# Patient Record
Sex: FEMALE | Race: WHITE | NOT HISPANIC OR LATINO | Employment: OTHER | URBAN - METROPOLITAN AREA
[De-identification: names, ages, dates, MRNs, and addresses within clinical notes are randomized per-mention and may not be internally consistent; named-entity substitution may affect disease eponyms.]

---

## 2017-02-23 ENCOUNTER — OFFICE VISIT (OUTPATIENT)
Dept: MEDICAL GROUP | Age: 67
End: 2017-02-23
Payer: MEDICARE

## 2017-02-23 VITALS
DIASTOLIC BLOOD PRESSURE: 78 MMHG | HEART RATE: 75 BPM | OXYGEN SATURATION: 96 % | HEIGHT: 66 IN | WEIGHT: 180 LBS | SYSTOLIC BLOOD PRESSURE: 106 MMHG | TEMPERATURE: 97.7 F | BODY MASS INDEX: 28.93 KG/M2

## 2017-02-23 DIAGNOSIS — Z23 NEED FOR PNEUMOCOCCAL VACCINATION: ICD-10-CM

## 2017-02-23 DIAGNOSIS — G47.00 INSOMNIA, UNSPECIFIED TYPE: ICD-10-CM

## 2017-02-23 DIAGNOSIS — M15.9 PRIMARY OSTEOARTHRITIS INVOLVING MULTIPLE JOINTS: ICD-10-CM

## 2017-02-23 DIAGNOSIS — G89.29 LUMBOSACRAL PAIN, CHRONIC: ICD-10-CM

## 2017-02-23 DIAGNOSIS — E06.3 HYPOTHYROIDISM DUE TO HASHIMOTO'S THYROIDITIS: ICD-10-CM

## 2017-02-23 DIAGNOSIS — E55.9 VITAMIN D INSUFFICIENCY: ICD-10-CM

## 2017-02-23 DIAGNOSIS — E04.2 MULTINODULAR GOITER (NONTOXIC): ICD-10-CM

## 2017-02-23 DIAGNOSIS — E78.00 PURE HYPERCHOLESTEROLEMIA: ICD-10-CM

## 2017-02-23 DIAGNOSIS — Z15.89 HLA B27 (HLA B27 POSITIVE): ICD-10-CM

## 2017-02-23 DIAGNOSIS — E03.8 HYPOTHYROIDISM DUE TO HASHIMOTO'S THYROIDITIS: ICD-10-CM

## 2017-02-23 DIAGNOSIS — M54.50 LUMBOSACRAL PAIN, CHRONIC: ICD-10-CM

## 2017-02-23 DIAGNOSIS — Z91.81 RISK FOR FALLS: ICD-10-CM

## 2017-02-23 DIAGNOSIS — Z80.0 FHX: COLON CANCER: ICD-10-CM

## 2017-02-23 DIAGNOSIS — K22.2 SCHATZKI'S RING: ICD-10-CM

## 2017-02-23 DIAGNOSIS — F41.1 GAD (GENERALIZED ANXIETY DISORDER): ICD-10-CM

## 2017-02-23 DIAGNOSIS — I10 BENIGN ESSENTIAL HTN: ICD-10-CM

## 2017-02-23 DIAGNOSIS — G43.009 MIGRAINE WITHOUT AURA AND WITHOUT STATUS MIGRAINOSUS, NOT INTRACTABLE: ICD-10-CM

## 2017-02-23 PROCEDURE — 99205 OFFICE O/P NEW HI 60 MIN: CPT | Performed by: FAMILY MEDICINE

## 2017-02-23 RX ORDER — BIOTIN 1 MG
5000 TABLET ORAL
COMMUNITY
End: 2018-07-05

## 2017-02-23 RX ORDER — ZOLPIDEM TARTRATE 5 MG/1
5 TABLET ORAL NIGHTLY PRN
COMMUNITY
End: 2017-02-23 | Stop reason: SDUPTHER

## 2017-02-23 RX ORDER — ZOLPIDEM TARTRATE 5 MG/1
5 TABLET ORAL NIGHTLY PRN
Qty: 30 TAB | Refills: 0 | Status: SHIPPED | OUTPATIENT
Start: 2017-02-23 | End: 2017-05-30 | Stop reason: SDUPTHER

## 2017-02-23 RX ORDER — PAROXETINE HYDROCHLORIDE 40 MG/1
40 TABLET, FILM COATED ORAL DAILY
COMMUNITY
End: 2017-04-07 | Stop reason: SDUPTHER

## 2017-02-23 RX ORDER — METOPROLOL SUCCINATE 50 MG/1
50 TABLET, EXTENDED RELEASE ORAL DAILY
COMMUNITY
End: 2017-03-23 | Stop reason: SDUPTHER

## 2017-02-23 RX ORDER — VALSARTAN AND HYDROCHLOROTHIAZIDE 160; 12.5 MG/1; MG/1
1 TABLET, FILM COATED ORAL DAILY
COMMUNITY
End: 2017-02-23

## 2017-02-23 RX ORDER — CHLORTHALIDONE 25 MG/1
12.5 TABLET ORAL DAILY
Qty: 90 TAB | Refills: 1 | Status: SHIPPED | OUTPATIENT
Start: 2017-02-23 | End: 2017-05-30 | Stop reason: SDUPTHER

## 2017-02-23 RX ORDER — HYDROCODONE BITARTRATE AND ACETAMINOPHEN 5; 325 MG/1; MG/1
0.5 TABLET ORAL
COMMUNITY
End: 2017-05-30 | Stop reason: SDUPTHER

## 2017-02-23 RX ORDER — LEVOTHYROXINE SODIUM 0.05 MG/1
50 TABLET ORAL
COMMUNITY
End: 2017-05-30 | Stop reason: SDUPTHER

## 2017-02-23 RX ORDER — ROSUVASTATIN CALCIUM 20 MG/1
20 TABLET, COATED ORAL EVERY EVENING
COMMUNITY
End: 2017-04-07 | Stop reason: SDUPTHER

## 2017-02-23 ASSESSMENT — PATIENT HEALTH QUESTIONNAIRE - PHQ9: CLINICAL INTERPRETATION OF PHQ2 SCORE: 0

## 2017-02-23 ASSESSMENT — PAIN SCALES - GENERAL: PAINLEVEL: 7=MODERATE-SEVERE PAIN

## 2017-02-23 NOTE — MR AVS SNAPSHOT
"        Margoth Hopkins   2017 1:40 PM   Office Visit   MRN: [id number]    Department:  [address] [place] [address]   Dept Phone:  [phone]    Description:  Female : 1950   Provider:  Charlee Walker M.D.           Reason for Visit     Establish Care           Allergies as of 2017     Allergen Noted Reactions    Pcn [Penicillins] 2017   Hives      You were diagnosed with     FHx: colon cancer   [622828]       Schatzki's ring   [044015]       Benign essential HTN   [983802]       Pure hypercholesterolemia   [272.0.ICD-9-CM]       Migraine without aura and without status migrainosus, not intractable   [934320]       MOHAMUD (generalized anxiety disorder)   [115210]       Hypothyroidism due to Hashimoto's thyroiditis   [0797866]       Multinodular goiter (nontoxic)   [719394]       HLA B27 (HLA B27 positive)   [875075]       Primary osteoarthritis involving multiple joints   [9219235]       Lumbosacral pain, chronic   [094749]       Insomnia, unspecified type   [6531165]       Risk for falls   [492083]       Need for pneumococcal vaccination   [745206]       Vitamin D insufficiency   [703374]         Vital Signs     Blood Pressure Pulse Temperature Height Weight Body Mass Index    106/78 mmHg 75 36.5 °C (97.7 °F) 1.676 m (5' 6\") 81.647 kg (180 lb) 29.07 kg/m2    Oxygen Saturation Smoking Status                96% Never Smoker           Basic Information     Date Of Birth Sex Race Ethnicity Preferred Language    1950 Female White Non- English      Your appointments     Mar 17, 2017  9:00 AM   DX INJECT/ASPIRATE-SMALL JOINT with [facility] DX 1, RADIOLOGIST, [facility] IMAGING - DIAGNOSTIC - [facility] ([facility]  [address]  [place] [address] [phone]           Need an H&P faxed (good for only 30 days).            Mar 21, 2017  2:20 PM   Established Patient with Charlee Walker M.D.   [facility] [address] [place] [address]    25 " [facility] [id number] [phone]           You will be receiving a confirmation call a few days before your appointment from our automated call confirmation system.              Problem List              ICD-10-CM Priority Class Noted - Resolved    FHx: colon cancer Z80.0   2/23/2017 - Present    Schatzki's ring K22.2   2/23/2017 - Present    Benign essential HTN I10   2/23/2017 - Present    Pure hypercholesterolemia E78.00   2/23/2017 - Present    Migraine without aura, not intractable G43.009   2/23/2017 - Present    MOHAMUD (generalized anxiety disorder) F41.1   2/23/2017 - Present    Hypothyroidism due to Hashimoto's thyroiditis E03.8, E06.3   2/23/2017 - Present    Multinodular goiter (nontoxic) E04.2   2/23/2017 - Present    HLA B27 (HLA B27 positive) Z15.89   2/23/2017 - Present    Osteoarthritis of multiple joints M15.9   2/23/2017 - Present    Lumbosacral pain, chronic M54.5, G89.29   2/23/2017 - Present    Insomnia G47.00   2/23/2017 - Present    Risk for falls Z91.81   2/23/2017 - Present    Vitamin D insufficiency E55.9   2/23/2017 - Present      Health Maintenance        Date Due Completion Dates    IMM DTaP/Tdap/Td Vaccine (1 - Tdap) 12/21/1967 ---    MAMMOGRAM 12/21/1988 ---    COLONOSCOPY 12/21/1998 ---    BONE DENSITY 12/21/2013 ---    IMM PNEUMOCOCCAL 65+ (ADULT) LOW/MEDIUM RISK SERIES (1 of 2 - PCV13) 12/21/2013 ---    IMM INFLUENZA (1) 9/1/2016 ---            Current Immunizations     SHINGLES VACCINE 2/23/2011, 2/15/2011    TD Vaccine 2/23/2011    Tetanus Vaccine 2/15/2011      Below and/or attached are the medications your provider expects you to take. Review all of your home medications and newly ordered medications with your provider and/or pharmacist. Follow medication instructions as directed by your provider and/or pharmacist. Please keep your medication list with you and share with your provider. Update the information when medications are discontinued, doses are changed, or  new medications (including over-the-counter products) are added; and carry medication information at all times in the event of emergency situations     Allergies:  PCN - Hives               Medications  Valid as of: February 23, 2017 -  2:50 PM    Generic Name Brand Name Tablet Size Instructions for use    Biotin (Tab) Biotin 1000 MCG Take 5,000 mcg by mouth.        Chlorthalidone (Tab) HYGROTON 25 MG Take 0.5 Tabs by mouth every day.        Cholecalciferol (Tab) cholecalciferol 1000 UNIT Take 2,000 Units by mouth every day.        Hydrocodone-Acetaminophen (Tab) NORCO 5-325 MG Take 0.5 Tabs by mouth every bedtime.        Levothyroxine Sodium (Tab) SYNTHROID 50 MCG Take 50 mcg by mouth Every morning on an empty stomach.        Metoprolol Succinate (TABLET SR 24 HR) TOPROL XL 50 MG Take 50 mg by mouth every day.        PARoxetine HCl (Tab) PAXIL 40 MG Take 40 mg by mouth every day.        Probiotic Product   Take  by mouth.        Rosuvastatin Calcium (Tab) CRESTOR 20 MG Take 20 mg by mouth every evening.        Turmeric   Take  by mouth.        Zolpidem Tartrate (Tab) AMBIEN 5 MG Take 1 Tab by mouth at bedtime as needed for Sleep.        .                 Medicines prescribed today were sent to:     [facility] - [address] [facility] [address]    Phone: [phone] Fax: [phone]    Open 24 Hours?: No    [facility] #[id number] - [place] - [address] [facility]    [address] [facility] [place] [address]    Phone: [phone] Fax: [phone]    Open 24 Hours?: No      Medication refill instructions:       If your prescription bottle indicates you have medication refills left, it is not necessary to call your provider’s office. Please contact your pharmacy and they will refill your medication.    If your prescription bottle indicates you do not have any refills left, you may request refills at any time through one of the following ways: The online  [employer] system (except Urgent Care), by calling your provider’s office, or by asking your pharmacy to contact your provider’s office with a refill request. Medication refills are processed only during regular business hours and may not be available until the next business day. Your provider may request additional information or to have a follow-up visit with you prior to refilling your medication.   *Please Note: Medication refills are assigned a new Rx number when refilled electronically. Your pharmacy may indicate that no refills were authorized even though a new prescription for the same medication is available at the pharmacy. Please request the medicine by name with the pharmacy before contacting your provider for a refill.        Your To Do List     Future Labs/Procedures Complete By Expires    COMP METABOLIC PANEL  As directed 2/24/2018    HEMOGLOBIN A1C  As directed 2/24/2018    LIPID PROFILE  As directed 2/24/2018    MICROALBUMIN CREAT RATIO URINE  As directed 2/24/2018    TSH  As directed 2/24/2018    VITAMIN D,25 HYDROXY  As directed 2/24/2018         [employer] Access Code: [email or web address]  Expires: 3/15/2017  2:14 PM    [employer]  A secure, online tool to manage your health information     [employer]’s [employer]® is a secure, online tool that connects you to your personalized health information from the privacy of your home -- day or night - making it very easy for you to manage your healthcare. Once the activation process is completed, you can even access your medical information using the Ubi Video roberto, which is available for free in the Apple Roberto store or Google Play store.     [employer] provides the following levels of access (as shown below):   My Chart Features   [facility] Doctor [facility]  Urgent  Care Non-[facility]  Primary Care  Doctor   Email your healthcare team securely and privately 24/7 X X X    Manage appointments: schedule your next appointment; view details of  past/upcoming appointments X      Request prescription refills. X      View recent personal medical records, including lab and immunizations X X X X   View health record, including health history, allergies, medications X X X X   Read reports about your outpatient visits, procedures, consult and ER notes X X X X   See your discharge summary, which is a recap of your hospital and/or ER visit that includes your diagnosis, lab results, and care plan. X X       How to register for [employer]:  1. Go to  [email or web address].  2. Click on the Sign Up Now box, which takes you to the New Member Sign Up page. You will need to provide the following information:  a. Enter your [employer] Access Code exactly as it appears at the top of this page. (You will not need to use this code after you’ve completed the sign-up process. If you do not sign up before the expiration date, you must request a new code.)   b. Enter your date of birth.   c. Enter your home email address.   d. Click Submit, and follow the next screen’s instructions.  3. Create a [employer] ID. This will be your [employer] login ID and cannot be changed, so think of one that is secure and easy to remember.  4. Create a [employer] password. You can change your password at any time.  5. Enter your Password Reset Question and Answer. This can be used at a later time if you forget your password.   6. Enter your e-mail address. This allows you to receive e-mail notifications when new information is available in [employer].  7. Click Sign Up. You can now view your health information.    For assistance activating your [employer] account, call [phone]

## 2017-02-24 NOTE — PROGRESS NOTES
CC: establish care, HTN, HLD    HPI:     Margoth Hopkins is a 66 y.o. female, new patient to the clinic, presents to [facility]. Pt has the following concerns:     1. FHx: colon cancer  Pt's mom has hx of colon cancer diagnosed in her 50s.   Pt has had multiple colonoscopy done starting at the age of 48. All has been normal so far.   Last colonoscopy done was 2-3 years ago in [place].     2. Schatzki's ring  Hx of Schatzki's ring at distal esophagus with intermittent symptoms of esophageal spasm, pain, and dysphagia.   Pt had EGD with esophageal dilation x4. She is currently asymptomatic and doing well.     3. Benign essential HTN  Chronic, well controlled with Diovan, denies medication side effects.   Denies chest pain, SOB, IVAN, unusual edema, dizziness with standing.     4. Pure hypercholesterolemia  Chronic, well controlled with Crestor, denies side effects.   Unable to exercise regularly due to diffuse arthritis.   Pt is adhering to low-fat, low-cholesterol diet.     5. Migraine without aura and without status migrainosus, not intractable  Hx of migraine, has been on small dose of Metoprolol for couple decades for migraine prophylaxis.   Doing well, denies active symptoms.     6. MOHAMUD (generalized anxiety disorder)  Chronic, well controlled with Paroxetine for couple years.   Tried to wean off, but suffered recurrent symptoms.   Pt expresses interest to continue.     7. Hypothyroidism due to Hashimoto's thyroiditis  Chronic, stable on Levothyroxine for a number of years, denies side effects.   Denies chest palpitation, hair loss, weight gain, fatigue, heat/cold intolerance    8. Multinodular goiter (nontoxic)  Pt was found to have multinodular goiter in [place] s/p needle biopsy.   Pt was told that it was benign and no further workups indicated.   Denies dysphagia, dysphonia, cervical lymphadenopathy.     9. HLA B27 (HLA B27 positive)  Pt was found to have HLA-B27 positive.     10. Primary osteoarthritis  involving multiple joints  Chronic, diffuse osteoarthritis including back, neck, knees, ankles, and hips.   Pt is taking 0.5 tablet of Vicodin 5-325 mg prior to bed for pain.   She usually takes OTC Tylenol or NSAID during the day for intermittent pain.   Can't exercise frequently due to arthritis.   Endorses mild leg weakness, but vision is excellent.   Declines PT referral or surgical intervention at this point.     11. Lumbosacral pain, chronic  Chronic, secondary to lumbosacral disc disease and arthritis, s/p steroid injection 6 times in [place], but does not really work.   She continue to suffer daily pain, that affect daily function. She take 0.5 tablet of Vicodin before bed.   She has appointment with pain management in a few weeks.     12. Insomnia, unspecified type  Pt takes Ambien occasionally for insomnia.   She states that her insomnia is pain-related. She has good sleep hygiene: drinks 1 cup of coffee per day, no reflux symptoms that keep her up at night, denies excessive usage of electronic devices, no daytime naps    13. Risk for falls  Pt has risks for fall secondary to diffuse joint pain. She uses cane on days that she feels unstable.   She declines PT or home safety eval. Vision is good. Neg hx of osteopenia or osteoporosis. Neg hx of bone fracture.     14. Vitamin D insufficiency  Pt is on vitamin D supplement      Current medicines (including changes today)  Current Outpatient Prescriptions   Medication Sig Dispense Refill   • metoprolol SR (TOPROL XL) 50 MG TABLET SR 24 HR Take 50 mg by mouth every day.     • rosuvastatin (CRESTOR) 20 MG Tab Take 20 mg by mouth every evening.     • paroxetine (PAXIL) 40 MG tablet Take 40 mg by mouth every day.     • levothyroxine (SYNTHROID) 50 MCG Tab Take 50 mcg by mouth Every morning on an empty stomach.     • vitamin D (CHOLECALCIFEROL) 1000 UNIT Tab Take 2,000 Units by mouth every day.     • Biotin 1000 MCG Tab Take 5,000 mcg by mouth.     • Probiotic  Product (PROBIOTIC DAILY PO) Take  by mouth.     • TURMERIC PO Take  by mouth.     • hydrocodone-acetaminophen (NORCO) 5-325 MG Tab per tablet Take 0.5 Tabs by mouth every bedtime.     • zolpidem (AMBIEN) 5 MG Tab Take 1 Tab by mouth at bedtime as needed for Sleep. 30 Tab 0   • chlorthalidone (HYGROTON) 25 MG Tab Take 0.5 Tabs by mouth every day. 90 Tab 1     No current facility-administered medications for this visit.     She  has a past medical history of Thyroid disease and Goiter.  She  has past surgical history that includes knee replacement, total (Right); foot surgery (Left); fusion; cervical fusion posterior; bladder sling female; appendectomy; and cataract extraction with iol (Left).  Social History   Substance Use Topics   • Smoking status: Never Smoker    • Smokeless tobacco: Never Used   • Alcohol Use: 4.2 oz/week     7 Glasses of wine per week     Social History     Social History Narrative   • No narrative on file     Family History   Problem Relation Age of Onset   • Arthritis Mother    • Alzheimer's Disease Mother    • Cancer Mother      cervical cancer   • Heart Attack Father    • Other Brother      brain anurism   • Cancer Maternal Grandmother 50     colono cancer   • Heart Disease Brother      Family Status   Relation Status Death Age   • Mother  92   • Father  64   • Brother     • Maternal Grandmother  70   • Brother Alive    • Maternal Grandfather     • Paternal Grandmother     • Paternal Grandfather     • Brother     • Brother Alive        I personally reviewed patient's problem list, allergies, medications, family hx, social hx with patient and update EPIC.     REVIEW OF SYSTEMS:  CONSTITUTIONAL:  Denies night sweats, fatigue, malaise, lethargy, fever or chills.  RESPIRATORY:  Denies cough, wheeze, hemoptysis, or shortness of breath.  CARDIOVASCULAR:  Denies chest pains, palpitations, pedal edema  GASTROINTESTINAL:  Denies  "abdominal pain, nausea or vomiting, diarrhea, constipation, hematemesis, hematochezia, melena.  GENITOURINARY:  Denies urinary urgency, frequency, dysuria, or hematuria.  No obstructive symptoms.  Denies unusual discharge.      All other systems reviewed and are negative     Objective:     Blood pressure 106/78, pulse 75, temperature 36.5 °C (97.7 °F), height 1.676 m (5' 6\"), weight 81.647 kg (180 lb), SpO2 96 %. Body mass index is 29.07 kg/(m^2).  Physical Exam:    Constitutional: Awake, alert, in no apparent distress.  Skin: Warm, dry, good turgor, no rashes/jaundice in visible areas.  Eye: PERRL, intact EOM, conjunctiva clear, lids normal.  ENMT: TM and auditory canal wnl, nasal & oral mucosa wnl, lips without lesions, good dentition, oropharynx clear.  Neck: Trachea midline, no masses, no thyromegaly. No cervical or supraclavicular lymphadenopathy.  Respiratory: Unlabored respiratory effort, lungs clear to auscultation, no wheezes, no rhonchi.  Cardiovascular: Normal S1, S2, no murmur, no rubs, no gallops, no pedal edema.  Abdomen: Soft, active bowel sounds, non-tender to palpation, no masses, no hepatosplenomegaly.  Neuro: CN 2-12 grossly intact, no focal weakness/numbness.   Psych: Alert and oriented x3, affect and mood wnl, intact judgement and insight.       Assessment and Plan:   The following treatment plan was discussed    1. FHx: colon cancer  Pt's mom has hx of colon cancer diagnosed in her 50s.   Pt has had multiple colonoscopy done starting at the age of 48. All has been normal so far.   Last colonoscopy done was 2-3 years ago in [place]. Plan:   - will request records from previous PCP  - continue screening colonoscopy every 5 years per GI recommendation    2. Schatzki's ring  Hx of Schatzki's ring at distal esophagus with intermittent symptoms of esophageal spasm, pain, and dysphagia.   Pt had EGD with esophageal dilation x4. She is currently asymptomatic and doing well. Plan: will monitor    3. " Benign essential HTN  Chronic, well controlled with Diovan, /78. Pt c/o mild dizziness with standing. Given fall risk, will discontinue Diovan and switch to low dose Chlorthalidone (hopefully also helps improving bone mineral density in postmenopausal women). Plan:   - chlorthalidone (HYGROTON) 25 MG Tab; Take 0.5 Tabs by mouth every day.  Dispense: 90 Tab; Refill: 1  - COMP METABOLIC PANEL; Future  - HEMOGLOBIN A1C; Future  - MICROALBUMIN CREAT RATIO URINE; Future  - f/u in 4 weeks    4. Pure hypercholesterolemia  - LIPID PROFILE; Future  - continue Crestor    5. Migraine without aura and without status migrainosus, not intractable  Chronic, well controlled with metoprolol. Plan: will monitor    6. MOHAMUD (generalized anxiety disorder)  Chronic, well controlled with Paxil, denies medication side effects. Plan: will continue Paxil    7. Hypothyroidism due to Hashimoto's thyroiditis  - TSH; Future  - continue Levothyroxine 50 mcg for now, will as adjust dose as indicated    8. Multinodular goiter (nontoxic)  Chronic, s/p biopsy, benign per pt's report.  Denies dysphagia, dysphonia, head/neck lymphadenopathy. Plan:   - will request records from previous PCP    9. HLA B27 (HLA B27 positive)  - will request records from previous PCP    10. Primary osteoarthritis involving multiple joints  Chronic, diffuse osteoarthritis including back, neck, knees, ankles, and hips.   Pt is taking 0.5 tablet of Vicodin 5-325 mg prior to bed for pain. She also takes OTC Tylenol or NSAID during the day for intermittent pain.   Can't exercise frequently due to arthritis. Endorses mild leg weakness, but vision is excellent. Declines PT referral or surgical intervention at this point. Plan:   - encourage home low-impact exercises  - avoid excessive consumption of NSAID  - f/u with pain management, appointment in a few weeks.     11. Lumbosacral pain, chronic  Chronic, secondary to lumbosacral disc disease and arthritis, s/p steroid  injection 6 times in [place], but does not really work. Pain sometimes keeps pt up at night.   She continue to suffer daily pain, that affects her functions. She take 0.5 tablet of Vicodin before bed. She has appointment with pain management in a few weeks.   Pt is not interested in PT or complimentary medicine at this point.   Plan:   - f/u with pain management.     12. Insomnia, unspecified type  Pt uses Ambien occasionally for insomnia. Does not drink alcohol at night. She takes 0.5 tablet of Vicodin 5-325 mg before bed every night for pain. Plan:   - zolpidem (AMBIEN) 5 MG Tab; Take 1 Tab by mouth at bedtime as needed for Sleep.  Dispense: 30 Tab; Refill: 0  - risks and benefits of Ambien reviewed with patient.   - avoid alcohol, drugs, and narcotics while taking Ambien.     13. Risk for falls  Secondary to diffuse hips, knee, ankle pain. Weakness and decondition are contributing. Her vision is good.   Pt states that she had DEXA scan every 2 years in [place]. She states that her bone density was normal.   Plan:   - Patient identified as fall risk.  Appropriate orders and counseling given.  - discussed PT referral and home safety eval but pt declines. She will try to exercise more with home equipment.   - Will request records from previous PCP    14. Need for pneumococcal vaccination  - PCV 13    15. Vitamin D insufficiency  - VITAMIN D,25 HYDROXY; Future    Total 60 minutes face-to-face time spent with patient, with greater than 50% of the total time discussing patient's issues and symptoms as listed above in assessment and plan, as well as managing coordination of care for future evaluation and treatment.        Records requested.  Followup: Return in about 4 weeks (around 3/23/2017) for Multiple issues.

## 2017-03-03 ENCOUNTER — HOSPITAL ENCOUNTER (OUTPATIENT)
Dept: LAB | Facility: MEDICAL CENTER | Age: 67
End: 2017-03-03
Attending: FAMILY MEDICINE
Payer: MEDICARE

## 2017-03-03 DIAGNOSIS — I10 BENIGN ESSENTIAL HTN: ICD-10-CM

## 2017-03-03 DIAGNOSIS — E03.8 HYPOTHYROIDISM DUE TO HASHIMOTO'S THYROIDITIS: ICD-10-CM

## 2017-03-03 DIAGNOSIS — E06.3 HYPOTHYROIDISM DUE TO HASHIMOTO'S THYROIDITIS: ICD-10-CM

## 2017-03-03 DIAGNOSIS — E78.00 PURE HYPERCHOLESTEROLEMIA: ICD-10-CM

## 2017-03-03 DIAGNOSIS — E55.9 VITAMIN D INSUFFICIENCY: ICD-10-CM

## 2017-03-03 LAB
25(OH)D3 SERPL-MCNC: 25 NG/ML (ref 30–100)
ALBUMIN SERPL BCP-MCNC: 4.3 G/DL (ref 3.2–4.9)
ALBUMIN/GLOB SERPL: 1.4 G/DL
ALP SERPL-CCNC: 70 U/L (ref 30–99)
ALT SERPL-CCNC: 23 U/L (ref 2–50)
ANION GAP SERPL CALC-SCNC: 9 MMOL/L (ref 0–11.9)
AST SERPL-CCNC: 20 U/L (ref 12–45)
BILIRUB SERPL-MCNC: 0.7 MG/DL (ref 0.1–1.5)
BUN SERPL-MCNC: 20 MG/DL (ref 8–22)
CALCIUM SERPL-MCNC: 10 MG/DL (ref 8.5–10.5)
CHLORIDE SERPL-SCNC: 106 MMOL/L (ref 96–112)
CHOLEST SERPL-MCNC: 200 MG/DL (ref 100–199)
CO2 SERPL-SCNC: 24 MMOL/L (ref 20–33)
CREAT SERPL-MCNC: 0.56 MG/DL (ref 0.5–1.4)
CREAT UR-MCNC: 156.3 MG/DL
EST. AVERAGE GLUCOSE BLD GHB EST-MCNC: 105 MG/DL
GLOBULIN SER CALC-MCNC: 3.1 G/DL (ref 1.9–3.5)
GLUCOSE SERPL-MCNC: 106 MG/DL (ref 65–99)
HBA1C MFR BLD: 5.3 % (ref 0–5.6)
HDLC SERPL-MCNC: 85 MG/DL
LDLC SERPL CALC-MCNC: 99 MG/DL
MICROALBUMIN UR-MCNC: 1.2 MG/DL
MICROALBUMIN/CREAT UR: 8 MG/G (ref 0–30)
POTASSIUM SERPL-SCNC: 3.8 MMOL/L (ref 3.6–5.5)
PROT SERPL-MCNC: 7.4 G/DL (ref 6–8.2)
SODIUM SERPL-SCNC: 139 MMOL/L (ref 135–145)
TRIGL SERPL-MCNC: 80 MG/DL (ref 0–149)
TSH SERPL DL<=0.005 MIU/L-ACNC: 0.64 UIU/ML (ref 0.3–3.7)

## 2017-03-03 PROCEDURE — 80061 LIPID PANEL: CPT

## 2017-03-03 PROCEDURE — 82043 UR ALBUMIN QUANTITATIVE: CPT

## 2017-03-03 PROCEDURE — 83036 HEMOGLOBIN GLYCOSYLATED A1C: CPT | Mod: GA

## 2017-03-03 PROCEDURE — 36415 COLL VENOUS BLD VENIPUNCTURE: CPT

## 2017-03-03 PROCEDURE — 80053 COMPREHEN METABOLIC PANEL: CPT

## 2017-03-03 PROCEDURE — 82570 ASSAY OF URINE CREATININE: CPT

## 2017-03-03 PROCEDURE — 84443 ASSAY THYROID STIM HORMONE: CPT

## 2017-03-03 PROCEDURE — 82306 VITAMIN D 25 HYDROXY: CPT

## 2017-03-17 ENCOUNTER — HOSPITAL ENCOUNTER (OUTPATIENT)
Dept: RADIOLOGY | Facility: MEDICAL CENTER | Age: 67
End: 2017-03-17
Attending: NURSE PRACTITIONER
Payer: MEDICARE

## 2017-03-17 DIAGNOSIS — M19.072 OSTEOARTHRITIS OF LEFT ANKLE, UNSPECIFIED OSTEOARTHRITIS TYPE: ICD-10-CM

## 2017-03-17 DIAGNOSIS — M25.572 LEFT ANKLE PAIN, UNSPECIFIED CHRONICITY: ICD-10-CM

## 2017-03-17 PROCEDURE — 700117 HCHG RX CONTRAST REV CODE 255: Performed by: NURSE PRACTITIONER

## 2017-03-17 PROCEDURE — 700111 HCHG RX REV CODE 636 W/ 250 OVERRIDE (IP)

## 2017-03-17 PROCEDURE — 700101 HCHG RX REV CODE 250

## 2017-03-17 PROCEDURE — 20605 DRAIN/INJ JOINT/BURSA W/O US: CPT | Mod: LT

## 2017-03-17 RX ORDER — BUPIVACAINE HYDROCHLORIDE 5 MG/ML
INJECTION, SOLUTION EPIDURAL; INTRACAUDAL
Status: DISPENSED
Start: 2017-03-17 | End: 2017-03-17

## 2017-03-17 RX ORDER — TRIAMCINOLONE ACETONIDE 40 MG/ML
INJECTION, SUSPENSION INTRA-ARTICULAR; INTRAMUSCULAR
Status: DISPENSED
Start: 2017-03-17 | End: 2017-03-17

## 2017-03-17 RX ORDER — LIDOCAINE HYDROCHLORIDE 10 MG/ML
INJECTION, SOLUTION INFILTRATION; PERINEURAL
Status: DISPENSED
Start: 2017-03-17 | End: 2017-03-17

## 2017-03-17 RX ADMIN — IOHEXOL 50 ML: 300 INJECTION, SOLUTION INTRAVENOUS at 09:15

## 2017-03-17 NOTE — OR SURGEON
Immediate Post- Operative Note        PostOp Diagnosis: left foot pain      Procedure(s): left foot injection      Estimated Blood Loss: Less than 5 ml        Complications: None            3/17/2017     2:15 PM     Luke Blue

## 2017-03-23 RX ORDER — METOPROLOL SUCCINATE 50 MG/1
50 TABLET, EXTENDED RELEASE ORAL DAILY
Qty: 90 TAB | Refills: 0 | Status: SHIPPED | OUTPATIENT
Start: 2017-03-23 | End: 2017-05-30 | Stop reason: SDUPTHER

## 2017-03-23 NOTE — TELEPHONE ENCOUNTER
Was the patient seen in the last year in this department? YES     Does patient have an active prescription for medications requested? No     Received Request Via: Patient

## 2017-03-27 ENCOUNTER — TELEPHONE (OUTPATIENT)
Dept: MEDICAL GROUP | Age: 67
End: 2017-03-27

## 2017-03-27 NOTE — TELEPHONE ENCOUNTER
Phone Number Called: [phone] [facility]    Message: medication called in to correct pharmacy, called pt to notify of information below.     Left Message for patient to call back: no

## 2017-03-27 NOTE — TELEPHONE ENCOUNTER
----- Message from Kerry Stallings sent at 3/27/2017  1:35 PM PDT -----  Contact: [phone]  Patient called up front, very upset. Her Metoprolol was called to the wrong Pharmacy in [place]. She is all out of her medication and is now dizzy and not feeling well. She would like her medication called to the [facility] in [place], [place]. [phone] and then she would like a call back to let her know it has been done, because she says she has called 3 times about this.  Thank you

## 2017-04-07 RX ORDER — ROSUVASTATIN CALCIUM 20 MG/1
20 TABLET, COATED ORAL EVERY EVENING
Qty: 90 TAB | Refills: 1 | Status: SHIPPED | OUTPATIENT
Start: 2017-04-07 | End: 2017-05-30 | Stop reason: SDUPTHER

## 2017-04-07 RX ORDER — PAROXETINE HYDROCHLORIDE 40 MG/1
40 TABLET, FILM COATED ORAL DAILY
Qty: 90 TAB | Refills: 1 | Status: SHIPPED | OUTPATIENT
Start: 2017-04-07 | End: 2017-05-30 | Stop reason: SDUPTHER

## 2017-04-07 NOTE — TELEPHONE ENCOUNTER
Phone Number Called: [phone] (home)     Message: Called and informed Pt that medication has been sent to Pharmacy. She is calling scheduling to set up an appointment to be seen.    Left Message for patient to call back: N\A

## 2017-05-26 ENCOUNTER — TELEPHONE (OUTPATIENT)
Dept: MEDICAL GROUP | Age: 67
End: 2017-05-26

## 2017-05-26 NOTE — TELEPHONE ENCOUNTER
ESTABLISHED PATIENT PRE-VISIT PLANNING     Note: Patient will not be contacted if there is no indication to call.     1.  Reviewed note from last office visit with PCP and/or other med group provider: Yes    2.  If any orders were placed at last visit, do we have Results/Consult Notes?        •  Labs - Labs ordered, completed and results are in chart.       •  Imaging - Imaging was not ordered at last office visit.       •  Referrals - No referrals were ordered at last office visit.    3.  Immunizations were updated in [facility] using WebIZ?: Yes       •  Web Iz Recommendations: FLU HEPATITIS A  PREVNAR (PCV13)  TDAP    4.  Patient is due for the following Health Maintenance Topics:   Health Maintenance Due   Topic Date Due   • Annual Wellness Visit  1950   • MAMMOGRAM  12/21/1988   • COLONOSCOPY  12/21/1998   • IMM DTaP/Tdap/Td Vaccine (1 - Tdap) 02/24/2011   • BONE DENSITY  12/21/2013   • IMM PNEUMOCOCCAL 65+ (ADULT) LOW/MEDIUM RISK SERIES (1 of 2 - PCV13) 12/21/2013       - Patient has completed TD and ZOSTAVAX (Shingles) Immunization(s) per WebIZ. Chart has been updated.    5.  Patient was not informed to arrive 15 min prior to their scheduled appointment and bring in their medication bottles.

## 2017-05-30 ENCOUNTER — OFFICE VISIT (OUTPATIENT)
Dept: MEDICAL GROUP | Age: 67
End: 2017-05-30
Payer: MEDICARE

## 2017-05-30 VITALS
HEIGHT: 66 IN | OXYGEN SATURATION: 95 % | HEART RATE: 72 BPM | TEMPERATURE: 98.1 F | DIASTOLIC BLOOD PRESSURE: 60 MMHG | BODY MASS INDEX: 27.61 KG/M2 | WEIGHT: 171.8 LBS | SYSTOLIC BLOOD PRESSURE: 110 MMHG

## 2017-05-30 DIAGNOSIS — E03.8 HYPOTHYROIDISM DUE TO HASHIMOTO'S THYROIDITIS: ICD-10-CM

## 2017-05-30 DIAGNOSIS — G43.009 MIGRAINE WITHOUT AURA AND WITHOUT STATUS MIGRAINOSUS, NOT INTRACTABLE: ICD-10-CM

## 2017-05-30 DIAGNOSIS — E06.3 HYPOTHYROIDISM DUE TO HASHIMOTO'S THYROIDITIS: ICD-10-CM

## 2017-05-30 DIAGNOSIS — I10 BENIGN ESSENTIAL HTN: Primary | ICD-10-CM

## 2017-05-30 DIAGNOSIS — E78.00 PURE HYPERCHOLESTEROLEMIA: ICD-10-CM

## 2017-05-30 DIAGNOSIS — M15.9 PRIMARY OSTEOARTHRITIS INVOLVING MULTIPLE JOINTS: ICD-10-CM

## 2017-05-30 DIAGNOSIS — G47.00 INSOMNIA, UNSPECIFIED TYPE: ICD-10-CM

## 2017-05-30 DIAGNOSIS — F41.1 GAD (GENERALIZED ANXIETY DISORDER): ICD-10-CM

## 2017-05-30 PROCEDURE — 99214 OFFICE O/P EST MOD 30 MIN: CPT | Performed by: FAMILY MEDICINE

## 2017-05-30 PROCEDURE — 3017F COLORECTAL CA SCREEN DOC REV: CPT | Mod: 8P | Performed by: FAMILY MEDICINE

## 2017-05-30 PROCEDURE — G8432 DEP SCR NOT DOC, RNG: HCPCS | Performed by: FAMILY MEDICINE

## 2017-05-30 PROCEDURE — 1100F PTFALLS ASSESS-DOCD GE2>/YR: CPT | Performed by: FAMILY MEDICINE

## 2017-05-30 PROCEDURE — G8419 CALC BMI OUT NRM PARAM NOF/U: HCPCS | Performed by: FAMILY MEDICINE

## 2017-05-30 PROCEDURE — 4040F PNEUMOC VAC/ADMIN/RCVD: CPT | Mod: 8P | Performed by: FAMILY MEDICINE

## 2017-05-30 PROCEDURE — 0518F FALL PLAN OF CARE DOCD: CPT | Mod: 8P | Performed by: FAMILY MEDICINE

## 2017-05-30 PROCEDURE — 3288F FALL RISK ASSESSMENT DOCD: CPT | Mod: 8P | Performed by: FAMILY MEDICINE

## 2017-05-30 PROCEDURE — 1036F TOBACCO NON-USER: CPT | Performed by: FAMILY MEDICINE

## 2017-05-30 PROCEDURE — 3014F SCREEN MAMMO DOC REV: CPT | Mod: 8P | Performed by: FAMILY MEDICINE

## 2017-05-30 RX ORDER — HYDROCODONE BITARTRATE AND ACETAMINOPHEN 5; 325 MG/1; MG/1
0.5 TABLET ORAL
Qty: 30 TAB | Refills: 0 | Status: SHIPPED | OUTPATIENT
Start: 2017-05-30 | End: 2017-09-21 | Stop reason: SDUPTHER

## 2017-05-30 RX ORDER — METOPROLOL SUCCINATE 50 MG/1
50 TABLET, EXTENDED RELEASE ORAL DAILY
Qty: 90 TAB | Refills: 0 | Status: SHIPPED | OUTPATIENT
Start: 2017-05-30 | End: 2017-08-08 | Stop reason: SDUPTHER

## 2017-05-30 RX ORDER — PAROXETINE HYDROCHLORIDE 40 MG/1
40 TABLET, FILM COATED ORAL DAILY
Qty: 90 TAB | Refills: 1 | Status: SHIPPED | OUTPATIENT
Start: 2017-05-30 | End: 2017-10-15 | Stop reason: SDUPTHER

## 2017-05-30 RX ORDER — ROSUVASTATIN CALCIUM 20 MG/1
20 TABLET, COATED ORAL EVERY EVENING
Qty: 90 TAB | Refills: 1 | Status: SHIPPED | OUTPATIENT
Start: 2017-05-30 | End: 2017-10-15 | Stop reason: SDUPTHER

## 2017-05-30 RX ORDER — LEVOTHYROXINE SODIUM 0.05 MG/1
50 TABLET ORAL
Qty: 90 TAB | Refills: 1 | Status: SHIPPED | OUTPATIENT
Start: 2017-05-30 | End: 2017-06-26 | Stop reason: SDUPTHER

## 2017-05-30 RX ORDER — ZOLPIDEM TARTRATE 5 MG/1
5 TABLET ORAL NIGHTLY PRN
Qty: 30 TAB | Refills: 0 | Status: SHIPPED | OUTPATIENT
Start: 2017-05-30 | End: 2017-09-21

## 2017-05-30 RX ORDER — CHLORTHALIDONE 25 MG/1
12.5 TABLET ORAL DAILY
Qty: 90 TAB | Refills: 1 | Status: SHIPPED | OUTPATIENT
Start: 2017-05-30 | End: 2018-03-20

## 2017-05-30 NOTE — MR AVS SNAPSHOT
"        Margoth Hopkins   2017 2:40 PM   Office Visit   MRN: [id number]    Department:  [address] [place] [address]   Dept Phone:  [phone]    Description:  Female : 1950   Provider:  Charlee Walker M.D.           Reason for Visit     Hypothyroidism     Hyperlipidemia     Results Blood work done 2017    Hypertension           Allergies as of 2017     Allergen Noted Reactions    Pcn [Penicillins] 2017   Hives      You were diagnosed with     Benign essential HTN   [023732]       Insomnia, unspecified type   [7266927]       Hypothyroidism due to Hashimoto's thyroiditis   [5883336]       MOHAMUD (generalized anxiety disorder)   [815469]       Pure hypercholesterolemia   [272.0.ICD-9-CM]       Primary osteoarthritis involving multiple joints   [0863511]       Migraine without aura and without status migrainosus, not intractable   [805564]         Vital Signs     Blood Pressure Pulse Temperature Height Weight Body Mass Index    110/60 mmHg 72 36.7 °C (98.1 °F) 1.676 m (5' 6\") 77.928 kg (171 lb 12.8 oz) 27.74 kg/m2    Oxygen Saturation Smoking Status                95% Never Smoker           Basic Information     Date Of Birth Sex Race Ethnicity Preferred Language    1950 Female White Non- English      Your appointments     2017 11:00 AM   ANNUAL WELLNESS with Charlee Walker M.D., [facility]    [address] [id number] [phone]              Problem List              ICD-10-CM Priority Class Noted - Resolved    FHx: colon cancer Z80.0   2017 - Present    Schatzki's ring K22.2   2017 - Present    Benign essential HTN I10   2017 - Present    Pure hypercholesterolemia E78.00   2017 - Present    Migraine without aura, not intractable G43.009   2017 - Present    MOHAMUD (generalized anxiety disorder) F41.1   2017 - Present    Hypothyroidism due to Hashimoto's thyroiditis E03.8, E06.3   2017 - " Present    Multinodular goiter (nontoxic) E04.2   2/23/2017 - Present    HLA B27 (HLA B27 positive) Z15.89   2/23/2017 - Present    Osteoarthritis of multiple joints M15.9   2/23/2017 - Present    Lumbosacral pain, chronic M54.5, G89.29   2/23/2017 - Present    Insomnia G47.00   2/23/2017 - Present    Risk for falls Z91.81   2/23/2017 - Present    Vitamin D insufficiency E55.9   2/23/2017 - Present      Health Maintenance        Date Due Completion Dates    MAMMOGRAM 12/21/1988 ---    COLONOSCOPY 12/21/1998 ---    IMM DTaP/Tdap/Td Vaccine (1 - Tdap) 2/24/2011 2/23/2011    BONE DENSITY 12/21/2013 ---    IMM PNEUMOCOCCAL 65+ (ADULT) LOW/MEDIUM RISK SERIES (1 of 2 - PCV13) 12/21/2013 ---            Current Immunizations     SHINGLES VACCINE 2/23/2011, 2/15/2011    TD Vaccine 2/23/2011    Tetanus Vaccine 2/15/2011      Below and/or attached are the medications your provider expects you to take. Review all of your home medications and newly ordered medications with your provider and/or pharmacist. Follow medication instructions as directed by your provider and/or pharmacist. Please keep your medication list with you and share with your provider. Update the information when medications are discontinued, doses are changed, or new medications (including over-the-counter products) are added; and carry medication information at all times in the event of emergency situations     Allergies:  PCN - Hives               Medications  Valid as of: May 30, 2017 -  2:49 PM    Generic Name Brand Name Tablet Size Instructions for use    Biotin (Tab) Biotin 1000 MCG Take 5,000 mcg by mouth.        Chlorthalidone (Tab) HYGROTON 25 MG Take 0.5 Tabs by mouth every day.        Cholecalciferol (Tab) cholecalciferol 1000 UNIT Take 2,000 Units by mouth every day.        Hydrocodone-Acetaminophen (Tab) NORCO 5-325 MG Take 0.5 Tabs by mouth every bedtime.        Levothyroxine Sodium (Tab) SYNTHROID 50 MCG Take 1 Tab by mouth Every morning on an  empty stomach.        Metoprolol Succinate (TABLET SR 24 HR) TOPROL XL 50 MG Take 1 Tab by mouth every day.        Omega-3 Fatty Acids   Take  by mouth.        PARoxetine HCl (Tab) PAXIL 40 MG Take 1 Tab by mouth every day.        Probiotic Product   Take  by mouth.        Rosuvastatin Calcium (Tab) CRESTOR 20 MG Take 1 Tab by mouth every evening.        Turmeric   Take  by mouth.        Zolpidem Tartrate (Tab) AMBIEN 5 MG Take 1 Tab by mouth at bedtime as needed for Sleep.        .                 Medicines prescribed today were sent to:     [id number] - [place], [place] - [address]    [address] [place] [address]    Phone: [phone] Fax: [phone]    Open 24 Hours?: No    [facility] - [place] [address] [facility] [address] [facility] [address]    Phone: [phone] Fax: [phone]    Open 24 Hours?: No    [id number] [place], [place] - [address] [address]    Phone: [phone] Fax: [phone]    Open 24 Hours?: Yes      Medication refill instructions:       If your prescription bottle indicates you have medication refills left, it is not necessary to call your provider’s office. Please contact your pharmacy and they will refill your medication.    If your prescription bottle indicates you do not have any refills left, you may request refills at any time through one of the following ways: The online Skillset system (except Urgent Care), by calling your provider’s office, or by asking your pharmacy to contact your provider’s office with a refill request. Medication refills are processed only during regular business hours and may not be available until the next business day. Your provider may request additional information or to have a follow-up visit with you prior to refilling your medication.   *Please Note: Medication refills are assigned a new Rx number when refilled electronically. Your pharmacy  may indicate that no refills were authorized even though a new prescription for the same medication is available at the pharmacy. Please request the medicine by name with the pharmacy before contacting your provider for a refill.           EndPlayhart Access Code: Activation code not generated  Current Ripple TVt Status: Active

## 2017-05-30 NOTE — PROGRESS NOTES
"Subjective:   CC: HTN follow up    HPI:     Margoth Hopkins is a 66 y.o. female, established patient of the clinic, presents with the following concerns:     1. Benign essential HTN  Chronic, taking Chlorthalidone as directed.   Endorses mild chest pressure with activity sometimes.   Pt states that she had cardiology evaluation 7 years ago [place].   She was told that she has \"valvular problems\", but she is not clear what valve was affected.     2. Insomnia, unspecified type  Chronic, taking Ambien PRN for symptoms, but rarely needs it.   Denies medication side effects.     3. Hypothyroidism due to Hashimoto's thyroiditis  Chronic, taking Levothyroxine 50 mcg qd as directed, denies side effects.   Denies weight gain/loss, heat/cold intolerance, chest palpitation    4. MOHAMUD (generalized anxiety disorder)  Chronic, taking Prozac as directed, denies side effects.     5. Pure hypercholesterolemia  Chronic, taking Lovastatin as directed, denies side effects.   She is monitoring her diet and tries to exercise as much as she can.     6. Primary osteoarthritis involving multiple joints  Chronic, s/p right knee replacement, but continues to suffer right knee pain.   She take 0.5 table of Norco 5-325 mg PRN for worsening pain.   She denies hx of alcohol/illicit drug abuse, oversedation, nausea, constipation with Norco.     7. Migraine without aura and without status migrainosus, not intractable  Chronic, taking metoprolol daily for prophylaxis.   Symptoms are overall well controlled.     Current medicines (including changes today)  Current Outpatient Prescriptions   Medication Sig Dispense Refill   • Omega-3 Fatty Acids (OMEGA 3 PO) Take  by mouth.     • levothyroxine (SYNTHROID) 50 MCG Tab Take 1 Tab by mouth Every morning on an empty stomach. 90 Tab 1   • chlorthalidone (HYGROTON) 25 MG Tab Take 0.5 Tabs by mouth every day. 90 Tab 1   • metoprolol SR (TOPROL XL) 50 MG TABLET SR 24 HR Take 1 Tab by mouth every " "day. 90 Tab 0   • rosuvastatin (CRESTOR) 20 MG Tab Take 1 Tab by mouth every evening. 90 Tab 1   • paroxetine (PAXIL) 40 MG tablet Take 1 Tab by mouth every day. 90 Tab 1   • hydrocodone-acetaminophen (NORCO) 5-325 MG Tab per tablet Take 0.5 Tabs by mouth every bedtime. 30 Tab 0   • zolpidem (AMBIEN) 5 MG Tab Take 1 Tab by mouth at bedtime as needed for Sleep. 30 Tab 0   • vitamin D (CHOLECALCIFEROL) 1000 UNIT Tab Take 2,000 Units by mouth every day.     • Biotin 1000 MCG Tab Take 5,000 mcg by mouth.     • Probiotic Product (PROBIOTIC DAILY PO) Take  by mouth.     • TURMERIC PO Take  by mouth.       No current facility-administered medications for this visit.     She  has a past medical history of Thyroid disease and Goiter.    I personally reviewed patient's problem list, allergies, medications, family hx, social hx with patient and update EPIC.     REVIEW OF SYSTEMS:  CONSTITUTIONAL:  Denies night sweats, fatigue, malaise, lethargy, fever or chills.  RESPIRATORY:  Denies cough, wheeze, hemoptysis, or shortness of breath.  CARDIOVASCULAR:  Denies chest pains, palpitations, pedal edema  GASTROINTESTINAL:  Denies abdominal pain, nausea or vomiting, diarrhea, constipation, hematemesis, hematochezia, melena.  GENITOURINARY:  Denies urinary urgency, frequency, dysuria, or hematuria.         Objective:     Blood pressure 110/60, pulse 72, temperature 36.7 °C (98.1 °F), height 1.676 m (5' 6\"), weight 77.928 kg (171 lb 12.8 oz), SpO2 95 %. Body mass index is 27.74 kg/(m^2).    Physical Exam:  Constitutional: awake, alert, in no distress.  Skin: Warm, dry, good turgor, no rashes, bruises, ulcers in visible areas.  Eye: conjunctiva clear, lids neg for edema or lesions.  Neck: Trachea midline, no masses, no thyromegaly. No cervical or supraclavicular lymphadenopathy  Respiratory: Unlabored respiratory effort, lungs clear to auscultation, no wheezes, no rhonchi.  Cardiovascular: Normal S1, S2, no murmur, no pedal " edema.  Abdomen: Soft, non-tender to palpation, no hernia, no hepatosplenomegaly.  Neuro: CN2-12 grossly intact.  Psych: Oriented x3, affect and mood wnl, intact judgement and insight.       Assessment and Plan:   The following treatment plan was discussed    1. Benign essential HTN  Chronic, well controlled with Chlorthalidone, will continue.   - chlorthalidone (HYGROTON) 25 MG Tab; Take 0.5 Tabs by mouth every day.  Dispense: 90 Tab; Refill: 1  - discussed lifestyle modification.   - pt c/o intermittent chest pressure at last minutes. Due to time constraint, pt is rescheduled a separate visit to discuss this issue. Strict ED precaution discussed with patient.     2. Insomnia, unspecified type  Chronic, taking Ambien PRN for symptoms, but rarely uses it. Will continue. Plan:   - zolpidem (AMBIEN) 5 MG Tab; Take 1 Tab by mouth at bedtime as needed for Sleep.  Dispense: 30 Tab; Refill: 0  - side effects reviewed with pt. Discouraged concurrent consumption of Ambien with alcohol and/or narcotics.     3. Hypothyroidism due to Hashimoto's thyroiditis  Chronic, well controlled with Levothyroxine, last TSH in 3/2017 was normal. Plan:   - levothyroxine (SYNTHROID) 50 MCG Tab; Take 1 Tab by mouth Every morning on an empty stomach.  Dispense: 90 Tab; Refill: 1    4. MOHAMUD (generalized anxiety disorder)  Chronic, well controlled with Paxil, will continue.   - paroxetine (PAXIL) 40 MG tablet; Take 1 Tab by mouth every day.  Dispense: 90 Tab; Refill: 1    5. Pure hypercholesterolemia  Chronic, responding well to Crestor, will continue  - rosuvastatin (CRESTOR) 20 MG Tab; Take 1 Tab by mouth every evening.  Dispense: 90 Tab; Refill: 1    6. Primary osteoarthritis involving multiple joints  Chronic, well controlled with Norco PRN, 30 pills lasts her for 2-3 months.   NARxCHECK done today, no concerns. Plan:   - hydrocodone-acetaminophen (NORCO) 5-325 MG Tab per tablet; Take 0.5 Tabs by mouth every bedtime.  Dispense: 30 Tab;  Refill: 0  - discussed risks, benefits, and potential complications of Narcotics. Discouraged concurrent consumption of narcotics with sleeping aid and/or alcohol.     7. Migraine without aura and without status migrainosus, not intractable  Chronic, well controlled with Metoprolol prophylaxis, will continue. Plan:   - metoprolol SR (TOPROL XL) 50 MG TABLET SR 24 HR; Take 1 Tab by mouth every day.  Dispense: 90 Tab; Refill: 0    HM:   Pneumonia vaccines, mammogram and colonoscopy were done in [place], will request records.     Charlee Walker M.D.      Followup: Return in about 4 weeks (around 6/27/2017) for Annual wellness visit.    Please note that this dictation was created using voice recognition software. I have made every reasonable attempt to correct obvious errors, but I expect that there are errors of grammar and possibly content that I did not discover before finalizing the note.

## 2017-06-21 ENCOUNTER — TELEPHONE (OUTPATIENT)
Dept: MEDICAL GROUP | Age: 67
End: 2017-06-21

## 2017-06-21 NOTE — TELEPHONE ENCOUNTER
ANNUAL WELLNESS VISIT PRE-VISIT PLANNING     1.  Reviewed note from last office visit with PCP: YES    2.  If any orders were placed at last visit, do we have Results/Consult Notes?        •  Labs - Labs were not ordered at last office visit.       •  Imaging - Imaging was not ordered at last office visit.       •  Referrals - No referrals were ordered at last office visit.    3.  Immunizations were updated in Epic using WebIZ?: Epic matches WebIZ       •  WebIZ Recommendations:        •  Is patient due for Tdap? NO       •  Is patient due for Shingles? NO     4.  Patient is due for the following Health Maintenance Topics:   Health Maintenance Due   Topic Date Due   • Annual Wellness Visit  1950   • MAMMOGRAM  12/21/1988   • COLONOSCOPY  12/21/1998   • IMM DTaP/Tdap/Td Vaccine (1 - Tdap) 02/24/2011   • BONE DENSITY  12/21/2013   • IMM PNEUMOCOCCAL 65+ (ADULT) LOW/MEDIUM RISK SERIES (1 of 2 - PCV13) 12/21/2013           5.  Reviewed/Updated the following with patient:       •   Preferred Pharmacy? YES       •   Preferred Lab? YES       •   Medications? YES. Was Abstract Encounter opened and chart updated? YES       •   Social History? YES. Was Abstract Encounter opened and chart updated? YES       •   Family History? YES. Was Abstract Encounter opened and chart updated? YES    6.  Care Team Updated:       •   DME Company (gait device, O2, CPAP, etc.): NO       •   Other Specialists (eye doctor, derm, GYN, cardiology, endo, etc): NO    7.  Patient has the following Care Path diagnoses on Problem List:  NONE    8.  Specialty Comments was updated with diagnosis information provided by [facility]: YES    9.  Patient was advised: “This is a free wellness visit. The provider will screen for medical conditions to help you stay healthy. If you have other concerns to address you may be asked to discuss these at a separate visit or there may be an additional fee.”     6.  Patient was informed to arrive 15 min prior to their  scheduled appointment and bring in their medication bottles.

## 2017-06-26 ENCOUNTER — OFFICE VISIT (OUTPATIENT)
Dept: MEDICAL GROUP | Age: 67
End: 2017-06-26
Payer: MEDICARE

## 2017-06-26 VITALS
DIASTOLIC BLOOD PRESSURE: 78 MMHG | WEIGHT: 170 LBS | BODY MASS INDEX: 26.68 KG/M2 | HEART RATE: 61 BPM | HEIGHT: 67 IN | OXYGEN SATURATION: 95 % | SYSTOLIC BLOOD PRESSURE: 102 MMHG | TEMPERATURE: 97.3 F

## 2017-06-26 DIAGNOSIS — I10 BENIGN ESSENTIAL HTN: ICD-10-CM

## 2017-06-26 DIAGNOSIS — F41.1 GAD (GENERALIZED ANXIETY DISORDER): ICD-10-CM

## 2017-06-26 DIAGNOSIS — E04.2 MULTINODULAR GOITER (NONTOXIC): ICD-10-CM

## 2017-06-26 DIAGNOSIS — E06.3 HYPOTHYROIDISM DUE TO HASHIMOTO'S THYROIDITIS: ICD-10-CM

## 2017-06-26 DIAGNOSIS — E03.8 HYPOTHYROIDISM DUE TO HASHIMOTO'S THYROIDITIS: ICD-10-CM

## 2017-06-26 DIAGNOSIS — R07.9 CHEST PAIN, UNSPECIFIED TYPE: Primary | ICD-10-CM

## 2017-06-26 PROBLEM — Z91.81 RISK FOR FALLS: Status: RESOLVED | Noted: 2017-02-23 | Resolved: 2017-06-26

## 2017-06-26 PROBLEM — L80 VITILIGO: Status: ACTIVE | Noted: 2017-06-26

## 2017-06-26 PROCEDURE — 99215 OFFICE O/P EST HI 40 MIN: CPT | Performed by: FAMILY MEDICINE

## 2017-06-26 RX ORDER — NITROGLYCERIN 0.3 MG/1
0.3 TABLET SUBLINGUAL PRN
Qty: 30 TAB | Refills: 0 | Status: SHIPPED | OUTPATIENT
Start: 2017-06-26 | End: 2017-08-19 | Stop reason: SDUPTHER

## 2017-06-26 RX ORDER — LEVOTHYROXINE SODIUM 0.05 MG/1
50 TABLET ORAL
Qty: 90 TAB | Refills: 1 | Status: SHIPPED | OUTPATIENT
Start: 2017-06-26 | End: 2018-01-02 | Stop reason: SDUPTHER

## 2017-06-26 ASSESSMENT — PATIENT HEALTH QUESTIONNAIRE - PHQ9: CLINICAL INTERPRETATION OF PHQ2 SCORE: 0

## 2017-06-26 NOTE — PROGRESS NOTES
Subjective:   CC: chest pain    HPI:     Margoth Hopkins is a 66 y.o. female, established patient of the clinic, presents with the following concerns:     1. Benign essential HTN  Chronic, taking chlorthalidone and metoprolol as directed.   Endorses intermittent chest pain and IVAN, denies unusual swelling or orthostatic hypotension.     2. Hypothyroidism due to Hashimoto's thyroiditis  4. Multinodular goiter (nontoxic)  Chronic, taking Levothyroxine as directed.   Pt had hx of multinodular goiter s/p biopsy in 2015 by Endo in [place].   Pt was told to have repeat thyroid US in 2 years.   She requests referral to establish care with local Endo.     3. MOHAMUD (generalized anxiety disorder)  Chronic, taking Paxil as directed, denies side effects.     5. Chest pain, unspecified type  Pt c/o intermittent chest tightness with exertion. Associated symptoms include SOB and mild anxiety. Pain is non-radiating, better with rest, usually last 30-60s then self resolve. She has similar symptoms in [place]. She did went through Echo and stress test 8 years ago. She states that everything was normal then. However, she states that her symptoms are progressive worsened over the past couple years. She has hx of well-controlled HTN and HLD. Her father and brother both have CVD.     Current medicines (including changes today)  Current Outpatient Prescriptions   Medication Sig Dispense Refill   • levothyroxine (SYNTHROID) 50 MCG Tab Take 1 Tab by mouth Every morning on an empty stomach. 90 Tab 1   • Omega-3 Fatty Acids (OMEGA 3 PO) Take  by mouth.     • chlorthalidone (HYGROTON) 25 MG Tab Take 0.5 Tabs by mouth every day. 90 Tab 1   • metoprolol SR (TOPROL XL) 50 MG TABLET SR 24 HR Take 1 Tab by mouth every day. 90 Tab 0   • rosuvastatin (CRESTOR) 20 MG Tab Take 1 Tab by mouth every evening. 90 Tab 1   • paroxetine (PAXIL) 40 MG tablet Take 1 Tab by mouth every day. 90 Tab 1   • hydrocodone-acetaminophen (NORCO) 5-325 MG Tab per tablet Take 0.5 Tabs by  "mouth every bedtime. 30 Tab 0   • zolpidem (AMBIEN) 5 MG Tab Take 1 Tab by mouth at bedtime as needed for Sleep. 30 Tab 0   • vitamin D (CHOLECALCIFEROL) 1000 UNIT Tab Take 2,000 Units by mouth every day.     • Biotin 1000 MCG Tab Take 5,000 mcg by mouth.     • Probiotic Product (PROBIOTIC DAILY PO) Take  by mouth.     • TURMERIC PO Take  by mouth.       No current facility-administered medications for this visit.     She  has a past medical history of Thyroid disease and Goiter.    I personally reviewed patient's problem list, allergies, medications, family hx, social hx with patient and update EPIC.     REVIEW OF SYSTEMS:  CONSTITUTIONAL:  Denies night sweats, fatigue, malaise, lethargy, fever or chills.  RESPIRATORY:  Denies cough, wheeze, hemoptysis, or shortness of breath.  CARDIOVASCULAR:  Denies palpitations, pedal edema  GASTROINTESTINAL:  Denies abdominal pain, nausea or vomiting, diarrhea, constipation, hematemesis, hematochezia, melena.  GENITOURINARY:  Denies urinary urgency, frequency, dysuria, or hematuria.  No obstructive symptoms.  Denies unusual discharge.       Objective:     Blood pressure 102/78, pulse 61, temperature 36.3 °C (97.3 °F), height 1.702 m (5' 7.01\"), weight 77.111 kg (170 lb), SpO2 95 %. Body mass index is 26.62 kg/(m^2).    Physical Exam:  Constitutional: awake, alert, in no distress.  Skin: Warm, dry, good turgor, no rashes, bruises, ulcers in visible areas.  Eye: conjunctiva clear, lids neg for edema or lesions.  Neck: Trachea midline, no masses, no thyromegaly. No cervical or supraclavicular lymphadenopathy  Respiratory: Unlabored respiratory effort, lungs clear to auscultation, no wheezes, no rhonchi.  Cardiovascular: Normal S1, S2, no murmur, no pedal edema.  Abdomen: Soft, non-tender to palpation, no hernia, no hepatosplenomegaly.  Neuro: CN2-12 grossly intact.     Psych: Oriented x3, affect and mood wnl, intact judgement and insight.       Assessment and Plan:   The following " treatment plan was discussed    1. Benign essential HTN  Chronic, well controlled with Chlorthalidone and Metoprolol, will continue.     2. Hypothyroidism due to Hashimoto's thyroiditis  Chronic, last TSH was normal, pt is on Levothyroxine 50 mcg.   Pt request Endo referral for multinodular goiter.   - REFERRAL TO ENDOCRINOLOGY  - levothyroxine (SYNTHROID) 50 MCG Tab; Take 1 Tab by mouth Every morning on an empty stomach.  Dispense: 90 Tab; Refill: 1    3. MOHAMUD (generalized anxiety disorder)  Chronic, well controlled with Paxil, will continue.     4. Multinodular goiter (nontoxic)  - REFERRAL TO ENDOCRINOLOGY    5. Chest pain, unspecified type  Pt presents with intermittent chest tightness with exertion. EKG noted for NSR w/o acute changes. HTN and HLD are well controlled. Familial hx note for father and brother with cardiovascular diseases. Plan:   - NM-CARDIAC STRESS TEST; Future (unable to do treadmill due to knee pain)  - trial of Nitroglyerine  - strict ED precautions     Total 40 minutes face-to-face time spent with patient, with greater than 50% of the total time discussing patient's issues and symptoms as listed above in assessment and plan, as well as managing coordination of care for future evaluation and treatment.      Charlee Walker M.D.      Followup: Return in about 2 weeks (around 7/10/2017) for Annual wellness visit, Todd.    Please note that this dictation was created using voice recognition software. I have made every reasonable attempt to correct obvious errors, but I expect that there are errors of grammar and possibly content that I did not discover before finalizing the note.

## 2017-06-26 NOTE — MR AVS SNAPSHOT
"        Margoth Hopkins   2017 11:40 AM   Office Visit   MRN: [id number]    Department:  [address] [place] [address]   Dept Phone:  [phone]    Description:  Female : 1950   Provider:  Charlee Walker M.D.           Reason for Visit     Annual Wellness Visit           Allergies as of 2017     Allergen Noted Reactions    Morphine 2017   Vomiting    Pcn [Penicillins] 2017   Hives      You were diagnosed with     Chest pain, unspecified type   [6538305]  -  Primary     Benign essential HTN   [934216]       Hypothyroidism due to Hashimoto's thyroiditis   [7418507]       MOHAMUD (generalized anxiety disorder)   [357998]       Multinodular goiter (nontoxic)   [964825]         Vital Signs     Blood Pressure Pulse Temperature Height Weight Body Mass Index    102/78 mmHg 61 36.3 °C (97.3 °F) 1.702 m (5' 7.01\") 77.111 kg (170 lb) 26.62 kg/m2    Oxygen Saturation Smoking Status                95% Never Smoker           Basic Information     Date Of Birth Sex Race Ethnicity Preferred Language    1950 Female White Non- English      Your appointments     2017  9:40 AM   ANNUAL WELLNESS with Charlee Walker M.D., [facility]    [address] [address] [phone]              Problem List              ICD-10-CM Priority Class Noted - Resolved    FHx: colon cancer Z80.0   2017 - Present    Schatzki's ring K22.2   2017 - Present    Benign essential HTN I10   2017 - Present    Pure hypercholesterolemia E78.00   2017 - Present    Migraine without aura, not intractable G43.009   2017 - Present    MOHAMUD (generalized anxiety disorder) F41.1   2017 - Present    Hypothyroidism due to Hashimoto's thyroiditis E03.8, E06.3   2017 - Present    Multinodular goiter (nontoxic) E04.2   2017 - Present    HLA B27 (HLA B27 positive) Z15.89   2017 - Present    Osteoarthritis of multiple joints M15.9   2017 - " Present    Lumbosacral pain, chronic M54.5, G89.29   2/23/2017 - Present    Insomnia G47.00   2/23/2017 - Present    Vitamin D insufficiency E55.9   2/23/2017 - Present    Vitiligo L80   6/26/2017 - Present    Chest pain R07.9   6/26/2017 - Present      Health Maintenance        Date Due Completion Dates    MAMMOGRAM 12/21/1988 ---    IMM DTaP/Tdap/Td Vaccine (1 - Tdap) 2/24/2011 2/23/2011    BONE DENSITY 12/21/2013 ---    IMM PNEUMOCOCCAL 65+ (ADULT) LOW/MEDIUM RISK SERIES (1 of 2 - PCV13) 12/21/2013 ---    COLONOSCOPY 3/24/2021 3/24/2016            Current Immunizations     SHINGLES VACCINE 2/23/2011, 2/15/2011    TD Vaccine 2/23/2011    Tetanus Vaccine 2/15/2011      Below and/or attached are the medications your provider expects you to take. Review all of your home medications and newly ordered medications with your provider and/or pharmacist. Follow medication instructions as directed by your provider and/or pharmacist. Please keep your medication list with you and share with your provider. Update the information when medications are discontinued, doses are changed, or new medications (including over-the-counter products) are added; and carry medication information at all times in the event of emergency situations     Allergies:  MORPHINE - Vomiting     PCN - Hives               Medications  Valid as of: June 26, 2017 - 12:46 PM    Generic Name Brand Name Tablet Size Instructions for use    Biotin (Tab) Biotin 1000 MCG Take 5,000 mcg by mouth.        Chlorthalidone (Tab) HYGROTON 25 MG Take 0.5 Tabs by mouth every day.        Cholecalciferol (Tab) cholecalciferol 1000 UNIT Take 2,000 Units by mouth every day.        Hydrocodone-Acetaminophen (Tab) NORCO 5-325 MG Take 0.5 Tabs by mouth every bedtime.        Levothyroxine Sodium (Tab) SYNTHROID 50 MCG Take 1 Tab by mouth Every morning on an empty stomach.        Metoprolol Succinate (TABLET SR 24 HR) TOPROL XL 50 MG Take 1 Tab by mouth every day.         Nitroglycerin (SL Tab) NITROSTAT 0.3 MG Place 1 Tab under tongue as needed for Chest Pain.        Omega-3 Fatty Acids   Take  by mouth.        PARoxetine HCl (Tab) PAXIL 40 MG Take 1 Tab by mouth every day.        Probiotic Product   Take  by mouth.        Rosuvastatin Calcium (Tab) CRESTOR 20 MG Take 1 Tab by mouth every evening.        Turmeric   Take  by mouth.        Zolpidem Tartrate (Tab) AMBIEN 5 MG Take 1 Tab by mouth at bedtime as needed for Sleep.        .                 Medicines prescribed today were sent to:     [id number] - [place], [place] - [address]    [address] [place] [address]    Phone: [phone] Fax: [phone]    Open 24 Hours?: No    [facility] - [place], [place] - [address] [place] [address]    [address] [facility] [address] [facility] [address]    Phone: [phone] Fax: [phone]    Open 24 Hours?: No    [id number] [place], [place] - [address] [address]    Phone: [phone] Fax: [phone]    Open 24 Hours?: Yes      Medication refill instructions:       If your prescription bottle indicates you have medication refills left, it is not necessary to call your provider’s office. Please contact your pharmacy and they will refill your medication.    If your prescription bottle indicates you do not have any refills left, you may request refills at any time through one of the following ways: The online [employer] system (except Urgent Care), by calling your provider’s office, or by asking your pharmacy to contact your provider’s office with a refill request. Medication refills are processed only during regular business hours and may not be available until the next business day. Your provider may request additional information or to have a follow-up visit with you prior to refilling your medication.   *Please Note: Medication refills are assigned a new Rx number when refilled electronically. Your pharmacy may indicate that  no refills were authorized even though a new prescription for the same medication is available at the pharmacy. Please request the medicine by name with the pharmacy before contacting your provider for a refill.        Your To Do List     Future Labs/Procedures Complete By Expires    NM-CARDIAC STRESS TEST  As directed 12/27/2017      Referral     A referral request has been sent to our patient care coordination department. Please allow 3-5 business days for us to process this request and contact you either by phone or mail. If you do not hear from us by the 5th business day, please call us at [phone].        Instructions    Referral information sent to the following:  Endocrinology     [facility]OCRINOLOGY [facility]  [phone]          Priztaghart Access Code: Activation code not generated  Current [employer] L[employer] Status: Active

## 2017-06-26 NOTE — PATIENT INSTRUCTIONS
Referral information sent to the following:  Endocrinology     ENDOCRINOLOGY MED [facility]  [phone]

## 2017-06-29 ENCOUNTER — TELEPHONE (OUTPATIENT)
Dept: MEDICAL GROUP | Age: 67
End: 2017-06-29

## 2017-06-29 NOTE — TELEPHONE ENCOUNTER
Phone Number Called:   [facility] - [place] [address] [address]  [address]  [facility] [address]  Phone: [phone] Fax: [phone]      Message: [facility] notified that it is ok to dispense 100 pills.    Left Message for patient to call back: no

## 2017-06-30 ENCOUNTER — APPOINTMENT (OUTPATIENT)
Dept: RADIOLOGY | Facility: MEDICAL CENTER | Age: 67
End: 2017-06-30
Attending: EMERGENCY MEDICINE
Payer: MEDICARE

## 2017-06-30 ENCOUNTER — HOSPITAL ENCOUNTER (EMERGENCY)
Facility: MEDICAL CENTER | Age: 67
End: 2017-06-30
Attending: EMERGENCY MEDICINE
Payer: MEDICARE

## 2017-06-30 VITALS
TEMPERATURE: 98.3 F | BODY MASS INDEX: 26.82 KG/M2 | OXYGEN SATURATION: 93 % | SYSTOLIC BLOOD PRESSURE: 131 MMHG | DIASTOLIC BLOOD PRESSURE: 67 MMHG | HEART RATE: 59 BPM | WEIGHT: 171.3 LBS | RESPIRATION RATE: 16 BRPM

## 2017-06-30 DIAGNOSIS — S09.90XA CLOSED HEAD INJURY, INITIAL ENCOUNTER: ICD-10-CM

## 2017-06-30 DIAGNOSIS — S20.211A CHEST WALL CONTUSION, RIGHT, INITIAL ENCOUNTER: ICD-10-CM

## 2017-06-30 DIAGNOSIS — S80.01XA CONTUSION OF RIGHT KNEE, INITIAL ENCOUNTER: ICD-10-CM

## 2017-06-30 PROCEDURE — 99284 EMERGENCY DEPT VISIT MOD MDM: CPT

## 2017-06-30 PROCEDURE — A9270 NON-COVERED ITEM OR SERVICE: HCPCS | Performed by: EMERGENCY MEDICINE

## 2017-06-30 PROCEDURE — 96374 THER/PROPH/DIAG INJ IV PUSH: CPT

## 2017-06-30 PROCEDURE — 71010 DX-CHEST-PORTABLE (1 VIEW): CPT

## 2017-06-30 PROCEDURE — 700102 HCHG RX REV CODE 250 W/ 637 OVERRIDE(OP): Performed by: EMERGENCY MEDICINE

## 2017-06-30 PROCEDURE — 96375 TX/PRO/DX INJ NEW DRUG ADDON: CPT

## 2017-06-30 PROCEDURE — 72125 CT NECK SPINE W/O DYE: CPT

## 2017-06-30 PROCEDURE — 700111 HCHG RX REV CODE 636 W/ 250 OVERRIDE (IP): Performed by: EMERGENCY MEDICINE

## 2017-06-30 PROCEDURE — 70450 CT HEAD/BRAIN W/O DYE: CPT

## 2017-06-30 PROCEDURE — 73562 X-RAY EXAM OF KNEE 3: CPT | Mod: RT

## 2017-06-30 RX ORDER — ONDANSETRON 2 MG/ML
4 INJECTION INTRAMUSCULAR; INTRAVENOUS ONCE
Status: COMPLETED | OUTPATIENT
Start: 2017-06-30 | End: 2017-06-30

## 2017-06-30 RX ORDER — OXYCODONE HYDROCHLORIDE AND ACETAMINOPHEN 5; 325 MG/1; MG/1
1-21 TABLET ORAL EVERY 6 HOURS PRN
Qty: 15 TAB | Refills: 0 | Status: SHIPPED | OUTPATIENT
Start: 2017-06-30 | End: 2017-09-21

## 2017-06-30 RX ORDER — OXYCODONE HYDROCHLORIDE AND ACETAMINOPHEN 5; 325 MG/1; MG/1
1 TABLET ORAL ONCE
Status: COMPLETED | OUTPATIENT
Start: 2017-06-30 | End: 2017-06-30

## 2017-06-30 RX ADMIN — OXYCODONE HYDROCHLORIDE AND ACETAMINOPHEN 1 TABLET: 5; 325 TABLET ORAL at 14:13

## 2017-06-30 RX ADMIN — ONDANSETRON 4 MG: 2 INJECTION INTRAMUSCULAR; INTRAVENOUS at 13:24

## 2017-06-30 RX ADMIN — HYDROMORPHONE HYDROCHLORIDE 0.5 MG: 1 INJECTION, SOLUTION INTRAMUSCULAR; INTRAVENOUS; SUBCUTANEOUS at 13:24

## 2017-06-30 ASSESSMENT — PAIN SCALES - GENERAL: PAINLEVEL_OUTOF10: ASSUMED PAIN PRESENT

## 2017-06-30 NOTE — ED AVS SNAPSHOT
SharePlow Access Code: Activation code not generated  Current SharePlow Status: Active    [employer]  A secure, online tool to manage your health information     [employer]’s SharePlow® is a secure, online tool that connects you to your personalized health information from the privacy of your home -- day or night - making it very easy for you to manage your healthcare. Once the activation process is completed, you can even access your medical information using the SharePlow roberto, which is available for free in the Apple Roberto store or Google Play store.     SharePlow provides the following levels of access (as shown below):   My Chart Features   [facility] Primary Care Doctor [facility]  Specialists [facility]  Urgent  Care Non-[facility]  Primary Care  Doctor   Email your healthcare team securely and privately 24/7 X X X X   Manage appointments: schedule your next appointment; view details of past/upcoming appointments X      Request prescription refills. X      View recent personal medical records, including lab and immunizations X X X X   View health record, including health history, allergies, medications X X X X   Read reports about your outpatient visits, procedures, consult and ER notes X X X X   See your discharge summary, which is a recap of your hospital and/or ER visit that includes your diagnosis, lab results, and care plan. X X       How to register for SharePlow:  1. Go to  [email or web address].  2. Click on the Sign Up Now box, which takes you to the New Member Sign Up page. You will need to provide the following information:  a. Enter your SharePlow Access Code exactly as it appears at the top of this page. (You will not need to use this code after you’ve completed the sign-up process. If you do not sign up before the expiration date, you must request a new code.)   b. Enter your date of birth.   c. Enter your home email address.   d. Click Submit, and follow the next screen’s instructions.  3. Create a SharePlow ID. This will  be your Fetchnotes login ID and cannot be changed, so think of one that is secure and easy to remember.  4. Create a Fetchnotes password. You can change your password at any time.  5. Enter your Password Reset Question and Answer. This can be used at a later time if you forget your password.   6. Enter your e-mail address. This allows you to receive e-mail notifications when new information is available in Fetchnotes.  7. Click Sign Up. You can now view your health information.    For assistance activating your Fetchnotes account, call [phone]

## 2017-06-30 NOTE — ED NOTES
Chief Complaint   Patient presents with   • Head Injury     pt states she tripped over a bag of groceries. pt states she landed on her right chest and bumped the right side of her head. pt denies LOC. pt not taking blood thinners. pt with hematoma above right eye. pt's AAOx4.    • Chest Wall Pain     right chest pain

## 2017-06-30 NOTE — DISCHARGE INSTRUCTIONS
Take Percocet as needed for severe pain  Do not take Norco and Percocet at the same time  Do not drink alcohol or drive while taking narcotics such as Norco and Percocet  Return to the ER for difficulty breathing, fever, increased pain, or other concerns  Follow-up with your regular doctor for recheck on Monday    Head Injury, Adult  You have a head injury. Headaches and throwing up (vomiting) are common after a head injury. It should be easy to wake up from sleeping. Sometimes you must stay in the hospital. Most problems happen within the first 24 hours. Side effects may occur up to 7-10 days after the injury.   WHAT ARE THE TYPES OF HEAD INJURIES?  Head injuries can be as minor as a bump. Some head injuries can be more severe. More severe head injuries include:  · A jarring injury to the brain (concussion).  · A bruise of the brain (contusion). This mean there is bleeding in the brain that can cause swelling.  · A cracked skull (skull fracture).  · Bleeding in the brain that collects, clots, and forms a bump (hematoma).  WHEN SHOULD I GET HELP RIGHT AWAY?   · You are confused or sleepy.  · You cannot be woken up.  · You feel sick to your stomach (nauseous) or keep throwing up (vomiting).  · Your dizziness or unsteadiness is getting worse.  · You have very bad, lasting headaches that are not helped by medicine. Take medicines only as told by your doctor.  · You cannot use your arms or legs like normal.  · You cannot walk.  · You notice changes in the black spots in the center of the colored part of your eye (pupil).  · You have clear or bloody fluid coming from your nose or ears.  · You have trouble seeing.  During the next 24 hours after the injury, you must stay with someone who can watch you. This person should get help right away (call 911 in the [place] if you start to shake and are not able to control it (have seizures), you pass out, or you are unable to wake up.  HOW CAN I PREVENT A HEAD INJURY IN THE  FUTURE?  · Wear seat belts.  · Wear a helmet while bike riding and playing sports like football.  · Stay away from dangerous activities around the house.  WHEN CAN I RETURN TO NORMAL ACTIVITIES AND ATHLETICS?  See your doctor before doing these activities. You should not do normal activities or play contact sports until 1 week after the following symptoms have stopped:  · Headache that does not go away.  · Dizziness.  · Poor attention.  · Confusion.  · Memory problems.  · Sickness to your stomach or throwing up.  · Tiredness.  · Fussiness.  · Bothered by bright lights or loud noises.  · Anxiousness or depression.  · Restless sleep.  MAKE SURE YOU:   · Understand these instructions.  · Will watch your condition.  · Will get help right away if you are not doing well or get worse.     This information is not intended to replace advice given to you by your health care provider. Make sure you discuss any questions you have with your health care provider.     Document Released: 11/30/2009 Document Revised: 01/08/2016 Document Reviewed: 08/25/2014  [employer] Interactive Patient Education ©2016 [employer]      Chest Contusion  A chest contusion is a deep bruise on your chest area. Contusions are the result of an injury that caused bleeding under the skin. A chest contusion may involve bruising of the skin, muscles, or ribs. The contusion may turn blue, purple, or yellow. Minor injuries will give you a painless contusion, but more severe contusions may stay painful and swollen for a few weeks.  CAUSES   A contusion is usually caused by a blow, trauma, or direct force to an area of the body.  SYMPTOMS   · Swelling and redness of the injured area.  · Discoloration of the injured area.  · Tenderness and soreness of the injured area.  · Pain.  DIAGNOSIS   The diagnosis can be made by taking a history and performing a physical exam. An X-ray, CT scan, or MRI may be needed to determine if there were any associated injuries, such  as broken bones (fractures) or internal injuries.  TREATMENT   Often, the best treatment for a chest contusion is resting, icing, and applying cold compresses to the injured area. Deep breathing exercises may be recommended to reduce the risk of pneumonia. Over-the-counter medicines may also be recommended for pain control.  HOME CARE INSTRUCTIONS   · Put ice on the injured area.  ¨ Put ice in a plastic bag.  ¨ Place a towel between your skin and the bag.  ¨ Leave the ice on for 15-20 minutes, 03-04 times a day.  · Only take over-the-counter or prescription medicines as directed by your caregiver. Your caregiver may recommend avoiding anti-inflammatory medicines (aspirin, ibuprofen, and naproxen) for 48 hours because these medicines may increase bruising.  · Rest the injured area.  · Perform deep-breathing exercises as directed by your caregiver.  · Stop smoking if you smoke.  · Do not lift objects over 5 pounds (2.3 kg) for 3 days or longer if recommended by your caregiver.  SEEK IMMEDIATE MEDICAL CARE IF:   · You have increased bruising or swelling.  · You have pain that is getting worse.  · You have difficulty breathing.  · You have dizziness, weakness, or fainting.  · You have blood in your urine or stool.  · You cough up or vomit blood.  · Your swelling or pain is not relieved with medicines.  MAKE SURE YOU:   · Understand these instructions.  · Will watch your condition.  · Will get help right away if you are not doing well or get worse.     This information is not intended to replace advice given to you by your health care provider. Make sure you discuss any questions you have with your health care provider.     Document Released: 09/12/2002 Document Revised: 09/11/2013 Document Reviewed: 06/10/2013  [employer] Interactive Patient Education ©2016 [employer]

## 2017-06-30 NOTE — ED AVS SNAPSHOT
6/30/2017    Margoth Hopkins  [address] [place] [address]    Dear Marogth:    [facility] wants to ensure your discharge home is safe and you or your loved ones have had all of your questions answered regarding your care after you leave the hospital.    Below is a list of resources and contact information should you have any questions regarding your hospital stay, follow-up instructions, or active medical symptoms.    Questions or Concerns Regarding… Contact   Medical Questions Related to Your Discharge  (7 days a week, 8am-5pm) Contact a Nurse Care Coordinator   [phone]   Medical Questions Not Related to Your Discharge  (24 hours a day / 7 days a week)  Contact the Nurse Health Line   [phone]    Medications or Discharge Instructions Refer to your discharge packet   or contact your [facility] Primary Care Provider   [phone]   Follow-up Appointment(s) Schedule your appointment via Estadeboda   or contact Scheduling [phone]   Billing Review your statement via Est[employer]  or contact Billing [phone]   Medical Records Review your records via Estadeboda   or contact Medical Records [phone]     You may receive a telephone call within two days of discharge. This call is to make certain you understand your discharge instructions and have the opportunity to have any questions answered. You can also easily access your medical information, test results and upcoming appointments via the Estadeb[employer] free online health management tool. You can learn more and sign up at [employer]/Estadeboda. For assistance setting up your Estadeboda account, please call [phone].    Once again, we want to ensure your discharge home is safe and that you have a clear understanding of any next steps in your care. If you have any questions or concerns, please do not hesitate to contact us, we are here for you. Thank you for choosing [facility] for your healthcare needs.    Sincerely,    Your [facility] Healthcare Team

## 2017-06-30 NOTE — ED AVS SNAPSHOT
Home Care Instructions                                                                                                                Margoth Hopkins   MRN: [id number]    Department:  [facility], Emergency Dept   Date of Visit:  6/30/2017            [facility], Emergency Dept    [address]    [place] [address]    Phone:  [phone]      You were seen by     Kadi Gaston M.D.      Your Diagnosis Was     Closed head injury, initial encounter     S09.90XA       These are the medications you received during your hospitalization from 06/30/2017 1212 to 06/30/2017 1402     Date/Time Order Dose Route Action    06/30/2017 1324 HYDROmorphone (DILAUDID) injection 0.5 mg 0.5 mg Intravenous Given    06/30/2017 1324 ondansetron (ZOFRAN) syringe/vial injection 4 mg 4 mg Intravenous Given      Follow-up Information     1. Follow up with [facility], Emergency Dept.    Specialty:  Emergency Medicine    Why:  As needed, If symptoms worsen    Contact information    [address] [address] [phone]        2. Follow up with Charlee Walker M.D.. Schedule an appointment as soon as possible for a visit in 3 days.    Specialty:  Family Medicine    Why:  for recheck    Contact information    [address] [address] [phone]        Medication Information     Review all of your home medications and newly ordered medications with your primary doctor and/or pharmacist as soon as possible. Follow medication instructions as directed by your doctor and/or pharmacist.     Please keep your complete medication list with you and share with your physician. Update the information when medications are discontinued, doses are changed, or new medications (including over-the-counter products) are added; and carry medication information at all times in the event of emergency situations.               Medication List      ASK your doctor about  these medications        Instructions    Morning Afternoon Evening Bedtime    Biotin 1000 MCG Tabs        Take 5,000 mcg by mouth.   Dose:  5000 mcg                        chlorthalidone 25 MG Tabs   Commonly known as:  HYGROTON        Take 0.5 Tabs by mouth every day.   Dose:  12.5 mg                        hydrocodone-acetaminophen 5-325 MG Tabs per tablet   Commonly known as:  NORCO        Take 0.5 Tabs by mouth every bedtime.   Dose:  0.5 Tab                        levothyroxine 50 MCG Tabs   Commonly known as:  SYNTHROID        Doctor's comments:  Brand Synthroid only   Take 1 Tab by mouth Every morning on an empty stomach.   Dose:  50 mcg                        metoprolol SR 50 MG Tb24   Commonly known as:  TOPROL XL        Take 1 Tab by mouth every day.   Dose:  50 mg                        nitroGLYCERIN 0.3 MG SL tablet   Commonly known as:  NITROSTAT        Place 1 Tab under tongue as needed for Chest Pain.   Dose:  0.3 mg                        OMEGA 3 PO        Take  by mouth.                        paroxetine 40 MG tablet   Commonly known as:  PAXIL        Take 1 Tab by mouth every day.   Dose:  40 mg                        PROBIOTIC DAILY PO        Take  by mouth.                        rosuvastatin 20 MG Tabs   Commonly known as:  CRESTOR        Take 1 Tab by mouth every evening.   Dose:  20 mg                        TURMERIC PO        Take  by mouth.                        vitamin D 1000 UNIT Tabs   Commonly known as:  cholecalciferol        Take 2,000 Units by mouth every day.   Dose:  2000 Units                        zolpidem 5 MG Tabs   Commonly known as:  AMBIEN        Take 1 Tab by mouth at bedtime as needed for Sleep.   Dose:  5 mg                                Procedures and tests performed during your visit     CT-CSPINE WITHOUT PLUS RECONS    CT-HEAD W/O    DX-CHEST-PORTABLE (1 VIEW)    DX-KNEE 3 VIEWS RIGHT        Discharge Instructions       Head Injury, Adult  You have a head injury.  Headaches and throwing up (vomiting) are common after a head injury. It should be easy to wake up from sleeping. Sometimes you must stay in the hospital. Most problems happen within the first 24 hours. Side effects may occur up to 7-10 days after the injury.   WHAT ARE THE TYPES OF HEAD INJURIES?  Head injuries can be as minor as a bump. Some head injuries can be more severe. More severe head injuries include:  · A jarring injury to the brain (concussion).  · A bruise of the brain (contusion). This mean there is bleeding in the brain that can cause swelling.  · A cracked skull (skull fracture).  · Bleeding in the brain that collects, clots, and forms a bump (hematoma).  WHEN SHOULD I GET HELP RIGHT AWAY?   · You are confused or sleepy.  · You cannot be woken up.  · You feel sick to your stomach (nauseous) or keep throwing up (vomiting).  · Your dizziness or unsteadiness is getting worse.  · You have very bad, lasting headaches that are not helped by medicine. Take medicines only as told by your doctor.  · You cannot use your arms or legs like normal.  · You cannot walk.  · You notice changes in the black spots in the center of the colored part of your eye (pupil).  · You have clear or bloody fluid coming from your nose or ears.  · You have trouble seeing.  During the next 24 hours after the injury, you must stay with someone who can watch you. This person should get help right away (call 911 in the [place] if you start to shake and are not able to control it (have seizures), you pass out, or you are unable to wake up.  HOW CAN I PREVENT A HEAD INJURY IN THE FUTURE?  · Wear seat belts.  · Wear a helmet while bike riding and playing sports like football.  · Stay away from dangerous activities around the house.  WHEN CAN I RETURN TO NORMAL ACTIVITIES AND ATHLETICS?  See your doctor before doing these activities. You should not do normal activities or play contact sports until 1 week after the following symptoms have  stopped:  · Headache that does not go away.  · Dizziness.  · Poor attention.  · Confusion.  · Memory problems.  · Sickness to your stomach or throwing up.  · Tiredness.  · Fussiness.  · Bothered by bright lights or loud noises.  · Anxiousness or depression.  · Restless sleep.  MAKE SURE YOU:   · Understand these instructions.  · Will watch your condition.  · Will get help right away if you are not doing well or get worse.     This information is not intended to replace advice given to you by your health care provider. Make sure you discuss any questions you have with your health care provider.     Document Released: 11/30/2009 Document Revised: 01/08/2016 Document Reviewed: 08/25/2014  [employer] Interactive Patient Education ©2016 [employer]      Chest Contusion  A chest contusion is a deep bruise on your chest area. Contusions are the result of an injury that caused bleeding under the skin. A chest contusion may involve bruising of the skin, muscles, or ribs. The contusion may turn blue, purple, or yellow. Minor injuries will give you a painless contusion, but more severe contusions may stay painful and swollen for a few weeks.  CAUSES   A contusion is usually caused by a blow, trauma, or direct force to an area of the body.  SYMPTOMS   · Swelling and redness of the injured area.  · Discoloration of the injured area.  · Tenderness and soreness of the injured area.  · Pain.  DIAGNOSIS   The diagnosis can be made by taking a history and performing a physical exam. An X-ray, CT scan, or MRI may be needed to determine if there were any associated injuries, such as broken bones (fractures) or internal injuries.  TREATMENT   Often, the best treatment for a chest contusion is resting, icing, and applying cold compresses to the injured area. Deep breathing exercises may be recommended to reduce the risk of pneumonia. Over-the-counter medicines may also be recommended for pain control.  HOME CARE INSTRUCTIONS   · Put ice  on the injured area.  ¨ Put ice in a plastic bag.  ¨ Place a towel between your skin and the bag.  ¨ Leave the ice on for 15-20 minutes, 03-04 times a day.  · Only take over-the-counter or prescription medicines as directed by your caregiver. Your caregiver may recommend avoiding anti-inflammatory medicines (aspirin, ibuprofen, and naproxen) for 48 hours because these medicines may increase bruising.  · Rest the injured area.  · Perform deep-breathing exercises as directed by your caregiver.  · Stop smoking if you smoke.  · Do not lift objects over 5 pounds (2.3 kg) for 3 days or longer if recommended by your caregiver.  SEEK IMMEDIATE MEDICAL CARE IF:   · You have increased bruising or swelling.  · You have pain that is getting worse.  · You have difficulty breathing.  · You have dizziness, weakness, or fainting.  · You have blood in your urine or stool.  · You cough up or vomit blood.  · Your swelling or pain is not relieved with medicines.  MAKE SURE YOU:   · Understand these instructions.  · Will watch your condition.  · Will get help right away if you are not doing well or get worse.     This information is not intended to replace advice given to you by your health care provider. Make sure you discuss any questions you have with your health care provider.     Document Released: 09/12/2002 Document Revised: 09/11/2013 Document Reviewed: 06/10/2013  [employer] Interactive Patient Education ©2016 [employer]                Patient Information     Patient Information    Following emergency treatment: all patient requiring follow-up care must return either to a private physician or a clinic if your condition worsens before you are able to obtain further medical attention, please return to the emergency room.     Billing Information    At [facility], we work to make the billing process streamlined for our patients.  Our Representatives are here to answer any questions you may have regarding your hospital bill.  If  you have insurance coverage and have supplied your insurance information to us, we will submit a claim to your insurer on your behalf.  Should you have any questions regarding your bill, we can be reached online or by phone as follows:  Online: You are able pay your bills online or live chat with our representatives about any billing questions you may have. We are here to help Monday - Friday from 8:00am to 7:30pm and 9:00am - 12:00pm on Saturdays.  Please visit [email or web address]  for more information.   Phone:  [phone] or [phone]    Please note that your emergency physician, surgeon, pathologist, radiologist, anesthesiologist, and other specialists are not employed by [facility] and will therefore bill separately for their services.  Please contact them directly for any questions concerning their bills at the numbers below:     Emergency Physician Services:  [phone]  [facility]:  [phone]  [facility]:  [phone]  [facility]:  [phone]    1. Your final bill may vary from the amount quoted upon discharge if all procedures are not complete at that time, or if your doctor has additional procedures of which we are not aware. You will receive an additional bill if you return to the Emergency Department at [facility] for suture removal regardless of the facility of which the sutures were placed.     2. Please arrange for settlement of this account at the emergency registration.    3. All self-pay accounts are due in full at the time of treatment.  If you are unable to meet this obligation then payment is expected within 4-5 days.     4. If you have had radiology studies (CT, X-ray, Ultrasound, MRI), you have received a preliminary result during your emergency department visit. Please contact the radiology department [phone] to receive a copy of your final result. Please discuss the Final result with  your primary physician or with the follow up physician provided.     Crisis Hotline:  [place] Crisis Hotline:  7-540-HDEKQDP or 1-500.951.3567  [place] Crisis Hotline:    1-614.875.6693 or 405-632-0416         ED Discharge Follow Up Questions    1. In order to provide you with very good care, we would like to follow up with a phone call in the next few days.  May we have your permission to contact you?     YES /  NO    2. What is the best phone number to call you? (       )_____-__________    3. What is the best time to call you?      Morning  /  Afternoon  /  Evening                   Patient Signature:  ____________________________________________________________    Date:  ____________________________________________________________      Your appointments     Jul 06, 2017  9:30 AM   NM CARDIAC STRESS TEST (30) with [facility]    [address]  [place] [address] [phone]           Some exams require specific prep instructions that would have been given to you at time of scheduling. If you have any additional questions about the prep instructions, please call Imaging Scheduling at [phone] and press #2.            Jul 24, 2017  9:40 AM   ANNUAL WELLNESS with Charlee Walker M.D., [facility]    [address] ([facility])    [address]  [place] [address] [phone]

## 2017-06-30 NOTE — ED PROVIDER NOTES
ED Provider Note    CHIEF COMPLAINT  Chief Complaint   Patient presents with   • Head Injury     pt states she tripped over a bag of groceries. pt states she landed on her right chest and bumped the right side of her head. pt denies LOC. pt not taking blood thinners. pt with hematoma above right eye. pt's AAOx4.    • Chest Wall Pain     right chest pain       HPI  Margoth Hopkins is a 66 y.o. female who presents complaining of right-sided chest pain, right knee pain, and bruising over the right forehead area status post fall.      Patient states she fell in her driveway while stepping over bags of groceries. This occurred prior to arrival. The patient fell on the cement driveway and struck the right side of her forehead, her right chest wall, and her right knee. She denies neck pain but states she has a plate status post fusion. Patient denies loss of consciousness, syncope, chest pain, shortness of breath visual changes, nausea, and vomiting. She does admit to a moderate to severe headache. She denies anticoagulants or aspirin use. Her areas of pain are achy, increased with movement or palpation, and nonradiating.      ALLERGIES  Allergies   Allergen Reactions   • Morphine Vomiting   • Pcn [Penicillins] Hives       CURRENT MEDICATIONS  Synthroid, Nitrostat, omega threes, Toprol, chlorthalidone, Crestor, Paxil, Norco, Ambien, vitamin D, turmeric, probiotic, biotin    PAST MEDICAL HISTORY   has a past medical history of Thyroid disease and Goiter.    SURGICAL HISTORY   has past surgical history that includes knee replacement, total (Right); foot surgery (Left); fusion; cervical fusion posterior; bladder sling female; appendectomy; and cataract extraction with iol (Left).    SOCIAL HISTORY  Social History     Social History Main Topics   • Smoking status: Never Smoker    • Smokeless tobacco: Never Used   • Alcohol Use: 4.2 oz/week     7 Glasses of wine per week   • Drug Use: No   • Sexual Activity:     Partners: Male          REVIEW OF SYSTEMS  See HPI for further details.  All other systems are negative except as above in HPI.    PHYSICAL EXAM  VITAL SIGNS: /67 mmHg  Pulse 80  Temp(Src) 36.8 °C (98.3 °F)  Resp 16  Wt 77.7 kg (171 lb 4.8 oz)  SpO2 92%   General:  WDWN, nontoxic appearing in NAD; A+Ox3; V/S as above; patient lying on hallway stretcher  Skin: warm and dry; good color; no rash  HEENT: NC; 3 cm hematoma over the right supraorbital/frontal regions; no bony depression; no laceration; no bogginess; EOMs intact; PERRL; no scleral icterus   Neck: Soft, no vertebral point tenderness  Cardiovascular: Regular heart rate and rhythm.  No murmurs, rubs, or gallops; pulses 2+ bilaterally radially and DP areas  Chest wall:  Tenderness to palpation over the superior aspect of the right breast; faint ecchymosis over the right breast  Lungs: Clear to auscultation with good air movement bilaterally.  No wheezes, rhonchi, or rales.   Abdomen: BS present; soft; NTND; no rebound, guarding, or rigidity.  No organomegaly or pulsatile mass  Extremities: CLEMENTS x 4; no e/o trauma; no pedal edema  Neurologic: CNs III-XII grossly intact; speech clear; distal sensation intact; strength 5/5 UE/LEs  Psychiatric: Appropriate affect, normal mood      IMAGING  CT-CSPINE WITHOUT PLUS RECONS   Final Result      1.  Surgical change extending from C4 through C7.      2.  No evidence of fracture.      3.  Multilevel degenerative disc disease and facet degeneration.      4.  Well-corticated ossification inferior to the anterior arch and anterior the odontoid involving the C1 vertebrae consistent with old posttraumatic change.      CT-HEAD W/O   Final Result      No evidence of acute intracranial process.      DX-KNEE 3 VIEWS RIGHT   Final Result      Prior tricompartment arthroplasty. No evidence of fracture or displacement of the arthroplasty components.      DX-CHEST-PORTABLE (1 VIEW)   Final Result      1.  No evidence of acute  cardiopulmonary process.      2.  Surgical changes involving the cervical spine.          MEDICAL RECORD  I have reviewed patient's medical record and pertinent results are listed below.      COURSE & MEDICAL DECISION MAKING  I have reviewed any medical record information, laboratory studies and radiographic results as noted.    Margoth Hopkins is a 66 y.o. female who presents complaining of right sided head pain, right chest wall pain, and right knee pain status post ground-level fall. Patient was in a hallway bed.      Pt was re-evaluated at 13:45  Pain is slightly improved after Dilaudid.    1:54 PM  Imaging results are noted. No evidence of acute injury on chest x-ray, knee x-ray, CT head, and CT C-spine. Patient was moved to bed 8B for a more thorough exam as she was originally a hallway bed. No ecchymosis or other injuries are noted on secondary exam. We will discharge home. Patient and significant other are amenable to this plan.      Pt's blood pressure was noted to be above 120/80 here in the ER.  Pt was informed and advised to follow up as an outpatient for recheck for possible dx/management of hypertension.    I have reviewed the patient's narcotic Rx record. Patient receives monthly prescription for chronic pain. She has obvious injuries that are requiring pain medication here in the ER. We will give her a prescription for Percocet to be taken as needed for worsening pain.      FINAL IMPRESSION  1. Closed head injury, initial encounter    2. Chest wall contusion, right, initial encounter    3. Contusion of right knee, initial encounter        Electronically signed by: Kadi Gaston, 6/30/2017 12:32 PM

## 2017-07-06 ENCOUNTER — HOSPITAL ENCOUNTER (OUTPATIENT)
Dept: RADIOLOGY | Facility: MEDICAL CENTER | Age: 67
End: 2017-07-06
Attending: FAMILY MEDICINE
Payer: MEDICARE

## 2017-07-06 DIAGNOSIS — R07.9 CHEST PAIN, UNSPECIFIED TYPE: ICD-10-CM

## 2017-07-06 PROCEDURE — 700111 HCHG RX REV CODE 636 W/ 250 OVERRIDE (IP)

## 2017-07-06 PROCEDURE — A9502 TC99M TETROFOSMIN: HCPCS

## 2017-07-06 RX ORDER — REGADENOSON 0.08 MG/ML
INJECTION, SOLUTION INTRAVENOUS
Status: COMPLETED
Start: 2017-07-06 | End: 2017-07-06

## 2017-07-06 RX ADMIN — REGADENOSON 0.4 MG: 0.08 INJECTION, SOLUTION INTRAVENOUS at 10:09

## 2017-07-06 NOTE — PROGRESS NOTES
Nursing care plan includes knowledge deficit, potential for discomfort, potential for compromised cardiac output.  POC includes teaching, comfort measures and reassurance, and access to code cart, cardiology stand by and availability of rapid response team.  Pt verbalizes good understanding of benefits and risks of pharmacological cardiac stress test.  Informed consent obtained.  Lexiscan given, pt developed the following signs/symptoms:sob, dizzy, nausea.    VS stable, symptoms resolved.  To waiting room, fluids and/or snack given, awaiting second scan.  Nursing goals met.

## 2017-07-17 ENCOUNTER — TELEPHONE (OUTPATIENT)
Dept: MEDICAL GROUP | Age: 67
End: 2017-07-17

## 2017-07-17 NOTE — TELEPHONE ENCOUNTER
ANNUAL WELLNESS VISIT PRE-VISIT PLANNING     1.  Reviewed note from last office visit with PCP: YES    2.  If any orders were placed at last visit, do we have Results/Consult Notes?        •  Labs - Labs were not ordered at last office visit.       •  Imaging - Imaging was not ordered at last office visit.       •  Referrals - Referral ordered, patient has NOT been seen.    3.  Immunizations were updated in [facility] using WebIZ?: Yes       •  WebIZ Recommendations: FLU, HEPATITIS A , PREVNAR (PCV13)  and TDAP       •  Is patient due for Tdap? NO       •  Is patient due for Shingles? NO     4.  Patient is due for the following Health Maintenance Topics:   Health Maintenance Due   Topic Date Due   • Annual Wellness Visit  1950   • MAMMOGRAM  12/21/1988   • IMM DTaP/Tdap/Td Vaccine (1 - Tdap) 02/24/2011   • BONE DENSITY  12/21/2013   • IMM PNEUMOCOCCAL 65+ (ADULT) LOW/MEDIUM RISK SERIES (1 of 2 - PCV13) 12/21/2013       - Patient has completed TD, TDAP and ZOSTAVAX (Shingles) Immunization(s) per WebIZ. Chart has been updated.    5.  Reviewed/Updated the following with patient:       •   Preferred Pharmacy? YES       •   Preferred Lab? YES       •   Medications? YES. Was Abstract Encounter opened and chart updated? YES       •   Social History? YES. Was Abstract Encounter opened and chart updated? YES       •   Family History? YES. Was Abstract Encounter opened and chart updated? YES    6.  Care Team Updated:       •   DME Company (gait device, O2, CPAP, etc.): N\A       •   Other Specialists (eye doctor, derm, GYN, cardiology, endo, etc): YES    7.  Patient has the following Care Path diagnoses on Problem List:  NONE    8.  Specialty Comments was updated with diagnosis information provided by [facility]: NO    9.  Patient was advised: “This is a free wellness visit. The provider will screen for medical conditions to help you stay healthy. If you have other concerns to address you may be asked to discuss these at a separate  visit or there may be an additional fee.”     6.  Patient was informed to arrive 15 min prior to their scheduled appointment and bring in their medication bottles.

## 2017-07-19 ENCOUNTER — OFFICE VISIT (OUTPATIENT)
Dept: ENDOCRINOLOGY | Facility: MEDICAL CENTER | Age: 67
End: 2017-07-19
Payer: MEDICARE

## 2017-07-19 VITALS
DIASTOLIC BLOOD PRESSURE: 76 MMHG | HEIGHT: 67 IN | OXYGEN SATURATION: 98 % | SYSTOLIC BLOOD PRESSURE: 108 MMHG | WEIGHT: 170 LBS | HEART RATE: 66 BPM | BODY MASS INDEX: 26.68 KG/M2

## 2017-07-19 DIAGNOSIS — E53.8 VITAMIN B12 DEFICIENCY: ICD-10-CM

## 2017-07-19 DIAGNOSIS — E06.3 HYPOTHYROIDISM DUE TO HASHIMOTO'S THYROIDITIS: ICD-10-CM

## 2017-07-19 DIAGNOSIS — E55.9 VITAMIN D DEFICIENCY: ICD-10-CM

## 2017-07-19 DIAGNOSIS — E04.2 MULTINODULAR GOITER (NONTOXIC): ICD-10-CM

## 2017-07-19 DIAGNOSIS — E03.8 HYPOTHYROIDISM DUE TO HASHIMOTO'S THYROIDITIS: ICD-10-CM

## 2017-07-19 PROCEDURE — 99204 OFFICE O/P NEW MOD 45 MIN: CPT | Performed by: INTERNAL MEDICINE

## 2017-07-19 RX ORDER — ERGOCALCIFEROL 1.25 MG/1
50000 CAPSULE ORAL
Qty: 10 CAP | Refills: 0 | Status: SHIPPED | OUTPATIENT
Start: 2017-07-19 | End: 2017-07-24

## 2017-07-19 NOTE — MR AVS SNAPSHOT
"        Margoth Hopkins   2017 3:50 PM   Office Visit   MRN: [id number]    Department:  Endocrinology [facility]   Dept Phone:  [phone]    Description:  Female : 1950   Provider:  Tay Garcia M.D.           Allergies as of 2017     Allergen Noted Reactions    Morphine 2017   Vomiting    Pcn [Penicillins] 2017   Hives      You were diagnosed with     Vitamin D deficiency   [1112594]       Hypothyroidism due to Hashimoto's thyroiditis   [1878436]       Multinodular goiter (nontoxic)   [711739]       Vitamin B12 deficiency   [137567]         Vital Signs     Blood Pressure Pulse Height Weight Body Mass Index Oxygen Saturation    108/76 mmHg 66 1.702 m (5' 7\") 77.111 kg (170 lb) 26.62 kg/m2 98%    Smoking Status                   Never Smoker            Basic Information     Date Of Birth Sex Race Ethnicity Preferred Language    1950 Female White Non- English      Your appointments     2017  9:40 AM   ANNUAL WELLNESS with Charlee Walker M.D., [facility]    [address] [id number] [phone]            Oct 03, 2017 10:30 AM   Established Patient with Tay Garcia M.D.   [facility]    [address], [address]  [facility] [address] [phone]           You will be receiving a confirmation call a few days before your appointment from our automated call confirmation system.              Problem List              ICD-10-CM Priority Class Noted - Resolved    FHx: colon cancer Z80.0   2017 - Present    Schatzki's ring K22.2   2017 - Present    Benign essential HTN I10   2017 - Present    Pure hypercholesterolemia E78.00   2017 - Present    Migraine without aura, not intractable G43.009   2017 - Present    MOHAMUD (generalized anxiety disorder) F41.1   2017 - Present    Hypothyroidism due to Hashimoto's thyroiditis E03.8, " E06.3   2/23/2017 - Present    Multinodular goiter (nontoxic) E04.2   2/23/2017 - Present    HLA B27 (HLA B27 positive) Z15.89   2/23/2017 - Present    Osteoarthritis of multiple joints M15.9   2/23/2017 - Present    Lumbosacral pain, chronic M54.5, G89.29   2/23/2017 - Present    Insomnia G47.00   2/23/2017 - Present    Vitamin D insufficiency E55.9   2/23/2017 - Present    Vitiligo L80   6/26/2017 - Present    Chest pain R07.9   6/26/2017 - Present      Health Maintenance        Date Due Completion Dates    MAMMOGRAM 12/21/1988 ---    IMM DTaP/Tdap/Td Vaccine (1 - Tdap) 2/24/2011 2/23/2011    BONE DENSITY 12/21/2013 ---    IMM PNEUMOCOCCAL 65+ (ADULT) LOW/MEDIUM RISK SERIES (1 of 2 - PCV13) 12/21/2013 ---    IMM INFLUENZA (1) 9/1/2017 ---    COLONOSCOPY 3/24/2021 3/24/2016            Current Immunizations     SHINGLES VACCINE 2/23/2011, 2/15/2011    TD Vaccine 2/23/2011    Tetanus Vaccine 2/15/2011      Below and/or attached are the medications your provider expects you to take. Review all of your home medications and newly ordered medications with your provider and/or pharmacist. Follow medication instructions as directed by your provider and/or pharmacist. Please keep your medication list with you and share with your provider. Update the information when medications are discontinued, doses are changed, or new medications (including over-the-counter products) are added; and carry medication information at all times in the event of emergency situations     Allergies:  MORPHINE - Vomiting     PCN - Hives               Medications  Valid as of: July 19, 2017 -  4:10 PM    Generic Name Brand Name Tablet Size Instructions for use    Biotin (Tab) Biotin 1000 MCG Take 5,000 mcg by mouth.        Chlorthalidone (Tab) HYGROTON 25 MG Take 0.5 Tabs by mouth every day.        Cholecalciferol (Tab) cholecalciferol 1000 UNIT Take 2,000 Units by mouth every day.        Ergocalciferol (Cap) DRISDOL 64532 UNITS Take 1 Cap by mouth  every 7 days.        Hydrocodone-Acetaminophen (Tab) NORCO 5-325 MG Take 0.5 Tabs by mouth every bedtime.        Levothyroxine Sodium (Tab) SYNTHROID 50 MCG Take 1 Tab by mouth Every morning on an empty stomach.        Metoprolol Succinate (TABLET SR 24 HR) TOPROL XL 50 MG Take 1 Tab by mouth every day.        Nitroglycerin (SL Tab) NITROSTAT 0.3 MG Place 1 Tab under tongue as needed for Chest Pain.        Omega-3 Fatty Acids   Take  by mouth.        Oxycodone-Acetaminophen (Tab) PERCOCET 5-325 MG Take 1-21 Tabs by mouth every 6 hours as needed for Moderate Pain.        PARoxetine HCl (Tab) PAXIL 40 MG Take 1 Tab by mouth every day.        Probiotic Product   Take  by mouth.        Rosuvastatin Calcium (Tab) CRESTOR 20 MG Take 1 Tab by mouth every evening.        Turmeric   Take  by mouth.        Zolpidem Tartrate (Tab) AMBIEN 5 MG Take 1 Tab by mouth at bedtime as needed for Sleep.        .                 Medicines prescribed today were sent to:     [facility]Y #[id number] - [place] - [address]    [address] [place] [address]    Phone: [phone] Fax: [phone]    Open 24 Hours?: No    [facility] - [place] [address] [facility] [address]    Phone: [phone] Fax: [phone]    Open 24 Hours?: No    [facility]Y #[id number] - [place] - [address]    [address] [address]    Phone: [phone] Fax: [phone]    Open 24 Hours?: Yes      Medication refill instructions:       If your prescription bottle indicates you have medication refills left, it is not necessary to call your provider’s office. Please contact your pharmacy and they will refill your medication.    If your prescription bottle indicates you do not have any refills left, you may request refills at any time through one of the following ways: The online [employer]AndS[employer] system (except Urgent Care), by calling your provider’s office, or by asking your  pharmacy to contact your provider’s office with a refill request. Medication refills are processed only during regular business hours and may not be available until the next business day. Your provider may request additional information or to have a follow-up visit with you prior to refilling your medication.   *Please Note: Medication refills are assigned a new Rx number when refilled electronically. Your pharmacy may indicate that no refills were authorized even though a new prescription for the same medication is available at the pharmacy. Please request the medicine by name with the pharmacy before contacting your provider for a refill.        Your To Do List     Future Labs/Procedures Complete By Expires    FREE THYROXINE  As directed 7/19/2018    TRIIDOTHYRONINE  As directed 7/19/2018    Comments:    Free T3    TRIIDOTHYRONINE  As directed 7/19/2018    Comments:    Total T3    TSH  As directed 7/19/2018    US-SOFT TISSUES OF HEAD - NECK  As directed 7/19/2018    VITAMIN B12  As directed 7/19/2018         Dine perfect Access Code: Activation code not generated  Current Dine perfect Status: Active

## 2017-07-19 NOTE — PROGRESS NOTES
New Patient Consult Note  Primary care physician: Charlee Walker M.D.    Reason for consult: Multiple thyroid nodules/hypothyroidism    HPI:  Margoth Hopkins is a 66 y.o. old patient who comes in today for evaluation of multiple thyroid nodules. Her most recent ultrasound was 2 years back.    For hypothyroidism and she is currently on 50 µg of levothyroxine. Denies any complaints and she feels fine.    ROS:  Constitutional: No weight loss  Cardiac: No palpitations or racing heart  Resp: No shortness of breath  Neuro: No numbness or tinging in feet  Endo: No heat or cold intolerance, no polyuria or polydipsia  All other systems were reviewed and were negative.    Past Medical History:  Patient Active Problem List    Diagnosis Date Noted   • Vitiligo 06/26/2017   • Chest pain 06/26/2017   • FHx: colon cancer 02/23/2017   • Schatzki's ring 02/23/2017   • Benign essential HTN 02/23/2017   • Pure hypercholesterolemia 02/23/2017   • Migraine without aura, not intractable 02/23/2017   • MOHAMUD (generalized anxiety disorder) 02/23/2017   • Hypothyroidism due to Hashimoto's thyroiditis 02/23/2017   • Multinodular goiter (nontoxic) 02/23/2017   • HLA B27 (HLA B27 positive) 02/23/2017   • Osteoarthritis of multiple joints 02/23/2017   • Lumbosacral pain, chronic 02/23/2017   • Insomnia 02/23/2017   • Vitamin D insufficiency 02/23/2017       Past Surgical History:  Past Surgical History   Procedure Laterality Date   • Knee replacement, total Right    • Foot surgery Left      metatarsal fusion, hammer toes correction   • Fusion       disc fusion   • Cervical fusion posterior     • Bladder sling female     • Appendectomy     • Cataract extraction with iol Left        Allergies:  Morphine and Pcn    Social History:  Social History     Social History   • Marital Status:      Spouse Name: N/A   • Number of Children: N/A   • Years of Education: N/A     Occupational History   • Not on file.     Social History Main Topics   • Smoking  status: Never Smoker    • Smokeless tobacco: Never Used   • Alcohol Use: 4.2 oz/week     7 Glasses of wine per week   • Drug Use: No   • Sexual Activity:     Partners: Male     Other Topics Concern   • Not on file     Social History Narrative       Family History:  Family History   Problem Relation Age of Onset   • Arthritis Mother    • Alzheimer's Disease Mother    • Cancer Mother      cervical cancer   • Heart Attack Father    • Other Brother      brain anurism   • Cancer Maternal Grandmother 50     colono cancer   • Heart Disease Brother        Medications:    Current outpatient prescriptions:   •  vitamin D, Ergocalciferol, (DRISDOL) 00936 UNITS Cap capsule, Take 1 Cap by mouth every 7 days., Disp: 10 Cap, Rfl: 0  •  oxycodone-acetaminophen (PERCOCET) 5-325 MG Tab, Take 1-21 Tabs by mouth every 6 hours as needed for Moderate Pain., Disp: 15 Tab, Rfl: 0  •  levothyroxine (SYNTHROID) 50 MCG Tab, Take 1 Tab by mouth Every morning on an empty stomach., Disp: 90 Tab, Rfl: 1  •  nitroGLYCERIN (NITROSTAT) 0.3 MG SL tablet, Place 1 Tab under tongue as needed for Chest Pain., Disp: 30 Tab, Rfl: 0  •  Omega-3 Fatty Acids (OMEGA 3 PO), Take  by mouth., Disp: , Rfl:   •  chlorthalidone (HYGROTON) 25 MG Tab, Take 0.5 Tabs by mouth every day., Disp: 90 Tab, Rfl: 1  •  metoprolol SR (TOPROL XL) 50 MG TABLET SR 24 HR, Take 1 Tab by mouth every day., Disp: 90 Tab, Rfl: 0  •  rosuvastatin (CRESTOR) 20 MG Tab, Take 1 Tab by mouth every evening., Disp: 90 Tab, Rfl: 1  •  paroxetine (PAXIL) 40 MG tablet, Take 1 Tab by mouth every day., Disp: 90 Tab, Rfl: 1  •  hydrocodone-acetaminophen (NORCO) 5-325 MG Tab per tablet, Take 0.5 Tabs by mouth every bedtime., Disp: 30 Tab, Rfl: 0  •  zolpidem (AMBIEN) 5 MG Tab, Take 1 Tab by mouth at bedtime as needed for Sleep., Disp: 30 Tab, Rfl: 0  •  vitamin D (CHOLECALCIFEROL) 1000 UNIT Tab, Take 2,000 Units by mouth every day., Disp: , Rfl:   •  Biotin 1000 MCG Tab, Take 5,000 mcg by mouth.,  "Disp: , Rfl:   •  Probiotic Product (PROBIOTIC DAILY PO), Take  by mouth., Disp: , Rfl:   •  TURMERIC PO, Take  by mouth., Disp: , Rfl:     Labs:    Physical Examination:  Vital signs: /76 mmHg  Pulse 66  Ht 1.702 m (5' 7\")  Wt 77.111 kg (170 lb)  BMI 26.62 kg/m2  SpO2 98% Body mass index is 26.62 kg/(m^2).  General: No apparent distress, cooperative  Eyes: No scleral icterus or discharge  ENMT: Normal on external inspection of nose, lips, normal thyroid exam  Neck: No abnormal masses on inspection  Resp: Normal effort, clear to auscultation bilaterally   CVS: Regular rate and rhythm, S1 S2 normal, no murmur   Extremities: No edema  Abdomen: abdominal obesity present  Neuro: Alert and oriented  Skin: No rash, left forearm region vitiligo present  Psych: Normal mood and affect, intact memory and able to make informed decisions    Assessment and Plan:    1. Vitamin D deficiency  Start   - vitamin D, Ergocalciferol, (DRISDOL) 59539 UNITS Cap capsule; Take 1 Cap every 7 days    2. Hypothyroidism due to Hashimoto's thyroiditis  Continue LT4 - 50 mcg daily.   - TRIIDOTHYRONINE; Future  - FREE THYROXINE; Future  - TRIIDOTHYRONINE; Future  - TSH; Future    3. Multinodular goiter (nontoxic)  Repeat   - US- of thyroid to monitor size of these nodules.  4. Vitamin B12 deficiency  Vitamin B12 deficiency considering the fact that she has hypothyroidism and vitiligo both of which are autoimmune problems.     - VITAMIN B12; Future      Return in about 2 months (around 9/19/2017).     Thank you for allowing me to participate in the care of this patient.    Tay Garcia M.D.  07/19/2017    CC:   Charlee Walker M.D.    This note was created using voice recognition software (Dragon). The accuracy of the dictation is limited by the abilities of the software. I have reviewed the note prior to signing, however some errors in grammar and context are still possible. If you have any questions related to this note please do not " hesitate to contact our office.

## 2017-07-24 ENCOUNTER — HOSPITAL ENCOUNTER (OUTPATIENT)
Dept: LAB | Facility: MEDICAL CENTER | Age: 67
End: 2017-07-24
Attending: ORTHOPAEDIC SURGERY
Payer: MEDICARE

## 2017-07-24 ENCOUNTER — OFFICE VISIT (OUTPATIENT)
Dept: MEDICAL GROUP | Age: 67
End: 2017-07-24
Payer: MEDICARE

## 2017-07-24 ENCOUNTER — HOSPITAL ENCOUNTER (OUTPATIENT)
Dept: LAB | Facility: MEDICAL CENTER | Age: 67
End: 2017-07-24
Attending: INTERNAL MEDICINE
Payer: MEDICARE

## 2017-07-24 VITALS
OXYGEN SATURATION: 96 % | SYSTOLIC BLOOD PRESSURE: 108 MMHG | HEART RATE: 74 BPM | TEMPERATURE: 98.4 F | BODY MASS INDEX: 26.53 KG/M2 | WEIGHT: 169 LBS | DIASTOLIC BLOOD PRESSURE: 76 MMHG | HEIGHT: 67 IN

## 2017-07-24 DIAGNOSIS — E04.2 MULTINODULAR GOITER (NONTOXIC): ICD-10-CM

## 2017-07-24 DIAGNOSIS — F41.1 GAD (GENERALIZED ANXIETY DISORDER): ICD-10-CM

## 2017-07-24 DIAGNOSIS — E03.8 HYPOTHYROIDISM DUE TO HASHIMOTO'S THYROIDITIS: ICD-10-CM

## 2017-07-24 DIAGNOSIS — L80 VITILIGO: ICD-10-CM

## 2017-07-24 DIAGNOSIS — G89.29 LUMBOSACRAL PAIN, CHRONIC: ICD-10-CM

## 2017-07-24 DIAGNOSIS — M17.11 PRIMARY OSTEOARTHRITIS OF RIGHT KNEE: ICD-10-CM

## 2017-07-24 DIAGNOSIS — E53.8 VITAMIN B12 DEFICIENCY: ICD-10-CM

## 2017-07-24 DIAGNOSIS — Z23 NEED FOR PNEUMOCOCCAL VACCINATION: ICD-10-CM

## 2017-07-24 DIAGNOSIS — E78.00 PURE HYPERCHOLESTEROLEMIA: ICD-10-CM

## 2017-07-24 DIAGNOSIS — Z91.81 RISK FOR FALLS: ICD-10-CM

## 2017-07-24 DIAGNOSIS — G47.00 INSOMNIA, UNSPECIFIED TYPE: ICD-10-CM

## 2017-07-24 DIAGNOSIS — Z12.31 ENCOUNTER FOR SCREENING MAMMOGRAM FOR BREAST CANCER: ICD-10-CM

## 2017-07-24 DIAGNOSIS — M54.50 LUMBOSACRAL PAIN, CHRONIC: ICD-10-CM

## 2017-07-24 DIAGNOSIS — E06.3 HYPOTHYROIDISM DUE TO HASHIMOTO'S THYROIDITIS: ICD-10-CM

## 2017-07-24 DIAGNOSIS — I10 BENIGN ESSENTIAL HTN: ICD-10-CM

## 2017-07-24 DIAGNOSIS — K22.2 SCHATZKI'S RING: ICD-10-CM

## 2017-07-24 DIAGNOSIS — Z00.00 MEDICARE ANNUAL WELLNESS VISIT, SUBSEQUENT: Primary | ICD-10-CM

## 2017-07-24 DIAGNOSIS — E55.9 VITAMIN D INSUFFICIENCY: ICD-10-CM

## 2017-07-24 PROBLEM — G43.009 MIGRAINE WITHOUT AURA, NOT INTRACTABLE: Status: RESOLVED | Noted: 2017-02-23 | Resolved: 2017-07-24

## 2017-07-24 PROBLEM — R07.9 CHEST PAIN: Status: RESOLVED | Noted: 2017-06-26 | Resolved: 2017-07-24

## 2017-07-24 LAB
CRP SERPL HS-MCNC: 0.11 MG/DL (ref 0–0.75)
ERYTHROCYTE [SEDIMENTATION RATE] IN BLOOD BY WESTERGREN METHOD: 10 MM/HOUR (ref 0–30)
T3 SERPL-MCNC: 132.5 NG/DL (ref 60–181)
T4 FREE SERPL-MCNC: 0.76 NG/DL (ref 0.53–1.43)
TSH SERPL DL<=0.005 MIU/L-ACNC: 1.1 UIU/ML (ref 0.3–3.7)
VIT B12 SERPL-MCNC: 578 PG/ML (ref 211–911)

## 2017-07-24 PROCEDURE — 36415 COLL VENOUS BLD VENIPUNCTURE: CPT

## 2017-07-24 PROCEDURE — 90670 PCV13 VACCINE IM: CPT | Performed by: FAMILY MEDICINE

## 2017-07-24 PROCEDURE — 85652 RBC SED RATE AUTOMATED: CPT

## 2017-07-24 PROCEDURE — 86140 C-REACTIVE PROTEIN: CPT

## 2017-07-24 PROCEDURE — G0439 PPPS, SUBSEQ VISIT: HCPCS | Mod: 25 | Performed by: FAMILY MEDICINE

## 2017-07-24 PROCEDURE — G0009 ADMIN PNEUMOCOCCAL VACCINE: HCPCS | Performed by: FAMILY MEDICINE

## 2017-07-24 ASSESSMENT — PATIENT HEALTH QUESTIONNAIRE - PHQ9: CLINICAL INTERPRETATION OF PHQ2 SCORE: 0

## 2017-07-24 NOTE — PROGRESS NOTES
Chief Complaint   Patient presents with   • Annual Wellness Visit       HPI:  Margoth is a 66 y.o. here for Medicare Annual Wellness Visit    Pt is doing well, no specific concerns.     Patient Active Problem List    Diagnosis Date Noted   • Risk for falls 07/24/2017   • Vitiligo 06/26/2017   • FHx: colon cancer 02/23/2017   • Schatzki's ring 02/23/2017   • Benign essential HTN 02/23/2017   • Pure hypercholesterolemia 02/23/2017   • MOHAMUD (generalized anxiety disorder) 02/23/2017   • Hypothyroidism due to Hashimoto's thyroiditis 02/23/2017   • Multinodular goiter (nontoxic) 02/23/2017   • HLA B27 (HLA B27 positive) 02/23/2017   • Primary osteoarthritis of right knee 02/23/2017   • Lumbosacral pain, chronic 02/23/2017   • Insomnia 02/23/2017   • Vitamin D insufficiency 02/23/2017       Current Outpatient Prescriptions   Medication Sig Dispense Refill   • Cholecalciferol (HM VITAMIN D3) 4000 UNITS Cap Take 1 Cap by mouth every day. 90 Cap 0   • oxycodone-acetaminophen (PERCOCET) 5-325 MG Tab Take 1-21 Tabs by mouth every 6 hours as needed for Moderate Pain. 15 Tab 0   • levothyroxine (SYNTHROID) 50 MCG Tab Take 1 Tab by mouth Every morning on an empty stomach. 90 Tab 1   • nitroGLYCERIN (NITROSTAT) 0.3 MG SL tablet Place 1 Tab under tongue as needed for Chest Pain. 30 Tab 0   • Omega-3 Fatty Acids (OMEGA 3 PO) Take  by mouth.     • chlorthalidone (HYGROTON) 25 MG Tab Take 0.5 Tabs by mouth every day. 90 Tab 1   • metoprolol SR (TOPROL XL) 50 MG TABLET SR 24 HR Take 1 Tab by mouth every day. 90 Tab 0   • rosuvastatin (CRESTOR) 20 MG Tab Take 1 Tab by mouth every evening. 90 Tab 1   • paroxetine (PAXIL) 40 MG tablet Take 1 Tab by mouth every day. 90 Tab 1   • hydrocodone-acetaminophen (NORCO) 5-325 MG Tab per tablet Take 0.5 Tabs by mouth every bedtime. 30 Tab 0   • zolpidem (AMBIEN) 5 MG Tab Take 1 Tab by mouth at bedtime as needed for Sleep. 30 Tab 0   • Probiotic Product (PROBIOTIC DAILY PO) Take  by mouth.     •  TURMERIC PO Take  by mouth.     • Biotin 1000 MCG Tab Take 5,000 mcg by mouth.       No current facility-administered medications for this visit.        Patient is taking medications as noted in medication list.  Current supplements as per medication list.     Allergies: Morphine and Pcn    Current social contact/activities: lives with , going to [employer], doing activities at club house    Is patient current with immunizations? No, due for PREVNAR (PCV13) , TDAP and ZOSTAVAX (Shingles). Patient is interested in receiving NONE today.    She  reports that she has never smoked. She has never used smokeless tobacco. She reports that she drinks about 4.2 oz of alcohol per week. She reports that she does not use illicit drugs.  Counseling given: Yes        DPA/Advanced directive: Patient does not have an Advanced Directive.  A packet and workshop information was given on Advanced Directives.    ROS:    Gait: Uses a cane as needed   Ostomy: no   Other tubes: no   Amputations: no   Chronic oxygen use no   Last eye exam 7/2016   Wears hearing aids: no   : Denies incontinence.       Depression Screening    Little interest or pleasure in doing things?  0 - not at all  Feeling down, depressed, or hopeless? 0 - not at all  Patient Health Questionnaire Score:  0  No depressive symptoms identified     Screening for Cognitive Impairment    Three Minute Recall (apple, watch, zeb)  3/3    Draw clock face with all 12 numbers set to the hand to show 10 minutes past 11 o'clock   5/5  No cognitive concerns identified     Fall Risk Assessment    Has the patient had two or more falls in the last year or any fall with injury in the last year?  Yes  No fall risk identified       Safety Assessment    Throw rugs on floor.  Yes  Handrails on all stairs.  Yes  Good lighting in all hallways.  Yes  Difficulty hearing.  No  Patient counseled about all safety risks that were identified.    Functional Assessment ADLs    Are there any  barriers preventing you from cooking for yourself or meeting nutritional needs?  No.    Are there any barriers preventing you from driving safely or obtaining transportation?  No.    Are there any barriers preventing you from using a telephone or calling for help?  No.    Are there any barriers preventing you from shopping?  No.    Are there any barriers preventing you from taking care of your own finances?  No.    Are there any barriers preventing you from managing your medications?  No.    Are you currently engaging any exercise or physical activity?  Yes.  Physical therapy, walking 3 times.    Health Maintenance Summary                Annual Wellness Visit Overdue 1950     MAMMOGRAM Overdue 12/21/1988     IMM DTaP/Tdap/Td Vaccine Overdue 2/24/2011      Done 2/23/2011 Imm Admin: TD Vaccine    BONE DENSITY Overdue 12/21/2013     IMM PNEUMOCOCCAL 65+ (ADULT) LOW/MEDIUM RISK SERIES Overdue 12/21/2013     IMM INFLUENZA Next Due 9/1/2017     COLONOSCOPY Next Due 3/24/2021      Done 3/24/2016 REFERRAL TO GI FOR COLONOSCOPY          Patient Care Team:  Charlee Walker M.D. as PCP - General (Family Medicine)  Alonzo Montes De Oca M.D. as Consulting Physician (Orthopaedics)  Slick Foley M.D. as Consulting Physician (Orthopaedics)  Tay Garcia M.D. as Consulting Physician (Endocrinology)      Social History   Substance Use Topics   • Smoking status: Never Smoker    • Smokeless tobacco: Never Used   • Alcohol Use: 4.2 oz/week     7 Glasses of wine per week     Family History   Problem Relation Age of Onset   • Arthritis Mother    • Alzheimer's Disease Mother    • Cancer Mother      cervical cancer   • Heart Attack Father    • Other Brother      brain anurism   • Cancer Maternal Grandmother 50     colono cancer   • Heart Disease Brother      She  has a past medical history of Thyroid disease and Goiter.   Past Surgical History   Procedure Laterality Date   • Knee replacement, total Right    • Foot surgery Left       "metatarsal fusion, hammer toes correction   • Fusion       disc fusion   • Cervical fusion posterior     • Bladder sling female     • Appendectomy     • Cataract extraction with iol Left          Exam:     Blood pressure 108/76, pulse 74, temperature 36.9 °C (98.4 °F), height 1.702 m (5' 7\"), weight 76.658 kg (169 lb), SpO2 96 %. Body mass index is 26.46 kg/(m^2).    Hearing excellent.    Dentition good  Alert, oriented in no acute distress.  Eye contact is good, speech goal directed, affect calm    Assessment and Plan.   1. Benign essential HTN  Chronic, well controlled with Chlorthalidone and metoprolol, denies side effect, will continue  - Initial Wellness Visit - Includes PPPS ()    2. Pure hypercholesterolemia  Chronic, well controlled with Crestor, will continue.   - Initial Wellness Visit - Includes PPPS ()    3. MOHAMUD (generalized anxiety disorder)  Chronic, well controlled with Paxil, will continue.   - Initial Wellness Visit - Includes PPPS ()    4. Hypothyroidism due to Hashimoto's thyroiditis  Chronic, well controlled with Levothyroxine 50 mcg qd, will continue.   - Initial Wellness Visit - Includes PPPS ()    5. Primary osteoarthritis of right knee  Chronic, working with PT, uses Norco PRN for pain.   Pt recently started THC cream with good symptoms improvement.   She is planning to f/u with [facility]  - Initial Wellness Visit - Includes PPPS ()    6. Insomnia, unspecified type  Chronic, taking Ambien PRN for symptoms, but rarely needs it.   Discussed sleep hygiene, will continue.   - Initial Wellness Visit - Includes PPPS ()    7. Vitiligo  Chronic, mostly on pt's hands bilaterally, does not appear to spread. Will monitor.   - Initial Wellness Visit - Includes PPPS ()    8. Schatzki's ring  Chronic, s/p dilation 4 times, doing well except for rare episodes of dysphagia. Plan:   - REFERRAL TO GASTROENTEROLOGY  - Initial Wellness Visit - Includes PPPS " ()    9. Multinodular goiter (nontoxic)  Chronic, working with Endo, she'll have neck US next weeks.   - Initial Wellness Visit - Includes PPPS ()    10. Medicare annual wellness visit, subsequent  - Initial Wellness Visit - Includes PPPS ()    11. Encounter for screening mammogram for breast cancer  - Initial Wellness Visit - Includes PPPS ()  - MA-SCREEN MAMMO W/CAD-BILAT    12. Need for pneumococcal vaccination  - PNEUMOCOCCAL CONJUGATE VACCINE 13-VALENT    13. Risk for falls  - Patient identified as fall risk.  Appropriate orders and counseling given.  - continue working with PT  - assisted device for ambulation.     14. Vitamin D insufficiency  - 4000 units of Vitamin D daily as directed by endocrinologist.     15. Lumbosacral pain, chronic  Chronic, currently asymptomatic, will monitor.       Services suggested: No services needed at this time  Health Care Screening recommendations as per orders if indicated.  Referrals offered: PT/OT/Nutrition counseling/Behavioral Health/Smoking cessation as per orders if indicated.    Discussion today about general wellness and lifestyle habits:    · Prevent falls and reduce trip hazards; Cautioned about securing or removing rugs.  · Have a working fire alarm and carbon monoxide detector;   · Engage in regular physical activity and social activities       Follow-up: Return in about 6 months (around 1/24/2018) for Multiple issues.

## 2017-07-24 NOTE — MR AVS SNAPSHOT
"        Margoth Hopkins   2017 9:40 AM   Office Visit   MRN: [id number]    Department:  [facility]   Dept Phone:  [phone]    Description:  Female : 1950   Provider:  Charlee Walker M.D.; [facility]            Reason for Visit     Annual Wellness Visit           Allergies as of 2017     Allergen Noted Reactions    Morphine 2017   Vomiting    Pcn [Penicillins] 2017   Hives      You were diagnosed with     Benign essential HTN   [996012]       Pure hypercholesterolemia   [272.0.ICD-9-CM]       MOHAMUD (generalized anxiety disorder)   [917368]       Hypothyroidism due to Hashimoto's thyroiditis   [8990318]       Primary osteoarthritis of right knee   [330445]       Insomnia, unspecified type   [0201881]       Vitiligo   [709.01.ICD-9-CM]       Schatzki's ring   [738331]       Multinodular goiter (nontoxic)   [346687]       Medicare annual wellness visit, subsequent   [365617]       Encounter for screening mammogram for breast cancer   [3090330]       Need for pneumococcal vaccination   [525257]       Risk for falls   [376887]       Vitamin D insufficiency   [630759]       Lumbosacral pain, chronic   [609252]         Vital Signs     Blood Pressure Pulse Temperature Height Weight Body Mass Index    108/76 mmHg 74 36.9 °C (98.4 °F) 1.702 m (5' 7\") 76.658 kg (169 lb) 26.46 kg/m2    Oxygen Saturation Smoking Status                96% Never Smoker           Basic Information     Date Of Birth Sex Race Ethnicity Preferred Language    1950 Female White Non- English      Your appointments     2017  1:00 PM   US [id number] with [facility] US 1   [facility] IMAGING - ULTRASOUND - [facility] ([facility])    [facility]  [address]  [place] [id number] [phone]            Oct 03, 2017 10:30 AM   Established Patient with Tay Garcia M.D.   [facility] ([facility])    [address], [address]  [place] " [place] [address]   [phone]           You will be receiving a confirmation call a few days before your appointment from our automated call confirmation system.            Dec 26, 2017 10:00 AM   Established Patient with Charlee Walker M.D.   [address] ([facility])    [address] [place] [id number] [phone]           You will be receiving a confirmation call a few days before your appointment from our automated call confirmation system.              Problem List              ICD-10-CM Priority Class Noted - Resolved    FHx: colon cancer Z80.0   2/23/2017 - Present    Schatzki's ring K22.2   2/23/2017 - Present    Benign essential HTN I10   2/23/2017 - Present    Pure hypercholesterolemia E78.00   2/23/2017 - Present    MOHAMUD (generalized anxiety disorder) F41.1   2/23/2017 - Present    Hypothyroidism due to Hashimoto's thyroiditis E03.8, E06.3   2/23/2017 - Present    Multinodular goiter (nontoxic) E04.2   2/23/2017 - Present    HLA B27 (HLA B27 positive) Z15.89   2/23/2017 - Present    Primary osteoarthritis of right knee M17.11   2/23/2017 - Present    Lumbosacral pain, chronic M54.5, G89.29   2/23/2017 - Present    Insomnia G47.00   2/23/2017 - Present    Vitamin D insufficiency E55.9   2/23/2017 - Present    Vitiligo L80   6/26/2017 - Present    Risk for falls Z91.81   7/24/2017 - Present      Health Maintenance        Date Due Completion Dates    MAMMOGRAM 12/21/1988 ---    IMM DTaP/Tdap/Td Vaccine (1 - Tdap) 2/24/2011 2/23/2011    BONE DENSITY 12/21/2013 ---    IMM INFLUENZA (1) 9/1/2017 ---    IMM PNEUMOCOCCAL 65+ (ADULT) LOW/MEDIUM RISK SERIES (2 of 2 - PPSV23) 7/24/2018 7/24/2017    COLONOSCOPY 3/24/2021 3/24/2016            Current Immunizations     13-VALENT PCV PREVNAR 7/24/2017    SHINGLES VACCINE 2/23/2011, 2/15/2011    TD Vaccine 2/23/2011    Tetanus Vaccine 2/15/2011      Below and/or attached are the medications your provider expects you to take. Review all of your home  medications and newly ordered medications with your provider and/or pharmacist. Follow medication instructions as directed by your provider and/or pharmacist. Please keep your medication list with you and share with your provider. Update the information when medications are discontinued, doses are changed, or new medications (including over-the-counter products) are added; and carry medication information at all times in the event of emergency situations     Allergies:  MORPHINE - Vomiting     PCN - Hives               Medications  Valid as of: July 24, 2017 - 10:57 AM    Generic Name Brand Name Tablet Size Instructions for use    Biotin (Tab) Biotin 1000 MCG Take 5,000 mcg by mouth.        Chlorthalidone (Tab) HYGROTON 25 MG Take 0.5 Tabs by mouth every day.        Cholecalciferol (Cap) Cholecalciferol 4000 UNITS Take 1 Cap by mouth every day.        Hydrocodone-Acetaminophen (Tab) NORCO 5-325 MG Take 0.5 Tabs by mouth every bedtime.        Levothyroxine Sodium (Tab) SYNTHROID 50 MCG Take 1 Tab by mouth Every morning on an empty stomach.        Metoprolol Succinate (TABLET SR 24 HR) TOPROL XL 50 MG Take 1 Tab by mouth every day.        Nitroglycerin (SL Tab) NITROSTAT 0.3 MG Place 1 Tab under tongue as needed for Chest Pain.        Omega-3 Fatty Acids   Take  by mouth.        Oxycodone-Acetaminophen (Tab) PERCOCET 5-325 MG Take 1-21 Tabs by mouth every 6 hours as needed for Moderate Pain.        PARoxetine HCl (Tab) PAXIL 40 MG Take 1 Tab by mouth every day.        Probiotic Product   Take  by mouth.        Rosuvastatin Calcium (Tab) CRESTOR 20 MG Take 1 Tab by mouth every evening.        Turmeric   Take  by mouth.        Zolpidem Tartrate (Tab) AMBIEN 5 MG Take 1 Tab by mouth at bedtime as needed for Sleep.        .                 Medicines prescribed today were sent to:     [facility]/PHARMACY #[id number] - [place] - [address]    [address] [address]    Phone: [phone] Fax: [phone]    [facility]  Hours?: No    [facility] - [place], [place] - [address]    [address] [facility] [address]    Phone: [phone] Fax: [phone]    Open 24 Hours?: No    [facility]/PHARMACY #[id number] - [place], [place] - [address]    [address] [place] [address]    Phone: [phone] Fax: [phone]    Open 24 Hours?: Yes      Medication refill instructions:       If your prescription bottle indicates you have medication refills left, it is not necessary to call your provider’s office. Please contact your pharmacy and they will refill your medication.    If your prescription bottle indicates you do not have any refills left, you may request refills at any time through one of the following ways: The online [employer] system (except Urgent Care), by calling your provider’s office, or by asking your pharmacy to contact your provider’s office with a refill request. Medication refills are processed only during regular business hours and may not be available until the next business day. Your provider may request additional information or to have a follow-up visit with you prior to refilling your medication.   *Please Note: Medication refills are assigned a new Rx number when refilled electronically. Your pharmacy may indicate that no refills were authorized even though a new prescription for the same medication is available at the pharmacy. Please request the medicine by name with the pharmacy before contacting your provider for a refill.        Referral     A referral request has been sent to our patient care coordination department. Please allow 3-5 business days for us to process this request and contact you either by phone or mail. If you do not hear from us by the 5th business day, please call us at [phone].           [employer] Access Code: Activation code not generated  Current [employer] Status: Active

## 2017-07-30 ENCOUNTER — HOSPITAL ENCOUNTER (OUTPATIENT)
Dept: RADIOLOGY | Facility: MEDICAL CENTER | Age: 67
End: 2017-07-30
Attending: INTERNAL MEDICINE
Payer: MEDICARE

## 2017-07-30 DIAGNOSIS — E04.2 MULTINODULAR GOITER (NONTOXIC): ICD-10-CM

## 2017-07-30 PROCEDURE — 76536 US EXAM OF HEAD AND NECK: CPT

## 2017-08-09 ENCOUNTER — APPOINTMENT (OUTPATIENT)
Dept: OTHER | Facility: IMAGING CENTER | Age: 67
End: 2017-08-09

## 2017-08-09 RX ORDER — METOPROLOL SUCCINATE 50 MG/1
TABLET, EXTENDED RELEASE ORAL
Qty: 90 TAB | Refills: 1 | Status: SHIPPED | OUTPATIENT
Start: 2017-08-09 | End: 2018-01-02 | Stop reason: SDUPTHER

## 2017-08-22 RX ORDER — NITROGLYCERIN 0.3 MG/1
TABLET SUBLINGUAL
Qty: 30 TAB | Refills: 0 | Status: SHIPPED | OUTPATIENT
Start: 2017-08-22 | End: 2017-09-21

## 2017-09-10 DIAGNOSIS — E55.9 VITAMIN D DEFICIENCY: ICD-10-CM

## 2017-09-10 NOTE — LETTER
September 14, 2017        Margoth Hopkins  [address]  [place] [address]        Dear Margoth:    Dr. Walker's  office has been unable to reach you. Please call our office at [phone]. Thank you.      If you have any questions or concerns, please don't hesitate to call.        Sincerely,        Donna Perez, Med Ass't    Electronically Signed

## 2017-09-11 RX ORDER — ERGOCALCIFEROL 1.25 MG/1
50000 CAPSULE ORAL
Qty: 10 CAP | Refills: 0 | OUTPATIENT
Start: 2017-09-11

## 2017-09-11 NOTE — TELEPHONE ENCOUNTER
Phone Number Called: [phone] (home)     Message: Left message for patient to call back.       Left Message for patient to call back: no and N\A

## 2017-09-13 NOTE — TELEPHONE ENCOUNTER
Phone Number Called: [phone] (home)     Message: left message for patient to call back.    Left Message for patient to call back: yes

## 2017-09-14 NOTE — TELEPHONE ENCOUNTER
Phone Number Called: [phone] (home)     Message: called pt left message for pt to call back. Letter mailed to pt.     Left Message for patient to call back: yes

## 2017-09-21 ENCOUNTER — OFFICE VISIT (OUTPATIENT)
Dept: MEDICAL GROUP | Age: 67
End: 2017-09-21
Payer: MEDICARE

## 2017-09-21 ENCOUNTER — HOSPITAL ENCOUNTER (OUTPATIENT)
Facility: MEDICAL CENTER | Age: 67
End: 2017-09-21
Attending: FAMILY MEDICINE
Payer: MEDICARE

## 2017-09-21 VITALS
TEMPERATURE: 96.7 F | WEIGHT: 172.6 LBS | HEART RATE: 69 BPM | DIASTOLIC BLOOD PRESSURE: 78 MMHG | OXYGEN SATURATION: 95 % | BODY MASS INDEX: 27.09 KG/M2 | SYSTOLIC BLOOD PRESSURE: 106 MMHG | HEIGHT: 67 IN

## 2017-09-21 DIAGNOSIS — Z23 NEED FOR VACCINATION: ICD-10-CM

## 2017-09-21 DIAGNOSIS — N30.01 ACUTE CYSTITIS WITH HEMATURIA: ICD-10-CM

## 2017-09-21 DIAGNOSIS — N30.01 ACUTE CYSTITIS WITH HEMATURIA: Primary | ICD-10-CM

## 2017-09-21 DIAGNOSIS — Z78.0 MENOPAUSE: ICD-10-CM

## 2017-09-21 DIAGNOSIS — M17.11 PRIMARY OSTEOARTHRITIS OF RIGHT KNEE: ICD-10-CM

## 2017-09-21 DIAGNOSIS — Z12.31 ENCOUNTER FOR SCREENING MAMMOGRAM FOR BREAST CANCER: ICD-10-CM

## 2017-09-21 LAB
APPEARANCE UR: CLEAR
APPEARANCE UR: CLEAR
BACTERIA #/AREA URNS HPF: NEGATIVE /HPF
BILIRUB UR QL STRIP.AUTO: NEGATIVE
BILIRUB UR STRIP-MCNC: NORMAL MG/DL
COLOR UR AUTO: NORMAL
COLOR UR: YELLOW
EPI CELLS #/AREA URNS HPF: NEGATIVE /HPF
GLUCOSE UR STRIP.AUTO-MCNC: NEGATIVE MG/DL
GLUCOSE UR STRIP.AUTO-MCNC: NORMAL MG/DL
HYALINE CASTS #/AREA URNS LPF: ABNORMAL /LPF
KETONES UR STRIP.AUTO-MCNC: NEGATIVE MG/DL
KETONES UR STRIP.AUTO-MCNC: NORMAL MG/DL
LEUKOCYTE ESTERASE UR QL STRIP.AUTO: ABNORMAL
LEUKOCYTE ESTERASE UR QL STRIP.AUTO: NORMAL
MICRO URNS: ABNORMAL
NITRITE UR QL STRIP.AUTO: NEGATIVE
NITRITE UR QL STRIP.AUTO: NORMAL
PH UR STRIP.AUTO: 6 [PH]
PH UR STRIP.AUTO: 6 [PH] (ref 5–8)
PROT UR QL STRIP: NEGATIVE MG/DL
PROT UR QL STRIP: NORMAL MG/DL
RBC # URNS HPF: ABNORMAL /HPF
RBC UR QL AUTO: NEGATIVE
RBC UR QL AUTO: NORMAL
SP GR UR STRIP.AUTO: 1.01
SP GR UR STRIP.AUTO: 1.02
UROBILINOGEN UR STRIP-MCNC: NORMAL MG/DL
UROBILINOGEN UR STRIP.AUTO-MCNC: 1 MG/DL
WBC #/AREA URNS HPF: ABNORMAL /HPF

## 2017-09-21 PROCEDURE — 81002 URINALYSIS NONAUTO W/O SCOPE: CPT | Performed by: FAMILY MEDICINE

## 2017-09-21 PROCEDURE — 99214 OFFICE O/P EST MOD 30 MIN: CPT | Mod: 25 | Performed by: FAMILY MEDICINE

## 2017-09-21 PROCEDURE — G0008 ADMIN INFLUENZA VIRUS VAC: HCPCS | Performed by: FAMILY MEDICINE

## 2017-09-21 PROCEDURE — 81001 URINALYSIS AUTO W/SCOPE: CPT

## 2017-09-21 PROCEDURE — 90662 IIV NO PRSV INCREASED AG IM: CPT | Performed by: FAMILY MEDICINE

## 2017-09-21 PROCEDURE — 87086 URINE CULTURE/COLONY COUNT: CPT

## 2017-09-21 RX ORDER — NITROFURANTOIN 25; 75 MG/1; MG/1
100 CAPSULE ORAL EVERY 12 HOURS
Qty: 10 CAP | Refills: 0 | Status: SHIPPED | OUTPATIENT
Start: 2017-09-21 | End: 2017-09-26

## 2017-09-21 RX ORDER — HYDROCODONE BITARTRATE AND ACETAMINOPHEN 5; 325 MG/1; MG/1
0.5 TABLET ORAL
Qty: 30 TAB | Refills: 0 | Status: SHIPPED | OUTPATIENT
Start: 2017-09-21 | End: 2017-12-29 | Stop reason: SDUPTHER

## 2017-09-21 NOTE — PROGRESS NOTES
Subjective:   CC:Dysuria    HPI:     Margoth Hopkins is a 66 y.o. female, established patient of the clinic, presents with the following concerns:     1. Need for vaccination  Need flu vaccine    2. Acute cystitis with hematuria  Patient was in her normal state of health until 7 days ago when she develops acute onset dysuria, urinary frequency, urinary urgency.  Patient denies fever, nausea, vomiting, flank pain, hematuria, abnormal vaginal bleeding or discharge.    3. Menopause  Patient requests DEXA scan. She denies personal or familial history of osteopenia or osteoporosis. She denies any history of fall or fracture.  She taking vitamin D and Calcium daily.     4. Primary osteoarthritis of right knee  Chronic, status post total knee replacement twice, however, she continues to experience persistent right knee pain.  She takes Norco PRN for symptoms. She has plan to have repeat knee surgery in 2/2018.   She tries to exercise as tolerated.       Current medicines (including changes today)  Current Outpatient Prescriptions   Medication Sig Dispense Refill   • Cholecalciferol (HM VITAMIN D3) 4000 units Cap Take 1 Cap by mouth every day. 90 Cap 0   • nitrofurantoin monohydr macro (MACROBID) 100 MG Cap Take 1 Cap by mouth every 12 hours for 5 days. 10 Cap 0   • hydrocodone-acetaminophen (NORCO) 5-325 MG Tab per tablet Take 0.5 Tabs by mouth 1 time daily as needed. 30 Tab 0   • metoprolol SR (TOPROL XL) 50 MG TABLET SR 24 HR Take 1 tablet by mouth  every day 90 Tab 1   • levothyroxine (SYNTHROID) 50 MCG Tab Take 1 Tab by mouth Every morning on an empty stomach. 90 Tab 1   • Omega-3 Fatty Acids (OMEGA 3 PO) Take  by mouth.     • chlorthalidone (HYGROTON) 25 MG Tab Take 0.5 Tabs by mouth every day. 90 Tab 1   • rosuvastatin (CRESTOR) 20 MG Tab Take 1 Tab by mouth every evening. 90 Tab 1   • paroxetine (PAXIL) 40 MG tablet Take 1 Tab by mouth every day. 90 Tab 1   • Biotin 1000 MCG Tab Take 5,000 mcg by mouth.     •  "Probiotic Product (PROBIOTIC DAILY PO) Take  by mouth.       No current facility-administered medications for this visit.      She  has a past medical history of Goiter and Thyroid disease.    I personally reviewed patient's problem list, allergies, medications, family hx, social hx with patient and update EPIC.     REVIEW OF SYSTEMS:  CONSTITUTIONAL:  Denies night sweats, fatigue, malaise, lethargy, fever or chills.  RESPIRATORY:  Denies cough, wheeze, hemoptysis, or shortness of breath.  CARDIOVASCULAR:  Denies chest pains, palpitations, pedal edema     Objective:     Blood pressure 106/78, pulse 69, temperature 35.9 °C (96.7 °F), height 1.708 m (5' 7.25\"), weight 78.3 kg (172 lb 9.6 oz), SpO2 95 %. Body mass index is 26.83 kg/m².    Physical Exam:  Constitutional: awake, alert, in no distress.  Skin: Warm, dry, good turgor, no rashes, bruises, ulcers in visible areas.  Eye: conjunctiva clear, lids neg for edema or lesions.  Respiratory: Unlabored respiratory effort, lungs clear to auscultation, no wheezes, no rhonchi.  Cardiovascular: Normal S1, S2, no murmur, no pedal edema.  Abdomen: Soft, non-tender to palpation, no hernia, no hepatosplenomegaly.  Neuro: CN2-12 grossly intact.   Psych: Oriented x3, affect and mood wnl, intact judgement and insight.       Assessment and Plan:   The following treatment plan was discussed    1. Need for vaccination  - INFLUENZA VACCINE, HIGH DOSE (65+ ONLY)    2. Acute cystitis with hematuria  Hx and urine dip concerning for acute cystitis. Plan:   - Urinalysis, Culture if Indicated; Future  - Urine Culture; Future  - nitrofurantoin monohydr macro (MACROBID) 100 MG Cap; Take 1 Cap by mouth every 12 hours for 5 days.  Dispense: 10 Cap; Refill: 0  - f/u PRN    3. Menopause  - DS-BONE DENSITY STUDY (DEXA); Future    4. Encounter for screening mammogram for breast cancer  - MA-SCREEN MAMMO W/CAD-BILAT    5. Primary osteoarthritis of right knee  Chronic, s/p knee replacement twice, " continues to have persistent pain, plans to have another knee surgery next year. PT does not seem to work. Plan:   - hydrocodone-acetaminophen (NORCO) 5-325 MG Tab per tablet; Take 0.5 Tabs by mouth 1 time daily as needed.  Dispense: 30 Tab; Refill: 0  - continue knee rehab exercise  - f/u with ortho.     Charlee Walker M.D.      Followup: Return in about 4 weeks (around 10/19/2017).    Please note that this dictation was created using voice recognition software. I have made every reasonable attempt to correct obvious errors, but I expect that there are errors of grammar and possibly content that I did not discover before finalizing the note.

## 2017-09-21 NOTE — PATIENT INSTRUCTIONS
Urinary Tract Infection  A urinary tract infection (UTI) can occur any place along the urinary tract. The tract includes the kidneys, ureters, bladder, and urethra. A type of germ called bacteria often causes a UTI. UTIs are often helped with antibiotic medicine.   HOME CARE   · If given, take antibiotics as told by your doctor. Finish them even if you start to feel better.  · Drink enough fluids to keep your pee (urine) clear or pale yellow.  · Avoid tea, drinks with caffeine, and bubbly (carbonated) drinks.  · Pee often. Avoid holding your pee in for a long time.  · Pee before and after having sex (intercourse).  · Wipe from front to back after you poop (bowel movement) if you are a woman. Use each tissue only once.  GET HELP RIGHT AWAY IF:   · You have back pain.  · You have lower belly (abdominal) pain.  · You have chills.  · You feel sick to your stomach (nauseous).  · You throw up (vomit).  · Your burning or discomfort with peeing does not go away.  · You have a fever.  · Your symptoms are not better in 3 days.  MAKE SURE YOU:   · Understand these instructions.  · Will watch your condition.  · Will get help right away if you are not doing well or get worse.     This information is not intended to replace advice given to you by your health care provider. Make sure you discuss any questions you have with your health care provider.     Document Released: 06/05/2009 Document Revised: 01/08/2016 Document Reviewed: 07/18/2013  [employer] Interactive Patient Education ©2016 [employer]

## 2017-09-24 LAB
BACTERIA UR CULT: NORMAL
SIGNIFICANT IND 70042: NORMAL
SOURCE SOURCE: NORMAL

## 2017-10-15 DIAGNOSIS — F41.1 GAD (GENERALIZED ANXIETY DISORDER): ICD-10-CM

## 2017-10-15 DIAGNOSIS — E78.00 PURE HYPERCHOLESTEROLEMIA: ICD-10-CM

## 2017-10-16 RX ORDER — ROSUVASTATIN CALCIUM 20 MG/1
TABLET, COATED ORAL
Qty: 90 TAB | Refills: 1 | Status: SHIPPED | OUTPATIENT
Start: 2017-10-16 | End: 2018-03-29 | Stop reason: SDUPTHER

## 2017-10-16 RX ORDER — PAROXETINE HYDROCHLORIDE 40 MG/1
TABLET, FILM COATED ORAL
Qty: 90 TAB | Refills: 1 | Status: SHIPPED | OUTPATIENT
Start: 2017-10-16 | End: 2018-03-29 | Stop reason: SDUPTHER

## 2017-10-20 ENCOUNTER — TELEPHONE (OUTPATIENT)
Dept: MEDICAL GROUP | Age: 67
End: 2017-10-20

## 2017-10-20 ENCOUNTER — NON-PROVIDER VISIT (OUTPATIENT)
Dept: MEDICAL GROUP | Age: 67
End: 2017-10-20
Payer: MEDICARE

## 2017-10-20 DIAGNOSIS — R39.9 UTI SYMPTOMS: ICD-10-CM

## 2017-10-20 LAB
APPEARANCE UR: CLEAR
BILIRUB UR STRIP-MCNC: NORMAL MG/DL
COLOR UR AUTO: YELLOW
GLUCOSE UR STRIP.AUTO-MCNC: NORMAL MG/DL
KETONES UR STRIP.AUTO-MCNC: NORMAL MG/DL
LEUKOCYTE ESTERASE UR QL STRIP.AUTO: NORMAL
NITRITE UR QL STRIP.AUTO: NORMAL
PH UR STRIP.AUTO: 6 [PH] (ref 5–8)
PROT UR QL STRIP: NORMAL MG/DL
RBC UR QL AUTO: NORMAL
SP GR UR STRIP.AUTO: 1.02
UROBILINOGEN UR STRIP-MCNC: NORMAL MG/DL

## 2017-10-20 PROCEDURE — 81002 URINALYSIS NONAUTO W/O SCOPE: CPT | Performed by: FAMILY MEDICINE

## 2017-10-20 NOTE — TELEPHONE ENCOUNTER
Please call patient and inform her that her follow urinalysis is clear.   If she continues to experience symptoms, please schedule follow up appointment.   Charlee Walker M.D.

## 2017-10-20 NOTE — PROGRESS NOTES
Margoth Hopkins is a 66 y.o. female here for a non-provider visit for Urinalysis    If abnormal was an in office provider notified today (if so, indicate provider)? No  Routed to PCP? Yes

## 2017-11-09 ENCOUNTER — OFFICE VISIT (OUTPATIENT)
Dept: MEDICAL GROUP | Age: 67
End: 2017-11-09
Payer: MEDICARE

## 2017-11-09 VITALS
HEART RATE: 80 BPM | TEMPERATURE: 97.2 F | BODY MASS INDEX: 27.31 KG/M2 | DIASTOLIC BLOOD PRESSURE: 64 MMHG | HEIGHT: 67 IN | SYSTOLIC BLOOD PRESSURE: 124 MMHG | OXYGEN SATURATION: 94 % | WEIGHT: 174 LBS

## 2017-11-09 DIAGNOSIS — J06.9 VIRAL URI WITH COUGH: Primary | ICD-10-CM

## 2017-11-09 DIAGNOSIS — H61.21 IMPACTED CERUMEN OF RIGHT EAR: ICD-10-CM

## 2017-11-09 PROCEDURE — 99214 OFFICE O/P EST MOD 30 MIN: CPT | Performed by: FAMILY MEDICINE

## 2017-11-09 RX ORDER — PROMETHAZINE HYDROCHLORIDE AND CODEINE PHOSPHATE 6.25; 1 MG/5ML; MG/5ML
5 SYRUP ORAL 4 TIMES DAILY PRN
Qty: 100 ML | Refills: 0 | Status: SHIPPED | OUTPATIENT
Start: 2017-11-09 | End: 2017-11-14

## 2017-11-09 NOTE — PROGRESS NOTES
Subjective:   CC:Cough    HPI:     Margoth Hopkins is a 66 y.o. female, established patient of the clinic, presents with the following concerns:     Patient was in her normal state of health until 6 days ago when she develops acute cough, sore throat, nasal congestion, nasal discharge, headaches, and generalized weakness. She denies fever, chills, shortness of breath, dyspnea on exertion. She had tried a DayQuil and NyQuil without relief. Her symptoms are worse at night and interfering with sleep. She also complains of right ear fullness and reduced hearing. She denies history of trauma to the right ear, tinnitus, focal weakness/numbness.      Current medicines (including changes today)  Current Outpatient Prescriptions   Medication Sig Dispense Refill   • promethazine-codeine (PHENERGAN-CODEINE) 6.25-10 MG/5ML Syrup Take 5 mL by mouth 4 times a day as needed for Cough for up to 5 days. 100 mL 0   • paroxetine (PAXIL) 40 MG tablet TAKE 1 TABLET BY MOUTH  EVERY DAY 90 Tab 1   • rosuvastatin (CRESTOR) 20 MG Tab TAKE 1 TABLET BY MOUTH  EVERY EVENING 90 Tab 1   • Cholecalciferol (HM VITAMIN D3) 4000 units Cap Take 1 Cap by mouth every day. 90 Cap 0   • hydrocodone-acetaminophen (NORCO) 5-325 MG Tab per tablet Take 0.5 Tabs by mouth 1 time daily as needed. 30 Tab 0   • metoprolol SR (TOPROL XL) 50 MG TABLET SR 24 HR Take 1 tablet by mouth  every day 90 Tab 1   • levothyroxine (SYNTHROID) 50 MCG Tab Take 1 Tab by mouth Every morning on an empty stomach. 90 Tab 1   • Omega-3 Fatty Acids (OMEGA 3 PO) Take  by mouth.     • chlorthalidone (HYGROTON) 25 MG Tab Take 0.5 Tabs by mouth every day. 90 Tab 1   • Biotin 1000 MCG Tab Take 5,000 mcg by mouth.     • Probiotic Product (PROBIOTIC DAILY PO) Take  by mouth.       No current facility-administered medications for this visit.      She  has a past medical history of Goiter and Thyroid disease.    I personally reviewed patient's problem list, allergies, medications, family hx,  "social hx with patient and update EPIC.     REVIEW OF SYSTEMS:  CONSTITUTIONAL:  Denies night sweats, fatigue, malaise, lethargy, fever or chills.  RESPIRATORY:  Denies cough, wheeze, hemoptysis, or shortness of breath.  CARDIOVASCULAR:  Denies chest pains, palpitations, pedal edema     Objective:     Blood pressure 124/64, pulse 80, temperature 36.2 °C (97.2 °F), height 1.702 m (5' 7\"), weight 78.9 kg (174 lb), SpO2 94 %. Body mass index is 27.25 kg/m².    Physical Exam:  Constitutional: awake, alert, in no distress.  Skin: Warm, dry, good turgor, no rashes, bruises, ulcers in visible areas.  Eye: conjunctiva clear, lids neg for edema or lesions.  ENMT: Cerumen impaction on the right ear canal. Left ear exam within normal limits. Inflamed nasal mucosa. Lips without lesions, good dentition, hyperemic oropharynx without tonsillar hypertrophy.  Neck: Trachea midline, no masses, no thyromegaly. Positive cervical lymphadenopathy  Respiratory: Unlabored respiratory effort, lungs clear to auscultation, no wheezes, no rhonchi, no rales.  Cardiovascular: Normal S1, S2, no murmur, no pedal edema.  Neuro: CN2-12 grossly intact. Strength 5/5, reflexes 2/4 in all extremities, no sensory deficits.   Psych: Oriented x3, affect and mood wnl, intact judgement and insight.       Assessment and Plan:   The following treatment plan was discussed    1. Viral URI with cough  History and exam consistent with viral URI with cough. Plan:   - promethazine-codeine (PHENERGAN-CODEINE) 6.25-10 MG/5ML Syrup; Take 5 mL by mouth 4 times a day as needed for Cough for up to 5 days.  Dispense: 100 mL; Refill: 0  Chloraseptic spray and/or Benzocaine-menthol lozenges for throat discomfort. Can also try home remedy with honey-lemon-ginger.  Nasal saline irrigation 2-3 times per day.  Tylenol and/or Ibuprofen for fever and pain.  Hydration, rest  Cautionary measures to prevent transmission.   Follow up PRN    2. Impacted cerumen of right ear  Exam noted " for cerumen impaction on the right ear, likely responsible for her symptoms. Ear wax was successfully irrigated by MA. Patient tolerated the procedure well. No immediate complication noted.    Total 25 minutes face-to-face time spent with patient, with greater than 50% of the total time discussing patient's issues and symptoms as listed above in assessment and plan, as well as managing coordination of care for future evaluation and treatment.    Charlee Walker M.D.      Followup: Return for As needed.    Please note that this dictation was created using voice recognition software. I have made every reasonable attempt to correct obvious errors, but I expect that there are errors of grammar and possibly content that I did not discover before finalizing the note.

## 2017-11-15 ENCOUNTER — OFFICE VISIT (OUTPATIENT)
Dept: ENDOCRINOLOGY | Facility: MEDICAL CENTER | Age: 67
End: 2017-11-15
Payer: MEDICARE

## 2017-11-15 VITALS
BODY MASS INDEX: 27.34 KG/M2 | OXYGEN SATURATION: 93 % | WEIGHT: 174.2 LBS | HEART RATE: 72 BPM | DIASTOLIC BLOOD PRESSURE: 78 MMHG | HEIGHT: 67 IN | SYSTOLIC BLOOD PRESSURE: 124 MMHG

## 2017-11-15 DIAGNOSIS — E04.2 MULTINODULAR GOITER (NONTOXIC): ICD-10-CM

## 2017-11-15 DIAGNOSIS — E55.9 VITAMIN D DEFICIENCY: ICD-10-CM

## 2017-11-15 DIAGNOSIS — E06.3 HYPOTHYROIDISM DUE TO HASHIMOTO'S THYROIDITIS: ICD-10-CM

## 2017-11-15 DIAGNOSIS — E03.8 HYPOTHYROIDISM DUE TO HASHIMOTO'S THYROIDITIS: ICD-10-CM

## 2017-11-15 DIAGNOSIS — E53.8 VITAMIN B12 DEFICIENCY: ICD-10-CM

## 2017-11-15 PROCEDURE — 99214 OFFICE O/P EST MOD 30 MIN: CPT | Performed by: INTERNAL MEDICINE

## 2017-11-15 NOTE — PROGRESS NOTES
Endocrinology Clinic Progress Note  PCP: Charlee Walker M.D.    HPI:  Margoth Hopkins is a 66 y.o. old patient who comes in today for routine follow up.   For  hypothyroidism she is currently on 50 µg of levothyroxine daily.  For  multinodular goiter she recently had an ultrasound of the thyroid done. She feels some obstructive symptoms including feeling of neck enlargement on and off.       ROS:  Constitutional: Neck enlargement No unintentional weight loss  Endo: Denies excessive thirst or frequent urination  All other systems were reviewed and were negative.    Past Medical History:  Patient Active Problem List    Diagnosis Date Noted   • Risk for falls 07/24/2017   • Vitiligo 06/26/2017   • FHx: colon cancer 02/23/2017   • Schatzki's ring 02/23/2017   • Benign essential HTN 02/23/2017   • Pure hypercholesterolemia 02/23/2017   • MOHAMUD (generalized anxiety disorder) 02/23/2017   • Hypothyroidism due to Hashimoto's thyroiditis 02/23/2017   • Multinodular goiter (nontoxic) 02/23/2017   • HLA B27 (HLA B27 positive) 02/23/2017   • Primary osteoarthritis of right knee 02/23/2017   • Lumbosacral pain, chronic 02/23/2017   • Insomnia 02/23/2017   • Vitamin D insufficiency 02/23/2017       Medications:    Current Outpatient Prescriptions:   •  paroxetine (PAXIL) 40 MG tablet, TAKE 1 TABLET BY MOUTH  EVERY DAY, Disp: 90 Tab, Rfl: 1  •  rosuvastatin (CRESTOR) 20 MG Tab, TAKE 1 TABLET BY MOUTH  EVERY EVENING, Disp: 90 Tab, Rfl: 1  •  Cholecalciferol (HM VITAMIN D3) 4000 units Cap, Take 1 Cap by mouth every day., Disp: 90 Cap, Rfl: 0  •  hydrocodone-acetaminophen (NORCO) 5-325 MG Tab per tablet, Take 0.5 Tabs by mouth 1 time daily as needed., Disp: 30 Tab, Rfl: 0  •  metoprolol SR (TOPROL XL) 50 MG TABLET SR 24 HR, Take 1 tablet by mouth  every day, Disp: 90 Tab, Rfl: 1  •  levothyroxine (SYNTHROID) 50 MCG Tab, Take 1 Tab by mouth Every morning on an empty stomach., Disp: 90 Tab, Rfl: 1  •  Omega-3 Fatty Acids (OMEGA 3 PO), Take  " by mouth., Disp: , Rfl:   •  chlorthalidone (HYGROTON) 25 MG Tab, Take 0.5 Tabs by mouth every day., Disp: 90 Tab, Rfl: 1  •  Biotin 1000 MCG Tab, Take 5,000 mcg by mouth., Disp: , Rfl:   •  Probiotic Product (PROBIOTIC DAILY PO), Take  by mouth., Disp: , Rfl:     Labs: Reviewed    Physical Examination:  Vital signs: /78   Pulse 72   Ht 1.702 m (5' 7\")   Wt 79 kg (174 lb 3.2 oz)   SpO2 93%   BMI 27.28 kg/m²  Body mass index is 27.28 kg/m².  General: No apparent distress, cooperative  Eyes: No scleral icterus, no discharge, normal eyelids  Neck: No abnormal masses on inspection, normal thyroid exam  Resp: Normal effort, clear to auscultation bilaterally  CVS: Regular rate and rhythm, S1 S2 normal, no murmur  Extremities: No lower extremity edema  Abdomen: abdominal obesity present  Musculoskeletal: Normal digits and nails  Skin: No rash on visible skin  Psych: Alert and oriented, normal mood and affect, intact memory and able to make informed decisions.    Assessment and Plan:    1. Multinodular goiter (nontoxic)  The nodules on the left and right when compared with the ultrasound done in 2015 obviously appears to have grown significantly in size and there also appears to be one nodule  Each on left and right side of the thyroid which are more than a centimeter in size and will need FNAC tor rule out thyroid cancer.     2. Hypothyroidism due to Hashimoto's thyroiditis  We'll repeat thyroid panel related to the dose accordingly.    3. Vitamin D deficiency  Vitamin D levels in March 2017 were around 25. Following that she was given a loading dose. I've advised her to take about 5-10,000 units daily as the maintenance dose. I will repeat vitamin D levels before next visit    4. Vitamin B12 deficiency  Her vitamin B12 levels are normal.    Return in about 2 months (around 1/15/2018).    Thank you for allowing me to participate in the care of this patient.    Tay Garcia M.D.    CC:   Charlee Walker, " M.D.    This note was created using voice recognition software (Dragon). The accuracy of the dictation is limited by the abilities of the software. I have reviewed the note prior to signing, however some errors in grammar and context are still possible. If you have any questions related to this note please do not hesitate to contact our office.

## 2017-12-12 ENCOUNTER — HOSPITAL ENCOUNTER (OUTPATIENT)
Dept: RADIOLOGY | Facility: MEDICAL CENTER | Age: 67
End: 2017-12-12
Attending: INTERNAL MEDICINE
Payer: MEDICARE

## 2017-12-12 DIAGNOSIS — E04.2 MULTINODULAR GOITER (NONTOXIC): ICD-10-CM

## 2017-12-12 PROCEDURE — 88112 CYTOPATH CELL ENHANCE TECH: CPT | Mod: 91

## 2017-12-12 PROCEDURE — 10022 IR-FINE NEEDLE ASPIR W/GUIDE(FL): CPT

## 2017-12-12 NOTE — PROGRESS NOTES
"Patient given [facility] \"Preventing the Spread of Infection\" Brochure upon being checked in.     US guided bilateral thyroid nodule fine needle aspiration done by Dr. Rose; bilateral anterior aspect of neck access site; procedural RN: Claudio; 2 jars of cytolyt obtained and sent to pathology lab; pt tolerated the procedure well; pt remained stable pre/intra/post procedure; all questions and concerns answered prior to being d/c; patient provided with appropriate education for procedure; pt d/c home.     "

## 2017-12-26 ENCOUNTER — APPOINTMENT (OUTPATIENT)
Dept: MEDICAL GROUP | Age: 67
End: 2017-12-26
Payer: COMMERCIAL

## 2017-12-28 ENCOUNTER — TELEPHONE (OUTPATIENT)
Dept: MEDICAL GROUP | Age: 67
End: 2017-12-28

## 2017-12-28 NOTE — TELEPHONE ENCOUNTER
ESTABLISHED PATIENT PRE-VISIT PLANNING     Note: Patient will not be contacted if there is no indication to call.     1.  Reviewed notes from the last few office visits within the medical group: Yes    2.  If any orders were placed at last visit or intended to be done for this visit (i.e. 6 mos follow-up), do we have Results/Consult Notes?        •  Labs - Labs ordered, completed on 10/21/17 and results are in chart.   Note: If patient appointment is for lab review and patient did not complete labs, check with provider if OK to reschedule patient until labs completed.       •  Imaging - Imaging ordered, NOT completed. Patient advised to complete prior to next appointment.       •  Referrals - Referral ordered, patient has NOT been seen.    3. Is this appointment scheduled as a Hospital Follow-Up? No    4.  Immunizations were updated in Epic using WebIZ?: Epic matches WebIZ       •  Web Iz Recommendations: TDAP    5.  Patient is due for the following Health Maintenance Topics:   Health Maintenance Due   Topic Date Due   • MAMMOGRAM  12/21/1988   • IMM DTaP/Tdap/Td Vaccine (1 - Tdap) 02/24/2011   • BONE DENSITY  12/21/2013           6.  Patient was NOT informed to arrive 15 min prior to their scheduled appointment and bring in their medication bottles.

## 2017-12-29 ENCOUNTER — OFFICE VISIT (OUTPATIENT)
Dept: MEDICAL GROUP | Age: 67
End: 2017-12-29
Payer: MEDICARE

## 2017-12-29 VITALS
OXYGEN SATURATION: 95 % | HEART RATE: 70 BPM | BODY MASS INDEX: 27.88 KG/M2 | TEMPERATURE: 95.1 F | DIASTOLIC BLOOD PRESSURE: 68 MMHG | HEIGHT: 67 IN | WEIGHT: 177.6 LBS | SYSTOLIC BLOOD PRESSURE: 128 MMHG

## 2017-12-29 DIAGNOSIS — E78.00 PURE HYPERCHOLESTEROLEMIA: ICD-10-CM

## 2017-12-29 DIAGNOSIS — E03.8 HYPOTHYROIDISM DUE TO HASHIMOTO'S THYROIDITIS: ICD-10-CM

## 2017-12-29 DIAGNOSIS — E55.9 VITAMIN D INSUFFICIENCY: ICD-10-CM

## 2017-12-29 DIAGNOSIS — E06.3 HYPOTHYROIDISM DUE TO HASHIMOTO'S THYROIDITIS: ICD-10-CM

## 2017-12-29 DIAGNOSIS — I10 BENIGN ESSENTIAL HTN: ICD-10-CM

## 2017-12-29 DIAGNOSIS — M17.11 PRIMARY OSTEOARTHRITIS OF RIGHT KNEE: ICD-10-CM

## 2017-12-29 DIAGNOSIS — G89.29 LUMBOSACRAL PAIN, CHRONIC: Primary | ICD-10-CM

## 2017-12-29 DIAGNOSIS — M54.50 LUMBOSACRAL PAIN, CHRONIC: Primary | ICD-10-CM

## 2017-12-29 PROCEDURE — 99214 OFFICE O/P EST MOD 30 MIN: CPT | Performed by: FAMILY MEDICINE

## 2017-12-29 RX ORDER — LORAZEPAM 0.5 MG/1
0.5 TABLET ORAL ONCE
Qty: 1 TAB | Refills: 0 | Status: SHIPPED | OUTPATIENT
Start: 2017-12-29 | End: 2017-12-29

## 2017-12-29 RX ORDER — HYDROCODONE BITARTRATE AND ACETAMINOPHEN 5; 325 MG/1; MG/1
1 TABLET ORAL 2 TIMES DAILY PRN
Qty: 60 TAB | Refills: 0 | Status: SHIPPED | OUTPATIENT
Start: 2017-12-29 | End: 2018-01-28

## 2017-12-29 NOTE — PROGRESS NOTES
Subjective:   CC:Chronic knee pain    HPI:     Margoth Hopkins is a 67 y.o. female, established patient of the clinic, presents with the following concerns:     Patient had history of primary osteoarthritis of the right knee status post total knee replacement twice. However, she continues to suffer persistent right knee pain and stiffness. Her orthopedic surgeon recommended another surgery as the hardwares appear to slip. Patient states that she is going to [place] as her daughter-in-law is having another baby in a few weeks. She states that she will schedule appointment for surgical evaluation. However, persistent right knee pain is affecting her mood, sleep, and daily activity. Patient tried Norco 5-325 mg 0.5 tablet daily in the past without relief.    She also complains of chronic lumbosacral back pain. She received multiple epidural injection in the past when she was living in [place]. However, she continues to have persistent worsening lumbosacral back pain radiating to the right leg. As with above, her symptoms are interfering with sleep and daily activity. Denies history of osteoporosis, history of malignancy, bowel/bladder symptoms, saddle anesthesia, LEs weakness, pain improves with lumbosacral leaning forward, history of IV drug use, history of systemic symptoms such as fever, chills, and malaise, dysuria, hematuria, pain onset comes with tearing/ripping sensation in the back.    Current medicines (including changes today)  Current Outpatient Prescriptions   Medication Sig Dispense Refill   • hydrocodone-acetaminophen (NORCO) 5-325 MG Tab per tablet Take 1 Tab by mouth 2 times a day as needed for up to 30 days. 60 Tab 0   • lorazepam (ATIVAN) 0.5 MG Tab Take 1 Tab by mouth Once for 1 dose. 1 Tab 0   • paroxetine (PAXIL) 40 MG tablet TAKE 1 TABLET BY MOUTH  EVERY DAY 90 Tab 1   • rosuvastatin (CRESTOR) 20 MG Tab TAKE 1 TABLET BY MOUTH  EVERY EVENING 90 Tab 1   • Cholecalciferol (HM VITAMIN D3) 4000  "units Cap Take 1 Cap by mouth every day. 90 Cap 0   • metoprolol SR (TOPROL XL) 50 MG TABLET SR 24 HR Take 1 tablet by mouth  every day 90 Tab 1   • levothyroxine (SYNTHROID) 50 MCG Tab Take 1 Tab by mouth Every morning on an empty stomach. 90 Tab 1   • Omega-3 Fatty Acids (OMEGA 3 PO) Take  by mouth.     • chlorthalidone (HYGROTON) 25 MG Tab Take 0.5 Tabs by mouth every day. 90 Tab 1   • Biotin 1000 MCG Tab Take 5,000 mcg by mouth.     • Probiotic Product (PROBIOTIC DAILY PO) Take  by mouth.       No current facility-administered medications for this visit.      She  has a past medical history of Goiter and Thyroid disease.    I personally reviewed patient's problem list, allergies, medications, family hx, social hx with patient and update EPIC.     REVIEW OF SYSTEMS:  CONSTITUTIONAL:  Denies night sweats, fatigue, malaise, lethargy, fever or chills.  RESPIRATORY:  Denies cough, wheeze, hemoptysis, or shortness of breath.  CARDIOVASCULAR:  Denies chest pains, palpitations, pedal edema     Objective:     Blood pressure 128/68, pulse 70, temperature (!) 35.1 °C (95.1 °F), height 1.702 m (5' 7\"), weight 80.6 kg (177 lb 9.6 oz), SpO2 95 %. Body mass index is 27.82 kg/m².    Physical Exam:  Constitutional: awake, alert, in no distress.  Skin: Warm, dry, good turgor, no rashes, bruises, ulcers in visible areas.  Eye: conjunctiva clear, lids neg for edema or lesions.  Neck: Trachea midline, no masses, no thyromegaly. No cervical or supraclavicular lymphadenopathy  Respiratory: Unlabored respiratory effort, lungs clear to auscultation, no wheezes, no rhonchi, no rales.  Cardiovascular: Normal S1, S2, no murmur, no pedal edema.  Psych: Oriented x3, affect and mood wnl, intact judgement and insight.       Assessment and Plan:   The following treatment plan was discussed    1. Primary osteoarthritis of right knee  Patient had history of primary osteoarthritis of the right knee status post total knee replacement twice. " However, she continues to suffer persistent right knee pain and stiffness. Her orthopedic surgeon recommended another surgery as the hardwares appear to slip. Patient is planning to schedule appointment for surgical evaluation next year. She is traveling to [place] to visit her family and interested in temporary pain control. Plans:  - hydrocodone-acetaminophen (NORCO) 5-325 MG Tab per tablet; Take 1 Tab by mouth 2 times a day as needed for up to 30 days.  Dispense: 60 Tab; Refill: 0  - Risks, benefits, side effects of medication discussed with patient.    2. Lumbosacral pain, chronic  Chronic, worsening lumbosacral pain with right-sided sciatica. Patient is status post multiple epidural injection in the past without success. Her previous neurosurgeon recommended spinal nerve stimulator. Patient is interested in consultation for this device. Plans:  - MR-LUMBAR SPINE-W/O; Future  - lorazepam (ATIVAN) 0.5 once for claustrophobia with MRI.  - REFERRAL TO NEUROSURGERY  - hydrocodone-acetaminophen (NORCO) 5-325 MG Tab per tablet; Take 1 Tab by mouth 2 times a day as needed for up to 30 days.  Dispense: 60 Tab; Refill: 0    Ly SPENCER Walker M.D.      Followup: Return in about 7 years (around 12/29/2024) for Annual wellness visit.    Please note that this dictation was created using voice recognition software. I have made every reasonable attempt to correct obvious errors, but I expect that there are errors of grammar and possibly content that I did not discover before finalizing the note.

## 2018-01-02 DIAGNOSIS — E03.8 HYPOTHYROIDISM DUE TO HASHIMOTO'S THYROIDITIS: ICD-10-CM

## 2018-01-02 DIAGNOSIS — E06.3 HYPOTHYROIDISM DUE TO HASHIMOTO'S THYROIDITIS: ICD-10-CM

## 2018-01-04 RX ORDER — LEVOTHYROXINE SODIUM 50 MCG
TABLET ORAL
Qty: 90 TAB | Refills: 1 | OUTPATIENT
Start: 2018-01-04 | End: 2018-03-19

## 2018-01-04 RX ORDER — METOPROLOL SUCCINATE 50 MG/1
TABLET, EXTENDED RELEASE ORAL
Qty: 90 TAB | Refills: 1 | OUTPATIENT
Start: 2018-01-04 | End: 2018-03-25 | Stop reason: SDUPTHER

## 2018-01-09 ENCOUNTER — TELEPHONE (OUTPATIENT)
Dept: MEDICAL GROUP | Age: 68
End: 2018-01-09

## 2018-01-09 RX ORDER — OSELTAMIVIR PHOSPHATE 75 MG/1
75 CAPSULE ORAL 2 TIMES DAILY
Qty: 10 CAP | Refills: 0 | Status: SHIPPED | OUTPATIENT
Start: 2018-01-09 | End: 2018-01-09

## 2018-01-09 RX ORDER — OSELTAMIVIR PHOSPHATE 75 MG/1
75 CAPSULE ORAL 2 TIMES DAILY
Qty: 10 CAP | Refills: 0 | Status: SHIPPED | OUTPATIENT
Start: 2018-01-09 | End: 2018-02-13

## 2018-02-08 ENCOUNTER — HOSPITAL ENCOUNTER (OUTPATIENT)
Dept: RADIOLOGY | Facility: MEDICAL CENTER | Age: 68
End: 2018-02-08
Attending: FAMILY MEDICINE
Payer: MEDICARE

## 2018-02-08 DIAGNOSIS — G89.29 LUMBOSACRAL PAIN, CHRONIC: ICD-10-CM

## 2018-02-08 DIAGNOSIS — M54.50 LUMBOSACRAL PAIN, CHRONIC: ICD-10-CM

## 2018-02-08 PROCEDURE — 72148 MRI LUMBAR SPINE W/O DYE: CPT

## 2018-02-13 ENCOUNTER — OFFICE VISIT (OUTPATIENT)
Dept: ENDOCRINOLOGY | Facility: MEDICAL CENTER | Age: 68
End: 2018-02-13
Payer: MEDICARE

## 2018-02-13 VITALS
BODY MASS INDEX: 27.94 KG/M2 | WEIGHT: 178 LBS | OXYGEN SATURATION: 95 % | HEIGHT: 67 IN | HEART RATE: 83 BPM | SYSTOLIC BLOOD PRESSURE: 108 MMHG | DIASTOLIC BLOOD PRESSURE: 72 MMHG

## 2018-02-13 DIAGNOSIS — E06.3 HYPOTHYROIDISM DUE TO HASHIMOTO'S THYROIDITIS: ICD-10-CM

## 2018-02-13 DIAGNOSIS — E16.2 HYPOGLYCEMIA: ICD-10-CM

## 2018-02-13 DIAGNOSIS — E03.8 HYPOTHYROIDISM DUE TO HASHIMOTO'S THYROIDITIS: ICD-10-CM

## 2018-02-13 DIAGNOSIS — E04.2 MULTINODULAR GOITER (NONTOXIC): ICD-10-CM

## 2018-02-13 LAB
HBA1C MFR BLD: 5.2 % (ref ?–5.8)
INT CON NEG: NORMAL
INT CON POS: NORMAL

## 2018-02-13 PROCEDURE — 83036 HEMOGLOBIN GLYCOSYLATED A1C: CPT | Performed by: INTERNAL MEDICINE

## 2018-02-13 PROCEDURE — 99214 OFFICE O/P EST MOD 30 MIN: CPT | Performed by: INTERNAL MEDICINE

## 2018-02-13 RX ORDER — HYDROCODONE BITARTRATE AND ACETAMINOPHEN 5; 325 MG/1; MG/1
1 TABLET ORAL EVERY 8 HOURS PRN
COMMUNITY
End: 2018-03-20

## 2018-02-14 NOTE — PROGRESS NOTES
Endocrinology Clinic Progress Note  PCP: Charlee Walker M.D.    HPI:  Margoth Hopkins is a 67 y.o. old patient who comes in today for review of endocrine problems.    She underwent FNAC of left and right dominant thyroid nodules recently and is negative for cancer. She forgot to get her labs are done as she was with her family in [facility] helping out with the birth of her grandchild.    She reports one episode wherein she felt increasing shakiness, heart palpitations and the blood pressure at the time was over 180 systolic. She drank some juice and milk and she felt better. It appears that she may have had an episode of hypoglycemia.    ROS:  Constitutional: No unintentional weight loss  Endo: Denies excessive thirst or frequent urination  All other systems were reviewed and were negative.    Past Medical History:  Patient Active Problem List    Diagnosis Date Noted   • Risk for falls 07/24/2017   • Vitiligo 06/26/2017   • FHx: colon cancer 02/23/2017   • Schatzki's ring 02/23/2017   • Benign essential HTN 02/23/2017   • Pure hypercholesterolemia 02/23/2017   • MOHAMUD (generalized anxiety disorder) 02/23/2017   • Hypothyroidism due to Hashimoto's thyroiditis 02/23/2017   • Multinodular goiter (nontoxic) 02/23/2017   • HLA B27 (HLA B27 positive) 02/23/2017   • Primary osteoarthritis of right knee 02/23/2017   • Lumbosacral pain, chronic, with right sciatica 02/23/2017   • Insomnia 02/23/2017   • Vitamin D insufficiency 02/23/2017       Medications:    Current Outpatient Prescriptions:   •  HYDROcodone-acetaminophen (NORCO) 5-325 MG Tab per tablet, Take 1 Tab by mouth every 8 hours as needed., Disp: , Rfl:   •  SYNTHROID 50 MCG Tab, TAKE 1 TABLET BY MOUTH  EVERY MORNING ON AN EMPTY  STOMACH, Disp: 90 Tab, Rfl: 1  •  metoprolol SR (TOPROL XL) 50 MG TABLET SR 24 HR, TAKE 1 TABLET BY MOUTH  EVERY DAY, Disp: 90 Tab, Rfl: 1  •  paroxetine (PAXIL) 40 MG tablet, TAKE 1 TABLET BY MOUTH  EVERY DAY, Disp: 90 Tab, Rfl: 1  •   "rosuvastatin (CRESTOR) 20 MG Tab, TAKE 1 TABLET BY MOUTH  EVERY EVENING, Disp: 90 Tab, Rfl: 1  •  Cholecalciferol (HM VITAMIN D3) 4000 units Cap, Take 1 Cap by mouth every day., Disp: 90 Cap, Rfl: 0  •  Omega-3 Fatty Acids (OMEGA 3 PO), Take  by mouth., Disp: , Rfl:   •  chlorthalidone (HYGROTON) 25 MG Tab, Take 0.5 Tabs by mouth every day., Disp: 90 Tab, Rfl: 1  •  Biotin 1000 MCG Tab, Take 5,000 mcg by mouth., Disp: , Rfl:     Labs: Reviewed    Physical Examination:  Vital signs: /72   Pulse 83   Ht 1.702 m (5' 7\")   Wt 80.7 kg (178 lb)   SpO2 95%   BMI 27.88 kg/m²  Body mass index is 27.88 kg/m².  General: No apparent distress, cooperative  Eyes: No scleral icterus, no discharge, normal eyelids  Neck: No abnormal masses on inspection, normal thyroid exam  Resp: Normal effort, clear to auscultation bilaterally  CVS: Regular rate and rhythm, S1 S2 normal, no murmur  Extremities: No lower extremity edema  Abdomen: abdominal obesity present  Musculoskeletal: Normal digits and nails  Skin: No rash on visible skin  Psych: Alert and oriented, normal mood and affect, intact memory and able to make informed decisions.    Assessment and Plan:    1. Hypoglycemia  Her A1c is slightly lower suggesting that she could be having frequent hypoglycemia. Advised to check more often.    2. Hypothyroidism due to Hashimoto's thyroiditis  She'll get the labs done shortly.    3. Multinodular goiter (nontoxic)  FNAC of the left and right thyroid dominant nodules are negative for cancer.    Return in about 1 month (around 3/13/2018).    Thank you for allowing me to participate in the care of this patient.    Tay Garcia M.D.    CC:   Charlee Walker M.D.    This note was created using voice recognition software (Dragon). The accuracy of the dictation is limited by the abilities of the software. I have reviewed the note prior to signing, however some errors in grammar and context are still possible. If you have any questions " related to this note please do not hesitate to contact our office.

## 2018-03-06 ENCOUNTER — HOSPITAL ENCOUNTER (OUTPATIENT)
Dept: LAB | Facility: MEDICAL CENTER | Age: 68
End: 2018-03-06
Attending: INTERNAL MEDICINE
Payer: MEDICARE

## 2018-03-06 DIAGNOSIS — E03.8 HYPOTHYROIDISM DUE TO HASHIMOTO'S THYROIDITIS: ICD-10-CM

## 2018-03-06 DIAGNOSIS — E55.9 VITAMIN D DEFICIENCY: ICD-10-CM

## 2018-03-06 DIAGNOSIS — E06.3 HYPOTHYROIDISM DUE TO HASHIMOTO'S THYROIDITIS: ICD-10-CM

## 2018-03-06 LAB
25(OH)D3 SERPL-MCNC: 21 NG/ML (ref 30–100)
T3 SERPL-MCNC: 81.1 NG/DL (ref 60–181)
T4 FREE SERPL-MCNC: 0.73 NG/DL (ref 0.53–1.43)
TSH SERPL DL<=0.005 MIU/L-ACNC: 1.49 UIU/ML (ref 0.38–5.33)

## 2018-03-06 PROCEDURE — 84443 ASSAY THYROID STIM HORMONE: CPT

## 2018-03-06 PROCEDURE — 82306 VITAMIN D 25 HYDROXY: CPT

## 2018-03-06 PROCEDURE — 36415 COLL VENOUS BLD VENIPUNCTURE: CPT

## 2018-03-06 PROCEDURE — 84439 ASSAY OF FREE THYROXINE: CPT

## 2018-03-06 PROCEDURE — 84480 ASSAY TRIIODOTHYRONINE (T3): CPT

## 2018-03-15 ENCOUNTER — HOSPITAL ENCOUNTER (OUTPATIENT)
Dept: LAB | Facility: MEDICAL CENTER | Age: 68
End: 2018-03-15
Attending: FAMILY MEDICINE
Payer: MEDICARE

## 2018-03-15 DIAGNOSIS — I10 BENIGN ESSENTIAL HTN: ICD-10-CM

## 2018-03-15 DIAGNOSIS — E78.00 PURE HYPERCHOLESTEROLEMIA: ICD-10-CM

## 2018-03-15 LAB
ALBUMIN SERPL BCP-MCNC: 3.9 G/DL (ref 3.2–4.9)
ALBUMIN/GLOB SERPL: 1.3 G/DL
ALP SERPL-CCNC: 58 U/L (ref 30–99)
ALT SERPL-CCNC: 36 U/L (ref 2–50)
ANION GAP SERPL CALC-SCNC: 8 MMOL/L (ref 0–11.9)
AST SERPL-CCNC: 27 U/L (ref 12–45)
BILIRUB SERPL-MCNC: 1 MG/DL (ref 0.1–1.5)
BUN SERPL-MCNC: 20 MG/DL (ref 8–22)
CALCIUM SERPL-MCNC: 9.6 MG/DL (ref 8.5–10.5)
CHLORIDE SERPL-SCNC: 106 MMOL/L (ref 96–112)
CHOLEST SERPL-MCNC: 212 MG/DL (ref 100–199)
CO2 SERPL-SCNC: 26 MMOL/L (ref 20–33)
CREAT SERPL-MCNC: 0.65 MG/DL (ref 0.5–1.4)
CREAT UR-MCNC: 98 MG/DL
GLOBULIN SER CALC-MCNC: 2.9 G/DL (ref 1.9–3.5)
GLUCOSE SERPL-MCNC: 94 MG/DL (ref 65–99)
HDLC SERPL-MCNC: 76 MG/DL
LDLC SERPL CALC-MCNC: 112 MG/DL
MICROALBUMIN UR-MCNC: <0.7 MG/DL
MICROALBUMIN/CREAT UR: NORMAL MG/G (ref 0–30)
POTASSIUM SERPL-SCNC: 3.9 MMOL/L (ref 3.6–5.5)
PROT SERPL-MCNC: 6.8 G/DL (ref 6–8.2)
SODIUM SERPL-SCNC: 140 MMOL/L (ref 135–145)
TRIGL SERPL-MCNC: 118 MG/DL (ref 0–149)

## 2018-03-15 PROCEDURE — 36415 COLL VENOUS BLD VENIPUNCTURE: CPT

## 2018-03-15 PROCEDURE — 82043 UR ALBUMIN QUANTITATIVE: CPT

## 2018-03-15 PROCEDURE — 80061 LIPID PANEL: CPT

## 2018-03-15 PROCEDURE — 82570 ASSAY OF URINE CREATININE: CPT

## 2018-03-15 PROCEDURE — 80053 COMPREHEN METABOLIC PANEL: CPT

## 2018-03-19 ENCOUNTER — OFFICE VISIT (OUTPATIENT)
Dept: ENDOCRINOLOGY | Facility: MEDICAL CENTER | Age: 68
End: 2018-03-19
Payer: MEDICARE

## 2018-03-19 VITALS
HEIGHT: 67 IN | BODY MASS INDEX: 28.09 KG/M2 | DIASTOLIC BLOOD PRESSURE: 80 MMHG | WEIGHT: 179 LBS | SYSTOLIC BLOOD PRESSURE: 126 MMHG | HEART RATE: 68 BPM | OXYGEN SATURATION: 95 %

## 2018-03-19 DIAGNOSIS — E04.2 MULTINODULAR GOITER (NONTOXIC): ICD-10-CM

## 2018-03-19 DIAGNOSIS — E06.3 HYPOTHYROIDISM DUE TO HASHIMOTO'S THYROIDITIS: ICD-10-CM

## 2018-03-19 DIAGNOSIS — E55.9 VITAMIN D DEFICIENCY: ICD-10-CM

## 2018-03-19 DIAGNOSIS — E03.8 HYPOTHYROIDISM DUE TO HASHIMOTO'S THYROIDITIS: ICD-10-CM

## 2018-03-19 PROCEDURE — 99214 OFFICE O/P EST MOD 30 MIN: CPT | Performed by: INTERNAL MEDICINE

## 2018-03-19 RX ORDER — LEVOTHYROXINE SODIUM 0.07 MG/1
75 TABLET ORAL
Qty: 90 TAB | Refills: 3 | Status: SHIPPED | OUTPATIENT
Start: 2018-03-19 | End: 2019-05-20 | Stop reason: SDUPTHER

## 2018-03-19 RX ORDER — MAGNESIUM 200 MG
TABLET ORAL
COMMUNITY
End: 2018-07-05

## 2018-03-19 RX ORDER — ERGOCALCIFEROL 1.25 MG/1
50000 CAPSULE ORAL
Qty: 12 CAP | Refills: 3 | Status: SHIPPED | OUTPATIENT
Start: 2018-03-19 | End: 2018-11-13

## 2018-03-19 RX ORDER — LEVOTHYROXINE SODIUM 0.07 MG/1
75 TABLET ORAL
Qty: 90 TAB | Refills: 3 | Status: SHIPPED | OUTPATIENT
Start: 2018-03-19 | End: 2018-03-19 | Stop reason: SDUPTHER

## 2018-03-20 ENCOUNTER — OFFICE VISIT (OUTPATIENT)
Dept: MEDICAL GROUP | Age: 68
End: 2018-03-20
Payer: MEDICARE

## 2018-03-20 VITALS
BODY MASS INDEX: 28.09 KG/M2 | HEIGHT: 67 IN | WEIGHT: 179 LBS | DIASTOLIC BLOOD PRESSURE: 66 MMHG | TEMPERATURE: 97.2 F | HEART RATE: 62 BPM | OXYGEN SATURATION: 97 % | SYSTOLIC BLOOD PRESSURE: 120 MMHG

## 2018-03-20 DIAGNOSIS — Z12.31 ENCOUNTER FOR SCREENING MAMMOGRAM FOR BREAST CANCER: ICD-10-CM

## 2018-03-20 DIAGNOSIS — Z78.0 MENOPAUSE: ICD-10-CM

## 2018-03-20 DIAGNOSIS — E03.8 HYPOTHYROIDISM DUE TO HASHIMOTO'S THYROIDITIS: Primary | ICD-10-CM

## 2018-03-20 DIAGNOSIS — G89.29 CHRONIC NONINTRACTABLE HEADACHE, UNSPECIFIED HEADACHE TYPE: ICD-10-CM

## 2018-03-20 DIAGNOSIS — R51.9 CHRONIC NONINTRACTABLE HEADACHE, UNSPECIFIED HEADACHE TYPE: ICD-10-CM

## 2018-03-20 DIAGNOSIS — E06.3 HYPOTHYROIDISM DUE TO HASHIMOTO'S THYROIDITIS: Primary | ICD-10-CM

## 2018-03-20 DIAGNOSIS — I10 BENIGN ESSENTIAL HTN: ICD-10-CM

## 2018-03-20 DIAGNOSIS — G89.29 LUMBOSACRAL PAIN, CHRONIC: ICD-10-CM

## 2018-03-20 DIAGNOSIS — M54.50 LUMBOSACRAL PAIN, CHRONIC: ICD-10-CM

## 2018-03-20 PROCEDURE — 99214 OFFICE O/P EST MOD 30 MIN: CPT | Performed by: FAMILY MEDICINE

## 2018-03-20 RX ORDER — OXYCODONE HYDROCHLORIDE AND ACETAMINOPHEN 5; 325 MG/1; MG/1
TABLET ORAL
COMMUNITY
Start: 2018-03-09 | End: 2018-07-05

## 2018-03-20 RX ORDER — METHYLPREDNISOLONE 4 MG/1
TABLET ORAL
COMMUNITY
Start: 2018-02-21 | End: 2018-03-20

## 2018-03-20 RX ORDER — HYDROCODONE BITARTRATE AND ACETAMINOPHEN 5; 325 MG/1; MG/1
TABLET ORAL
Refills: 0 | COMMUNITY
Start: 2017-12-29 | End: 2018-03-20

## 2018-03-20 RX ORDER — HYDROCHLOROTHIAZIDE 12.5 MG/1
12.5 TABLET ORAL DAILY
Qty: 90 TAB | Refills: 1 | Status: SHIPPED | OUTPATIENT
Start: 2018-03-20 | End: 2018-06-15

## 2018-03-20 NOTE — PROGRESS NOTES
Subjective:   CC: chronic back pain    HPI:     Margoth Hopkins is a 67 y.o. female, established patient of the clinic, presents with the following concerns:     1. Hypothyroidism due to Hashimoto's thyroiditis  Chronic, working with endocrinology. Her recent thyroid function test was normal. However endocrinology increases her dose from 50-75 µg daily. Patient will repeat her blood tests in 6 weeks. She reports doing well, denies any side effects with recent change in levothyroxine dose.    2. Benign essential HTN  Chronic, taking Chlorthalidone 12.5 Mg daily and metoprolol for 50 mg daily blood pressure and maintenance therapy for chronic headaches. Patient complains that it is very hard for her to break chlorthalidone 25 mg in half. She requests to change medication.     3. Lumbosacral pain, chronic, with right sciatica  Chronic, working with [facility], she is taking Oxycodone for chronic pain and was referred to physiatry. She is in the process to look for new orthopedic surgeon for evaluation of hip and right knee pain. She will be traveling to [place] for 3 weeks in 4/2018. She states that she might needs more pain medication for the trip as she anticipates to be more active during the trip. She denies any side effects with opiates. She does not drink or abuse illicit drugs/BDZ.     4. Chronic nonintractable headache, unspecified headache type  Chronic, taking propranolol for chronic suppression, which appears to work well. Denies any side effects.     Current medicines (including changes today)  Current Outpatient Prescriptions   Medication Sig Dispense Refill   • oxyCODONE-acetaminophen (PERCOCET) 5-325 MG Tab      • hydroCHLOROthiazide (HYDRODIURIL) 12.5 MG tablet Take 1 Tab by mouth every day. 90 Tab 1   • Magnesium 200 MG Tab Take  by mouth.     • vitamin D, Ergocalciferol, (DRISDOL) 30878 units Cap capsule Take 1 Cap by mouth every 7 days. 12 Cap 3   • levothyroxine (SYNTHROID) 75 MCG Tab Take 1 Tab  "by mouth Every morning on an empty stomach. Synthroid brand medically necessary. 90 Tab 3   • Cholecalciferol (HM VITAMIN D3) 4000 units Cap Take 1 Cap by mouth every day. 90 Cap 3   • metoprolol SR (TOPROL XL) 50 MG TABLET SR 24 HR TAKE 1 TABLET BY MOUTH  EVERY DAY 90 Tab 1   • paroxetine (PAXIL) 40 MG tablet TAKE 1 TABLET BY MOUTH  EVERY DAY 90 Tab 1   • rosuvastatin (CRESTOR) 20 MG Tab TAKE 1 TABLET BY MOUTH  EVERY EVENING 90 Tab 1   • Omega-3 Fatty Acids (OMEGA 3 PO) Take  by mouth.     • Biotin 1000 MCG Tab Take 5,000 mcg by mouth.       No current facility-administered medications for this visit.      She  has a past medical history of Goiter and Thyroid disease.    I personally reviewed patient's problem list, allergies, medications, family hx, social hx with patient and update EPIC.     REVIEW OF SYSTEMS:  CONSTITUTIONAL:  Denies night sweats, fatigue, malaise, lethargy, fever or chills.  RESPIRATORY:  Denies cough, wheeze, hemoptysis, or shortness of breath.  CARDIOVASCULAR:  Denies chest pains, palpitations, pedal edema     Objective:     Blood pressure 120/66, pulse 62, temperature 36.2 °C (97.2 °F), height 1.702 m (5' 7\"), weight 81.2 kg (179 lb), SpO2 97 %. Body mass index is 28.04 kg/m².    Physical Exam:  Constitutional: awake, alert, in no distress.  Skin: Warm, dry, good turgor, no rashes, bruises, ulcers in visible areas.  Neck: Trachea midline, no masses, no thyromegaly. No cervical or supraclavicular lymphadenopathy  Respiratory: Unlabored respiratory effort, lungs clear to auscultation, no wheezes, no rhonchi, no rales.  Cardiovascular: Normal S1, S2, no murmur, no pedal edema.  Psych: Oriented x3, affect and mood wnl, intact judgement and insight.       Assessment and Plan:   The following treatment plan was discussed    1. Hypothyroidism due to Hashimoto's thyroiditis  - f/u with Endo    2. Benign essential HTN  Chronic, stable, well controlled with Chlorthalidone 12.5 mg and Metoprolol XR 50 " mg daily. However, it is not easy for her to half Chlorthalidone. Plans:   - discontinue Chlorthalidone 12.5 mg.   - start hydroCHLOROthiazide (HYDRODIURIL) 12.5 MG tablet; Take 1 Tab by mouth every day.  Dispense: 90 Tab; Refill: 1  - continue Metoprolol for both HTN and chronic headache.      3. Lumbosacral pain, chronic, with right sciatica  - continue Oxycodone as directed by [facility].   - pt is traveling to [place] for 3 weeks in April and might suffer more musculoskeletal pain due to increased activity. Pt is advised to contact me prior to the trip to evaluate the need for pain med refill.     4. Chronic nonintractable headache, unspecified headache type  Chronic, well controlled with Propranolol, will continue.     5. Encounter for screening mammogram for breast cancer  - MA-SCREEN MAMMO W/CAD-BILAT; Future    6. Menopause  - DS-BONE DENSITY STUDY (DEXA); Future      Ly SPENCER Walker M.D.      Followup: Return for As needed.    Please note that this dictation was created using voice recognition software. I have made every reasonable attempt to correct obvious errors, but I expect that there are errors of grammar and possibly content that I did not discover before finalizing the note.

## 2018-03-20 NOTE — PROGRESS NOTES
Endocrinology Clinic Progress Note  PCP: Charlee Walker M.D.    HPI:  Margoth Hopkins is a 67 y.o. old patient who comes in today for review of endocrine problems.  Hypothyroidism: She is currently in 50 µg of levothyroxine daily. She does feel fatigued and tired at all times.    He also has vitamin D deficiency.      ROS:  Constitutional: Fatigue, No unintentional weight loss  Endo: Denies excessive thirst or frequent urination  All other systems were reviewed and were negative.    Past Medical History:  Patient Active Problem List    Diagnosis Date Noted   • Chronic headache 03/20/2018   • Risk for falls 07/24/2017   • Vitiligo 06/26/2017   • FHx: colon cancer 02/23/2017   • Schatzki's ring 02/23/2017   • Benign essential HTN 02/23/2017   • Pure hypercholesterolemia 02/23/2017   • MOHAMUD (generalized anxiety disorder) 02/23/2017   • Hypothyroidism due to Hashimoto's thyroiditis 02/23/2017   • Multinodular goiter (nontoxic) 02/23/2017   • HLA B27 (HLA B27 positive) 02/23/2017   • Primary osteoarthritis of right knee 02/23/2017   • Lumbosacral pain, chronic, with right sciatica 02/23/2017   • Insomnia 02/23/2017   • Vitamin D insufficiency 02/23/2017       Medications:    Current Outpatient Prescriptions:   •  Magnesium 200 MG Tab, Take  by mouth., Disp: , Rfl:   •  vitamin D, Ergocalciferol, (DRISDOL) 56502 units Cap capsule, Take 1 Cap by mouth every 7 days., Disp: 12 Cap, Rfl: 3  •  levothyroxine (SYNTHROID) 75 MCG Tab, Take 1 Tab by mouth Every morning on an empty stomach. Synthroid brand medically necessary., Disp: 90 Tab, Rfl: 3  •  Cholecalciferol (HM VITAMIN D3) 4000 units Cap, Take 1 Cap by mouth every day., Disp: 90 Cap, Rfl: 3  •  metoprolol SR (TOPROL XL) 50 MG TABLET SR 24 HR, TAKE 1 TABLET BY MOUTH  EVERY DAY, Disp: 90 Tab, Rfl: 1  •  paroxetine (PAXIL) 40 MG tablet, TAKE 1 TABLET BY MOUTH  EVERY DAY, Disp: 90 Tab, Rfl: 1  •  rosuvastatin (CRESTOR) 20 MG Tab, TAKE 1 TABLET BY MOUTH  EVERY EVENING, Disp: 90  "Tab, Rfl: 1  •  Omega-3 Fatty Acids (OMEGA 3 PO), Take  by mouth., Disp: , Rfl:   •  Biotin 1000 MCG Tab, Take 5,000 mcg by mouth., Disp: , Rfl:   •  oxyCODONE-acetaminophen (PERCOCET) 5-325 MG Tab, , Disp: , Rfl:   •  hydroCHLOROthiazide (HYDRODIURIL) 12.5 MG tablet, Take 1 Tab by mouth every day., Disp: 90 Tab, Rfl: 1    Labs: Reviewed    Physical Examination:  Vital signs: /80   Pulse 68   Ht 1.702 m (5' 7\")   Wt 81.2 kg (179 lb)   SpO2 95%   BMI 28.04 kg/m²  Body mass index is 28.04 kg/m².  General: No apparent distress, cooperative  Eyes: No scleral icterus, no discharge, normal eyelids  Neck: No abnormal masses on inspection, normal thyroid exam  Resp: Normal effort, clear to auscultation bilaterally  CVS: Regular rate and rhythm, S1 S2 normal, no murmur  Extremities: No lower extremity edema  Abdomen: abdominal obesity present  Musculoskeletal: Normal digits and nails  Skin: No rash on visible skin  Psych: Alert and oriented, normal mood and affect, intact memory and able to make informed decisions.    Assessment and Plan:    1. Vitamin D deficiency  Her low vitamin D levels could be contributing to her fatigue. She'll benefit from the loading dose as per below.    - vitamin D, Ergocalciferol, (DRISDOL) 90220 units Cap capsule; Take 1 Cap by mouth every 7 days.  Dispense: 12 Cap; Refill: 3    Continue  - Cholecalciferol (HM VITAMIN D3) 4000 units Cap; Take 1 Cap by mouth every day.  Dispense: 90 Cap; Refill: 3    2. Hypothyroidism due to Hashimoto's thyroiditis  Clinically and biochemically she is hypothyroid. Increase  levothyroxine (SYNTHROID) to 75 MCG Tab; Take 1 Tab by mouth Every morning on an empty stomach.     - FREE THYROXINE; Future  - TSH; Future  - TRIIDOTHYRONINE; Future  - TRIIDOTHYRONINE; Future  -    3. Multinodular goiter (nontoxic)  Stable.     Return in about 2 months (around 5/19/2018).    Thank you for allowing me to participate in the care of this patient.    Tay RAMOS" REENA Garcia.    CC:   Charlee Walker M.D.    This note was created using voice recognition software (Dragon). The accuracy of the dictation is limited by the abilities of the software. I have reviewed the note prior to signing, however some errors in grammar and context are still possible. If you have any questions related to this note please do not hesitate to contact our office.

## 2018-03-26 RX ORDER — METOPROLOL SUCCINATE 50 MG/1
TABLET, EXTENDED RELEASE ORAL
Qty: 90 TAB | Refills: 1 | Status: SHIPPED | OUTPATIENT
Start: 2018-03-26 | End: 2018-06-19

## 2018-03-26 NOTE — TELEPHONE ENCOUNTER
Was the patient seen in the last year in this department? Yes   Does patient have an active prescription for medications requested? No   Received Request Via: Pharmacy

## 2018-03-29 DIAGNOSIS — E78.00 PURE HYPERCHOLESTEROLEMIA: ICD-10-CM

## 2018-03-29 DIAGNOSIS — F41.1 GAD (GENERALIZED ANXIETY DISORDER): ICD-10-CM

## 2018-03-30 ENCOUNTER — HOSPITAL ENCOUNTER (OUTPATIENT)
Dept: RADIOLOGY | Facility: MEDICAL CENTER | Age: 68
End: 2018-03-30
Attending: FAMILY MEDICINE
Payer: MEDICARE

## 2018-03-30 DIAGNOSIS — Z12.31 ENCOUNTER FOR SCREENING MAMMOGRAM FOR BREAST CANCER: ICD-10-CM

## 2018-03-30 DIAGNOSIS — Z78.0 MENOPAUSE: ICD-10-CM

## 2018-03-30 PROCEDURE — 77080 DXA BONE DENSITY AXIAL: CPT

## 2018-03-30 PROCEDURE — 77067 SCR MAMMO BI INCL CAD: CPT

## 2018-03-30 RX ORDER — PAROXETINE HYDROCHLORIDE 40 MG/1
TABLET, FILM COATED ORAL
Qty: 90 TAB | Refills: 1 | Status: SHIPPED | OUTPATIENT
Start: 2018-03-30 | End: 2018-10-15 | Stop reason: SDUPTHER

## 2018-03-30 RX ORDER — ROSUVASTATIN CALCIUM 20 MG/1
TABLET, COATED ORAL
Qty: 90 TAB | Refills: 1 | Status: SHIPPED | OUTPATIENT
Start: 2018-03-30 | End: 2018-12-11 | Stop reason: SDUPTHER

## 2018-06-15 RX ORDER — CHLORTHALIDONE 25 MG/1
TABLET ORAL
Qty: 90 TAB | Refills: 1 | Status: SHIPPED | OUTPATIENT
Start: 2018-06-15 | End: 2018-07-05

## 2018-06-19 ENCOUNTER — OFFICE VISIT (OUTPATIENT)
Dept: MEDICAL GROUP | Age: 68
End: 2018-06-19
Payer: MEDICARE

## 2018-06-19 VITALS
WEIGHT: 178 LBS | HEART RATE: 62 BPM | OXYGEN SATURATION: 96 % | TEMPERATURE: 97.1 F | BODY MASS INDEX: 27.94 KG/M2 | HEIGHT: 67 IN | DIASTOLIC BLOOD PRESSURE: 74 MMHG | SYSTOLIC BLOOD PRESSURE: 126 MMHG

## 2018-06-19 DIAGNOSIS — M17.11 PRIMARY OSTEOARTHRITIS OF RIGHT KNEE: ICD-10-CM

## 2018-06-19 DIAGNOSIS — G25.0 ESSENTIAL TREMOR: ICD-10-CM

## 2018-06-19 DIAGNOSIS — Z01.818 PREOPERATIVE CLEARANCE: ICD-10-CM

## 2018-06-19 PROCEDURE — 99214 OFFICE O/P EST MOD 30 MIN: CPT | Performed by: FAMILY MEDICINE

## 2018-06-19 RX ORDER — OXYCODONE AND ACETAMINOPHEN 10; 325 MG/1; MG/1
TABLET ORAL
Refills: 0 | COMMUNITY
Start: 2018-04-06 | End: 2018-07-05

## 2018-06-19 RX ORDER — LEVOTHYROXINE SODIUM 50 MCG
TABLET ORAL
COMMUNITY
Start: 2018-05-26 | End: 2018-06-19

## 2018-06-19 RX ORDER — PROPRANOLOL HYDROCHLORIDE 40 MG/1
40 TABLET ORAL 2 TIMES DAILY
Qty: 180 TAB | Refills: 1 | Status: SHIPPED | OUTPATIENT
Start: 2018-06-19 | End: 2018-07-05

## 2018-06-19 RX ORDER — TRAMADOL HYDROCHLORIDE 50 MG/1
TABLET ORAL
Refills: 1 | COMMUNITY
Start: 2018-04-06 | End: 2018-11-13

## 2018-06-19 NOTE — LETTER
PROCEDURE/SURGERY CLEARANCE FORM      Encounter Date: 6/19/2018    Patient: Margoth Hopkins  YOB: 1950    To whom it may concern,    My name is Charlee Walker MD. I am taking care of Margoth Hopkins, who has elective orthopedic surgery of her right knee on 6/26/2018. I have interviewed and reviewed Margoth's relevant medical history. She does not have any contraindications for elective surgery at this time. She is therefore cleared for above-mentioned surgery.     If you have any questions, please do not hesitate to call my office.     Best regards,                  MD Signature   Charlee Walker M.D.       I cannot release the above patient for the following procedure/surgery without a cardiology evaluation:                                MD Signature   Charlee Walker M.D.

## 2018-06-19 NOTE — PROGRESS NOTES
Subjective:   CC: preop clearance    HPI:     1. Preoperative clearance  2. Primary osteoarthritis of right knee  Margoth Hopkins 67 y.o. female with history of HTN, HLD, hypothyroidism, osteoarthritis, HLD, who has planned elective right knee surgery on 6/26/2018. Pt presents for pre-op evaluation.     Tobacco consumption within one year: no  Alcohol consumption: rarely  Hx of chronic lung disease (COPD, asthma, etc): no  Hx of cardiovascular disease: no  Hx of stroke: no  Hx of steroid use for chronic conditions: no  Hx of ascites 30 days prior to surgery: no  Hx of heart failure 30 days prior to surgery: no  Hx of renal failure: no  Hx of dialysis: no  Hx of disseminated cancer: no  HTN: yes, under good control  DM: no  Independent status: no  Is the patient ventilator dependent?: no  Pt denies chest pain, SOB, IVAN, hemoptysis, hx of allergic reaction to anesthesia.     3. Essential tremor  Patient complains of fine, worsening tremor affecting her hands, neck, handwriting, and head. Symptoms are worse with stress, anxiety, nervousness. Symptoms are usually exacerbated with purposeful movement of the extremity. Denies resting tremor, gait problem, personal or familial history of Parkinson disease. However, both her mother and brothers were diagnosed with essential tremor. Symptoms were initially tolerable, however, they have gotten worse over the past few months. Patient is interested in pharmacotherapy for her symptoms.    4. Benign essential HTN  Chronic, currently taking chlorthalidone and metoprolol XL 50 mg daily as directed. Denies chest pain, shortness of breath, dyspnea on exertion, unusual edema.      Current medicines (including changes today)  Current Outpatient Prescriptions   Medication Sig Dispense Refill   • oxyCODONE-acetaminophen (PERCOCET-10)  MG Tab TAKE 1 TABLET BY MOUTH EVERY 12 HOURS AS NEEDED FOR PAIN FOR 30 DAYS  0   • tramadol (ULTRAM) 50 MG Tab TAKE 1 TABLET BY MOUTH EVERY 4 TO 6  "HOURS AS NEEDED FOR PAIN FOR 30 DAYS  1   • propranolol (INDERAL) 40 MG Tab Take 1 Tab by mouth 2 times a day. 180 Tab 1   • chlorthalidone (HYGROTON) 25 MG Tab TAKE ONE-HALF TABLET BY  MOUTH EVERY DAY 90 Tab 1   • paroxetine (PAXIL) 40 MG tablet TAKE 1 TABLET BY MOUTH  EVERY DAY 90 Tab 1   • rosuvastatin (CRESTOR) 20 MG Tab TAKE 1 TABLET BY MOUTH  EVERY EVENING 90 Tab 1   • oxyCODONE-acetaminophen (PERCOCET) 5-325 MG Tab      • Magnesium 200 MG Tab Take  by mouth.     • vitamin D, Ergocalciferol, (DRISDOL) 26926 units Cap capsule Take 1 Cap by mouth every 7 days. 12 Cap 3   • levothyroxine (SYNTHROID) 75 MCG Tab Take 1 Tab by mouth Every morning on an empty stomach. Synthroid brand medically necessary. 90 Tab 3   • Cholecalciferol (HM VITAMIN D3) 4000 units Cap Take 1 Cap by mouth every day. 90 Cap 3   • Omega-3 Fatty Acids (OMEGA 3 PO) Take  by mouth.     • Biotin 1000 MCG Tab Take 5,000 mcg by mouth.       No current facility-administered medications for this visit.      She  has a past medical history of Goiter and Thyroid disease.    I personally reviewed patient's problem list, allergies, medications, family hx, social hx with patient and update EPIC.     REVIEW OF SYSTEMS:  CONSTITUTIONAL:  Denies night sweats, fatigue, malaise, lethargy, fever or chills.  RESPIRATORY:  Denies cough, wheeze, hemoptysis, or shortness of breath.  CARDIOVASCULAR:  Denies chest pains, palpitations, pedal edema     Objective:     Blood pressure 126/74, pulse 62, temperature 36.2 °C (97.1 °F), height 1.702 m (5' 7\"), weight 80.7 kg (178 lb), SpO2 96 %. Body mass index is 27.88 kg/m².    Physical Exam:  Constitutional: awake, alert, in no distress, fine head tremor while resting.   Skin: Warm, dry, good turgor, no rashes, bruises, ulcers in visible areas.  Eye: conjunctiva clear, lids neg for edema or lesions.  Neck: Trachea midline, no masses, no thyromegaly. No cervical or supraclavicular lymphadenopathy  Respiratory: Unlabored " respiratory effort, lungs clear to auscultation, no wheezes, no rales.  Cardiovascular: Normal S1, S2, no murmur, no pedal edema.  Abdomen: Soft, non-tender to palpation, active BS, no hernia, no hepatosplenomegaly, negative rebound or guarding.   Psych: Oriented x3, affect and mood wnl, intact judgement and insight.   Neuro: fine head tremor at rest, negative resting tremor of the hands, positive action tremor bilaterally. The rest of neurological exam were unremarkable.     Assessment and Plan:   The following treatment plan was discussed    1. Preoperative clearance  2. Primary osteoarthritis of right knee  68 yo with hx of chronic right knee OA, s/p surgical intervention x2 but continues to have persistent knee pain. Pt has elective surgery of the right knee for revision of hardware in a few weeks. I reviewed pt's medical history, allergy, medications. Pt has no contraindication for elective surgery. She is therefore cleared for surgery from medical standpoint.     3. Essential tremor  Chronic, progressing to bothersome level, interested in treatment at this time. Plans:   - propranolol (INDERAL) 40 MG Tab; Take 1 Tab by mouth 2 times a day.  Dispense: 180 Tab; Refill: 1    4. Benign essential HTN  Chronic, well controlled with chlorthalidone and metoprolol.  Plans:   - discontinued Metoprolol  - start Propranolol for both hypertension and essential tremor  -Continue chlorthalidone as directed  - Discussed dietary modification: DASH diet   - Recommended moderate intensity exercise at least 30 minutes per day x 5 days per week.  - Encouraged weight loss, Smoking cessation, Avoid excessive alcohol consumption.  -Follow-up in 3 months.      Charlee Walker M.D.      Followup: Return in about 3 months (around 9/19/2018).    Please note that this dictation was created using voice recognition software. I have made every reasonable attempt to correct obvious errors, but I expect that there are errors of grammar and  possibly content that I did not discover before finalizing the note.

## 2018-06-19 NOTE — LETTER
PROCEDURE/SURGERY CLEARANCE FORM      Encounter Date: 6/19/2018    Patient: Margoth Hopkins  YOB: 1950    PCP:  Charlee Walker M.D.      To whom it may concern,    My name is Charlee Walker MD. I am taking care of Margoth Hopkins, who has elective orthopedic surgery of her right knee on 6/26/2018. I have interviewed and reviewed patient's relevant medical history. She does not have any contraindications for elective surgery at this time. She is therefore cleared for above-mentioned surgery.     If you have any questions, please do not hesitate to call my office.                  MD Signature   Charlee Walker M.D.

## 2018-07-05 ENCOUNTER — APPOINTMENT (OUTPATIENT)
Dept: RADIOLOGY | Facility: MEDICAL CENTER | Age: 68
DRG: 176 | End: 2018-07-05
Attending: EMERGENCY MEDICINE
Payer: MEDICARE

## 2018-07-05 ENCOUNTER — HOSPITAL ENCOUNTER (INPATIENT)
Facility: MEDICAL CENTER | Age: 68
LOS: 1 days | DRG: 176 | End: 2018-07-07
Attending: EMERGENCY MEDICINE | Admitting: HOSPITALIST
Payer: MEDICARE

## 2018-07-05 DIAGNOSIS — I26.99 BILATERAL PULMONARY EMBOLISM (HCC): ICD-10-CM

## 2018-07-05 DIAGNOSIS — I26.99 OTHER ACUTE PULMONARY EMBOLISM WITHOUT ACUTE COR PULMONALE (HCC): ICD-10-CM

## 2018-07-05 LAB
ALBUMIN SERPL BCP-MCNC: 3.3 G/DL (ref 3.2–4.9)
ALBUMIN/GLOB SERPL: 0.9 G/DL
ALP SERPL-CCNC: 122 U/L (ref 30–99)
ALT SERPL-CCNC: 39 U/L (ref 2–50)
ANION GAP SERPL CALC-SCNC: 7 MMOL/L (ref 0–11.9)
APPEARANCE UR: CLEAR
AST SERPL-CCNC: 39 U/L (ref 12–45)
BASOPHILS # BLD AUTO: 0.5 % (ref 0–1.8)
BASOPHILS # BLD: 0.05 K/UL (ref 0–0.12)
BILIRUB SERPL-MCNC: 0.9 MG/DL (ref 0.1–1.5)
BILIRUB UR QL STRIP.AUTO: NEGATIVE
BNP SERPL-MCNC: 44 PG/ML (ref 0–100)
BUN SERPL-MCNC: 15 MG/DL (ref 8–22)
CALCIUM SERPL-MCNC: 8.9 MG/DL (ref 8.4–10.2)
CHLORIDE SERPL-SCNC: 105 MMOL/L (ref 96–112)
CO2 SERPL-SCNC: 24 MMOL/L (ref 20–33)
COLOR UR: YELLOW
CREAT SERPL-MCNC: 0.68 MG/DL (ref 0.5–1.4)
DEPRECATED D DIMER PPP IA-ACNC: 2101 NG/ML(D-DU)
EKG IMPRESSION: NORMAL
EOSINOPHIL # BLD AUTO: 0.18 K/UL (ref 0–0.51)
EOSINOPHIL NFR BLD: 1.9 % (ref 0–6.9)
ERYTHROCYTE [DISTWIDTH] IN BLOOD BY AUTOMATED COUNT: 42.5 FL (ref 35.9–50)
GLOBULIN SER CALC-MCNC: 3.5 G/DL (ref 1.9–3.5)
GLUCOSE SERPL-MCNC: 112 MG/DL (ref 65–99)
GLUCOSE UR STRIP.AUTO-MCNC: NEGATIVE MG/DL
HCT VFR BLD AUTO: 35.9 % (ref 37–47)
HGB BLD-MCNC: 11.6 G/DL (ref 12–16)
IMM GRANULOCYTES # BLD AUTO: 0.08 K/UL (ref 0–0.11)
IMM GRANULOCYTES NFR BLD AUTO: 0.9 % (ref 0–0.9)
INR PPP: 0.95 (ref 0.87–1.13)
KETONES UR STRIP.AUTO-MCNC: NEGATIVE MG/DL
LEUKOCYTE ESTERASE UR QL STRIP.AUTO: NEGATIVE
LV EJECT FRACT  99904: 65
LV EJECT FRACT MOD 2C 99903: 65.56
LV EJECT FRACT MOD 4C 99902: 63.69
LV EJECT FRACT MOD BP 99901: 64.83
LYMPHOCYTES # BLD AUTO: 1.43 K/UL (ref 1–4.8)
LYMPHOCYTES NFR BLD: 15.5 % (ref 22–41)
MCH RBC QN AUTO: 27.4 PG (ref 27–33)
MCHC RBC AUTO-ENTMCNC: 32.3 G/DL (ref 33.6–35)
MCV RBC AUTO: 84.7 FL (ref 81.4–97.8)
MICRO URNS: NORMAL
MONOCYTES # BLD AUTO: 0.95 K/UL (ref 0–0.85)
MONOCYTES NFR BLD AUTO: 10.3 % (ref 0–13.4)
NEUTROPHILS # BLD AUTO: 6.55 K/UL (ref 2–7.15)
NEUTROPHILS NFR BLD: 70.9 % (ref 44–72)
NITRITE UR QL STRIP.AUTO: NEGATIVE
NRBC # BLD AUTO: 0 K/UL
NRBC BLD-RTO: 0 /100 WBC
PH UR STRIP.AUTO: 6.5 [PH]
PLATELET # BLD AUTO: 338 K/UL (ref 164–446)
PMV BLD AUTO: 9.5 FL (ref 9–12.9)
POTASSIUM SERPL-SCNC: 3.6 MMOL/L (ref 3.6–5.5)
PROT SERPL-MCNC: 6.8 G/DL (ref 6–8.2)
PROT UR QL STRIP: NEGATIVE MG/DL
PROTHROMBIN TIME: 12.6 SEC (ref 12–14.6)
RBC # BLD AUTO: 4.24 M/UL (ref 4.2–5.4)
RBC UR QL AUTO: NEGATIVE
SODIUM SERPL-SCNC: 136 MMOL/L (ref 135–145)
SP GR UR STRIP.AUTO: <=1.005
TROPONIN I SERPL-MCNC: <0.02 NG/ML (ref 0–0.04)
WBC # BLD AUTO: 9.2 K/UL (ref 4.8–10.8)

## 2018-07-05 PROCEDURE — 99220 PR INITIAL OBSERVATION CARE,LEVL III: CPT | Performed by: INTERNAL MEDICINE

## 2018-07-05 PROCEDURE — 85610 PROTHROMBIN TIME: CPT

## 2018-07-05 PROCEDURE — 96374 THER/PROPH/DIAG INJ IV PUSH: CPT

## 2018-07-05 PROCEDURE — 93005 ELECTROCARDIOGRAM TRACING: CPT

## 2018-07-05 PROCEDURE — 96372 THER/PROPH/DIAG INJ SC/IM: CPT

## 2018-07-05 PROCEDURE — 85025 COMPLETE CBC W/AUTO DIFF WBC: CPT

## 2018-07-05 PROCEDURE — A9270 NON-COVERED ITEM OR SERVICE: HCPCS

## 2018-07-05 PROCEDURE — 700111 HCHG RX REV CODE 636 W/ 250 OVERRIDE (IP): Performed by: EMERGENCY MEDICINE

## 2018-07-05 PROCEDURE — G0378 HOSPITAL OBSERVATION PER HR: HCPCS

## 2018-07-05 PROCEDURE — 83880 ASSAY OF NATRIURETIC PEPTIDE: CPT

## 2018-07-05 PROCEDURE — 84484 ASSAY OF TROPONIN QUANT: CPT

## 2018-07-05 PROCEDURE — 36415 COLL VENOUS BLD VENIPUNCTURE: CPT

## 2018-07-05 PROCEDURE — 93005 ELECTROCARDIOGRAM TRACING: CPT | Performed by: EMERGENCY MEDICINE

## 2018-07-05 PROCEDURE — 71275 CT ANGIOGRAPHY CHEST: CPT

## 2018-07-05 PROCEDURE — 304561 HCHG STAT O2

## 2018-07-05 PROCEDURE — 96375 TX/PRO/DX INJ NEW DRUG ADDON: CPT

## 2018-07-05 PROCEDURE — 85379 FIBRIN DEGRADATION QUANT: CPT

## 2018-07-05 PROCEDURE — 80053 COMPREHEN METABOLIC PANEL: CPT

## 2018-07-05 PROCEDURE — A9270 NON-COVERED ITEM OR SERVICE: HCPCS | Performed by: INTERNAL MEDICINE

## 2018-07-05 PROCEDURE — 700102 HCHG RX REV CODE 250 W/ 637 OVERRIDE(OP)

## 2018-07-05 PROCEDURE — 93308 TTE F-UP OR LMTD: CPT | Mod: 26 | Performed by: INTERNAL MEDICINE

## 2018-07-05 PROCEDURE — 94760 N-INVAS EAR/PLS OXIMETRY 1: CPT

## 2018-07-05 PROCEDURE — 71045 X-RAY EXAM CHEST 1 VIEW: CPT

## 2018-07-05 PROCEDURE — 700117 HCHG RX CONTRAST REV CODE 255: Performed by: EMERGENCY MEDICINE

## 2018-07-05 PROCEDURE — 81003 URINALYSIS AUTO W/O SCOPE: CPT

## 2018-07-05 PROCEDURE — 700102 HCHG RX REV CODE 250 W/ 637 OVERRIDE(OP): Performed by: INTERNAL MEDICINE

## 2018-07-05 PROCEDURE — 99285 EMERGENCY DEPT VISIT HI MDM: CPT

## 2018-07-05 PROCEDURE — 700111 HCHG RX REV CODE 636 W/ 250 OVERRIDE (IP): Performed by: INTERNAL MEDICINE

## 2018-07-05 PROCEDURE — 93306 TTE W/DOPPLER COMPLETE: CPT

## 2018-07-05 RX ORDER — ROSUVASTATIN CALCIUM 10 MG/1
20 TABLET, COATED ORAL EVERY EVENING
Status: DISCONTINUED | OUTPATIENT
Start: 2018-07-05 | End: 2018-07-07 | Stop reason: HOSPADM

## 2018-07-05 RX ORDER — LEVOTHYROXINE SODIUM 0.05 MG/1
75 TABLET ORAL
Status: DISCONTINUED | OUTPATIENT
Start: 2018-07-05 | End: 2018-07-07 | Stop reason: HOSPADM

## 2018-07-05 RX ORDER — AMOXICILLIN AND CLAVULANATE POTASSIUM 875; 125 MG/1; MG/1
1 TABLET, FILM COATED ORAL 2 TIMES DAILY
Status: ON HOLD | COMMUNITY
Start: 2018-06-21 | End: 2018-07-06

## 2018-07-05 RX ORDER — ONDANSETRON 2 MG/ML
4 INJECTION INTRAMUSCULAR; INTRAVENOUS EVERY 4 HOURS PRN
Status: DISCONTINUED | OUTPATIENT
Start: 2018-07-05 | End: 2018-07-07 | Stop reason: HOSPADM

## 2018-07-05 RX ORDER — POLYETHYLENE GLYCOL 3350 17 G/17G
1 POWDER, FOR SOLUTION ORAL DAILY
Status: DISCONTINUED | OUTPATIENT
Start: 2018-07-05 | End: 2018-07-07 | Stop reason: HOSPADM

## 2018-07-05 RX ORDER — TRAMADOL HYDROCHLORIDE 50 MG/1
50 TABLET ORAL EVERY 6 HOURS PRN
Status: DISCONTINUED | OUTPATIENT
Start: 2018-07-05 | End: 2018-07-07 | Stop reason: HOSPADM

## 2018-07-05 RX ORDER — ONDANSETRON 2 MG/ML
4 INJECTION INTRAMUSCULAR; INTRAVENOUS ONCE
Status: COMPLETED | OUTPATIENT
Start: 2018-07-05 | End: 2018-07-05

## 2018-07-05 RX ORDER — OXYCODONE AND ACETAMINOPHEN 10; 325 MG/1; MG/1
1 TABLET ORAL EVERY 4 HOURS PRN
Status: DISCONTINUED | OUTPATIENT
Start: 2018-07-05 | End: 2018-07-07 | Stop reason: HOSPADM

## 2018-07-05 RX ORDER — METOPROLOL SUCCINATE 25 MG/1
50 TABLET, EXTENDED RELEASE ORAL EVERY MORNING
Status: DISCONTINUED | OUTPATIENT
Start: 2018-07-05 | End: 2018-07-07 | Stop reason: HOSPADM

## 2018-07-05 RX ORDER — AMOXICILLIN 250 MG
2 CAPSULE ORAL 2 TIMES DAILY
Status: DISCONTINUED | OUTPATIENT
Start: 2018-07-05 | End: 2018-07-07 | Stop reason: HOSPADM

## 2018-07-05 RX ORDER — ONDANSETRON 4 MG/1
4 TABLET, ORALLY DISINTEGRATING ORAL EVERY 4 HOURS PRN
Status: DISCONTINUED | OUTPATIENT
Start: 2018-07-05 | End: 2018-07-07 | Stop reason: HOSPADM

## 2018-07-05 RX ORDER — OXYCODONE AND ACETAMINOPHEN 10; 325 MG/1; MG/1
1 TABLET ORAL EVERY 4 HOURS PRN
COMMUNITY
End: 2018-07-20

## 2018-07-05 RX ORDER — BISACODYL 10 MG
10 SUPPOSITORY, RECTAL RECTAL
Status: DISCONTINUED | OUTPATIENT
Start: 2018-07-05 | End: 2018-07-07 | Stop reason: HOSPADM

## 2018-07-05 RX ORDER — LORAZEPAM 2 MG/ML
0.5 INJECTION INTRAMUSCULAR EVERY 6 HOURS PRN
Status: DISCONTINUED | OUTPATIENT
Start: 2018-07-05 | End: 2018-07-07 | Stop reason: HOSPADM

## 2018-07-05 RX ORDER — LORAZEPAM 1 MG/1
0.5 TABLET ORAL EVERY 6 HOURS PRN
Status: DISCONTINUED | OUTPATIENT
Start: 2018-07-05 | End: 2018-07-07 | Stop reason: HOSPADM

## 2018-07-05 RX ORDER — METOPROLOL SUCCINATE 50 MG/1
50 TABLET, EXTENDED RELEASE ORAL EVERY MORNING
COMMUNITY
End: 2018-09-07 | Stop reason: SDUPTHER

## 2018-07-05 RX ORDER — PAROXETINE HYDROCHLORIDE 20 MG/1
40 TABLET, FILM COATED ORAL
Status: DISCONTINUED | OUTPATIENT
Start: 2018-07-05 | End: 2018-07-07 | Stop reason: HOSPADM

## 2018-07-05 RX ORDER — ACETAMINOPHEN 325 MG/1
650 TABLET ORAL EVERY 6 HOURS PRN
Status: DISCONTINUED | OUTPATIENT
Start: 2018-07-05 | End: 2018-07-07 | Stop reason: HOSPADM

## 2018-07-05 RX ORDER — OXYCODONE AND ACETAMINOPHEN 10; 325 MG/1; MG/1
TABLET ORAL
Status: COMPLETED
Start: 2018-07-05 | End: 2018-07-05

## 2018-07-05 RX ADMIN — ONDANSETRON 4 MG: 2 INJECTION INTRAMUSCULAR; INTRAVENOUS at 11:30

## 2018-07-05 RX ADMIN — OXYCODONE HYDROCHLORIDE AND ACETAMINOPHEN 1 TABLET: 10; 325 TABLET ORAL at 15:33

## 2018-07-05 RX ADMIN — TRAMADOL HYDROCHLORIDE 50 MG: 50 TABLET, COATED ORAL at 17:44

## 2018-07-05 RX ADMIN — FENTANYL CITRATE 100 MCG: 50 INJECTION INTRAMUSCULAR; INTRAVENOUS at 11:30

## 2018-07-05 RX ADMIN — SENNOSIDES AND DOCUSATE SODIUM 2 TABLET: 8.6; 5 TABLET ORAL at 20:14

## 2018-07-05 RX ADMIN — OXYCODONE HYDROCHLORIDE AND ACETAMINOPHEN 1 TABLET: 10; 325 TABLET ORAL at 20:16

## 2018-07-05 RX ADMIN — ENOXAPARIN SODIUM 100 MG: 100 INJECTION SUBCUTANEOUS at 14:42

## 2018-07-05 RX ADMIN — IOHEXOL 75 ML: 350 INJECTION, SOLUTION INTRAVENOUS at 13:28

## 2018-07-05 RX ADMIN — ROSUVASTATIN CALCIUM 20 MG: 10 TABLET, FILM COATED ORAL at 20:14

## 2018-07-05 ASSESSMENT — ENCOUNTER SYMPTOMS
COUGH: 1
HEMOPTYSIS: 0
DIARRHEA: 0
BLOOD IN STOOL: 1
DIZZINESS: 1
FEVER: 0
EYE DISCHARGE: 0
FOCAL WEAKNESS: 0
SHORTNESS OF BREATH: 1
CONSTIPATION: 1
WHEEZING: 0
CHILLS: 1
SPUTUM PRODUCTION: 0
SORE THROAT: 0
VOMITING: 0
HEADACHES: 1
DEPRESSION: 0
DIAPHORESIS: 1
NERVOUS/ANXIOUS: 0
ABDOMINAL PAIN: 0
NAUSEA: 1
EYE PAIN: 0

## 2018-07-05 ASSESSMENT — COGNITIVE AND FUNCTIONAL STATUS - GENERAL
PERSONAL GROOMING: A LITTLE
WALKING IN HOSPITAL ROOM: A LITTLE
STANDING UP FROM CHAIR USING ARMS: A LITTLE
TURNING FROM BACK TO SIDE WHILE IN FLAT BAD: A LITTLE
SUGGESTED CMS G CODE MODIFIER MOBILITY: CK
TOILETING: A LITTLE
DAILY ACTIVITIY SCORE: 19
MOBILITY SCORE: 19
DRESSING REGULAR UPPER BODY CLOTHING: A LITTLE
MOVING TO AND FROM BED TO CHAIR: A LITTLE
DRESSING REGULAR LOWER BODY CLOTHING: A LITTLE
SUGGESTED CMS G CODE MODIFIER DAILY ACTIVITY: CK
CLIMB 3 TO 5 STEPS WITH RAILING: A LITTLE
HELP NEEDED FOR BATHING: A LITTLE

## 2018-07-05 ASSESSMENT — LIFESTYLE VARIABLES
HAVE PEOPLE ANNOYED YOU BY CRITICIZING YOUR DRINKING: NO
TOTAL SCORE: 0
TOTAL SCORE: 0
HOW MANY TIMES IN THE PAST YEAR HAVE YOU HAD 5 OR MORE DRINKS IN A DAY: 0
ALCOHOL_USE: YES
TOTAL SCORE: 0
EVER FELT BAD OR GUILTY ABOUT YOUR DRINKING: NO
ON A TYPICAL DAY WHEN YOU DRINK ALCOHOL HOW MANY DRINKS DO YOU HAVE: 1
AVERAGE NUMBER OF DAYS PER WEEK YOU HAVE A DRINK CONTAINING ALCOHOL: 7
EVER_SMOKED: NEVER
CONSUMPTION TOTAL: NEGATIVE
HAVE YOU EVER FELT YOU SHOULD CUT DOWN ON YOUR DRINKING: NO
EVER HAD A DRINK FIRST THING IN THE MORNING TO STEADY YOUR NERVES TO GET RID OF A HANGOVER: NO

## 2018-07-05 ASSESSMENT — PAIN SCALES - GENERAL
PAINLEVEL_OUTOF10: 6
PAINLEVEL_OUTOF10: 7
PAINLEVEL_OUTOF10: 3
PAINLEVEL_OUTOF10: 6
PAINLEVEL_OUTOF10: ASSUMED PAIN PRESENT

## 2018-07-05 ASSESSMENT — PATIENT HEALTH QUESTIONNAIRE - PHQ9
1. LITTLE INTEREST OR PLEASURE IN DOING THINGS: NOT AT ALL
2. FEELING DOWN, DEPRESSED, IRRITABLE, OR HOPELESS: NOT AT ALL
SUM OF ALL RESPONSES TO PHQ9 QUESTIONS 1 AND 2: 0

## 2018-07-05 NOTE — ED NOTES
Med rec complete per pt at bedside  Allergies reviewed  Pt reports that she was prescribed a 5 day course of Augmentin but stopped taking on 6/24/18 after 2-3 days because it made her feel nauseated  Pt had surgery on 6/26/18, received IV vanco during that time, per pt

## 2018-07-05 NOTE — ED PROVIDER NOTES
ED Provider Note    CHIEF COMPLAINT  Chief Complaint   Patient presents with   • Shortness of Breath     pt s/p total right knee 6/26/18. pt denies hx DVT/PE.   • Chest Pain       HPI  Margoth Hopkins is a 67 y.o. female here for evaluation of chest pain and shortness of breath.  The patient states that she had knee revision surgery on the right knee on June 26, but approximately 3 days ago she was awoken in the middle of the night with some substernal chest pain and shortness of breath.  She has no fever, no vomiting, and no back pain.  The patient states that nothing alleviates or exacerbates her pain, and she did not take anything for the pain prior to coming in.  She denies any knee pain.  She has no other medical concerns at this time.    PAST MEDICAL HISTORY   has a past medical history of Goiter and Thyroid disease.    SOCIAL HISTORY  Social History     Social History Main Topics   • Smoking status: Never Smoker   • Smokeless tobacco: Never Used   • Alcohol use 4.2 oz/week     7 Glasses of wine per week   • Drug use: No   • Sexual activity: Yes     Partners: Male       SURGICAL HISTORY   has a past surgical history that includes knee replacement, total (Right); foot surgery (Left); fusion; cervical fusion posterior; bladder sling female; appendectomy; and cataract extraction with iol (Left).    CURRENT MEDICATIONS  Home Medications     Reviewed by Disha Funez (Pharmacy Tech) on 07/05/18 at 1102  Med List Status: Complete   Medication Last Dose Status   amoxicillin-clavulanate (AUGMENTIN) 875-125 MG Tab 6/24/2018 Active   Cholecalciferol (HM VITAMIN D3) 4000 units Cap 7/4/2018 Active   levothyroxine (SYNTHROID) 75 MCG Tab 7/5/2018 Active   metoprolol SR (TOPROL XL) 50 MG TABLET SR 24 HR 7/5/2018 Active   oxyCODONE-acetaminophen (PERCOCET-10)  MG Tab 7/4/2018 Active   paroxetine (PAXIL) 40 MG tablet 7/5/2018 Active   rosuvastatin (CRESTOR) 20 MG Tab 7/4/2018 Active   tramadol (ULTRAM) 50 MG  Tab >2 days Active   vitamin D, Ergocalciferol, (DRISDOL) 71291 units Cap capsule 7/3/2018 Active                ALLERGIES  Allergies   Allergen Reactions   • Morphine Vomiting   • Nsaids Unspecified     History of gastric ulcers - cannot take NSAIDS   • Pcn [Penicillins] Hives       REVIEW OF SYSTEMS  See HPI for further details. Review of systems as above, otherwise all other systems are negative.     PHYSICAL EXAM  Constitutional: Well developed, well nourished. No acute distress.  HEENT: Normocephalic, atraumatic. Posterior pharynx clear and moist.  Eyes:  EOMI. Normal sclera.  Neck: Supple, Full range of motion, nontender.  Chest/Pulmonary: clear to ausculation. Symmetrical expansion.   Cardio: Regular rate and rhythm with no murmur.   Abdomen: Soft, nontender. No peritoneal signs. No guarding. No palpable masses.  Back: No CVA tenderness, nontender midline, no step offs.  Musculoskeletal: No deformity, no edema, neurovascular intact.   Neuro: Clear speech, appropriate, cooperative, cranial nerves II-XII grossly intact.  Psych: Normal mood and affect      Results for orders placed or performed during the hospital encounter of 07/05/18   CBC w/ Differential   Result Value Ref Range    WBC 9.2 4.8 - 10.8 K/uL    RBC 4.24 4.20 - 5.40 M/uL    Hemoglobin 11.6 (L) 12.0 - 16.0 g/dL    Hematocrit 35.9 (L) 37.0 - 47.0 %    MCV 84.7 81.4 - 97.8 fL    MCH 27.4 27.0 - 33.0 pg    MCHC 32.3 (L) 33.6 - 35.0 g/dL    RDW 42.5 35.9 - 50.0 fL    Platelet Count 338 164 - 446 K/uL    MPV 9.5 9.0 - 12.9 fL    Neutrophils-Polys 70.90 44.00 - 72.00 %    Lymphocytes 15.50 (L) 22.00 - 41.00 %    Monocytes 10.30 0.00 - 13.40 %    Eosinophils 1.90 0.00 - 6.90 %    Basophils 0.50 0.00 - 1.80 %    Immature Granulocytes 0.90 0.00 - 0.90 %    Nucleated RBC 0.00 /100 WBC    Neutrophils (Absolute) 6.55 2.00 - 7.15 K/uL    Lymphs (Absolute) 1.43 1.00 - 4.80 K/uL    Monos (Absolute) 0.95 (H) 0.00 - 0.85 K/uL    Eos (Absolute) 0.18 0.00 - 0.51  K/uL    Baso (Absolute) 0.05 0.00 - 0.12 K/uL    Immature Granulocytes (abs) 0.08 0.00 - 0.11 K/uL    NRBC (Absolute) 0.00 K/uL   Complete Metabolic Panel (CMP)   Result Value Ref Range    Sodium 136 135 - 145 mmol/L    Potassium 3.6 3.6 - 5.5 mmol/L    Chloride 105 96 - 112 mmol/L    Co2 24 20 - 33 mmol/L    Anion Gap 7.0 0.0 - 11.9    Glucose 112 (H) 65 - 99 mg/dL    Bun 15 8 - 22 mg/dL    Creatinine 0.68 0.50 - 1.40 mg/dL    Calcium 8.9 8.4 - 10.2 mg/dL    AST(SGOT) 39 12 - 45 U/L    ALT(SGPT) 39 2 - 50 U/L    Alkaline Phosphatase 122 (H) 30 - 99 U/L    Total Bilirubin 0.9 0.1 - 1.5 mg/dL    Albumin 3.3 3.2 - 4.9 g/dL    Total Protein 6.8 6.0 - 8.2 g/dL    Globulin 3.5 1.9 - 3.5 g/dL    A-G Ratio 0.9 g/dL   Btype Natriuretic Peptide   Result Value Ref Range    B Natriuretic Peptide 44 0 - 100 pg/mL   Troponin STAT   Result Value Ref Range    Troponin I <0.02 0.00 - 0.04 ng/mL   PT/INR   Result Value Ref Range    PT 12.6 12.0 - 14.6 sec    INR 0.95 0.87 - 1.13   D-Dimer (only helpful in low pre-test probability wells critieria. Do not order if patient ruled out by PERC criteria. See Weblinks at top of Labs section)   Result Value Ref Range    D-Dimer Screen 2101 (H) <250 ng/mL(D-DU)   ESTIMATED GFR   Result Value Ref Range    GFR If African American >60 >60 mL/min/1.73 m 2    GFR If Non African American >60 >60 mL/min/1.73 m 2   EKG (ER)   Result Value Ref Range    Report       [facility]    Test Date:  2018  Pt Name:    IAN JOHNSON                   Department: EDS  MRN:        [id number]                      Room:       -ROOM 4  Gender:     Female                       Technician: [id number]  :        1950                   Requested By:ER TRIAGE PROTOCOL  Order #:    [id number]                    Reading MD:    Measurements  Intervals                                Axis  Rate:       91                           P:          46  IL:         185                           QRS:        -13  QRSD:       85                           T:          12  QT:         332  QTc:        409    Interpretive Statements  Sinus rhythm  Probable left atrial enlargement  Left ventricular hypertrophy  No previous ECG available for comparison       CT-CTA CHEST PULMONARY ARTERY W/ RECONS   Final Result      1.  Numerous pulmonary thromboemboli left greater than right with no evidence of right heart strain      2.  Right lower lobe pulmonary infarction favored over aspiration      3.  Small right effusion with multi segmental atelectasis      4.  Cholelithiasis      5.  5 mm noncalcified right lower lobe nodule      Low Risk: No routine follow-up      High Risk: Optional CT at 12 months      Comments: Nodules less than 6 mm do not require routine follow-up, but certain patients at high risk with suspicious nodule morphology, upper lobe location, or both may warrant 12-month follow-up.      Low Risk - Minimal or absent history of smoking and of other known risk factors.      High Risk - History of smoking or of other known risk factors.      Note: These recommendations do not apply to lung cancer screening, patients with immunosuppression, or patients with known primary cancer.      Fleischner Society 2017 Guidelines for Management of Incidentally Detected Pulmonary Nodules in Adults      Findings were discussed with ALVIN MANTILLA on 7/5/2018 1:35 PM.      DX-CHEST-PORTABLE (1 VIEW)   Final Result      New right midlung zone platelike atelectasis is favored over fluid in the fissure            PROCEDURES     MEDICAL RECORD  I have reviewed patient's medical record and pertinent results are listed above.    COURSE & MEDICAL DECISION MAKING  I have reviewed any medical record information, laboratory studies and radiographic results as noted above.    If you have had any blood pressure issues while here in the emergency department, please see your doctor for a further evaluation or work up.    1:42  PM  I spoke to Dr. Ortega, who states pt has multiple sites of PE, but  No heart strain    Dr. Owens will admit the pt.  The pt is currently comfortable, but did have some desaturation to a room air of 80 percent while walking to the bathroom.     Differential diagnoses include but not limited to: PE, mi, pneumonia     FINAL IMPRESSION  1. Other acute pulmonary embolism without acute cor pulmonale (HCC)        Electronically signed by: Demetrius Gan, 7/5/2018 12:26 PM

## 2018-07-05 NOTE — ED NOTES
[place] fall assessment completed. Pt is high risk for fall. Interventions complete. Pt placed in yellow non slip socks, fall wrist band placed, green sign on door. Call light and belongings within reach, bed locked and in lowest position. Explained importance of calling before getting gout of bed and pt verbalizes understanding. Hourly rounding in place.

## 2018-07-05 NOTE — ED NOTES
ERP at bedside. Pt agrees with plan of care discussed by ERP. AIDET acknowledged with patient. Veto in low position, side rail up for pt safety. Call light within reach. X-ray at bedside.  Will continue to monitor.

## 2018-07-05 NOTE — ED NOTES
Assisted pt back from BR to room w/ SBA.  Pt found to be 80% on RA, placed on 2L NC to maintain SPo2.  Pt c/o feeling short of breath while ambulating.

## 2018-07-05 NOTE — PROGRESS NOTES
Pt up to bed via gurney. Transferred with SBA on 4 L O2. 2 L O2 at rest. Report from Donna MARIE. Pt spouse at bedside.

## 2018-07-05 NOTE — ED NOTES
Chief Complaint   Patient presents with   • Shortness of Breath     pt s/p total right knee 6/26/18. pt denies hx DVT/PE.   • Chest Pain

## 2018-07-06 ENCOUNTER — PATIENT OUTREACH (OUTPATIENT)
Dept: HEALTH INFORMATION MANAGEMENT | Facility: OTHER | Age: 68
End: 2018-07-06

## 2018-07-06 PROCEDURE — G8980 MOBILITY D/C STATUS: HCPCS | Mod: CI

## 2018-07-06 PROCEDURE — 700102 HCHG RX REV CODE 250 W/ 637 OVERRIDE(OP): Performed by: HOSPITALIST

## 2018-07-06 PROCEDURE — 93970 EXTREMITY STUDY: CPT

## 2018-07-06 PROCEDURE — 700102 HCHG RX REV CODE 250 W/ 637 OVERRIDE(OP): Performed by: INTERNAL MEDICINE

## 2018-07-06 PROCEDURE — G8978 MOBILITY CURRENT STATUS: HCPCS | Mod: CI

## 2018-07-06 PROCEDURE — G8979 MOBILITY GOAL STATUS: HCPCS | Mod: CI

## 2018-07-06 PROCEDURE — A9270 NON-COVERED ITEM OR SERVICE: HCPCS | Performed by: INTERNAL MEDICINE

## 2018-07-06 PROCEDURE — 97161 PT EVAL LOW COMPLEX 20 MIN: CPT

## 2018-07-06 PROCEDURE — A9270 NON-COVERED ITEM OR SERVICE: HCPCS | Performed by: HOSPITALIST

## 2018-07-06 PROCEDURE — 700111 HCHG RX REV CODE 636 W/ 250 OVERRIDE (IP): Performed by: INTERNAL MEDICINE

## 2018-07-06 PROCEDURE — 99232 SBSQ HOSP IP/OBS MODERATE 35: CPT | Performed by: HOSPITALIST

## 2018-07-06 PROCEDURE — 97535 SELF CARE MNGMENT TRAINING: CPT

## 2018-07-06 PROCEDURE — 770020 HCHG ROOM/CARE - TELE (206)

## 2018-07-06 RX ADMIN — METOPROLOL SUCCINATE 50 MG: 25 TABLET, EXTENDED RELEASE ORAL at 09:41

## 2018-07-06 RX ADMIN — SENNOSIDES AND DOCUSATE SODIUM 2 TABLET: 8.6; 5 TABLET ORAL at 20:02

## 2018-07-06 RX ADMIN — ROSUVASTATIN CALCIUM 20 MG: 10 TABLET, FILM COATED ORAL at 20:02

## 2018-07-06 RX ADMIN — OXYCODONE HYDROCHLORIDE AND ACETAMINOPHEN 1 TABLET: 10; 325 TABLET ORAL at 11:08

## 2018-07-06 RX ADMIN — OXYCODONE HYDROCHLORIDE AND ACETAMINOPHEN 1 TABLET: 10; 325 TABLET ORAL at 00:47

## 2018-07-06 RX ADMIN — ENOXAPARIN SODIUM 100 MG: 100 INJECTION SUBCUTANEOUS at 09:42

## 2018-07-06 RX ADMIN — SENNOSIDES AND DOCUSATE SODIUM 2 TABLET: 8.6; 5 TABLET ORAL at 09:40

## 2018-07-06 RX ADMIN — PAROXETINE HYDROCHLORIDE 40 MG: 20 TABLET, FILM COATED ORAL at 15:19

## 2018-07-06 RX ADMIN — OXYCODONE HYDROCHLORIDE AND ACETAMINOPHEN 1 TABLET: 10; 325 TABLET ORAL at 15:19

## 2018-07-06 RX ADMIN — LEVOTHYROXINE SODIUM 75 MCG: 50 TABLET ORAL at 05:56

## 2018-07-06 RX ADMIN — ENOXAPARIN SODIUM 100 MG: 100 INJECTION SUBCUTANEOUS at 00:43

## 2018-07-06 RX ADMIN — APIXABAN 10 MG: 5 TABLET, FILM COATED ORAL at 20:02

## 2018-07-06 RX ADMIN — OXYCODONE HYDROCHLORIDE AND ACETAMINOPHEN 1 TABLET: 10; 325 TABLET ORAL at 20:02

## 2018-07-06 RX ADMIN — OXYCODONE HYDROCHLORIDE AND ACETAMINOPHEN 1 TABLET: 10; 325 TABLET ORAL at 05:56

## 2018-07-06 ASSESSMENT — ENCOUNTER SYMPTOMS
BLOOD IN STOOL: 0
EYE PAIN: 0
SORE THROAT: 0
MEMORY LOSS: 0
TINGLING: 0
HEADACHES: 0
WEAKNESS: 0
CHILLS: 0
NAUSEA: 0
CONSTIPATION: 0
PALPITATIONS: 0
TREMORS: 0
BACK PAIN: 0
DEPRESSION: 0
COUGH: 0
DOUBLE VISION: 0
MYALGIAS: 1
SPEECH CHANGE: 0
SENSORY CHANGE: 0
HEMOPTYSIS: 0
SPUTUM PRODUCTION: 0
PND: 0
NECK PAIN: 0
FEVER: 0
VOMITING: 0
BLURRED VISION: 0
HEARTBURN: 0
STRIDOR: 0
DIZZINESS: 0
PHOTOPHOBIA: 0
NERVOUS/ANXIOUS: 0
SHORTNESS OF BREATH: 0
CLAUDICATION: 0
ORTHOPNEA: 0

## 2018-07-06 ASSESSMENT — PAIN SCALES - GENERAL
PAINLEVEL_OUTOF10: 6
PAINLEVEL_OUTOF10: 7
PAINLEVEL_OUTOF10: 7
PAINLEVEL_OUTOF10: 3
PAINLEVEL_OUTOF10: 2
PAINLEVEL_OUTOF10: 7

## 2018-07-06 ASSESSMENT — GAIT ASSESSMENTS
GAIT LEVEL OF ASSIST: STAND BY ASSIST
DISTANCE (FEET): 225
ASSISTIVE DEVICE: FRONT WHEEL WALKER
DEVIATION: DECREASED TOE OFF

## 2018-07-06 ASSESSMENT — PATIENT HEALTH QUESTIONNAIRE - PHQ9
SUM OF ALL RESPONSES TO PHQ9 QUESTIONS 1 AND 2: 0
2. FEELING DOWN, DEPRESSED, IRRITABLE, OR HOPELESS: NOT AT ALL
1. LITTLE INTEREST OR PLEASURE IN DOING THINGS: NOT AT ALL

## 2018-07-06 NOTE — PROGRESS NOTES
2 RN skin assessment done with Alvin MARIE. Pt has surgical incision on right knee from recent surgery, dressing in place. No other skin issues noted.

## 2018-07-06 NOTE — DISCHARGE PLANNING
Received Choice form at 1355  Agency/Facility Name: [facility]  Referral sent per Choice form at 1430.  Choice obtained by AMANDO Damon

## 2018-07-06 NOTE — CARE PLAN
Problem: Communication  Goal: The ability to communicate needs accurately and effectively will improve  Outcome: PROGRESSING AS EXPECTED  POC discussed including echo, ultrasound, anticoagulation. Provided KRAMES education. Pt oriented to unit routine and environment, call light system, MD rounding, d/c planning. Pt verbalizes understanding.    Problem: Safety  Goal: Will remain free from injury  Outcome: PROGRESSING AS EXPECTED  Pt instructed to call for assistance. SBA with FWW. Non slip socks on pt. Call light and belongings in reach. Pt verbalizes understanding of safety, especially related to mobility and pain management.

## 2018-07-06 NOTE — ASSESSMENT & PLAN NOTE
No RV strain on CT  Discussed pros and cons of warfarin versus XA inhibitor  Will get echo to completely rule out right heart strain, ultrasound lower extremity to rule out unstable proximal DVT.  May need filter if positive  Possibly discharged tomorrow on XA inhibitor if these studies are okay.

## 2018-07-06 NOTE — CARE PLAN
Problem: Knowledge Deficit  Goal: Knowledge of disease process/condition, treatment plan, diagnostic tests, and medications will improve  Outcome: PROGRESSING AS EXPECTED  Pt provided with KRAMES handouts. One on one discussion had with pt with teachback. Pt verbalizes understanding of anticoagulation, mobility, and PE.    Problem: Pain Management  Goal: Pain level will decrease to patient's comfort goal  Outcome: PROGRESSING AS EXPECTED  Pt c/o pain in chest and knee. Medicated per MAR. Pt offered ice/heat. Verbalizes understanding of pain management.

## 2018-07-06 NOTE — CARE PLAN
Problem: Safety  Goal: Will remain free from falls    Intervention: Implement fall precautions  Room/floor clear. Non skid socks on. Proper signs up. Bed alarm on, bed low and locked. Call light and belonging within reach. Hourly rounding in place to make sure needs are met.        Problem: Infection  Goal: Will remain free from infection    Intervention: Implement standard precautions and perform hand washing before and after patient contact  Hand washing every encounter. IV ports scrubbed with alcohol when hanging medicine. Patient watch for s/s of infection. Patient taught to report s/s of infection, verbalized understanding.        Problem: Pain Management  Goal: Pain level will decrease to patient's comfort goal    Intervention: Follow pain managment plan developed in collaboration with patient and Interdisciplinary Team  Pain assessment Q4H or Q2H after medication intervention. Encouraged patient to report pain, verbalized understanding. Medicated PRN.        Problem: Respiratory:  Goal: Respiratory status will improve    Intervention: Educate and encourage coughing and deep breathing  Encouraged to perform coughing and deep breathing, verbalized understanding.

## 2018-07-06 NOTE — PROGRESS NOTES
Bedside report received from Ashleigh MARIE. Assumed care. POC discussed. Pt resting comfortably in bed. Safety precautions in place.

## 2018-07-06 NOTE — PROGRESS NOTES
Admission and assessment completed. Pt AAOx4, c/o pain 6/10, medicated per MAR. Provided with meal tray. Pt uses FWW with SBA to get up to bathroom. Pt aware of urine sample need. POC discussed including echo, ultrasound of leg, mobility/PT needs, anticoagulation (TALIA provided).

## 2018-07-06 NOTE — PROGRESS NOTES
Assessment completed. Due meds given by Marisela MARIE. Pt AAOx4, c/o pain in right knee and chest 7/10, chest pain worse with inspiration. Unchanged from admission. Medicated per MAR. RN had one on one discussion regarding PE's and anticoagulation. Provided [facility] handouts. Pt concerned about getting PT, PT already ordered. Will f/u with ultrasound for time. Will f/u with pt's surgeon regarding dressing care for surgical knee incision. No other needs, will monitor.

## 2018-07-06 NOTE — DISCHARGE INSTRUCTIONS
Discharge Instructions    Discharged to home by car with relative. Discharged via wheelchair, hospital escort: Yes.  Special equipment needed: Oxygen    Be sure to schedule a follow-up appointment with your primary care doctor or any specialists as instructed.     Discharge Plan:   Diet Plan: Discussed  Activity Level: Discussed  Confirmed Follow up Appointment: Appointment Scheduled  Confirmed Symptoms Management: Discussed  Medication Reconciliation Updated: Yes  Influenza Vaccine Indication: Not indicated: Previously immunized this influenza season and > 8 years of age    I understand that a diet low in cholesterol, fat, and sodium is recommended for good health. Unless I have been given specific instructions below for another diet, I accept this instruction as my diet prescription.   Other diet: Regular    Special Instructions: None    · Is patient discharged on Warfarin / Coumadin?   No       Pulmonary Embolism  A pulmonary embolism (PE) is a sudden blockage or decrease of blood flow in one lung or both lungs. Most blockages come from a blood clot that travels from the legs or the pelvis to the lungs. PE is a dangerous and potentially life-threatening condition if it is not treated right away.  What are the causes?  A pulmonary embolism occurs most commonly when a blood clot travels from one of your veins to your lungs. Rarely, PE is caused by air, fat, amniotic fluid, or part of a tumor traveling through your veins to your lungs.  What increases the risk?  A PE is more likely to develop in:  · People who smoke.  · People who are older, especially over 60 years of age.  · People who are overweight (obese).  · People who sit or lie still for a long time, such as during long-distance travel (over 4 hours), bed rest, hospitalization, or during recovery from certain medical conditions like a stroke.  · People who do not engage in much physical activity (sedentary lifestyle).  · People who have chronic breathing  disorders.  · People who have a personal or family history of blood clots or blood clotting disease.  · People who have peripheral vascular disease (PVD), diabetes, or some types of cancer.  · People who have heart disease, especially if the person had a recent heart attack or has congestive heart failure.  · People who have neurological diseases that affect the legs (leg paresis).  · People who have had a traumatic injury, such as breaking a hip or leg.  · People who have recently had major or lengthy surgery, especially on the hip, knee, or abdomen.  · People who have had a central line placed inside a large vein.  · People who take medicines that contain the hormone estrogen. These include birth control pills and hormone replacement therapy.  · Pregnancy or during childbirth or the postpartum period.  What are the signs or symptoms?  The symptoms of a PE usually start suddenly and include:  · Shortness of breath while active or at rest.  · Coughing or coughing up blood or blood-tinged mucus.  · Chest pain that is often worse with deep breaths.  · Rapid or irregular heartbeat.  · Feeling light-headed or dizzy.  · Fainting.  · Feeling anxious.  · Sweating.  There may also be pain and swelling in a leg if that is where the blood clot started.  These symptoms may represent a serious problem that is an emergency. Do not wait to see if the symptoms will go away. Get medical help right away. Call your local emergency services (911 in the [place] Do not drive yourself to the hospital.   How is this diagnosed?  Your health care provider will take a medical history and perform a physical exam. You may also have other tests, including:  · Blood tests to assess the clotting properties of your blood, assess oxygen levels in your blood, and find blood clots.  · Imaging tests, such as CT, ultrasound, MRI, X-ray, and other tests to see if you have clots anywhere in your body.  · An electrocardiogram (ECG) to look for heart strain  from blood clots in the lungs.  How is this treated?  The main goals of PE treatment are:  · To stop a blood clot from growing larger.  · To stop new blood clots from forming.  The type of treatment that you receive depends on many factors, such as the cause of your PE, your risk for bleeding or developing more clots, and other medical conditions that you have. Sometimes, a combination of treatments is necessary.  This condition may be treated with:  · Medicines, including newer oral blood thinners (anticoagulants), warfarin, low molecular weight heparins, thrombolytics, or heparins.  · Wearing compression stockings or using different types of devices.  · Surgery (rare) to remove the blood clot or to place a filter in your abdomen to stop the blood clot from traveling to your lungs.  Treatments for a PE are often divided into immediate treatment, long-term treatment (up to 3 months after PE), and extended treatment (more than 3 months after PE). Your treatment may continue for several months. This is called maintenance therapy, and it is used to prevent the forming of new blood clots. You can work with your health care provider to choose the treatment program that is best for you.  What are anticoagulants?   Anticoagulants are medicines that treat PEs. They can stop current blood clots from growing and stop new clots from forming. They cannot dissolve existing clots. Your body dissolves clots by itself over time. Anticoagulants are given by mouth, by injection, or through an IV tube.  What are thrombolytics?   Thrombolytics are clot-dissolving medicines that are used to dissolve a PE. They carry a high risk of bleeding, so they tend to be used only in severe cases or if you have very low blood pressure.  Follow these instructions at home:  If you are taking a newer oral anticoagulant:  · Take the medicine every single day at the same time each day.  · Understand what foods and drugs interact with this  medicine.  · Understand that there are no regular blood tests required when using this medicine.  · Understand the side effects of this medicine, including excessive bruising or bleeding. Ask your health care provider or pharmacist about other possible side effects.  If you are taking warfarin:  · Understand how to take warfarin and know which foods can affect how warfarin works in your body.  · Understand that it is dangerous to take too much or too little warfarin. Too much warfarin increases the risk of bleeding. Too little warfarin continues to allow the risk for blood clots.  · Follow your PT and INR blood testing schedule. The PT and INR results allow your health care provider to adjust your dose of warfarin. It is very important that you have your PT and INR tested as often as told by your health care provider.  · Avoid major changes in your diet, or tell your health care provider before you change your diet. Arrange a visit with a registered dietitian to answer your questions. Many foods, especially foods that are high in vitamin K, can interfere with warfarin and affect the PT and INR results. Eat a consistent amount of foods that are high in vitamin K, such as:  ¨ Spinach, kale, broccoli, cabbage, kathryn greens, turnip greens, Platter sprouts, peas, cauliflower, seaweed, and parsley.  ¨ Beef liver and pork liver.  ¨ Green tea.  ¨ Soybean oil.  · Tell your health care provider about any and all medicines, vitamins, and supplements that you take, including aspirin and other over-the-counter anti-inflammatory medicines. Be especially cautious with aspirin and anti-inflammatory medicines. Do not take those before you ask your health care provider if it is safe to do so. This is important because many medicines can interfere with warfarin and affect the PT and INR results.  · Do not start or stop taking any over-the-counter or prescription medicine unless your health care provider or pharmacist tells you to  do so.  If you take warfarin, you will also need to do these things:  · Hold pressure over cuts for longer than usual.  · Tell your dentist and other health care providers that you are taking warfarin before you have any procedures in which bleeding may occur.  · Avoid alcohol or drink very small amounts. Tell your health care provider if you change your alcohol intake.  · Do not use tobacco products, including cigarettes, chewing tobacco, and e-cigarettes. If you need help quitting, ask your health care provider.  · Avoid contact sports.  General instructions  · Take over-the-counter and prescription medicines only as told by your health care provider. Anticoagulant medicines can have side effects, including easy bruising and difficulty stopping bleeding. If you are prescribed an anticoagulant, you will also need to do these things:  ¨ Hold pressure over cuts for longer than usual.  ¨ Tell your dentist and other health care providers that you are taking anticoagulants before you have any procedures in which bleeding may occur.  ¨ Avoid contact sports.  · Wear a medical alert bracelet or carry a medical alert card that says you have had a PE.  · Ask your health care provider how soon you can go back to your normal activities. Stay active to prevent new blood clots from forming.  · Make sure to exercise while traveling or when you have been sitting or standing for a long period of time. It is very important to exercise. Exercise your legs by walking or by tightening and relaxing your leg muscles often. Take frequent walks.  · Wear compression stockings as told by your health care provider to help prevent more blood clots from forming.  · Do not use tobacco products, including cigarettes, chewing tobacco, and e-cigarettes. If you need help quitting, ask your health care provider.  · Keep all follow-up appointments with your health care provider. This is important.  How is this prevented?  Take these actions to  decrease your risk of developing another PE:  · Exercise regularly. For at least 30 minutes every day, engage in:  ¨ Activity that involves moving your arms and legs.  ¨ Activity that encourages good blood flow through your body by increasing your heart rate.  · Exercise your arms and legs every hour during long-distance travel (over 4 hours). Drink plenty of water and avoid drinking alcohol while traveling.  · Avoid sitting or lying in bed for long periods of time without moving your legs.  · Maintain a weight that is appropriate for your height. Ask your health care provider what weight is healthy for you.  · If you are a woman who is over 35 years of age, avoid unnecessary use of medicines that contain estrogen. These include birth control pills.  · Do not smoke, especially if you take estrogen medicines. If you need help quitting, ask your health care provider.  · If you are at very high risk for PE, wear compression stockings.  · If you recently had a PE, have regularly scheduled ultrasound testing on your legs to check for new blood clots.  If you are hospitalized, prevention measures may include:  · Early walking after surgery, as soon as your health care provider says that it is safe.  · Receiving anticoagulants to prevent blood clots. If you cannot take anticoagulants, other options may be available, such as wearing compression stockings or using different types of devices.  Get help right away if:  · You have new or increased pain, swelling, or redness in an arm or leg.  · You have numbness or tingling in an arm or leg.  · You have shortness of breath while active or at rest.  · You have chest pain.  · You have a rapid or irregular heartbeat.  · You feel light-headed or dizzy.  · You cough up blood.  · You notice blood in your vomit, bowel movement, or urine.  · You have a fever.  These symptoms may represent a serious problem that is an emergency. Do not wait to see if the symptoms will go away. Get  medical help right away. Call your local emergency services (911 in the [place] Do not drive yourself to the hospital.   This information is not intended to replace advice given to you by your health care provider. Make sure you discuss any questions you have with your health care provider.  Document Released: 12/15/2001 Document Revised: 05/25/2017 Document Reviewed: 04/13/2016  [employer] Interactive Patient Education © 2017 [employer]    Apixaban oral tablets  What is this medicine?  APIXABAN (a PIX a ban) is an anticoagulant (blood thinner). It is used to lower the chance of stroke in people with a medical condition called atrial fibrillation. It is also used to treat or prevent blood clots in the lungs or in the veins.  This medicine may be used for other purposes; ask your health care provider or pharmacist if you have questions.  COMMON BRAND NAME(S): Eliquis  What should I tell my health care provider before I take this medicine?  They need to know if you have any of these conditions:  -bleeding disorders  -bleeding in the brain  -blood in your stools (black or tarry stools) or if you have blood in your vomit  -history of stomach bleeding  -kidney disease  -liver disease  -mechanical heart valve  -an unusual or allergic reaction to apixaban, other medicines, foods, dyes, or preservatives  -pregnant or trying to get pregnant  -breast-feeding  How should I use this medicine?  Take this medicine by mouth with a glass of water. Follow the directions on the prescription label. You can take it with or without food. If it upsets your stomach, take it with food. Take your medicine at regular intervals. Do not take it more often than directed. Do not stop taking except on your doctor's advice. Stopping this medicine may increase your risk of a blot clot. Be sure to refill your prescription before you run out of medicine.  Talk to your pediatrician regarding the use of this medicine in children. Special care may be  needed.  Overdosage: If you think you have taken too much of this medicine contact a poison control center or emergency room at once.  NOTE: This medicine is only for you. Do not share this medicine with others.  What if I miss a dose?  If you miss a dose, take it as soon as you can. If it is almost time for your next dose, take only that dose. Do not take double or extra doses.  What may interact with this medicine?  This medicine may interact with the following:  -aspirin and aspirin-like medicines  -certain medicines for fungal infections like ketoconazole and itraconazole  -certain medicines for seizures like carbamazepine and phenytoin  -certain medicines that treat or prevent blood clots like warfarin, enoxaparin, and dalteparin  -clarithromycin  -NSAIDs, medicines for pain and inflammation, like ibuprofen or naproxen  -rifampin  -ritonavir  -Bonita's wort  This list may not describe all possible interactions. Give your health care provider a list of all the medicines, herbs, non-prescription drugs, or dietary supplements you use. Also tell them if you smoke, drink alcohol, or use illegal drugs. Some items may interact with your medicine.  What should I watch for while using this medicine?  Visit your doctor or health care professional for regular checks on your progress.  Notify your doctor or health care professional and seek emergency treatment if you develop breathing problems; changes in vision; chest pain; severe, sudden headache; pain, swelling, warmth in the leg; trouble speaking; sudden numbness or weakness of the face, arm or leg. These can be signs that your condition has gotten worse.  If you are going to have surgery or other procedure, tell your doctor that you are taking this medicine.  What side effects may I notice from receiving this medicine?  Side effects that you should report to your doctor or health care professional as soon as possible:  -allergic reactions like skin rash, itching or  hives, swelling of the face, lips, or tongue  -signs and symptoms of bleeding such as bloody or black, tarry stools; red or dark-brown urine; spitting up blood or brown material that looks like coffee grounds; red spots on the skin; unusual bruising or bleeding from the eye, gums, or nose  This list may not describe all possible side effects. Call your doctor for medical advice about side effects. You may report side effects to FDA at 9-285-QYH-7322.  Where should I keep my medicine?  Keep out of the reach of children.  Store at room temperature between 20 and 25 degrees C (68 and 77 degrees F). Throw away any unused medicine after the expiration date.  NOTE: This sheet is a summary. It may not cover all possible information. If you have questions about this medicine, talk to your doctor, pharmacist, or health care provider.  © 2018 [employer]vier/Gold Standard (2017-07-10 11:54:23)        Depression / Suicide Risk    As you are discharged from this [facility] facility, it is important to learn how to keep safe from harming yourself.    Recognize the warning signs:  · Abrupt changes in personality, positive or negative- including increase in energy   · Giving away possessions  · Change in eating patterns- significant weight changes-  positive or negative  · Change in sleeping patterns- unable to sleep or sleeping all the time   · Unwillingness or inability to communicate  · Depression  · Unusual sadness, discouragement and loneliness  · Talk of wanting to die  · Neglect of personal appearance   · Rebelliousness- reckless behavior  · Withdrawal from people/activities they love  · Confusion- inability to concentrate     If you or a loved one observes any of these behaviors or has concerns about self-harm, here's what you can do:  · Talk about it- your feelings and reasons for harming yourself  · Remove any means that you might use to hurt yourself (examples: pills, rope, extension cords, firearm)  · Get professional help  from the community (Mental Health, Substance Abuse, psychological counseling)  · Do not be alone:Call your Safe Contact- someone whom you trust who will be there for you.  · Call your local CRISIS HOTLINE 481-6792 or 605-889-1375  · Call your local Children's Mobile Crisis Response Team [place] (831) 424-4293 or www.[employer]  · Call the toll free National Suicide Prevention Hotlines   · National Suicide Prevention Lifeline 951-358-BNRP (9372)  · National Hope Line Network 800-SUICIDE (944-5865)

## 2018-07-06 NOTE — FACE TO FACE
Face to Face Note  -  Durable Medical Equipment    Sarah Eubanks M.D. - NPI: [id number]  I certify that this patient is under my care and that they had a durable medical equipment(DME)face to face encounter by myself that meets the physician DME face-to-face encounter requirements with this patient on:    Date of encounter:   Patient:                    MRN:                       YOB: 2020  Margoth Hopkins  [id number]  1950     The encounter with the patient was in whole, or in part, for the following medical condition, which is the primary reason for durable medical equipment:  Other - pulmonary embolism    I certify that, based on my findings, the following durable medical equipment is medically necessary:  Oxygen.    HOME O2 Saturation Measurements:(Values must be present for Home Oxygen orders)  Room air sat at rest: 94  Room air sat with amb: 87  With liters of O2: 2, O2 sat at rest with O2: 94  With Liters of O2: 2, O2 sat with amb with O2 : 91  Is the patient mobile?: Yes    My Clinical findings support the need for the above equipment due to:  Hypoxia    Supporting Symptoms: Pulmonary embolism

## 2018-07-06 NOTE — PROGRESS NOTES
US at bedside. Spoke with MD, pt needs to finish US, wait for one hour for results. If not posted in that time, pt can still d/c and follow up with PCP for results. Pt updated on POC. O2 has been delivered.

## 2018-07-06 NOTE — H&P
Hospital Medicine History & Physical Note    Date of Service  7/5/2018    Primary Care Physician  Charlee Walker M.D.    Consultants  none    Code Status  Full    Chief Complaint  Chest pain    History of Presenting Illness  67 y.o. female who presented 7/5/2018 with chest pain and shortness of breath.  Patient just had R knee revision surgery done on 6/26, prior to this she had 2 previous knee replacements on the same knee apparently the knee was weak and unstable after the previous two surgeries.  Around the 28th or 29th she began to notice some discomfort in her central chest and shortness of breath, she has swelling in the right leg but she says it is a normal amount and what she comes to expect after knee surgery.  She has chronic charley horses and has not noted any change in this either.  She has a slight cough which worsens the pain the cough is nonproductive and she has had no hemoptysis she denies any fever but has had episodes of diaphoresis particularly after pain medicine she has also had some chills, when she tries to take a deep breath it makes her chest hurt worse but she has no sharp or stabbing pain.  She has not found any exacerbating or relieving factors.  The chest pain has been fairly persistent.  She feels generally weak , she has had some lightheadedness but has not felt near syncopal .  She has had some headaches but is not having one currently .  She has had nausea but no vomiting.  She has been constipated. She has no history of previous thromboembolic disease.  She has no cardiac history.     Review of Systems  Review of Systems   Constitutional: Positive for chills, diaphoresis and malaise/fatigue. Negative for fever.   HENT: Positive for nosebleeds (Mild and self-limited due to dryness). Negative for congestion and sore throat.    Eyes: Negative for pain and discharge.   Respiratory: Positive for cough and shortness of breath. Negative for hemoptysis, sputum production and wheezing.     Cardiovascular: Positive for chest pain and leg swelling.   Gastrointestinal: Positive for blood in stool (The past none recently due to hemorrhoid), constipation and nausea. Negative for abdominal pain, diarrhea, melena and vomiting.        Remote history of bleeding ulcer requiring transfusion   Genitourinary: Negative for dysuria, frequency, hematuria and urgency.        Post voiding pressure sensation   Musculoskeletal: Positive for joint pain.        She has some discomfort along her medial right thigh   Skin: Negative for itching and rash.   Neurological: Positive for dizziness and headaches. Negative for focal weakness.   Psychiatric/Behavioral: Negative for depression. The patient is not nervous/anxious.        Past Medical History   has a past medical history of Goiter and Thyroid disease.  Hashimoto's    Surgical History   has a past surgical history that includes knee replacement, total (Right); foot surgery (Left); fusion; cervical fusion posterior; bladder sling female; appendectomy; and cataract extraction with iol (Left).  She has had 2 knee replacements on her right and the recent revision surgery in addition to this, right hand surgery, retinal detachment    Family History  family history includes Alzheimer's Disease in her mother; Arthritis in her mother; Cancer in her mother; Cancer (age of onset: 50) in her maternal grandmother; Heart Attack in her father; Heart Disease in her brother; Other in her brother.  Heart disease, cervical cancer, cerebral aneurysm, colon cancer, denies family history of thromboembolic disease    Social History   reports that she has never smoked. She has never used smokeless tobacco. She reports that she drinks about 4.2 oz of alcohol per week . She reports that she does not use drugs.  She has not drink any wine since her surgery, she [place] 1 biological child and 2 stepchildren [place]  He is  and lives with her spouse.    Allergies  Allergies    Allergen Reactions   • Morphine Vomiting   • Nsaids Unspecified     History of gastric ulcers - cannot take NSAIDS   • Pcn [Penicillins] Hives       Medications  Prior to Admission Medications   Prescriptions Last Dose Informant Patient Reported? Taking?   Cholecalciferol (HM VITAMIN D3) 4000 units Cap 7/4/2018 at am Patient No No   Sig: Take 1 Cap by mouth every day.   amoxicillin-clavulanate (AUGMENTIN) 875-125 MG Tab 6/24/2018 at FINISHED Patient Yes Yes   Sig: Take 1 Tab by mouth 2 times a day. 5 day course, stopped after 2-3 days (stop date 6/24/18)   levothyroxine (SYNTHROID) 75 MCG Tab 7/5/2018 at 0600 Patient No No   Sig: Take 1 Tab by mouth Every morning on an empty stomach. Synthroid brand medically necessary.   metoprolol SR (TOPROL XL) 50 MG TABLET SR 24 HR 7/5/2018 at 0900 Patient Yes Yes   Sig: Take 50 mg by mouth every morning.   oxyCODONE-acetaminophen (PERCOCET-10)  MG Tab 7/4/2018 at pm Patient Yes Yes   Sig: Take 1 Tab by mouth every four hours as needed for Severe Pain.   paroxetine (PAXIL) 40 MG tablet 7/5/2018 at 0900 Patient No No   Sig: TAKE 1 TABLET BY MOUTH  EVERY DAY   rosuvastatin (CRESTOR) 20 MG Tab 7/4/2018 at pm Patient No No   Sig: TAKE 1 TABLET BY MOUTH  EVERY EVENING   tramadol (ULTRAM) 50 MG Tab >2 days at unknown Patient Yes No   Sig: TAKE 1 TABLET BY MOUTH EVERY 4 TO 6 HOURS AS NEEDED FOR PAIN FOR 30 DAYS   vitamin D, Ergocalciferol, (DRISDOL) 60702 units Cap capsule 7/3/2018 at am Patient No No   Sig: Take 1 Cap by mouth every 7 days.      Facility-Administered Medications: None       Physical Exam  Blood Pressure : 114/57   Temperature: 36.7 °C (98.1 °F)   Pulse: 72   Respiration: 18   Pulse Oximetry: 97 %     Physical Exam   Constitutional: She is oriented to person, place, and time. She appears well-developed and well-nourished. No distress.   HENT:   Head: Normocephalic and atraumatic.   Eyes: Conjunctivae and EOM are normal. Pupils are equal, round, and reactive to  light. Right eye exhibits no discharge. Left eye exhibits no discharge.   Neck: Normal range of motion. Neck supple.   Cardiovascular: Normal rate and regular rhythm.    Pulmonary/Chest: Effort normal and breath sounds normal. No respiratory distress. She has no wheezes. She has no rales. She exhibits no tenderness.   Abdominal: Soft. Bowel sounds are normal. She exhibits distension (Mild bloatiness).   Musculoskeletal: She exhibits edema (8 lower extremity without pitting).   Tenderness around the right medial thigh without palpable cord   Neurological: She is alert and oriented to person, place, and time. No cranial nerve deficit.   Skin: Skin is warm and dry. She is not diaphoretic.   Vitiligo both hands   Psychiatric: She has a normal mood and affect.   Nursing note and vitals reviewed.      Laboratory:  Recent Labs      07/05/18   1125   WBC  9.2   RBC  4.24   HEMOGLOBIN  11.6*   HEMATOCRIT  35.9*   MCV  84.7   MCH  27.4   MCHC  32.3*   RDW  42.5   PLATELETCT  338   MPV  9.5     Recent Labs      07/05/18   1125   SODIUM  136   POTASSIUM  3.6   CHLORIDE  105   CO2  24   GLUCOSE  112*   BUN  15   CREATININE  0.68   CALCIUM  8.9     Recent Labs      07/05/18   1125   ALTSGPT  39   ASTSGOT  39   ALKPHOSPHAT  122*   TBILIRUBIN  0.9   GLUCOSE  112*     Recent Labs      07/05/18   1125   INR  0.95     Recent Labs      07/05/18   1125   BNPBTYPENAT  44         Lab Results   Component Value Date    TROPONINI <0.02 07/05/2018       Urinalysis:    Lab Results   Component Value Date    SPECGRAVITY 1.019 09/21/2017    GLUCOSEUR Negative 09/21/2017    KETONES Negative 09/21/2017    NITRITE Negative 09/21/2017    WBCURINE 20-50 (A) 09/21/2017    RBCURINE 2-5 (A) 09/21/2017    BACTERIA Negative 09/21/2017    EPITHELCELL Negative 09/21/2017        Imaging:  ECHOCARDIOGRAM COMP W/O CONT   Final Result      CT-CTA CHEST PULMONARY ARTERY W/ RECONS   Final Result      1.  Numerous pulmonary thromboemboli left greater than right  with no evidence of right heart strain      2.  Right lower lobe pulmonary infarction favored over aspiration      3.  Small right effusion with multi segmental atelectasis      4.  Cholelithiasis      5.  5 mm noncalcified right lower lobe nodule      Low Risk: No routine follow-up      High Risk: Optional CT at 12 months      Comments: Nodules less than 6 mm do not require routine follow-up, but certain patients at high risk with suspicious nodule morphology, upper lobe location, or both may warrant 12-month follow-up.      Low Risk - Minimal or absent history of smoking and of other known risk factors.      High Risk - History of smoking or of other known risk factors.      Note: These recommendations do not apply to lung cancer screening, patients with immunosuppression, or patients with known primary cancer.      Fleischner Society 2017 Guidelines for Management of Incidentally Detected Pulmonary Nodules in Adults      Findings were discussed with ALVIN MANTILLA on 7/5/2018 1:35 PM.      DX-CHEST-PORTABLE (1 VIEW)   Final Result      New right midlung zone platelike atelectasis is favored over fluid in the fissure      LE VENOUS DUPLEX    (Results Pending)         Assessment/Plan:  I anticipate this patient is appropriate for observation status at this time.    Pulmonary infarct (HCC)   Assessment & Plan    No hemoptysis at this time        Bilateral pulmonary embolism (HCC)   Assessment & Plan    No RV strain on CT  Discussed pros and cons of warfarin versus XA inhibitor  Will get echo to completely rule out right heart strain, ultrasound lower extremity to rule out unstable proximal DVT.  May need filter if positive  Possibly discharged tomorrow on XA inhibitor if these studies are okay.            VTE prophylaxis: Patient on full dose Lovenox

## 2018-07-06 NOTE — DISCHARGE PLANNING
Pt's eliquist copay is $0.     SW met with pt at bedside regarding O2 choice.   Pt chose [facility].      Per [facility], pt's O2 will be delivered within the hour.

## 2018-07-06 NOTE — THERAPY
"Physical Therapy Evaluation completed.   Bed Mobility:  Supine to Sit: Independent  Transfers: Sit to Stand: Supervised  Gait: Level Of Assist: Stand by Assist with Front-Wheel Walker       Plan of Care: Patient with no further skilled PT needs in the acute care setting at this time  Discharge Recommendations: Equipment: No Equipment Needed. Post-acute therapy Discharge to home with outpatient or home health for additional skilled therapy services.    See \"Rehab Therapy-Acute\" Patient Summary Report for complete documentation.   Pt is a 67 y.o.f. who presented to the hospital with c/o SOB and chest pain.  Pt dx with PE.  Pt had TKA revision 6/265/18  Pt is alert and able to follow all commands.  Pt was performing AP at home and using ice.  She was not able to recall any other exercises she had been given following her surgery.  Pt stated she had initially performed her exercises, but when the chest pain started she did not cont with  many exercises.  Pt's  was present for all training.  Pt educated in HEP and handout provided.  Pt completed 10 reps with exception prolonged heel prop stretch which pt performed x 3 minutes.  Pt educated in need to complete extension stretch exercises at home.  Pt educated in proper gait and demonstrated good understanding.   demonstrated good knowledge of HEP.  Pt provided with handout for HEP.  No further skilled PT needs in acute care setting.  Pt encouraged to complete HEP while in hospital and to cont with exercises at home,  Pt verbalized understanding.  "

## 2018-07-06 NOTE — PROGRESS NOTES
Bedside report given to Ashleigh MARIE. POC discussed. Pt resting comfortably in bed. Safety precautions in place.

## 2018-07-07 VITALS
HEIGHT: 67 IN | OXYGEN SATURATION: 97 % | RESPIRATION RATE: 18 BRPM | HEART RATE: 75 BPM | SYSTOLIC BLOOD PRESSURE: 140 MMHG | WEIGHT: 214.29 LBS | DIASTOLIC BLOOD PRESSURE: 54 MMHG | TEMPERATURE: 98 F | BODY MASS INDEX: 33.63 KG/M2

## 2018-07-07 PROCEDURE — 700102 HCHG RX REV CODE 250 W/ 637 OVERRIDE(OP): Performed by: INTERNAL MEDICINE

## 2018-07-07 PROCEDURE — 99239 HOSP IP/OBS DSCHRG MGMT >30: CPT | Performed by: HOSPITALIST

## 2018-07-07 PROCEDURE — A9270 NON-COVERED ITEM OR SERVICE: HCPCS | Performed by: INTERNAL MEDICINE

## 2018-07-07 PROCEDURE — A9270 NON-COVERED ITEM OR SERVICE: HCPCS | Performed by: HOSPITALIST

## 2018-07-07 PROCEDURE — 700102 HCHG RX REV CODE 250 W/ 637 OVERRIDE(OP): Performed by: HOSPITALIST

## 2018-07-07 RX ADMIN — APIXABAN 10 MG: 5 TABLET, FILM COATED ORAL at 08:49

## 2018-07-07 RX ADMIN — SENNOSIDES AND DOCUSATE SODIUM 2 TABLET: 8.6; 5 TABLET ORAL at 08:50

## 2018-07-07 RX ADMIN — OXYCODONE HYDROCHLORIDE AND ACETAMINOPHEN 1 TABLET: 10; 325 TABLET ORAL at 08:51

## 2018-07-07 RX ADMIN — METOPROLOL SUCCINATE 50 MG: 25 TABLET, EXTENDED RELEASE ORAL at 08:50

## 2018-07-07 RX ADMIN — LEVOTHYROXINE SODIUM 75 MCG: 50 TABLET ORAL at 06:03

## 2018-07-07 ASSESSMENT — PAIN SCALES - GENERAL
PAINLEVEL_OUTOF10: 7
PAINLEVEL_OUTOF10: 0

## 2018-07-07 NOTE — PROGRESS NOTES
Hospital Medicine Daily Progress Note    Date of Service  7/6/2018    Chief Complaint  Mrs. Hopkins is up for a pleasant 67-year-old female admitted on 7/5/2018 with chest discomfort and shortness of breath. She apparently had a right knee revision on 6/26 and then several days afterwards started experiencing exertional shortness of breath and chest discomfort on deep inspiration. In the ER patient underwent a CTA PE study which revealed a Numerous pulmonary thromboemboli left greater than right with no evidence of right heart strain, Right lower lobe pulmonary infarction favored over aspiration, prompting us to initiate anticoagulation weight-based.     Interval Problem Update  No acute events overnight patient clinically stable and patient says her energy level has improved.  We have confirmed evidence of pulmonary embolism, echocardiogram was performed which did show a preserved LVEF with mild right systolic ventricular heart pressures and 35 mmHg.  At this point she was on weight-based Lovenox will transition to [employer] quest for which she will continue as an outpatient  Lower extremity duplex was done and is currently pending for official read  Continue supportive management.      Consultants/Specialty  None    Disposition  TBD    Review of Systems  Review of Systems   Constitutional: Negative for chills, fever and malaise/fatigue.   HENT: Negative for congestion, hearing loss, sore throat and tinnitus.    Eyes: Negative for blurred vision, double vision, photophobia and pain.   Respiratory: Negative for cough, hemoptysis, sputum production, shortness of breath and stridor.    Cardiovascular: Negative for chest pain, palpitations, orthopnea, claudication and PND.   Gastrointestinal: Negative for blood in stool, constipation, heartburn, melena, nausea and vomiting.   Genitourinary: Negative for dysuria, frequency and urgency.   Musculoskeletal: Positive for joint pain and myalgias. Negative for back pain and neck  pain.   Neurological: Negative for dizziness, tingling, tremors, sensory change, speech change, weakness and headaches.   Psychiatric/Behavioral: Negative for depression, memory loss and suicidal ideas. The patient is not nervous/anxious.         Physical Exam  Blood Pressure : 127/60   Temperature: 36.6 °C (97.8 °F)   Pulse: 72   Respiration: 20   Pulse Oximetry: 90 %     Physical Exam   Constitutional: She is oriented to person, place, and time. She appears well-developed and well-nourished. No distress.   HENT:   Head: Normocephalic and atraumatic.   Mouth/Throat: No oropharyngeal exudate.   Eyes: Conjunctivae are normal. Pupils are equal, round, and reactive to light. Right eye exhibits no discharge. No scleral icterus.   Neck: Neck supple. No JVD present. No thyromegaly present.   Cardiovascular: Normal rate and intact distal pulses.    No murmur heard.  Pulmonary/Chest: Effort normal and breath sounds normal. No stridor. No respiratory distress. She has no wheezes. She has no rales.   Abdominal: Soft. Bowel sounds are normal. She exhibits no distension. There is no tenderness. There is no rebound.   Musculoskeletal: She exhibits tenderness. She exhibits no edema.   Right knee wrapped in Ace bandage dressing clean no discharge   Neurological: She is alert and oriented to person, place, and time. No cranial nerve deficit.   Skin: Skin is warm. She is not diaphoretic. No erythema.   Psychiatric: She has a normal mood and affect. Her behavior is normal. Thought content normal.       Fluids    Intake/Output Summary (Last 24 hours) at 07/06/18 1826  Last data filed at 07/06/18 1300   Gross per 24 hour   Intake              960 ml   Output                0 ml   Net              960 ml       Laboratory  Recent Labs      07/05/18   1125   WBC  9.2   RBC  4.24   HEMOGLOBIN  11.6*   HEMATOCRIT  35.9*   MCV  84.7   MCH  27.4   MCHC  32.3*   RDW  42.5   PLATELETCT  338   MPV  9.5     Recent Labs      07/05/18   1125    SODIUM  136   POTASSIUM  3.6   CHLORIDE  105   CO2  24   GLUCOSE  112*   BUN  15   CREATININE  0.68   CALCIUM  8.9     Recent Labs      07/05/18   1125   INR  0.95     Recent Labs      07/05/18   1125   BNPBTYPENAT  44           Imaging  ECHOCARDIOGRAM COMP W/O CONT   Final Result      CT-CTA CHEST PULMONARY ARTERY W/ RECONS   Final Result      1.  Numerous pulmonary thromboemboli left greater than right with no evidence of right heart strain      2.  Right lower lobe pulmonary infarction favored over aspiration      3.  Small right effusion with multi segmental atelectasis      4.  Cholelithiasis      5.  5 mm noncalcified right lower lobe nodule      Low Risk: No routine follow-up      High Risk: Optional CT at 12 months      Comments: Nodules less than 6 mm do not require routine follow-up, but certain patients at high risk with suspicious nodule morphology, upper lobe location, or both may warrant 12-month follow-up.      Low Risk - Minimal or absent history of smoking and of other known risk factors.      High Risk - History of smoking or of other known risk factors.      Note: These recommendations do not apply to lung cancer screening, patients with immunosuppression, or patients with known primary cancer.      Fleischner Society 2017 Guidelines for Management of Incidentally Detected Pulmonary Nodules in Adults      Findings were discussed with ALVIN MANTILLA on 7/5/2018 1:35 PM.      DX-CHEST-PORTABLE (1 VIEW)   Final Result      New right midlung zone platelike atelectasis is favored over fluid in the fissure      LE VENOUS DUPLEX    (Results Pending)        Assessment/Plan  Pulmonary infarct (HCC)   Assessment & Plan    No evidence of hemoptysis or decompensation.  A shoulder requiring 1-2 L of oxygen especially during ambulation  Home oxygen evaluation done patient to receive oxygen at home        Bilateral pulmonary embolism (HCC)   Assessment & Plan    Positive CTA PE  Echocardiogram done no  evidence of right heart strain  We will transition weight-based Lovenox to [employer] quest  Lower extremity duplex pending       Dyslipidemia (HCC)  Continue Crestor     Hypothyroidism  (HCC)  Continue Synthroid     Benign essential hypertension  (HCC)  Continue metoprolol      Patient plan of care discussed at multidisplinary team rounds and with patient and R.N at [facility].

## 2018-07-07 NOTE — PROGRESS NOTES
Received report from Elizabeth MARIE. Discussed plan of care and safety measures in place. No further needs at this time.

## 2018-07-07 NOTE — DISCHARGE SUMMARY
Discharge Summary    CHIEF COMPLAINT ON ADMISSION  Chief Complaint   Patient presents with   • Shortness of Breath     pt s/p total right knee 6/26/18. pt denies hx DVT/PE.   • Chest Pain       Reason for Admission  Chest Pains     Admission Date  7/5/2018    CODE STATUS  Full Code     HPI & HOSPITAL COURSE  Mrs. Hopkins is up for a pleasant 67-year-old female admitted on 7/5/2018 with chest discomfort and shortness of breath. She apparently had a right knee revision on 6/26 and then several days afterwards started experiencing exertional shortness of breath and chest discomfort on deep inspiration. In the ER patient underwent a CTA PE study which revealed a Numerous pulmonary thromboemboli left greater than right with no evidence of right heart strain, Right lower lobe pulmonary infarction favored over aspiration, prompting us to initiate anticoagulation weight-based. We also performed an echocardiogram which showed a preserved LV EF of 65% and Estimated right ventricular systolic pressure  is 35 mmHg. We also performed a LE duplex which showed a small distal popliteal DVT, final study not read yet. At this point patient has been clinically stable. She is requiring minimal amounts of oxygen nevertheless to maintain above 90% on ambulation hence oxygen was ordered. We have also prescribed the patient with Eliquis, which the decision was made together. I have recommended her to follow with her primary care physician within one week. Patient denies fevers/chills, chest pain, shortness of breath or nausea/vommiting.         Therefore, she is discharged in good and stable condition to home with close outpatient follow-up.    The patient recovered much more quickly than anticipated on admission.    Discharge Date  7/7/2018    FOLLOW UP ITEMS POST DISCHARGE  PCP in 1 week    DISCHARGE DIAGNOSES  Active Problems:    Bilateral pulmonary embolism (HCC) POA: Unknown    Pulmonary infarct (HCC) POA: Unknown  Resolved Problems:     * No resolved hospital problems. *      FOLLOW UP  Future Appointments  Date Time Provider Department Center   7/20/2018 3:00 PM Charlee Walker M.D. [facility]   7/27/2018 10:00 AM Charlee Walker M.D. [facility]     No follow-up provider specified.    MEDICATIONS ON DISCHARGE     Medication List      START taking these medications      Instructions   * apixaban 5mg Tabs  Commonly known as:  ELIQUIS   Take 1 Tab by mouth 2 Times a Day.  Dose:  5 mg     * apixaban 5mg Tabs  Commonly known as:  ELIQUIS   Take 2 Tabs by mouth 2 Times a Day for 13 doses.  Dose:  10 mg        * This list has 2 medication(s) that are the same as other medications prescribed for you. Read the directions carefully, and ask your doctor or other care provider to review them with you.            CONTINUE taking these medications      Instructions   Cholecalciferol 4000 units Caps  Commonly known as:  HM VITAMIN D3   Take 1 Cap by mouth every day.  Dose:  1 Cap     levothyroxine 75 MCG Tabs  Commonly known as:  SYNTHROID   Take 1 Tab by mouth Every morning on an empty stomach. Synthroid brand medically necessary.  Dose:  75 mcg     metoprolol SR 50 MG Tb24  Commonly known as:  TOPROL XL   Take 50 mg by mouth every morning.  Dose:  50 mg     oxyCODONE-acetaminophen  MG Tabs  Commonly known as:  PERCOCET-10   Take 1 Tab by mouth every four hours as needed for Severe Pain.  Dose:  1 Tab     paroxetine 40 MG tablet  Commonly known as:  PAXIL   TAKE 1 TABLET BY MOUTH  EVERY DAY     rosuvastatin 20 MG Tabs  Commonly known as:  CRESTOR   TAKE 1 TABLET BY MOUTH  EVERY EVENING     tramadol 50 MG Tabs  Commonly known as:  ULTRAM   TAKE 1 TABLET BY MOUTH EVERY 4 TO 6 HOURS AS NEEDED FOR PAIN FOR 30 DAYS     vitamin D (Ergocalciferol) 80064 units Caps capsule  Commonly known as:  DRISDOL   Take 1 Cap by mouth every 7 days.  Dose:  89602 Units        STOP taking these medications    amoxicillin-clavulanate 875-125 MG Tabs  Commonly known as:   AUGMENTIN            Allergies  Allergies   Allergen Reactions   • Morphine Vomiting   • Nsaids Unspecified     History of gastric ulcers - cannot take NSAIDS   • Pcn [Penicillins] Hives       DIET  Orders Placed This Encounter   Procedures   • Diet Order Regular     Standing Status:   Standing     Number of Occurrences:   1     Order Specific Question:   Diet:     Answer:   Regular [1]       ACTIVITY  As tolerated.  Weight bearing as tolerated    CONSULTATIONS  None    PROCEDURES  None    LABORATORY  Lab Results   Component Value Date    SODIUM 136 07/05/2018    POTASSIUM 3.6 07/05/2018    CHLORIDE 105 07/05/2018    CO2 24 07/05/2018    GLUCOSE 112 (H) 07/05/2018    BUN 15 07/05/2018    CREATININE 0.68 07/05/2018        Lab Results   Component Value Date    WBC 9.2 07/05/2018    HEMOGLOBIN 11.6 (L) 07/05/2018    HEMATOCRIT 35.9 (L) 07/05/2018    PLATELETCT 338 07/05/2018        Total time of the discharge process exceeds 38 minutes.

## 2018-07-07 NOTE — PROGRESS NOTES
Patient wants to stay the night. MD to bedside to speak with pt. Pt to d/c in the morning.  Bedside report given to Yessica MARIE. POC discussed. Pt resting comfortably in bed. Safety precautions in place.

## 2018-07-07 NOTE — PROGRESS NOTES
All charting, medication administration, notes, and assessments reviewed by JAIMEE Baca RN Preceptor

## 2018-07-07 NOTE — PROGRESS NOTES
Events noted.  I saw her in Dr. Lomeli's absence.  Her knee looks great, no hemarthrosis or drainage on the Xarelto.  Asked her to ice frequently and keep the compressive ACE wrap on it to help minimize bleeding.  She has an appointment with Dr. Lomeli on Tuesday and is expecting to discharge home today.  Otherwise, activity as tolerated.

## 2018-07-07 NOTE — PROGRESS NOTES
Pt is A&Ox4, resting comfortably in bed. Assessment completed and pain level 7/10. Due medication have been given. Bed is in lowest postion, and side rails up x2, pt calls when needs assistance and call light within reach.

## 2018-07-07 NOTE — CARE PLAN
Problem: Knowledge Deficit  Goal: Knowledge of disease process/condition, treatment plan, diagnostic tests, and medications will improve  Outcome: PROGRESSING AS EXPECTED  Educated pt on new medication and all questions answered.     Problem: Discharge Barriers/Planning  Goal: Patient's continuum of care needs will be met  Outcome: PROGRESSING AS EXPECTED  Discussed discharge plans with patient. All questions were answered.

## 2018-07-07 NOTE — PROGRESS NOTES
Received bedside report from Garrett MARIE. Assumed care of pt who is up in the bathroom, RR even/unlabored. Fall protocol in place. CLIP. Will continue to monitor.

## 2018-07-07 NOTE — PROGRESS NOTES
Pt discharge, discharge instructions provided, verbalized understanding. CNA escorted patient out to family car. All belongings in bag. PIV removed tip intact.

## 2018-07-09 ENCOUNTER — PATIENT OUTREACH (OUTPATIENT)
Dept: HEALTH INFORMATION MANAGEMENT | Facility: OTHER | Age: 68
End: 2018-07-09

## 2018-07-10 ENCOUNTER — PATIENT MESSAGE (OUTPATIENT)
Dept: MEDICAL GROUP | Age: 68
End: 2018-07-10

## 2018-07-10 DIAGNOSIS — J96.01 ACUTE RESPIRATORY FAILURE WITH HYPOXIA (HCC): ICD-10-CM

## 2018-07-10 DIAGNOSIS — I26.99 BILATERAL PULMONARY EMBOLISM (HCC): ICD-10-CM

## 2018-07-16 ENCOUNTER — TELEPHONE (OUTPATIENT)
Dept: MEDICAL GROUP | Age: 68
End: 2018-07-16

## 2018-07-16 NOTE — TELEPHONE ENCOUNTER
sent patient bublhart message to reschedule AWV on 7/27/18. Patient telephone is out of service.

## 2018-07-18 ENCOUNTER — TELEPHONE (OUTPATIENT)
Dept: MEDICAL GROUP | Age: 68
End: 2018-07-18

## 2018-07-18 NOTE — TELEPHONE ENCOUNTER
Future Appointments       Provider Department Center    7/20/2018 3:00 PM Charlee Walker M.D. [address]    7/27/2018 10:00 AM [facility] [address]        ESTABLISHED PATIENT PRE-VISIT PLANNING     Note: Patient will not be contacted if there is no indication to call.     1.  Reviewed notes from the last few office visits within the medical group: Yes    2.  If any orders were placed at last visit or intended to be done for this visit (i.e. 6 mos follow-up), do we have Results/Consult Notes?        •  Labs - Labs ordered, completed on 7/5/18 and results are in chart.   Note: If patient appointment is for lab review and patient did not complete labs, check with provider if OK to reschedule patient until labs completed.       •  Imaging - Imaging ordered, completed and results are in chart.       •  Referrals - Referral ordered, patient has NOT been seen.    3. Is this appointment scheduled as a Hospital Follow-Up? Yes, visit was at [facility].     4.  Immunizations were updated in Epic using WebIZ?: Epic matches WebIZ       •  Web Iz Recommendations: TDAP    5.  Patient is due for the following Health Maintenance Topics:   Health Maintenance Due   Topic Date Due   • IMM DTaP/Tdap/Td Vaccine (1 - Tdap) 02/24/2011       - Patient is up-to-date on all Health Maintenance topics. No records have been requested at this time.    6.  MDX printed for Provider? NO    7.  Patient was NOT informed to arrive 15 min prior to their scheduled appointment and bring in their medication bottles.

## 2018-07-20 ENCOUNTER — OFFICE VISIT (OUTPATIENT)
Dept: MEDICAL GROUP | Age: 68
End: 2018-07-20
Payer: MEDICARE

## 2018-07-20 VITALS
HEIGHT: 67 IN | OXYGEN SATURATION: 94 % | RESPIRATION RATE: 12 BRPM | BODY MASS INDEX: 27.69 KG/M2 | TEMPERATURE: 97.2 F | DIASTOLIC BLOOD PRESSURE: 76 MMHG | SYSTOLIC BLOOD PRESSURE: 122 MMHG | WEIGHT: 176.4 LBS | HEART RATE: 65 BPM

## 2018-07-20 DIAGNOSIS — Z09 HOSPITAL DISCHARGE FOLLOW-UP: Primary | ICD-10-CM

## 2018-07-20 DIAGNOSIS — I26.99 BILATERAL PULMONARY EMBOLISM (HCC): ICD-10-CM

## 2018-07-20 DIAGNOSIS — Z98.890 H/O RIGHT KNEE SURGERY: ICD-10-CM

## 2018-07-20 DIAGNOSIS — I26.99 PULMONARY INFARCT (HCC): ICD-10-CM

## 2018-07-20 DIAGNOSIS — J96.01 ACUTE RESPIRATORY FAILURE WITH HYPOXIA (HCC): ICD-10-CM

## 2018-07-20 PROCEDURE — 99214 OFFICE O/P EST MOD 30 MIN: CPT | Performed by: FAMILY MEDICINE

## 2018-07-20 RX ORDER — OXYCODONE HYDROCHLORIDE 5 MG/1
TABLET ORAL
Refills: 0 | COMMUNITY
Start: 2018-07-18 | End: 2018-11-13

## 2018-07-20 RX ORDER — AMOXICILLIN 875 MG/1
TABLET, COATED ORAL 2 TIMES DAILY
Refills: 0 | COMMUNITY
Start: 2018-06-20 | End: 2018-07-20

## 2018-07-23 NOTE — PROGRESS NOTES
Subjective:   CC: hospital discharge follow up    HPI:     Margoth Hopkins is a 67 y.o. female, established patient of the clinic, presents with the following concerns:     Pt has hx of right knee revision surgery on 6/26/2018. She developed acute SOB and chest discomfort approximately 7 days after the surgery and presented to ED on 7/5/2018. She was found to have numerous bilateral PE (left worse than right) w/o evidence of right heart strain. There was possible RLL pulmonary infarct (or aspiration). Echo showed EF of 65%, R ventricular pressure was estimated to be 35 mmHg. Venous doppler noted for right soleal thrombus in the distal right calf. Pt was treated with Eliquis and discharged home with oxygen supplement for acute respiratory failure. Today, pt reports doing well. The knee pain is improving. She has follow-up appointment with orthopedic surgeon in a few weeks. She anticipate to start PT soon. She states that SOB is much improved. She no longer need to use supplemental oxygen. She states that her oxygen saturation remains above 94% both at rest and exertion. She is planning to return oxygen tank to supplier.     She denies fever, chills, SoB, Johnson, CP, unusual edema, active bleeding, hematochezia, melena.   The wounds on her knee are healing well.     Current medicines (including changes today)  Current Outpatient Prescriptions   Medication Sig Dispense Refill   • oxyCODONE immediate-release (ROXICODONE) 5 MG Tab TAKE 1 TO 2 TABLETS BY MOUTH EVERY 4 TO 6 HOURS AS NEEDED FOR PAIN FOR 14 DAYS  0   • apixaban (ELIQUIS) 5mg Tab Take 1 Tab by mouth 2 Times a Day. 60 Tab 3   • metoprolol SR (TOPROL XL) 50 MG TABLET SR 24 HR Take 50 mg by mouth every morning.     • tramadol (ULTRAM) 50 MG Tab TAKE 1 TABLET BY MOUTH EVERY 4 TO 6 HOURS AS NEEDED FOR PAIN FOR 30 DAYS  1   • paroxetine (PAXIL) 40 MG tablet TAKE 1 TABLET BY MOUTH  EVERY DAY 90 Tab 1   • rosuvastatin (CRESTOR) 20 MG Tab TAKE 1 TABLET BY MOUTH  EVERY  "EVENING 90 Tab 1   • vitamin D, Ergocalciferol, (DRISDOL) 25359 units Cap capsule Take 1 Cap by mouth every 7 days. 12 Cap 3   • levothyroxine (SYNTHROID) 75 MCG Tab Take 1 Tab by mouth Every morning on an empty stomach. Synthroid brand medically necessary. 90 Tab 3   • Cholecalciferol (HM VITAMIN D3) 4000 units Cap Take 1 Cap by mouth every day. 90 Cap 3     No current facility-administered medications for this visit.      She  has a past medical history of Goiter and Thyroid disease.    I personally reviewed patient's problem list, allergies, medications, family hx, social hx with patient and update EPIC.     REVIEW OF SYSTEMS:  CONSTITUTIONAL:  Denies night sweats, fatigue, malaise, lethargy, fever or chills.  RESPIRATORY:  Denies cough, wheeze, hemoptysis, or shortness of breath.  CARDIOVASCULAR:  Denies chest pains, palpitations, pedal edema     Objective:     Blood pressure 122/76, pulse 65, temperature 36.2 °C (97.2 °F), resp. rate 12, height 1.702 m (5' 7\"), weight 80 kg (176 lb 6.4 oz), SpO2 94 %, not currently breastfeeding. Body mass index is 27.63 kg/m².    Physical Exam:  Constitutional: awake, alert, in no distress.  Skin: Warm, dry, good turgor, no rashes, bruises, ulcers in visible areas.  - surgical wounds on right knee is healing as expected. Trace edema and erythema noted around surgical wounds.   Neck: Trachea midline, no masses, no thyromegaly. No cervical or supraclavicular lymphadenopathy  Respiratory: Unlabored respiratory effort, lungs clear to auscultation, no wheezes, no rales.  Cardiovascular: Normal S1, S2, no murmur, no pedal edema.  Abdomen: Soft, non-tender to palpation, active BS, no hernia, no hepatosplenomegaly, negative rebound or guarding.   Psych: Oriented x3, affect and mood wnl, intact judgement and insight.     Assessment and Plan:   The following treatment plan was discussed    1. Hospital discharge follow-up  2. Pulmonary infarct (HCC)  3. Bilateral pulmonary embolism " (HCC)  - CT-CTA CHEST PULMONARY ARTERY W/ RECONS; in 3 months  - CBC WITH DIFFERENTIAL; Future  - COMP METABOLIC PANEL; Future  - continue Eliquis for 3 months    4. Acute respiratory failure with hypoxia (HCC)  Acute respiratory failure secondary to acute provoked VTE. Pt needs oxygen supplement for several days after discharge. However, oxygen problems have improved. She is able to maintain oxygen above 94% both at rest and exertion. Plans:   - discontinued oxygen supplement.     5. H/O right knee surgery  s/p right knee revision couple weeks ago, likely triggered acute VTE currently taking DOAC and tolerating it well, denies active bleeding, surgical wounds are healing well on exam. Pt is still taking opiates for pain but weaning down. Pt will have follow up with orthopedic surgeon in a few weeks. Plans:   - f/u with orthopedic surgeon as indicated.     Charlee Walker M.D.      Followup: Return in about 3 months (around 10/20/2018) for Multiple issues.    Please note that this dictation was created using voice recognition software. I have made every reasonable attempt to correct obvious errors, but I expect that there are errors of grammar and possibly content that I did not discover before finalizing the note.

## 2018-07-25 PROBLEM — Z79.891 CHRONIC USE OF OPIATE DRUGS THERAPEUTIC PURPOSES: Status: ACTIVE | Noted: 2018-07-25

## 2018-07-25 PROBLEM — J96.01 ACUTE RESPIRATORY FAILURE WITH HYPOXIA (HCC): Status: RESOLVED | Noted: 2018-07-20 | Resolved: 2018-07-25

## 2018-07-26 ENCOUNTER — PATIENT MESSAGE (OUTPATIENT)
Dept: MEDICAL GROUP | Age: 68
End: 2018-07-26

## 2018-07-26 NOTE — LETTER
July 27, 2018        RE: Margoth Hopkins    To whom it may concern,     Margoth was seen by me on 7/20/2018. It is safe for her to discontinue supplemental oxygen.     Let me know if you have any questions.     Regards,                   Charlee Walker

## 2018-07-27 ENCOUNTER — APPOINTMENT (OUTPATIENT)
Dept: MEDICAL GROUP | Age: 68
End: 2018-07-27
Payer: COMMERCIAL

## 2018-08-07 ENCOUNTER — PATIENT OUTREACH (OUTPATIENT)
Dept: HEALTH INFORMATION MANAGEMENT | Facility: OTHER | Age: 68
End: 2018-08-07

## 2018-08-13 NOTE — PROGRESS NOTES
Margoth Hopkins was admitted on 7/5/18 for pulmonary embolism, once treated she  was discharged home on 7/7/18. [facility] Patient Advocate confirmed discharge needs including 1 appointment with her Primary Care Physician. Of the 1 appointment the patient kept 1. Patient also has 1 scheduled appointment with, primary care physician on 11/2.

## 2018-09-11 RX ORDER — METOPROLOL SUCCINATE 50 MG/1
TABLET, EXTENDED RELEASE ORAL
Qty: 90 TAB | Refills: 1 | Status: SHIPPED | OUTPATIENT
Start: 2018-09-11 | End: 2018-12-11 | Stop reason: SDUPTHER

## 2018-09-11 RX ORDER — HYDROCHLOROTHIAZIDE 12.5 MG/1
12.5 TABLET ORAL DAILY
Qty: 90 TAB | Refills: 1 | Status: SHIPPED | OUTPATIENT
Start: 2018-09-11 | End: 2018-11-13

## 2018-09-11 NOTE — TELEPHONE ENCOUNTER
Phone Number Called: [phone] (home)       Message: pt state she's taking the medication not the propranolol pt state didn't make her feel good    Left Message for patient to call back: N\A

## 2018-09-17 ENCOUNTER — HOSPITAL ENCOUNTER (OUTPATIENT)
Dept: RADIOLOGY | Facility: MEDICAL CENTER | Age: 68
End: 2018-09-17

## 2018-10-10 ENCOUNTER — HOSPITAL ENCOUNTER (OUTPATIENT)
Dept: LAB | Facility: MEDICAL CENTER | Age: 68
End: 2018-10-10
Attending: INTERNAL MEDICINE
Payer: MEDICARE

## 2018-10-10 ENCOUNTER — HOSPITAL ENCOUNTER (OUTPATIENT)
Dept: LAB | Facility: MEDICAL CENTER | Age: 68
End: 2018-10-10
Attending: FAMILY MEDICINE
Payer: MEDICARE

## 2018-10-10 DIAGNOSIS — E06.3 HYPOTHYROIDISM DUE TO HASHIMOTO'S THYROIDITIS: ICD-10-CM

## 2018-10-10 DIAGNOSIS — E03.8 HYPOTHYROIDISM DUE TO HASHIMOTO'S THYROIDITIS: ICD-10-CM

## 2018-10-10 DIAGNOSIS — E55.9 VITAMIN D INSUFFICIENCY: ICD-10-CM

## 2018-10-10 DIAGNOSIS — I26.99 BILATERAL PULMONARY EMBOLISM (HCC): ICD-10-CM

## 2018-10-10 LAB
25(OH)D3 SERPL-MCNC: 59 NG/ML (ref 30–100)
ALBUMIN SERPL BCP-MCNC: 4.2 G/DL (ref 3.2–4.9)
ALBUMIN/GLOB SERPL: 1.6 G/DL
ALP SERPL-CCNC: 84 U/L (ref 30–99)
ALT SERPL-CCNC: 37 U/L (ref 2–50)
ANION GAP SERPL CALC-SCNC: 9 MMOL/L (ref 0–11.9)
AST SERPL-CCNC: 33 U/L (ref 12–45)
BASOPHILS # BLD AUTO: 0.8 % (ref 0–1.8)
BASOPHILS # BLD: 0.06 K/UL (ref 0–0.12)
BILIRUB SERPL-MCNC: 0.9 MG/DL (ref 0.1–1.5)
BUN SERPL-MCNC: 18 MG/DL (ref 8–22)
CALCIUM SERPL-MCNC: 10 MG/DL (ref 8.5–10.5)
CHLORIDE SERPL-SCNC: 103 MMOL/L (ref 96–112)
CO2 SERPL-SCNC: 26 MMOL/L (ref 20–33)
CREAT SERPL-MCNC: 0.63 MG/DL (ref 0.5–1.4)
EOSINOPHIL # BLD AUTO: 0.29 K/UL (ref 0–0.51)
EOSINOPHIL NFR BLD: 3.9 % (ref 0–6.9)
ERYTHROCYTE [DISTWIDTH] IN BLOOD BY AUTOMATED COUNT: 47.1 FL (ref 35.9–50)
GLOBULIN SER CALC-MCNC: 2.6 G/DL (ref 1.9–3.5)
GLUCOSE SERPL-MCNC: 89 MG/DL (ref 65–99)
HCT VFR BLD AUTO: 41.6 % (ref 37–47)
HGB BLD-MCNC: 13.7 G/DL (ref 12–16)
IMM GRANULOCYTES # BLD AUTO: 0.02 K/UL (ref 0–0.11)
IMM GRANULOCYTES NFR BLD AUTO: 0.3 % (ref 0–0.9)
LYMPHOCYTES # BLD AUTO: 2.64 K/UL (ref 1–4.8)
LYMPHOCYTES NFR BLD: 35.3 % (ref 22–41)
MCH RBC QN AUTO: 27.5 PG (ref 27–33)
MCHC RBC AUTO-ENTMCNC: 32.9 G/DL (ref 33.6–35)
MCV RBC AUTO: 83.4 FL (ref 81.4–97.8)
MONOCYTES # BLD AUTO: 0.65 K/UL (ref 0–0.85)
MONOCYTES NFR BLD AUTO: 8.7 % (ref 0–13.4)
NEUTROPHILS # BLD AUTO: 3.82 K/UL (ref 2–7.15)
NEUTROPHILS NFR BLD: 51 % (ref 44–72)
NRBC # BLD AUTO: 0 K/UL
NRBC BLD-RTO: 0 /100 WBC
PLATELET # BLD AUTO: 280 K/UL (ref 164–446)
PMV BLD AUTO: 10.9 FL (ref 9–12.9)
POTASSIUM SERPL-SCNC: 3.7 MMOL/L (ref 3.6–5.5)
PROT SERPL-MCNC: 6.8 G/DL (ref 6–8.2)
RBC # BLD AUTO: 4.99 M/UL (ref 4.2–5.4)
SODIUM SERPL-SCNC: 138 MMOL/L (ref 135–145)
T3 SERPL-MCNC: 107 NG/DL (ref 60–181)
T4 FREE SERPL-MCNC: 0.96 NG/DL (ref 0.53–1.43)
TSH SERPL DL<=0.005 MIU/L-ACNC: 0.67 UIU/ML (ref 0.38–5.33)
TSH SERPL DL<=0.005 MIU/L-ACNC: 0.69 UIU/ML (ref 0.38–5.33)
WBC # BLD AUTO: 7.5 K/UL (ref 4.8–10.8)

## 2018-10-10 PROCEDURE — 84443 ASSAY THYROID STIM HORMONE: CPT | Mod: 91

## 2018-10-10 PROCEDURE — 84439 ASSAY OF FREE THYROXINE: CPT

## 2018-10-10 PROCEDURE — 84480 ASSAY TRIIODOTHYRONINE (T3): CPT

## 2018-10-10 PROCEDURE — 82306 VITAMIN D 25 HYDROXY: CPT

## 2018-10-10 PROCEDURE — 85025 COMPLETE CBC W/AUTO DIFF WBC: CPT

## 2018-10-10 PROCEDURE — 80053 COMPREHEN METABOLIC PANEL: CPT

## 2018-10-10 PROCEDURE — 84443 ASSAY THYROID STIM HORMONE: CPT

## 2018-10-10 PROCEDURE — 36415 COLL VENOUS BLD VENIPUNCTURE: CPT

## 2018-10-12 ENCOUNTER — HOSPITAL ENCOUNTER (OUTPATIENT)
Dept: RADIOLOGY | Facility: MEDICAL CENTER | Age: 68
End: 2018-10-12
Attending: FAMILY MEDICINE
Payer: MEDICARE

## 2018-10-12 DIAGNOSIS — I26.99 PULMONARY INFARCT (HCC): ICD-10-CM

## 2018-10-12 DIAGNOSIS — I26.99 BILATERAL PULMONARY EMBOLISM (HCC): ICD-10-CM

## 2018-10-12 PROCEDURE — 71275 CT ANGIOGRAPHY CHEST: CPT

## 2018-10-12 PROCEDURE — 700117 HCHG RX CONTRAST REV CODE 255: Performed by: FAMILY MEDICINE

## 2018-10-12 RX ADMIN — IOHEXOL 100 ML: 350 INJECTION, SOLUTION INTRAVENOUS at 10:03

## 2018-10-14 PROBLEM — K80.21 CALCULUS OF GALLBLADDER WITH BILIARY OBSTRUCTION BUT WITHOUT CHOLECYSTITIS: Status: ACTIVE | Noted: 2018-10-14

## 2018-10-15 DIAGNOSIS — F41.1 GAD (GENERALIZED ANXIETY DISORDER): ICD-10-CM

## 2018-10-16 ENCOUNTER — APPOINTMENT (OUTPATIENT)
Dept: RADIOLOGY | Facility: MEDICAL CENTER | Age: 68
End: 2018-10-16
Attending: ORTHOPAEDIC SURGERY
Payer: MEDICARE

## 2018-10-16 DIAGNOSIS — M54.5 LOW BACK PAIN, UNSPECIFIED BACK PAIN LATERALITY, UNSPECIFIED CHRONICITY, WITH SCIATICA PRESENCE UNSPECIFIED: ICD-10-CM

## 2018-10-16 PROCEDURE — 72148 MRI LUMBAR SPINE W/O DYE: CPT

## 2018-10-17 RX ORDER — PAROXETINE HYDROCHLORIDE 40 MG/1
TABLET, FILM COATED ORAL
Qty: 90 TAB | Refills: 1 | Status: SHIPPED | OUTPATIENT
Start: 2018-10-17 | End: 2018-11-13

## 2018-11-09 ENCOUNTER — APPOINTMENT (OUTPATIENT)
Dept: MEDICAL GROUP | Age: 68
End: 2018-11-09
Payer: COMMERCIAL

## 2018-11-13 ENCOUNTER — OFFICE VISIT (OUTPATIENT)
Dept: MEDICAL GROUP | Age: 68
End: 2018-11-13
Payer: MEDICARE

## 2018-11-13 VITALS
OXYGEN SATURATION: 97 % | HEIGHT: 67 IN | WEIGHT: 173.8 LBS | SYSTOLIC BLOOD PRESSURE: 122 MMHG | BODY MASS INDEX: 27.28 KG/M2 | HEART RATE: 62 BPM | TEMPERATURE: 97.9 F | DIASTOLIC BLOOD PRESSURE: 70 MMHG

## 2018-11-13 DIAGNOSIS — E78.00 PURE HYPERCHOLESTEROLEMIA: ICD-10-CM

## 2018-11-13 DIAGNOSIS — K80.21 CALCULUS OF GALLBLADDER WITH BILIARY OBSTRUCTION BUT WITHOUT CHOLECYSTITIS: ICD-10-CM

## 2018-11-13 DIAGNOSIS — G47.01 INSOMNIA DUE TO MEDICAL CONDITION: ICD-10-CM

## 2018-11-13 DIAGNOSIS — Z79.891 CHRONIC USE OF OPIATE DRUGS THERAPEUTIC PURPOSES: ICD-10-CM

## 2018-11-13 DIAGNOSIS — I26.99 PULMONARY INFARCT (HCC): ICD-10-CM

## 2018-11-13 DIAGNOSIS — M54.50 LUMBOSACRAL PAIN, CHRONIC: ICD-10-CM

## 2018-11-13 DIAGNOSIS — Z91.81 RISK FOR FALLS: ICD-10-CM

## 2018-11-13 DIAGNOSIS — Z23 NEED FOR PNEUMOCOCCAL VACCINATION: ICD-10-CM

## 2018-11-13 DIAGNOSIS — M17.11 PRIMARY OSTEOARTHRITIS OF RIGHT KNEE: ICD-10-CM

## 2018-11-13 DIAGNOSIS — Z23 NEED FOR INFLUENZA VACCINATION: ICD-10-CM

## 2018-11-13 DIAGNOSIS — G89.29 LUMBOSACRAL PAIN, CHRONIC: ICD-10-CM

## 2018-11-13 DIAGNOSIS — E06.3 HYPOTHYROIDISM DUE TO HASHIMOTO'S THYROIDITIS: ICD-10-CM

## 2018-11-13 DIAGNOSIS — G89.29 CHRONIC NONINTRACTABLE HEADACHE, UNSPECIFIED HEADACHE TYPE: ICD-10-CM

## 2018-11-13 DIAGNOSIS — E03.8 HYPOTHYROIDISM DUE TO HASHIMOTO'S THYROIDITIS: ICD-10-CM

## 2018-11-13 DIAGNOSIS — Z00.00 MEDICARE ANNUAL WELLNESS VISIT, SUBSEQUENT: ICD-10-CM

## 2018-11-13 DIAGNOSIS — F41.1 GAD (GENERALIZED ANXIETY DISORDER): ICD-10-CM

## 2018-11-13 DIAGNOSIS — Z98.890 H/O RIGHT KNEE SURGERY: ICD-10-CM

## 2018-11-13 DIAGNOSIS — I10 BENIGN ESSENTIAL HTN: ICD-10-CM

## 2018-11-13 DIAGNOSIS — I26.99 BILATERAL PULMONARY EMBOLISM (HCC): ICD-10-CM

## 2018-11-13 DIAGNOSIS — L80 VITILIGO: ICD-10-CM

## 2018-11-13 DIAGNOSIS — R51.9 CHRONIC NONINTRACTABLE HEADACHE, UNSPECIFIED HEADACHE TYPE: ICD-10-CM

## 2018-11-13 DIAGNOSIS — E04.2 MULTINODULAR GOITER (NONTOXIC): ICD-10-CM

## 2018-11-13 DIAGNOSIS — K22.2 SCHATZKI'S RING: ICD-10-CM

## 2018-11-13 PROBLEM — E55.9 VITAMIN D INSUFFICIENCY: Status: RESOLVED | Noted: 2017-02-23 | Resolved: 2018-11-13

## 2018-11-13 PROCEDURE — G0008 ADMIN INFLUENZA VIRUS VAC: HCPCS | Performed by: FAMILY MEDICINE

## 2018-11-13 PROCEDURE — G0009 ADMIN PNEUMOCOCCAL VACCINE: HCPCS | Performed by: FAMILY MEDICINE

## 2018-11-13 PROCEDURE — G0439 PPPS, SUBSEQ VISIT: HCPCS | Mod: 25 | Performed by: FAMILY MEDICINE

## 2018-11-13 PROCEDURE — 99213 OFFICE O/P EST LOW 20 MIN: CPT | Mod: 25 | Performed by: FAMILY MEDICINE

## 2018-11-13 PROCEDURE — 90732 PPSV23 VACC 2 YRS+ SUBQ/IM: CPT | Performed by: FAMILY MEDICINE

## 2018-11-13 PROCEDURE — 90662 IIV NO PRSV INCREASED AG IM: CPT | Performed by: FAMILY MEDICINE

## 2018-11-13 RX ORDER — ZOLPIDEM TARTRATE 5 MG/1
5 TABLET ORAL NIGHTLY PRN
Qty: 30 TAB | Refills: 0 | Status: SHIPPED | OUTPATIENT
Start: 2018-11-13 | End: 2018-11-16 | Stop reason: SDUPTHER

## 2018-11-13 RX ORDER — DIAZEPAM 5 MG/1
TABLET ORAL
Refills: 0 | COMMUNITY
Start: 2018-09-25 | End: 2018-11-13

## 2018-11-13 RX ORDER — DULOXETIN HYDROCHLORIDE 20 MG/1
20 CAPSULE, DELAYED RELEASE ORAL DAILY
Qty: 90 CAP | Refills: 1 | Status: SHIPPED | OUTPATIENT
Start: 2018-11-13 | End: 2018-12-13 | Stop reason: SDUPTHER

## 2018-11-13 RX ORDER — HYDROCODONE BITARTRATE AND ACETAMINOPHEN 5; 325 MG/1; MG/1
1-2 TABLET ORAL
Qty: 30 TAB | Refills: 0 | Status: SHIPPED | OUTPATIENT
Start: 2018-11-13 | End: 2018-12-13

## 2018-11-13 RX ORDER — CLOPIDOGREL BISULFATE 75 MG/1
75 TABLET ORAL DAILY
Qty: 90 TAB | Refills: 1 | Status: SHIPPED | OUTPATIENT
Start: 2018-11-13 | End: 2019-05-14

## 2018-11-13 RX ORDER — CLINDAMYCIN HYDROCHLORIDE 150 MG/1
CAPSULE ORAL
Refills: 0 | COMMUNITY
Start: 2018-09-20 | End: 2018-11-13

## 2018-11-13 RX ORDER — CHLORTHALIDONE 25 MG/1
TABLET ORAL
COMMUNITY
Start: 2018-09-08 | End: 2019-02-21 | Stop reason: SDUPTHER

## 2018-11-13 ASSESSMENT — ENCOUNTER SYMPTOMS: GENERAL WELL-BEING: GOOD

## 2018-11-13 ASSESSMENT — ACTIVITIES OF DAILY LIVING (ADL): BATHING_REQUIRES_ASSISTANCE: 0

## 2018-11-13 ASSESSMENT — PATIENT HEALTH QUESTIONNAIRE - PHQ9: CLINICAL INTERPRETATION OF PHQ2 SCORE: 0

## 2018-11-14 NOTE — PROGRESS NOTES
Chief Complaint   Patient presents with   • Annual Exam   • Medication Management     refill: apixaban (ELIQUIS) 5mg Tab     HPI:  Margoth Hopkins is a 67 y.o. here for Medicare Annual Wellness Visit     The patient had history of right knee revision surgery on 6/26/2018 and was found to have bilateral PE. She was started on Eliquis in July 2018 and had a repeat CT-scan that showed improvement of PE. She states she continues to have right knee pain, but is not currently any OTC medications for pain relief. She cannot take Aspirin or NSAIDs due to stomach ulcers. She previously taking Percocet and Tramaold prescribed by [facility] for back pain, which is now reduced to Tramadol only. She rarely takes Tramadol as it is not effective.  She has tried using cannabis ointments on the right knee with some relief. She notices the pain can worsen when she is resting, so she would like some pain medications when the pain is at its worse. She has not had any falls related to the knee. She plans to travel to [place] in the next few months.     Due to the pain, her anxiety has also worsened. She is currently on Paxil, but has minimal relief from it. She has difficulty sleeping at night and has no relief with OTC medications. She has tried Ambien previously with success.     She is working with pain management at [facility] for chronic back pain and receives trigger point injections.     She has history of Schatzki's Ring and last had it dilated in December 2017. However, she continues to have dysphagia. She is planning to visit GI for consultation.     She takes Linzess as needed for constipation with inconsistent results. She is planning to follow up with GI.       Patient Active Problem List    Diagnosis Date Noted   • Calculus of gallbladder with biliary obstruction but without cholecystitis 10/14/2018   • H/O right knee surgery 07/20/2018   • Pulmonary infarct (HCC) 07/05/2018   • Chronic headache 03/20/2018   • Risk for  falls 07/24/2017   • Vitiligo 06/26/2017   • FHx: colon cancer 02/23/2017   • Schatzki's ring 02/23/2017   • Benign essential HTN 02/23/2017   • Pure hypercholesterolemia 02/23/2017   • MOHAMUD (generalized anxiety disorder) 02/23/2017   • Hypothyroidism due to Hashimoto's thyroiditis 02/23/2017   • Multinodular goiter (nontoxic), s/p FNA in 12/2018, benign folicullar nodules 02/23/2017   • HLA B27 (HLA B27 positive) 02/23/2017   • Primary osteoarthritis of right knee, S/P TKR + 2 revision surgeries 02/23/2017   • Lumbosacral pain, chronic, with right sciatica_[facility] 02/23/2017   • Insomnia 02/23/2017       Current Outpatient Prescriptions   Medication Sig Dispense Refill   • chlorthalidone (HYGROTON) 25 MG Tab      • HYDROcodone-acetaminophen (NORCO) 5-325 MG Tab per tablet Take 1-2 Tabs by mouth 1 time daily as needed for up to 30 days. 30 Tab 0   • DULoxetine (CYMBALTA) 20 MG Cap DR Particles Take 1 Cap by mouth every day. 90 Cap 1   • clopidogrel (PLAVIX) 75 MG Tab Take 1 Tab by mouth every day. 90 Tab 1   • zolpidem (AMBIEN) 5 MG Tab Take 1 Tab by mouth at bedtime as needed for Sleep for up to 30 days. 30 Tab 0   • metoprolol SR (TOPROL XL) 50 MG TABLET SR 24 HR TAKE 1 TABLET BY MOUTH  EVERY DAY 90 Tab 1   • rosuvastatin (CRESTOR) 20 MG Tab TAKE 1 TABLET BY MOUTH  EVERY EVENING 90 Tab 1   • levothyroxine (SYNTHROID) 75 MCG Tab Take 1 Tab by mouth Every morning on an empty stomach. Synthroid brand medically necessary. 90 Tab 3   • Cholecalciferol (HM VITAMIN D3) 4000 units Cap Take 1 Cap by mouth every day. 90 Cap 3     No current facility-administered medications for this visit.             Current supplements as per medication list.       Allergies: Morphine; Nsaids; and Pcn [penicillins]    Current social contact/activities: , lives with her , has 1 son and 2 children, travels frequently to [place].      She  reports that she has never smoked. She has never used smokeless tobacco. She reports  that she drinks about 4.2 oz of alcohol per week . She reports that she does not use drugs.  Counseling given: No      DPA/Advanced Directive:  Patient does not have an Advanced Directive.  A packet and workshop information was given on Advanced Directives.    ROS:    Gait: Uses no assistive device  Ostomy: No  Other tubes: No  Amputations: No  Chronic oxygen use: No  Last eye exam: 2018  Wears hearing aids: No   : Denies any urinary leakage during the last 6 months    Screening:    Depression Screening    Little interest or pleasure in doing things?  0 - not at all  Feeling down, depressed , or hopeless? 0 - not at all  Patient Health Questionnaire Score: 0   No depressive symptoms identified     Screening for Cognitive Impairment    Three Minute Recall (leader, season, table) 3/3    Shawn clock face with all 12 numbers and set the hands to show 10 past 11.  Yes    No cognitive concerns identified     Fall Risk Assessment    Has the patient had two or more falls in the last year or any fall with injury in the last year?  Yes    Safety Assessment    Throw rugs on floor.  Yes  Handrails on all stairs.  No  Good lighting in all hallways.  Yes  Difficulty hearing.  Yes  Patient counseled about all safety risks that were identified.    Functional Assessment ADLs    Are there any barriers preventing you from cooking for yourself or meeting nutritional needs?  No.    Are there any barriers preventing you from driving safely or obtaining transportation?  No.    Are there any barriers preventing you from using a telephone or calling for help?  No.    Are there any barriers preventing you from shopping?  No.    Are there any barriers preventing you from taking care of your own finances?  No.    Are there any barriers preventing you from managing your medications?  No.    Are there any barriers preventing you from showering, bathing or dressing yourself?  No.    Are you currently engaging in any exercise or physical  activity?  Yes.  Patient sometimes  Gardening  walk  What is your perception of your health?  Good.      Health Maintenance Summary                IMM DTaP/Tdap/Td Vaccine Overdue 2/24/2011      Done 2/23/2011 Imm Admin: TD Vaccine    IMM ZOSTER VACCINES Overdue 4/20/2011      Done 2/23/2011 Imm Admin: Zoster Vaccine Live (ZVL) (Zostavax)     Patient has more history with this topic...    MAMMOGRAM Next Due 3/30/2019      Done 3/30/2018 MA-MAMMO SCREENING BILAT W/TOMOSYNTHESIS W/CAD    Annual Wellness Visit Next Due 11/14/2019      Done 11/13/2018 Visit Dx: Medicare annual wellness visit, subsequent     Patient has more history with this topic...    COLONOSCOPY Next Due 3/24/2021      Done 3/24/2016 REFERRAL TO GI FOR COLONOSCOPY    BONE DENSITY Next Due 3/30/2023      Done 3/30/2018 DS-BONE DENSITY STUDY (DEXA)          Patient Care Team:  Charlee Walker M.D. as PCP - General (Family Medicine)  Alonzo Montes De Oca M.D. as Consulting Physician (Orthopaedics)  Slick Foley M.D. as Consulting Physician (Orthopaedics)  Tay Garcia M.D. as Consulting Physician (Endocrinology)        Social History   Substance Use Topics   • Smoking status: Never Smoker   • Smokeless tobacco: Never Used   • Alcohol use 4.2 oz/week     7 Glasses of wine per week     Family History   Problem Relation Age of Onset   • Arthritis Mother    • Alzheimer's Disease Mother    • Cancer Mother         cervical cancer   • Heart Attack Father    • Other Brother         brain anurism   • Cancer Maternal Grandmother 50        colono cancer   • Heart Disease Brother      She  has a past medical history of Goiter and Thyroid disease.   Past Surgical History:   Procedure Laterality Date   • APPENDECTOMY     • BLADDER SLING FEMALE     • CATARACT EXTRACTION WITH IOL Left    • CERVICAL FUSION POSTERIOR     • FOOT SURGERY Left     metatarsal fusion, hammer toes correction   • FUSION      disc fusion   • KNEE REPLACEMENT, TOTAL Right        Exam:   Blood  "pressure 122/70, pulse 62, temperature 36.6 °C (97.9 °F), temperature source Temporal, height 1.702 m (5' 7\"), weight 78.8 kg (173 lb 12.8 oz), SpO2 97 %, not currently breastfeeding. Body mass index is 27.22 kg/m².    Hearing excellent.    Dentition good  Alert, oriented in no acute distress.  Eye contact is good, speech goal directed, affect calm    Assessment and Plan. The following treatment and monitoring plan is recommended:      1. Need for influenza vaccination  - INFLUENZA VACCINE, HIGH DOSE (65+ ONLY)    2. Insomnia due to medical condition  Chronic, due to poorly controlled knee and back pain, tried a number of medications but not working. She tried Ambien in the past and appears to respond well to it. She is interested in Rx for Ambien to be taken as needed. She denies hx of drugs, alcohol, tobacco abuse. Plans:   - zolpidem (AMBIEN) 5 MG Tab; Take 1 Tab by mouth at bedtime as needed for Sleep for up to 30 days.  Dispense: 30 Tab; Refill: 0  - side effects of Ambien discussed with patient.      3. Schatzki's ring  Chronic, s/p multiple dilation by GI, last dilation was in 2017. Pt c/o early sign of symptoms relapse such as intermittent choking. She is planning to schedule appt with her GI doctor    4. Benign essential HTN  Chronic, well controlled with Chlorthalidone, will continue.     5. Pure hypercholesterolemia  Chronic, well controlled with Crestor 20 mg qd, will continue.     6. MOHAMUD (generalized anxiety disorder)  Chronic, currently taking Paxil 40 mg qd. Symptoms are partially controlled. Will discontinue Paxil and switch pt to Cymbalta, hopefull also help pt cope with chronic pain. Plans:   - DULoxetine (CYMBALTA) 20 MG Cap DR Particles; Take 1 Cap by mouth every day.  Dispense: 90 Cap; Refill: 1  - regular exercise, well-balanced diet, multi-vitamin daily, weight loss, adequate sleep (8 hours per day), meditation, stress management.   - Avoid excessive consumption of stimulants, alcohol, illicit " drugs, tobacco  - f/u in 6 months.       7. Hypothyroidism due to Hashimoto's thyroiditis  Chronic, well controlled with Levothyroxine 75 mcg qd, normal TSH in 10/2018, will continue the current dose of Levothyroxine.     8. Primary osteoarthritis of right knee, S/P TKR + 2 revision surgeries  10. H/O right knee surgery  Chronic, s/p TKR surgeries and revision x4. Last revision was earlier this year. Pt states that there is minimal changes in chronic knee pain. She is taking Tramadol for chronic back pain as well. However, she state that she rarely take Tramadol as it does not work. She is dealing with pain everyday. She states that it is hard to maintain daily function due to pain. She request Rx for Norco to be taken when needed. She is not planning to take them chronically. Plans:   - HYDROcodone-acetaminophen (NORCO) 5-325 MG Tab per tablet; Take 1-2 Tabs by mouth 1 time daily as needed for up to 30 days.  Dispense: 30 Tab; Refill: 0  - start Cymbalta for both mental health and chornic pain.     11. Bilateral pulmonary embolism, provoked  12. Pulmonary infarct (HCC)  Pt develops bilateral PE after last knee surgery 3+ months ago. She is now takin Eliquis. Recent CTA shows resolution of PE. She is not able to take low dose aspirin due to hx fo stomach ulcers. Plans:   - clopidogrel (PLAVIX) 75 MG Tab; Take 1 Tab by mouth every day.  Dispense: 90 Tab; Refill: 1  - discontinued Eliquis    12. Vitiligo  Chronic, mild stable, will monitor.     13. Lumbosacral pain, chronic, with right sciatica_[facility]  Chronic, s/p steroid injections, responding inconsistently to injections. Pt previously taking Percocet and Tramadol prescribed by [facility]. However, percocet was later discontinued. Tramadol is not helping with her symptoms. Plans:   - f/u with [facility]  - Norco prescribed for knee pain as above    14. Chronic use of opiate drugs therapeutic purposes  - f/u with [facility]    15. Calculus of gallbladder with biliary obstruction  but without cholecystitis  Incidental findings of cholelithiasis per imaging, pt is asymptomatic, will monitor.     16. Chronic nonintractable headache, unspecified headache type  Chronic, well controlled with with Metoprolol for HA prevention, will monitor.     17. Risk for falls  - Patient identified as fall risk.  Appropriate orders and counseling given.    18. Multinodular goiter (nontoxic), s/p FNA in 12/2018, benign folicullar nodules  S/p biopsy, benign, will monitor.     19. Medicare annual wellness visit, subsequent  - Annual Wellness Visit - Includes PPPS Subsequent ()    20. Need for pneumococcal vaccination  - Pneumococal Polysaccharide Vaccine 23-Valent =>3YO SQ/IM     Services suggested: No services needed at this time  Health Care Screening: Age-appropriate preventive services recommended by USPTF and ACIP covered by Medicare were discussed today. Services ordered if indicated and agreed upon by the patient.  Referrals offered: Community-based lifestyle interventions to reduce health risks and promote self-management and wellness, fall prevention, nutrition, physical activity, tobacco-use cessation, weight loss, and mental health services as per orders if indicated.    Discussion today about general wellness and lifestyle habits:    · Prevent falls and reduce trip hazards; Cautioned about securing or removing rugs.  · Have a working fire alarm and carbon monoxide detector;   · Engage in regular physical activity and social activities     Follow-up: Return in about 6 months (around 5/13/2019) for Multiple issues.

## 2018-11-15 ENCOUNTER — TELEPHONE (OUTPATIENT)
Dept: MEDICAL GROUP | Age: 68
End: 2018-11-15

## 2018-11-15 DIAGNOSIS — G47.01 INSOMNIA DUE TO MEDICAL CONDITION: ICD-10-CM

## 2018-11-15 NOTE — TELEPHONE ENCOUNTER
MEDICATION PRIOR AUTHORIZATION NEEDED:    1. Name of Medication: Zolpidem     2. Requested By (Name of Pharmacy):   [facility]/pharmacy #[id number] - [place] - [address]  [address] [address]  Phone: [phone] Fax: [phone]           3. Is insurance on file current? yes    4. What is the name & phone number of the 3rd party payor? Key code [id number]    Would you like PAR started?

## 2018-11-16 RX ORDER — ZOLPIDEM TARTRATE 5 MG/1
5 TABLET ORAL NIGHTLY PRN
Qty: 30 TAB | Refills: 0 | Status: SHIPPED | OUTPATIENT
Start: 2018-11-16 | End: 2021-03-09 | Stop reason: SDUPTHER

## 2018-11-16 NOTE — TELEPHONE ENCOUNTER
Please start PA.  In the meantime, patient can stop by the office to  the coupon and prescription that would allow her to pay cash for Ambien, approximately $8 for 30 pills at [facility] if she interested.  Thank you

## 2018-11-16 NOTE — TELEPHONE ENCOUNTER
DOCUMENTATION OF PAR STATUS:    1. Name of Medication & Dose: Zolpidem      2. Name of Prescription Coverage Company & phone #:   [facility]/pharmacy #[id number] - [place] - [address]  [address] [place] [address]  Phone: [phone] Fax: [phone]            3. Date Prior Auth Submitted: 11/16/18    4. What information was given to obtain insurance decision? Chart note     5. Prior Auth Status? Pending    6. Patient Notified: yes patient informed to stop in to clinic to  coupon in meantime.

## 2018-11-16 NOTE — TELEPHONE ENCOUNTER
Phone Number Called: [phone] (home)       Message: informed patient. Patient can stop by to  Rx with discount coupon.    Also patient want all her med [facility]    Left Message for patient to call back: no

## 2018-12-11 DIAGNOSIS — E78.00 PURE HYPERCHOLESTEROLEMIA: ICD-10-CM

## 2018-12-13 DIAGNOSIS — F41.1 GAD (GENERALIZED ANXIETY DISORDER): ICD-10-CM

## 2018-12-13 DIAGNOSIS — M17.11 PRIMARY OSTEOARTHRITIS OF RIGHT KNEE: ICD-10-CM

## 2018-12-13 RX ORDER — ROSUVASTATIN CALCIUM 20 MG/1
TABLET, COATED ORAL
Qty: 90 TAB | Refills: 3 | Status: SHIPPED | OUTPATIENT
Start: 2018-12-13 | End: 2019-11-21 | Stop reason: SDUPTHER

## 2018-12-13 RX ORDER — METOPROLOL SUCCINATE 50 MG/1
TABLET, EXTENDED RELEASE ORAL
Qty: 90 TAB | Refills: 3 | Status: SHIPPED | OUTPATIENT
Start: 2018-12-13 | End: 2019-09-16

## 2018-12-14 RX ORDER — DULOXETIN HYDROCHLORIDE 20 MG/1
20 CAPSULE, DELAYED RELEASE ORAL DAILY
Qty: 90 CAP | Refills: 1 | Status: SHIPPED | OUTPATIENT
Start: 2018-12-14 | End: 2019-05-03

## 2019-01-03 ENCOUNTER — TELEPHONE (OUTPATIENT)
Dept: MEDICAL GROUP | Age: 69
End: 2019-01-03

## 2019-01-03 NOTE — TELEPHONE ENCOUNTER
Fax refill request for Duloxetine received. Refill approved. Please call [facility] to correct my address. Per their fax, my practicing address is in [place], [place].   Thanks

## 2019-02-21 ENCOUNTER — PATIENT MESSAGE (OUTPATIENT)
Dept: MEDICAL GROUP | Age: 69
End: 2019-02-21

## 2019-02-22 RX ORDER — CHLORTHALIDONE 25 MG/1
25 TABLET ORAL DAILY
Qty: 90 TAB | Refills: 3 | Status: SHIPPED | OUTPATIENT
Start: 2019-02-22 | End: 2020-01-07

## 2019-04-24 ENCOUNTER — HOSPITAL ENCOUNTER (OUTPATIENT)
Dept: RADIOLOGY | Facility: MEDICAL CENTER | Age: 69
End: 2019-04-24
Attending: FAMILY MEDICINE
Payer: MEDICARE

## 2019-04-24 DIAGNOSIS — Z12.31 BREAST CANCER SCREENING BY MAMMOGRAM: ICD-10-CM

## 2019-04-24 PROCEDURE — 77063 BREAST TOMOSYNTHESIS BI: CPT

## 2019-04-30 DIAGNOSIS — F41.1 GAD (GENERALIZED ANXIETY DISORDER): ICD-10-CM

## 2019-04-30 DIAGNOSIS — M17.11 PRIMARY OSTEOARTHRITIS OF RIGHT KNEE: ICD-10-CM

## 2019-05-03 RX ORDER — DULOXETIN HYDROCHLORIDE 20 MG/1
CAPSULE, DELAYED RELEASE ORAL
Qty: 90 CAP | Refills: 3 | Status: SHIPPED | OUTPATIENT
Start: 2019-05-03 | End: 2019-12-02

## 2019-05-14 ENCOUNTER — OFFICE VISIT (OUTPATIENT)
Dept: MEDICAL GROUP | Age: 69
End: 2019-05-14
Payer: MEDICARE

## 2019-05-14 VITALS
BODY MASS INDEX: 27.65 KG/M2 | HEART RATE: 66 BPM | WEIGHT: 176.2 LBS | SYSTOLIC BLOOD PRESSURE: 130 MMHG | DIASTOLIC BLOOD PRESSURE: 66 MMHG | OXYGEN SATURATION: 94 % | HEIGHT: 67 IN | TEMPERATURE: 97.6 F

## 2019-05-14 DIAGNOSIS — G47.62 NOCTURNAL LEG CRAMPS: ICD-10-CM

## 2019-05-14 DIAGNOSIS — M17.11 PRIMARY OSTEOARTHRITIS OF RIGHT KNEE: ICD-10-CM

## 2019-05-14 DIAGNOSIS — E04.2 MULTINODULAR GOITER (NONTOXIC): ICD-10-CM

## 2019-05-14 DIAGNOSIS — M54.50 LUMBOSACRAL PAIN, CHRONIC: ICD-10-CM

## 2019-05-14 DIAGNOSIS — T14.8XXA SKIN ABRASION: ICD-10-CM

## 2019-05-14 DIAGNOSIS — G89.29 LUMBOSACRAL PAIN, CHRONIC: ICD-10-CM

## 2019-05-14 DIAGNOSIS — I10 BENIGN ESSENTIAL HTN: Primary | ICD-10-CM

## 2019-05-14 DIAGNOSIS — E78.00 PURE HYPERCHOLESTEROLEMIA: ICD-10-CM

## 2019-05-14 PROBLEM — I26.99 PULMONARY INFARCT (HCC): Status: RESOLVED | Noted: 2018-07-05 | Resolved: 2019-05-14

## 2019-05-14 PROCEDURE — 90471 IMMUNIZATION ADMIN: CPT | Performed by: FAMILY MEDICINE

## 2019-05-14 PROCEDURE — 90715 TDAP VACCINE 7 YRS/> IM: CPT | Performed by: FAMILY MEDICINE

## 2019-05-14 PROCEDURE — 99214 OFFICE O/P EST MOD 30 MIN: CPT | Mod: 25 | Performed by: FAMILY MEDICINE

## 2019-05-14 RX ORDER — HYDROCODONE BITARTRATE AND ACETAMINOPHEN 5; 325 MG/1; MG/1
TABLET ORAL
Refills: 0 | COMMUNITY
Start: 2019-04-18 | End: 2019-12-02

## 2019-05-14 RX ORDER — TIZANIDINE 4 MG/1
TABLET ORAL
Refills: 2 | COMMUNITY
Start: 2019-04-18 | End: 2021-11-17

## 2019-05-14 ASSESSMENT — PATIENT HEALTH QUESTIONNAIRE - PHQ9: CLINICAL INTERPRETATION OF PHQ2 SCORE: 0

## 2019-05-14 NOTE — PROGRESS NOTES
Subjective:   CC: Lab review    HPI:     Margoth Hopkins is a 68 y.o. female who is an established patient of the clinic, presents with the following concerns:     The patient is here for follow up visit for her chronic medical problems. She has been compliant with all her medications and overall feeling well.     She has history of osteoarthritis to the right knee, status post status post TKR with multiple revision surgeries.  Her pain, gait, stability are improving.  Patient is overall pleased with the outcome of the treatment.    She has been experiencing severe intermittent nocturnal bilateral leg cramps for the last few months.  She endorses adequate hydration.  She is active, but does not exercise regularly.  Patient has been taking over-the-counter leg cramp medication with some relief of symptoms.    She additionally has chronic history of lumbosacral pain with right sciatica and has received epidural and trigger injections multiple times with minimal improvement.  She is currently working with by Dr. Guerrero at [facility] and currently taking Norco and Zanaflex 4mg as needed with pain relief.  She received epidural injection couple weeks ago, but states that it did not work.    She has history of goiter and had a biopsy done in 2015 that she told was normal. She had a follow up ultrasound done in December 2017 that showed benign follicular nodules. Most recently, she states the goiter has enlarged and may be pushing on her esophagus as she is experiencing intermittent dysphagia for solids. She does have history of Schatzki's Ring and followed by GI.     In regards to her hypertension, she is taking Metoprolol 50mg and Chlorthalidone 25mg and has been tolerating them well without side effects.     For her cholesterol, she is taking Rosuvastatin 20mg and tolerating well witiout side effects.     She is requesting a tetanus shot as she works in the garden often and receives scratches to bilateral hands from her  "[employer].     Current medicines (including changes today)  Current Outpatient Prescriptions   Medication Sig Dispense Refill   • tizanidine (ZANAFLEX) 4 MG Tab TAKE 1/2 TO 2 TABS BY MOUTH 3 TIMES A DAY AS NEEDED FOR SPASMS  2   • HYDROcodone-acetaminophen (NORCO) 5-325 MG Tab per tablet TAKE 1 TABLET BY MOUTH TWICE A DAY AS NEEDED FOR 30 DAYS  0   • DILTIAZem (CARDIZEM) 30 MG Tab Take 1 tablet before bed nightly for leg cramp 90 Tab 0   • DULoxetine (CYMBALTA) 20 MG Cap DR Particles TAKE 1 CAPSULE BY MOUTH EVERY DAY 90 Cap 3   • chlorthalidone (HYGROTON) 25 MG Tab Take 1 Tab by mouth every day. 90 Tab 3   • rosuvastatin (CRESTOR) 20 MG Tab TAKE 1 TABLET BY MOUTH  EVERY EVENING 90 Tab 3   • metoprolol SR (TOPROL XL) 50 MG TABLET SR 24 HR TAKE 1 TABLET BY MOUTH  EVERY DAY 90 Tab 3   • levothyroxine (SYNTHROID) 75 MCG Tab Take 1 Tab by mouth Every morning on an empty stomach. Synthroid brand medically necessary. 90 Tab 3   • Cholecalciferol (HM VITAMIN D3) 4000 units Cap Take 1 Cap by mouth every day. 90 Cap 3     No current facility-administered medications for this visit.      She  has a past medical history of Goiter and Thyroid disease.    I personally reviewed patient's problem list, allergies, medications, family hx, social hx with patient and update EPIC.     REVIEW OF SYSTEMS:  CONSTITUTIONAL:  Denies night sweats, fatigue, malaise, lethargy, fever or chills.  RESPIRATORY:  Denies cough, wheeze, hemoptysis, or shortness of breath.  CARDIOVASCULAR:  Denies chest pains, palpitations, pedal edema     Objective:     /66 (BP Location: Left arm, Patient Position: Sitting, BP Cuff Size: Adult)   Pulse 66   Temp 36.4 °C (97.6 °F)   Ht 1.702 m (5' 7\")   Wt 79.9 kg (176 lb 3.2 oz)   SpO2 94%  Body mass index is 27.6 kg/m².    Physical Exam:  Constitutional: awake, alert, in no distress. Overweight.   Skin: Warm, dry, good turgor, no rashes, bruises, ulcers in visible areas.  -Healing superficial skin " scratches on bilateral forearms.  Eye: conjunctiva clear, lids neg for edema or lesions.  ENMT: TM and auditory canals wnl. Oral and nasal mucosa wnl. Lips without lesions, good dentition, oropharynx clear.  Neck: Trachea midline, no masses, no thyromegaly. No cervical or supraclavicular lymphadenopathy  Respiratory: Unlabored respiratory effort, lungs clear to auscultation, no wheezes, no rales.  Cardiovascular: Normal S1, S2, no murmur, no pedal edema.  Psych: Oriented x3, affect and mood wnl, intact judgement and insight.       Assessment and Plan:   The following treatment plan was discussed    1. Primary osteoarthritis of right knee, S/P TKR + 2 revision surgeries  Chronic, status post right total knee replacement with multiple revisions.  Her last revision surgery was in 2018 by Dr. Lomeli.  Patient is doing well currently.  Her right knee is not perfect, but does not give her symptoms as it used to be.  She overall is pleased with the outcome of the treatment.  We will continue routine monitoring.    2. Lumbosacral pain, chronic, with right sciatica_ROC_Dr. Amado   Chronic lumbosacral pain, status post multiple epidural and trigger injections in [place].  Patient is working with Dr. Mejia at [facility].  She is status post 2 epidural injection by Dr. Mejia.  Patient is now taking tizanidine and Norco PRN for pain.  She denies any lower extremity weakness or paresthesia.  -Follow-up with [facility] as directed    3. Nocturnal leg cramps  - DILTIAZem (CARDIZEM) 30 MG Tab; Take 1 tablet before bed nightly for leg cramp  Dispense: 90 Tab; Refill: 0   -Vitamin B complex with 30 mg of vitamin B6 3 times daily  -Hydration, stretching exercises  -Follow-up PRN    4. Benign essential HTN  Chronic, under good control with chlorthalidone 25 mg and metoprolol SR 50 mg daily.  Her blood pressure is 130/66 today.  Patient tolerates medications well, denies any side effects.  -Continue chlorthalidone and metoprolol at current  doses  - Pt was counseled on dietary modification, weight loss, smoking cessation, and avoidance of excessive alcohol consumption.    - Recommended moderate intensity exercise at least 30 minutes per day x 5 days per week.     5. Multinodular goiter (nontoxic), s/p FNA in 12/2017, benign folicullar nodules  - TSH; Future  - US-SOFT TISSUES OF HEAD - NECK; Future    6. Pure hypercholesterolemia  Chronic, controlled with Crestor 10 mg daily, denies any side effects, will monitor.  - Lipid Profile; Future    7. Skin abrasion  Patient suffers multiple healing skin abrasion from gardening.  Plans:  - Tdap Vaccine =>8YO IM       Charlee Walker M.D.    Followup: Return in about 6 months (around 11/14/2019) for Annual wellness visit.    Please note that this dictation was created using voice recognition software and/or scribes. I have made every reasonable attempt to correct obvious errors, but I expect that there are errors of grammar and possibly content that I did not discover before finalizing the note.     ILamonte (Binu), am scribing for, and in the presence of, Charlee Walker M.D.    Electronically signed by: Lamonte Jane (Binu), 5/14/2019    ICharlee M.D. personally performed the services described in this documentation, as scribed by Lamonte Jane in my presence, and it is both accurate and complete.

## 2019-05-16 ENCOUNTER — TELEPHONE (OUTPATIENT)
Dept: MEDICAL GROUP | Age: 69
End: 2019-05-16

## 2019-05-16 NOTE — TELEPHONE ENCOUNTER
1. Caller Name:   [facility] - [facility] [address]  Phone: [phone] Fax: [phone]          2. SPECIFIC Action To Be Taken: Medication clarification needed, please advise.     Medication : DilTIAZem 30MG   Typically dosed 3 times to 4 times daily, please advise.

## 2019-05-17 NOTE — TELEPHONE ENCOUNTER
This medication was prescribed for nocturnal leg cramp. The direction is correct. Please inform pharmacy.   Charele Walker M.D.

## 2019-05-20 ENCOUNTER — TELEPHONE (OUTPATIENT)
Dept: ENDOCRINOLOGY | Facility: MEDICAL CENTER | Age: 69
End: 2019-05-20

## 2019-05-20 DIAGNOSIS — E06.3 HYPOTHYROIDISM DUE TO HASHIMOTO'S THYROIDITIS: ICD-10-CM

## 2019-05-20 DIAGNOSIS — E03.8 HYPOTHYROIDISM DUE TO HASHIMOTO'S THYROIDITIS: ICD-10-CM

## 2019-05-20 RX ORDER — LEVOTHYROXINE SODIUM 0.07 MG/1
75 TABLET ORAL
Qty: 90 TAB | Refills: 0 | Status: SHIPPED | OUTPATIENT
Start: 2019-05-20 | End: 2019-05-23 | Stop reason: SDUPTHER

## 2019-05-20 NOTE — TELEPHONE ENCOUNTER
Patient called and notified that I did fill her prescription for Synthroid but she has not been seen in office in over a year.  She will need to make a follow up appointment with Dr. Garcia or have her PCP fill future refills of Synthroid.

## 2019-05-23 DIAGNOSIS — E06.3 HYPOTHYROIDISM DUE TO HASHIMOTO'S THYROIDITIS: ICD-10-CM

## 2019-05-23 DIAGNOSIS — E03.8 HYPOTHYROIDISM DUE TO HASHIMOTO'S THYROIDITIS: ICD-10-CM

## 2019-05-23 RX ORDER — LEVOTHYROXINE SODIUM 0.07 MG/1
75 TABLET ORAL
Qty: 90 TAB | Refills: 0 | Status: SHIPPED | OUTPATIENT
Start: 2019-05-23 | End: 2019-07-29

## 2019-06-15 ENCOUNTER — HOSPITAL ENCOUNTER (OUTPATIENT)
Dept: RADIOLOGY | Facility: MEDICAL CENTER | Age: 69
End: 2019-06-15
Attending: FAMILY MEDICINE
Payer: MEDICARE

## 2019-06-15 DIAGNOSIS — E04.2 MULTINODULAR GOITER (NONTOXIC): ICD-10-CM

## 2019-06-15 PROCEDURE — 76536 US EXAM OF HEAD AND NECK: CPT

## 2019-07-07 DIAGNOSIS — G47.62 NOCTURNAL LEG CRAMPS: ICD-10-CM

## 2019-07-29 ENCOUNTER — HOSPITAL ENCOUNTER (OUTPATIENT)
Facility: MEDICAL CENTER | Age: 69
End: 2019-07-29
Attending: PHYSICIAN ASSISTANT
Payer: MEDICARE

## 2019-07-29 ENCOUNTER — OFFICE VISIT (OUTPATIENT)
Dept: URGENT CARE | Facility: CLINIC | Age: 69
End: 2019-07-29
Payer: MEDICARE

## 2019-07-29 VITALS
WEIGHT: 168 LBS | HEIGHT: 67 IN | SYSTOLIC BLOOD PRESSURE: 140 MMHG | OXYGEN SATURATION: 95 % | BODY MASS INDEX: 26.37 KG/M2 | TEMPERATURE: 97.3 F | DIASTOLIC BLOOD PRESSURE: 68 MMHG | HEART RATE: 68 BPM

## 2019-07-29 DIAGNOSIS — R30.0 DYSURIA: Primary | ICD-10-CM

## 2019-07-29 DIAGNOSIS — R30.0 DYSURIA: ICD-10-CM

## 2019-07-29 LAB
APPEARANCE UR: NORMAL
BILIRUB UR STRIP-MCNC: NORMAL MG/DL
COLOR UR AUTO: YELLOW
GLUCOSE UR STRIP.AUTO-MCNC: NORMAL MG/DL
KETONES UR STRIP.AUTO-MCNC: NORMAL MG/DL
LEUKOCYTE ESTERASE UR QL STRIP.AUTO: NORMAL
NITRITE UR QL STRIP.AUTO: NORMAL
PH UR STRIP.AUTO: 7 [PH] (ref 5–8)
PROT UR QL STRIP: 100 MG/DL
RBC UR QL AUTO: NORMAL
SP GR UR STRIP.AUTO: 1.01
UROBILINOGEN UR STRIP-MCNC: 0.2 MG/DL

## 2019-07-29 PROCEDURE — 81002 URINALYSIS NONAUTO W/O SCOPE: CPT | Performed by: PHYSICIAN ASSISTANT

## 2019-07-29 PROCEDURE — 87186 SC STD MICRODIL/AGAR DIL: CPT

## 2019-07-29 PROCEDURE — 87077 CULTURE AEROBIC IDENTIFY: CPT

## 2019-07-29 PROCEDURE — 99214 OFFICE O/P EST MOD 30 MIN: CPT | Performed by: PHYSICIAN ASSISTANT

## 2019-07-29 PROCEDURE — 87086 URINE CULTURE/COLONY COUNT: CPT

## 2019-07-29 RX ORDER — PHENAZOPYRIDINE HYDROCHLORIDE 200 MG/1
200 TABLET, FILM COATED ORAL 3 TIMES DAILY
Qty: 6 TAB | Refills: 0 | Status: SHIPPED | OUTPATIENT
Start: 2019-07-29 | End: 2019-07-31

## 2019-07-29 RX ORDER — SULFAMETHOXAZOLE AND TRIMETHOPRIM 800; 160 MG/1; MG/1
1 TABLET ORAL EVERY 12 HOURS
Qty: 10 TAB | Refills: 0 | Status: SHIPPED | OUTPATIENT
Start: 2019-07-29 | End: 2019-08-03

## 2019-07-29 NOTE — PROGRESS NOTES
Subjective:      Pt is a 68 y.o. female who presents with Dysuria (x4 days. Dysuria, abdominal pain.)            HPI  This is a new problem. PT comes into the UC with a chief complaint of dysuria, burning on urination, urgency, frequency, and bladder pressure x 4 days. PT denies fevers or chills, CP, SOB, NVD, paresthesias, headaches, dizziness, change in vision, hives, or joint pain. PT states the pain is a 6/10 with burning upon urination, aching in nature and worse at night. Pt states they have not taken any RX meds for this issue. Pt denies flank or back pain as well. The pt's medication list, problem list, and allergies have been evaluated and reviewed during today's visit.      PMH:  Past Medical History:   Diagnosis Date   • Goiter    • Thyroid disease     hasimotos       PSH:  Past Surgical History:   Procedure Laterality Date   • APPENDECTOMY     • BLADDER SLING FEMALE     • CATARACT EXTRACTION WITH IOL Left    • CERVICAL FUSION POSTERIOR     • FOOT SURGERY Left     metatarsal fusion, hammer toes correction   • FUSION      disc fusion   • KNEE REPLACEMENT, TOTAL Right        Fam Hx:    family history includes Alzheimer's Disease in her mother; Arthritis in her mother; Cancer in her mother; Cancer (age of onset: 50) in her maternal grandmother; Heart Attack in her father; Heart Disease in her brother; Other in her brother.  Family Status   Relation Status   • Mo  at age 92   • Fa  at age 64   • Bro    • MGMo  at age 70   • Bro Alive   • MGFa    • PGMo    • PGFa    • Bro    • Bro Alive       Soc HX:  Social History     Social History   • Marital status:      Spouse name: N/A   • Number of children: N/A   • Years of education: N/A     Occupational History   • Not on file.     Social History Main Topics   • Smoking status: Never Smoker   • Smokeless tobacco: Never Used   • Alcohol use 4.2 oz/week     7 Glasses of wine per week   • Drug use:  No   • Sexual activity: Yes     Partners: Male     Other Topics Concern   • Not on file     Social History Narrative   • No narrative on file         Medications:    Current Outpatient Prescriptions:   •  sulfamethoxazole-trimethoprim (BACTRIM DS) 800-160 MG tablet, Take 1 Tab by mouth every 12 hours for 5 days., Disp: 10 Tab, Rfl: 0  •  phenazopyridine (PYRIDIUM) 200 MG Tab, Take 1 Tab by mouth 3 times a day for 2 days., Disp: 6 Tab, Rfl: 0  •  tizanidine (ZANAFLEX) 4 MG Tab, TAKE 1/2 TO 2 TABS BY MOUTH 3 TIMES A DAY AS NEEDED FOR SPASMS, Disp: , Rfl: 2  •  HYDROcodone-acetaminophen (NORCO) 5-325 MG Tab per tablet, TAKE 1 TABLET BY MOUTH TWICE A DAY AS NEEDED FOR 30 DAYS, Disp: , Rfl: 0  •  DULoxetine (CYMBALTA) 20 MG Cap DR Particles, TAKE 1 CAPSULE BY MOUTH EVERY DAY, Disp: 90 Cap, Rfl: 3  •  chlorthalidone (HYGROTON) 25 MG Tab, Take 1 Tab by mouth every day., Disp: 90 Tab, Rfl: 3  •  rosuvastatin (CRESTOR) 20 MG Tab, TAKE 1 TABLET BY MOUTH  EVERY EVENING, Disp: 90 Tab, Rfl: 3  •  metoprolol SR (TOPROL XL) 50 MG TABLET SR 24 HR, TAKE 1 TABLET BY MOUTH  EVERY DAY, Disp: 90 Tab, Rfl: 3  •  Cholecalciferol (HM VITAMIN D3) 4000 units Cap, Take 1 Cap by mouth every day., Disp: 90 Cap, Rfl: 3  •  PENNSAID 2 % Solution, , Disp: , Rfl:       Allergies:  Morphine; Nsaids; Pcn [penicillins]; and Gabapentin    ROS  Review of Systems   Constitutional: Negative for fever, chills and malaise/fatigue.   HENT: Negative for congestion and sore throat.    Eyes: Negative for blurred vision, double vision and photophobia.   Respiratory: Negative for cough and shortness of breath.  Cardiovascular: Negative for chest pain and palpitations.   Gastrointestinal: Negative for nausea, vomiting, abdominal pain, diarrhea and constipation.   Genitourinary: Positive for dysuria, urgency and frequency.   Musculoskeletal: Negative for joint pain and myalgias.   Skin: Negative for rash.   Neurological: Negative for dizziness, tingling and  "headaches.   Endo/Heme/Allergies: Does not bruise/bleed easily.   Psychiatric/Behavioral: Negative for depression. The patient is not nervous/anxious.           Objective:     /68   Pulse 68   Temp 36.3 °C (97.3 °F)   Ht 1.702 m (5' 7\")   Wt 76.2 kg (168 lb)   SpO2 95%   BMI 26.31 kg/m²      Physical Exam       Physical Exam   Constitutional: She is oriented to person, place, and time. She appears well-developed and well-nourished. No distress.   HENT:   Head: Normocephalic and atraumatic.   Right Ear: External ear normal.   Left Ear: External ear normal.   Nose: Nose normal.   Mouth/Throat: Oropharynx is clear and moist. No oropharyngeal exudate.   Eyes: Conjunctivae normal and EOM are normal. Pupils are equal, round, and reactive to light.   Neck: Normal range of motion. Neck supple. No thyromegaly present.   Cardiovascular: Normal rate, regular rhythm, normal heart sounds and intact distal pulses.  Exam reveals no gallop and no friction rub.    No murmur heard.  Pulmonary/Chest: Effort normal and breath sounds normal. No respiratory distress. She has no wheezes. She has no rales. She exhibits no tenderness.   Abdominal: Soft. Bowel sounds are normal. She exhibits no distension and no mass. There is no tenderness. There is no rebound and no guarding.   Genitourinary:        Pt deferred   Musculoskeletal: Normal range of motion. She exhibits no edema and no tenderness.   Lymphadenopathy:     She has no cervical adenopathy.   Neurological: She is alert and oriented to person, place, and time. She has normal reflexes. No cranial nerve deficit.   Skin: Skin is warm and dry. No rash noted. No erythema.   Psychiatric: She has a normal mood and affect. Her behavior is normal. Judgment and thought content normal.        Assessment/Plan:     1. Dysuria    - POCT Urinalysis-->LEUKS AND BLOOD  - Urine Culture; Future  - sulfamethoxazole-trimethoprim (BACTRIM DS) 800-160 MG tablet; Take 1 Tab by mouth every 12 " hours for 5 days.  Dispense: 10 Tab; Refill: 0  - phenazopyridine (PYRIDIUM) 200 MG Tab; Take 1 Tab by mouth 3 times a day for 2 days.  Dispense: 6 Tab; Refill: 0    Rest, fluids encouraged.  AVS with medical info given.  Pt was in full understanding and agreement with the plan.  Differential diagnosis, natural history, supportive care, and indications for immediate follow-up discussed. All questions answered. Patient agrees with the plan of care.  Follow-up as needed if symptoms worsen or fail to improve.

## 2019-07-31 ENCOUNTER — TELEPHONE (OUTPATIENT)
Dept: URGENT CARE | Facility: CLINIC | Age: 69
End: 2019-07-31

## 2019-07-31 LAB
BACTERIA UR CULT: ABNORMAL
BACTERIA UR CULT: ABNORMAL
SIGNIFICANT IND 70042: ABNORMAL
SITE SITE: ABNORMAL
SOURCE SOURCE: ABNORMAL

## 2019-07-31 NOTE — TELEPHONE ENCOUNTER
Called and left message with pt about urine culture results which came back positive for E.coli.   I told her she could continue the abx therapy with a positive urine culture which was sensitive to the abx she was placed on.  Encouraged Pt to call back with questions.  Mikael Anna PA-C

## 2019-08-06 ENCOUNTER — OFFICE VISIT (OUTPATIENT)
Dept: URGENT CARE | Facility: CLINIC | Age: 69
End: 2019-08-06
Payer: MEDICARE

## 2019-08-06 VITALS
OXYGEN SATURATION: 97 % | SYSTOLIC BLOOD PRESSURE: 152 MMHG | HEART RATE: 76 BPM | WEIGHT: 171.8 LBS | BODY MASS INDEX: 26.97 KG/M2 | HEIGHT: 67 IN | TEMPERATURE: 97.5 F | DIASTOLIC BLOOD PRESSURE: 90 MMHG

## 2019-08-06 DIAGNOSIS — R03.0 ELEVATED BLOOD PRESSURE READING: ICD-10-CM

## 2019-08-06 PROCEDURE — 99213 OFFICE O/P EST LOW 20 MIN: CPT | Performed by: PHYSICIAN ASSISTANT

## 2019-08-06 ASSESSMENT — ENCOUNTER SYMPTOMS
DIZZINESS: 1
SHORTNESS OF BREATH: 0
TREMORS: 1
BLURRED VISION: 0
HEADACHES: 1
HYPERTENSION: 1
PALPITATIONS: 0

## 2019-08-06 NOTE — PROGRESS NOTES
Subjective:   Margoth Hopkins is a 68 y.o. female who presents today with   Chief Complaint   Patient presents with   • Hypertension     Had lumbar epidermal yesterday, BP has been high all day, has headache x 2 days, pt fell this am       Hypertension   This is a new problem. The current episode started yesterday. The problem has been gradually improving since onset. The problem is controlled. Associated symptoms include anxiety and headaches. Pertinent negatives include no blurred vision, chest pain, palpitations, peripheral edema or shortness of breath.   Patient has tremor which has seemed to exacerbate recently. Her BP before lumbar epidural was 190 systolic. She has had BP readings in 150 to 160s systolic over 90 to 105 diastolic. She has had a headache before the injection and still does today. She did have a fall this morning when she felt dizzy.  Patient has had the lumbar epidural in the past with no issues.   PMH:  has a past medical history of Goiter and Thyroid disease.  MEDS:   Current Outpatient Medications:   •  PENNSAID 2 % Solution, , Disp: , Rfl:   •  tizanidine (ZANAFLEX) 4 MG Tab, TAKE 1/2 TO 2 TABS BY MOUTH 3 TIMES A DAY AS NEEDED FOR SPASMS, Disp: , Rfl: 2  •  HYDROcodone-acetaminophen (NORCO) 5-325 MG Tab per tablet, TAKE 1 TABLET BY MOUTH TWICE A DAY AS NEEDED FOR 30 DAYS, Disp: , Rfl: 0  •  DULoxetine (CYMBALTA) 20 MG Cap DR Particles, TAKE 1 CAPSULE BY MOUTH EVERY DAY, Disp: 90 Cap, Rfl: 3  •  chlorthalidone (HYGROTON) 25 MG Tab, Take 1 Tab by mouth every day., Disp: 90 Tab, Rfl: 3  •  rosuvastatin (CRESTOR) 20 MG Tab, TAKE 1 TABLET BY MOUTH  EVERY EVENING, Disp: 90 Tab, Rfl: 3  •  metoprolol SR (TOPROL XL) 50 MG TABLET SR 24 HR, TAKE 1 TABLET BY MOUTH  EVERY DAY, Disp: 90 Tab, Rfl: 3  •  Cholecalciferol (HM VITAMIN D3) 4000 units Cap, Take 1 Cap by mouth every day., Disp: 90 Cap, Rfl: 3  ALLERGIES:   Allergies   Allergen Reactions   • Morphine Vomiting   • Nsaids Unspecified      "History of gastric ulcers - cannot take NSAIDS   • Pcn [Penicillins] Hives   • Gabapentin      Cognitive changes      SURGHX:   Past Surgical History:   Procedure Laterality Date   • APPENDECTOMY     • BLADDER SLING FEMALE     • CATARACT EXTRACTION WITH IOL Left    • CERVICAL FUSION POSTERIOR     • FOOT SURGERY Left     metatarsal fusion, hammer toes correction   • FUSION      disc fusion   • KNEE REPLACEMENT, TOTAL Right      SOCHX:  reports that she has never smoked. She has never used smokeless tobacco. She reports that she drinks about 4.2 oz of alcohol per week. She reports that she does not use drugs.  FH: Reviewed with patient, not pertinent to this visit.       Review of Systems   Eyes: Negative for blurred vision.   Respiratory: Negative for shortness of breath.    Cardiovascular: Negative for chest pain and palpitations.   Neurological: Positive for dizziness, tremors and headaches.        Objective:   /90 (BP Location: Left arm, Patient Position: Sitting)   Pulse 76   Temp 36.4 °C (97.5 °F) (Temporal)   Ht 1.702 m (5' 7\")   Wt 77.9 kg (171 lb 12.8 oz)   SpO2 97%   BMI 26.91 kg/m²   Physical Exam   Constitutional: She appears well-developed and well-nourished. No distress.   HENT:   Head: Normocephalic and atraumatic.   Right Ear: Hearing, tympanic membrane and ear canal normal.   Left Ear: Hearing, tympanic membrane and ear canal normal.   Eyes: Pupils are equal, round, and reactive to light.   Cardiovascular: Normal rate, regular rhythm and normal heart sounds.   Pulmonary/Chest: Effort normal and breath sounds normal.   Musculoskeletal:   Normal movement in all 4 extremities   Neurological: She is alert. She displays tremor. No cranial nerve deficit or sensory deficit. She displays a negative Romberg sign. Coordination normal. GCS eye subscore is 4. GCS verbal subscore is 5. GCS motor subscore is 6.   Skin: Skin is warm and dry.   Psychiatric: She has a normal mood and affect.   Nursing note " and vitals reviewed.  Normal strength bilaterally. Non-pressured speech. Normal facial strength with no drooping.       Assessment/Plan:   Assessment    1. Elevated blood pressure reading  Patient plans on following up with PCP who is currently out of town.   Differential diagnosis, natural history, supportive care, and indications for immediate follow-up discussed.   Patient given instructions and understanding of medications and treatment.    Strict ER precautions given if BP gets elevated again or symptoms as discussed occur for acute hypertension management.   Unimpressive physical exam for any acute abnormalities.    Patient agreeable to plan.      Please note that this dictation was created using voice recognition software. I have made every reasonable attempt to correct obvious errors, but I expect that there are errors of grammar and possibly content that I did not discover before finalizing the note.    Tonio Butler PA-C

## 2019-08-12 ENCOUNTER — OFFICE VISIT (OUTPATIENT)
Dept: MEDICAL GROUP | Age: 69
End: 2019-08-12
Payer: MEDICARE

## 2019-08-12 VITALS
TEMPERATURE: 97.2 F | WEIGHT: 171 LBS | BODY MASS INDEX: 26.84 KG/M2 | HEART RATE: 70 BPM | SYSTOLIC BLOOD PRESSURE: 168 MMHG | OXYGEN SATURATION: 96 % | HEIGHT: 67 IN | DIASTOLIC BLOOD PRESSURE: 82 MMHG

## 2019-08-12 DIAGNOSIS — I10 BENIGN ESSENTIAL HTN: ICD-10-CM

## 2019-08-12 DIAGNOSIS — R53.81 MALAISE: ICD-10-CM

## 2019-08-12 DIAGNOSIS — E78.49 OTHER HYPERLIPIDEMIA: ICD-10-CM

## 2019-08-12 DIAGNOSIS — R00.2 PALPITATIONS: ICD-10-CM

## 2019-08-12 DIAGNOSIS — E78.00 PURE HYPERCHOLESTEROLEMIA: ICD-10-CM

## 2019-08-12 DIAGNOSIS — I35.8 AORTIC VALVE SCLEROSIS: ICD-10-CM

## 2019-08-12 DIAGNOSIS — E03.8 HYPOTHYROIDISM DUE TO HASHIMOTO'S THYROIDITIS: ICD-10-CM

## 2019-08-12 DIAGNOSIS — E06.3 HYPOTHYROIDISM DUE TO HASHIMOTO'S THYROIDITIS: ICD-10-CM

## 2019-08-12 DIAGNOSIS — I35.1 MILD AORTIC INSUFFICIENCY: ICD-10-CM

## 2019-08-12 PROCEDURE — 93000 ELECTROCARDIOGRAM COMPLETE: CPT | Performed by: INTERNAL MEDICINE

## 2019-08-12 PROCEDURE — 99204 OFFICE O/P NEW MOD 45 MIN: CPT | Performed by: INTERNAL MEDICINE

## 2019-08-12 RX ORDER — LEVOTHYROXINE SODIUM 0.07 MG/1
75 TABLET ORAL
Qty: 90 TAB | Refills: 0 | Status: SHIPPED | OUTPATIENT
Start: 2019-08-12 | End: 2019-09-16

## 2019-08-12 RX ORDER — LISINOPRIL 5 MG/1
5 TABLET ORAL DAILY
Qty: 90 TAB | Refills: 0 | Status: SHIPPED | OUTPATIENT
Start: 2019-08-12 | End: 2019-09-16 | Stop reason: SDUPTHER

## 2019-08-12 SDOH — HEALTH STABILITY: MENTAL HEALTH: HOW MANY STANDARD DRINKS CONTAINING ALCOHOL DO YOU HAVE ON A TYPICAL DAY?: 1 OR 2

## 2019-08-12 SDOH — HEALTH STABILITY: MENTAL HEALTH: HOW OFTEN DO YOU HAVE A DRINK CONTAINING ALCOHOL?: 4 OR MORE TIMES A WEEK

## 2019-08-12 ASSESSMENT — ENCOUNTER SYMPTOMS
GASTROINTESTINAL NEGATIVE: 1
EYES NEGATIVE: 1
CARDIOVASCULAR NEGATIVE: 1
PSYCHIATRIC NEGATIVE: 1
RESPIRATORY NEGATIVE: 1
MUSCULOSKELETAL NEGATIVE: 1
CONSTITUTIONAL NEGATIVE: 1
NEUROLOGICAL NEGATIVE: 1

## 2019-08-13 NOTE — PROGRESS NOTES
Subjective:      Margoth Hopkins is a 68 y.o. female who presents with Hypertension (has been high for more than one week)    This is a new patient to me unable see PCP today.  She comes in for BP control.  She has been on metoprolol 50 mg daily with heart rates usually less than 70 as well as chlorthalidone 25 mg daily.  He also complains of palpitations but no chest pain PND orthopnea.  Has occasional migraine headaches but no worsening of them recently.  Does have general fatigue and malaise however.  Loss of energy.  No syncope or presyncope.    Past medical history significant for aortic sclerosis without stenosis and mild aortic insufficiency on echocardiogram 2018 when she had pulmonary embolus.    Hypothyroidism is under control with present therapy, dyslipidemia controlled on statin therapy    Outpatient Medications Prior to Visit   Medication Sig Dispense Refill   • Probiotic Product (PROBIOTIC DAILY PO) Take  by mouth.     • MAGNESIUM PO Take  by mouth.     • PENNSAID 2 % Solution      • tizanidine (ZANAFLEX) 4 MG Tab TAKE 1/2 TO 2 TABS BY MOUTH 3 TIMES A DAY AS NEEDED FOR SPASMS  2   • HYDROcodone-acetaminophen (NORCO) 5-325 MG Tab per tablet TAKE 1 TABLET BY MOUTH TWICE A DAY AS NEEDED FOR 30 DAYS  0   • DULoxetine (CYMBALTA) 20 MG Cap DR Particles TAKE 1 CAPSULE BY MOUTH EVERY DAY 90 Cap 3   • chlorthalidone (HYGROTON) 25 MG Tab Take 1 Tab by mouth every day. 90 Tab 3   • rosuvastatin (CRESTOR) 20 MG Tab TAKE 1 TABLET BY MOUTH  EVERY EVENING 90 Tab 3   • metoprolol SR (TOPROL XL) 50 MG TABLET SR 24 HR TAKE 1 TABLET BY MOUTH  EVERY DAY 90 Tab 3   • Cholecalciferol (HM VITAMIN D3) 4000 units Cap Take 1 Cap by mouth every day. 90 Cap 3     No facility-administered medications prior to visit.      Allergies   Allergen Reactions   • Morphine Vomiting   • Nsaids Unspecified     History of gastric ulcers - cannot take NSAIDS   • Pcn [Penicillins] Hives   • Gabapentin      Cognitive changes   "                  '        HPI    Review of Systems   Constitutional: Negative.    HENT: Negative.    Eyes: Negative.    Respiratory: Negative.    Cardiovascular: Negative.    Gastrointestinal: Negative.    Genitourinary: Negative.    Musculoskeletal: Negative.    Skin: Negative.    Neurological: Negative.    Endo/Heme/Allergies: Negative.    Psychiatric/Behavioral: Negative.           Objective:     BP (!) 168/82 (BP Location: Right arm, Patient Position: Sitting, BP Cuff Size: Adult)   Pulse 70   Temp 36.2 °C (97.2 °F) (Temporal)   Ht 1.702 m (5' 7\")   Wt 77.6 kg (171 lb)   SpO2 96%   BMI 26.78 kg/m²    Repeat BP by me was confirmed at 160/94 in left arm sitting.  Unchanged after 5 minutes.  Physical Exam   Constitutional: She is oriented to person, place, and time. She appears well-developed and well-nourished.   HENT:   Head: Normocephalic and atraumatic.   Right Ear: External ear normal.   Left Ear: External ear normal.   Nose: Nose normal.   Mouth/Throat: Oropharynx is clear and moist.   Eyes: Pupils are equal, round, and reactive to light. Conjunctivae and EOM are normal. Right eye exhibits no discharge. Left eye exhibits no discharge. No scleral icterus.   Neck: Normal range of motion. Neck supple. No JVD present. No tracheal deviation present. No thyromegaly present.   Cardiovascular: Normal rate, regular rhythm, normal heart sounds and intact distal pulses. Exam reveals no gallop and no friction rub.   Pulmonary/Chest: Effort normal and breath sounds normal. No stridor. No respiratory distress. She has no wheezes. Rales:   She exhibits no tenderness.   Abdominal: Soft. Bowel sounds are normal. She exhibits no distension and no mass. There is no tenderness. There is no rebound and no guarding. No hernia.   Musculoskeletal: Normal range of motion. She exhibits no edema or tenderness.   Lymphadenopathy:     She has no cervical adenopathy.   Neurological: She is alert and oriented to person, place, and " time. She has normal reflexes. She displays normal reflexes. No cranial nerve deficit. Coordination normal.   Skin: Skin is warm and dry. No rash noted. No erythema. No pallor.   Psychiatric: She has a normal mood and affect. Her behavior is normal. Judgment and thought content normal.   Nursing note and vitals reviewed.    Hospital Outpatient Visit on 07/29/2019   Component Date Value   • Significant Indicator 07/29/2019 POS*   • Source 07/29/2019 UR    • Site 07/29/2019 -    • Culture Result 07/29/2019 Mixed skin lynn 10-50,000 cfu/mL*   • Culture Result 07/29/2019 *                    Value:Escherichia coli  10-50,000 cfu/mL     Office Visit on 07/29/2019   Component Date Value   • POC Color 07/29/2019 YELLOW    • POC Appearance 07/29/2019 CLOUDY    • POC Leukocyte Esterase 07/29/2019 LARGE    • POC Nitrites 07/29/2019 neg    • POC Urobiligen 07/29/2019 0.2    • POC Protein 07/29/2019 100    • POC Urine PH 07/29/2019 7.0    • POC Blood 07/29/2019 SMALL    • POC Specific Gravity 07/29/2019 1.015    • POC Ketones 07/29/2019 neg    • POC Bilirubin 07/29/2019 neg    • POC Glucose 07/29/2019 neg       Lab Results   Component Value Date/Time    HBA1C 5.2 02/13/2018 02:50 PM    HBA1C 5.3 03/03/2017 10:16 AM     Lab Results   Component Value Date/Time    SODIUM 138 10/10/2018 02:29 PM    POTASSIUM 3.7 10/10/2018 02:29 PM    CHLORIDE 103 10/10/2018 02:29 PM    CO2 26 10/10/2018 02:29 PM    GLUCOSE 89 10/10/2018 02:29 PM    BUN 18 10/10/2018 02:29 PM    CREATININE 0.63 10/10/2018 02:29 PM    ALKPHOSPHAT 84 10/10/2018 02:29 PM    ASTSGOT 33 10/10/2018 02:29 PM    ALTSGPT 37 10/10/2018 02:29 PM    TBILIRUBIN 0.9 10/10/2018 02:29 PM     Lab Results   Component Value Date/Time    INR 0.95 07/05/2018 11:25 AM     Lab Results   Component Value Date/Time    CHOLSTRLTOT 212 (H) 03/15/2018 09:58 AM     (H) 03/15/2018 09:58 AM    HDL 76 03/15/2018 09:58 AM    TRIGLYCERIDE 118 03/15/2018 09:58 AM       No results found for:  TESTOSTERONE  No results found for: TSH  Lab Results   Component Value Date/Time    FREET4 0.96 10/10/2018 02:28 PM    FREET4 0.73 03/06/2018 02:42 PM     No results found for: URICACID  No components found for: VITB12  Lab Results   Component Value Date/Time    25HYDROXY 59 10/10/2018 02:29 PM    25HYDROXY 21 (L) 03/06/2018 02:42 PM     EKG reviewed by me with unremarkable with sinus rhythm at 65 and no ST segment changes of ischemia or injury or LVH.                Assessment/Plan:     1. Palpitations-probably benign.  No premature beats heard on auscultation for 1 minute nor on EKG.  Probably secondary to emotional distress from hypertension.     - EKG    2. Benign essential HTN-uncontrolled.  Continue metoprolol 50 mg daily and chlorthalidone 25 mg daily, and start lisinopril 5 mg daily.      Since her heart rate is in the 60s, I am not inclined to push the beta-blocker therapy to 100 mg a day at this time unless she continues to have uncontrollable palpitations, and her heart rate remains above 60.  But she says she often has heart rates less than this at home..    She will follow-up with her PCP in 2 weeks to assess response to adding lisinopril.      - EKG  - lisinopril (PRINIVIL) 5 MG Tab; Take 1 Tab by mouth every day.  Dispense: 90 Tab; Refill: 0  - Comp Metabolic Panel; Future  - Lipid Profile; Future  - CBC WITH DIFFERENTIAL; Future    3. Hypothyroidism due to Hashimoto's thyroiditis-under good control.  Continue same regimen medication refill.       - TSH; Future  - levothyroxine (SYNTHROID) 75 MCG Tab; Take 1 Tab by mouth Every morning on an empty stomach. Synthroid brand medically necessary.  Dispense: 90 Tab; Refill: 0    4. Malaise-probably secondary to hypertension and stress from concerns about this.       5. Aortic valve sclerosis- without stenosis 2018 echo-stable.  No significant murmur of aortic stenosis.      6. Mild aortic insufficiency- 2018 echo     I cannot detect the AI murmur at this  time.       7. Other hyperlipidemia  Under good control.  Continue same regimen  - Comp Metabolic Panel; Future  - Lipid Profile; Future    8. Pure hypercholesterolemia     As above    45 minute face-to-face encounter took place today.  More than half of this time was spent in the coordination of care of the above problems, as well as counseling.

## 2019-09-03 ENCOUNTER — HOSPITAL ENCOUNTER (OUTPATIENT)
Dept: LAB | Facility: MEDICAL CENTER | Age: 69
End: 2019-09-03
Attending: INTERNAL MEDICINE
Payer: MEDICARE

## 2019-09-03 DIAGNOSIS — E78.49 OTHER HYPERLIPIDEMIA: ICD-10-CM

## 2019-09-03 DIAGNOSIS — E03.8 HYPOTHYROIDISM DUE TO HASHIMOTO'S THYROIDITIS: ICD-10-CM

## 2019-09-03 DIAGNOSIS — E06.3 HYPOTHYROIDISM DUE TO HASHIMOTO'S THYROIDITIS: ICD-10-CM

## 2019-09-03 DIAGNOSIS — I10 BENIGN ESSENTIAL HTN: ICD-10-CM

## 2019-09-03 LAB
ALBUMIN SERPL BCP-MCNC: 4 G/DL (ref 3.2–4.9)
ALBUMIN/GLOB SERPL: 1.6 G/DL
ALP SERPL-CCNC: 77 U/L (ref 30–99)
ALT SERPL-CCNC: 35 U/L (ref 2–50)
ANION GAP SERPL CALC-SCNC: 9 MMOL/L (ref 0–11.9)
AST SERPL-CCNC: 29 U/L (ref 12–45)
BASOPHILS # BLD AUTO: 1.1 % (ref 0–1.8)
BASOPHILS # BLD: 0.06 K/UL (ref 0–0.12)
BILIRUB SERPL-MCNC: 0.9 MG/DL (ref 0.1–1.5)
BUN SERPL-MCNC: 22 MG/DL (ref 8–22)
CALCIUM SERPL-MCNC: 9.6 MG/DL (ref 8.5–10.5)
CHLORIDE SERPL-SCNC: 105 MMOL/L (ref 96–112)
CHOLEST SERPL-MCNC: 211 MG/DL (ref 100–199)
CO2 SERPL-SCNC: 25 MMOL/L (ref 20–33)
CREAT SERPL-MCNC: 0.8 MG/DL (ref 0.5–1.4)
EOSINOPHIL # BLD AUTO: 0.49 K/UL (ref 0–0.51)
EOSINOPHIL NFR BLD: 8.9 % (ref 0–6.9)
ERYTHROCYTE [DISTWIDTH] IN BLOOD BY AUTOMATED COUNT: 44.7 FL (ref 35.9–50)
FASTING STATUS PATIENT QL REPORTED: NORMAL
GLOBULIN SER CALC-MCNC: 2.5 G/DL (ref 1.9–3.5)
GLUCOSE SERPL-MCNC: 89 MG/DL (ref 65–99)
HCT VFR BLD AUTO: 40.2 % (ref 37–47)
HDLC SERPL-MCNC: 61 MG/DL
HGB BLD-MCNC: 13.5 G/DL (ref 12–16)
IMM GRANULOCYTES # BLD AUTO: 0.04 K/UL (ref 0–0.11)
IMM GRANULOCYTES NFR BLD AUTO: 0.7 % (ref 0–0.9)
LDLC SERPL CALC-MCNC: 121 MG/DL
LYMPHOCYTES # BLD AUTO: 2.21 K/UL (ref 1–4.8)
LYMPHOCYTES NFR BLD: 40.3 % (ref 22–41)
MCH RBC QN AUTO: 31.4 PG (ref 27–33)
MCHC RBC AUTO-ENTMCNC: 33.6 G/DL (ref 33.6–35)
MCV RBC AUTO: 93.5 FL (ref 81.4–97.8)
MONOCYTES # BLD AUTO: 0.46 K/UL (ref 0–0.85)
MONOCYTES NFR BLD AUTO: 8.4 % (ref 0–13.4)
NEUTROPHILS # BLD AUTO: 2.23 K/UL (ref 2–7.15)
NEUTROPHILS NFR BLD: 40.6 % (ref 44–72)
NRBC # BLD AUTO: 0 K/UL
NRBC BLD-RTO: 0 /100 WBC
PLATELET # BLD AUTO: 290 K/UL (ref 164–446)
PMV BLD AUTO: 10.4 FL (ref 9–12.9)
POTASSIUM SERPL-SCNC: 3.7 MMOL/L (ref 3.6–5.5)
PROT SERPL-MCNC: 6.5 G/DL (ref 6–8.2)
RBC # BLD AUTO: 4.3 M/UL (ref 4.2–5.4)
SODIUM SERPL-SCNC: 139 MMOL/L (ref 135–145)
TRIGL SERPL-MCNC: 143 MG/DL (ref 0–149)
TSH SERPL DL<=0.005 MIU/L-ACNC: 1.08 UIU/ML (ref 0.38–5.33)
WBC # BLD AUTO: 5.5 K/UL (ref 4.8–10.8)

## 2019-09-03 PROCEDURE — 80053 COMPREHEN METABOLIC PANEL: CPT

## 2019-09-03 PROCEDURE — 84443 ASSAY THYROID STIM HORMONE: CPT

## 2019-09-03 PROCEDURE — 36415 COLL VENOUS BLD VENIPUNCTURE: CPT

## 2019-09-03 PROCEDURE — 85025 COMPLETE CBC W/AUTO DIFF WBC: CPT

## 2019-09-03 PROCEDURE — 80061 LIPID PANEL: CPT

## 2019-09-16 ENCOUNTER — OFFICE VISIT (OUTPATIENT)
Dept: MEDICAL GROUP | Age: 69
End: 2019-09-16
Payer: MEDICARE

## 2019-09-16 VITALS
HEIGHT: 67 IN | WEIGHT: 172 LBS | SYSTOLIC BLOOD PRESSURE: 138 MMHG | BODY MASS INDEX: 27 KG/M2 | TEMPERATURE: 97.1 F | DIASTOLIC BLOOD PRESSURE: 84 MMHG | HEART RATE: 65 BPM | OXYGEN SATURATION: 96 %

## 2019-09-16 DIAGNOSIS — G25.0 ESSENTIAL TREMOR: ICD-10-CM

## 2019-09-16 DIAGNOSIS — E03.8 HYPOTHYROIDISM DUE TO HASHIMOTO'S THYROIDITIS: ICD-10-CM

## 2019-09-16 DIAGNOSIS — E78.00 PURE HYPERCHOLESTEROLEMIA: ICD-10-CM

## 2019-09-16 DIAGNOSIS — J30.1 SEASONAL ALLERGIC RHINITIS DUE TO POLLEN: ICD-10-CM

## 2019-09-16 DIAGNOSIS — I10 BENIGN ESSENTIAL HTN: Primary | ICD-10-CM

## 2019-09-16 DIAGNOSIS — Z23 NEED FOR VACCINATION: ICD-10-CM

## 2019-09-16 DIAGNOSIS — E06.3 HYPOTHYROIDISM DUE TO HASHIMOTO'S THYROIDITIS: ICD-10-CM

## 2019-09-16 PROBLEM — R53.81 MALAISE: Status: RESOLVED | Noted: 2019-08-12 | Resolved: 2019-09-16

## 2019-09-16 PROCEDURE — 99214 OFFICE O/P EST MOD 30 MIN: CPT | Mod: 25 | Performed by: FAMILY MEDICINE

## 2019-09-16 PROCEDURE — G0008 ADMIN INFLUENZA VIRUS VAC: HCPCS | Performed by: FAMILY MEDICINE

## 2019-09-16 PROCEDURE — 90662 IIV NO PRSV INCREASED AG IM: CPT | Performed by: FAMILY MEDICINE

## 2019-09-16 RX ORDER — FLUTICASONE PROPIONATE 50 MCG
1 SPRAY, SUSPENSION (ML) NASAL DAILY
Qty: 16 G | Refills: 1 | Status: SHIPPED | OUTPATIENT
Start: 2019-09-16 | End: 2019-10-12 | Stop reason: SDUPTHER

## 2019-09-16 RX ORDER — LEVOTHYROXINE SODIUM 75 MCG
75 TABLET ORAL
Qty: 90 TAB | Refills: 3 | Status: SHIPPED | OUTPATIENT
Start: 2019-11-01 | End: 2019-09-16 | Stop reason: SDUPTHER

## 2019-09-16 RX ORDER — LISINOPRIL 10 MG/1
10 TABLET ORAL DAILY
Qty: 90 TAB | Refills: 0 | Status: SHIPPED | OUTPATIENT
Start: 2019-09-16 | End: 2019-10-01

## 2019-09-16 RX ORDER — FEXOFENADINE HCL 180 MG/1
180 TABLET ORAL DAILY
Qty: 90 TAB | Refills: 1 | Status: SHIPPED | OUTPATIENT
Start: 2019-09-16 | End: 2019-12-02

## 2019-09-16 RX ORDER — PROPRANOLOL HYDROCHLORIDE 40 MG/1
40 TABLET ORAL 2 TIMES DAILY
Qty: 180 TAB | Refills: 1 | Status: SHIPPED | OUTPATIENT
Start: 2019-09-16 | End: 2020-02-07

## 2019-09-16 RX ORDER — LEVOTHYROXINE SODIUM 75 MCG
75 TABLET ORAL
Qty: 90 TAB | Refills: 3 | Status: ON HOLD | OUTPATIENT
Start: 2019-11-01 | End: 2020-02-05

## 2019-09-16 RX ORDER — LISINOPRIL 10 MG/1
10 TABLET ORAL DAILY
Qty: 90 TAB | Refills: 0 | Status: SHIPPED | OUTPATIENT
Start: 2019-09-16 | End: 2019-09-16 | Stop reason: SDUPTHER

## 2019-09-16 NOTE — PROGRESS NOTES
Subjective:   CC: Hypertension follow-up    HPI:     Margoth Hopkins is a 68 y.o. female, established patient of the clinic, presents with the following concerns:     Patient has history of essential hypertension, hypothyroidism secondary to Hashimoto thyroiditis, hyperlipidemia, essential tremor.  Hypertension was previously under good control with chlorthalidone 25 mg daily and metoprolol XL 50 mg daily.  However, patient has been experiencing elevated blood pressure at home.  Patient was seen by Dr. Rod on August 12, 2019.  At that time, her blood pressure was 168/82.  Lisinopril 5 mg daily was added to control blood pressure.  Patient tolerated lisinopril well, no side effect reported.  Her blood pressure is improving with lisinopril.  Patient is taking Synthroid 75 mcg daily for hypothyroidism.  Her recent TSH was normal.  For hyperlipidemia, she is taking Crestor 20 mg daily.  She tolerates all medication well, no side effect reported.    Today, patient complains of worsening essential tremor.  She currently does not take any medication for this condition.  However, symptoms are becoming more noticeable and are bothersome to patient.  Patient wishes to start treatment for essential tremor.    Patient also complains of dry cough, sneezing, sore throat, nasal discharge, tearing, burning eyes over the past few weeks.  She denies fever, chills, shortness of breath, dyspnea on exertion, chest pain.      Current medicines (including changes today)  Current Outpatient Medications   Medication Sig Dispense Refill   • fluticasone (FLONASE) 50 MCG/ACT nasal spray Spray 1 Spray in nose every day. 16 g 1   • fexofenadine (ALLEGRA) 180 MG tablet Take 1 Tab by mouth every day. 90 Tab 1   • [START ON 11/1/2019] SYNTHROID 75 MCG Tab Take 1 Tab by mouth Every morning on an empty stomach. 90 Tab 3   • lisinopril (PRINIVIL) 10 MG Tab Take 1 Tab by mouth every day. 90 Tab 0   • propranolol (INDERAL) 40 MG Tab Take 1 Tab by  "mouth 2 times a day. 180 Tab 1   • Probiotic Product (PROBIOTIC DAILY PO) Take  by mouth.     • MAGNESIUM PO Take  by mouth.     • PENNSAID 2 % Solution      • tizanidine (ZANAFLEX) 4 MG Tab TAKE 1/2 TO 2 TABS BY MOUTH 3 TIMES A DAY AS NEEDED FOR SPASMS  2   • HYDROcodone-acetaminophen (NORCO) 5-325 MG Tab per tablet TAKE 1 TABLET BY MOUTH TWICE A DAY AS NEEDED FOR 30 DAYS  0   • DULoxetine (CYMBALTA) 20 MG Cap DR Particles TAKE 1 CAPSULE BY MOUTH EVERY DAY 90 Cap 3   • chlorthalidone (HYGROTON) 25 MG Tab Take 1 Tab by mouth every day. 90 Tab 3   • rosuvastatin (CRESTOR) 20 MG Tab TAKE 1 TABLET BY MOUTH  EVERY EVENING 90 Tab 3   • Cholecalciferol (HM VITAMIN D3) 4000 units Cap Take 1 Cap by mouth every day. 90 Cap 3     No current facility-administered medications for this visit.      She  has a past medical history of Goiter and Thyroid disease.    I personally reviewed patient's problem list, allergies, medications, family hx, social hx with patient and update [facility].     REVIEW OF SYSTEMS:  CONSTITUTIONAL:  Denies night sweats, fatigue, malaise, lethargy, fever or chills.  RESPIRATORY:  Denies cough, wheeze, hemoptysis, or shortness of breath.  CARDIOVASCULAR:  Denies chest pains, palpitations, pedal edema     Objective:     /84 (BP Location: Left arm, Patient Position: Sitting, BP Cuff Size: Adult)   Pulse 65   Temp 36.2 °C (97.1 °F) (Temporal)   Ht 1.702 m (5' 7\")   Wt 78 kg (172 lb)   SpO2 96%  Body mass index is 26.94 kg/m².    Physical Exam:  Constitutional: awake, alert, in no distress.  Skin: Warm, dry, good turgor, no rashes, bruises, ulcers in visible areas.  Eye: conjunctiva clear, lids neg for edema or lesions.  ENMT: TM and auditory canals wnl.  Inflamed nasal mucosa with inferior nasal turbinate hypertrophy bilaterally. Lips without lesions, good dentition, hyperemic oropharynx without tonsillar hypertrophy.  Neck: Trachea midline, no masses, no thyromegaly. No cervical or supraclavicular " lymphadenopathy  Respiratory: Unlabored respiratory effort, lungs clear to auscultation, no wheezes, no rales.  Cardiovascular: Normal S1, S2, no murmur, no pedal edema.  Psych: Oriented x3, affect and mood wnl, intact judgement and insight.       Assessment and Plan:   The following treatment plan was discussed    1. Benign essential HTN  Chronic, previously under good control with chlorthalidone 25 mg daily and metoprolol SR 50 mg daily.  Patient complains of worsening blood pressure at home despite compliance to medications.  Patient was seen by Dr. Rod 4 weeks ago.  Lisinopril 5 mg was added to her existing regimen.  Her blood pressure was 138/84 today.  -Continue chlorthalidone 25 mg daily  -Discontinue metoprolol SR 50 mg.  Patient was started on propranolol 40 mg twice daily for essential tremor.  - lisinopril (PRINIVIL) 10 MG Tab; Take 1 Tab by mouth every day.  Dispense: 90 Tab; Refill: 0  - Pt was counseled on dietary modification, weight loss, smoking cessation, and avoidance of excessive alcohol consumption.    - Recommended moderate intensity exercise at least 30 minutes per day x 5 days per week.   -Patient is a retired nurse.  She was advised to continue to monitor her home blood pressure daily and email me the readings weekly.  We will adjust pharmacotherapy as directed.    2. Hypothyroidism due to Hashimoto's thyroiditis  Chronic, controlled with Synthroid 75 mcg daily, no side effect reported, will continue.  - SYNTHROID 75 MCG Tab; Take 1 Tab by mouth Every morning on an empty stomach.  Dispense: 90 Tab; Refill: 3    3. Need for vaccination  - INFLUENZA VACCINE, HIGH DOSE (65+ ONLY)    4. Pure hypercholesterolemia  -Continue Crestor 20 mg daily  - recommended dietary modification, exercise, and weight loss.      5. Seasonal allergic rhinitis due to pollen  -Nasal saline irrigation  - fluticasone (FLONASE) 50 MCG/ACT nasal spray; Spray 1 Spray in nose every day.  Dispense: 16 g; Refill: 1  -  fexofenadine (ALLEGRA) 180 MG tablet; Take 1 Tab by mouth every day.  Dispense: 90 Tab; Refill: 1    6. Essential tremor  Chronic, worsening essential tremor.  Patient wishes to start treatment for essential tremor.  - propranolol (INDERAL) 40 MG Tab; Take 1 Tab by mouth 2 times a day.  Dispense: 180 Tab; Refill: 1      Ly SPENCER Walker M.D.      Followup: Return in about 3 months (around 12/16/2019) for Annual wellness visit.    Please note that this dictation was created using voice recognition software. I have made every reasonable attempt to correct obvious errors, but I expect that there are errors of grammar and possibly content that I did not discover before finalizing the note.

## 2019-09-16 NOTE — PATIENT INSTRUCTIONS
Irrigate your nose 2 times daily.       1 spray per nostril twice daily      Take Allegra 180 mg once daily

## 2019-09-19 ENCOUNTER — TELEPHONE (OUTPATIENT)
Dept: MEDICAL GROUP | Age: 69
End: 2019-09-19

## 2019-09-19 NOTE — TELEPHONE ENCOUNTER
Fax Received From:   [facility] - [address]  [address]  [facility] [address]  Phone: [phone] Fax: [phone]        Message: Patient has been prescribed both Propanolol 40mg tab and metoprolol Succ ER 50MG Tab. Should patient be taking both medications? Please advise.

## 2019-09-20 NOTE — TELEPHONE ENCOUNTER
Patient was advised to stop taking metoprolol during previous office visit.  Please advise pharmacy to discontinue prescription for metoprolol.  Thank you

## 2019-10-01 ENCOUNTER — PATIENT MESSAGE (OUTPATIENT)
Dept: MEDICAL GROUP | Age: 69
End: 2019-10-01

## 2019-10-01 DIAGNOSIS — G47.01 INSOMNIA DUE TO MEDICAL CONDITION: ICD-10-CM

## 2019-10-01 DIAGNOSIS — I10 BENIGN ESSENTIAL HTN: ICD-10-CM

## 2019-10-01 RX ORDER — LISINOPRIL 10 MG/1
20 TABLET ORAL DAILY
Qty: 90 TAB | Refills: 0
Start: 2019-10-01 | End: 2019-11-20

## 2019-10-12 DIAGNOSIS — J30.1 SEASONAL ALLERGIC RHINITIS DUE TO POLLEN: ICD-10-CM

## 2019-10-14 RX ORDER — FLUTICASONE PROPIONATE 50 MCG
2 SPRAY, SUSPENSION (ML) NASAL DAILY
Qty: 1 BOTTLE | Refills: 3 | Status: SHIPPED | OUTPATIENT
Start: 2019-10-14 | End: 2020-01-31

## 2019-10-28 DIAGNOSIS — I10 BENIGN ESSENTIAL HTN: ICD-10-CM

## 2019-10-28 RX ORDER — ZOLPIDEM TARTRATE 5 MG/1
5 TABLET ORAL NIGHTLY PRN
Qty: 30 TAB | Refills: 0 | Status: SHIPPED | OUTPATIENT
Start: 2019-10-28 | End: 2019-10-28 | Stop reason: SDUPTHER

## 2019-10-28 RX ORDER — ZOLPIDEM TARTRATE 5 MG/1
5 TABLET ORAL NIGHTLY PRN
Qty: 30 TAB | Refills: 0 | Status: SHIPPED
Start: 2019-10-28 | End: 2020-01-28 | Stop reason: SDUPTHER

## 2019-10-30 NOTE — PROGRESS NOTES
ANNUAL WELLNESS VISIT PRE-VISIT PLANNING WITHOUT OUTREACH    1.  Reviewed note from last office visit with PCP: YES    2.  If any orders were placed at last visit, do we have Results/Consult Notes?        •  Labs - Labs ordered, completed on 9/3/2019 and results are in chart.  Note: If patient appointment is for lab review and patient did not complete labs, check with provider if OK to reschedule patient until labs completed.       •  Imaging - Imaging was not ordered at last office visit.       •  Referrals - No referrals were ordered at last office visit.    3.  Immunizations were updated in Epic using WebIZ?: Epic matches WebIZ       •  WebIZ Recommendations: SHINGRIX (Shingles)        •  Is patient due for Tdap? NO       •  Is patient due for Shingrix? YES. Patient was not notified of copay/out of pocket cost.     4.  Patient is due for the following Health Maintenance Topics:   Health Maintenance Due   Topic Date Due   • HEPATITIS C SCREENING  1950   • IMM ZOSTER VACCINES (2 of 3) 04/20/2011           5.  Reviewed/Updated the following with patient:       •   Preferred Pharmacy? YES       •   Preferred Lab? YES       •   Preferred Communication? YES       •   Allergies? YES       •   Medications? YES. Was Abstract Encounter opened and chart updated? YES       •   Social History? YES. Was Abstract Encounter opened and chart updated? YES       •   Family History (document living status of immediate family members and if + hx of  cancer, diabetes, hypertension, hyperlipidemia, heart attack, stroke) YES. Was Abstract Encounter opened and chart updated? YES    6.  Care Team Updated:       •   DME Company (gait device, O2, CPAP, etc.): N\A       •   Other Specialists (eye doctor, derm, GYN, cardiology, endo, etc): YES    7. Orders for overdue Health Maintenance topics pended in Pre-Charting? N\A    8.  Patient has the following Care Path diagnoses on Problem List:  NONE    9.  Patient was advised: “This is a  free wellness visit. The provider will screen for medical conditions to help you stay healthy. If you have other concerns to address you may be asked to discuss these at a separate visit or there may be an additional fee.”     10.  Patient was informed to arrive 15 min prior to their scheduled appointment and bring in their medication bottles.

## 2019-10-31 ENCOUNTER — OFFICE VISIT (OUTPATIENT)
Dept: MEDICAL GROUP | Age: 69
End: 2019-10-31
Payer: MEDICARE

## 2019-10-31 VITALS
TEMPERATURE: 97.4 F | HEIGHT: 67 IN | DIASTOLIC BLOOD PRESSURE: 72 MMHG | BODY MASS INDEX: 27.34 KG/M2 | OXYGEN SATURATION: 97 % | WEIGHT: 174.2 LBS | SYSTOLIC BLOOD PRESSURE: 110 MMHG | HEART RATE: 65 BPM

## 2019-10-31 DIAGNOSIS — E03.8 HYPOTHYROIDISM DUE TO HASHIMOTO'S THYROIDITIS: ICD-10-CM

## 2019-10-31 DIAGNOSIS — M54.50 LUMBOSACRAL PAIN, CHRONIC: ICD-10-CM

## 2019-10-31 DIAGNOSIS — I10 BENIGN ESSENTIAL HTN: Primary | ICD-10-CM

## 2019-10-31 DIAGNOSIS — Z71.84 TRAVEL ADVICE ENCOUNTER: ICD-10-CM

## 2019-10-31 DIAGNOSIS — E06.3 HYPOTHYROIDISM DUE TO HASHIMOTO'S THYROIDITIS: ICD-10-CM

## 2019-10-31 DIAGNOSIS — M25.561 POSTERIOR KNEE PAIN, RIGHT: ICD-10-CM

## 2019-10-31 DIAGNOSIS — E78.00 PURE HYPERCHOLESTEROLEMIA: ICD-10-CM

## 2019-10-31 DIAGNOSIS — G89.29 LUMBOSACRAL PAIN, CHRONIC: ICD-10-CM

## 2019-10-31 DIAGNOSIS — M17.11 PRIMARY OSTEOARTHRITIS OF RIGHT KNEE: ICD-10-CM

## 2019-10-31 DIAGNOSIS — G25.0 ESSENTIAL TREMOR: ICD-10-CM

## 2019-10-31 PROBLEM — Z98.890 H/O RIGHT KNEE SURGERY: Status: RESOLVED | Noted: 2018-07-20 | Resolved: 2019-10-31

## 2019-10-31 PROBLEM — Z91.81 RISK FOR FALLS: Status: RESOLVED | Noted: 2017-07-24 | Resolved: 2019-10-31

## 2019-10-31 PROBLEM — G47.62 NOCTURNAL LEG CRAMPS: Status: RESOLVED | Noted: 2019-05-14 | Resolved: 2019-10-31

## 2019-10-31 PROCEDURE — 99215 OFFICE O/P EST HI 40 MIN: CPT | Mod: 25 | Performed by: FAMILY MEDICINE

## 2019-10-31 PROCEDURE — 90472 IMMUNIZATION ADMIN EACH ADD: CPT | Performed by: FAMILY MEDICINE

## 2019-10-31 PROCEDURE — 90707 MMR VACCINE SC: CPT | Performed by: FAMILY MEDICINE

## 2019-10-31 PROCEDURE — 90471 IMMUNIZATION ADMIN: CPT | Performed by: FAMILY MEDICINE

## 2019-10-31 PROCEDURE — 90632 HEPA VACCINE ADULT IM: CPT | Performed by: FAMILY MEDICINE

## 2019-10-31 RX ORDER — SULFAMETHOXAZOLE AND TRIMETHOPRIM 800; 160 MG/1; MG/1
1 TABLET ORAL 2 TIMES DAILY
Qty: 10 TAB | Refills: 0 | Status: SHIPPED | OUTPATIENT
Start: 2019-10-31 | End: 2019-11-05

## 2019-10-31 RX ORDER — PREGABALIN 50 MG/1
50 CAPSULE ORAL NIGHTLY PRN
Qty: 90 CAP | Refills: 0 | Status: SHIPPED | OUTPATIENT
Start: 2019-10-31 | End: 2021-12-30

## 2019-10-31 ASSESSMENT — ENCOUNTER SYMPTOMS: GENERAL WELL-BEING: FAIR

## 2019-10-31 ASSESSMENT — ACTIVITIES OF DAILY LIVING (ADL): BATHING_REQUIRES_ASSISTANCE: 0

## 2019-10-31 ASSESSMENT — PATIENT HEALTH QUESTIONNAIRE - PHQ9: CLINICAL INTERPRETATION OF PHQ2 SCORE: 0

## 2019-10-31 ASSESSMENT — PAIN SCALES - GENERAL: PAINLEVEL: NO PAIN

## 2019-10-31 NOTE — PROGRESS NOTES
Subjective:   CC: posterior right knee pain    HPI:     Margoth Hopkins is a 68 y.o. female, established patient of the clinic, presents with the following concerns:     She states her propranolol is helping with her tremors. Her blood pressure is 110/72 in the office today. The patient will be traveling to [place] and [facility] soon and is requesting that I review requested vaccines.    Pt has hx of chronic R knee pain s/p TKR with 2 revisions surgeries. She was doing well until recently when she develops acute posterior R knee pain. The pain is exacerbated by laying supine at night and is not improved with repositioning her knee with pillows. She denies leg swelling, although, she describes a sensation of fullness behind her R knee.     Pt also has hx of chronic lower back pain, currently working Dr. Amado, multiple steroid Injections have not improved the pain. Patient is no longer taking her Norco regularly and doesn't notice much pain control with the Norco anyway. She complains that gabapentin causes poor balance. She has never tried taking the gabapentin before bedtime.     Pt has hx of essential HTN. Her BP was elevated at previous OV. She was advised to take Chlorthalidone 25 mg, Lisinopril 20 mg, and Propranolol 40 mg BID for both essential tremors and HTN. Her BP is 110/72. She states that home BP still in the 140s for systemic and 90s for diastolic.     She tolerates all medications well, no s/e reported.     Current medicines (including changes today)  Current Outpatient Medications   Medication Sig Dispense Refill   • pregabalin (LYRICA) 50 MG capsule Take 1 Cap by mouth at bedtime as needed for up to 90 days. 90 Cap 0   • sulfamethoxazole-trimethoprim (BACTRIM DS) 800-160 MG tablet Take 1 Tab by mouth 2 times a day for 5 days. 10 Tab 0   • typhoid (VIVOTIF) SR capsule Take 1 capsul on day 1, 3, 5, and 7 one week prior to the trip. 4 Cap 0   • zolpidem (AMBIEN) 5 MG Tab Take 1 Tab by mouth at  "bedtime as needed for Sleep for up to 30 days. 30 Tab 0   • fluticasone (FLONASE) 50 MCG/ACT nasal spray Spray 2 Sprays in nose every day. 1 Bottle 3   • lisinopril (PRINIVIL) 10 MG Tab Take 2 Tabs by mouth every day. 90 Tab 0   • fexofenadine (ALLEGRA) 180 MG tablet Take 1 Tab by mouth every day. 90 Tab 1   • [START ON 11/1/2019] SYNTHROID 75 MCG Tab Take 1 Tab by mouth Every morning on an empty stomach. 90 Tab 3   • propranolol (INDERAL) 40 MG Tab Take 1 Tab by mouth 2 times a day. 180 Tab 1   • Probiotic Product (PROBIOTIC DAILY PO) Take  by mouth.     • MAGNESIUM PO Take  by mouth.     • PENNSAID 2 % Solution      • tizanidine (ZANAFLEX) 4 MG Tab TAKE 1/2 TO 2 TABS BY MOUTH 3 TIMES A DAY AS NEEDED FOR SPASMS  2   • HYDROcodone-acetaminophen (NORCO) 5-325 MG Tab per tablet TAKE 1 TABLET BY MOUTH TWICE A DAY AS NEEDED FOR 30 DAYS  0   • DULoxetine (CYMBALTA) 20 MG Cap DR Particles TAKE 1 CAPSULE BY MOUTH EVERY DAY 90 Cap 3   • chlorthalidone (HYGROTON) 25 MG Tab Take 1 Tab by mouth every day. 90 Tab 3   • rosuvastatin (CRESTOR) 20 MG Tab TAKE 1 TABLET BY MOUTH  EVERY EVENING 90 Tab 3   • Cholecalciferol (HM VITAMIN D3) 4000 units Cap Take 1 Cap by mouth every day. 90 Cap 3     No current facility-administered medications for this visit.      She  has a past medical history of Goiter and Thyroid disease.    I personally reviewed patient's problem list, allergies, medications, family hx, social hx with patient and update [facility].     REVIEW OF SYSTEMS:  CONSTITUTIONAL:  Denies night sweats, fatigue, malaise, lethargy, fever or chills.  RESPIRATORY:  Denies cough, wheeze, hemoptysis, or shortness of breath.  CARDIOVASCULAR:  Denies chest pains, palpitations, pedal edema     Objective:     /72 (BP Location: Left arm, Patient Position: Sitting, BP Cuff Size: Adult)   Pulse 65   Temp 36.3 °C (97.4 °F)   Ht 1.702 m (5' 7\")   Wt 79 kg (174 lb 3.2 oz)   SpO2 97%  Body mass index is 27.28 kg/m².    Physical " Exam:  Constitutional: awake, alert, in no distress.  Skin: Warm, dry, good turgor, no rashes, bruises, ulcers in visible areas.  Neck: Trachea midline, no masses, no thyromegaly. No cervical or supraclavicular lymphadenopathy  Respiratory: Unlabored respiratory effort, lungs clear to auscultation, no wheezes, no rales.  Cardiovascular: Normal S1, S2, no murmur, no pedal edema.  Psych: Oriented x3, affect and mood wnl, intact judgement and insight.   MSK: palpation right posterior knee fullness with pressure sensation to palpation but no pain. The rest of R knee exam was unremarkable.     Assessment and Plan:   The following treatment plan was discussed    1. Benign essential HTN  Chronic, currently taking Lisinopril 20 mg and Chlorthalidone 25 mg qd. Her BP was 110/72 today. However, she states that home blood pressure remains elevated. She states that she feels better with recent adjustment of anti-HTN meds. She would like to return to clinic in 4 weeks for BP recheck. Pt will bring home BP machine to  for callibration. Pt was advised to cont all meds.   - 4 wks f/u for BP.   - Pt was counseled on dietary modification, weight loss, smoking cessation, and avoidance of excessive alcohol consumption.    - Recommended moderate intensity exercise at least 30 minutes per day x 5 days per week.     2. Hypothyroidism due to Hashimoto's thyroiditis  Chronic, controlled with Levothyroxine 75 mcg daily, normal TSH with recent blood tests in 9/2019.   - cont Levothyroxine 75 mcg dialy    3. Lumbosacral pain, chronic, with right sciatica_ROC_Dr. Amado   Chronic, s/p several steroid injections w/o significant relief, takes Norco PRN for pain, tried Gabapentin but c/o sedation. Plans:   - pregabalin (LYRICA) 50 MG capsule; Take 1 Cap by mouth at bedtime as needed for up to 90 days.  Dispense: 90 Cap; Refill: 0  - weight loss, regular exercises    4. Pure hypercholesterolemia  Chronic, taking Crestor 20 mg daily, no s/e  reported, will cont  - recommended dietary modification, exercise, and weight loss.      5. Primary osteoarthritis of right knee, S/P TKR + 2 revision surgeries  6. Posterior knee pain, right  Chronic, s/p TRK replacement with 2 revisions by Dr. Lomeli. She is doing well but c/o posterior R knee discomfort and fullness sensation especially at night. Exam is concerning for posterior R knee fullness concerning for Baker's cyst.   - US-EXTREMITY NON VASCULAR UNILATERAL RIGHT; Future    7. Travel advice encounter  Pt is traveling to [facility], [place], [place], and [place].  Patient requests travel counseling and vaccination.  [facility] travel recommendations discussed with patient.  Appropriate counseling provided.  - sulfamethoxazole-trimethoprim (BACTRIM DS) 800-160 MG tablet; Take 1 Tab by mouth 2 times a day for 5 days.  Dispense: 10 Tab; Refill: 0  - Hepatitis A Vaccine Adult IM  - typhoid (VIVOTIF) SR capsule; Take 1 capsul on day 1, 3, 5, and 7 one week prior to the trip.  Dispense: 4 Cap; Refill: 0  - MMR Vaccine SQ    8. Essential tremor  Chronic, improved with propranolol 40 mg twice daily, no side effect reported, will continue.    Patient was seen for 40 minutes face to face of which greater than 50% of appointment time was spent on counseling and coordination of care regarding the above     Ly SPENCER Walker M.D.      Followup: Return in about 4 weeks (around 11/28/2019) for Hypertension.    Please note that this dictation was created using voice recognition software. I have made every reasonable attempt to correct obvious errors, but I expect that there are errors of grammar and possibly content that I did not discover before finalizing the note.

## 2019-11-18 ENCOUNTER — HOSPITAL ENCOUNTER (OUTPATIENT)
Dept: RADIOLOGY | Facility: MEDICAL CENTER | Age: 69
End: 2019-11-18
Attending: FAMILY MEDICINE
Payer: MEDICARE

## 2019-11-18 DIAGNOSIS — I10 BENIGN ESSENTIAL HTN: ICD-10-CM

## 2019-11-18 DIAGNOSIS — M25.561 POSTERIOR KNEE PAIN, RIGHT: ICD-10-CM

## 2019-11-18 PROCEDURE — 76882 US LMTD JT/FCL EVL NVASC XTR: CPT | Mod: RT

## 2019-11-19 NOTE — TELEPHONE ENCOUNTER
Was the patient seen in the last year in this department? Yes    Does patient have an active prescription for medications requested? No     Received Request Via: Pharmacy  
None

## 2019-11-20 RX ORDER — LISINOPRIL 20 MG/1
20 TABLET ORAL DAILY
Qty: 90 TAB | Refills: 3 | Status: SHIPPED | OUTPATIENT
Start: 2019-11-20 | End: 2020-02-07

## 2019-11-20 RX ORDER — LISINOPRIL 10 MG/1
TABLET ORAL
Qty: 90 TAB | Refills: 0 | OUTPATIENT
Start: 2019-11-20

## 2019-11-21 DIAGNOSIS — E78.00 PURE HYPERCHOLESTEROLEMIA: ICD-10-CM

## 2019-11-23 RX ORDER — ROSUVASTATIN CALCIUM 20 MG/1
TABLET, COATED ORAL
Qty: 90 TAB | Refills: 3 | Status: SHIPPED | OUTPATIENT
Start: 2019-11-23 | End: 2020-06-12

## 2019-11-29 ENCOUNTER — OFFICE VISIT (OUTPATIENT)
Dept: URGENT CARE | Facility: MEDICAL CENTER | Age: 69
End: 2019-11-29
Payer: MEDICARE

## 2019-11-29 ENCOUNTER — HOSPITAL ENCOUNTER (OUTPATIENT)
Dept: RADIOLOGY | Facility: MEDICAL CENTER | Age: 69
End: 2019-11-29
Attending: FAMILY MEDICINE
Payer: MEDICARE

## 2019-11-29 VITALS
HEART RATE: 57 BPM | SYSTOLIC BLOOD PRESSURE: 118 MMHG | HEIGHT: 67 IN | DIASTOLIC BLOOD PRESSURE: 68 MMHG | BODY MASS INDEX: 26.93 KG/M2 | TEMPERATURE: 97.5 F | RESPIRATION RATE: 12 BRPM | OXYGEN SATURATION: 97 % | WEIGHT: 171.6 LBS

## 2019-11-29 DIAGNOSIS — F41.1 GAD (GENERALIZED ANXIETY DISORDER): ICD-10-CM

## 2019-11-29 DIAGNOSIS — R05.9 COUGH: ICD-10-CM

## 2019-11-29 DIAGNOSIS — J01.90 ACUTE RHINOSINUSITIS: ICD-10-CM

## 2019-11-29 DIAGNOSIS — J20.9 ACUTE BRONCHITIS WITH BRONCHOSPASM: ICD-10-CM

## 2019-11-29 DIAGNOSIS — M17.11 PRIMARY OSTEOARTHRITIS OF RIGHT KNEE: ICD-10-CM

## 2019-11-29 PROCEDURE — 71046 X-RAY EXAM CHEST 2 VIEWS: CPT

## 2019-11-29 PROCEDURE — 99214 OFFICE O/P EST MOD 30 MIN: CPT | Performed by: EMERGENCY MEDICINE

## 2019-11-29 RX ORDER — BENZONATATE 100 MG/1
100 CAPSULE ORAL 3 TIMES DAILY PRN
Qty: 20 CAP | Refills: 0 | Status: SHIPPED | OUTPATIENT
Start: 2019-11-29 | End: 2020-01-08

## 2019-11-29 RX ORDER — DOXYCYCLINE HYCLATE 100 MG
100 TABLET ORAL 2 TIMES DAILY
Qty: 14 TAB | Refills: 0 | Status: SHIPPED | OUTPATIENT
Start: 2019-11-29 | End: 2020-01-08

## 2019-11-29 ASSESSMENT — ENCOUNTER SYMPTOMS
HEMOPTYSIS: 0
COUGH: 1
SENSORY CHANGE: 0
DIARRHEA: 0
SPUTUM PRODUCTION: 0
RHINORRHEA: 1
VOMITING: 0
SINUS PAIN: 0
SORE THROAT: 1
HEADACHES: 1
FEVER: 0
HEARTBURN: 0
SHORTNESS OF BREATH: 0
WHEEZING: 0
FOCAL WEAKNESS: 0

## 2019-11-29 NOTE — PROGRESS NOTES
Subjective:      Margoth Hopkins is a 68 y.o. female who presents with Cough (x2weeks, consistent cough, fatigue, loss of voice, chest congestion, )            Cough   This is a new problem. Episode onset: 2 weeks. The problem has been gradually worsening. The cough is non-productive. Associated symptoms include headaches, nasal congestion, rhinorrhea and a sore throat. Pertinent negatives include no chest pain, ear congestion, ear pain, fever, heartburn, hemoptysis, rash, shortness of breath or wheezing. The symptoms are aggravated by lying down. She has tried OTC cough suppressant for the symptoms. The treatment provided no relief. Her past medical history is significant for bronchitis. There is no history of pneumonia.       Review of Systems   Constitutional: Positive for malaise/fatigue. Negative for fever.   HENT: Positive for congestion, rhinorrhea and sore throat. Negative for ear pain, nosebleeds and sinus pain.    Respiratory: Positive for cough. Negative for hemoptysis, sputum production, shortness of breath and wheezing.    Cardiovascular: Negative for chest pain.   Gastrointestinal: Negative for diarrhea, heartburn and vomiting.   Skin: Negative for rash.   Neurological: Positive for headaches. Negative for sensory change and focal weakness.     PMH:  has a past medical history of Goiter and Thyroid disease.  MEDS:   Current Outpatient Medications:   •  rosuvastatin (CRESTOR) 20 MG Tab, TAKE 1 TABLET BY MOUTH  EVERY EVENING, Disp: 90 Tab, Rfl: 3  •  lisinopril (PRINIVIL) 20 MG Tab, Take 1 Tab by mouth every day., Disp: 90 Tab, Rfl: 3  •  pregabalin (LYRICA) 50 MG capsule, Take 1 Cap by mouth at bedtime as needed for up to 90 days., Disp: 90 Cap, Rfl: 0  •  typhoid (VIVOTIF) SR capsule, Take 1 capsul on day 1, 3, 5, and 7 one week prior to the trip., Disp: 4 Cap, Rfl: 0  •  fluticasone (FLONASE) 50 MCG/ACT nasal spray, Spray 2 Sprays in nose every day., Disp: 1 Bottle, Rfl: 3  •  SYNTHROID 75 MCG Tab,  Take 1 Tab by mouth Every morning on an empty stomach., Disp: 90 Tab, Rfl: 3  •  propranolol (INDERAL) 40 MG Tab, Take 1 Tab by mouth 2 times a day., Disp: 180 Tab, Rfl: 1  •  Probiotic Product (PROBIOTIC DAILY PO), Take  by mouth., Disp: , Rfl:   •  MAGNESIUM PO, Take  by mouth., Disp: , Rfl:   •  tizanidine (ZANAFLEX) 4 MG Tab, TAKE 1/2 TO 2 TABS BY MOUTH 3 TIMES A DAY AS NEEDED FOR SPASMS, Disp: , Rfl: 2  •  HYDROcodone-acetaminophen (NORCO) 5-325 MG Tab per tablet, TAKE 1 TABLET BY MOUTH TWICE A DAY AS NEEDED FOR 30 DAYS, Disp: , Rfl: 0  •  DULoxetine (CYMBALTA) 20 MG Cap DR Particles, TAKE 1 CAPSULE BY MOUTH EVERY DAY, Disp: 90 Cap, Rfl: 3  •  chlorthalidone (HYGROTON) 25 MG Tab, Take 1 Tab by mouth every day., Disp: 90 Tab, Rfl: 3  •  Cholecalciferol (HM VITAMIN D3) 4000 units Cap, Take 1 Cap by mouth every day., Disp: 90 Cap, Rfl: 3  •  fexofenadine (ALLEGRA) 180 MG tablet, Take 1 Tab by mouth every day. (Patient not taking: Reported on 11/29/2019), Disp: 90 Tab, Rfl: 1  •  PENNSAID 2 % Solution, , Disp: , Rfl:   ALLERGIES:   Allergies   Allergen Reactions   • Morphine Vomiting   • Nsaids Unspecified     History of gastric ulcers - cannot take NSAIDS   • Pcn [Penicillins] Hives   • Gabapentin      Cognitive changes      SURGHX:   Past Surgical History:   Procedure Laterality Date   • APPENDECTOMY     • BLADDER SLING FEMALE     • CATARACT EXTRACTION WITH IOL Left    • CERVICAL FUSION POSTERIOR     • FOOT SURGERY Left     metatarsal fusion, hammer toes correction   • FUSION      disc fusion   • KNEE REPLACEMENT, TOTAL Right      SOCHX:  reports that she has never smoked. She has never used smokeless tobacco. She reports current alcohol use of about 4.2 oz of alcohol per week. She reports that she does not use drugs.  FH: family history includes Alzheimer's Disease in her mother; Arthritis in her mother; Cancer in her mother; Cancer (age of onset: 50) in her maternal grandmother; Heart Attack in her father;  "Heart Disease in her brother; Other in her brother.     Objective:     /68 (BP Location: Left arm, Patient Position: Sitting, BP Cuff Size: Adult)   Pulse (!) 57   Temp 36.4 °C (97.5 °F) (Temporal)   Resp 12   Ht 1.702 m (5' 7\")   Wt 77.8 kg (171 lb 9.6 oz)   SpO2 97%   BMI 26.88 kg/m²      Physical Exam  Constitutional:       General: She is not in acute distress.     Appearance: She is well-developed. She is not toxic-appearing.   HENT:      Right Ear: Tympanic membrane and ear canal normal.      Left Ear: Tympanic membrane and ear canal normal.      Nose: Mucosal edema and rhinorrhea present.      Mouth/Throat:      Pharynx: Posterior oropharyngeal erythema present. No oropharyngeal exudate.   Eyes:      Conjunctiva/sclera: Conjunctivae normal.   Neck:      Musculoskeletal: Neck supple.      Trachea: Trachea normal.   Cardiovascular:      Rate and Rhythm: Normal rate and regular rhythm.      Heart sounds: Murmur present.   Pulmonary:      Effort: Pulmonary effort is normal.      Breath sounds: Decreased breath sounds present. No wheezing, rhonchi or rales.   Musculoskeletal:      Right lower leg: No edema.      Left lower leg: No edema.   Lymphadenopathy:      Cervical: No cervical adenopathy.   Skin:     General: Skin is warm and dry.   Neurological:      Mental Status: She is alert and oriented to person, place, and time.   Psychiatric:         Behavior: Behavior is cooperative.                 Assessment/Plan:       1. Acute bronchitis with bronchospasm  Rx ProAir, Tessalon  Recommended supportive care measures, including rest, increasing oral fluid intake and use of over-the-counter medications for relief of symptoms.  2. Acute rhinosinusitis  Due to duration:  Rx doxycycline  3. Cough  - DX-CHEST-2 VIEWS; per radiologist:  No radiographic evidence of acute cardiopulmonary process.    "

## 2019-12-02 ENCOUNTER — OFFICE VISIT (OUTPATIENT)
Dept: MEDICAL GROUP | Age: 69
End: 2019-12-02
Payer: MEDICARE

## 2019-12-02 ENCOUNTER — PATIENT MESSAGE (OUTPATIENT)
Dept: MEDICAL GROUP | Age: 69
End: 2019-12-02

## 2019-12-02 VITALS
OXYGEN SATURATION: 95 % | WEIGHT: 173 LBS | SYSTOLIC BLOOD PRESSURE: 118 MMHG | HEART RATE: 54 BPM | HEIGHT: 67 IN | TEMPERATURE: 97.6 F | BODY MASS INDEX: 27.15 KG/M2 | DIASTOLIC BLOOD PRESSURE: 70 MMHG

## 2019-12-02 DIAGNOSIS — E03.8 HYPOTHYROIDISM DUE TO HASHIMOTO'S THYROIDITIS: ICD-10-CM

## 2019-12-02 DIAGNOSIS — M17.11 PRIMARY OSTEOARTHRITIS OF RIGHT KNEE: ICD-10-CM

## 2019-12-02 DIAGNOSIS — I10 BENIGN ESSENTIAL HTN: ICD-10-CM

## 2019-12-02 DIAGNOSIS — J06.9 VIRAL URI WITH COUGH: Primary | ICD-10-CM

## 2019-12-02 DIAGNOSIS — E06.3 HYPOTHYROIDISM DUE TO HASHIMOTO'S THYROIDITIS: ICD-10-CM

## 2019-12-02 DIAGNOSIS — E78.00 PURE HYPERCHOLESTEROLEMIA: ICD-10-CM

## 2019-12-02 PROCEDURE — 99214 OFFICE O/P EST MOD 30 MIN: CPT | Performed by: FAMILY MEDICINE

## 2019-12-02 RX ORDER — ALBUTEROL SULFATE 90 UG/1
2 AEROSOL, METERED RESPIRATORY (INHALATION) EVERY 6 HOURS PRN
Qty: 8.5 G | Refills: 5 | Status: SHIPPED | OUTPATIENT
Start: 2019-12-02 | End: 2020-01-08

## 2019-12-02 RX ORDER — HYDROCODONE BITARTRATE AND ACETAMINOPHEN 5; 325 MG/1; MG/1
1 TABLET ORAL
Qty: 30 TAB | Refills: 0 | Status: SHIPPED | OUTPATIENT
Start: 2019-12-02 | End: 2020-01-01

## 2019-12-02 RX ORDER — DULOXETIN HYDROCHLORIDE 20 MG/1
CAPSULE, DELAYED RELEASE ORAL
Qty: 90 CAP | Refills: 1 | Status: SHIPPED | OUTPATIENT
Start: 2019-12-02 | End: 2020-05-18

## 2019-12-02 ASSESSMENT — PAIN SCALES - GENERAL: PAINLEVEL: 5=MODERATE PAIN

## 2019-12-02 NOTE — PROGRESS NOTES
Subjective:   CC: HTN follow up     HPI:     Margoth Hopkins is a 68 y.o. female who is an established patient of the clinic, presents with the following concerns:       Patient has a history of hypertension, currently taking Lisinopril 20 mg and Chorthalidone 25 mg QD. Patient's BP was 118/70 today. She denies any side effects from medications. Patient denies any vision changes, headache, dizziness, nausea, or vomiting.     Patient complains of an onset of cough and congestion 1 week ago. She was seen at Urgent Care on 11/29/19 for her cough and prescribed Tessalon, Proair inhaler, and course of Doxycyline. Patient reports minimal improvement with antibiotics but complains of a persistent cough not improving with Tessalon. She tried to take left-over codeine cough syrup w/o improvement as well. Patient denies any chest pain or shortness of breath.     Patient has a history of chronic right knee pain secondary to osteoarthritis, s/p 2 previous revision surgeries by Dr. Lomeli. Patient treats her chronic knee pain with Norco 5-325 mg as needed. Her last Rx of Norco was 6 months ago.     Patient has a history of hypothyroidism, currently taking Synthroid 75 mcg daily. She denies any side effects.     Patient has a history of pure hypercholesteremia, currently taking Rosuvastatin 20 mg daily. She denies any side effects.             Current medicines (including changes today)  Current Outpatient Medications   Medication Sig Dispense Refill   • Hydrocod Polst-CPM Polst ER (TUSSIONEX) 10-8 MG/5ML Suspension Extended Release Take 5 mL by mouth every 12 hours for 10 days. 100 mL 0   • albuterol 108 (90 Base) MCG/ACT Aero Soln inhalation aerosol Inhale 2 Puffs by mouth every 6 hours as needed for Shortness of Breath. 8.5 g 5   • HYDROcodone-acetaminophen (NORCO) 5-325 MG Tab per tablet Take 1 Tab by mouth 1 time daily as needed for up to 30 days. 30 Tab 0   • benzonatate (TESSALON) 100 MG Cap Take 1 Cap by mouth 3 times a  "day as needed for Cough. 20 Cap 0   • doxycycline (VIBRAMYCIN) 100 MG Tab Take 1 Tab by mouth 2 times a day. 14 Tab 0   • rosuvastatin (CRESTOR) 20 MG Tab TAKE 1 TABLET BY MOUTH  EVERY EVENING 90 Tab 3   • lisinopril (PRINIVIL) 20 MG Tab Take 1 Tab by mouth every day. 90 Tab 3   • pregabalin (LYRICA) 50 MG capsule Take 1 Cap by mouth at bedtime as needed for up to 90 days. 90 Cap 0   • typhoid (VIVOTIF) SR capsule Take 1 capsul on day 1, 3, 5, and 7 one week prior to the trip. 4 Cap 0   • fluticasone (FLONASE) 50 MCG/ACT nasal spray Spray 2 Sprays in nose every day. 1 Bottle 3   • SYNTHROID 75 MCG Tab Take 1 Tab by mouth Every morning on an empty stomach. 90 Tab 3   • propranolol (INDERAL) 40 MG Tab Take 1 Tab by mouth 2 times a day. 180 Tab 1   • Probiotic Product (PROBIOTIC DAILY PO) Take  by mouth.     • MAGNESIUM PO Take  by mouth.     • PENNSAID 2 % Solution      • tizanidine (ZANAFLEX) 4 MG Tab TAKE 1/2 TO 2 TABS BY MOUTH 3 TIMES A DAY AS NEEDED FOR SPASMS  2   • DULoxetine (CYMBALTA) 20 MG Cap DR Particles TAKE 1 CAPSULE BY MOUTH EVERY DAY 90 Cap 3   • chlorthalidone (HYGROTON) 25 MG Tab Take 1 Tab by mouth every day. 90 Tab 3   • Cholecalciferol (HM VITAMIN D3) 4000 units Cap Take 1 Cap by mouth every day. 90 Cap 3     No current facility-administered medications for this visit.      She  has a past medical history of Goiter and Thyroid disease.    I personally reviewed patient's problem list, allergies, medications, family hx, social hx with patient and update [facility].     REVIEW OF SYSTEMS:  CONSTITUTIONAL:  Denies night sweats, fatigue, malaise, lethargy, fever or chills.  RESPIRATORY:  Denies wheeze, hemoptysis, or shortness of breath.  CARDIOVASCULAR:  Denies chest pains, palpitations, pedal edema     Objective:     /70 (BP Location: Left arm, Patient Position: Sitting, BP Cuff Size: Adult)   Pulse (!) 54   Temp 36.4 °C (97.6 °F)   Ht 1.702 m (5' 7\")   Wt 78.5 kg (173 lb)   SpO2 95%  Body mass " index is 27.1 kg/m².    Physical Exam:  Constitutional: awake, alert, in no distress.  Skin: Warm, dry, good turgor, no rashes, bruises, ulcers in visible areas.  Neck: Trachea midline, no masses, no thyromegaly. No cervical or supraclavicular lymphadenopathy  Respiratory: Unlabored respiratory effort, lungs clear to auscultation, no wheezes, no rales.  Cardiovascular: Normal S1, S2, no murmur, no pedal edema.  Psych: Oriented x3, affect and mood wnl, intact judgement and insight.       Assessment and Plan:   The following treatment plan was discussed    1. Benign essential HTN  Chronic, controlled with Chlorthalidone 25 mg and Lisinopril 20 mg qd, no s/e reported, will continue.    - Pt was counseled on lifestyle modification     - CBC WITH DIFFERENTIAL; Future  - Comp Metabolic Panel; Future  - MICROALBUMIN CREAT RATIO URINE; Future    2. Viral URI with cough  Pt was recently seen by . She was diagnosed with Bronchitis. Proair, Doxycycline, and Tessalon were prescribed w/o significant improvement. She tried left-over cough syrup with codeine w/o improvement as well. She is traveling to [place] in a few days and wants to get better prior to the trip.   - Hydrocod Polst-CPM Polst ER (TUSSIONEX) 10-8 MG/5ML Suspension Extended Release; Take 5 mL by mouth every 12 hours for 10 days.  Dispense: 100 mL; Refill: 0  - albuterol 108 (90 Base) MCG/ACT Aero Soln inhalation aerosol; Inhale 2 Puffs by mouth every 6 hours as needed for Shortness of Breath.  Dispense: 8.5 g; Refill: 5  - Risks, benefits, side effects, as well as potential health complications associated with Tussionex discussed with patient. Appropriate counseling provided.      3. Primary osteoarthritis of right knee, S/P TKR + 2 revision surgeries_Dr. Lomeli  Chronic OA affecting the R knee, s/p TKR + 2 revisions by Dr. Lomeli but knee pain remains chronic. She manages her symptoms with exercises, weight loss, and behavioral modification. She intermittently  takes Norco 5-325 mg qd PRN for pain. Her last Rx for 30 tablets of Norco was 6 months ago. Pt is traveling to [place] and concerns of worsening knee pain from excessive ambulation and requests Rx for Norco.   - HYDROcodone-acetaminophen (NORCO) 5-325 MG Tab per tablet; Take 1 Tab by mouth 1 time daily as needed for up to 30 days.  Dispense: 30 Tab; Refill: 0  - Consent for Opiate Prescription  - Risks, benefits, side effects, as well as potential health complications associated with Norco discussed with patient. Appropriate counseling provided.      4. Hypothyroidism due to Hashimoto's thyroiditis  Chronic controlled with Levothyroxine 75 mcg daily, no s/e reported, due for repeat labs  - cont Levothyroxine 75 mcg daily  - TSH; Future    5. Pure hypercholesterolemia  Chronic, controlled with Crestor 20 mg qd, no s/e reported, will cont.   - Lipid Profile; Future       Charlee Walker M.D.    Followup: Return in about 6 months (around 6/2/2020) for Annual wellness visit.    Please note that this dictation was created using voice recognition software and/or scribes. I have made every reasonable attempt to correct obvious errors, but I expect that there are errors of grammar and possibly content that I did not discover before finalizing the note.     IMarc (Scribe), am scribing for, and in the presence of, Charlee Walker M.D.    Electronically signed by: Marc Patel ([email or web address]), 12/2/2019    Charlee ALANIS M.D. personally performed the services described in this documentation, as scribed by Marc Patel in my presence, and it is both accurate and complete.

## 2019-12-02 NOTE — TELEPHONE ENCOUNTER
Was the patient seen in the last year in this department? Yes    Does patient have an active prescription for medications requested? Yes     Medication needs to go to [facility]. Pharmacy has been updated.    Received Request Via: Pharmacy

## 2019-12-05 RX ORDER — GUAIFENESIN AND DEXTROMETHORPHAN HYDROBROMIDE 100; 10 MG/5ML; MG/5ML
10 SOLUTION ORAL EVERY 4 HOURS PRN
Qty: 600 ML | Refills: 0 | Status: SHIPPED | OUTPATIENT
Start: 2019-12-05 | End: 2020-01-08

## 2020-01-07 RX ORDER — CHLORTHALIDONE 25 MG/1
25 TABLET ORAL DAILY
Qty: 90 TAB | Refills: 3 | Status: SHIPPED | OUTPATIENT
Start: 2020-01-07 | End: 2020-12-07

## 2020-01-08 ENCOUNTER — OFFICE VISIT (OUTPATIENT)
Dept: ENDOCRINOLOGY | Facility: MEDICAL CENTER | Age: 70
End: 2020-01-08
Payer: MEDICARE

## 2020-01-08 VITALS
HEART RATE: 74 BPM | SYSTOLIC BLOOD PRESSURE: 122 MMHG | BODY MASS INDEX: 26.68 KG/M2 | OXYGEN SATURATION: 100 % | WEIGHT: 170 LBS | DIASTOLIC BLOOD PRESSURE: 64 MMHG | HEIGHT: 67 IN

## 2020-01-08 DIAGNOSIS — E55.9 VITAMIN D DEFICIENCY: ICD-10-CM

## 2020-01-08 DIAGNOSIS — E03.8 HYPOTHYROIDISM DUE TO HASHIMOTO'S THYROIDITIS: ICD-10-CM

## 2020-01-08 DIAGNOSIS — E04.2 MULTIPLE THYROID NODULES: ICD-10-CM

## 2020-01-08 DIAGNOSIS — E04.2 MULTINODULAR GOITER (NONTOXIC): ICD-10-CM

## 2020-01-08 DIAGNOSIS — E06.3 HYPOTHYROIDISM DUE TO HASHIMOTO'S THYROIDITIS: ICD-10-CM

## 2020-01-08 PROCEDURE — 99214 OFFICE O/P EST MOD 30 MIN: CPT | Performed by: INTERNAL MEDICINE

## 2020-01-08 RX ORDER — OMEPRAZOLE 20 MG/1
CAPSULE, DELAYED RELEASE ORAL DAILY
COMMUNITY
Start: 2020-01-02 | End: 2022-12-08

## 2020-01-08 NOTE — PROGRESS NOTES
Dictation #1  MRN:[id number]  [facility]:[id number]  Endocrinology Clinic Progress Note  PCP: Charlee Walker M.D.    HPI:  Margoth Hopkins is a 69 y.o. old patient who comes in today for review of her endocrine problems.   She has a multiglandular thyroid nodules.   She is complaining of neck and throat discomfort, states it is a choking sensation.    Last ultrasound was performed in June of 2019 which showed:    Nodule #1  Location:  Right  lower  Size:  3.2 x 1.8 x 1.8 cm  Composition:  Mixed-1  Echogenicity:  Isoechoic-1  Shape:  Wider than tall-0  Margins:  Smooth-0  Echogenic Foci:  None-0     ACR TIRADS points/category:  2 - TR2 - Not Suspicious     Nodule #2  Location:  Right  upper  Size:  0.9 x 0.7 x 1.2 cm  Composition:  Solid-2  Echogenicity:  Hyperechoic-1  Shape:  Taller than wide-3  Margins:  Smooth-0  Echogenic Foci:  None-0     ACR TIRADS points/category:  6 - TR4 - Moderately Suspicious     Nodule #3  Location:  Left  upper  Size:  2.2 x 2.0 x 1.6 cm  Composition:  Mixed-1  Echogenicity:  Isoechoic-1  Shape:  Wider than tall-0  Margins:  Smooth-0  Echogenic Foci:  None-0     ACR TIRADS points/category:  2 - TR2 - Not Suspicious     Nodule #4  Location:  Left  lower  Size:  1.7 x 1.8 x 1.5 cm  Composition:  Mixed-1  Echogenicity:  Isoechoic-1  Shape:  Wider than tall-0  Margins:  Smooth-0  Echogenic Foci:  None-0     ACR TIRADS points/category:  2 - TR2 - Not Suspicious     IMPRESSION:     Multinodular goiter with the most suspicious nodule in the right upper pole measuring 12 mm.     She has hypothyroidism due to Hashimoto's thyroiditis, she is currently on Synthroid 75 mcg per day.  Her last TSH value was on 9/13/19 at 1.08  Her Vitamin D deficiency has been well controlled with supplemental Vitamin D of 4000 iu per day       ROS:  Constitutional: No weight loss  Cardiac: No palpitations or racing heart  Resp: Choking sensation, no shortness of breath  Neuro: No numbness or tinging in feet  Endo: No heat or  cold intolerance, no polyuria or polydipsia  GI: On and off difficulty swallowing food  All other systems were reviewed and were negative.    Past Medical History:  Patient Active Problem List    Diagnosis Date Noted   • Essential tremor 10/31/2019   • Aortic valve sclerosis- without stenosis 2018 echo 08/12/2019   • Mild aortic insufficiency- 2018 echo 08/12/2019   • Calculus of gallbladder with biliary obstruction but without cholecystitis 10/14/2018   • Chronic headache 03/20/2018   • Vitiligo 06/26/2017   • FHx: colon cancer 02/23/2017   • Schatzki's ring_DHA 02/23/2017   • Benign essential HTN 02/23/2017   • Pure hypercholesterolemia 02/23/2017   • MOHAMUD (generalized anxiety disorder) 02/23/2017   • Hypothyroidism due to Hashimoto's thyroiditis 02/23/2017   • Multinodular goiter (nontoxic), s/p FNA in 12/2017, TR4 x1, repeat US in 6/2020 02/23/2017   • HLA B27 (HLA B27 positive) 02/23/2017   • Primary osteoarthritis of right knee, S/P TKR + 2 revision surgeries_Dr. Lomeli 02/23/2017   • Lumbosacral pain, chronic, with right sciatica_ROC_Dr. Amado  02/23/2017   • Insomnia 02/23/2017       Past Surgical History:  Past Surgical History:   Procedure Laterality Date   • APPENDECTOMY     • BLADDER SLING FEMALE     • CATARACT EXTRACTION WITH IOL Left    • CERVICAL FUSION POSTERIOR     • FOOT SURGERY Left     metatarsal fusion, hammer toes correction   • FUSION      disc fusion   • KNEE REPLACEMENT, TOTAL Right        Allergies:  Morphine; Nsaids; Pcn [penicillins]; and Gabapentin    Social History:  Social History     Socioeconomic History   • Marital status:      Spouse name: Not on file   • Number of children: Not on file   • Years of education: Not on file   • Highest education level: Not on file   Occupational History   • Not on file   Social Needs   • Financial resource strain: Not on file   • Food insecurity:     Worry: Not on file     Inability: Not on file   • Transportation needs:     Medical: Not on  file     Non-medical: Not on file   Tobacco Use   • Smoking status: Never Smoker   • Smokeless tobacco: Never Used   Substance and Sexual Activity   • Alcohol use: Yes     Alcohol/week: 4.2 oz     Types: 7 Glasses of wine per week     Frequency: 4 or more times a week     Drinks per session: 1 or 2   • Drug use: No   • Sexual activity: Yes     Partners: Male   Lifestyle   • Physical activity:     Days per week: Not on file     Minutes per session: Not on file   • Stress: Not on file   Relationships   • Social connections:     Talks on phone: Not on file     Gets together: Not on file     Attends Voodoo service: Not on file     Active member of club or organization: Not on file     Attends meetings of clubs or organizations: Not on file     Relationship status: Not on file   • Intimate partner violence:     Fear of current or ex partner: Not on file     Emotionally abused: Not on file     Physically abused: Not on file     Forced sexual activity: Not on file   Other Topics Concern   • Not on file   Social History Narrative   • Not on file       Family History:  Family History   Problem Relation Age of Onset   • Arthritis Mother    • Alzheimer's Disease Mother    • Cancer Mother         cervical cancer   • Heart Attack Father    • Other Brother         brain anurism   • Cancer Maternal Grandmother 50        colono cancer   • Heart Disease Brother        Medications:    Current Outpatient Medications:   •  omeprazole (PRILOSEC) 20 MG delayed-release capsule, , Disp: , Rfl:   •  chlorthalidone (HYGROTON) 25 MG Tab, Take 1 Tab by mouth every day., Disp: 90 Tab, Rfl: 3  •  DULoxetine (CYMBALTA) 20 MG Cap DR Particles, TAKE 1 CAPSULE BY MOUTH  EVERY DAY, Disp: 90 Cap, Rfl: 1  •  rosuvastatin (CRESTOR) 20 MG Tab, TAKE 1 TABLET BY MOUTH  EVERY EVENING, Disp: 90 Tab, Rfl: 3  •  lisinopril (PRINIVIL) 20 MG Tab, Take 1 Tab by mouth every day., Disp: 90 Tab, Rfl: 3  •  pregabalin (LYRICA) 50 MG capsule, Take 1 Cap by mouth at  "bedtime as needed for up to 90 days., Disp: 90 Cap, Rfl: 0  •  fluticasone (FLONASE) 50 MCG/ACT nasal spray, Spray 2 Sprays in nose every day., Disp: 1 Bottle, Rfl: 3  •  SYNTHROID 75 MCG Tab, Take 1 Tab by mouth Every morning on an empty stomach., Disp: 90 Tab, Rfl: 3  •  propranolol (INDERAL) 40 MG Tab, Take 1 Tab by mouth 2 times a day., Disp: 180 Tab, Rfl: 1  •  Probiotic Product (PROBIOTIC DAILY PO), Take  by mouth., Disp: , Rfl:   •  MAGNESIUM PO, Take  by mouth., Disp: , Rfl:   •  PENNSAID 2 % Solution, , Disp: , Rfl:   •  tizanidine (ZANAFLEX) 4 MG Tab, TAKE 1/2 TO 2 TABS BY MOUTH 3 TIMES A DAY AS NEEDED FOR SPASMS, Disp: , Rfl: 2  •  Cholecalciferol (HM VITAMIN D3) 4000 units Cap, Take 1 Cap by mouth every day., Disp: 90 Cap, Rfl: 3    Labs: Reviewed    Physical Examination:  Vital signs: /64 (BP Location: Left arm, Patient Position: Sitting, BP Cuff Size: Adult)   Pulse 74   Ht 1.702 m (5' 7\")   Wt 77.1 kg (170 lb)   SpO2 100%   BMI 26.63 kg/m²  Body mass index is 26.63 kg/m².  General: No apparent distress, cooperative  Eyes: No scleral icterus or discharge  ENMT: Normal on external inspection of nose, lips, lumpy bumpy thyroid exam  Neck: No abnormal masses on inspection, scar from previous neck surgery present  Resp: Normal effort, clear to auscultation bilaterally   CVS: Regular rate and rhythm, S1 S2 normal, no murmur   Extremities: No edema  Abdomen: abdominal obesity present  Neuro: Alert and oriented  Skin: No rash  Psych: Normal mood and affect, intact memory and able to make informed decisions    Assessment and Plan:  1. Multiple thyroid nodules  With obstructive symptoms leading to choking sensation and difficulty swallowing food.  Referral to endocrine surgeon for total thyroidectomy.  Discussed several different options with patient in detail and patient agreeable for total thyroidectomy considering the obstructive symptoms    2. Hypothyroidism due to Hashimoto's " thyroiditis  Continue current dose of Synthroid    3. Vitamin D deficiency  continue vitamin D supplementation    Return in about 3 months (around 4/8/2020).    Thank you for allowing me to participate in the care of this patient.    Tay Garcia M.D.  01/08/20    CC:   Charlee Walker M.D.    This note was created using voice recognition software (Dragon). The accuracy of the dictation is limited by the abilities of the software. I have reviewed the note prior to signing, however some errors in grammar and context are still possible. If you have any questions related to this note please do not hesitate to contact our office.   This note was scribed by Danita Pena RN, CDE

## 2020-01-28 DIAGNOSIS — M54.50 LUMBOSACRAL PAIN, CHRONIC: ICD-10-CM

## 2020-01-28 DIAGNOSIS — G89.29 LUMBOSACRAL PAIN, CHRONIC: ICD-10-CM

## 2020-01-28 RX ORDER — PREGABALIN 50 MG/1
CAPSULE ORAL
Qty: 90 CAP | Refills: 0 | OUTPATIENT
Start: 2020-01-28 | End: 2020-03-16

## 2020-01-31 DIAGNOSIS — Z01.812 PRE-PROCEDURAL LABORATORY EXAMINATION: ICD-10-CM

## 2020-01-31 LAB
ANION GAP SERPL CALC-SCNC: 11 MMOL/L (ref 0–11.9)
BUN SERPL-MCNC: 20 MG/DL (ref 8–22)
CALCIUM SERPL-MCNC: 10.2 MG/DL (ref 8.5–10.5)
CHLORIDE SERPL-SCNC: 103 MMOL/L (ref 96–112)
CO2 SERPL-SCNC: 26 MMOL/L (ref 20–33)
CREAT SERPL-MCNC: 0.73 MG/DL (ref 0.5–1.4)
ERYTHROCYTE [DISTWIDTH] IN BLOOD BY AUTOMATED COUNT: 44.2 FL (ref 35.9–50)
GLUCOSE SERPL-MCNC: 106 MG/DL (ref 65–99)
HCT VFR BLD AUTO: 43 % (ref 37–47)
HGB BLD-MCNC: 14.2 G/DL (ref 12–16)
MCH RBC QN AUTO: 30 PG (ref 27–33)
MCHC RBC AUTO-ENTMCNC: 33 G/DL (ref 33.6–35)
MCV RBC AUTO: 90.9 FL (ref 81.4–97.8)
PLATELET # BLD AUTO: 286 K/UL (ref 164–446)
PMV BLD AUTO: 10.6 FL (ref 9–12.9)
POTASSIUM SERPL-SCNC: 3.6 MMOL/L (ref 3.6–5.5)
RBC # BLD AUTO: 4.73 M/UL (ref 4.2–5.4)
SODIUM SERPL-SCNC: 140 MMOL/L (ref 135–145)
WBC # BLD AUTO: 7.2 K/UL (ref 4.8–10.8)

## 2020-01-31 PROCEDURE — 85027 COMPLETE CBC AUTOMATED: CPT

## 2020-01-31 PROCEDURE — 86800 THYROGLOBULIN ANTIBODY: CPT

## 2020-01-31 PROCEDURE — 36415 COLL VENOUS BLD VENIPUNCTURE: CPT

## 2020-01-31 PROCEDURE — 84432 ASSAY OF THYROGLOBULIN: CPT

## 2020-01-31 PROCEDURE — 80048 BASIC METABOLIC PNL TOTAL CA: CPT

## 2020-02-01 LAB
THYROGLOB AB SERPL-ACNC: 3.7 IU/ML (ref 0–4)
THYROGLOB SERPL-MCNC: 61.5 NG/ML (ref 1.3–31.8)
THYROGLOB SERPL-MCNC: ABNORMAL NG/ML (ref 1.3–31.8)

## 2020-02-05 ENCOUNTER — ANESTHESIA (OUTPATIENT)
Dept: SURGERY | Facility: MEDICAL CENTER | Age: 70
End: 2020-02-05
Payer: MEDICARE

## 2020-02-05 ENCOUNTER — HOSPITAL ENCOUNTER (OUTPATIENT)
Facility: MEDICAL CENTER | Age: 70
End: 2020-02-06
Attending: SURGERY | Admitting: SURGERY
Payer: MEDICARE

## 2020-02-05 ENCOUNTER — ANESTHESIA EVENT (OUTPATIENT)
Dept: SURGERY | Facility: MEDICAL CENTER | Age: 70
End: 2020-02-05
Payer: MEDICARE

## 2020-02-05 DIAGNOSIS — G89.18 POSTOPERATIVE PAIN: ICD-10-CM

## 2020-02-05 LAB
ALBUMIN SERPL BCP-MCNC: 4.1 G/DL (ref 3.2–4.9)
CALCIUM SERPL-MCNC: 9.3 MG/DL (ref 8.5–10.5)
CALCIUM SERPL-MCNC: 9.5 MG/DL (ref 8.5–10.5)
PATHOLOGY CONSULT NOTE: NORMAL

## 2020-02-05 PROCEDURE — 160002 HCHG RECOVERY MINUTES (STAT): Performed by: SURGERY

## 2020-02-05 PROCEDURE — 88307 TISSUE EXAM BY PATHOLOGIST: CPT

## 2020-02-05 PROCEDURE — 160009 HCHG ANES TIME/MIN: Performed by: SURGERY

## 2020-02-05 PROCEDURE — 700102 HCHG RX REV CODE 250 W/ 637 OVERRIDE(OP): Performed by: ANESTHESIOLOGY

## 2020-02-05 PROCEDURE — 700105 HCHG RX REV CODE 258: Performed by: SURGERY

## 2020-02-05 PROCEDURE — 36415 COLL VENOUS BLD VENIPUNCTURE: CPT

## 2020-02-05 PROCEDURE — 160031 HCHG SURGERY MINUTES - 1ST 30 MINS LEVEL 5: Performed by: SURGERY

## 2020-02-05 PROCEDURE — 160036 HCHG PACU - EA ADDL 30 MINS PHASE I: Performed by: SURGERY

## 2020-02-05 PROCEDURE — G0378 HOSPITAL OBSERVATION PER HR: HCPCS

## 2020-02-05 PROCEDURE — 82310 ASSAY OF CALCIUM: CPT

## 2020-02-05 PROCEDURE — A9270 NON-COVERED ITEM OR SERVICE: HCPCS | Performed by: ANESTHESIOLOGY

## 2020-02-05 PROCEDURE — 501838 HCHG SUTURE GENERAL: Performed by: SURGERY

## 2020-02-05 PROCEDURE — 82040 ASSAY OF SERUM ALBUMIN: CPT

## 2020-02-05 PROCEDURE — 700101 HCHG RX REV CODE 250: Performed by: ANESTHESIOLOGY

## 2020-02-05 PROCEDURE — 160042 HCHG SURGERY MINUTES - EA ADDL 1 MIN LEVEL 5: Performed by: SURGERY

## 2020-02-05 PROCEDURE — 160048 HCHG OR STATISTICAL LEVEL 1-5: Performed by: SURGERY

## 2020-02-05 PROCEDURE — 700111 HCHG RX REV CODE 636 W/ 250 OVERRIDE (IP): Performed by: ANESTHESIOLOGY

## 2020-02-05 PROCEDURE — 700102 HCHG RX REV CODE 250 W/ 637 OVERRIDE(OP): Performed by: SURGERY

## 2020-02-05 PROCEDURE — A9270 NON-COVERED ITEM OR SERVICE: HCPCS | Performed by: SURGERY

## 2020-02-05 PROCEDURE — 160035 HCHG PACU - 1ST 60 MINS PHASE I: Performed by: SURGERY

## 2020-02-05 PROCEDURE — 502594 HCHG SCISSOR HANDLE, HARMONIC ACE: Performed by: SURGERY

## 2020-02-05 RX ORDER — OXYCODONE HYDROCHLORIDE AND ACETAMINOPHEN 5; 325 MG/1; MG/1
1 TABLET ORAL
Status: COMPLETED | OUTPATIENT
Start: 2020-02-05 | End: 2020-02-05

## 2020-02-05 RX ORDER — HYDROCODONE BITARTRATE AND ACETAMINOPHEN 5; 325 MG/1; MG/1
1-2 TABLET ORAL EVERY 6 HOURS PRN
Status: DISCONTINUED | OUTPATIENT
Start: 2020-02-05 | End: 2020-02-06 | Stop reason: HOSPADM

## 2020-02-05 RX ORDER — VITAMIN B COMPLEX
1000 TABLET ORAL DAILY
Status: DISCONTINUED | OUTPATIENT
Start: 2020-02-06 | End: 2020-02-06 | Stop reason: HOSPADM

## 2020-02-05 RX ORDER — SODIUM CHLORIDE, SODIUM LACTATE, POTASSIUM CHLORIDE, CALCIUM CHLORIDE 600; 310; 30; 20 MG/100ML; MG/100ML; MG/100ML; MG/100ML
INJECTION, SOLUTION INTRAVENOUS CONTINUOUS
Status: DISCONTINUED | OUTPATIENT
Start: 2020-02-05 | End: 2020-02-05

## 2020-02-05 RX ORDER — SODIUM CHLORIDE, SODIUM LACTATE, POTASSIUM CHLORIDE, CALCIUM CHLORIDE 600; 310; 30; 20 MG/100ML; MG/100ML; MG/100ML; MG/100ML
INJECTION, SOLUTION INTRAVENOUS CONTINUOUS
Status: DISCONTINUED | OUTPATIENT
Start: 2020-02-05 | End: 2020-02-06 | Stop reason: HOSPADM

## 2020-02-05 RX ORDER — PROPRANOLOL HYDROCHLORIDE 40 MG/1
40 TABLET ORAL 2 TIMES DAILY
Status: DISCONTINUED | OUTPATIENT
Start: 2020-02-05 | End: 2020-02-06 | Stop reason: HOSPADM

## 2020-02-05 RX ORDER — SODIUM CHLORIDE, SODIUM LACTATE, POTASSIUM CHLORIDE, CALCIUM CHLORIDE 600; 310; 30; 20 MG/100ML; MG/100ML; MG/100ML; MG/100ML
INJECTION, SOLUTION INTRAVENOUS CONTINUOUS
Status: DISCONTINUED | OUTPATIENT
Start: 2020-02-05 | End: 2020-02-05 | Stop reason: HOSPADM

## 2020-02-05 RX ORDER — CEFAZOLIN SODIUM 1 G/3ML
INJECTION, POWDER, FOR SOLUTION INTRAMUSCULAR; INTRAVENOUS PRN
Status: DISCONTINUED | OUTPATIENT
Start: 2020-02-05 | End: 2020-02-05 | Stop reason: SURG

## 2020-02-05 RX ORDER — PHENYLEPHRINE HCL IN 0.9% NACL 0.5 MG/5ML
SYRINGE (ML) INTRAVENOUS PRN
Status: DISCONTINUED | OUTPATIENT
Start: 2020-02-05 | End: 2020-02-05 | Stop reason: SURG

## 2020-02-05 RX ORDER — SCOLOPAMINE TRANSDERMAL SYSTEM 1 MG/1
1 PATCH, EXTENDED RELEASE TRANSDERMAL
Status: DISCONTINUED | OUTPATIENT
Start: 2020-02-05 | End: 2020-02-06 | Stop reason: HOSPADM

## 2020-02-05 RX ORDER — DIPHENHYDRAMINE HYDROCHLORIDE 50 MG/ML
25 INJECTION INTRAMUSCULAR; INTRAVENOUS EVERY 6 HOURS PRN
Status: DISCONTINUED | OUTPATIENT
Start: 2020-02-05 | End: 2020-02-06 | Stop reason: HOSPADM

## 2020-02-05 RX ORDER — OXYCODONE HYDROCHLORIDE AND ACETAMINOPHEN 5; 325 MG/1; MG/1
2 TABLET ORAL
Status: COMPLETED | OUTPATIENT
Start: 2020-02-05 | End: 2020-02-05

## 2020-02-05 RX ORDER — OMEPRAZOLE 20 MG/1
20 CAPSULE, DELAYED RELEASE ORAL EVERY EVENING
Status: DISCONTINUED | OUTPATIENT
Start: 2020-02-05 | End: 2020-02-06 | Stop reason: HOSPADM

## 2020-02-05 RX ORDER — ONDANSETRON 2 MG/ML
INJECTION INTRAMUSCULAR; INTRAVENOUS PRN
Status: DISCONTINUED | OUTPATIENT
Start: 2020-02-05 | End: 2020-02-05 | Stop reason: SURG

## 2020-02-05 RX ORDER — ROSUVASTATIN CALCIUM 20 MG/1
20 TABLET, COATED ORAL EVERY EVENING
Status: DISCONTINUED | OUTPATIENT
Start: 2020-02-05 | End: 2020-02-06 | Stop reason: HOSPADM

## 2020-02-05 RX ORDER — HALOPERIDOL 5 MG/ML
1 INJECTION INTRAMUSCULAR
Status: DISCONTINUED | OUTPATIENT
Start: 2020-02-05 | End: 2020-02-05 | Stop reason: HOSPADM

## 2020-02-05 RX ORDER — ONDANSETRON 2 MG/ML
4 INJECTION INTRAMUSCULAR; INTRAVENOUS
Status: DISCONTINUED | OUTPATIENT
Start: 2020-02-05 | End: 2020-02-05 | Stop reason: HOSPADM

## 2020-02-05 RX ORDER — PREGABALIN 25 MG/1
50 CAPSULE ORAL EVERY EVENING
Status: DISCONTINUED | OUTPATIENT
Start: 2020-02-05 | End: 2020-02-06 | Stop reason: HOSPADM

## 2020-02-05 RX ORDER — DEXAMETHASONE SODIUM PHOSPHATE 4 MG/ML
INJECTION, SOLUTION INTRA-ARTICULAR; INTRALESIONAL; INTRAMUSCULAR; INTRAVENOUS; SOFT TISSUE PRN
Status: DISCONTINUED | OUTPATIENT
Start: 2020-02-05 | End: 2020-02-05 | Stop reason: SURG

## 2020-02-05 RX ORDER — LEVOTHYROXINE SODIUM 0.12 MG/1
125 TABLET ORAL
Status: DISCONTINUED | OUTPATIENT
Start: 2020-02-05 | End: 2020-02-06 | Stop reason: HOSPADM

## 2020-02-05 RX ORDER — ROCURONIUM BROMIDE 10 MG/ML
INJECTION, SOLUTION INTRAVENOUS PRN
Status: DISCONTINUED | OUTPATIENT
Start: 2020-02-05 | End: 2020-02-05 | Stop reason: SURG

## 2020-02-05 RX ORDER — LISINOPRIL 20 MG/1
20 TABLET ORAL DAILY
Status: DISCONTINUED | OUTPATIENT
Start: 2020-02-05 | End: 2020-02-06 | Stop reason: HOSPADM

## 2020-02-05 RX ORDER — ZOLPIDEM TARTRATE 5 MG/1
5 TABLET ORAL NIGHTLY PRN
Status: DISCONTINUED | OUTPATIENT
Start: 2020-02-05 | End: 2020-02-06 | Stop reason: HOSPADM

## 2020-02-05 RX ORDER — CHLORTHALIDONE 25 MG/1
25 TABLET ORAL DAILY
Status: DISCONTINUED | OUTPATIENT
Start: 2020-02-05 | End: 2020-02-06 | Stop reason: HOSPADM

## 2020-02-05 RX ORDER — DULOXETIN HYDROCHLORIDE 20 MG/1
20 CAPSULE, DELAYED RELEASE ORAL DAILY
Status: DISCONTINUED | OUTPATIENT
Start: 2020-02-06 | End: 2020-02-06 | Stop reason: HOSPADM

## 2020-02-05 RX ORDER — ONDANSETRON 2 MG/ML
4 INJECTION INTRAMUSCULAR; INTRAVENOUS EVERY 4 HOURS PRN
Status: DISCONTINUED | OUTPATIENT
Start: 2020-02-05 | End: 2020-02-06 | Stop reason: HOSPADM

## 2020-02-05 RX ORDER — DEXMEDETOMIDINE HYDROCHLORIDE 100 UG/ML
INJECTION, SOLUTION INTRAVENOUS PRN
Status: DISCONTINUED | OUTPATIENT
Start: 2020-02-05 | End: 2020-02-05 | Stop reason: SURG

## 2020-02-05 RX ADMIN — PREGABALIN 50 MG: 25 CAPSULE ORAL at 18:02

## 2020-02-05 RX ADMIN — DEXAMETHASONE SODIUM PHOSPHATE 4 MG: 4 INJECTION, SOLUTION INTRA-ARTICULAR; INTRALESIONAL; INTRAMUSCULAR; INTRAVENOUS; SOFT TISSUE at 07:40

## 2020-02-05 RX ADMIN — FENTANYL CITRATE 50 MCG: 0.05 INJECTION, SOLUTION INTRAMUSCULAR; INTRAVENOUS at 09:38

## 2020-02-05 RX ADMIN — OXYCODONE HYDROCHLORIDE AND ACETAMINOPHEN 2 TABLET: 5; 325 TABLET ORAL at 08:56

## 2020-02-05 RX ADMIN — PROPOFOL 120 MG: 10 INJECTION, EMULSION INTRAVENOUS at 07:32

## 2020-02-05 RX ADMIN — Medication 200 MCG: at 07:42

## 2020-02-05 RX ADMIN — HYDROCODONE BITARTRATE AND ACETAMINOPHEN 2 TABLET: 5; 325 TABLET ORAL at 18:01

## 2020-02-05 RX ADMIN — GLYCOPYRROLATE 0.2 MG: 0.2 INJECTION INTRAMUSCULAR; INTRAVENOUS at 08:10

## 2020-02-05 RX ADMIN — LISINOPRIL 20 MG: 20 TABLET ORAL at 20:56

## 2020-02-05 RX ADMIN — Medication 200 MCG: at 08:10

## 2020-02-05 RX ADMIN — EPHEDRINE SULFATE 10 MG: 50 INJECTION, SOLUTION INTRAVENOUS at 08:10

## 2020-02-05 RX ADMIN — OMEPRAZOLE 20 MG: 20 CAPSULE, DELAYED RELEASE ORAL at 18:03

## 2020-02-05 RX ADMIN — FENTANYL CITRATE 50 MCG: 0.05 INJECTION, SOLUTION INTRAMUSCULAR; INTRAVENOUS at 08:55

## 2020-02-05 RX ADMIN — DEXMEDETOMIDINE HYDROCHLORIDE 10 MCG: 100 INJECTION, SOLUTION INTRAVENOUS at 07:52

## 2020-02-05 RX ADMIN — ONDANSETRON 4 MG: 2 INJECTION INTRAMUSCULAR; INTRAVENOUS at 08:29

## 2020-02-05 RX ADMIN — HYDROCODONE BITARTRATE AND ACETAMINOPHEN 2 TABLET: 5; 325 TABLET ORAL at 11:33

## 2020-02-05 RX ADMIN — PROPRANOLOL HYDROCHLORIDE 40 MG: 40 TABLET ORAL at 18:03

## 2020-02-05 RX ADMIN — DEXMEDETOMIDINE HYDROCHLORIDE 10 MCG: 100 INJECTION, SOLUTION INTRAVENOUS at 07:40

## 2020-02-05 RX ADMIN — ROSUVASTATIN CALCIUM 20 MG: 20 TABLET, FILM COATED ORAL at 18:02

## 2020-02-05 RX ADMIN — CEFAZOLIN 2 G: 330 INJECTION, POWDER, FOR SOLUTION INTRAMUSCULAR; INTRAVENOUS at 07:30

## 2020-02-05 RX ADMIN — SODIUM CHLORIDE, POTASSIUM CHLORIDE, SODIUM LACTATE AND CALCIUM CHLORIDE: 600; 310; 30; 20 INJECTION, SOLUTION INTRAVENOUS at 11:34

## 2020-02-05 RX ADMIN — FENTANYL CITRATE 50 MCG: 50 INJECTION, SOLUTION INTRAMUSCULAR; INTRAVENOUS at 07:32

## 2020-02-05 RX ADMIN — MIDAZOLAM HYDROCHLORIDE 2 MG: 1 INJECTION, SOLUTION INTRAMUSCULAR; INTRAVENOUS at 07:32

## 2020-02-05 RX ADMIN — ZOLPIDEM TARTRATE 5 MG: 5 TABLET ORAL at 20:56

## 2020-02-05 RX ADMIN — SODIUM CHLORIDE, POTASSIUM CHLORIDE, SODIUM LACTATE AND CALCIUM CHLORIDE: 600; 310; 30; 20 INJECTION, SOLUTION INTRAVENOUS at 08:43

## 2020-02-05 RX ADMIN — ROCURONIUM BROMIDE 20 MG: 10 INJECTION, SOLUTION INTRAVENOUS at 07:32

## 2020-02-05 RX ADMIN — CHLORTHALIDONE 25 MG: 25 TABLET ORAL at 20:56

## 2020-02-05 RX ADMIN — SODIUM CHLORIDE, POTASSIUM CHLORIDE, SODIUM LACTATE AND CALCIUM CHLORIDE: 600; 310; 30; 20 INJECTION, SOLUTION INTRAVENOUS at 07:45

## 2020-02-05 SDOH — ECONOMIC STABILITY: TRANSPORTATION INSECURITY
IN THE PAST 12 MONTHS, HAS LACK OF TRANSPORTATION KEPT YOU FROM MEETINGS, WORK, OR FROM GETTING THINGS NEEDED FOR DAILY LIVING?: NO

## 2020-02-05 SDOH — ECONOMIC STABILITY: TRANSPORTATION INSECURITY
IN THE PAST 12 MONTHS, HAS THE LACK OF TRANSPORTATION KEPT YOU FROM MEDICAL APPOINTMENTS OR FROM GETTING MEDICATIONS?: NO

## 2020-02-05 SDOH — ECONOMIC STABILITY: FOOD INSECURITY: WITHIN THE PAST 12 MONTHS, YOU WORRIED THAT YOUR FOOD WOULD RUN OUT BEFORE YOU GOT MONEY TO BUY MORE.: NEVER TRUE

## 2020-02-05 SDOH — ECONOMIC STABILITY: FOOD INSECURITY: WITHIN THE PAST 12 MONTHS, THE FOOD YOU BOUGHT JUST DIDN'T LAST AND YOU DIDN'T HAVE MONEY TO GET MORE.: NEVER TRUE

## 2020-02-05 ASSESSMENT — COPD QUESTIONNAIRES
DURING THE PAST 4 WEEKS HOW MUCH DID YOU FEEL SHORT OF BREATH: NONE/LITTLE OF THE TIME
COPD SCREENING SCORE: 2
IN THE PAST 12 MONTHS DO YOU DO LESS THAN YOU USED TO BECAUSE OF YOUR BREATHING PROBLEMS: DISAGREE/UNSURE
HAVE YOU SMOKED AT LEAST 100 CIGARETTES IN YOUR ENTIRE LIFE: NO/DON'T KNOW
DO YOU EVER COUGH UP ANY MUCUS OR PHLEGM?: NO/ONLY WITH OCCASIONAL COLDS OR INFECTIONS

## 2020-02-05 ASSESSMENT — LIFESTYLE VARIABLES
CONSUMPTION TOTAL: NEGATIVE
ALCOHOL_USE: NO
TOTAL SCORE: 0
EVER HAD A DRINK FIRST THING IN THE MORNING TO STEADY YOUR NERVES TO GET RID OF A HANGOVER: NO
TOTAL SCORE: 0
HOW MANY TIMES IN THE PAST YEAR HAVE YOU HAD 5 OR MORE DRINKS IN A DAY: 0
AVERAGE NUMBER OF DAYS PER WEEK YOU HAVE A DRINK CONTAINING ALCOHOL: 0
EVER_SMOKED: NEVER
ON A TYPICAL DAY WHEN YOU DRINK ALCOHOL HOW MANY DRINKS DO YOU HAVE: 0
HAVE YOU EVER FELT YOU SHOULD CUT DOWN ON YOUR DRINKING: NO
DOES PATIENT WANT TO STOP DRINKING: NO
TOTAL SCORE: 0
HAVE PEOPLE ANNOYED YOU BY CRITICIZING YOUR DRINKING: NO
EVER FELT BAD OR GUILTY ABOUT YOUR DRINKING: NO

## 2020-02-05 ASSESSMENT — PATIENT HEALTH QUESTIONNAIRE - PHQ9
2. FEELING DOWN, DEPRESSED, IRRITABLE, OR HOPELESS: NOT AT ALL
SUM OF ALL RESPONSES TO PHQ9 QUESTIONS 1 AND 2: 0
1. LITTLE INTEREST OR PLEASURE IN DOING THINGS: NOT AT ALL

## 2020-02-05 ASSESSMENT — COGNITIVE AND FUNCTIONAL STATUS - GENERAL
SUGGESTED CMS G CODE MODIFIER MOBILITY: CJ
WALKING IN HOSPITAL ROOM: A LITTLE
DAILY ACTIVITIY SCORE: 23
MOBILITY SCORE: 21
STANDING UP FROM CHAIR USING ARMS: A LITTLE
CLIMB 3 TO 5 STEPS WITH RAILING: A LITTLE
SUGGESTED CMS G CODE MODIFIER DAILY ACTIVITY: CI
TOILETING: A LITTLE

## 2020-02-05 ASSESSMENT — PAIN SCALES - GENERAL: PAIN_LEVEL: 2

## 2020-02-05 NOTE — ANESTHESIA PREPROCEDURE EVALUATION
Relevant Problems   CARDIAC   (+) Benign essential HTN   (+) Mild aortic insufficiency- 2018 echo      ENDO   (+) Hypothyroidism due to Hashimoto's thyroiditis       Physical Exam    Airway   Mallampati: II  TM distance: >3 FB  Neck ROM: full       Cardiovascular - normal exam  Rhythm: regular  Rate: normal  (-) murmur     Dental - normal exam         Pulmonary - normal exam  Breath sounds clear to auscultation     Abdominal    Neurological - normal exam                 Anesthesia Plan    ASA 2       Plan - general       Airway plan will be ETT      Plan Factors:   Patient was previously instructed to abstain from smoking on day of procedure.  Patient did not smoke on day of procedure.      Induction: intravenous    Postoperative Plan: Postoperative administration of opioids is intended.    Pertinent diagnostic labs and testing reviewed    Informed Consent:    Anesthetic plan and risks discussed with patient.    Use of blood products discussed with: patient whom consented to blood products.

## 2020-02-05 NOTE — ANESTHESIA TIME REPORT
Anesthesia Start and Stop Event Times     Date Time Event    2/5/2020 0725 Ready for Procedure     0730 Anesthesia Start     0844 Anesthesia Stop        Responsible Staff  02/05/20    Name Role Begin End    Gianluca Klein M.D. Anesth 0730 0844        Preop Diagnosis (Free Text):  Pre-op Diagnosis     MULTINODULAR GOITER        Preop Diagnosis (Codes):    Post op Diagnosis  Goiter      Premium Reason  Non-Premium    Comments:

## 2020-02-05 NOTE — ANESTHESIA POSTPROCEDURE EVALUATION
Patient: Margoth Hopkins    Procedure Summary     Date:  02/05/20 Room / Location:  [facility] ROOM 23 / SURGERY SAME DAY [facility]    Anesthesia Start:  0730 Anesthesia Stop:  0844    Procedure:  THYROIDECTOMY, TOTAL- COMPLETE (Bilateral Neck) Diagnosis:  (MULTINODULAR GOITER)    Surgeon:  Cydney Franklin M.D. Responsible Provider:  Gianluca Klein M.D.    Anesthesia Type:  general ASA Status:  2          Final Anesthesia Type: general  Last vitals  BP   Blood Pressure : 129/59    Temp   36.2 °C (97.2 °F)    Pulse   Pulse: 85   Resp   18    SpO2   99 %      Anesthesia Post Evaluation    Patient location during evaluation: PACU  Patient participation: complete - patient participated  Level of consciousness: awake and alert  Pain score: 2    Airway patency: patent  Anesthetic complications: no  Cardiovascular status: hemodynamically stable  Respiratory status: acceptable  Hydration status: euvolemic    PONV: none           Nurse Pain Score: 5 (NPRS)

## 2020-02-05 NOTE — OP REPORT
Operative Report    Date: 2/5/2020    Surgeon: Cydney Franklin M.D.    Assistant: VENKATESH Jason    Anesthesiologist: Ernie AGUIRRE    Pre-operative Diagnosis: E04.2 Nontoxic multinodular goiter     Post-operative Diagnosis: same     Procedure: 66478 Thyroidectomy, total or complete , bilateral recurrent laryngeal nerve monitoring    Procedure in detail: The patient was identified in the pre-operative holding area and brought to the operating room. Correct side and site were identified.  GETA was smoothly induced. The patient was prepped and draped in the usual sterile fashion.    With the patient in the supine position, the head of the bed slightly elevated, the neck slightly extended, and the patient intubated with a NIM endotracheal tube, the precordial electrodes were placed by the surgical assistant, who then monitored the recurrent laryngeal nerves bilaterally throughout the procedure.     The neck was prepared with betadiene and draped in the usual fashion. The neck was entered through a lower transverse cervical incision and carried down through the platysma. Subplatysmal flaps were dissected superiorly and inferiorly down to the sternal notch. The midline cervical fascia was incised down to the capsule of the thyroid. The strap muscles were mobilized off the right thyroid lobe, and the superior pole vessels progressively divided with the Harmonic. The thyroid veins were divided with the Harmonic. The recurrent laryngeal nerve and both parathyroids were identified and protected. Branches of the inferior thyroid artery were divided on the capsule of the gland with the Harmonic. Smaller vessels were either divided with the Harmonic or, when near to the recurrent nerve, divided between clamps and suture ligated with 3-0 Vicryl suture as the gland was dissected free from the nerve and off the trachea. The gland was transected at the medial border of the left thyroid lobe and the right thyroid lobe and the  isthmus were sent for permanent pathologic exam.    The strap muscles were then mobilized off of the left thyroid lobe, and the superior pole vessels progressively divided with the Harmonic. The middle and inferior thyroid veins were divided with the Harmonic. The recurrent laryngeal nerve and both parathyroids were identified and protected. Branches of the inferior thyroid artery were divided on the capsule of the gland with the Harminic. Smaller vessels were either divided with the Harmonic or, when close to the nerve, divided between clamps and suture ligated with 3-0 Vicryl as the gland was dissected free of the nerve and off of the trachea. The left  thyroid lobe was sent for permanent pathology. Good hemostasis was assured.     The integrity of both recurrent laryngeal nerves was documented. Small pieces of Surgicel Fibrillar were placed in both sides of the neck. The midline cervical fascia was reapproximated with running 3-0 Vicryl. Platysma was reapproximated with interrupted 3-0 Vicryl. The skin was closed with interrupted 5-0 silk suture.     The patient was awakened from general anesthetic, and was taken to the recovery room in stable condition.    Sponge and needle counts were correct at the end of the case.     Specimen:   1. Right thyroid lobe and isthmus  2. Left thyroid lobe    EBL: minimal    Dispo: stable, extubated, to PACU    Cydney Franklin M.D.  [facility]  [phone]

## 2020-02-05 NOTE — OR NURSING
0843  RECEIVED PATIENT FROM OR.  REPORT FROM DR. HOOVER.  NO AIRWAY IN PLACE.  RESPIRATIONS ARE EVEN AND UNLABORED.  GAUZE, TAPE AND SUTURES TO ANTERIOR NECK ARE CDI.      0855  MEDICATED WITH IV FENTANYL FOR C/O ANTERIOR NECK PAIN 7.    0856  MEDICATED WITH PO PERCOCET.      0909   LEEANN UPDATED IN WAITING ROOM.    0938  MEDICATED WITH IV FENTANYL FOR C/O NECK PAIN 7.    1042   LEEANN TO BEDSIDE.    1050  TRANSFERRED TO [place] [id number] BED 1, VIA WHEELCHAIR, ON OXYGEN 10 LITERS PER OXYMASK, WITH TRANSPORT.  ALL BELONGINGS WITH PATIENT.   ALONG FOR TRANSFER.  PATIENT STATES SHE IS READY TO GO TO HER ROOM.

## 2020-02-05 NOTE — PROGRESS NOTES
Bedside report received.  Assessment complete.  A&O x 4. Patient calls appropriately.  Patient up with stand by assist.   Patient has 8/10 pain. PRN given, Norco works fine for pain, makes it tolerable.  Denies N&V. Tolerating regular diet.  Surgical transverse anterior necks site with sutures and gauze and tape, CDI.  + void, - flatus, - BM.  Patient denies SOB.  SCD's on.  Patient in pleasant mood, admit profile completed, no further concerns.  Review plan with of care with patient. Call light and personal belongings with in reach. Hourly rounding in place. All needs met at this time.

## 2020-02-06 VITALS
BODY MASS INDEX: 26.92 KG/M2 | SYSTOLIC BLOOD PRESSURE: 132 MMHG | HEART RATE: 62 BPM | OXYGEN SATURATION: 94 % | RESPIRATION RATE: 18 BRPM | DIASTOLIC BLOOD PRESSURE: 50 MMHG | TEMPERATURE: 97.1 F | HEIGHT: 67 IN | WEIGHT: 171.52 LBS

## 2020-02-06 PROBLEM — E06.3 HYPOTHYROIDISM DUE TO HASHIMOTO'S THYROIDITIS: Status: RESOLVED | Noted: 2017-02-23 | Resolved: 2020-02-06

## 2020-02-06 PROBLEM — E04.2 MULTINODULAR GOITER (NONTOXIC): Status: RESOLVED | Noted: 2017-02-23 | Resolved: 2020-02-06

## 2020-02-06 PROBLEM — E03.8 HYPOTHYROIDISM DUE TO HASHIMOTO'S THYROIDITIS: Status: RESOLVED | Noted: 2017-02-23 | Resolved: 2020-02-06

## 2020-02-06 LAB
ALBUMIN SERPL BCP-MCNC: 3.6 G/DL (ref 3.2–4.9)
ALBUMIN SERPL BCP-MCNC: 3.6 G/DL (ref 3.2–4.9)
CALCIUM SERPL-MCNC: 8.6 MG/DL (ref 8.5–10.5)
CALCIUM SERPL-MCNC: 8.6 MG/DL (ref 8.5–10.5)

## 2020-02-06 PROCEDURE — 82310 ASSAY OF CALCIUM: CPT | Mod: 91

## 2020-02-06 PROCEDURE — 82040 ASSAY OF SERUM ALBUMIN: CPT | Mod: 91

## 2020-02-06 PROCEDURE — 700102 HCHG RX REV CODE 250 W/ 637 OVERRIDE(OP): Performed by: SURGERY

## 2020-02-06 PROCEDURE — 36415 COLL VENOUS BLD VENIPUNCTURE: CPT

## 2020-02-06 PROCEDURE — G0378 HOSPITAL OBSERVATION PER HR: HCPCS

## 2020-02-06 PROCEDURE — A9270 NON-COVERED ITEM OR SERVICE: HCPCS | Performed by: SURGERY

## 2020-02-06 RX ORDER — HYDROCODONE BITARTRATE AND ACETAMINOPHEN 5; 325 MG/1; MG/1
1-2 TABLET ORAL EVERY 6 HOURS PRN
Qty: 20 TAB | Refills: 0 | Status: SHIPPED | OUTPATIENT
Start: 2020-02-06 | End: 2020-02-13

## 2020-02-06 RX ORDER — LEVOTHYROXINE SODIUM 0.12 MG/1
125 TABLET ORAL
Qty: 30 TAB | Refills: 3 | Status: SHIPPED | OUTPATIENT
Start: 2020-02-07 | End: 2020-04-21

## 2020-02-06 RX ADMIN — LISINOPRIL 20 MG: 20 TABLET ORAL at 06:16

## 2020-02-06 RX ADMIN — DULOXETINE HYDROCHLORIDE 20 MG: 20 CAPSULE, DELAYED RELEASE ORAL at 06:16

## 2020-02-06 RX ADMIN — MELATONIN 1000 UNITS: at 06:16

## 2020-02-06 RX ADMIN — HYDROCODONE BITARTRATE AND ACETAMINOPHEN 2 TABLET: 5; 325 TABLET ORAL at 00:15

## 2020-02-06 RX ADMIN — HYDROCODONE BITARTRATE AND ACETAMINOPHEN 2 TABLET: 5; 325 TABLET ORAL at 06:16

## 2020-02-06 RX ADMIN — PROPRANOLOL HYDROCHLORIDE 40 MG: 40 TABLET ORAL at 06:16

## 2020-02-06 RX ADMIN — LEVOTHYROXINE SODIUM 125 MCG: 125 TABLET ORAL at 06:16

## 2020-02-06 RX ADMIN — CHLORTHALIDONE 25 MG: 25 TABLET ORAL at 06:16

## 2020-02-06 NOTE — DISCHARGE PLANNING
This RN CM met with pt at bedside to assist pt with discharge. Per pt her  will provide transport for her to home and that she does not need help with prescriptions. No other identified need from pt.

## 2020-02-06 NOTE — RESPIRATORY CARE
COPD EDUCATION by COPD CLINICAL EDUCATOR  2/6/2020 at 7:52 AM by Ange De Leon, RRT     Patient reviewed by COPD education team. Patient does not have a history or diagnosis of COPD and is a non-smoker, therefore does not qualify for the COPD program.

## 2020-02-06 NOTE — PROGRESS NOTES
"Bedside report received.  Assessment complete.  A&O x 4. Patient calls appropriately.  Patient ambulates with no assist. Bed alarm off.   Patient has 4/10 pain. Pain managed with prescribed medications.  Denies N&V. Tolerating regular diet.  Surgical dressing JAIME, sutures removed and steri strips applied per order.  + void, + flatus, last BM PTA.  Patient denies SOB.  SCD's refused.  Patient is pleasant and cooperative, expressing positive feelings regarding DC home.  Review plan with of care with patient. Call light and personal belongings with in reach. Hourly rounding in place. All needs met at this time.  /50   Pulse 62   Temp 36.2 °C (97.1 °F) (Temporal)   Resp 18   Ht 1.702 m (5' 7\")   Wt 77.8 kg (171 lb 8.3 oz)   SpO2 94%   BMI 26.86 kg/m²     "

## 2020-02-06 NOTE — CARE PLAN
Problem: Communication  Goal: The ability to communicate needs accurately and effectively will improve  Outcome: PROGRESSING AS EXPECTED     Problem: Safety  Goal: Will remain free from injury  Outcome: PROGRESSING AS EXPECTED  Goal: Will remain free from falls  Outcome: PROGRESSING AS EXPECTED     Problem: Knowledge Deficit  Goal: Knowledge of disease process/condition, treatment plan, diagnostic tests, and medications will improve  Outcome: PROGRESSING AS EXPECTED  Goal: Knowledge of the prescribed therapeutic regimen will improve  Outcome: PROGRESSING AS EXPECTED     Problem: Discharge Barriers/Planning  Goal: Patient's continuum of care needs will be met  Outcome: PROGRESSING AS EXPECTED

## 2020-02-06 NOTE — PROGRESS NOTES
Assumed care of pt at 1900, report given.  A+O x 4, VSS, and RA.   Pt has lateral anterior neck incision with sutures; covered with dry gauze and tape. Dressing CDI with scant old dried serosanguinous drainage.  Pt complaining of mild to moderate pain; medicated per MAR and tolerating well. Ice applied.  Pt tolerating regular diet; denies N/V at this time.   +void  -BM (PTA)  +flatus  Pt ambulating self up to restroom and down halls; tolerating well.  Pt updated on POC and all questions answered at this time.  Bed in lowest position, call light within reach, and no current needs.

## 2020-02-06 NOTE — DISCHARGE SUMMARY
Discharge Summary      DATE OF ADMISSION: 2/5/2020    DATE OF DISCHARGE: 2/6/2020    DISCHARGE DIAGNOSIS (ES):  Nontoxic multinodular goiter    DISCHARGE CONDITION:  ? good    CONSULTATIONS:  ? none    PROCEDURES:  ? 2/5/20: total thyroidectomy    BRIEF HPI:  ? Admitted with above, see admission history and physical.     HOSPITAL COURSE:  ? Underwent procedure as above. On day of discharge, the patient has stable vital signs, tolerating a regular diet, and pain is well controlled with PO meds. The patient is stable for discharge to home.    MEDS:   Current Outpatient Medications   Medication Sig Dispense Refill   • HYDROcodone-acetaminophen (NORCO) 5-325 MG Tab per tablet Take 1-2 Tabs by mouth every 6 hours as needed for up to 7 days. 20 Tab 0   • [START ON 2/7/2020] levothyroxine (SYNTHROID) 125 MCG Tab Take 1 Tab by mouth Every morning on an empty stomach. 30 Tab 3       FOLLOW-UP:  ? Please call my office at [phone] to make an appointment in 1 week(s)    DISCHARGE INSTRUCTIONS:      Discharge instructions after thyroidectomy:    You had surgery called thyroidectomy. This means that part or all of your thyroid gland was removed. The main job of the thyroid gland is to make thyroid hormone. This hormone controls your body’s metabolism. This is the way your body creates and uses energy. Removing the thyroid gland removes your body’s source of thyroid hormone. So after the surgery, you will need to take thyroid hormone pills daily. This helps keep the level of thyroid hormone in your body steady. This sheet tells you more about how to care for yourself after surgery.    Recovering After Surgery:    Get plenty of rest.    Care for your incision as directed.    Avoid heavy lifting and strenuous activity for 3-5 weeks.    Walk a few times daily. But don’t push yourself too hard. Slowly increase your pace and distance, as you feel able.    Return to work when your doctor says it’s okay.    Taking Your Thyroid  Medication:    Take your medication as directed.    Use a pillbox labeled with the days of the week. This will help you remember whether you’ve taken your medication each day.    Take your medication with a liquid. But avoid taking it with soy milk. Soy milk can affect how well your body absorbs the medication. The pill needs to reach your stomach and not dissolve in your throat.    Try to take your medication with the same types and amounts of food and liquid each day. This helps control the amount of thyroid hormone in your system.    After taking your medication:    Wait 4 hours before eating or drinking anything that contains soy.    Wait 4 hours before taking certain medications. These include:     Iron supplements     Calcium supplements     Antacids that contain either calcium or aluminum hydroxide     Medications that lower your cholesterol    Do not stop taking your medication even if you become pregnant.    Never stop taking medications on your own.    Keeping Your Doctor’s Appointments:    See your doctor for regular visits. These are needed to monitor your health.    Have routine blood tests done. These check the level of thyroid hormone in your body. This helps your doctor know whether to adjust the dosage of your medication if needed.    Tell your doctor about any signs of further thyroid problems.    Signs that you may have too much thyroid hormone in your body include:     Restlessness     Rapid weight loss     Sweating     Signs that you may have too little thyroid hormone in your body include:     Fatigue or sluggishness     Puffy hands, face, or feet     Hoarseness     Muscle pain     Slow pulse (less than 60 beats per minute)    When to Call Your Doctor:  Call your doctor right away if you have any of the following:  Fever above 100.4°F  Swelling or bleeding at the incision site  Choking  Trouble breathing  A sore throat that lasts longer than 7 days  Tingling or cramps in your hands, feet, or  lips    FOLLOW-UP:  ? Please call my office at [phone] to make an appointment in 1 week    Office address:  [address]  [place] [id number]    Cydney Franklin M.D.  [facility]  [phone]

## 2020-02-06 NOTE — DISCHARGE INSTRUCTIONS
FOLLOW-UP:  · Please call my office at [phone] to make an appointment in 1 week(s)     DISCHARGE INSTRUCTIONS:        Discharge instructions after thyroidectomy:     You had surgery called thyroidectomy. This means that part or all of your thyroid gland was removed. The main job of the thyroid gland is to make thyroid hormone. This hormone controls your body’s metabolism. This is the way your body creates and uses energy. Removing the thyroid gland removes your body’s source of thyroid hormone. So after the surgery, you will need to take thyroid hormone pills daily. This helps keep the level of thyroid hormone in your body steady. This sheet tells you more about how to care for yourself after surgery.     Recovering After Surgery:     Get plenty of rest.     Care for your incision as directed.     Avoid heavy lifting and strenuous activity for 3-5 weeks.     Walk a few times daily. But don’t push yourself too hard. Slowly increase your pace and distance, as you feel able.     Return to work when your doctor says it’s okay.     Taking Your Thyroid Medication:     Take your medication as directed.     Use a pillbox labeled with the days of the week. This will help you remember whether you’ve taken your medication each day.     Take your medication with a liquid. But avoid taking it with soy milk. Soy milk can affect how well your body absorbs the medication. The pill needs to reach your stomach and not dissolve in your throat.     Try to take your medication with the same types and amounts of food and liquid each day. This helps control the amount of thyroid hormone in your system.     After taking your medication:     Wait 4 hours before eating or drinking anything that contains soy.     Wait 4 hours before taking certain medications. These include:                 Iron supplements                 Calcium supplements                 Antacids that contain either calcium or aluminum hydroxide                  Medications that lower your cholesterol     Do not stop taking your medication even if you become pregnant.     Never stop taking medications on your own.     Keeping Your Doctor’s Appointments:     See your doctor for regular visits. These are needed to monitor your health.     Have routine blood tests done. These check the level of thyroid hormone in your body. This helps your doctor know whether to adjust the dosage of your medication if needed.     Tell your doctor about any signs of further thyroid problems.     Signs that you may have too much thyroid hormone in your body include:                 Restlessness                 Rapid weight loss                 Sweating                 Signs that you may have too little thyroid hormone in your body include:                 Fatigue or sluggishness                 Puffy hands, face, or feet                 Hoarseness                 Muscle pain                 Slow pulse (less than 60 beats per minute)     When to Call Your Doctor:  Call your doctor right away if you have any of the following:  Fever above 100.4°F  Swelling or bleeding at the incision site  Choking  Trouble breathing  A sore throat that lasts longer than 7 days  Tingling or cramps in your hands, feet, or lips     FOLLOW-UP:  · Please call my office at [phone] to make an appointment in 1 week     Office address:  [address] [place] [address] [id number]  [place] [address]     Cydney Franklin M.D.  [facility]  [phone] Instructions    Discharged to home by car with relative. Discharged via wheelchair, hospital escort: Yes.  Special equipment needed: none    Be sure to schedule a follow-up appointment with your primary care doctor or any specialists as instructed.     Discharge Plan:   Influenza Vaccine Indication: Not indicated: Previously immunized this influenza season and > 8 years of age    I understand that a diet low in cholesterol, fat, and sodium is recommended for good health.  Unless I have been given specific instructions below for another diet, I accept this instruction as my diet prescription.   Other diet: regular    Special Instructions: None    · Is patient discharged on Warfarin / Coumadin?   No     Depression / Suicide Risk    As you are discharged from this [facility] facility, it is important to learn how to keep safe from harming yourself.    Recognize the warning signs:  · Abrupt changes in personality, positive or negative- including increase in energy   · Giving away possessions  · Change in eating patterns- significant weight changes-  positive or negative  · Change in sleeping patterns- unable to sleep or sleeping all the time   · Unwillingness or inability to communicate  · Depression  · Unusual sadness, discouragement and loneliness  · Talk of wanting to die  · Neglect of personal appearance   · Rebelliousness- reckless behavior  · Withdrawal from people/activities they love  · Confusion- inability to concentrate     If you or a loved one observes any of these behaviors or has concerns about self-harm, here's what you can do:  · Talk about it- your feelings and reasons for harming yourself  · Remove any means that you might use to hurt yourself (examples: pills, rope, extension cords, firearm)  · Get professional help from the community (Mental Health, Substance Abuse, psychological counseling)  · Do not be alone:Call your Safe Contact- someone whom you trust who will be there for you.  · Call your local CRISIS HOTLINE 868-6644 or 640-141-3178  · Call your local Children's Mobile Crisis Response Team [place] (784) 677-0868 or www.[employer]  · Call the toll free National Suicide Prevention Hotlines   · National Suicide Prevention Lifeline 524-100-SZUT (4758)  · National Hope Line Network 800-SUICIDE (477-5586)

## 2020-02-06 NOTE — PROGRESS NOTES
"Surgical Progress Note:    POD# 1  S/P TT     No acute events. Pain well controlled with PO meds.    PE:  /59   Pulse 80   Temp 36.3 °C (97.4 °F) (Temporal)   Resp 18   Ht 1.702 m (5' 7\")   Wt 77.8 kg (171 lb 8.3 oz)   SpO2 94%   BMI 26.86 kg/m²     I/O:     Intake/Output Summary (Last 24 hours) at 2/6/2020 0730  Last data filed at 2/6/2020 0400  Gross per 24 hour   Intake 3672.5 ml   Output --   Net 3672.5 ml       NAD  Breathing comfortably  Voice scratchy  Incision c/d/i with sutures, no hematoma    Labs:  Calcium:   6p: 9.5   12a: 8.6   6a:8.6      A/P: POD# 1  S/P TT   - doing well  - will remove silk sutures from neck incision and place steri strips.  - d/c home, f/u one week      Cydney Franklin M.D.  [facility]  [phone]          "

## 2020-02-06 NOTE — CARE PLAN
Problem: Communication  Goal: The ability to communicate needs accurately and effectively will improve  2/6/2020 0951 by Ameya Voss R.N.  Outcome: MET  2/6/2020 0936 by Ameya Voss R.N.  Outcome: PROGRESSING AS EXPECTED     Problem: Safety  Goal: Will remain free from injury  2/6/2020 0951 by Ameya Voss R.N.  Outcome: MET  2/6/2020 0936 by Ameya Voss R.N.  Outcome: PROGRESSING AS EXPECTED  Goal: Will remain free from falls  2/6/2020 0951 by Ameya Voss R.N.  Outcome: MET  2/6/2020 0936 by Ameya Voss R.N.  Outcome: PROGRESSING AS EXPECTED     Problem: Infection  Goal: Will remain free from infection  Outcome: MET     Problem: Venous Thromboembolism (VTW)/Deep Vein Thrombosis (DVT) Prevention:  Goal: Patient will participate in Venous Thrombosis (VTE)/Deep Vein Thrombosis (DVT)Prevention Measures  Outcome: MET     Problem: Bowel/Gastric:  Goal: Normal bowel function is maintained or improved  Outcome: MET  Goal: Will not experience complications related to bowel motility  Outcome: MET     Problem: Knowledge Deficit  Goal: Knowledge of disease process/condition, treatment plan, diagnostic tests, and medications will improve  2/6/2020 0951 by Ameya Voss R.N.  Outcome: MET  2/6/2020 0936 by Ameya Voss R.N.  Outcome: PROGRESSING AS EXPECTED  Goal: Knowledge of the prescribed therapeutic regimen will improve  2/6/2020 0951 by Ameya Voss R.N.  Outcome: MET  2/6/2020 0936 by Ameya Voss R.N.  Outcome: PROGRESSING AS EXPECTED     Problem: Discharge Barriers/Planning  Goal: Patient's continuum of care needs will be met  2/6/2020 0951 by Ameya Voss R.N.  Outcome: MET  2/6/2020 0936 by Ameya Voss R.N.  Outcome: PROGRESSING AS EXPECTED     Problem: Pain Management  Goal: Pain level will decrease to patient's comfort goal  Outcome: MET     Problem: Fluid Volume:  Goal: Will maintain balanced intake and output  Outcome: MET

## 2020-02-10 ENCOUNTER — HOSPITAL ENCOUNTER (OUTPATIENT)
Dept: LAB | Facility: MEDICAL CENTER | Age: 70
End: 2020-02-10
Attending: SURGERY
Payer: MEDICARE

## 2020-02-10 LAB — CALCIUM SERPL-MCNC: 9.2 MG/DL (ref 8.5–10.5)

## 2020-02-10 PROCEDURE — 36415 COLL VENOUS BLD VENIPUNCTURE: CPT

## 2020-02-10 PROCEDURE — 82310 ASSAY OF CALCIUM: CPT

## 2020-02-20 PROBLEM — E06.3 HASHIMOTO'S THYROIDITIS: Status: ACTIVE | Noted: 2020-02-20

## 2020-03-15 DIAGNOSIS — G89.29 LUMBOSACRAL PAIN, CHRONIC: ICD-10-CM

## 2020-03-15 DIAGNOSIS — M54.50 LUMBOSACRAL PAIN, CHRONIC: ICD-10-CM

## 2020-03-16 RX ORDER — PREGABALIN 50 MG/1
CAPSULE ORAL
Qty: 90 CAP | Refills: 1 | Status: SHIPPED
Start: 2020-03-16 | End: 2020-09-29 | Stop reason: SDUPTHER

## 2020-03-16 NOTE — TELEPHONE ENCOUNTER
Received request via: Pharmacy    Was the patient seen in the last year in this department? Yes    Does the patient have an active prescription (recently filled or refills available) for medication(s) requested? Yes

## 2020-03-17 ENCOUNTER — HOSPITAL ENCOUNTER (OUTPATIENT)
Dept: LAB | Facility: MEDICAL CENTER | Age: 70
End: 2020-03-17
Attending: SURGERY
Payer: MEDICARE

## 2020-03-17 LAB — TSH SERPL DL<=0.005 MIU/L-ACNC: 0.01 UIU/ML (ref 0.38–5.33)

## 2020-03-17 PROCEDURE — 84443 ASSAY THYROID STIM HORMONE: CPT

## 2020-03-17 PROCEDURE — 36415 COLL VENOUS BLD VENIPUNCTURE: CPT

## 2020-04-13 ENCOUNTER — HOSPITAL ENCOUNTER (OUTPATIENT)
Dept: LAB | Facility: MEDICAL CENTER | Age: 70
End: 2020-04-13
Attending: INTERNAL MEDICINE
Payer: MEDICARE

## 2020-04-13 DIAGNOSIS — E03.8 HYPOTHYROIDISM DUE TO HASHIMOTO'S THYROIDITIS: ICD-10-CM

## 2020-04-13 DIAGNOSIS — E06.3 HYPOTHYROIDISM DUE TO HASHIMOTO'S THYROIDITIS: ICD-10-CM

## 2020-04-13 DIAGNOSIS — E55.9 VITAMIN D DEFICIENCY: ICD-10-CM

## 2020-04-13 LAB
25(OH)D3 SERPL-MCNC: 41 NG/ML (ref 30–100)
T3 SERPL-MCNC: 109 NG/DL (ref 60–181)
T3FREE SERPL-MCNC: 3.44 PG/ML (ref 2.4–4.2)
T4 FREE SERPL-MCNC: 2.19 NG/DL (ref 0.53–1.43)
TSH SERPL DL<=0.005 MIU/L-ACNC: 0.01 UIU/ML (ref 0.38–5.33)

## 2020-04-13 PROCEDURE — 84443 ASSAY THYROID STIM HORMONE: CPT

## 2020-04-13 PROCEDURE — 84439 ASSAY OF FREE THYROXINE: CPT

## 2020-04-13 PROCEDURE — 82306 VITAMIN D 25 HYDROXY: CPT

## 2020-04-13 PROCEDURE — 84481 FREE ASSAY (FT-3): CPT

## 2020-04-13 PROCEDURE — 36415 COLL VENOUS BLD VENIPUNCTURE: CPT

## 2020-04-13 PROCEDURE — 84480 ASSAY TRIIODOTHYRONINE (T3): CPT

## 2020-04-19 NOTE — PROGRESS NOTES
Endocrinology Clinic Progress Note  PCP: Charlee Walker M.D.    HPI:  This encounter was conducted via Zoom.  Verbal consent was obtained. Patient's identity was verified.  Margoth Hopkins is a 69 y.o. old patient who comes in today for review of endocrine problems.    Postoperative hypothyroidism:  She had a total thyroidectomy by Dr. 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 Currently taking Levothyroxine 112 mcg daily. She feels shaky and losing hair. Results for MARGOTH HOPKINS (MRN [id number]) as of 4/18/2020 19:26   Ref. Range 4/13/2020 12:45   TSH Latest Ref Range: 0.380 - 5.330 uIU/mL 0.009 (L)   Free T-4 Latest Ref Range: 0.53 - 1.43 ng/dL 2.19 (H)   T3 Latest Ref Range: 60.0 - 181.0 ng/dL 109.0   T3,Free Latest Ref Range: 2.40 - 4.20 pg/mL 3.44      Ref. Range 2/10/2020 10:46   Calcium Latest Ref Range: 8.5 - 10.5 mg/dL 9.2       Vitamin D deficiency:  Currently taking Vitamin D 2000 units daily.  Results for MARGOTH HOPKINS (MRN [id number]) as of 4/18/2020 19:26   Ref. Range 4/13/2020 12:45   25-Hydroxy   Vitamin D 25 Latest Ref Range: 30 - 100 ng/mL 41       ROS:  Constitutional: slightly shaky, No unintentional weight loss  Endo: Denies excessive thirst or frequent urination  All other systems were reviewed and were negative.    Past Medical History:  Patient Active Problem List    Diagnosis Date Noted   • Hashimoto's thyroiditis s/p total thyroidectom Dr. Franklin in 2/2020.  02/20/2020   • Essential tremor 10/31/2019   • Aortic valve sclerosis- without stenosis 2018 echo 08/12/2019   • Mild aortic insufficiency- 2018 echo 08/12/2019   • Calculus of gallbladder with biliary obstruction but without cholecystitis 10/14/2018   • Chronic headache 03/20/2018   • Vitiligo 06/26/2017   • FHx: colon cancer 02/23/2017   • Schatzki's ring_DHA 02/23/2017   • Benign essential HTN 02/23/2017   • Pure hypercholesterolemia 02/23/2017   • MOHAMUD (generalized anxiety disorder)  02/23/2017   • HLA B27 (HLA B27 positive) 02/23/2017   • Primary osteoarthritis of right knee, S/P TKR + 2 revision surgeries_Dr. Lomeli 02/23/2017   • Lumbosacral pain, chronic, with right sciatica_ROC_Dr. Amado  02/23/2017   • Insomnia 02/23/2017       Medications:    Current Outpatient Medications:   •  VITAMIN D PO, Take 2,000 Units by mouth every day., Disp: , Rfl:   •  vitamin D, Ergocalciferol, (DRISDOL) 1.25 MG (10469 UT) Cap capsule, Take 1 Cap by mouth every 7 days. With food., Disp: 12 Cap, Rfl: 3  •  levothyroxine (SYNTHROID) 100 MCG Tab, Take 1 Tab by mouth Every morning on an empty stomach., Disp: 90 Tab, Rfl: 3  •  pregabalin (LYRICA) 50 MG capsule, TAKE 1 CAPSULE BY MOUTH AT BEDTIME AS NEEDED FOR 90 DAYS, Disp: 90 Cap, Rfl: 1  •  propranolol (INDERAL) 40 MG Tab, Take 1 Tab by mouth 2 times a day., Disp: 180 Tab, Rfl: 1  •  lisinopril (PRINIVIL) 20 MG Tab, Take 1 Tab by mouth every day., Disp: 90 Tab, Rfl: 1  •  omeprazole (PRILOSEC) 20 MG delayed-release capsule, every evening., Disp: , Rfl:   •  chlorthalidone (HYGROTON) 25 MG Tab, Take 1 Tab by mouth every day., Disp: 90 Tab, Rfl: 3  •  DULoxetine (CYMBALTA) 20 MG Cap DR Particles, TAKE 1 CAPSULE BY MOUTH  EVERY DAY, Disp: 90 Cap, Rfl: 1  •  rosuvastatin (CRESTOR) 20 MG Tab, TAKE 1 TABLET BY MOUTH  EVERY EVENING, Disp: 90 Tab, Rfl: 3  •  Probiotic Product (PROBIOTIC DAILY PO), Take  by mouth every day., Disp: , Rfl:   •  MAGNESIUM PO, Take  by mouth as needed., Disp: , Rfl:   •  PENNSAID 2 % Solution, as needed., Disp: , Rfl:   •  tizanidine (ZANAFLEX) 4 MG Tab, TAKE 1/2 TO 2 TABS BY MOUTH 3 TIMES A DAY AS NEEDED FOR SPASMS, Disp: , Rfl: 2    Labs: Reviewed    Physical Examination:  Vital signs: There were no vitals taken for this visit. There is no height or weight on file to calculate BMI. Patient's body mass index is unknown because there is no height or weight on file. Exercise and nutrition counseling were performed at this visit.  General:  No apparent distress, cooperative  Eyes: No scleral icterus or discharge  ENMT: Normal on external inspection of nose, lips, Scar from previous thyroidectomy present.   Neck: No abnormal masses on inspection  Resp: Normal effort  Extremities: No visible edema  Neuro: Alert and oriented  Skin: No visible rash  Psych: Normal mood and affect, intact memory and able to make informed decisions    Assessment and Plan:    1. Postoperative hypothyroidism  Reduce levothyroxine to 100 mcg daily; monitor closely.  Surgical thyroid specimen was not cancerous.     2. Vitamin D deficiency  Recommend vit D 50,000 units once a week with food. Script sent to pharmacy.   Side effects and benefits discussed with patient in detail.       Return in about 3 months (around 7/21/2020).    Thank you for allowing me to participate in the care of this patient.        CC:   Charlee Walker M.D.    This note was created using voice recognition software (Dragon). The accuracy of the dictation is limited by the abilities of the software. I have reviewed the note prior to signing, however some errors in grammar and context are still possible. If you have any questions related to this note please do not hesitate to contact our office.

## 2020-04-21 ENCOUNTER — TELEMEDICINE (OUTPATIENT)
Dept: ENDOCRINOLOGY | Facility: MEDICAL CENTER | Age: 70
End: 2020-04-21
Payer: MEDICARE

## 2020-04-21 DIAGNOSIS — L65.9 HAIR LOSS: ICD-10-CM

## 2020-04-21 DIAGNOSIS — E53.8 VITAMIN B 12 DEFICIENCY: ICD-10-CM

## 2020-04-21 DIAGNOSIS — E89.0 POSTOPERATIVE HYPOTHYROIDISM: ICD-10-CM

## 2020-04-21 DIAGNOSIS — E55.9 VITAMIN D DEFICIENCY: ICD-10-CM

## 2020-04-21 PROCEDURE — 99214 OFFICE O/P EST MOD 30 MIN: CPT | Mod: 95,CR | Performed by: INTERNAL MEDICINE

## 2020-04-21 RX ORDER — LEVOTHYROXINE SODIUM 112 UG/1
112 TABLET ORAL
COMMUNITY
End: 2020-04-21

## 2020-04-21 RX ORDER — LEVOTHYROXINE SODIUM 0.1 MG/1
100 TABLET ORAL
Qty: 90 TAB | Refills: 3 | Status: SHIPPED | OUTPATIENT
Start: 2020-04-21 | End: 2020-08-11 | Stop reason: SDUPTHER

## 2020-04-21 RX ORDER — ERGOCALCIFEROL 1.25 MG/1
50000 CAPSULE ORAL
Qty: 12 CAP | Refills: 3 | Status: SHIPPED | OUTPATIENT
Start: 2020-04-21 | End: 2020-08-11 | Stop reason: SDUPTHER

## 2020-04-27 DIAGNOSIS — G47.01 INSOMNIA DUE TO MEDICAL CONDITION: ICD-10-CM

## 2020-04-27 RX ORDER — ZOLPIDEM TARTRATE 5 MG/1
5 TABLET ORAL NIGHTLY PRN
Qty: 30 TAB | Refills: 0 | OUTPATIENT
Start: 2020-04-27 | End: 2020-05-27

## 2020-05-17 DIAGNOSIS — F41.1 GAD (GENERALIZED ANXIETY DISORDER): ICD-10-CM

## 2020-05-17 DIAGNOSIS — M17.11 PRIMARY OSTEOARTHRITIS OF RIGHT KNEE: ICD-10-CM

## 2020-05-18 RX ORDER — DULOXETIN HYDROCHLORIDE 20 MG/1
CAPSULE, DELAYED RELEASE ORAL
Qty: 90 CAP | Refills: 3 | Status: SHIPPED | OUTPATIENT
Start: 2020-05-18 | End: 2021-03-09

## 2020-06-03 ENCOUNTER — HOSPITAL ENCOUNTER (OUTPATIENT)
Dept: LAB | Facility: MEDICAL CENTER | Age: 70
End: 2020-06-03
Attending: FAMILY MEDICINE
Payer: MEDICARE

## 2020-06-03 DIAGNOSIS — E78.00 PURE HYPERCHOLESTEROLEMIA: ICD-10-CM

## 2020-06-03 DIAGNOSIS — I10 BENIGN ESSENTIAL HTN: ICD-10-CM

## 2020-06-03 LAB
BASOPHILS # BLD AUTO: 1.2 % (ref 0–1.8)
BASOPHILS # BLD: 0.08 K/UL (ref 0–0.12)
CREAT UR-MCNC: 106.24 MG/DL
EOSINOPHIL # BLD AUTO: 0.39 K/UL (ref 0–0.51)
EOSINOPHIL NFR BLD: 6 % (ref 0–6.9)
ERYTHROCYTE [DISTWIDTH] IN BLOOD BY AUTOMATED COUNT: 42.9 FL (ref 35.9–50)
HCT VFR BLD AUTO: 43.5 % (ref 37–47)
HGB BLD-MCNC: 14.5 G/DL (ref 12–16)
IMM GRANULOCYTES # BLD AUTO: 0.03 K/UL (ref 0–0.11)
IMM GRANULOCYTES NFR BLD AUTO: 0.5 % (ref 0–0.9)
LYMPHOCYTES # BLD AUTO: 2.15 K/UL (ref 1–4.8)
LYMPHOCYTES NFR BLD: 33.3 % (ref 22–41)
MCH RBC QN AUTO: 29.6 PG (ref 27–33)
MCHC RBC AUTO-ENTMCNC: 33.3 G/DL (ref 33.6–35)
MCV RBC AUTO: 88.8 FL (ref 81.4–97.8)
MICROALBUMIN UR-MCNC: <1.2 MG/DL
MICROALBUMIN/CREAT UR: NORMAL MG/G (ref 0–30)
MONOCYTES # BLD AUTO: 0.69 K/UL (ref 0–0.85)
MONOCYTES NFR BLD AUTO: 10.7 % (ref 0–13.4)
NEUTROPHILS # BLD AUTO: 3.11 K/UL (ref 2–7.15)
NEUTROPHILS NFR BLD: 48.3 % (ref 44–72)
NRBC # BLD AUTO: 0 K/UL
NRBC BLD-RTO: 0 /100 WBC
PLATELET # BLD AUTO: 294 K/UL (ref 164–446)
PMV BLD AUTO: 10.8 FL (ref 9–12.9)
RBC # BLD AUTO: 4.9 M/UL (ref 4.2–5.4)
WBC # BLD AUTO: 6.5 K/UL (ref 4.8–10.8)

## 2020-06-03 PROCEDURE — 80061 LIPID PANEL: CPT

## 2020-06-03 PROCEDURE — 82043 UR ALBUMIN QUANTITATIVE: CPT

## 2020-06-03 PROCEDURE — 80053 COMPREHEN METABOLIC PANEL: CPT

## 2020-06-03 PROCEDURE — 82570 ASSAY OF URINE CREATININE: CPT

## 2020-06-03 PROCEDURE — 85025 COMPLETE CBC W/AUTO DIFF WBC: CPT

## 2020-06-03 PROCEDURE — 36415 COLL VENOUS BLD VENIPUNCTURE: CPT

## 2020-06-04 LAB
ALBUMIN SERPL BCP-MCNC: 4.1 G/DL (ref 3.2–4.9)
ALBUMIN/GLOB SERPL: 1.6 G/DL
ALP SERPL-CCNC: 75 U/L (ref 30–99)
ALT SERPL-CCNC: 24 U/L (ref 2–50)
ANION GAP SERPL CALC-SCNC: 12 MMOL/L (ref 7–16)
AST SERPL-CCNC: 25 U/L (ref 12–45)
BILIRUB SERPL-MCNC: 0.8 MG/DL (ref 0.1–1.5)
BUN SERPL-MCNC: 19 MG/DL (ref 8–22)
CALCIUM SERPL-MCNC: 10 MG/DL (ref 8.5–10.5)
CHLORIDE SERPL-SCNC: 104 MMOL/L (ref 96–112)
CHOLEST SERPL-MCNC: 190 MG/DL (ref 100–199)
CO2 SERPL-SCNC: 23 MMOL/L (ref 20–33)
CREAT SERPL-MCNC: 0.62 MG/DL (ref 0.5–1.4)
FASTING STATUS PATIENT QL REPORTED: NORMAL
GLOBULIN SER CALC-MCNC: 2.6 G/DL (ref 1.9–3.5)
GLUCOSE SERPL-MCNC: 95 MG/DL (ref 65–99)
HDLC SERPL-MCNC: 75 MG/DL
LDLC SERPL CALC-MCNC: 95 MG/DL
POTASSIUM SERPL-SCNC: 3.9 MMOL/L (ref 3.6–5.5)
PROT SERPL-MCNC: 6.7 G/DL (ref 6–8.2)
SODIUM SERPL-SCNC: 139 MMOL/L (ref 135–145)
TRIGL SERPL-MCNC: 100 MG/DL (ref 0–149)

## 2020-06-11 RX ORDER — ZOLPIDEM TARTRATE 5 MG/1
5 TABLET ORAL NIGHTLY PRN
COMMUNITY
End: 2020-06-12 | Stop reason: SDUPTHER

## 2020-06-11 RX ORDER — ROSUVASTATIN CALCIUM 20 MG/1
20 TABLET, COATED ORAL DAILY
COMMUNITY
Start: 2020-03-15 | End: 2020-12-08 | Stop reason: SDUPTHER

## 2020-06-11 RX ORDER — HYDROCODONE BITARTRATE AND ACETAMINOPHEN 7.5; 325 MG/1; MG/1
1 TABLET ORAL EVERY 6 HOURS PRN
COMMUNITY
End: 2020-06-12 | Stop reason: SDUPTHER

## 2020-06-11 SDOH — HEALTH STABILITY: MENTAL HEALTH: HOW OFTEN DO YOU HAVE 6 OR MORE DRINKS ON ONE OCCASION?: NEVER

## 2020-06-11 NOTE — PROGRESS NOTES
ANNUAL WELLNESS VISIT PRE-VISIT PLANNING WITHOUT OUTREACH    1.  Reviewed note from last office visit with PCP: YES    2.  If any orders were placed at last visit, do we have Results/Consult Notes?        •  Labs - Labs ordered, completed on 06/04/2020 and results are in chart.  Note: If patient appointment is for lab review and patient did not complete labs, check with provider if OK to reschedule patient until labs completed.       •  Imaging - Imaging was not ordered at last office visit.       •  Referrals - No referrals were ordered at last office visit.    3.  Immunizations were updated in Epic using WebIZ?: Epic matches WebIZ       •  WebIZ Recommendations: SHINGRIX (Shingles)        •  Is patient due for Tdap? NO       •  Is patient due for Shingrix? YES. Patient was notified of copay/out of pocket cost.     4.  Patient is due for the following Health Maintenance Topics:   Health Maintenance Due   Topic Date Due   • IMM ZOSTER VACCINES (2 of 3) 04/20/2011   • Annual Wellness Visit  11/14/2019   • MAMMOGRAM  04/24/2020     5.  Reviewed/Updated the following with patient:       •   Preferred Pharmacy? YES       •   Preferred Lab? YES       •   Preferred Communication? YES       •   Allergies? YES       •   Medications? YES. Was Abstract Encounter opened and chart updated? YES       •   Social History? YES. Was Abstract Encounter opened and chart updated? YES       •   Family History (document living status of immediate family members and if + hx of  cancer, diabetes, hypertension, hyperlipidemia, heart attack, stroke) YES. Was Abstract Encounter opened and chart updated? YES    6.  Care Team Updated:       •   DME Company (gait device, O2, CPAP, etc.): NO       •   Other Specialists (eye doctor, derm, GYN, cardiology, endo, etc): N\A    7. Orders for overdue Health Maintenance topics pended in Pre-Charting? N\A    8.  Patient has the following Care Path diagnoses on Problem List:  NONE    9.  Patient was  advised: “This is a free wellness visit. The provider will screen for medical conditions to help you stay healthy. If you have other concerns to address you may be asked to discuss these at a separate visit or there may be an additional fee.”     10.  Patient was informed to arrive 15 min prior to their scheduled appointment and bring in their medication bottles.

## 2020-06-12 ENCOUNTER — TELEMEDICINE (OUTPATIENT)
Dept: MEDICAL GROUP | Age: 70
End: 2020-06-12
Payer: MEDICARE

## 2020-06-12 VITALS
DIASTOLIC BLOOD PRESSURE: 71 MMHG | HEART RATE: 72 BPM | HEIGHT: 67 IN | BODY MASS INDEX: 26.68 KG/M2 | WEIGHT: 170 LBS | SYSTOLIC BLOOD PRESSURE: 131 MMHG

## 2020-06-12 DIAGNOSIS — K59.03 DRUG INDUCED CONSTIPATION: ICD-10-CM

## 2020-06-12 DIAGNOSIS — E89.0 POSTOPERATIVE HYPOTHYROIDISM: ICD-10-CM

## 2020-06-12 DIAGNOSIS — R51.9 CHRONIC NONINTRACTABLE HEADACHE, UNSPECIFIED HEADACHE TYPE: ICD-10-CM

## 2020-06-12 DIAGNOSIS — K22.2 SCHATZKI'S RING: ICD-10-CM

## 2020-06-12 DIAGNOSIS — Z00.00 MEDICARE ANNUAL WELLNESS VISIT, SUBSEQUENT: Primary | ICD-10-CM

## 2020-06-12 DIAGNOSIS — F41.1 GENERALIZED ANXIETY DISORDER: ICD-10-CM

## 2020-06-12 DIAGNOSIS — I10 BENIGN ESSENTIAL HTN: ICD-10-CM

## 2020-06-12 DIAGNOSIS — E78.00 PURE HYPERCHOLESTEROLEMIA: ICD-10-CM

## 2020-06-12 DIAGNOSIS — E06.3 HASHIMOTO'S THYROIDITIS: ICD-10-CM

## 2020-06-12 DIAGNOSIS — G47.01 INSOMNIA DUE TO MEDICAL CONDITION: ICD-10-CM

## 2020-06-12 DIAGNOSIS — G25.0 ESSENTIAL TREMOR: ICD-10-CM

## 2020-06-12 DIAGNOSIS — M54.50 LUMBOSACRAL PAIN, CHRONIC: ICD-10-CM

## 2020-06-12 DIAGNOSIS — G89.29 LUMBOSACRAL PAIN, CHRONIC: ICD-10-CM

## 2020-06-12 DIAGNOSIS — M15.9 GENERALIZED OSTEOARTHRITIS: ICD-10-CM

## 2020-06-12 DIAGNOSIS — G89.29 CHRONIC NONINTRACTABLE HEADACHE, UNSPECIFIED HEADACHE TYPE: ICD-10-CM

## 2020-06-12 DIAGNOSIS — Z12.31 ENCOUNTER FOR SCREENING MAMMOGRAM FOR BREAST CANCER: ICD-10-CM

## 2020-06-12 DIAGNOSIS — M17.11 PRIMARY OSTEOARTHRITIS OF RIGHT KNEE: ICD-10-CM

## 2020-06-12 DIAGNOSIS — Z79.891 CHRONIC USE OF OPIATE DRUG FOR THERAPEUTIC PURPOSE: ICD-10-CM

## 2020-06-12 PROCEDURE — G0439 PPPS, SUBSEQ VISIT: HCPCS | Mod: 25,95,CR | Performed by: FAMILY MEDICINE

## 2020-06-12 PROCEDURE — 99213 OFFICE O/P EST LOW 20 MIN: CPT | Mod: CR | Performed by: FAMILY MEDICINE

## 2020-06-12 RX ORDER — HYDROCODONE BITARTRATE AND ACETAMINOPHEN 7.5; 325 MG/1; MG/1
1 TABLET ORAL EVERY 6 HOURS PRN
Qty: 30 TAB | Refills: 0 | Status: SHIPPED | OUTPATIENT
Start: 2020-08-11 | End: 2020-08-11

## 2020-06-12 RX ORDER — LINACLOTIDE 72 UG/1
1 CAPSULE, GELATIN COATED ORAL DAILY
Qty: 30 CAP | Refills: 5 | Status: SHIPPED | OUTPATIENT
Start: 2020-06-12 | End: 2020-12-08 | Stop reason: SDUPTHER

## 2020-06-12 RX ORDER — HYDROCODONE BITARTRATE AND ACETAMINOPHEN 7.5; 325 MG/1; MG/1
1 TABLET ORAL EVERY 6 HOURS PRN
Qty: 30 TAB | Refills: 0 | Status: SHIPPED | OUTPATIENT
Start: 2020-06-12 | End: 2020-07-12

## 2020-06-12 RX ORDER — HYDROCODONE BITARTRATE AND ACETAMINOPHEN 7.5; 325 MG/1; MG/1
1 TABLET ORAL EVERY 6 HOURS PRN
Qty: 30 TAB | Refills: 0 | Status: SHIPPED | OUTPATIENT
Start: 2020-07-12 | End: 2020-08-19 | Stop reason: SDUPTHER

## 2020-06-12 RX ORDER — ZOLPIDEM TARTRATE 5 MG/1
5 TABLET ORAL NIGHTLY PRN
Qty: 30 TAB | Refills: 0 | Status: SHIPPED | OUTPATIENT
Start: 2020-06-12 | End: 2020-08-10 | Stop reason: SDUPTHER

## 2020-06-12 ASSESSMENT — PAIN SCALES - GENERAL: PAINLEVEL: 7=MODERATE-SEVERE PAIN

## 2020-06-12 ASSESSMENT — FIBROSIS 4 INDEX: FIB4 SCORE: 1.23

## 2020-06-12 ASSESSMENT — PATIENT HEALTH QUESTIONNAIRE - PHQ9: CLINICAL INTERPRETATION OF PHQ2 SCORE: 0

## 2020-06-12 ASSESSMENT — ACTIVITIES OF DAILY LIVING (ADL): BATHING_REQUIRES_ASSISTANCE: 0

## 2020-06-12 ASSESSMENT — ENCOUNTER SYMPTOMS: GENERAL WELL-BEING: GOOD

## 2020-06-12 NOTE — LETTER
[facility]  Charlee Walker M.D.  [address] [place] [address]  Fax: [phone]   Authorization for Release/Disclosure of   Protected Health Information   Name: MARGOTH JOHNSON : 1950 SSN: [id number]   Address: [address] [facility] [place] [address] Phone:    [phone] (home)    I authorize the entity listed below to release/disclose the PHI below to:   [facility]/Charlee Walker M.D. and Charlee Walker M.D.   Provider or Entity Name:  [facility]   Address   [facility], [facility]   Phone:   [phone]    Fax:     Reason for request: continuity of care   Information to be released:    [  ] LAST COLONOSCOPY,  including any PATH REPORT and follow-up  [  ] LAST FIT/COLOGUARD RESULT [  ] LAST DEXA  [  ] LAST MAMMOGRAM  [  ] LAST PAP  [  ] LAST LABS [  ] RETINA EXAM REPORT  [  ] IMMUNIZATION RECORDS  [  ] Release all info      [  ] Check here and initial the line next to each item to release ALL health information INCLUDING  _____ Care and treatment for drug and / or alcohol abuse  _____ HIV testing, infection status, or AIDS  _____ Genetic Testing    DATES OF SERVICE OR TIME PERIOD TO BE DISCLOSED: _____________  I understand and acknowledge that:  * This Authorization may be revoked at any time by you in writing, except if your health information has already been used or disclosed.  * Your health information that will be used or disclosed as a result of you signing this authorization could be re-disclosed by the recipient. If this occurs, your re-disclosed health information may no longer be protected by State or Federal laws.  * You may refuse to sign this Authorization. Your refusal will not affect your ability to obtain treatment.  * This Authorization becomes effective upon signing and will  on (date) __________.      If no date is indicated, this Authorization will  one (1) year from the signature date.    Name: Margoth Johnson    Signature: Continuity of Care   Date:     2020          PLEASE FAX REQUESTED RECORDS BACK TO: [phone]

## 2020-06-12 NOTE — PROGRESS NOTES
Chief Complaint   Patient presents with   • Annual Wellness Visit     This encounter was conducted via [employer].   Verbal consent was obtained. Patient's identity was verified.      HPI:  Margoth is a 69 y.o. here for Medicare Annual Wellness Visit    Patient is doing well, she takes all medication as directed.  She denies any side effect with medication.  Patient recently underwent thyroidectomy by Dr. Franklin in February 2020.  She is now taking levothyroxine 100 mcg daily managed by endocrinology.  Patient also history of chronic low back pain and left knee pain.  She had multiple surgery on her knee including knee replacement and revision.  However, she continues to have chronic left knee pain.  She previously working with Dr. Lomeli.  However, she no longer follow-up with him due to failure of pain control despite multiple surgeries.  Patient was previously working with Dr. Mejia at [facility].  She received epidural and trigger point injections multiple times in the past without relief.  She currently taking Norco 7.5-325 mg daily as needed, tizanidine, and Lyrica 50 mg nightly as needed to manage her pain.  She states that she only take this medication as needed.  She does not want to continue to follow-up with Dr. Mejia.  She wants me to manage pain medication.  She denies history of drugs, alcohol, tobacco abuse.  She takes Ambien 5 mg 2-3 times per week for sleep.  She states that insomnia is a direct result of poor pain control at night.  She tried over-the-counter products without relief.  Trazodone did not work for her.      Patient Active Problem List    Diagnosis Date Noted   • Postoperative hypothyroidism_s/p thyroidectomy.  06/12/2020   • Generalized osteoarthritis 06/12/2020   • Drug induced constipation 06/12/2020   • Hashimoto's thyroiditis s/p total thyroidectom Dr. Franklin in 2/2020.  02/20/2020   • Essential tremor 10/31/2019   • Aortic valve sclerosis- without stenosis 2018 echo 08/12/2019   • Mild  aortic insufficiency- 2018 echo 08/12/2019   • Calculus of gallbladder with biliary obstruction but without cholecystitis 10/14/2018   • Chronic headache 03/20/2018   • Vitiligo 06/26/2017   • FHx: colon cancer 02/23/2017   • Schatzki's ring_DHA 02/23/2017   • Benign essential HTN 02/23/2017   • Pure hypercholesterolemia 02/23/2017   • MOHAMUD (generalized anxiety disorder) 02/23/2017   • HLA B27 (HLA B27 positive) 02/23/2017   • Primary osteoarthritis of right knee, S/P TKR + 2 revision surgeries_Dr. Lomeli 02/23/2017   • Lumbosacral pain, chronic, with right sciatica_ROC_Dr. Amado  02/23/2017   • Insomnia 02/23/2017       Current Outpatient Medications   Medication Sig Dispense Refill   • VITAMIN E PO Take  by mouth.     • HYDROcodone-acetaminophen (NORCO) 7.5-325 MG per tablet Take 1 Tab by mouth every 6 hours as needed for up to 30 days. 30 Tab 0   • [START ON 7/12/2020] HYDROcodone-acetaminophen (NORCO) 7.5-325 MG per tablet Take 1 Tab by mouth every 6 hours as needed for up to 30 days. 30 Tab 0   • [START ON 8/11/2020] HYDROcodone-acetaminophen (NORCO) 7.5-325 MG per tablet Take 1 Tab by mouth every 6 hours as needed for up to 30 days. 30 Tab 0   • linaCLOtide (LINZESS) 72 MCG Cap Take 1 Cap by mouth every day. 30 Cap 5   • zolpidem (AMBIEN) 5 MG Tab Take 1 Tab by mouth at bedtime as needed for Sleep for up to 30 days. 30 Tab 0   • rosuvastatin (CRESTOR) 20 MG Tab      • COLLAGEN PO Take  by mouth.     • DULoxetine (CYMBALTA) 20 MG Cap DR Particles TAKE 1 CAPSULE BY MOUTH  EVERY DAY 90 Cap 3   • VITAMIN D PO Take 2,000 Units by mouth every day.     • vitamin D, Ergocalciferol, (DRISDOL) 1.25 MG (74717 UT) Cap capsule Take 1 Cap by mouth every 7 days. With food. 12 Cap 3   • levothyroxine (SYNTHROID) 100 MCG Tab Take 1 Tab by mouth Every morning on an empty stomach. 90 Tab 3   • pregabalin (LYRICA) 50 MG capsule TAKE 1 CAPSULE BY MOUTH AT BEDTIME AS NEEDED FOR 90 DAYS 90 Cap 1   • propranolol (INDERAL) 40 MG Tab  Take 1 Tab by mouth 2 times a day. 180 Tab 1   • lisinopril (PRINIVIL) 20 MG Tab Take 1 Tab by mouth every day. 90 Tab 1   • omeprazole (PRILOSEC) 20 MG delayed-release capsule every evening.     • chlorthalidone (HYGROTON) 25 MG Tab Take 1 Tab by mouth every day. 90 Tab 3   • rosuvastatin (CRESTOR) 20 MG Tab TAKE 1 TABLET BY MOUTH  EVERY EVENING 90 Tab 3   • Probiotic Product (PROBIOTIC DAILY PO) Take  by mouth every day.     • MAGNESIUM PO Take  by mouth as needed.     • PENNSAID 2 % Solution as needed.     • tizanidine (ZANAFLEX) 4 MG Tab TAKE 1/2 TO 2 TABS BY MOUTH 3 TIMES A DAY AS NEEDED FOR SPASMS  2     No current facility-administered medications for this visit.         Patient is taking medications as noted in medication list.  Current supplements as per medication list.     Allergies: Morphine; Nsaids; Pcn [penicillins]; and Gabapentin    Current social contact/activities: Visit with friends/family by telephone, gardening, walking, beading/knitting, cooking, cleaning the house, laundry    Is patient current with immunizations? No, due for SHINGRIX (Shingles). Patient is interested in receiving NONE today.    She  reports that she has never smoked. She has never used smokeless tobacco. She reports current alcohol use of about 4.2 oz of alcohol per week. She reports that she does not use drugs.  Counseling given: Not Answered        DPA/Advanced directive: Patient has Advanced Directive on file.     ROS:    Gait: Uses a cane   Ostomy: No   Other tubes: No   Amputations: No   Chronic oxygen use No   Last eye exam: 2019  Wears hearing aids: No   : Denies any urinary leakage during the last 6 months      Screening:      Depression Screening    Little interest or pleasure in doing things?  0 - not at all  Feeling down, depressed, or hopeless? 0 - not at all  Patient Health Questionnaire Score: 0    Screening for Cognitive Impairment    Three Minute Recall (village, kitchen, baby)   /3    Shawn clock face with  all 12 numbers and set the hands to show 10 past 10.         Fall Risk Assessment    Has the patient had two or more falls in the last year or any fall with injury in the last year?  Yes  If fall risk identified, deferred for follow up unless specifically addressed in assessment and plan.    Safety Assessment    Throw rugs on floor.  Yes  Handrails on all stairs.  No  Good lighting in all hallways.  Yes  Difficulty hearing.  No  Patient counseled about all safety risks that were identified.    Functional Assessment ADLs    Are there any barriers preventing you from cooking for yourself or meeting nutritional needs?  No.    Are there any barriers preventing you from driving safely or obtaining transportation?  No.    Are there any barriers preventing you from using a telephone or calling for help?  No.    Are there any barriers preventing you from shopping?  No.    Are there any barriers preventing you from taking care of your own finances?  No.    Are there any barriers preventing you from managing your medications?  No.    Are there any barriers preventing you from showering, bathing or dressing yourself?  No.    Are you currently engaging in any exercise or physical activity?  Yes.     What is your perception of your health?  Good.    Health Maintenance Summary                IMM ZOSTER VACCINES Overdue 4/20/2011      Done 2/23/2011 Imm Admin: Zoster Vaccine Live (ZVL) (Zostavax)     Patient has more history with this topic...    Annual Wellness Visit Overdue 11/14/2019      Done 11/13/2018 Visit Dx: Medicare annual wellness visit, subsequent     Patient has more history with this topic...    MAMMOGRAM Overdue 4/24/2020      Done 4/24/2019 MA-SCREENING MAMMO BILAT W/TOMOSYNTHESIS W/CAD     Patient has more history with this topic...    COLONOSCOPY Next Due 3/24/2021      Done 3/24/2016 REFERRAL TO GI FOR COLONOSCOPY    BONE DENSITY Next Due 3/30/2023      Done 3/30/2018 DS-BONE DENSITY STUDY (DEXA)    IMM  DTaP/Tdap/Td Vaccine Next Due 5/14/2029      Done 5/14/2019 Imm Admin: Tdap Vaccine     Patient has more history with this topic...          Patient Care Team:  Charlee Walker M.D. as PCP - General (Family Medicine)  Alonzo Montes De Oca M.D. as Consulting Physician (Orthopaedics)  Tay Garcia M.D. as Consulting Physician (Endocrinology)  Ishmael Lomeli M.D. as Consulting Physician (Orthopaedics)  [facility] as Attending Team Physician (Optometry)    Social History     Tobacco Use   • Smoking status: Never Smoker   • Smokeless tobacco: Never Used   Substance Use Topics   • Alcohol use: Yes     Alcohol/week: 4.2 oz     Types: 7 Glasses of wine per week     Frequency: 4 or more times a week     Drinks per session: 1 or 2     Binge frequency: Never     Comment: 3-5/day   • Drug use: No     Comment: CBD ingested     Family History   Problem Relation Age of Onset   • Arthritis Mother    • Alzheimer's Disease Mother    • Cancer Mother         cervical cancer   • Heart Attack Father    • Other Brother         brain anurism   • Hypertension Brother    • Cancer Maternal Grandmother 50        colono cancer   • Heart Disease Brother    • Hypertension Brother    • Arthritis Brother    • Other Brother         Celiac Disease   • No Known Problems Maternal Grandfather    • No Known Problems Paternal Grandmother    • No Known Problems Paternal Grandfather    • Hypertension Brother    • Arthritis Brother    • Lung Disease Brother    • Asthma Brother      She  has a past medical history of Anemia, Aortic insufficiency (01/31/2020), Arthritis, Cataract, Gastric ulcer, Goiter, High cholesterol, Hypertension, Hypothyroidism, Pain (01/31/2020), Pulmonary embolism (HCC) (01/31/2020), Reactive arthritis (HCC), Thyroid disease, and Thyroid disease (01/31/2020).   Past Surgical History:   Procedure Laterality Date   • THYROIDECTOMY TOTAL Bilateral 2/5/2020    Procedure: THYROIDECTOMY, TOTAL- COMPLETE;  Surgeon: Cydney Franklin,  "M.D.;  Location: SURGERY SAME DAY [facility];  Service: General   • APPENDECTOMY     • BLADDER SLING FEMALE     • CATARACT EXTRACTION WITH IOL Left    • CATARACT EXTRACTION WITH IOL Bilateral    • CERVICAL FUSION POSTERIOR     • FOOT SURGERY Left     metatarsal fusion, hammer toes correction   • FUSION      disc fusion   • GYN SURGERY      Hysterectomy   • KNEE ARTHROPLASTY TOTAL Right     x3   • KNEE REPLACEMENT, TOTAL Right    • KNEE REVISION TOTAL Right            Exam:     /71 (BP Location: Left arm, Patient Position: Sitting, BP Cuff Size: Adult)   Pulse 72   Ht 1.702 m (5' 7\")   Wt 77.1 kg (170 lb)  Body mass index is 26.63 kg/m².    Hearing excellent.    Dentition good  Alert, oriented in no acute distress.  Eye contact is good, speech goal directed, affect calm      Assessment and Plan. The following treatment and monitoring plan is recommended:      1. Benign essential HTN  Chronic, controlled with chlorthalidone 25 mg daily and lisinopril 20 mg daily, no s/e reported, will continue.      2. Pure hypercholesterolemia  Chronic, controlled with Crestor 20 mg daily, no s/e reported, will continue.    - recommended dietary modification, exercise, and weight loss.   - avoid alcohol, drugs, tobacco products     3. MOHAMUD (generalized anxiety disorder)  Chronic, controlled with Cymbalta 20 mg daily, no side effect reported, will continue.  Patient does not work with behavioral health.  - Appropriate counseling provided. Topics might include: regular exercise, well-balanced diet, multi-vitamin daily, weight loss, adequate sleep, meditation, stress management, avoidance of excessive consumption of caffeine, alcohol, illicit drugs, tobacco.      4. Postoperative hypothyroidism_s/p thyroidectomy.   5. Hashimoto's thyroiditis s/p total thyroidectom Dr. Franklin in 2/2020.   Patient is status post thyroidectomy in February 2020.  She is taking levothyroxine 100 mcg daily.  She reports feeling much better after " surgery.  This condition is currently being managed by endocrinology.  -Continue levothyroxine 100 mcg daily and follow-up with endocrinology as directed    6. Insomnia due to medical condition  Chronic insomnia secondary to chronic, poorly controlled generalized pain from osteoarthritis.  Patient states that she takes Ambien 5 mg couple times per week for sleep.  She tried over-the-counter and prescribed medication without relief.  She denies history of drugs and alcohol abuse.  - zolpidem (AMBIEN) 5 MG Tab; Take 1 Tab by mouth at bedtime as needed for Sleep for up to 30 days.  Dispense: 30 Tab; Refill: 0  - Risks, benefits, side effects, as well as potential health complications associated with Ambien discussed with patient. Appropriate counseling provided.    - regular exercise, well-balanced diet, multi-vitamin daily, meditation, stress reduction.   - Avoid excessive consumption of stimulants, alcohol, illicit drugs, tobacco  - Avoid using computer or electronic devices at night  - Avoid watching TV on bed  - Avoid napping during the day  - pain control as below   - f/u PRN.      7. Schatzki's ring_DHA  Chronic, status post dilation x6, most recent EGD was in early 2020, currently taking omeprazole 20 mg daily.  Denies any swallowing problems at this time.  -Continue omeprazole 20 mg daily and follow-up with GI as directed.    8. Essential tremor  Chronic, controlled with propanolol 40 mg twice daily, no side effect reported, will continue.    9. Encounter for screening mammogram for breast cancer  - MA-SCREENING MAMMO BILAT W/CAD; Future    10. Chronic nonintractable headache, unspecified headache type  Chronic, controlled with propanolol 40 mg twice daily, rarely has exacerbation.  -Continue propranolol 40 mg twice daily    11. Medicare annual wellness visit, subsequent  General counseling provided, topics might include: diet, exercise, vitamin supplement, mental health, sleep, stress management, pap,  mammogram, colonoscopy and vaccine recommendations.        12. Chronic use of opiate drug for therapeutic purpose  13. Lumbosacral pain, chronic, with right sciatica_ROC_Dr. Amado   14. Primary osteoarthritis of right knee, S/P TKR + 2 revision surgeries_Dr. Lomeli  15. Generalized osteoarthritis  -Chronic pain source: Generalized osteoarthritis  -Functional goals: able to perform ADLs, sleep   -Opioid Medications (med, does, /mo):  Norco 7.5-325 mg qd    -Non opiates: Tizanidine and Lyrica   -Other modalities: regular rehab exercises   -Drug or alcohol abuse history: neg   -Opiate Risk Score:0   -Date of Last therapy agreement/ update: 6/12/2020 (will send to patient to sign)  -Date of last UDS: UDS ordered, pt will have it done prior to next OV.     -Discrepancies: No  -date of last NARxCHECK: 6/12/2020   Plan:   - pt was counseled on risks, benefits, and potential complications of chronic opiate use including but not limited to the followings: tolerance, dependence, addiction, diversion, side effects of constipation, bloating, abdominal distention, nausea, vomiting, drowsiness, dizziness loss of balance or coordination, cognitive slowing, itching, potential allergic reaction, sedation, and respiratory depression which are enhanced with concomitant use of other classes of medications including antidepression, anxiolytic, sleep medications,  benzodiazepines, and others. Pt was advised to avoid alcohol, illicit drugs, BDZ, and OTC sleeping aids while taking opiates  - recommended counseling, but pt declines at this time.  - regular exercise, well-balanced diet, meditation, stress management, 8 hours sleep nightly  - Controlled Substance Treatment Agreement, will send to pt for signature.   - Pain Management Screen; Future (pt will complete prior to next OV)  - HYDROcodone-acetaminophen (NORCO) 7.5-325 MG per tablet; Take 1 Tab by mouth every 6 hours as needed for up to 30 days.  Dispense: 30 Tab; Refill: 0  -  HYDROcodone-acetaminophen (NORCO) 7.5-325 MG per tablet; Take 1 Tab by mouth every 6 hours as needed for up to 30 days.  Dispense: 30 Tab; Refill: 0  - HYDROcodone-acetaminophen (NORCO) 7.5-325 MG per tablet; Take 1 Tab by mouth every 6 hours as needed for up to 30 days.  Dispense: 30 Tab; Refill: 0    16. Drug induced constipation  Hx of drug-induced constipation in the past. Historically, she did well with Linzess. Plans:   - linaCLOtide (LINZESS) 72 MCG Cap; Take 1 Cap by mouth every day.  Dispense: 30 Cap; Refill: 5     Services suggested: No services needed at this time  Health Care Screening recommendations as per orders if indicated.  Referrals offered: PT/OT/Nutrition counseling/Behavioral Health/Smoking cessation as per orders if indicated.    Discussion today about general wellness and lifestyle habits:    · Prevent falls and reduce trip hazards; Cautioned about securing or removing rugs.  · Have a working fire alarm and carbon monoxide detector;   · Engage in regular physical activity and social activities       Follow-up: Return in about 3 months (around 9/12/2020) for pain meds refill.

## 2020-07-25 ENCOUNTER — TELEPHONE ENCOUNTER (OUTPATIENT)
Dept: URBAN - METROPOLITAN AREA CLINIC 13 | Facility: CLINIC | Age: 70
End: 2020-07-25

## 2020-07-25 ENCOUNTER — HOSPITAL ENCOUNTER (OUTPATIENT)
Dept: RADIOLOGY | Facility: MEDICAL CENTER | Age: 70
End: 2020-07-25
Attending: FAMILY MEDICINE
Payer: MEDICARE

## 2020-07-25 PROCEDURE — 77067 SCR MAMMO BI INCL CAD: CPT

## 2020-07-26 ENCOUNTER — TELEPHONE ENCOUNTER (OUTPATIENT)
Dept: URBAN - METROPOLITAN AREA CLINIC 13 | Facility: CLINIC | Age: 70
End: 2020-07-26

## 2020-08-06 ENCOUNTER — HOSPITAL ENCOUNTER (OUTPATIENT)
Dept: LAB | Facility: MEDICAL CENTER | Age: 70
End: 2020-08-06
Attending: INTERNAL MEDICINE
Payer: MEDICARE

## 2020-08-06 DIAGNOSIS — E89.0 POSTOPERATIVE HYPOTHYROIDISM: ICD-10-CM

## 2020-08-06 DIAGNOSIS — E55.9 VITAMIN D DEFICIENCY: ICD-10-CM

## 2020-08-06 DIAGNOSIS — E53.8 VITAMIN B 12 DEFICIENCY: ICD-10-CM

## 2020-08-06 LAB
25(OH)D3 SERPL-MCNC: 52 NG/ML (ref 30–100)
T3 SERPL-MCNC: 95 NG/DL (ref 60–181)
T3FREE SERPL-MCNC: 2.59 PG/ML (ref 2–4.4)
T4 FREE SERPL-MCNC: 1.99 NG/DL (ref 0.93–1.7)
TSH SERPL DL<=0.005 MIU/L-ACNC: 0.06 UIU/ML (ref 0.38–5.33)
VIT B12 SERPL-MCNC: 609 PG/ML (ref 211–911)

## 2020-08-06 PROCEDURE — 84443 ASSAY THYROID STIM HORMONE: CPT

## 2020-08-06 PROCEDURE — 84439 ASSAY OF FREE THYROXINE: CPT

## 2020-08-06 PROCEDURE — 84481 FREE ASSAY (FT-3): CPT

## 2020-08-06 PROCEDURE — 82607 VITAMIN B-12: CPT

## 2020-08-06 PROCEDURE — 82306 VITAMIN D 25 HYDROXY: CPT

## 2020-08-06 PROCEDURE — 36415 COLL VENOUS BLD VENIPUNCTURE: CPT

## 2020-08-06 PROCEDURE — 84480 ASSAY TRIIODOTHYRONINE (T3): CPT

## 2020-08-09 DIAGNOSIS — G47.01 INSOMNIA DUE TO MEDICAL CONDITION: ICD-10-CM

## 2020-08-09 NOTE — PROGRESS NOTES
Endocrinology Clinic Progress Note  PCP: Charlee Walker M.D.    HPI:  Margoth Hopkins is a 69 y.o. old patient who comes in today for review of endocrine problems.    Postoperative hypothyroidism:  She had a total thyroidectomy by Dr. 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 Currently taking Name brand Synthroid 100 mcg daily. Results for MARGOTH HOPKINS (MRN [id number]) as of 8/9/2020 13:00   Ref. Range 8/6/2020 15:57   TSH Latest Ref Range: 0.380 - 5.330 uIU/mL 0.063 (L)   Free T-4 Latest Ref Range: 0.93 - 1.70 ng/dL 1.99 (H)   T3 Latest Ref Range: 60.0 - 181.0 ng/dL 95.0   T3,Free Latest Ref Range: 2.00 - 4.40 pg/mL 2.59      Ref. Range 6/3/2020 11:17   Calcium Latest Ref Range: 8.5 - 10.5 mg/dL 10.0     Vitamin D deficiency:  Currently taking Vitamin D 50,000 units weekly.  Results for MARGOTH HOPKINS (MRN [id number]) as of 8/9/2020 13:00   Ref. Range 8/6/2020 15:57   25-Hydroxy   Vitamin D 25 Latest Ref Range: 30 - 100 ng/mL 52       ROS:  Constitutional: No unintentional weight loss  Endo: Denies excessive thirst or frequent urination  All other systems were reviewed and were negative.    Past Medical History:  Patient Active Problem List    Diagnosis Date Noted   • Postoperative hypothyroidism_s/p thyroidectomy.  06/12/2020   • Generalized osteoarthritis 06/12/2020   • Drug induced constipation 06/12/2020   • Hashimoto's thyroiditis s/p total thyroidectom Dr. Franklin in 2/2020.  02/20/2020   • Essential tremor 10/31/2019   • Aortic valve sclerosis- without stenosis 2018 echo 08/12/2019   • Mild aortic insufficiency- 2018 echo 08/12/2019   • Calculus of gallbladder with biliary obstruction but without cholecystitis 10/14/2018   • Chronic use of opiate drug for therapeutic purpose_contract signed 6/2020, UDS pending 07/25/2018   • Chronic headache 03/20/2018   • Vitiligo 06/26/2017   • FHx: colon cancer 02/23/2017   • Jody's ring_DHA 02/23/2017   • Benign  essential HTN 02/23/2017   • Pure hypercholesterolemia 02/23/2017   • MOHAMUD (generalized anxiety disorder) 02/23/2017   • HLA B27 (HLA B27 positive) 02/23/2017   • Primary osteoarthritis of right knee, S/P TKR + 2 revision surgeries_Dr. Lomeli 02/23/2017   • Lumbosacral pain, chronic, with right sciatica_ROC_Dr. Amado  02/23/2017   • Insomnia 02/23/2017       Medications:    Current Outpatient Medications:   •  vitamin D, Ergocalciferol, (DRISDOL) 1.25 MG (85405 UT) Cap capsule, Take 1 Cap by mouth every 7 days. With food., Disp: 12 Cap, Rfl: 3  •  levothyroxine (SYNTHROID) 100 MCG Tab, Take 1 Tab by mouth Every morning on an empty stomach. Synthroid brand medically necessary., Disp: 12 Tab, Rfl: 3  •  zolpidem (AMBIEN) 5 MG Tab, Take 1 Tab by mouth at bedtime as needed for Sleep for up to 30 days., Disp: 30 Tab, Rfl: 0  •  VITAMIN E PO, Take  by mouth., Disp: , Rfl:   •  HYDROcodone-acetaminophen (NORCO) 7.5-325 MG per tablet, Take 1 Tab by mouth every 6 hours as needed for up to 30 days., Disp: 30 Tab, Rfl: 0  •  linaCLOtide (LINZESS) 72 MCG Cap, Take 1 Cap by mouth every day., Disp: 30 Cap, Rfl: 5  •  rosuvastatin (CRESTOR) 20 MG Tab, , Disp: , Rfl:   •  COLLAGEN PO, Take  by mouth., Disp: , Rfl:   •  DULoxetine (CYMBALTA) 20 MG Cap DR Particles, TAKE 1 CAPSULE BY MOUTH  EVERY DAY, Disp: 90 Cap, Rfl: 3  •  pregabalin (LYRICA) 50 MG capsule, TAKE 1 CAPSULE BY MOUTH AT BEDTIME AS NEEDED FOR 90 DAYS, Disp: 90 Cap, Rfl: 1  •  propranolol (INDERAL) 40 MG Tab, Take 1 Tab by mouth 2 times a day., Disp: 180 Tab, Rfl: 1  •  lisinopril (PRINIVIL) 20 MG Tab, Take 1 Tab by mouth every day., Disp: 90 Tab, Rfl: 1  •  omeprazole (PRILOSEC) 20 MG delayed-release capsule, every evening., Disp: , Rfl:   •  chlorthalidone (HYGROTON) 25 MG Tab, Take 1 Tab by mouth every day., Disp: 90 Tab, Rfl: 3  •  Probiotic Product (PROBIOTIC DAILY PO), Take  by mouth every day., Disp: , Rfl:   •  MAGNESIUM PO, Take  by mouth as needed., Disp: ,  "Rfl:   •  tizanidine (ZANAFLEX) 4 MG Tab, TAKE 1/2 TO 2 TABS BY MOUTH 3 TIMES A DAY AS NEEDED FOR SPASMS, Disp: , Rfl: 2  •  VITAMIN D PO, Take 2,000 Units by mouth every day., Disp: , Rfl:     Labs: Reviewed    Physical Examination:  Vital signs: /70   Pulse 82   Ht 1.702 m (5' 7\")   Wt 79.4 kg (175 lb)   SpO2 96%   BMI 27.41 kg/m²  Body mass index is 27.41 kg/m². Patient's body mass index is 27.41 kg/m².   General: No apparent distress, cooperative  Eyes: No scleral icterus or discharge  ENMT: Normal on external inspection of nose, lips, Scar from previous thyroidectomy present.   Neck: No abnormal masses on inspection  Resp: Normal effort, clear to auscultation bilaterally   CVS: Regular rate and rhythm, S1 S2 normal, no murmur   Extremities: No edema  Abdomen: abdominal obesity present  Neuro: Alert and oriented  Skin: No rash  Psych: Normal mood and affect, intact memory and able to make informed decisions    Assessment and Plan:    1. Postoperative hypothyroidism  Continue current dose; no signs and or symptoms of hyperthyroidism.    2. Vitamin D deficiency  Cont vit D with food.     Return in about 3 months (around 11/11/2020).    Thank you for allowing me to participate in the care of this patient.        CC:   Charlee Walker M.D.    This note was created using voice recognition software (Dragon). The accuracy of the dictation is limited by the abilities of the software. I have reviewed the note prior to signing, however some errors in grammar and context are still possible. If you have any questions related to this note please do not hesitate to contact our office.   "

## 2020-08-10 RX ORDER — ZOLPIDEM TARTRATE 5 MG/1
5 TABLET ORAL NIGHTLY PRN
Qty: 30 TAB | Refills: 0 | Status: SHIPPED | OUTPATIENT
Start: 2020-08-10 | End: 2020-09-08 | Stop reason: SDUPTHER

## 2020-08-10 NOTE — TELEPHONE ENCOUNTER
From: Margoth Hopkins  To: Office of Charele Walker M.D.  Sent: 8/9/2020 10:26 PM PDT  Subject: Medication Renewal Request    Refills have been requested for the following medications:   Other - Zolpidem Tartrate 5mg. po hs prn for sleep.    Preferred pharmacy: [id number] - [place], [place] - [address]  Delivery method: Pickup

## 2020-08-11 ENCOUNTER — OFFICE VISIT (OUTPATIENT)
Dept: ENDOCRINOLOGY | Facility: MEDICAL CENTER | Age: 70
End: 2020-08-11
Attending: INTERNAL MEDICINE
Payer: MEDICARE

## 2020-08-11 VITALS
DIASTOLIC BLOOD PRESSURE: 70 MMHG | SYSTOLIC BLOOD PRESSURE: 108 MMHG | OXYGEN SATURATION: 96 % | HEART RATE: 82 BPM | BODY MASS INDEX: 27.47 KG/M2 | HEIGHT: 67 IN | WEIGHT: 175 LBS

## 2020-08-11 DIAGNOSIS — E55.9 VITAMIN D DEFICIENCY: ICD-10-CM

## 2020-08-11 DIAGNOSIS — I35.1 AORTIC VALVE INSUFFICIENCY, ETIOLOGY OF CARDIAC VALVE DISEASE UNSPECIFIED: ICD-10-CM

## 2020-08-11 DIAGNOSIS — E89.0 POSTOPERATIVE HYPOTHYROIDISM: ICD-10-CM

## 2020-08-11 PROCEDURE — 99214 OFFICE O/P EST MOD 30 MIN: CPT | Performed by: INTERNAL MEDICINE

## 2020-08-11 PROCEDURE — 99212 OFFICE O/P EST SF 10 MIN: CPT | Performed by: INTERNAL MEDICINE

## 2020-08-11 RX ORDER — ERGOCALCIFEROL 1.25 MG/1
50000 CAPSULE ORAL
Qty: 12 CAP | Refills: 3 | Status: SHIPPED | OUTPATIENT
Start: 2020-08-11 | End: 2020-12-08

## 2020-08-11 RX ORDER — LEVOTHYROXINE SODIUM 0.1 MG/1
100 TABLET ORAL
Qty: 12 TAB | Refills: 3 | Status: SHIPPED | OUTPATIENT
Start: 2020-08-11 | End: 2021-01-12

## 2020-08-11 ASSESSMENT — FIBROSIS 4 INDEX: FIB4 SCORE: 1.23

## 2020-08-19 DIAGNOSIS — M15.9 GENERALIZED OSTEOARTHRITIS: ICD-10-CM

## 2020-08-19 DIAGNOSIS — Z79.891 CHRONIC USE OF OPIATE DRUG FOR THERAPEUTIC PURPOSE: ICD-10-CM

## 2020-08-19 RX ORDER — HYDROCODONE BITARTRATE AND ACETAMINOPHEN 7.5; 325 MG/1; MG/1
1 TABLET ORAL EVERY 6 HOURS PRN
Qty: 30 TAB | Refills: 0 | Status: SHIPPED | OUTPATIENT
Start: 2020-08-19 | End: 2020-09-08

## 2020-09-03 ENCOUNTER — HOSPITAL ENCOUNTER (OUTPATIENT)
Dept: RADIOLOGY | Facility: MEDICAL CENTER | Age: 70
End: 2020-09-03
Attending: NEUROLOGICAL SURGERY
Payer: MEDICARE

## 2020-09-03 ENCOUNTER — HOSPITAL ENCOUNTER (OUTPATIENT)
Facility: MEDICAL CENTER | Age: 70
End: 2020-09-03
Attending: FAMILY MEDICINE
Payer: MEDICARE

## 2020-09-03 DIAGNOSIS — M54.50 LOW BACK PAIN, UNSPECIFIED BACK PAIN LATERALITY, UNSPECIFIED CHRONICITY, UNSPECIFIED WHETHER SCIATICA PRESENT: ICD-10-CM

## 2020-09-03 DIAGNOSIS — M15.9 GENERALIZED OSTEOARTHRITIS: ICD-10-CM

## 2020-09-03 DIAGNOSIS — Z79.891 CHRONIC USE OF OPIATE DRUG FOR THERAPEUTIC PURPOSE: ICD-10-CM

## 2020-09-03 PROCEDURE — 72110 X-RAY EXAM L-2 SPINE 4/>VWS: CPT

## 2020-09-03 PROCEDURE — 80307 DRUG TEST PRSMV CHEM ANLYZR: CPT

## 2020-09-07 LAB
6MAM UR QL: NOT DETECTED
7AMINOCLONAZEPAM UR QL: NOT DETECTED
A-OH ALPRAZ UR QL: NOT DETECTED
ALPRAZ UR QL: NOT DETECTED
AMPHET UR QL SCN: NOT DETECTED
ANNOTATION COMMENT IMP: NORMAL
ANNOTATION COMMENT IMP: NORMAL
BARBITURATES UR QL: NOT DETECTED
BUPRENORPHINE UR QL: NOT DETECTED
BZE UR QL: NOT DETECTED
CARBOXYTHC UR QL: NOT DETECTED
CARISOPRODOL UR QL: NOT DETECTED
CLONAZEPAM UR QL: NOT DETECTED
CODEINE UR QL: NOT DETECTED
DIAZEPAM UR QL: NOT DETECTED
ETHYL GLUCURONIDE UR QL: NOT DETECTED
FENTANYL UR QL: NOT DETECTED
HYDROCODONE UR QL: NOT DETECTED
HYDROMORPHONE UR QL: NOT DETECTED
LORAZEPAM UR QL: NOT DETECTED
MDA UR QL: NOT DETECTED
MDEA UR QL: NOT DETECTED
MDMA UR QL: NOT DETECTED
MEPERIDINE UR QL: NOT DETECTED
METHADONE UR QL: NOT DETECTED
METHAMPHET UR QL: NOT DETECTED
MIDAZOLAM UR QL SCN: NOT DETECTED
MORPHINE UR QL: NOT DETECTED
NORBUPRENORPHINE UR QL CFM: NOT DETECTED
NORDIAZEPAM UR QL: NOT DETECTED
NORFENTANYL UR QL: NOT DETECTED
NORHYDROCODONE UR QL CFM: NOT DETECTED
NOROXYCODONE UR QL CFM: NOT DETECTED
NOROXYMORPH CO100 Q0458: NOT DETECTED
OXAZEPAM UR QL: NOT DETECTED
OXYCODONE UR QL: NOT DETECTED
OXYMORPHONE UR QL: NOT DETECTED
PATHOLOGY STUDY: NORMAL
PCP UR QL: NOT DETECTED
PHENTERMINE UR QL: NOT DETECTED
PPAA UR QL: NOT DETECTED
PROPOXYPH UR QL: NOT DETECTED
SERVICE CMNT-IMP: NORMAL
TAPENADOL OSULF CO200 Q0473: NOT DETECTED
TAPENTADOL UR QL SCN: NOT DETECTED
TEMAZEPAM UR QL: NOT DETECTED
TRAMADOL UR QL: NOT DETECTED
ZOLPIDEM UR QL: NOT DETECTED

## 2020-09-08 ENCOUNTER — TELEMEDICINE (OUTPATIENT)
Dept: MEDICAL GROUP | Age: 70
End: 2020-09-08
Payer: MEDICARE

## 2020-09-08 VITALS — HEIGHT: 67 IN | BODY MASS INDEX: 26.68 KG/M2 | WEIGHT: 170 LBS

## 2020-09-08 DIAGNOSIS — E89.0 POSTOPERATIVE HYPOTHYROIDISM: ICD-10-CM

## 2020-09-08 DIAGNOSIS — G89.29 LUMBOSACRAL PAIN, CHRONIC: ICD-10-CM

## 2020-09-08 DIAGNOSIS — M54.50 LUMBOSACRAL PAIN, CHRONIC: ICD-10-CM

## 2020-09-08 DIAGNOSIS — G47.01 INSOMNIA DUE TO MEDICAL CONDITION: Primary | ICD-10-CM

## 2020-09-08 PROCEDURE — 99214 OFFICE O/P EST MOD 30 MIN: CPT | Mod: 95,CR | Performed by: FAMILY MEDICINE

## 2020-09-08 RX ORDER — ZOLPIDEM TARTRATE 5 MG/1
5 TABLET ORAL NIGHTLY PRN
Qty: 30 TAB | Refills: 0 | Status: SHIPPED | OUTPATIENT
Start: 2020-11-09 | End: 2020-09-08 | Stop reason: SDUPTHER

## 2020-09-08 RX ORDER — ZOLPIDEM TARTRATE 5 MG/1
5 TABLET ORAL NIGHTLY PRN
Qty: 30 TAB | Refills: 0 | Status: SHIPPED | OUTPATIENT
Start: 2020-09-10 | End: 2020-12-08 | Stop reason: SDUPTHER

## 2020-09-08 RX ORDER — ZOLPIDEM TARTRATE 5 MG/1
5 TABLET ORAL NIGHTLY PRN
Qty: 30 TAB | Refills: 0 | Status: SHIPPED | OUTPATIENT
Start: 2020-10-10 | End: 2020-11-09

## 2020-09-08 RX ORDER — ZOLPIDEM TARTRATE 5 MG/1
5 TABLET ORAL NIGHTLY PRN
Qty: 30 TAB | Refills: 0 | Status: SHIPPED | OUTPATIENT
Start: 2020-11-09 | End: 2020-11-05

## 2020-09-08 ASSESSMENT — FIBROSIS 4 INDEX: FIB4 SCORE: 1.23

## 2020-09-09 NOTE — PROGRESS NOTES
Virtual Visit: Established Patient   This visit was conducted via Zoom using secure and encrypted videoconferencing technology. The patient was in a private location in the state of [place].    The patient's identity was confirmed and verbal consent was obtained for this virtual visit.    Subjective:   CC: insomnia f/u     Margoth Hopkins is a 69 y.o. female with hx of chronic insomnia. She is taking Ambien 5 mg qhs for sleep. She tried and failed multiple OTC and prescribed sleeping aids. She tolerates Ambien well, no s/e reported. She has been following up with me every 3 months for medication refill.     She also has hx of chronic lumbosacral pain, s/p epidural and trigger injections x6 in [place], but did not work. She was previously working with with pain management at [facility], got 2 epidural injections by Dr. Amado. However, her symptoms are persistent. She is on Tizanidine, Lyrica, and Norco PRN. She also takes Cymbalta 20 mg qd for chronic pain. She will establish care with Dr. Alvarez in 9/2020 for 2nd opinion.     She has hypothyroidism, s/p thyroidectomy in 2/2020, currently working with Dr. Garcia. She is taking Levothyroxine 100 mcg qd. She denies any s/e.       ROS   Denies any recent fevers or chills. No nausea or vomiting. No chest pains or shortness of breath.     Allergies   Allergen Reactions   • Morphine Vomiting   • Nsaids Unspecified     History of gastric ulcers - cannot take NSAIDS   • Pcn [Penicillins] Hives   • Gabapentin      Cognitive changes        Current medicines (including changes today)  Current Outpatient Medications   Medication Sig Dispense Refill   • [START ON 9/10/2020] zolpidem (AMBIEN) 5 MG Tab Take 1 Tab by mouth at bedtime as needed for Sleep for up to 30 days. 30 Tab 0   • [START ON 10/10/2020] zolpidem (AMBIEN) 5 MG Tab Take 1 Tab by mouth at bedtime as needed for Sleep for up to 30 days. 30 Tab 0   • [START ON 11/9/2020] zolpidem (AMBIEN) 5 MG Tab Take 1 Tab by mouth at  bedtime as needed for Sleep for up to 30 days. 30 Tab 0   • vitamin D, Ergocalciferol, (DRISDOL) 1.25 MG (53628 UT) Cap capsule Take 1 Cap by mouth every 7 days. With food. 12 Cap 3   • levothyroxine (SYNTHROID) 100 MCG Tab Take 1 Tab by mouth Every morning on an empty stomach. Synthroid brand medically necessary. 12 Tab 3   • VITAMIN E PO Take  by mouth.     • linaCLOtide (LINZESS) 72 MCG Cap Take 1 Cap by mouth every day. 30 Cap 5   • rosuvastatin (CRESTOR) 20 MG Tab      • COLLAGEN PO Take  by mouth.     • DULoxetine (CYMBALTA) 20 MG Cap DR Particles TAKE 1 CAPSULE BY MOUTH  EVERY DAY 90 Cap 3   • pregabalin (LYRICA) 50 MG capsule TAKE 1 CAPSULE BY MOUTH AT BEDTIME AS NEEDED FOR 90 DAYS 90 Cap 1   • propranolol (INDERAL) 40 MG Tab Take 1 Tab by mouth 2 times a day. 180 Tab 1   • lisinopril (PRINIVIL) 20 MG Tab Take 1 Tab by mouth every day. 90 Tab 1   • omeprazole (PRILOSEC) 20 MG delayed-release capsule every evening.     • chlorthalidone (HYGROTON) 25 MG Tab Take 1 Tab by mouth every day. 90 Tab 3   • Probiotic Product (PROBIOTIC DAILY PO) Take  by mouth every day.     • MAGNESIUM PO Take  by mouth as needed.     • tizanidine (ZANAFLEX) 4 MG Tab TAKE 1/2 TO 2 TABS BY MOUTH 3 TIMES A DAY AS NEEDED FOR SPASMS  2     No current facility-administered medications for this visit.        Patient Active Problem List    Diagnosis Date Noted   • Postoperative hypothyroidism_s/p thyroidectomy_Dr. Garcia  06/12/2020   • Generalized osteoarthritis 06/12/2020   • Drug induced constipation 06/12/2020   • Hashimoto's thyroiditis s/p total thyroidectom Dr. Franklin in 2/2020.  02/20/2020   • Essential tremor 10/31/2019   • Aortic valve sclerosis- without stenosis 2018 echo 08/12/2019   • Mild aortic insufficiency- 2018 echo 08/12/2019   • Calculus of gallbladder with biliary obstruction but without cholecystitis 10/14/2018   • Chronic use of opiate drug for therapeutic purpose_contract signed 6/2020, UDS consistent in 9/2020  "07/25/2018   • Chronic headache 03/20/2018   • Vitiligo 06/26/2017   • FHx: colon cancer 02/23/2017   • Schatzki's ring_DHA 02/23/2017   • Benign essential HTN 02/23/2017   • Pure hypercholesterolemia 02/23/2017   • MOHAMUD (generalized anxiety disorder) 02/23/2017   • HLA B27 (HLA B27 positive) 02/23/2017   • Primary osteoarthritis of right knee, S/P TKR + 2 revision surgeries_Dr. Lomeli 02/23/2017   • Lumbosacral pain, chronic, with right sciatica_ROC_Dr. Alvarez 02/23/2017   • Insomnia 02/23/2017       Family History   Problem Relation Age of Onset   • Arthritis Mother    • Alzheimer's Disease Mother    • Cancer Mother         cervical cancer   • Heart Attack Father    • Other Brother         brain anurism   • Hypertension Brother    • Cancer Maternal Grandmother 50        colono cancer   • Heart Disease Brother    • Hypertension Brother    • Arthritis Brother    • Other Brother         Celiac Disease   • No Known Problems Maternal Grandfather    • No Known Problems Paternal Grandmother    • No Known Problems Paternal Grandfather    • Hypertension Brother    • Arthritis Brother    • Lung Disease Brother    • Asthma Brother        She  has a past medical history of Anemia, Aortic insufficiency (01/31/2020), Arthritis, Cataract, Gastric ulcer, Goiter, High cholesterol, Hypertension, Hypothyroidism, Pain (01/31/2020), Pulmonary embolism (HCC) (01/31/2020), Reactive arthritis (HCC), Thyroid disease, and Thyroid disease (01/31/2020).  She  has a past surgical history that includes knee replacement, total (Right); fusion; cervical fusion posterior; bladder sling female; cataract extraction with iol (Left); appendectomy; cataract extraction with iol (Bilateral); gyn surgery; foot surgery (Left); knee arthroplasty total (Right); knee revision total (Right); and thyroidectomy total (Bilateral, 2/5/2020).       Objective:   Ht 1.702 m (5' 7\")   Wt 77.1 kg (170 lb) Comment: pt stated  BMI 26.63 kg/m²     Physical " Exam:  Constitutional: Alert, no distress, well-groomed.  Skin: No rashes in visible areas.  Eye: Round. Conjunctiva clear, lids normal. No icterus.   ENMT: Lips pink without lesions, good dentition, moist mucous membranes. Phonation normal.  Neck: No masses, no thyromegaly. Moves freely without pain.  Respiratory: Unlabored respiratory effort, no cough or audible wheeze  Psych: Alert and oriented x3, normal affect and mood.       Assessment and Plan:   The following treatment plan was discussed:     1. Lumbosacral pain, chronic, with right sciatica_Dr. Alvarez   She also has hx of chronic lumbosacral pain, s/p epidural and trigger injections x6 in [place], but did not work. She was previously working with with pain management at [facility], got 2 epidural injections by Dr. Amado. However, her symptoms are persistent. She is on Tizanidine, Lyrica, and Norco PRN. She also takes Cymbalta 20 mg qd for chronic pain. She will establish care with Dr. Alvarez in 9/2020 for 2nd opinion.   - cont Tizanidine, Lyrica, Norco PRN, and Cymbalta  - f/u with Dr. Alvarez     2. Insomnia due to medical condition  - zolpidem (AMBIEN) 5 MG Tab; Take 1 Tab by mouth at bedtime as needed for Sleep for up to 30 days.  Dispense: 30 Tab; Refill: 0  - zolpidem (AMBIEN) 5 MG Tab; Take 1 Tab by mouth at bedtime as needed for Sleep for up to 30 days.  Dispense: 30 Tab; Refill: 0  - zolpidem (AMBIEN) 5 MG Tab; Take 1 Tab by mouth at bedtime as needed for Sleep for up to 30 days.  Dispense: 30 Tab; Refill: 0  - Risks, benefits, side effects, as well as potential health complications associated with Ambien discussed with patient. Appropriate counseling provided.      3. Postoperative hypothyroidism_s/p thyroidectomy.   S/p thyroidectomy in 2/2020, currently takes Levothyroxine 100 mcg qd managed by endo.   - f/u with endo as directed.      Follow-up: Return in about 3 months (around 12/8/2020) for Ambien refill .

## 2020-09-28 DIAGNOSIS — G25.0 ESSENTIAL TREMOR: ICD-10-CM

## 2020-09-28 DIAGNOSIS — G89.29 LUMBOSACRAL PAIN, CHRONIC: ICD-10-CM

## 2020-09-28 DIAGNOSIS — M54.50 LUMBOSACRAL PAIN, CHRONIC: ICD-10-CM

## 2020-09-29 RX ORDER — PROPRANOLOL HYDROCHLORIDE 40 MG/1
40 TABLET ORAL 2 TIMES DAILY
Qty: 180 TAB | Refills: 1 | Status: SHIPPED | OUTPATIENT
Start: 2020-09-29 | End: 2021-01-05 | Stop reason: SDUPTHER

## 2020-09-29 RX ORDER — PREGABALIN 50 MG/1
CAPSULE ORAL
Qty: 90 CAP | Refills: 0 | Status: SHIPPED | OUTPATIENT
Start: 2020-09-29 | End: 2020-12-08

## 2020-10-07 ENCOUNTER — APPOINTMENT (OUTPATIENT)
Dept: RADIOLOGY | Facility: MEDICAL CENTER | Age: 70
End: 2020-10-07
Attending: PHYSICIAN ASSISTANT
Payer: MEDICARE

## 2020-10-07 DIAGNOSIS — M54.16 LUMBAR RADICULOPATHY: ICD-10-CM

## 2020-10-07 PROCEDURE — 72148 MRI LUMBAR SPINE W/O DYE: CPT

## 2020-10-20 ENCOUNTER — TELEPHONE (OUTPATIENT)
Dept: CARDIOLOGY | Facility: MEDICAL CENTER | Age: 70
End: 2020-10-20

## 2020-10-20 NOTE — TELEPHONE ENCOUNTER
Contacted patient on 10/20/2020 @ 2:02pm regarding her upcoming appointment with Dr. Bermudez.     She reports that there is no recent EKG's or other testing done from outside sources and anything cardiac related is in her chart.

## 2020-11-05 ENCOUNTER — OFFICE VISIT (OUTPATIENT)
Dept: CARDIOLOGY | Facility: MEDICAL CENTER | Age: 70
End: 2020-11-05
Payer: MEDICARE

## 2020-11-05 VITALS
DIASTOLIC BLOOD PRESSURE: 76 MMHG | OXYGEN SATURATION: 97 % | BODY MASS INDEX: 27.47 KG/M2 | WEIGHT: 175 LBS | HEIGHT: 67 IN | SYSTOLIC BLOOD PRESSURE: 140 MMHG | HEART RATE: 70 BPM

## 2020-11-05 DIAGNOSIS — R00.2 PALPITATIONS: ICD-10-CM

## 2020-11-05 DIAGNOSIS — R07.9 CHEST PAIN, UNSPECIFIED TYPE: ICD-10-CM

## 2020-11-05 DIAGNOSIS — I35.1 MILD AORTIC INSUFFICIENCY: ICD-10-CM

## 2020-11-05 DIAGNOSIS — R06.02 SHORTNESS OF BREATH: ICD-10-CM

## 2020-11-05 LAB — EKG IMPRESSION: NORMAL

## 2020-11-05 PROCEDURE — 99205 OFFICE O/P NEW HI 60 MIN: CPT | Performed by: INTERNAL MEDICINE

## 2020-11-05 PROCEDURE — 93000 ELECTROCARDIOGRAM COMPLETE: CPT | Performed by: INTERNAL MEDICINE

## 2020-11-05 RX ORDER — TIZANIDINE 2 MG/1
2 TABLET ORAL
COMMUNITY
Start: 2020-10-22 | End: 2020-11-05

## 2020-11-05 ASSESSMENT — ENCOUNTER SYMPTOMS
LOSS OF CONSCIOUSNESS: 0
PND: 0
DIZZINESS: 0
DEPRESSION: 0
FALLS: 0
ORTHOPNEA: 0
SHORTNESS OF BREATH: 1
ABDOMINAL PAIN: 0
PALPITATIONS: 1

## 2020-11-05 ASSESSMENT — FIBROSIS 4 INDEX: FIB4 SCORE: 1.23

## 2020-11-05 NOTE — PROGRESS NOTES
"Chief Complaint   Patient presents with   • Aortic Stenosis   • Palpitations   • Chest Pain       Subjective:   Margoth Hopkins is a 69 y.o. female who presents today as a referral for management of possible aortic stenosis.    Pertinent history:  Hypertension  Hyperlipidemia  History of goiter status post total thyroidectomy  History of GI bleed secondary to gastric ulcers, around 2016 or 2017  Patient has a Schatzki's ring and gets regular dilatations, most recent in January 2020      Patient reports being told that she had mitral valve prolapse when she used to live in the [place] area.  But later on she was told that she has aortic valve prolapse.  She was referred to us for further management of this.    Patient reports being in her usual state of health about a year ago when she started noticing substernal nonradiating chest heaviness that occurs with lying down associated with \"gasping\" and symptoms usually resolve within a couple of minutes after she sits up.  Symptoms have been occurring sometimes every day and have been relatively stable over the past year.  She also reports having similar symptoms with walking which again resolved after a few minutes with resting.  She reports having associated palpitations with the aforementioned symptoms.    Patient has a history of GI bleed secondary to gastric ulcers about 3 to 4 years ago.  She was told to stop taking aspirin after that.  She has not had any recurrent GI bleeding issues since.    She is on propranolol for her history of essential tremors.    She worries about her family history.  Her father and brother have both had MIs, at the age of 64 and 57 respectively.    Referring physician: Dr. Garcia    Past Medical History:   Diagnosis Date   • Anemia     H/O   • Aortic insufficiency 01/31/2020    Pt. states this is mild and does not have any signs or symptoms.   • Arthritis     Osteo-Right Knee   • Cataract     IOL OU   • Gastric ulcer    • Goiter    • " High cholesterol    • Hypertension    • Hypothyroidism    • Pain 01/31/2020    Right Knee & Lower Back   • Pulmonary embolism (HCC) 01/31/2020    Pt. states after joint replacement in 7-2018.   • Reactive arthritis (HCC)    • Thyroid disease     hasimotos   • Thyroid disease 01/31/2020    Thyroid Nodules     Past Surgical History:   Procedure Laterality Date   • THYROIDECTOMY TOTAL Bilateral 2/5/2020    Procedure: THYROIDECTOMY, TOTAL- COMPLETE;  Surgeon: Cydney Franklin M.D.;  Location: SURGERY SAME DAY [facility];  Service: General   • APPENDECTOMY     • BLADDER SLING FEMALE     • CATARACT EXTRACTION WITH IOL Left    • CATARACT EXTRACTION WITH IOL Bilateral    • CERVICAL FUSION POSTERIOR     • FOOT SURGERY Left     metatarsal fusion, hammer toes correction   • FUSION      disc fusion   • GYN SURGERY      Hysterectomy   • KNEE ARTHROPLASTY TOTAL Right     x3   • KNEE REPLACEMENT, TOTAL Right    • KNEE REVISION TOTAL Right      Family History   Problem Relation Age of Onset   • Arthritis Mother    • Alzheimer's Disease Mother    • Cancer Mother         cervical cancer   • Heart Attack Father    • Other Brother         brain anurism   • Hypertension Brother    • Cancer Maternal Grandmother 50        colono cancer   • Heart Disease Brother    • Hypertension Brother    • Arthritis Brother    • Other Brother         Celiac Disease   • No Known Problems Maternal Grandfather    • No Known Problems Paternal Grandmother    • No Known Problems Paternal Grandfather    • Hypertension Brother    • Arthritis Brother    • Lung Disease Brother    • Asthma Brother      Social History     Socioeconomic History   • Marital status:      Spouse name: Not on file   • Number of children: Not on file   • Years of education: Not on file   • Highest education level: Not on file   Occupational History   • Not on file   Social Needs   • Financial resource strain: Not on file   • Food insecurity     Worry: Never true     Inability:  Never true   • Transportation needs     Medical: No     Non-medical: No   Tobacco Use   • Smoking status: Never Smoker   • Smokeless tobacco: Never Used   Substance and Sexual Activity   • Alcohol use: Yes     Alcohol/week: 4.2 oz     Types: 7 Glasses of wine per week     Frequency: 4 or more times a week     Drinks per session: 1 or 2     Binge frequency: Never     Comment: 3-5/day   • Drug use: No     Comment: CBD ingested   • Sexual activity: Yes     Partners: Male   Lifestyle   • Physical activity     Days per week: Not on file     Minutes per session: Not on file   • Stress: Not on file   Relationships   • Social connections     Talks on phone: Not on file     Gets together: Not on file     Attends Spiritism service: Not on file     Active member of club or organization: Not on file     Attends meetings of clubs or organizations: Not on file     Relationship status: Not on file   • Intimate partner violence     Fear of current or ex partner: Not on file     Emotionally abused: Not on file     Physically abused: Not on file     Forced sexual activity: Not on file   Other Topics Concern   • Not on file   Social History Narrative   • Not on file     Allergies   Allergen Reactions   • Morphine Vomiting   • Nsaids Unspecified     History of gastric ulcers - cannot take NSAIDS   • Pcn [Penicillins] Hives   • Gabapentin      Cognitive changes      Outpatient Encounter Medications as of 11/5/2020   Medication Sig Dispense Refill   • propranolol (INDERAL) 40 MG Tab Take 1 Tab by mouth 2 times a day. 180 Tab 1   • pregabalin (LYRICA) 50 MG capsule TAKE 1 CAPSULE BY MOUTH AT BEDTIME AS NEEDED FOR 90 DAYS (Patient taking differently: every bedtime. TAKE 1 CAPSULE BY MOUTH AT BEDTIME AS NEEDED FOR 90 DAYS) 90 Cap 0   • zolpidem (AMBIEN) 5 MG Tab Take 1 Tab by mouth at bedtime as needed for Sleep for up to 30 days. 30 Tab 0   • vitamin D, Ergocalciferol, (DRISDOL) 1.25 MG (39437 UT) Cap capsule Take 1 Cap by mouth every 7  "days. With food. 12 Cap 3   • levothyroxine (SYNTHROID) 100 MCG Tab Take 1 Tab by mouth Every morning on an empty stomach. Synthroid brand medically necessary. 12 Tab 3   • VITAMIN E PO Take  by mouth.     • linaCLOtide (LINZESS) 72 MCG Cap Take 1 Cap by mouth every day. 30 Cap 5   • rosuvastatin (CRESTOR) 20 MG Tab Take 20 mg by mouth every day.     • COLLAGEN PO Take  by mouth.     • DULoxetine (CYMBALTA) 20 MG Cap DR Particles TAKE 1 CAPSULE BY MOUTH  EVERY DAY 90 Cap 3   • lisinopril (PRINIVIL) 20 MG Tab Take 1 Tab by mouth every day. 90 Tab 1   • omeprazole (PRILOSEC) 20 MG delayed-release capsule every evening.     • chlorthalidone (HYGROTON) 25 MG Tab Take 1 Tab by mouth every day. 90 Tab 3   • Probiotic Product (PROBIOTIC DAILY PO) Take  by mouth every day.     • MAGNESIUM PO Take  by mouth as needed.     • tizanidine (ZANAFLEX) 4 MG Tab TAKE 1/2 TO 2 TABS BY MOUTH 3 TIMES A DAY AS NEEDED FOR SPASMS  2   • [DISCONTINUED] tizanidine (ZANAFLEX) 2 MG tablet Take 2 mg by mouth. AS NEEDED FOR SPASM     • [DISCONTINUED] zolpidem (AMBIEN) 5 MG Tab Take 1 Tab by mouth at bedtime as needed for Sleep for up to 30 days. 30 Tab 0     No facility-administered encounter medications on file as of 11/5/2020.      Review of Systems   Constitutional: Negative for malaise/fatigue.   Respiratory: Positive for shortness of breath.    Cardiovascular: Positive for chest pain and palpitations. Negative for orthopnea, leg swelling and PND.   Gastrointestinal: Negative for abdominal pain.   Musculoskeletal: Negative for falls.   Neurological: Negative for dizziness and loss of consciousness.   Psychiatric/Behavioral: Negative for depression.   All other systems reviewed and are negative.       Objective:   /76 (BP Location: Left arm, Patient Position: Sitting, BP Cuff Size: Adult)   Pulse 70   Ht 1.702 m (5' 7\")   Wt 79.4 kg (175 lb)   SpO2 97%   BMI 27.41 kg/m²     Physical Exam   Constitutional: She is oriented to " person, place, and time. She appears well-developed and well-nourished. No distress.   HENT:   Head: Normocephalic and atraumatic.   Eyes: Conjunctivae are normal. No scleral icterus.   Neck: Normal range of motion. Neck supple.   Cardiovascular: Normal rate and regular rhythm. Exam reveals no gallop and no friction rub.   Murmur heard.   Crescendo decrescendo systolic murmur is present with a grade of 1/6.  Pulmonary/Chest: Effort normal and breath sounds normal. No respiratory distress. She has no wheezes. She has no rales.   Abdominal: She exhibits no distension. There is no abdominal tenderness.   Musculoskeletal:         General: No edema.   Neurological: She is alert and oriented to person, place, and time.   Skin: Skin is warm and dry. She is not diaphoretic.   Psychiatric: She has a normal mood and affect. Her behavior is normal. Judgment and thought content normal.   Nursing note and vitals reviewed.    Labs performed June 2020 were reviewed and showed normal creatinine.  Normal hemoglobin.  LDL 95  In August 2020, TSH low, T4 elevated    EKG performed today was personally reviewed and per my interpretation shows sinus rhythm in the 60s.  First-degree AV block.  Poor R wave progression.    Assessment:     1. Mild aortic insufficiency- 2018 echo     2. Chest pain, unspecified type  EKG    NM-CARDIAC STRESS TEST   3. Shortness of breath  EC-ECHOCARDIOGRAM COMPLETE W/O CONT   4. Palpitations  TSH    FREE THYROXINE     Medical Decision Making:  Today's Assessment / Status / Plan:     Patient has possible mitral valve prolapse, not appreciated on exam.  Should be referred for repeat echocardiogram to reevaluate her mitral and aortic valves.    Patient reports having chest discomfort which is concerning for angina.  She will be referred for a pharmacologic myocardial perfusion study for ischemic evaluation.  If her myocardial perfusion study is low risk, would recommend a coronary calcium scoring CT for  restratification given her family history.  Patient is agreeable with this plan.    She also reports having frequent palpitations.  Her recent T4 levels were elevated.  We will repeat thyroid labs.  If she continues to be hyperthyroid, she may need lowering of her Synthroid dose which may help with her palpitations.  We will consider referring her for an ambulatory EKG monitor depending on her results.    Her dyspnea could be secondary to PND.  Echocardiogram as above.    Return to clinic in 2 months or earlier if needed.    Thank you for allowing me to participate in the care of this patient. Please do not hesitate to contact me with any questions.    Bianca Bermudez MD, [facility]  Cardiologist  [facility]    PLEASE NOTE: This dictation was created using voice recognition software.

## 2020-11-05 NOTE — LETTER
"     [facility]   [address], [facility] [address]  [place] [address]  Phone: [phone]  Fax: [phone]              Margoth Hopkins  1950    Encounter Date: 11/5/2020    Bianca Bermudez M.D.          PROGRESS NOTE:  Chief Complaint   Patient presents with   • Aortic Stenosis   • Palpitations   • Chest Pain       Subjective:   Margoth Hopkins is a 69 y.o. female who presents today as a referral for management of possible aortic stenosis.    Pertinent history:  Hypertension  Hyperlipidemia  History of goiter status post total thyroidectomy  History of GI bleed secondary to gastric ulcers, around 2016 or 2017  Patient has a Schatzki's ring and gets regular dilatations, most recent in January 2020      Patient reports being told that she had mitral valve prolapse when she used to live in the [place] area.  But later on she was told that she has aortic valve prolapse.  She was referred to us for further management of this.    Patient reports being in her usual state of health about a year ago when she started noticing substernal nonradiating chest heaviness that occurs with lying down associated with \"gasping\" and symptoms usually resolve within a couple of minutes after she sits up.  Symptoms have been occurring sometimes every day and have been relatively stable over the past year.  She also reports having similar symptoms with walking which again resolved after a few minutes with resting.  She reports having associated palpitations with the aforementioned symptoms.    Patient has a history of GI bleed secondary to gastric ulcers about 3 to 4 years ago.  She was told to stop taking aspirin after that.  She has not had any recurrent GI bleeding issues since.    She is on propranolol for her history of essential tremors.    She worries about her family history.  Her father and brother have both had MIs, at the age of 64 and 57 respectively.    Referring physician: Dr." Jamiechema    Past Medical History:   Diagnosis Date   • Anemia     H/O   • Aortic insufficiency 01/31/2020    Pt. states this is mild and does not have any signs or symptoms.   • Arthritis     Osteo-Right Knee   • Cataract     IOL OU   • Gastric ulcer    • Goiter    • High cholesterol    • Hypertension    • Hypothyroidism    • Pain 01/31/2020    Right Knee & Lower Back   • Pulmonary embolism (HCC) 01/31/2020    Pt. states after joint replacement in 7-2018.   • Reactive arthritis (HCC)    • Thyroid disease     hasimotos   • Thyroid disease 01/31/2020    Thyroid Nodules     Past Surgical History:   Procedure Laterality Date   • THYROIDECTOMY TOTAL Bilateral 2/5/2020    Procedure: THYROIDECTOMY, TOTAL- COMPLETE;  Surgeon: Cydney Franklin M.D.;  Location: SURGERY SAME DAY [facility];  Service: General   • APPENDECTOMY     • BLADDER SLING FEMALE     • CATARACT EXTRACTION WITH IOL Left    • CATARACT EXTRACTION WITH IOL Bilateral    • CERVICAL FUSION POSTERIOR     • FOOT SURGERY Left     metatarsal fusion, hammer toes correction   • FUSION      disc fusion   • GYN SURGERY      Hysterectomy   • KNEE ARTHROPLASTY TOTAL Right     x3   • KNEE REPLACEMENT, TOTAL Right    • KNEE REVISION TOTAL Right      Family History   Problem Relation Age of Onset   • Arthritis Mother    • Alzheimer's Disease Mother    • Cancer Mother         cervical cancer   • Heart Attack Father    • Other Brother         brain anurism   • Hypertension Brother    • Cancer Maternal Grandmother 50        colono cancer   • Heart Disease Brother    • Hypertension Brother    • Arthritis Brother    • Other Brother         Celiac Disease   • No Known Problems Maternal Grandfather    • No Known Problems Paternal Grandmother    • No Known Problems Paternal Grandfather    • Hypertension Brother    • Arthritis Brother    • Lung Disease Brother    • Asthma Brother      Social History     Socioeconomic History   • Marital status:      Spouse name: Not on file     • Number of children: Not on file   • Years of education: Not on file   • Highest education level: Not on file   Occupational History   • Not on file   Social Needs   • Financial resource strain: Not on file   • Food insecurity     Worry: Never true     Inability: Never true   • Transportation needs     Medical: No     Non-medical: No   Tobacco Use   • Smoking status: Never Smoker   • Smokeless tobacco: Never Used   Substance and Sexual Activity   • Alcohol use: Yes     Alcohol/week: 4.2 oz     Types: 7 Glasses of wine per week     Frequency: 4 or more times a week     Drinks per session: 1 or 2     Binge frequency: Never     Comment: 3-5/day   • Drug use: No     Comment: CBD ingested   • Sexual activity: Yes     Partners: Male   Lifestyle   • Physical activity     Days per week: Not on file     Minutes per session: Not on file   • Stress: Not on file   Relationships   • Social connections     Talks on phone: Not on file     Gets together: Not on file     Attends [employer] service: Not on file     Active member of club or organization: Not on file     Attends meetings of clubs or organizations: Not on file     Relationship status: Not on file   • Intimate partner violence     Fear of current or ex partner: Not on file     Emotionally abused: Not on file     Physically abused: Not on file     Forced sexual activity: Not on file   Other Topics Concern   • Not on file   Social History Narrative   • Not on file     Allergies   Allergen Reactions   • Morphine Vomiting   • Nsaids Unspecified     History of gastric ulcers - cannot take NSAIDS   • Pcn [Penicillins] Hives   • Gabapentin      Cognitive changes      Outpatient Encounter Medications as of 11/5/2020   Medication Sig Dispense Refill   • propranolol (INDERAL) 40 MG Tab Take 1 Tab by mouth 2 times a day. 180 Tab 1   • pregabalin (LYRICA) 50 MG capsule TAKE 1 CAPSULE BY MOUTH AT BEDTIME AS NEEDED FOR 90 DAYS (Patient taking differently: every bedtime. TAKE 1  CAPSULE BY MOUTH AT BEDTIME AS NEEDED FOR 90 DAYS) 90 Cap 0   • zolpidem (AMBIEN) 5 MG Tab Take 1 Tab by mouth at bedtime as needed for Sleep for up to 30 days. 30 Tab 0   • vitamin D, Ergocalciferol, (DRISDOL) 1.25 MG (56824 UT) Cap capsule Take 1 Cap by mouth every 7 days. With food. 12 Cap 3   • levothyroxine (SYNTHROID) 100 MCG Tab Take 1 Tab by mouth Every morning on an empty stomach. Synthroid brand medically necessary. 12 Tab 3   • VITAMIN E PO Take  by mouth.     • linaCLOtide (LINZESS) 72 MCG Cap Take 1 Cap by mouth every day. 30 Cap 5   • rosuvastatin (CRESTOR) 20 MG Tab Take 20 mg by mouth every day.     • COLLAGEN PO Take  by mouth.     • DULoxetine (CYMBALTA) 20 MG Cap DR Particles TAKE 1 CAPSULE BY MOUTH  EVERY DAY 90 Cap 3   • lisinopril (PRINIVIL) 20 MG Tab Take 1 Tab by mouth every day. 90 Tab 1   • omeprazole (PRILOSEC) 20 MG delayed-release capsule every evening.     • chlorthalidone (HYGROTON) 25 MG Tab Take 1 Tab by mouth every day. 90 Tab 3   • Probiotic Product (PROBIOTIC DAILY PO) Take  by mouth every day.     • MAGNESIUM PO Take  by mouth as needed.     • tizanidine (ZANAFLEX) 4 MG Tab TAKE 1/2 TO 2 TABS BY MOUTH 3 TIMES A DAY AS NEEDED FOR SPASMS  2   • [DISCONTINUED] tizanidine (ZANAFLEX) 2 MG tablet Take 2 mg by mouth. AS NEEDED FOR SPASM     • [DISCONTINUED] zolpidem (AMBIEN) 5 MG Tab Take 1 Tab by mouth at bedtime as needed for Sleep for up to 30 days. 30 Tab 0     No facility-administered encounter medications on file as of 11/5/2020.      Review of Systems   Constitutional: Negative for malaise/fatigue.   Respiratory: Positive for shortness of breath.    Cardiovascular: Positive for chest pain and palpitations. Negative for orthopnea, leg swelling and PND.   Gastrointestinal: Negative for abdominal pain.   Musculoskeletal: Negative for falls.   Neurological: Negative for dizziness and loss of consciousness.   Psychiatric/Behavioral: Negative for depression.   All other systems  "reviewed and are negative.       Objective:   /76 (BP Location: Left arm, Patient Position: Sitting, BP Cuff Size: Adult)   Pulse 70   Ht 1.702 m (5' 7\")   Wt 79.4 kg (175 lb)   SpO2 97%   BMI 27.41 kg/m²     Physical Exam   Constitutional: She is oriented to person, place, and time. She appears well-developed and well-nourished. No distress.   HENT:   Head: Normocephalic and atraumatic.   Eyes: Conjunctivae are normal. No scleral icterus.   Neck: Normal range of motion. Neck supple.   Cardiovascular: Normal rate and regular rhythm. Exam reveals no gallop and no friction rub.   Murmur heard.   Crescendo decrescendo systolic murmur is present with a grade of 1/6.  Pulmonary/Chest: Effort normal and breath sounds normal. No respiratory distress. She has no wheezes. She has no rales.   Abdominal: She exhibits no distension. There is no abdominal tenderness.   Musculoskeletal:         General: No edema.   Neurological: She is alert and oriented to person, place, and time.   Skin: Skin is warm and dry. She is not diaphoretic.   Psychiatric: She has a normal mood and affect. Her behavior is normal. Judgment and thought content normal.   Nursing note and vitals reviewed.    Labs performed June 2020 were reviewed and showed normal creatinine.  Normal hemoglobin.  LDL 95  In August 2020, TSH low, T4 elevated    EKG performed today was personally reviewed and per my interpretation shows sinus rhythm in the 60s.  First-degree AV block.  Poor R wave progression.    Assessment:     1. Mild aortic insufficiency- 2018 echo     2. Chest pain, unspecified type  EKG    NM-CARDIAC STRESS TEST   3. Shortness of breath  EC-ECHOCARDIOGRAM COMPLETE W/O CONT   4. Palpitations  TSH    FREE THYROXINE     Medical Decision Making:  Today's Assessment / Status / Plan:     Patient has possible mitral valve prolapse, not appreciated on exam.  Should be referred for repeat echocardiogram to reevaluate her mitral and aortic " valves.    Patient reports having chest discomfort which is concerning for angina.  She will be referred for a pharmacologic myocardial perfusion study for ischemic evaluation.  If her myocardial perfusion study is low risk, would recommend a coronary calcium scoring CT for restratification given her family history.  Patient is agreeable with this plan.    She also reports having frequent palpitations.  Her recent T4 levels were elevated.  We will repeat thyroid labs.  If she continues to be hyperthyroid, she may need lowering of her Synthroid dose which may help with her palpitations.  We will consider referring her for an ambulatory EKG monitor depending on her results.    Her dyspnea could be secondary to PND.  Echocardiogram as above.    Return to clinic in 2 months or earlier if needed.    Thank you for allowing me to participate in the care of this patient. Please do not hesitate to contact me with any questions.    Bianca Bermudez MD, [facility]  Cardiologist  [facility]    PLEASE NOTE: This dictation was created using voice recognition software.             Charlee Walker M.D.  [address] [place] [address] [place] [address]  [employer]

## 2020-12-04 DIAGNOSIS — I10 BENIGN ESSENTIAL HTN: ICD-10-CM

## 2020-12-07 RX ORDER — CHLORTHALIDONE 25 MG/1
TABLET ORAL
Qty: 90 TAB | Refills: 3 | Status: SHIPPED | OUTPATIENT
Start: 2020-12-07 | End: 2021-06-10

## 2020-12-08 ENCOUNTER — TELEMEDICINE (OUTPATIENT)
Dept: MEDICAL GROUP | Age: 70
End: 2020-12-08
Payer: MEDICARE

## 2020-12-08 VITALS — WEIGHT: 172 LBS | BODY MASS INDEX: 27 KG/M2 | HEIGHT: 67 IN

## 2020-12-08 DIAGNOSIS — G89.29 LUMBOSACRAL PAIN, CHRONIC: ICD-10-CM

## 2020-12-08 DIAGNOSIS — E06.3 HASHIMOTO'S THYROIDITIS: ICD-10-CM

## 2020-12-08 DIAGNOSIS — E78.00 PURE HYPERCHOLESTEROLEMIA: ICD-10-CM

## 2020-12-08 DIAGNOSIS — K59.03 DRUG INDUCED CONSTIPATION: ICD-10-CM

## 2020-12-08 DIAGNOSIS — G47.01 INSOMNIA DUE TO MEDICAL CONDITION: Primary | ICD-10-CM

## 2020-12-08 DIAGNOSIS — E89.0 POSTOPERATIVE HYPOTHYROIDISM: ICD-10-CM

## 2020-12-08 DIAGNOSIS — M54.50 LUMBOSACRAL PAIN, CHRONIC: ICD-10-CM

## 2020-12-08 DIAGNOSIS — I10 BENIGN ESSENTIAL HTN: ICD-10-CM

## 2020-12-08 PROBLEM — Z79.891 CHRONIC USE OF OPIATE DRUG FOR THERAPEUTIC PURPOSE: Status: RESOLVED | Noted: 2018-07-25 | Resolved: 2020-12-08

## 2020-12-08 PROCEDURE — 99214 OFFICE O/P EST MOD 30 MIN: CPT | Mod: 95,CR | Performed by: FAMILY MEDICINE

## 2020-12-08 RX ORDER — ZOLPIDEM TARTRATE 5 MG/1
5 TABLET ORAL NIGHTLY PRN
Qty: 30 TAB | Refills: 0 | Status: SHIPPED | OUTPATIENT
Start: 2020-12-08 | End: 2021-01-26 | Stop reason: SDUPTHER

## 2020-12-08 RX ORDER — ROSUVASTATIN CALCIUM 20 MG/1
20 TABLET, COATED ORAL DAILY
Qty: 90 TAB | Refills: 3 | Status: SHIPPED | OUTPATIENT
Start: 2020-12-08 | End: 2021-11-17

## 2020-12-08 RX ORDER — LINACLOTIDE 72 UG/1
1 CAPSULE, GELATIN COATED ORAL DAILY
Qty: 90 CAP | Refills: 3 | Status: SHIPPED | OUTPATIENT
Start: 2020-12-08 | End: 2021-12-30

## 2020-12-08 RX ORDER — LISINOPRIL 20 MG/1
20 TABLET ORAL DAILY
Qty: 90 TAB | Refills: 1 | Status: SHIPPED | OUTPATIENT
Start: 2020-12-08 | End: 2021-04-16

## 2020-12-08 RX ORDER — PREGABALIN 75 MG/1
75 CAPSULE ORAL
COMMUNITY
End: 2021-03-09

## 2020-12-08 ASSESSMENT — FIBROSIS 4 INDEX: FIB4 SCORE: 1.23

## 2020-12-08 NOTE — PROGRESS NOTES
Virtual Visit: Established Patient   This visit was conducted via Zoom using secure and encrypted videoconferencing technology. The patient was in a private location in the state of [place].    The patient's identity was confirmed and verbal consent was obtained for this virtual visit.    Subjective:   CC: Insomnia    Margoth Hopkins is a 69 y.o. female patient has history of chronic, intermittent insomnia.  Patient is taking Ambien 5 mg nightly as needed for sleep.  Over the course of the past 90 days, patient uses approximately 30 tablets of Ambien.  Patient also has history of chronic low back pain with right-sided sciatica.  Patient has had multiple steroid injection and back surgery in the past.  She recently received injection with XIOMARA from Dr. Mejia.  She is taking tizanidine and Lyrica for pain.  She was previously taking Norco 7.5-325 mg daily as needed for pain.  However, she stopped taking this medication due to ineffectiveness.  She is now working with [facility].  Patient received nerve block recently with significant improvement of her symptoms.  She is scheduled for ablation with them in near future.  She continue to take tizanidine and Lyrica 75 mg daily.    Patient has been suffering drug-induced constipation.  Her symptoms respond well to Linzess 72 mg daily.    Hyperlipidemia is controlled with Crestor 20 mg daily.    Patient has history of postop hypothyroidism secondary to Hashimoto thyroiditis.  Patient was previously working with Dr. Garcia.  However, he left the practice.  Patient is looking forward to establishing care with new endocrinologist.    ROS   Denies any recent fevers or chills. No nausea or vomiting. No chest pains or shortness of breath.     Allergies   Allergen Reactions   • Morphine Vomiting   • Nsaids Unspecified     History of gastric ulcers - cannot take NSAIDS   • Pcn [Penicillins] Hives   • Gabapentin      Cognitive changes        Current medicines  (including changes today)  Current Outpatient Medications   Medication Sig Dispense Refill   • pregabalin (LYRICA) 75 MG Cap Take 75 mg by mouth every bedtime.     • zolpidem (AMBIEN) 5 MG Tab Take 1 Tab by mouth at bedtime as needed for Sleep for up to 30 days. 30 Tab 0   • linaCLOtide (LINZESS) 72 MCG Cap Take 1 Cap by mouth every day. 90 Cap 3   • lisinopril (PRINIVIL) 20 MG Tab Take 1 Tab by mouth every day. 90 Tab 1   • rosuvastatin (CRESTOR) 20 MG Tab Take 1 Tab by mouth every day. 90 Tab 3   • chlorthalidone (HYGROTON) 25 MG Tab TAKE 1 TABLET BY MOUTH  EVERY DAY 90 Tab 3   • propranolol (INDERAL) 40 MG Tab Take 1 Tab by mouth 2 times a day. 180 Tab 1   • levothyroxine (SYNTHROID) 100 MCG Tab Take 1 Tab by mouth Every morning on an empty stomach. Synthroid brand medically necessary. 12 Tab 3   • VITAMIN E PO Take  by mouth.     • COLLAGEN PO Take  by mouth.     • DULoxetine (CYMBALTA) 20 MG Cap DR Particles TAKE 1 CAPSULE BY MOUTH  EVERY DAY 90 Cap 3   • omeprazole (PRILOSEC) 20 MG delayed-release capsule every evening.     • Probiotic Product (PROBIOTIC DAILY PO) Take  by mouth every day.     • MAGNESIUM PO Take  by mouth as needed.     • tizanidine (ZANAFLEX) 4 MG Tab TAKE 1/2 TO 2 TABS BY MOUTH 3 TIMES A DAY AS NEEDED FOR SPASMS  2     No current facility-administered medications for this visit.        Patient Active Problem List    Diagnosis Date Noted   • Postoperative hypothyroidism_s/p thyroidectomy_Dr. Garcia  06/12/2020   • Generalized osteoarthritis 06/12/2020   • Drug induced constipation 06/12/2020   • Hashimoto's thyroiditis s/p total thyroidectom Dr. Franklin in 2/2020.  02/20/2020   • Essential tremor 10/31/2019   • Aortic valve sclerosis- without stenosis 2018 echo 08/12/2019   • Mild aortic insufficiency- 2018 echo 08/12/2019   • Calculus of gallbladder with biliary obstruction but without cholecystitis 10/14/2018   • Chronic headache 03/20/2018   • Vitiligo 06/26/2017   • FHx: colon cancer  "02/23/2017   • Schatzki's ring_DHA 02/23/2017   • Benign essential HTN 02/23/2017   • Pure hypercholesterolemia 02/23/2017   • MOHAMUD (generalized anxiety disorder) 02/23/2017   • HLA B27 (HLA B27 positive) 02/23/2017   • Primary osteoarthritis of right knee, S/P TKR + 2 revision surgeries_Dr. Lomeli 02/23/2017   • Lumbosacral pain, chronic, with right sciatica_ [facility] 02/23/2017   • Insomnia 02/23/2017       Family History   Problem Relation Age of Onset   • Arthritis Mother    • Alzheimer's Disease Mother    • Cancer Mother         cervical cancer   • Heart Attack Father    • Other Brother         brain anurism   • Hypertension Brother    • Cancer Maternal Grandmother 50        colono cancer   • Heart Disease Brother    • Hypertension Brother    • Arthritis Brother    • Other Brother         Celiac Disease   • No Known Problems Maternal Grandfather    • No Known Problems Paternal Grandmother    • No Known Problems Paternal Grandfather    • Hypertension Brother    • Arthritis Brother    • Lung Disease Brother    • Asthma Brother        She  has a past medical history of Anemia, Aortic insufficiency (01/31/2020), Arthritis, Cataract, Gastric ulcer, Goiter, High cholesterol, Hypertension, Hypothyroidism, Pain (01/31/2020), Pulmonary embolism (HCC) (01/31/2020), Reactive arthritis (HCC), Thyroid disease, and Thyroid disease (01/31/2020).  She  has a past surgical history that includes knee replacement, total (Right); fusion; cervical fusion posterior; bladder sling female; cataract extraction with iol (Left); appendectomy; cataract extraction with iol (Bilateral); gyn surgery; foot surgery (Left); knee arthroplasty total (Right); knee revision total (Right); and thyroidectomy total (Bilateral, 2/5/2020).       Objective:   Ht 1.702 m (5' 7\")   Wt 78 kg (172 lb) Comment: pt stated  BMI 26.94 kg/m²     Physical Exam:  Constitutional: Alert, no distress, well-groomed.  Skin: No rashes in visible areas.  Eye: Round. " Conjunctiva clear, lids normal. No icterus.   ENMT: Lips pink without lesions, good dentition, moist mucous membranes. Phonation normal.  Neck: No masses, no thyromegaly. Moves freely without pain.  Respiratory: Unlabored respiratory effort, no cough or audible wheeze  Psych: Alert and oriented x3, normal affect and mood.       Assessment and Plan:   The following treatment plan was discussed:     1. Insomnia due to medical condition  - zolpidem (AMBIEN) 5 MG Tab; Take 1 Tab by mouth at bedtime as needed for Sleep for up to 30 days.  Dispense: 30 Tab; Refill: 0  - Risks, benefits, side effects, as well as potential health complications associated with Ambien was previously discussed with patient. Appropriate counseling provided.      2. Lumbosacral pain, chronic, with right sciatica   - f/u with [facility]  - cont Lyrica 75 mg qd  - con Tizanidine     3. Drug induced constipation  - linaCLOtide (LINZESS) 72 MCG Cap; Take 1 Cap by mouth every day.  Dispense: 90 Cap; Refill: 3  - hydration, increased fibers  - dietary modification.     4. Benign essential HTN  - lisinopril (PRINIVIL) 20 MG Tab; Take 1 Tab by mouth every day.  Dispense: 90 Tab; Refill: 1    5. Pure hypercholesterolemia  - rosuvastatin (CRESTOR) 20 MG Tab; Take 1 Tab by mouth every day.  Dispense: 90 Tab; Refill: 3  - dietary modification, exercise, and weight loss.   - avoid alcohol, drugs, tobacco products     6. Hashimoto's thyroiditis s/p total thyroidectom Dr. Franklin in 2/2020.   7. Postoperative hypothyroidism_s/p thyroidectomy   - TSH; Future  - FREE THYROXINE; Future  - cont Levothyroxine 100 mcg qd  - f/u with new endocrinologist.         Follow-up: Return in about 3 months (around 3/8/2021).

## 2020-12-10 ENCOUNTER — HOSPITAL ENCOUNTER (OUTPATIENT)
Dept: CARDIOLOGY | Facility: MEDICAL CENTER | Age: 70
End: 2020-12-10
Attending: INTERNAL MEDICINE
Payer: MEDICARE

## 2020-12-10 ENCOUNTER — HOSPITAL ENCOUNTER (OUTPATIENT)
Dept: RADIOLOGY | Facility: MEDICAL CENTER | Age: 70
End: 2020-12-10
Attending: INTERNAL MEDICINE
Payer: MEDICARE

## 2020-12-10 DIAGNOSIS — R07.9 CHEST PAIN, UNSPECIFIED TYPE: ICD-10-CM

## 2020-12-10 DIAGNOSIS — R06.02 SHORTNESS OF BREATH: ICD-10-CM

## 2020-12-10 LAB
LV EJECT FRACT  99904: 65
LV EJECT FRACT MOD 2C 99903: 62.43
LV EJECT FRACT MOD 4C 99902: 61.83
LV EJECT FRACT MOD BP 99901: 62.19

## 2020-12-10 PROCEDURE — 93306 TTE W/DOPPLER COMPLETE: CPT | Mod: 26 | Performed by: INTERNAL MEDICINE

## 2020-12-10 PROCEDURE — A9502 TC99M TETROFOSMIN: HCPCS

## 2020-12-10 PROCEDURE — 78452 HT MUSCLE IMAGE SPECT MULT: CPT | Mod: 26 | Performed by: INTERNAL MEDICINE

## 2020-12-10 PROCEDURE — 93306 TTE W/DOPPLER COMPLETE: CPT

## 2020-12-10 PROCEDURE — 93018 CV STRESS TEST I&R ONLY: CPT | Performed by: INTERNAL MEDICINE

## 2020-12-10 PROCEDURE — 700111 HCHG RX REV CODE 636 W/ 250 OVERRIDE (IP)

## 2020-12-10 RX ORDER — REGADENOSON 0.08 MG/ML
INJECTION, SOLUTION INTRAVENOUS
Status: COMPLETED
Start: 2020-12-10 | End: 2020-12-10

## 2020-12-10 RX ADMIN — REGADENOSON 0.4 MG: 0.08 INJECTION, SOLUTION INTRAVENOUS at 09:08

## 2020-12-15 ENCOUNTER — TELEPHONE (OUTPATIENT)
Dept: ENDOCRINOLOGY | Facility: MEDICAL CENTER | Age: 70
End: 2020-12-15

## 2020-12-15 NOTE — TELEPHONE ENCOUNTER
Patient called she used to see Dr. Garcia told her she is still scheduled for  5/11/2020 with Lalitha/Resource provider.  She stated her PCP and Cardiologist think her labs are not in range.  She will redo her labs next week would like someone to call her with results.  #[phone]      Thanks

## 2020-12-21 ENCOUNTER — TELEPHONE (OUTPATIENT)
Dept: CARDIOLOGY | Facility: MEDICAL CENTER | Age: 70
End: 2020-12-21

## 2020-12-21 DIAGNOSIS — R07.9 CHEST PAIN, UNSPECIFIED TYPE: ICD-10-CM

## 2020-12-21 NOTE — TELEPHONE ENCOUNTER
----- Message from Bianca Bermudez M.D. sent at 12/18/2020  9:38 AM PST -----  Reviewed nuclear study. No reversible defects noted.   Can you please refer the patient for coronary calcium scoring CT?  Thank you  AA

## 2021-01-05 ENCOUNTER — NURSE TRIAGE (OUTPATIENT)
Dept: HEALTH INFORMATION MANAGEMENT | Facility: OTHER | Age: 71
End: 2021-01-05

## 2021-01-05 ENCOUNTER — PATIENT MESSAGE (OUTPATIENT)
Dept: MEDICAL GROUP | Age: 71
End: 2021-01-05

## 2021-01-05 DIAGNOSIS — G25.0 ESSENTIAL TREMOR: ICD-10-CM

## 2021-01-05 NOTE — TELEPHONE ENCOUNTER
Take propanolol 40 mg BID, did not receive full dose.  [facility].   and palpations.     Sent message to Dr. Walker through Epic also left message for Dr. Jose Antonio NAJERA.  Will call pts back at 1400.  Pt aware if symptoms worsen that she will need to go to the ED, rather on waiting for medication refill.     Returned phone call at 1345    Current /98 HR 86  No returned call from Dr. Jose Antonio NAJERA as of yet.  Will continue to [facility] w/pt.    Returned phone call at 1545.  She stated Dr. Jose Antonio NAJERA sent a message to the MD to refill medication.  Told to call back as needed.     Reason for Disposition  • Request for URGENT new prescription or refill of 'essential' medication (i.e., likelihood of harm to patient if not taken) and triager unable to fill per department policy    Additional Information  • Negative: Drug overdose and triager unable to answer question  • Negative: Caller requesting information unrelated to medicine  • Negative: Caller requesting a prescription for Strep throat and has a positive culture result  • Negative: Rash while taking a medication or within 3 days of stopping it  • Negative: Immunization reaction suspected  • Negative: Asthma and having symptoms of asthma (cough, wheezing, etc.)  • Negative: Breastfeeding questions about mother's medicines and diet  • Negative: MORE THAN A DOUBLE DOSE of a prescription or over-the-counter (OTC) drug  • Negative: DOUBLE DOSE (an extra dose or lesser amount) of over-the-counter (OTC) drug and any symptoms (e.g., dizziness, nausea, pain, sleepiness)  • Negative: DOUBLE DOSE (an extra dose or lesser amount) of prescription drug and any symptoms (e.g., dizziness, nausea, pain, sleepiness)  • Negative: Took another person's prescription drug  • Negative: DOUBLE DOSE (an extra dose or lesser amount) of prescription drug and NO symptoms (Exception: a double dose of antibiotics)  • Negative: Diabetes drug error or overdose (e.g., took wrong type of insulin or took extra  dose)  • Negative: Caller has medication question about med not prescribed by PCP and triager unable to answer question (e.g., compatibility with other med, storage)    Protocols used: MEDICATION QUESTION CALL-A-[place]

## 2021-01-05 NOTE — TELEPHONE ENCOUNTER
From: Margoth Hopkins  To: Charlee Walker M.D.  Sent: 1/5/2021 1:22 PM PST  Subject: Prescription Question    Urgent   Dr Walker  I ran out of Propranolol 40mg bid. I receive my med. from [facility]. It was mailed yesterday. Please call  In a week of Propranolol at [facility] [place]. I have missed 2 doses, having palpitations, increased HR headache and increased tremors.   Thanks

## 2021-01-06 ENCOUNTER — PATIENT MESSAGE (OUTPATIENT)
Dept: MEDICAL GROUP | Age: 71
End: 2021-01-06

## 2021-01-06 RX ORDER — PROPRANOLOL HYDROCHLORIDE 40 MG/1
40 TABLET ORAL 2 TIMES DAILY
Qty: 180 TAB | Refills: 0 | Status: SHIPPED | OUTPATIENT
Start: 2021-01-06 | End: 2021-02-24 | Stop reason: SDUPTHER

## 2021-01-07 ENCOUNTER — HOSPITAL ENCOUNTER (OUTPATIENT)
Dept: LAB | Facility: MEDICAL CENTER | Age: 71
End: 2021-01-07
Attending: FAMILY MEDICINE
Payer: MEDICARE

## 2021-01-07 DIAGNOSIS — E06.3 HASHIMOTO'S THYROIDITIS: ICD-10-CM

## 2021-01-07 LAB
T4 FREE SERPL-MCNC: 2.1 NG/DL (ref 0.93–1.7)
TSH SERPL DL<=0.005 MIU/L-ACNC: 0.01 UIU/ML (ref 0.38–5.33)

## 2021-01-07 PROCEDURE — 84443 ASSAY THYROID STIM HORMONE: CPT

## 2021-01-07 PROCEDURE — 36415 COLL VENOUS BLD VENIPUNCTURE: CPT

## 2021-01-07 PROCEDURE — 84439 ASSAY OF FREE THYROXINE: CPT

## 2021-01-07 NOTE — TELEPHONE ENCOUNTER
Called pt to follow up. Discussed Echo and stress test results. Advised CT cardiac test recommended, pt agreeable. She had previously discussed test with AA at last OV. Order placed. Confirmed pt lab and follow up visit on 01/19/21. She stated she will get her lab work completed today. No questions or concerns. Verbalized understanding and appreciative of call.

## 2021-01-12 ENCOUNTER — TELEPHONE (OUTPATIENT)
Dept: ENDOCRINOLOGY | Facility: MEDICAL CENTER | Age: 71
End: 2021-01-12

## 2021-01-12 ENCOUNTER — TELEMEDICINE (OUTPATIENT)
Dept: ENDOCRINOLOGY | Facility: MEDICAL CENTER | Age: 71
End: 2021-01-12
Attending: INTERNAL MEDICINE
Payer: MEDICARE

## 2021-01-12 DIAGNOSIS — E53.8 VITAMIN B 12 DEFICIENCY: ICD-10-CM

## 2021-01-12 DIAGNOSIS — E55.9 VITAMIN D DEFICIENCY: ICD-10-CM

## 2021-01-12 DIAGNOSIS — E89.0 POSTOPERATIVE HYPOTHYROIDISM: ICD-10-CM

## 2021-01-12 PROCEDURE — 99214 OFFICE O/P EST MOD 30 MIN: CPT | Mod: 95,CR | Performed by: INTERNAL MEDICINE

## 2021-01-12 RX ORDER — LEVOTHYROXINE SODIUM 88 MCG
88 TABLET ORAL
Qty: 90 TAB | Refills: 3 | Status: SHIPPED | OUTPATIENT
Start: 2021-01-12 | End: 2021-04-21

## 2021-01-12 NOTE — PROGRESS NOTES
Chief Complaint: Follow up for  Postsurgical hypothyroidism.  Patient was presented for a telehealth consultation via secure and encrypted videoconferencing technology. This encounter was conducted via Zoom . Verbal consent was obtained. Patient's identity was verified.      HPI:     Margoth Hopkins is a 70 y.o. female here for follow up of postsurgical hypothyroidism, she had a total thyroidectomy on February 5, 2020 with benign findings    Since last visit patient reports feeling poor.  She remains on Synthroid 100 MCG daily which has been her dose for the past 6 months.   She reports excellent compliance and denies missing any daily doses.   She takes thyroid hormone prior to breakfast.   She  denies taking any iron, calcium supplements or antacids.      Weight has been stable    She currently reports feeling excessive energy, tremulousness, palpitations which has been present for months.     She currently denies weight loss, diarrhea, change in skin,  nails, or hair, heat intolerance.         Her TSH is markedly suppressed at 0.012 with an elevated free T4 2.10 on January 7, 2021  Previously her TSH was low at 0.063 with an elevated free T4 of 1.99 back in August 6, 2020  She reports that she alerted her previous endocrinologist about her abnormal labs but she was told by her previous endocrinologist that her labs were fine      Her last 25-hydroxy vitamin D was 52   B12 levels were 609  on August 6, 2020       Patient's medications, allergies, and social histories were reviewed and updated as appropriate.      ROS:     CONS:     No fever, no chills   EYES:     No diplopia, no blurry vision   CV:           No chest pain, no palpitations   PULM:     No SOB, no cough, no hemoptysis.   GI:            No nausea, no vomiting, no diarrhea, no constipation   ENDO:     No polyuria, no polydipsia, no heat intolerance, no cold intolerance       Past Medical History:  Problem List:  2020-06: Postoperative  hypothyroidism_s/p thyroidectomy_Dr. Garcia   2020-06: Generalized osteoarthritis  2020-06: Drug induced constipation  2020-02: Hashimoto's thyroiditis s/p total thyroidectom Dr. Franklin in   2/2020.   2019-10: Essential tremor  2019-08: Malaise  2019-08: Aortic valve sclerosis- without stenosis 2018 echo  2019-08: Mild aortic insufficiency- 2018 echo  2019-05: Nocturnal leg cramps  2018-10: Calculus of gallbladder with biliary obstruction but without   cholecystitis  2018-07: Chronic use of opiate drug for therapeutic purpose_contract   signed 6/2020, UDS consistent in 9/2020 2018-07: H/O right knee surgery  2018-07: Acute respiratory failure with hypoxia (HCC)  2018-07: Bilateral pulmonary embolism, provoked  2018-07: Pulmonary infarct (HCC)  2018-03: Chronic headache  2017-07: Risk for falls  2017-06: Vitiligo  2017-06: Chest pain  2017-02: FHx: colon cancer  2017-02: Schatzki's ring_DHA  2017-02: Benign essential HTN  2017-02: Pure hypercholesterolemia  2017-02: Migraine without aura, not intractable  2017-02: MOHAMUD (generalized anxiety disorder)  2017-02: Hypothyroidism due to Hashimoto's thyroiditis  2017-02: Multinodular goiter (nontoxic), s/p FNA in 12/2017, TR4 x1,   repeat US in 6/2020 2017-02: HLA B27 (HLA B27 positive)  2017-02: Primary osteoarthritis of right knee, S/P TKR + 2 revision   surgeries_Dr. Lomeli  2017-02: Lumbosacral pain, chronic, with right sciatica_ [place]  2017-02: Insomnia  2017-02: Risk for falls  2017-02: Vitamin D insufficiency      Past Surgical History:  Past Surgical History:   Procedure Laterality Date   • THYROIDECTOMY TOTAL Bilateral 2/5/2020    Procedure: THYROIDECTOMY, TOTAL- COMPLETE;  Surgeon: Cydney Franklin M.D.;  Location: SURGERY SAME DAY [facility];  Service: General   • APPENDECTOMY     • BLADDER SLING FEMALE     • CATARACT EXTRACTION WITH IOL Left    • CATARACT EXTRACTION WITH IOL Bilateral    • CERVICAL FUSION POSTERIOR     • FOOT SURGERY Left      metatarsal fusion, hammer toes correction   • FUSION      disc fusion   • GYN SURGERY      Hysterectomy   • KNEE ARTHROPLASTY TOTAL Right     x3   • KNEE REPLACEMENT, TOTAL Right    • KNEE REVISION TOTAL Right         Allergies:  Morphine, Nsaids, Pcn [penicillins], and Gabapentin     Social History:  Social History     Tobacco Use   • Smoking status: Never Smoker   • Smokeless tobacco: Never Used   Substance Use Topics   • Alcohol use: Yes     Alcohol/week: 4.2 oz     Types: 7 Glasses of wine per week     Frequency: 4 or more times a week     Drinks per session: 1 or 2     Binge frequency: Never     Comment: 3-5/day   • Drug use: No     Comment: CBD ingested        Family History:   family history includes Alzheimer's Disease in her mother; Arthritis in her brother, brother, and mother; Asthma in her brother; Cancer in her mother; Cancer (age of onset: 50) in her maternal grandmother; Heart Attack in her father; Heart Disease in her brother; Hypertension in her brother, brother, and brother; Lung Disease in her brother; No Known Problems in her maternal grandfather, paternal grandfather, and paternal grandmother; Other in her brother and brother.      PHYSICAL EXAM:   Vital signs: There were no vitals taken for this visit.  GENERAL: Well-developed, well-nourished in no apparent distress.   EYE:  No ocular asymmetry, PERRLA  HENT: Pink, moist mucous membranes.    NECK: No thyromegaly.   CARDIOVASCULAR:  No murmurs  LUNGS: Clear breath sounds  ABDOMEN: Soft, nontender   EXTREMITIES: No clubbing, cyanosis, or edema.   NEUROLOGICAL: No gross focal motor abnormalities   LYMPH: No cervical adenopathy seen.   SKIN: No rashes, lesions.       Labs:  Lab Results   Component Value Date/Time    SODIUM 139 06/03/2020 11:17 AM    POTASSIUM 3.9 06/03/2020 11:17 AM    CHLORIDE 104 06/03/2020 11:17 AM    CO2 23 06/03/2020 11:17 AM    ANION 12.0 06/03/2020 11:17 AM    GLUCOSE 95 06/03/2020 11:17 AM    BUN 19 06/03/2020 11:17 AM     CREATININE 0.62 06/03/2020 11:17 AM    CALCIUM 10.0 06/03/2020 11:17 AM    ASTSGOT 25 06/03/2020 11:17 AM    ALTSGPT 24 06/03/2020 11:17 AM    TBILIRUBIN 0.8 06/03/2020 11:17 AM    ALBUMIN 4.1 06/03/2020 11:17 AM    TOTPROTEIN 6.7 06/03/2020 11:17 AM    GLOBULIN 2.6 06/03/2020 11:17 AM    AGRATIO 1.6 06/03/2020 11:17 AM       Lab Results   Component Value Date/Time    SODIUM 139 06/03/2020 1117    POTASSIUM 3.9 06/03/2020 1117    CHLORIDE 104 06/03/2020 1117    CO2 23 06/03/2020 1117    GLUCOSE 95 06/03/2020 1117    BUN 19 06/03/2020 1117    CREATININE 0.62 06/03/2020 1117    CALCIUM 10.0 06/03/2020 1117    ANION 12.0 06/03/2020 1117       Lab Results   Component Value Date/Time    CHOLSTRLTOT 190 06/03/2020 1117    TRIGLYCERIDE 100 06/03/2020 1117    HDL 75 06/03/2020 1117    LDL 95 06/03/2020 1117       Lab Results   Component Value Date/Time    TSHULTRASEN 0.012 (L) 01/07/2021 1535     Lab Results   Component Value Date/Time    FREET4 2.10 (H) 01/07/2021 1535     Lab Results   Component Value Date/Time    FREET3 2.59 08/06/2020 1557     No results found for: THYSTIMIG    No results found for: MICROSOMALA      Imaging:      ASSESSMENT/PLAN:     1. Postoperative hypothyroidism  Unstable she has iatrogenic hyperthyroidism both clinically and biochemically  Adjust Synthroid to 88 mcg daily  Discussed with patient that we would like to avoid iatrogenic hyperthyroidism  I will see her again in 3 months with repeat of her TSH and free T4 levels    2. Vitamin D deficiency  Controlled continue current supplements repeat calcium and 25-hydroxy levels in 3 months    3. Vitamin B 12 deficiency  Controlled continue current supplements repeat B12 levels with next labs      Return in about 3 months (around 4/12/2021).      This patient during there office visit today was started on a new medication.  Side effects of the new medication were discussed with the patient today in the office.     Thank you kindly for allowing me to  participate in the thyroid care plan for this patient.    Asad Mullen MD, [facility], [facility]  01/12/21    CC:   Charlee Walker M.D.

## 2021-01-12 NOTE — TELEPHONE ENCOUNTER
Patient called stating she is worried about her lab results and she would like to speak with Lalitha or Dr. Mullen about her medication dosage.   Please advise.

## 2021-01-15 DIAGNOSIS — Z23 NEED FOR VACCINATION: ICD-10-CM

## 2021-01-18 ENCOUNTER — HOSPITAL ENCOUNTER (OUTPATIENT)
Dept: RADIOLOGY | Facility: MEDICAL CENTER | Age: 71
End: 2021-01-18
Attending: INTERNAL MEDICINE
Payer: COMMERCIAL

## 2021-01-18 DIAGNOSIS — R07.9 CHEST PAIN, UNSPECIFIED TYPE: ICD-10-CM

## 2021-01-18 PROCEDURE — 4410556 CT-CARDIAC SCORING (SELF PAY ONLY)

## 2021-01-19 ENCOUNTER — OFFICE VISIT (OUTPATIENT)
Dept: CARDIOLOGY | Facility: MEDICAL CENTER | Age: 71
End: 2021-01-19
Payer: MEDICARE

## 2021-01-19 VITALS
HEIGHT: 67 IN | HEART RATE: 60 BPM | WEIGHT: 175.8 LBS | OXYGEN SATURATION: 96 % | BODY MASS INDEX: 27.59 KG/M2 | RESPIRATION RATE: 16 BRPM | DIASTOLIC BLOOD PRESSURE: 62 MMHG | SYSTOLIC BLOOD PRESSURE: 120 MMHG

## 2021-01-19 DIAGNOSIS — I25.10 CORONARY ARTERY CALCIFICATION: ICD-10-CM

## 2021-01-19 DIAGNOSIS — Z79.899 ENCOUNTER FOR LONG-TERM (CURRENT) USE OF HIGH-RISK MEDICATION: ICD-10-CM

## 2021-01-19 DIAGNOSIS — K92.2 GASTROINTESTINAL HEMORRHAGE, UNSPECIFIED GASTROINTESTINAL HEMORRHAGE TYPE: ICD-10-CM

## 2021-01-19 DIAGNOSIS — R01.1 SYSTOLIC MURMUR: ICD-10-CM

## 2021-01-19 DIAGNOSIS — I35.9 AORTIC VALVE DISORDER: ICD-10-CM

## 2021-01-19 DIAGNOSIS — I25.84 CORONARY ARTERY CALCIFICATION: ICD-10-CM

## 2021-01-19 DIAGNOSIS — I10 BENIGN ESSENTIAL HTN: ICD-10-CM

## 2021-01-19 DIAGNOSIS — E78.00 PURE HYPERCHOLESTEROLEMIA: ICD-10-CM

## 2021-01-19 PROCEDURE — 99214 OFFICE O/P EST MOD 30 MIN: CPT | Performed by: INTERNAL MEDICINE

## 2021-01-19 RX ORDER — ERGOCALCIFEROL 1.25 MG/1
CAPSULE ORAL
COMMUNITY
Start: 2020-12-31 | End: 2021-08-24 | Stop reason: SDUPTHER

## 2021-01-19 RX ORDER — DIAZEPAM 5 MG/1
TABLET ORAL
COMMUNITY
Start: 2020-12-28 | End: 2021-04-21

## 2021-01-19 ASSESSMENT — ENCOUNTER SYMPTOMS
ABDOMINAL PAIN: 0
LOSS OF CONSCIOUSNESS: 0
PALPITATIONS: 0
SHORTNESS OF BREATH: 0
PND: 0
ORTHOPNEA: 0
DEPRESSION: 0
FALLS: 0
DIZZINESS: 0

## 2021-01-19 ASSESSMENT — FIBROSIS 4 INDEX: FIB4 SCORE: 1.25

## 2021-01-19 NOTE — Clinical Note
Joey Joe,   Can you contact this patient's GI physician at [facility]?  I would like to know if the patient can safely be on baby aspirin.  She has a history of gastric ulcers about 4 years ago. Thank you  AA

## 2021-01-19 NOTE — PROGRESS NOTES
Chief Complaint   Patient presents with   • Aortic Stenosis     F/V Dx: Aortic valve sclerosis- without stenosis 2018 echo       Subjective:   Margoth Hopkins is a 70-year-old female presenting to clinic for follow-up on possible aortic stenosis.    Pertinent history:  Hypertension  Hyperlipidemia  History of goiter status post total thyroidectomy  History of GI bleed secondary to gastric ulcers, around 2016 or 2017  Patient has a Schatzki's ring and gets regular dilatations, most recent in January 2020        Patient has previously been told that she has mitral valve prolapse and aortic stenosis.    Since her last appointment, she has been doing much better.    She was having significant palpitations which have improved as her Synthroid dose has been reduced slowly.  She has not had any recurrent chest discomfort since her last appointment.  She has not had any recurrent GI bleeding issues.  She follows with a GI physician in [facility]      She is a retired pediatric nurse.    Past Medical History:   Diagnosis Date   • Anemia     H/O   • Aortic insufficiency 01/31/2020    Pt. states this is mild and does not have any signs or symptoms.   • Arthritis     Osteo-Right Knee   • Cataract     IOL OU   • Gastric ulcer    • Goiter    • High cholesterol    • Hypertension    • Hypothyroidism    • Pain 01/31/2020    Right Knee & Lower Back   • Pulmonary embolism (HCC) 01/31/2020    Pt. states after joint replacement in 7-2018.   • Reactive arthritis (HCC)    • Thyroid disease     hasimotos   • Thyroid disease 01/31/2020    Thyroid Nodules     Past Surgical History:   Procedure Laterality Date   • THYROIDECTOMY TOTAL Bilateral 2/5/2020    Procedure: THYROIDECTOMY, TOTAL- COMPLETE;  Surgeon: Cydney Franklin M.D.;  Location: SURGERY SAME DAY [facility];  Service: General   • APPENDECTOMY     • BLADDER SLING FEMALE     • CATARACT EXTRACTION WITH IOL Left    • CATARACT EXTRACTION WITH IOL Bilateral    • CERVICAL  FUSION POSTERIOR     • FOOT SURGERY Left     metatarsal fusion, hammer toes correction   • FUSION      disc fusion   • GYN SURGERY      Hysterectomy   • KNEE ARTHROPLASTY TOTAL Right     x3   • KNEE REPLACEMENT, TOTAL Right    • KNEE REVISION TOTAL Right      Family History   Problem Relation Age of Onset   • Arthritis Mother    • Alzheimer's Disease Mother    • Cancer Mother         cervical cancer   • Heart Attack Father    • Other Brother         brain anurism   • Hypertension Brother    • Cancer Maternal Grandmother 50        colono cancer   • Heart Disease Brother    • Hypertension Brother    • Arthritis Brother    • Other Brother         Celiac Disease   • No Known Problems Maternal Grandfather    • No Known Problems Paternal Grandmother    • No Known Problems Paternal Grandfather    • Hypertension Brother    • Arthritis Brother    • Lung Disease Brother    • Asthma Brother      Social History     Socioeconomic History   • Marital status:      Spouse name: Not on file   • Number of children: Not on file   • Years of education: Not on file   • Highest education level: Not on file   Occupational History   • Not on file   Social Needs   • Financial resource strain: Not on file   • Food insecurity     Worry: Never true     Inability: Never true   • Transportation needs     Medical: No     Non-medical: No   Tobacco Use   • Smoking status: Never Smoker   • Smokeless tobacco: Never Used   Substance and Sexual Activity   • Alcohol use: Yes     Alcohol/week: 4.2 oz     Types: 7 Glasses of wine per week     Frequency: 4 or more times a week     Drinks per session: 1 or 2     Binge frequency: Never     Comment: 3-5/day   • Drug use: No     Comment: CBD ingested   • Sexual activity: Yes     Partners: Male   Lifestyle   • Physical activity     Days per week: Not on file     Minutes per session: Not on file   • Stress: Not on file   Relationships   • Social connections     Talks on phone: Not on file     Gets  together: Not on file     Attends Caodaism service: Not on file     Active member of club or organization: Not on file     Attends meetings of clubs or organizations: Not on file     Relationship status: Not on file   • Intimate partner violence     Fear of current or ex partner: Not on file     Emotionally abused: Not on file     Physically abused: Not on file     Forced sexual activity: Not on file   Other Topics Concern   • Not on file   Social History Narrative   • Not on file     Allergies   Allergen Reactions   • Morphine Vomiting   • Nsaids Unspecified     History of gastric ulcers - cannot take NSAIDS   • Pcn [Penicillins] Hives   • Gabapentin      Cognitive changes      Outpatient Encounter Medications as of 1/19/2021   Medication Sig Dispense Refill   • diazePAM (VALIUM) 5 MG Tab 2 TABLETS BY MOUTH AS DIRECTED TAKE ONE 30 MINS PRIOR TO APPT AND ONE AT THE TIME OF CHECK IN     • vitamin D, Ergocalciferol, (DRISDOL) 1.25 MG (66037 UT) Cap capsule TAKE 1 CAP BY MOUTH EVERY 7 DAYS. WITH FOOD.     • SYNTHROID 88 MCG Tab Take 1 Tab by mouth Every morning on an empty stomach. 90 Tab 3   • propranolol (INDERAL) 40 MG Tab Take 1 Tab by mouth 2 times a day. 180 Tab 0   • pregabalin (LYRICA) 75 MG Cap Take 75 mg by mouth every bedtime.     • linaCLOtide (LINZESS) 72 MCG Cap Take 1 Cap by mouth every day. 90 Cap 3   • lisinopril (PRINIVIL) 20 MG Tab Take 1 Tab by mouth every day. 90 Tab 1   • rosuvastatin (CRESTOR) 20 MG Tab Take 1 Tab by mouth every day. 90 Tab 3   • chlorthalidone (HYGROTON) 25 MG Tab TAKE 1 TABLET BY MOUTH  EVERY DAY 90 Tab 3   • VITAMIN E PO Take  by mouth.     • COLLAGEN PO Take  by mouth.     • DULoxetine (CYMBALTA) 20 MG Cap DR Particles TAKE 1 CAPSULE BY MOUTH  EVERY DAY 90 Cap 3   • omeprazole (PRILOSEC) 20 MG delayed-release capsule every evening.     • Probiotic Product (PROBIOTIC DAILY PO) Take  by mouth every day.     • MAGNESIUM PO Take  by mouth as needed.     • tizanidine (ZANAFLEX) 4  "MG Tab TAKE 1/2 TO 2 TABS BY MOUTH 3 TIMES A DAY AS NEEDED FOR SPASMS  2     No facility-administered encounter medications on file as of 1/19/2021.      Review of Systems   Constitutional: Negative for malaise/fatigue.   Respiratory: Negative for shortness of breath.    Cardiovascular: Negative for chest pain, palpitations, orthopnea, leg swelling and PND.   Gastrointestinal: Negative for abdominal pain.   Musculoskeletal: Negative for falls.   Neurological: Negative for dizziness and loss of consciousness.   Psychiatric/Behavioral: Negative for depression.   All other systems reviewed and are negative.       Objective:   /62 (BP Location: Left arm, Patient Position: Sitting, BP Cuff Size: Adult)   Pulse 60   Resp 16   Ht 1.702 m (5' 7\")   Wt 79.7 kg (175 lb 12.8 oz)   SpO2 96%   BMI 27.53 kg/m²     Physical Exam   Constitutional: She is oriented to person, place, and time. She appears well-developed and well-nourished. No distress.   HENT:   Head: Normocephalic and atraumatic.   Eyes: Conjunctivae are normal. No scleral icterus.   Neck: Normal range of motion. Neck supple.   Cardiovascular: Normal rate and regular rhythm. Exam reveals no gallop and no friction rub.   Murmur heard.   Crescendo decrescendo systolic murmur is present with a grade of 1/6.  Pulmonary/Chest: Effort normal and breath sounds normal. No respiratory distress. She has no wheezes. She has no rales.   Abdominal: She exhibits no distension. There is no abdominal tenderness.   Musculoskeletal:         General: No edema.   Neurological: She is alert and oriented to person, place, and time.   Skin: Skin is warm and dry. She is not diaphoretic.   Psychiatric: She has a normal mood and affect. Her behavior is normal. Judgment and thought content normal.   Nursing note and vitals reviewed.    Echocardiogram performed December 2020 was personally reviewed and per my interpretation showed normal LV function.  RVSP 37 mmHg.    Myocardial " perfusion study performed December 2020 was personally reviewed and per my interpretation showed no reversible defects.  Normal LV function.    Coronary calcium scoring CT performed in January 2021.  Report was reviewed and showed a total score of 264.    Assessment:     1. Aortic valve disorder     2. Systolic murmur     3. Benign essential HTN  Basic Metabolic Panel   4. Pure hypercholesterolemia  Lipid Profile   5. Coronary artery calcification     6. Gastrointestinal hemorrhage, unspecified gastrointestinal hemorrhage type     7. Encounter for long-term (current) use of high-risk medication       Medical Decision Making:  Today's Assessment / Status / Plan:     Patient has previously been told that she has mitral valve prolapse and aortic stenosis.  I discussed her echocardiogram findings with her.  She does not have any major valvular pathology.  Her murmur is secondary to the aortic valve sclerosis.  No further work-up for now.    Hypertension is well controlled.  Continue lisinopril and chlorthalidone at current dose.  Both of these are high risk medications requiring regular monitoring of renal function and potassium.  Labs ordered today.    For hyperlipidemia, continue Crestor at current dose.  Lipid panel ordered today.    She was found to have coronary artery calcification recently.  We discussed using a baby aspirin however patient is concerned given her history of GI bleed in the past.  We will contact her GI physician at [facility].  Okay to hold off on aspirin for now pending GI recommendations.    Return to clinic in 6 months to 1 year or earlier if needed.    Thank you for allowing me to participate in the care of this patient. Please do not hesitate to contact me with any questions.    Bianca Bermudez MD, [facility]  Cardiologist  [facility]    PLEASE NOTE: This dictation was created using voice recognition software.

## 2021-01-19 NOTE — LETTER
[facility]   [address]   [address]  [place] [address]  Phone: [phone]  Fax: [phone]              Margoth Hopkisn  1950    Encounter Date: 1/19/2021    Bianca Bermudez M.D.          PROGRESS NOTE:  Chief Complaint   Patient presents with   • Aortic Stenosis     F/V Dx: Aortic valve sclerosis- without stenosis 2018 echo       Subjective:   Margoth Hopkins is a 70-year-old female presenting to clinic for follow-up on possible aortic stenosis.    Pertinent history:  Hypertension  Hyperlipidemia  History of goiter status post total thyroidectomy  History of GI bleed secondary to gastric ulcers, around 2016 or 2017  Patient has a Schatzki's ring and gets regular dilatations, most recent in January 2020        Patient has previously been told that she has mitral valve prolapse and aortic stenosis.    Since her last appointment, she has been doing much better.    She was having significant palpitations which have improved as her Synthroid dose has been reduced slowly.  She has not had any recurrent chest discomfort since her last appointment.  She has not had any recurrent GI bleeding issues.  She follows with a GI physician in [facility]      She is a retired pediatric nurse.    Past Medical History:   Diagnosis Date   • Anemia     H/O   • Aortic insufficiency 01/31/2020    Pt. states this is mild and does not have any signs or symptoms.   • Arthritis     Osteo-Right Knee   • Cataract     IOL OU   • Gastric ulcer    • Goiter    • High cholesterol    • Hypertension    • Hypothyroidism    • Pain 01/31/2020    Right Knee & Lower Back   • Pulmonary embolism (HCC) 01/31/2020    Pt. states after joint replacement in 7-2018.   • Reactive arthritis (HCC)    • Thyroid disease     hasimotos   • Thyroid disease 01/31/2020    Thyroid Nodules     Past Surgical History:   Procedure Laterality Date   • THYROIDECTOMY TOTAL Bilateral 2/5/2020    Procedure: THYROIDECTOMY, TOTAL- COMPLETE;  Surgeon: Cydney Franklin M.D.;  Location: SURGERY SAME DAY [facility];  Service: General   • APPENDECTOMY     • BLADDER SLING FEMALE     • CATARACT EXTRACTION WITH IOL Left    • CATARACT EXTRACTION WITH IOL Bilateral    • CERVICAL FUSION POSTERIOR     • FOOT SURGERY Left     metatarsal fusion, hammer toes correction   • FUSION      disc fusion   • GYN SURGERY      Hysterectomy   • KNEE ARTHROPLASTY TOTAL Right     x3   • KNEE REPLACEMENT, TOTAL Right    • KNEE REVISION TOTAL Right      Family History   Problem Relation Age of Onset   • Arthritis Mother    • Alzheimer's Disease Mother    • Cancer Mother         cervical cancer   • Heart Attack Father    • Other Brother         brain anurism   • Hypertension Brother    • Cancer Maternal Grandmother 50        colono cancer   • Heart Disease Brother    • Hypertension Brother    • Arthritis Brother    • Other Brother         Celiac Disease   • No Known Problems Maternal Grandfather    • No Known Problems Paternal Grandmother    • No Known Problems Paternal Grandfather    • Hypertension Brother    • Arthritis Brother    • Lung Disease Brother    • Asthma Brother      Social History     Socioeconomic History   • Marital status:      Spouse name: Not on file   • Number of children: Not on file   • Years of education: Not on file   • Highest education level: Not on file   Occupational History   • Not on file   Social Needs   • Financial resource strain: Not on file   • Food insecurity     Worry: Never true     Inability: Never true   • Transportation needs     Medical: No     Non-medical: No   Tobacco Use   • Smoking status: Never Smoker   • Smokeless tobacco: Never Used   Substance and Sexual Activity   • Alcohol use: Yes     Alcohol/week: 4.2 oz     Types: 7 Glasses of wine per week     Frequency: 4 or more times a week     Drinks per session: 1 or 2     Binge frequency: Never     Comment: 3-5/day   • Drug use: No      Comment: CBD ingested   • Sexual activity: Yes     Partners: Male   Lifestyle   • Physical activity     Days per week: Not on file     Minutes per session: Not on file   • Stress: Not on file   Relationships   • Social connections     Talks on phone: Not on file     Gets together: Not on file     Attends Jehovah's witness service: Not on file     Active member of club or organization: Not on file     Attends meetings of clubs or organizations: Not on file     Relationship status: Not on file   • Intimate partner violence     Fear of current or ex partner: Not on file     Emotionally abused: Not on file     Physically abused: Not on file     Forced sexual activity: Not on file   Other Topics Concern   • Not on file   Social History Narrative   • Not on file     Allergies   Allergen Reactions   • Morphine Vomiting   • Nsaids Unspecified     History of gastric ulcers - cannot take NSAIDS   • Pcn [Penicillins] Hives   • Gabapentin      Cognitive changes      Outpatient Encounter Medications as of 1/19/2021   Medication Sig Dispense Refill   • diazePAM (VALIUM) 5 MG Tab 2 TABLETS BY MOUTH AS DIRECTED TAKE ONE 30 MINS PRIOR TO APPT AND ONE AT THE TIME OF CHECK IN     • vitamin D, Ergocalciferol, (DRISDOL) 1.25 MG (27522 UT) Cap capsule TAKE 1 CAP BY MOUTH EVERY 7 DAYS. WITH FOOD.     • SYNTHROID 88 MCG Tab Take 1 Tab by mouth Every morning on an empty stomach. 90 Tab 3   • propranolol (INDERAL) 40 MG Tab Take 1 Tab by mouth 2 times a day. 180 Tab 0   • pregabalin (LYRICA) 75 MG Cap Take 75 mg by mouth every bedtime.     • linaCLOtide (LINZESS) 72 MCG Cap Take 1 Cap by mouth every day. 90 Cap 3   • lisinopril (PRINIVIL) 20 MG Tab Take 1 Tab by mouth every day. 90 Tab 1   • rosuvastatin (CRESTOR) 20 MG Tab Take 1 Tab by mouth every day. 90 Tab 3   • chlorthalidone (HYGROTON) 25 MG Tab TAKE 1 TABLET BY MOUTH  EVERY DAY 90 Tab 3   • VITAMIN E PO Take  by mouth.     • COLLAGEN PO Take  by mouth.     • DULoxetine (CYMBALTA) 20 MG Cap  "DR Particles TAKE 1 CAPSULE BY MOUTH  EVERY DAY 90 Cap 3   • omeprazole (PRILOSEC) 20 MG delayed-release capsule every evening.     • Probiotic Product (PROBIOTIC DAILY PO) Take  by mouth every day.     • MAGNESIUM PO Take  by mouth as needed.     • tizanidine (ZANAFLEX) 4 MG Tab TAKE 1/2 TO 2 TABS BY MOUTH 3 TIMES A DAY AS NEEDED FOR SPASMS  2     No facility-administered encounter medications on file as of 1/19/2021.      Review of Systems   Constitutional: Negative for malaise/fatigue.   Respiratory: Negative for shortness of breath.    Cardiovascular: Negative for chest pain, palpitations, orthopnea, leg swelling and PND.   Gastrointestinal: Negative for abdominal pain.   Musculoskeletal: Negative for falls.   Neurological: Negative for dizziness and loss of consciousness.   Psychiatric/Behavioral: Negative for depression.   All other systems reviewed and are negative.       Objective:   /62 (BP Location: Left arm, Patient Position: Sitting, BP Cuff Size: Adult)   Pulse 60   Resp 16   Ht 1.702 m (5' 7\")   Wt 79.7 kg (175 lb 12.8 oz)   SpO2 96%   BMI 27.53 kg/m²     Physical Exam   Constitutional: She is oriented to person, place, and time. She appears well-developed and well-nourished. No distress.   HENT:   Head: Normocephalic and atraumatic.   Eyes: Conjunctivae are normal. No scleral icterus.   Neck: Normal range of motion. Neck supple.   Cardiovascular: Normal rate and regular rhythm. Exam reveals no gallop and no friction rub.   Murmur heard.   Crescendo decrescendo systolic murmur is present with a grade of 1/6.  Pulmonary/Chest: Effort normal and breath sounds normal. No respiratory distress. She has no wheezes. She has no rales.   Abdominal: She exhibits no distension. There is no abdominal tenderness.   Musculoskeletal:         General: No edema.   Neurological: She is alert and oriented to person, place, and time.   Skin: Skin is warm and dry. She is not diaphoretic.   Psychiatric: She has " a normal mood and affect. Her behavior is normal. Judgment and thought content normal.   Nursing note and vitals reviewed.    Echocardiogram performed December 2020 was personally reviewed and per my interpretation showed normal LV function.  RVSP 37 mmHg.    Myocardial perfusion study performed December 2020 was personally reviewed and per my interpretation showed no reversible defects.  Normal LV function.    Coronary calcium scoring CT performed in January 2021.  Report was reviewed and showed a total score of 264.    Assessment:     1. Aortic valve disorder     2. Systolic murmur     3. Benign essential HTN  Basic Metabolic Panel   4. Pure hypercholesterolemia  Lipid Profile   5. Coronary artery calcification     6. Gastrointestinal hemorrhage, unspecified gastrointestinal hemorrhage type     7. Encounter for long-term (current) use of high-risk medication       Medical Decision Making:  Today's Assessment / Status / Plan:     Patient has previously been told that she has mitral valve prolapse and aortic stenosis.  I discussed her echocardiogram findings with her.  She does not have any major valvular pathology.  Her murmur is secondary to the aortic valve sclerosis.  No further work-up for now.    Hypertension is well controlled.  Continue lisinopril and chlorthalidone at current dose.  Both of these are high risk medications requiring regular monitoring of renal function and potassium.  Labs ordered today.    For hyperlipidemia, continue Crestor at current dose.  Lipid panel ordered today.    She was found to have coronary artery calcification recently.  We discussed using a baby aspirin however patient is concerned given her history of GI bleed in the past.  We will contact her GI physician at [facility].  Okay to hold off on aspirin for now pending GI recommendations.    Return to clinic in 6 months to 1 year or earlier if needed.    Thank you for allowing me to participate in the care of this patient.  Please do not hesitate to contact me with any questions.    Bianca Bermudez MD, [facility]  Cardiologist  [facility]    PLEASE NOTE: This dictation was created using voice recognition software.             Charlee Walker M.D.  [address] [place] [address] [place] [address]  Via In Basket

## 2021-01-20 ENCOUNTER — TELEPHONE (OUTPATIENT)
Dept: CARDIOLOGY | Facility: MEDICAL CENTER | Age: 71
End: 2021-01-20

## 2021-01-20 DIAGNOSIS — I25.10 CORONARY ARTERY CALCIFICATION: ICD-10-CM

## 2021-01-20 DIAGNOSIS — R07.9 CHEST PAIN, UNSPECIFIED TYPE: ICD-10-CM

## 2021-01-20 DIAGNOSIS — I25.84 CORONARY ARTERY CALCIFICATION: ICD-10-CM

## 2021-01-20 PROBLEM — I35.1 MILD AORTIC INSUFFICIENCY: Status: RESOLVED | Noted: 2019-08-12 | Resolved: 2021-01-20

## 2021-01-20 PROBLEM — I35.8 AORTIC VALVE SCLEROSIS: Status: RESOLVED | Noted: 2019-08-12 | Resolved: 2021-01-20

## 2021-01-25 ENCOUNTER — PATIENT MESSAGE (OUTPATIENT)
Dept: MEDICAL GROUP | Age: 71
End: 2021-01-25

## 2021-01-25 DIAGNOSIS — G47.01 INSOMNIA DUE TO MEDICAL CONDITION: ICD-10-CM

## 2021-01-26 RX ORDER — ZOLPIDEM TARTRATE 5 MG/1
5 TABLET ORAL NIGHTLY PRN
Qty: 30 TAB | Refills: 0 | Status: SHIPPED | OUTPATIENT
Start: 2021-01-26 | End: 2021-02-25

## 2021-01-26 RX ORDER — ZOLPIDEM TARTRATE 5 MG/1
5 TABLET ORAL NIGHTLY PRN
Qty: 30 TAB | Refills: 0 | Status: SHIPPED | OUTPATIENT
Start: 2021-01-26 | End: 2021-01-26

## 2021-01-27 ENCOUNTER — IMMUNIZATION (OUTPATIENT)
Dept: FAMILY PLANNING/WOMEN'S HEALTH CLINIC | Facility: IMMUNIZATION CENTER | Age: 71
End: 2021-01-27
Attending: INTERNAL MEDICINE
Payer: MEDICARE

## 2021-01-27 DIAGNOSIS — Z23 NEED FOR VACCINATION: ICD-10-CM

## 2021-01-27 DIAGNOSIS — Z23 ENCOUNTER FOR VACCINATION: Primary | ICD-10-CM

## 2021-01-27 PROCEDURE — 0001A PFIZER SARS-COV-2 VACCINE: CPT

## 2021-01-27 PROCEDURE — 91300 PFIZER SARS-COV-2 VACCINE: CPT

## 2021-02-03 RX ORDER — ASPIRIN 81 MG/1
81 TABLET, CHEWABLE ORAL DAILY
Qty: 100 TAB | Refills: 0 | COMMUNITY
Start: 2021-02-03 | End: 2022-06-10

## 2021-02-03 NOTE — TELEPHONE ENCOUNTER
Attempted to call Sandhya back at provided number and ext. No answer, LM again for her to call back.  
Called back LASHELL, spoke with Chyna . She states pt has a FV with a nurse practitioner on 02/02/21. Chyna took down message for Sandhya MARIE regarding ASA safety. Left phone and fax numbers to office. She will send message to Sandhya. Will await response.  
Message  Received: Yesterday  Message Contents   TIMOTHY Zambrano R.N. Hi Liz,   Can you contact this patient's GI physician at [facility]?  I would like to know if the patient can safely be on baby aspirin.  She has a history of gastric ulcers about 4 years ago.  Thank you   AA        Called [facility] and spoke with . Pt last seen by Dr. Coronel. Called and LM for RN for Dr. Coronel regarding above. Will await call back.      [facility]  [phone]  
Received OV notes from [facility]. Pt seen and evaluated by VENKATESH Munoz. Per evaluation, pt is ok to start ASA 81mg daily. Pt was advised to take medication with plenty of water to prevent ulceration. Pt was also advised not to take ASA on an empty stomach or at bedside. MAR updated.    Copy of OV notes sent to scanning.  
Received call back from Sandhya MARIE at [facility]. She states pt has not been seen in their office since December 2019 however has a FV on 02/02/21. Will discuss ASA safety at this FV and return our call. Will await call next week.  
TO: 2:30p/Fri/Ofc  NM: Sandhya   HOSP: [facility]   PH: [phone]  EXT: 112   PT NM: Margoth Hopkins   : 46   REG DR: does not know   RE: Returning Tatyana's call.    DISP HIST: 2021 02:12P AF p/disp  2021 02:16P RANGEL rosario msg    
Airway patent, Nasal mucosa clear. Mouth with normal mucosa. Throat has no vesicles, no oropharyngeal exudates and uvula is midline.

## 2021-02-19 ENCOUNTER — IMMUNIZATION (OUTPATIENT)
Dept: FAMILY PLANNING/WOMEN'S HEALTH CLINIC | Facility: IMMUNIZATION CENTER | Age: 71
End: 2021-02-19
Attending: INTERNAL MEDICINE
Payer: MEDICARE

## 2021-02-19 DIAGNOSIS — Z23 ENCOUNTER FOR VACCINATION: Primary | ICD-10-CM

## 2021-02-19 PROCEDURE — 0002A PFIZER SARS-COV-2 VACCINE: CPT

## 2021-02-19 PROCEDURE — 91300 PFIZER SARS-COV-2 VACCINE: CPT

## 2021-02-24 ENCOUNTER — PATIENT MESSAGE (OUTPATIENT)
Dept: MEDICAL GROUP | Age: 71
End: 2021-02-24

## 2021-02-24 DIAGNOSIS — G25.0 ESSENTIAL TREMOR: ICD-10-CM

## 2021-02-24 NOTE — PATIENT COMMUNICATION
Received request via: Patient    Was the patient seen in the last year in this department? Yes    Does the patient have an active prescription (recently filled or refills available) for medication(s) requested? Yes, Patient states that [employer] has canceled the script sent on 01/2021 and a new script needs to be sent.

## 2021-02-25 RX ORDER — PROPRANOLOL HYDROCHLORIDE 40 MG/1
40 TABLET ORAL 2 TIMES DAILY
Qty: 180 TABLET | Refills: 3 | Status: SHIPPED | OUTPATIENT
Start: 2021-02-25 | End: 2021-12-30 | Stop reason: SDUPTHER

## 2021-03-08 ENCOUNTER — TELEPHONE (OUTPATIENT)
Dept: MEDICAL GROUP | Age: 71
End: 2021-03-08

## 2021-03-08 NOTE — TELEPHONE ENCOUNTER
ESTABLISHED PATIENT PRE-VISIT PLANNING     03/08/21@10:31AM Called & spoke to pt. Advised virtual visit scheduled Tuesday, 03/09/21@1:00PM Dr. Walker. Per pt zoom rosita downloaded. [employer] active.    Patient was contacted to complete PVP.     Note: Patient will not be contacted if there is no indication to call.     1.  Reviewed notes from the last few office visits within the medical group: Yes    2.  If any orders were placed at last visit or intended to be done for this visit (i.e. 6 mos follow-up), do we have Results/Consult Notes?         •  Labs - Labs ordered, completed on 01/07/21 and results are in chart.  Note: If patient appointment is for lab review and patient did not complete labs, check with provider if OK to reschedule patient until labs completed.       •  Imaging - Imaging ordered, completed and results are in chart.       •  Referrals - No referrals were ordered at last office visit.    3. Is this appointment scheduled as a Hospital Follow-Up? No    4.  Immunizations were updated in Epic using [employer] Outside Information activity? Yes    5.  Patient is due for the following Health Maintenance Topics:   Health Maintenance Due   Topic Date Due   • IMM ZOSTER VACCINES (2 of 3) 04/20/2011   • COLONOSCOPY  03/24/2021     6.  AHA (Pulse8) form printed for Provider? N/A

## 2021-03-09 ENCOUNTER — TELEMEDICINE (OUTPATIENT)
Dept: MEDICAL GROUP | Age: 71
End: 2021-03-09
Payer: MEDICARE

## 2021-03-09 VITALS
HEART RATE: 64 BPM | DIASTOLIC BLOOD PRESSURE: 56 MMHG | SYSTOLIC BLOOD PRESSURE: 120 MMHG | WEIGHT: 178 LBS | BODY MASS INDEX: 27.88 KG/M2

## 2021-03-09 DIAGNOSIS — E78.00 PURE HYPERCHOLESTEROLEMIA: ICD-10-CM

## 2021-03-09 DIAGNOSIS — G47.01 INSOMNIA DUE TO MEDICAL CONDITION: Primary | ICD-10-CM

## 2021-03-09 DIAGNOSIS — Z78.0 MENOPAUSE: ICD-10-CM

## 2021-03-09 DIAGNOSIS — I10 BENIGN ESSENTIAL HTN: ICD-10-CM

## 2021-03-09 DIAGNOSIS — Z23 NEED FOR VACCINATION: ICD-10-CM

## 2021-03-09 PROCEDURE — 99214 OFFICE O/P EST MOD 30 MIN: CPT | Mod: 95,CR | Performed by: FAMILY MEDICINE

## 2021-03-09 RX ORDER — ZOLPIDEM TARTRATE 5 MG/1
5 TABLET ORAL NIGHTLY PRN
Qty: 30 TABLET | Refills: 2 | Status: SHIPPED | OUTPATIENT
Start: 2021-03-09 | End: 2021-06-07

## 2021-03-09 RX ORDER — PREGABALIN 50 MG/1
50 CAPSULE ORAL
COMMUNITY
Start: 2021-03-08 | End: 2021-06-10

## 2021-03-09 RX ORDER — DULOXETIN HYDROCHLORIDE 30 MG/1
30 CAPSULE, DELAYED RELEASE ORAL 2 TIMES DAILY
COMMUNITY
Start: 2021-03-08 | End: 2023-01-05 | Stop reason: SDUPTHER

## 2021-03-09 ASSESSMENT — PATIENT HEALTH QUESTIONNAIRE - PHQ9: CLINICAL INTERPRETATION OF PHQ2 SCORE: 0

## 2021-03-09 ASSESSMENT — FIBROSIS 4 INDEX: FIB4 SCORE: 1.25

## 2021-03-09 NOTE — PROGRESS NOTES
Virtual Visit: Established Patient   This visit was conducted via Zoom using secure and encrypted videoconferencing technology. The patient was in a private location in the state of [place].    The patient's identity was confirmed and verbal consent was obtained for this virtual visit.    Subjective:   CC: Insomnia follow up       Margoth Hopkins is a 70 y.o. female with chronic insomnia secondary to chronic pain from generalized osteoarthritis and chronic back pain.  Patient has been taking Ambien 5 mg nightly as needed for sleep.  Patient tolerated medication well, no side effect reported.  Negative history of drugs, alcohol, tobacco abuse.  Patient has been following up with me every 3 months for medication refill.    Patient also has chronic hypertension and hyperlipidemia.  She is compliant with medications.  She tolerates them well, no side effect reported.  Patient is unable to exercise regularly due to chronic pain.    ROS   Denies any recent fevers or chills. No nausea or vomiting. No chest pains or shortness of breath.     Allergies   Allergen Reactions   • Morphine Vomiting   • Nsaids Unspecified     History of gastric ulcers - cannot take NSAIDS   • Pcn [Penicillins] Hives   • Gabapentin      Cognitive changes        Current medicines (including changes today)  Current Outpatient Medications   Medication Sig Dispense Refill   • zolpidem (AMBIEN) 5 MG Tab Take 1 tablet by mouth at bedtime as needed for Sleep for up to 90 days. 30 tablet 2   • propranolol (INDERAL) 40 MG Tab Take 1 tablet by mouth 2 times a day. 180 tablet 3   • aspirin (ASA) 81 MG Chew Tab chewable tablet Chew 1 Tab every day. 100 Tab 0   • vitamin D, Ergocalciferol, (DRISDOL) 1.25 MG (08524 UT) Cap capsule TAKE 1 CAP BY MOUTH EVERY 7 DAYS. WITH FOOD.     • SYNTHROID 88 MCG Tab Take 1 Tab by mouth Every morning on an empty stomach. 90 Tab 3   • linaCLOtide (LINZESS) 72 MCG Cap Take 1 Cap by mouth every day. 90 Cap 3   • lisinopril  (PRINIVIL) 20 MG Tab Take 1 Tab by mouth every day. 90 Tab 1   • rosuvastatin (CRESTOR) 20 MG Tab Take 1 Tab by mouth every day. 90 Tab 3   • chlorthalidone (HYGROTON) 25 MG Tab TAKE 1 TABLET BY MOUTH  EVERY DAY 90 Tab 3   • VITAMIN E PO Take  by mouth.     • COLLAGEN PO Take  by mouth.     • omeprazole (PRILOSEC) 20 MG delayed-release capsule every evening.     • Probiotic Product (PROBIOTIC DAILY PO) Take  by mouth every day.     • tizanidine (ZANAFLEX) 4 MG Tab TAKE 1/2 TO 2 TABS BY MOUTH 3 TIMES A DAY AS NEEDED FOR SPASMS  2   • DULoxetine (CYMBALTA) 30 MG Cap DR Particles Take 30 mg by mouth every day.     • pregabalin (LYRICA) 50 MG capsule Take 50 mg by mouth 2 Times a Day.     • diazePAM (VALIUM) 5 MG Tab 2 TABLETS BY MOUTH AS DIRECTED TAKE ONE 30 MINS PRIOR TO APPT AND ONE AT THE TIME OF CHECK IN     • MAGNESIUM PO Take  by mouth as needed.       No current facility-administered medications for this visit.       Patient Active Problem List    Diagnosis Date Noted   • Postoperative hypothyroidism_s/p thyroidectomy_Dr. Mullen 06/12/2020   • Generalized osteoarthritis 06/12/2020   • Drug induced constipation 06/12/2020   • Hashimoto's thyroiditis s/p total thyroidectom Dr. Franklin in 2/2020.  02/20/2020   • Essential tremor 10/31/2019   • Calculus of gallbladder with biliary obstruction but without cholecystitis 10/14/2018   • Chronic headache 03/20/2018   • Vitiligo 06/26/2017   • FHx: colon cancer 02/23/2017   • Schatzki's ring_DHA 02/23/2017   • Benign essential HTN 02/23/2017   • Pure hypercholesterolemia 02/23/2017   • MOHAMUD (generalized anxiety disorder) 02/23/2017   • HLA B27 (HLA B27 positive) 02/23/2017   • Primary osteoarthritis of right knee, S/P TKR + 2 revision surgeries_Dr. Lomeli 02/23/2017   • Lumbosacral pain, chronic, with right sciatica_ [place] 02/23/2017   • Insomnia 02/23/2017       Family History   Problem Relation Age of Onset   • Arthritis Mother    • Alzheimer's Disease Mother    •  Cancer Mother         cervical cancer   • Heart Attack Father    • Other Brother         brain anurism   • Hypertension Brother    • Cancer Maternal Grandmother 50        colono cancer   • Heart Disease Brother    • Hypertension Brother    • Arthritis Brother    • Other Brother         Celiac Disease   • No Known Problems Maternal Grandfather    • No Known Problems Paternal Grandmother    • No Known Problems Paternal Grandfather    • Hypertension Brother    • Arthritis Brother    • Lung Disease Brother    • Asthma Brother        She  has a past medical history of Anemia, Aortic insufficiency (01/31/2020), Arthritis, Cataract, Gastric ulcer, Goiter, High cholesterol, Hypertension, Hypothyroidism, Pain (01/31/2020), Pulmonary embolism (HCC) (01/31/2020), Reactive arthritis (HCC), Thyroid disease, and Thyroid disease (01/31/2020).  She  has a past surgical history that includes knee replacement, total (Right); fusion; cervical fusion posterior; bladder sling female; cataract extraction with iol (Left); appendectomy; cataract extraction with iol (Bilateral); gyn surgery; foot surgery (Left); knee arthroplasty total (Right); knee revision total (Right); and thyroidectomy total (Bilateral, 2/5/2020).       Objective:   /56 (BP Location: Right arm, Patient Position: Sitting, BP Cuff Size: Adult) Comment: pt stated  Pulse 64 Comment: pt stated  Wt 80.7 kg (178 lb) Comment: pt stated  BMI 27.88 kg/m²     Physical Exam:  Constitutional: Alert, no distress, well-groomed.  Skin: No rashes in visible areas.  Eye: Round. Conjunctiva clear, lids normal. No icterus.   ENMT: Lips pink without lesions, good dentition, moist mucous membranes. Phonation normal.  Neck: No masses, no thyromegaly. Moves freely without pain.  Respiratory: Unlabored respiratory effort, no cough or audible wheeze  Psych: Alert and oriented x3, normal affect and mood.       Assessment and Plan:   The following treatment plan was discussed:     1.  Insomnia due to medical condition  - zolpidem (AMBIEN) 5 MG Tab; Take 1 tablet by mouth at bedtime as needed for Sleep for up to 90 days.  Dispense: 30 tablet; Refill: 2  - Risks, benefits, side effects, as well as potential health complications associated with Ambien was previously discussed with patient. Appropriate counseling provided.      2. Menopause  - VITAMIN D,25 HYDROXY; Future  - DS-BONE DENSITY STUDY (DEXA); Future    3. Benign essential HTN  Chronic, controlled with Lisinopril 20  Mg qd, no s/e reported, will continue.    - CBC WITH DIFFERENTIAL; Future  - Comp Metabolic Panel; Future  - MICROALBUMIN CREAT RATIO URINE; Future    4. Pure hypercholesterolemia  Chronic, controlled with Crestor 20 mg qd, no s/e reported, will continue.    - CBC WITH DIFFERENTIAL; Future  - Comp Metabolic Panel; Future  - Lipid Profile; Future    5. Need for vaccination  - Shingles Vaccine (SHINGRIX); Future       Follow-up: Return in about 3 months (around 6/9/2021) for Annual wellness visit.

## 2021-03-15 ENCOUNTER — APPOINTMENT (OUTPATIENT)
Dept: MEDICAL GROUP | Age: 71
End: 2021-03-15
Payer: COMMERCIAL

## 2021-03-29 ENCOUNTER — HOSPITAL ENCOUNTER (OUTPATIENT)
Dept: RADIOLOGY | Facility: MEDICAL CENTER | Age: 71
End: 2021-03-29
Attending: FAMILY MEDICINE
Payer: MEDICARE

## 2021-03-29 DIAGNOSIS — Z78.0 MENOPAUSE: ICD-10-CM

## 2021-03-29 PROCEDURE — 77080 DXA BONE DENSITY AXIAL: CPT

## 2021-03-31 PROBLEM — M85.89 OSTEOPENIA OF MULTIPLE SITES: Status: ACTIVE | Noted: 2021-03-31

## 2021-04-08 ENCOUNTER — HOSPITAL ENCOUNTER (OUTPATIENT)
Dept: LAB | Facility: MEDICAL CENTER | Age: 71
End: 2021-04-08
Attending: INTERNAL MEDICINE
Payer: MEDICARE

## 2021-04-08 DIAGNOSIS — E89.0 POSTOPERATIVE HYPOTHYROIDISM: ICD-10-CM

## 2021-04-08 DIAGNOSIS — E53.8 VITAMIN B 12 DEFICIENCY: ICD-10-CM

## 2021-04-08 DIAGNOSIS — E55.9 VITAMIN D DEFICIENCY: ICD-10-CM

## 2021-04-08 LAB
ALBUMIN SERPL BCP-MCNC: 4.2 G/DL (ref 3.2–4.9)
ALBUMIN/GLOB SERPL: 1.5 G/DL
ALP SERPL-CCNC: 80 U/L (ref 30–99)
ALT SERPL-CCNC: 27 U/L (ref 2–50)
ANION GAP SERPL CALC-SCNC: 10 MMOL/L (ref 7–16)
AST SERPL-CCNC: 21 U/L (ref 12–45)
BILIRUB SERPL-MCNC: 0.7 MG/DL (ref 0.1–1.5)
BUN SERPL-MCNC: 18 MG/DL (ref 8–22)
CALCIUM SERPL-MCNC: 9.6 MG/DL (ref 8.5–10.5)
CHLORIDE SERPL-SCNC: 101 MMOL/L (ref 96–112)
CO2 SERPL-SCNC: 27 MMOL/L (ref 20–33)
CREAT SERPL-MCNC: 0.6 MG/DL (ref 0.5–1.4)
GLOBULIN SER CALC-MCNC: 2.8 G/DL (ref 1.9–3.5)
GLUCOSE SERPL-MCNC: 99 MG/DL (ref 65–99)
POTASSIUM SERPL-SCNC: 3.7 MMOL/L (ref 3.6–5.5)
PROT SERPL-MCNC: 7 G/DL (ref 6–8.2)
SODIUM SERPL-SCNC: 138 MMOL/L (ref 135–145)
T3FREE SERPL-MCNC: 2.72 PG/ML (ref 2–4.4)
T4 FREE SERPL-MCNC: 1.9 NG/DL (ref 0.93–1.7)
TSH SERPL DL<=0.005 MIU/L-ACNC: 0.07 UIU/ML (ref 0.38–5.33)
VIT B12 SERPL-MCNC: 605 PG/ML (ref 211–911)

## 2021-04-08 PROCEDURE — 82306 VITAMIN D 25 HYDROXY: CPT

## 2021-04-08 PROCEDURE — 80053 COMPREHEN METABOLIC PANEL: CPT

## 2021-04-08 PROCEDURE — 36415 COLL VENOUS BLD VENIPUNCTURE: CPT

## 2021-04-08 PROCEDURE — 84439 ASSAY OF FREE THYROXINE: CPT

## 2021-04-08 PROCEDURE — 84443 ASSAY THYROID STIM HORMONE: CPT

## 2021-04-08 PROCEDURE — 84481 FREE ASSAY (FT-3): CPT

## 2021-04-08 PROCEDURE — 82607 VITAMIN B-12: CPT

## 2021-04-10 LAB — 25(OH)D3 SERPL-MCNC: 72 NG/ML (ref 30–80)

## 2021-04-13 ENCOUNTER — PATIENT MESSAGE (OUTPATIENT)
Dept: MEDICAL GROUP | Age: 71
End: 2021-04-13

## 2021-04-13 DIAGNOSIS — G47.01 INSOMNIA DUE TO MEDICAL CONDITION: ICD-10-CM

## 2021-04-15 DIAGNOSIS — I10 BENIGN ESSENTIAL HTN: ICD-10-CM

## 2021-04-15 RX ORDER — ZOLPIDEM TARTRATE 5 MG/1
5 TABLET ORAL NIGHTLY PRN
Qty: 30 TABLET | Refills: 2 | OUTPATIENT
Start: 2021-04-15 | End: 2021-07-14

## 2021-04-16 RX ORDER — LISINOPRIL 20 MG/1
TABLET ORAL
Qty: 90 TABLET | Refills: 3 | Status: SHIPPED | OUTPATIENT
Start: 2021-04-16 | End: 2022-03-17

## 2021-04-21 ENCOUNTER — OFFICE VISIT (OUTPATIENT)
Dept: ENDOCRINOLOGY | Facility: MEDICAL CENTER | Age: 71
End: 2021-04-21
Attending: INTERNAL MEDICINE
Payer: MEDICARE

## 2021-04-21 ENCOUNTER — HOSPITAL ENCOUNTER (OUTPATIENT)
Dept: LAB | Facility: MEDICAL CENTER | Age: 71
End: 2021-04-21
Attending: ORTHOPAEDIC SURGERY
Payer: MEDICARE

## 2021-04-21 VITALS
OXYGEN SATURATION: 97 % | DIASTOLIC BLOOD PRESSURE: 70 MMHG | SYSTOLIC BLOOD PRESSURE: 118 MMHG | HEIGHT: 67 IN | WEIGHT: 181 LBS | BODY MASS INDEX: 28.41 KG/M2 | HEART RATE: 74 BPM

## 2021-04-21 DIAGNOSIS — E89.0 POSTOPERATIVE HYPOTHYROIDISM: ICD-10-CM

## 2021-04-21 DIAGNOSIS — E55.9 VITAMIN D DEFICIENCY: ICD-10-CM

## 2021-04-21 DIAGNOSIS — E53.8 VITAMIN B 12 DEFICIENCY: ICD-10-CM

## 2021-04-21 LAB
CRP SERPL HS-MCNC: <0.3 MG/DL (ref 0–0.75)
ERYTHROCYTE [SEDIMENTATION RATE] IN BLOOD BY WESTERGREN METHOD: 7 MM/HOUR (ref 0–30)

## 2021-04-21 PROCEDURE — 99214 OFFICE O/P EST MOD 30 MIN: CPT | Performed by: INTERNAL MEDICINE

## 2021-04-21 PROCEDURE — 86140 C-REACTIVE PROTEIN: CPT

## 2021-04-21 PROCEDURE — 36415 COLL VENOUS BLD VENIPUNCTURE: CPT

## 2021-04-21 PROCEDURE — 99211 OFF/OP EST MAY X REQ PHY/QHP: CPT | Performed by: INTERNAL MEDICINE

## 2021-04-21 PROCEDURE — 85652 RBC SED RATE AUTOMATED: CPT

## 2021-04-21 RX ORDER — LEVOTHYROXINE SODIUM 75 MCG
75 TABLET ORAL
Qty: 90 TABLET | Refills: 1 | Status: SHIPPED | OUTPATIENT
Start: 2021-04-21 | End: 2021-08-24 | Stop reason: SDUPTHER

## 2021-04-21 RX ORDER — LEVOTHYROXINE SODIUM 75 MCG
75 TABLET ORAL
Qty: 90 TABLET | Refills: 1 | Status: SHIPPED | OUTPATIENT
Start: 2021-04-21 | End: 2021-04-21 | Stop reason: SDUPTHER

## 2021-04-21 ASSESSMENT — FIBROSIS 4 INDEX: FIB4 SCORE: .989743318610787025

## 2021-04-21 NOTE — PROGRESS NOTES
Chief Complaint: Follow up for  Postsurgical hypothyroidism.      HPI:     Margoth Hopkins is a 70 y.o. female here for follow up of postsurgical hypothyroidism, she had a total thyroidectomy on February 5, 2020 with benign findings    Since last visit patient reports feeling poor.  She remains on Synthroid 88 MCG daily which has been her dose for the past 3 months.   She reports excellent compliance and denies missing any daily doses.   She takes thyroid hormone prior to breakfast.   She  denies taking any iron, calcium supplements or antacids.      Weight has been stable    She currently reports palpitations  She currently denies weight loss, diarrhea, change in skin,  nails, or hair, heat intolerance.         Her TSH is markedly suppressed at 0.012 with an elevated free T4 2.10 on January 7, 2021 -while on Synthroid 100  Previously her TSH was low at 0.063 with an elevated free T4 of 1.99 back in August 6, 2020 -while on Synthroid 100    Her TSH is still low at 0.070 with an elevated free T4 of 1.9 on Synthroid 88 mcg daily      Her last 25-hydroxy vitamin D was 72  B12 levels were 609  on April 8, 2021    She was recently diagnosed with osteopenia by her primary care physician  Bone density showed the lowest T score of -1.2 for the lumbar spine on March 29, 2021      Patient's medications, allergies, and social histories were reviewed and updated as appropriate.      ROS:     CONS:     No fever, no chills   EYES:     No diplopia, no blurry vision   CV:           No chest pain, no palpitations   PULM:     No SOB, no cough, no hemoptysis.   GI:            No nausea, no vomiting, no diarrhea, no constipation   ENDO:     No polyuria, no polydipsia, no heat intolerance, no cold intolerance       Past Medical History:  Problem List:  2021-03: Osteopenia of multiple sites  2020-06: Postoperative hypothyroidism_s/p thyroidectomy_Dr. Mullen  2020-06: Generalized osteoarthritis  2020-06: Drug induced  constipation  2020-02: Hashimoto's thyroiditis s/p total thyroidectom Dr. Franklin in   2/2020.   2019-10: Essential tremor  2019-08: Malaise  2019-08: Aortic valve sclerosis- without stenosis 2018 echo  2019-08: Mild aortic insufficiency- 2018 echo  2019-05: Nocturnal leg cramps  2018-10: Calculus of gallbladder with biliary obstruction but without   cholecystitis  2018-07: Chronic use of opiate drug for therapeutic purpose_contract   signed 6/2020, UDS consistent in 9/2020 2018-07: H/O right knee surgery  2018-07: Acute respiratory failure with hypoxia (HCC)  2018-07: Bilateral pulmonary embolism, provoked  2018-07: Pulmonary infarct (HCC)  2018-03: Chronic headache  2017-07: Risk for falls  2017-06: Vitiligo  2017-06: Chest pain  2017-02: FHx: colon cancer  2017-02: Schatzki's ring_DHA  2017-02: Benign essential HTN  2017-02: Pure hypercholesterolemia  2017-02: Migraine without aura, not intractable  2017-02: MOHAMUD (generalized anxiety disorder)  2017-02: Hypothyroidism due to Hashimoto's thyroiditis  2017-02: Multinodular goiter (nontoxic), s/p FNA in 12/2017, TR4 x1,   repeat US in 6/2020 2017-02: HLA B27 (HLA B27 positive)  2017-02: Primary osteoarthritis of right knee, S/P TKR + 2 revision   surgeries_Dr. Lomeli  2017-02: Lumbosacral pain, chronic, with right sciatica_ sweet water  2017-02: Insomnia  2017-02: Risk for falls  2017-02: Vitamin D insufficiency      Past Surgical History:  Past Surgical History:   Procedure Laterality Date   • THYROIDECTOMY TOTAL Bilateral 2/5/2020    Procedure: THYROIDECTOMY, TOTAL- COMPLETE;  Surgeon: Cydney Franklin M.D.;  Location: SURGERY SAME DAY [facility];  Service: General   • APPENDECTOMY     • BLADDER SLING FEMALE     • CATARACT EXTRACTION WITH IOL Left    • CATARACT EXTRACTION WITH IOL Bilateral    • CERVICAL FUSION POSTERIOR     • FOOT SURGERY Left     metatarsal fusion, hammer toes correction   • FUSION      disc fusion   • GYN SURGERY      Hysterectomy   • KNEE  "ARTHROPLASTY TOTAL Right     x3   • KNEE REPLACEMENT, TOTAL Right    • KNEE REVISION TOTAL Right         Allergies:  Morphine, Nsaids, Pcn [penicillins], and Gabapentin     Social History:  Social History     Tobacco Use   • Smoking status: Never Smoker   • Smokeless tobacco: Never Used   Substance Use Topics   • Alcohol use: Yes     Alcohol/week: 4.2 oz     Types: 7 Glasses of wine per week     Comment: 3-5/day   • Drug use: Yes     Types: Oral     Comment: CBD ingested        Family History:   family history includes Alzheimer's Disease in her mother; Arthritis in her brother, brother, and mother; Asthma in her brother; Cancer in her mother; Cancer (age of onset: 50) in her maternal grandmother; Heart Attack in her father; Heart Disease in her brother; Hypertension in her brother, brother, and brother; Lung Disease in her brother; No Known Problems in her maternal grandfather, paternal grandfather, and paternal grandmother; Other in her brother and brother.      PHYSICAL EXAM:   Vital signs: /70 (BP Location: Left arm, Patient Position: Sitting, BP Cuff Size: Adult)   Pulse 74   Ht 1.702 m (5' 7\")   Wt 82.1 kg (181 lb)   SpO2 97%   BMI 28.35 kg/m²   GENERAL: Well-developed, well-nourished in no apparent distress.   EYE:  No ocular asymmetry, PERRLA  HENT: Pink, moist mucous membranes.    NECK: No thyromegaly.   CARDIOVASCULAR:  No murmurs  LUNGS: Clear breath sounds  ABDOMEN: Soft, nontender   EXTREMITIES: No clubbing, cyanosis, or edema.   NEUROLOGICAL: No gross focal motor abnormalities   LYMPH: No cervical adenopathy seen.   SKIN: No rashes, lesions.       Labs:  Lab Results   Component Value Date/Time    SODIUM 138 04/08/2021 02:15 PM    POTASSIUM 3.7 04/08/2021 02:15 PM    CHLORIDE 101 04/08/2021 02:15 PM    CO2 27 04/08/2021 02:15 PM    ANION 10.0 04/08/2021 02:15 PM    GLUCOSE 99 04/08/2021 02:15 PM    BUN 18 04/08/2021 02:15 PM    CREATININE 0.60 04/08/2021 02:15 PM    CALCIUM 9.6 04/08/2021 " 02:15 PM    ASTSGOT 21 04/08/2021 02:15 PM    ALTSGPT 27 04/08/2021 02:15 PM    TBILIRUBIN 0.7 04/08/2021 02:15 PM    ALBUMIN 4.2 04/08/2021 02:15 PM    TOTPROTEIN 7.0 04/08/2021 02:15 PM    GLOBULIN 2.8 04/08/2021 02:15 PM    AGRATIO 1.5 04/08/2021 02:15 PM       Lab Results   Component Value Date/Time    SODIUM 139 06/03/2020 1117    POTASSIUM 3.9 06/03/2020 1117    CHLORIDE 104 06/03/2020 1117    CO2 23 06/03/2020 1117    GLUCOSE 95 06/03/2020 1117    BUN 19 06/03/2020 1117    CREATININE 0.62 06/03/2020 1117    CALCIUM 10.0 06/03/2020 1117    ANION 12.0 06/03/2020 1117       Lab Results   Component Value Date/Time    CHOLSTRLTOT 190 06/03/2020 1117    TRIGLYCERIDE 100 06/03/2020 1117    HDL 75 06/03/2020 1117    LDL 95 06/03/2020 1117       Lab Results   Component Value Date/Time    TSHULTRASEN 0.012 (L) 01/07/2021 1535     Lab Results   Component Value Date/Time    FREET4 2.10 (H) 01/07/2021 1535     Lab Results   Component Value Date/Time    FREET3 2.59 08/06/2020 1557     No results found for: THYSTIMIG    No results found for: MICROSOMALA      Imaging:      ASSESSMENT/PLAN:     1. Postoperative hypothyroidism  Unstable she has still  iatrogenic hyperthyroidism both clinically and biochemically  Adjust Synthroid to 75 mcg daily  Discussed with patient that we would like to avoid iatrogenic hyperthyroidism  Discussed the risk of iatrogenic hyperthyroidism such as bone loss and arrhythmias  I will see her again in 3 months with repeat of her TSH and free T4 levels    2. Vitamin D deficiency  Controlled continue current supplements repeat calcium and 25-hydroxy levels in 6 months    3. Vitamin B 12 deficiency  Controlled continue current supplements repeat B12 levels after 6 months      Return in about 4 months (around 8/21/2021).      Thank you kindly for allowing me to participate in the thyroid care plan for this patient.    Asad Mullen MD, [facility], [facility]  01/12/21    CC:   Charlee Walker M.D.

## 2021-06-04 ENCOUNTER — HOSPITAL ENCOUNTER (OUTPATIENT)
Dept: LAB | Facility: MEDICAL CENTER | Age: 71
End: 2021-06-04
Attending: FAMILY MEDICINE
Payer: MEDICARE

## 2021-06-04 DIAGNOSIS — I10 BENIGN ESSENTIAL HTN: ICD-10-CM

## 2021-06-04 DIAGNOSIS — Z78.0 MENOPAUSE: ICD-10-CM

## 2021-06-04 DIAGNOSIS — E78.00 PURE HYPERCHOLESTEROLEMIA: ICD-10-CM

## 2021-06-04 LAB
ALBUMIN SERPL BCP-MCNC: 4.1 G/DL (ref 3.2–4.9)
ALBUMIN/GLOB SERPL: 1.5 G/DL
ALP SERPL-CCNC: 72 U/L (ref 30–99)
ALT SERPL-CCNC: 28 U/L (ref 2–50)
ANION GAP SERPL CALC-SCNC: 11 MMOL/L (ref 7–16)
AST SERPL-CCNC: 29 U/L (ref 12–45)
BASOPHILS # BLD AUTO: 1.5 % (ref 0–1.8)
BASOPHILS # BLD: 0.11 K/UL (ref 0–0.12)
BILIRUB SERPL-MCNC: 0.6 MG/DL (ref 0.1–1.5)
BUN SERPL-MCNC: 20 MG/DL (ref 8–22)
CALCIUM SERPL-MCNC: 9.2 MG/DL (ref 8.5–10.5)
CHLORIDE SERPL-SCNC: 104 MMOL/L (ref 96–112)
CHOLEST SERPL-MCNC: 200 MG/DL (ref 100–199)
CO2 SERPL-SCNC: 25 MMOL/L (ref 20–33)
CREAT SERPL-MCNC: 0.63 MG/DL (ref 0.5–1.4)
CREAT UR-MCNC: 126.87 MG/DL
EOSINOPHIL # BLD AUTO: 1.23 K/UL (ref 0–0.51)
EOSINOPHIL NFR BLD: 16.4 % (ref 0–6.9)
ERYTHROCYTE [DISTWIDTH] IN BLOOD BY AUTOMATED COUNT: 45.2 FL (ref 35.9–50)
FASTING STATUS PATIENT QL REPORTED: NORMAL
GLOBULIN SER CALC-MCNC: 2.7 G/DL (ref 1.9–3.5)
GLUCOSE SERPL-MCNC: 117 MG/DL (ref 65–99)
HCT VFR BLD AUTO: 42.7 % (ref 37–47)
HDLC SERPL-MCNC: 79 MG/DL
HGB BLD-MCNC: 14.1 G/DL (ref 12–16)
IMM GRANULOCYTES # BLD AUTO: 0.03 K/UL (ref 0–0.11)
IMM GRANULOCYTES NFR BLD AUTO: 0.4 % (ref 0–0.9)
LDLC SERPL CALC-MCNC: 104 MG/DL
LYMPHOCYTES # BLD AUTO: 2 K/UL (ref 1–4.8)
LYMPHOCYTES NFR BLD: 26.7 % (ref 22–41)
MCH RBC QN AUTO: 30.3 PG (ref 27–33)
MCHC RBC AUTO-ENTMCNC: 33 G/DL (ref 33.6–35)
MCV RBC AUTO: 91.8 FL (ref 81.4–97.8)
MICROALBUMIN UR-MCNC: <1.2 MG/DL
MICROALBUMIN/CREAT UR: NORMAL MG/G (ref 0–30)
MONOCYTES # BLD AUTO: 0.68 K/UL (ref 0–0.85)
MONOCYTES NFR BLD AUTO: 9.1 % (ref 0–13.4)
NEUTROPHILS # BLD AUTO: 3.45 K/UL (ref 2–7.15)
NEUTROPHILS NFR BLD: 45.9 % (ref 44–72)
NRBC # BLD AUTO: 0 K/UL
NRBC BLD-RTO: 0 /100 WBC
PLATELET # BLD AUTO: 262 K/UL (ref 164–446)
PMV BLD AUTO: 11.1 FL (ref 9–12.9)
POTASSIUM SERPL-SCNC: 3.8 MMOL/L (ref 3.6–5.5)
PROT SERPL-MCNC: 6.8 G/DL (ref 6–8.2)
RBC # BLD AUTO: 4.65 M/UL (ref 4.2–5.4)
SODIUM SERPL-SCNC: 140 MMOL/L (ref 135–145)
TRIGL SERPL-MCNC: 85 MG/DL (ref 0–149)
WBC # BLD AUTO: 7.5 K/UL (ref 4.8–10.8)

## 2021-06-04 PROCEDURE — 82570 ASSAY OF URINE CREATININE: CPT

## 2021-06-04 PROCEDURE — 85025 COMPLETE CBC W/AUTO DIFF WBC: CPT

## 2021-06-04 PROCEDURE — 80053 COMPREHEN METABOLIC PANEL: CPT

## 2021-06-04 PROCEDURE — 36415 COLL VENOUS BLD VENIPUNCTURE: CPT | Mod: GA

## 2021-06-04 PROCEDURE — 82306 VITAMIN D 25 HYDROXY: CPT | Mod: GA

## 2021-06-04 PROCEDURE — 82043 UR ALBUMIN QUANTITATIVE: CPT

## 2021-06-04 PROCEDURE — 80061 LIPID PANEL: CPT

## 2021-06-07 ENCOUNTER — APPOINTMENT (OUTPATIENT)
Dept: RADIOLOGY | Facility: MEDICAL CENTER | Age: 71
End: 2021-06-07
Attending: NURSE PRACTITIONER
Payer: MEDICARE

## 2021-06-07 DIAGNOSIS — M54.6 THORACIC SPINE PAIN: ICD-10-CM

## 2021-06-07 LAB — 25(OH)D3 SERPL-MCNC: 64 NG/ML (ref 30–80)

## 2021-06-07 PROCEDURE — 72146 MRI CHEST SPINE W/O DYE: CPT | Mod: MH

## 2021-06-09 RX ORDER — COVID-19 MOLECULAR TEST ASSAY
KIT MISCELLANEOUS
COMMUNITY
Start: 2021-04-22 | End: 2021-06-10

## 2021-06-09 RX ORDER — HYDROCODONE BITARTRATE AND ACETAMINOPHEN 7.5; 325 MG/1; MG/1
TABLET ORAL PRN
COMMUNITY
Start: 2021-05-13 | End: 2022-09-16

## 2021-06-09 NOTE — PROGRESS NOTES
ESTABLISHED PATIENT PRE-VISIT PLANNING   Wednesday, 06/09/2021@2:17PM:    Phone Number Called: [phone] (home) [phone] (work)  Call outcome: Spoke to patient regarding message below.  Message: Advised office visit scheduled with , Thursday, 06/10/2021@ 11:00AM, [address]. Patient wants office visit.    Patient was contacted to complete PVP.     Note: Patient will not be contacted if there is no indication to call.     1.  Reviewed notes from the last few office visits within the medical group: Yes    2.  If any orders were placed at last visit or intended to be done for this visit (i.e. 6 mos follow-up), do we have Results/Consult Notes?         •  Labs - Labs ordered, completed on 06/04/2021 and results are in chart.  Note: If patient appointment is for lab review and patient did not complete labs, check with provider if OK to reschedule patient until labs completed.       •  Imaging - Imaging ordered, completed and results are in chart.       •  Referrals - No referrals were ordered at last office visit.    3. Is this appointment scheduled as a Hospital Follow-Up? No    4.  Immunizations were updated in Epic using Reconcile Outside Information activity? No. Initiated request for immunization update with [employer] registry. At time of this pre-visit planning note, wide spread system issues are occurring and I.T. is working to resolve. No response to request.    5.  Patient is due for the following Health Maintenance Topics:   Health Maintenance Due   Topic Date Due   • IMM ZOSTER VACCINES (2 of 3) 04/20/2011   • COLONOSCOPY  03/24/2021     6.  AHA (Pulse8) form printed for Provider? N/A

## 2021-06-10 ENCOUNTER — OFFICE VISIT (OUTPATIENT)
Dept: MEDICAL GROUP | Age: 71
End: 2021-06-10
Payer: MEDICARE

## 2021-06-10 VITALS
TEMPERATURE: 96.9 F | SYSTOLIC BLOOD PRESSURE: 110 MMHG | BODY MASS INDEX: 28.56 KG/M2 | OXYGEN SATURATION: 96 % | HEIGHT: 67 IN | HEART RATE: 60 BPM | WEIGHT: 182 LBS | DIASTOLIC BLOOD PRESSURE: 62 MMHG

## 2021-06-10 DIAGNOSIS — G89.29 LUMBOSACRAL PAIN, CHRONIC: ICD-10-CM

## 2021-06-10 DIAGNOSIS — E78.00 PURE HYPERCHOLESTEROLEMIA: Primary | ICD-10-CM

## 2021-06-10 DIAGNOSIS — R73.01 IMPAIRED FASTING BLOOD SUGAR: ICD-10-CM

## 2021-06-10 DIAGNOSIS — Z23 NEED FOR VACCINATION: ICD-10-CM

## 2021-06-10 DIAGNOSIS — I10 BENIGN ESSENTIAL HTN: ICD-10-CM

## 2021-06-10 DIAGNOSIS — G47.01 INSOMNIA DUE TO MEDICAL CONDITION: ICD-10-CM

## 2021-06-10 DIAGNOSIS — M54.50 LUMBOSACRAL PAIN, CHRONIC: ICD-10-CM

## 2021-06-10 PROCEDURE — 99214 OFFICE O/P EST MOD 30 MIN: CPT | Mod: 25 | Performed by: FAMILY MEDICINE

## 2021-06-10 PROCEDURE — 90471 IMMUNIZATION ADMIN: CPT | Performed by: FAMILY MEDICINE

## 2021-06-10 PROCEDURE — 90750 HZV VACC RECOMBINANT IM: CPT | Performed by: FAMILY MEDICINE

## 2021-06-10 RX ORDER — PREGABALIN 75 MG/1
75 CAPSULE ORAL 2 TIMES DAILY
Qty: 180 CAPSULE | Refills: 0 | Status: SHIPPED | OUTPATIENT
Start: 2021-06-10 | End: 2021-09-08

## 2021-06-10 RX ORDER — HYDROCHLOROTHIAZIDE 12.5 MG/1
12.5 TABLET ORAL DAILY
Qty: 90 TABLET | Refills: 3 | Status: SHIPPED | OUTPATIENT
Start: 2021-06-10 | End: 2021-11-17

## 2021-06-10 RX ORDER — ZOLPIDEM TARTRATE 5 MG/1
5 TABLET ORAL NIGHTLY PRN
Qty: 30 TABLET | Refills: 2 | Status: SHIPPED | OUTPATIENT
Start: 2021-06-13 | End: 2021-09-11

## 2021-06-10 ASSESSMENT — FIBROSIS 4 INDEX: FIB4 SCORE: 1.51

## 2021-06-10 NOTE — PROGRESS NOTES
Subjective:   CC: 6 months follow up     HPI:     Margoth Hopkins is a 70 y.o. female, established patient of the clinic, presents with the following concerns:     Pt has chronic hypertension, hyperlipidemia, insomnia, IFG, and chronic lower back pain. She tried multiple epidural injections, nerve blocks, ablations, but none appears to work. Nerve stimulator is being considered. She is taking Lyrica 50 mg qhs for pain. Her pain doctor recommended 100 mg of Lyrica, but she complained of oversedation with higher dose of Lyrica. She wants to try 75 mg qhs instead. She also takes Ambien 5 mg qhs for sleep. She tries to wean off without success. Chronic lower back pain causes insomnia.     Current medicines (including changes today)  Current Outpatient Medications   Medication Sig Dispense Refill   • pregabalin (LYRICA) 75 MG Cap Take 1 capsule by mouth 2 times a day for 90 days. 180 capsule 0   • hydroCHLOROthiazide (HYDRODIURIL) 12.5 MG tablet Take 1 tablet by mouth every day. 90 tablet 3   • [START ON 6/13/2021] zolpidem (AMBIEN) 5 MG Tab Take 1 tablet by mouth at bedtime as needed for Sleep for up to 90 days. 30 tablet 2   • HYDROcodone-acetaminophen (NORCO) 7.5-325 MG per tablet      • SYNTHROID 75 MCG Tab Take 1 tablet by mouth every morning on an empty stomach. 90 tablet 1   • lisinopril (PRINIVIL) 20 MG Tab TAKE 1 TABLET BY MOUTH  DAILY 90 tablet 3   • DULoxetine (CYMBALTA) 30 MG Cap DR Particles Take 30 mg by mouth 2 times a day.     • propranolol (INDERAL) 40 MG Tab Take 1 tablet by mouth 2 times a day. 180 tablet 3   • aspirin (ASA) 81 MG Chew Tab chewable tablet Chew 1 Tab every day. 100 Tab 0   • vitamin D, Ergocalciferol, (DRISDOL) 1.25 MG (64542 UT) Cap capsule TAKE 1 CAP BY MOUTH EVERY 7 DAYS. WITH FOOD.     • linaCLOtide (LINZESS) 72 MCG Cap Take 1 Cap by mouth every day. 90 Cap 3   • rosuvastatin (CRESTOR) 20 MG Tab Take 1 Tab by mouth every day. 90 Tab 3   • VITAMIN E PO Take  by mouth.     • COLLAGEN  "PO Take  by mouth.     • omeprazole (PRILOSEC) 20 MG delayed-release capsule every evening.     • Probiotic Product (PROBIOTIC DAILY PO) Take  by mouth every day.     • tizanidine (ZANAFLEX) 4 MG Tab TAKE 1/2 TO 2 TABS BY MOUTH 3 TIMES A DAY AS NEEDED FOR SPASMS  2     No current facility-administered medications for this visit.     She  has a past medical history of Anemia, Aortic insufficiency (01/31/2020), Arthritis, Cataract, Gastric ulcer, Goiter, High cholesterol, Hypertension, Hypothyroidism, Pain (01/31/2020), Pulmonary embolism (HCC) (01/31/2020), Reactive arthritis (HCC), Thyroid disease, and Thyroid disease (01/31/2020).    I personally reviewed patient's problem list, allergies, medications, family hx, social hx with patient and update EPIC.     REVIEW OF SYSTEMS:  CONSTITUTIONAL:  Denies night sweats, fatigue, malaise, lethargy, fever or chills.  RESPIRATORY:  Denies cough, wheeze, hemoptysis, or shortness of breath.  CARDIOVASCULAR:  Denies chest pains, palpitations, pedal edema     Objective:     /62 (BP Location: Left arm, Patient Position: Sitting, BP Cuff Size: Adult)   Pulse 60   Temp 36.1 °C (96.9 °F) (Temporal)   Ht 1.702 m (5' 7\")   Wt 82.6 kg (182 lb)   SpO2 96%  Body mass index is 28.51 kg/m².    Physical Exam:  Constitutional: awake, alert, in no distress.  Skin: Warm, dry, good turgor, no rashes, bruises, ulcers in visible areas.  Eye: conjunctiva clear, lids neg for edema or lesions.  ENMT: TM and auditory canals wnl.    Neck: Trachea midline, no masses, no thyromegaly. No cervical or supraclavicular lymphadenopathy  Respiratory: Unlabored respiratory effort, lungs clear to auscultation, no wheezes, no rales.  Cardiovascular: Normal S1, S2, no murmur, no pedal edema.  Psych: Oriented x3, affect and mood wnl, intact judgement and insight.       Assessment and Plan:   The following treatment plan was discussed    1. Lumbosacral pain, chronic, with right sciatica_ sweet " water  Chronic, working with pain management, taking Lyrica 50 mg qhs for pain currently, but still has pain. Her pain doctor recommended Lyrica 100 mg, which caused over sedation in the morning. Will start a trial of 75 mg qhs.   - pregabalin (LYRICA) 75 MG Cap; Take 1 capsule by mouth 2 times a day for 90 days.  Dispense: 180 capsule; Refill: 0  - Risks, benefits, side effects, as well as potential health complications associated with Lyrica was previously discussed with patient. Appropriate counseling provided.    - cont to follow up with pain management, nerve stimulator is being planned.     2. Benign essential HTN  Chronic, currently taking Chlorthalidone 25 mg and Lisinopril 20 mg qd. Both home and in-clinic BP is low. She does have intermittent dizziness with standing.   - discontinued Chlorthalidone.   - Start hydroCHLOROthiazide (HYDRODIURIL) 12.5 MG tablet; Take 1 tablet by mouth every day.  Dispense: 90 tablet; Refill: 3  - cont Lisinopril 20 mg qd.   - Comp Metabolic Panel; Future    3. Pure hypercholesterolemia  Chronic, taking Crestor 20 mg qd. Most recently labs showed mild worsening lipid profile, but not severe.   - cont Crestor 20 mg qhs  - dietary modification, exercise, and weight loss.   - avoid alcohol, drugs, tobacco products   - Lipid Profile; Future    4. Postoperative hypothyroidism_s/p thyroidectomy_Dr. Mullen  F/u with Dr. Mullen.     5. Insomnia due to medical condition  - zolpidem (AMBIEN) 5 MG Tab; Take 1 tablet by mouth at bedtime as needed for Sleep for up to 90 days.  Dispense: 30 tablet; Refill: 2  - Risks, benefits, side effects, as well as potential health complications associated with Ambien was previously discussed with patient. Appropriate counseling provided.      6. Impaired fasting blood sugar  - HEMOGLOBIN A1C; Future    7. Need for vaccination  - Shingles Vaccine (SHINGRIX); Future      Ly SPENCER Walker M.D.      Followup: Return in about 6 months (around 12/10/2021) for  Annual wellness visit.    Please note that this dictation was created using voice recognition software. I have made every reasonable attempt to correct obvious errors, but I expect that there are errors of grammar and possibly content that I did not discover before finalizing the note.

## 2021-07-30 ENCOUNTER — OFFICE VISIT (OUTPATIENT)
Dept: URGENT CARE | Facility: CLINIC | Age: 71
End: 2021-07-30
Payer: MEDICARE

## 2021-07-30 ENCOUNTER — HOSPITAL ENCOUNTER (OUTPATIENT)
Facility: MEDICAL CENTER | Age: 71
End: 2021-07-30
Attending: PHYSICIAN ASSISTANT
Payer: MEDICARE

## 2021-07-30 VITALS
HEART RATE: 58 BPM | HEIGHT: 67 IN | TEMPERATURE: 97 F | OXYGEN SATURATION: 99 % | RESPIRATION RATE: 16 BRPM | WEIGHT: 182 LBS | DIASTOLIC BLOOD PRESSURE: 72 MMHG | BODY MASS INDEX: 28.56 KG/M2 | SYSTOLIC BLOOD PRESSURE: 154 MMHG

## 2021-07-30 DIAGNOSIS — N30.01 ACUTE CYSTITIS WITH HEMATURIA: ICD-10-CM

## 2021-07-30 LAB
APPEARANCE UR: CLEAR
BILIRUB UR STRIP-MCNC: NEGATIVE MG/DL
COLOR UR AUTO: YELLOW
GLUCOSE UR STRIP.AUTO-MCNC: NEGATIVE MG/DL
KETONES UR STRIP.AUTO-MCNC: NEGATIVE MG/DL
LEUKOCYTE ESTERASE UR QL STRIP.AUTO: NORMAL
NITRITE UR QL STRIP.AUTO: NEGATIVE
PH UR STRIP.AUTO: 6.5 [PH] (ref 5–8)
PROT UR QL STRIP: NEGATIVE MG/DL
RBC UR QL AUTO: NORMAL
SP GR UR STRIP.AUTO: 1.01
UROBILINOGEN UR STRIP-MCNC: 0.2 MG/DL

## 2021-07-30 PROCEDURE — 81002 URINALYSIS NONAUTO W/O SCOPE: CPT | Performed by: PHYSICIAN ASSISTANT

## 2021-07-30 PROCEDURE — 87086 URINE CULTURE/COLONY COUNT: CPT

## 2021-07-30 PROCEDURE — 99214 OFFICE O/P EST MOD 30 MIN: CPT | Performed by: PHYSICIAN ASSISTANT

## 2021-07-30 RX ORDER — SULFAMETHOXAZOLE AND TRIMETHOPRIM 800; 160 MG/1; MG/1
1 TABLET ORAL EVERY 12 HOURS
Qty: 10 TABLET | Refills: 0 | Status: SHIPPED | OUTPATIENT
Start: 2021-07-30 | End: 2021-08-04

## 2021-07-30 RX ORDER — PHENAZOPYRIDINE HYDROCHLORIDE 200 MG/1
200 TABLET, FILM COATED ORAL 3 TIMES DAILY
Qty: 6 TABLET | Refills: 0 | Status: SHIPPED | OUTPATIENT
Start: 2021-07-30 | End: 2021-08-01

## 2021-07-30 ASSESSMENT — ENCOUNTER SYMPTOMS
BACK PAIN: 1
CHILLS: 0
FLANK PAIN: 0
FEVER: 0

## 2021-07-30 ASSESSMENT — FIBROSIS 4 INDEX: FIB4 SCORE: 1.51

## 2021-07-30 NOTE — PROGRESS NOTES
Subjective:   Margoth Hopkins is a 70 y.o. female who presents today with   Chief Complaint   Patient presents with   • Dysuria     x 4 days       Dysuria   This is a new problem. Episode onset: 4 days. The problem occurs every urination. The problem has been unchanged. The quality of the pain is described as burning. The pain is mild. There is no history of pyelonephritis. Associated symptoms include frequency and urgency. Pertinent negatives include no chills, flank pain or hematuria. She has tried nothing for the symptoms. The treatment provided no relief.     History of UTIs but no history of kidney stones or kidney infections.  PMH:  has a past medical history of Anemia, Aortic insufficiency (01/31/2020), Arthritis, Cataract, Gastric ulcer, Goiter, High cholesterol, Hypertension, Hypothyroidism, Pain (01/31/2020), Pulmonary embolism (HCC) (01/31/2020), Reactive arthritis (HCC), Thyroid disease, and Thyroid disease (01/31/2020).  MEDS:   Current Outpatient Medications:   •  sulfamethoxazole-trimethoprim (BACTRIM DS) 800-160 MG tablet, Take 1 tablet by mouth every 12 hours for 5 days., Disp: 10 tablet, Rfl: 0  •  phenazopyridine (PYRIDIUM) 200 MG Tab, Take 1 tablet by mouth 3 times a day for 2 days., Disp: 6 tablet, Rfl: 0  •  pregabalin (LYRICA) 75 MG Cap, Take 1 capsule by mouth 2 times a day for 90 days., Disp: 180 capsule, Rfl: 0  •  hydroCHLOROthiazide (HYDRODIURIL) 12.5 MG tablet, Take 1 tablet by mouth every day., Disp: 90 tablet, Rfl: 3  •  zolpidem (AMBIEN) 5 MG Tab, Take 1 tablet by mouth at bedtime as needed for Sleep for up to 90 days., Disp: 30 tablet, Rfl: 2  •  HYDROcodone-acetaminophen (NORCO) 7.5-325 MG per tablet, , Disp: , Rfl:   •  SYNTHROID 75 MCG Tab, Take 1 tablet by mouth every morning on an empty stomach., Disp: 90 tablet, Rfl: 1  •  lisinopril (PRINIVIL) 20 MG Tab, TAKE 1 TABLET BY MOUTH  DAILY, Disp: 90 tablet, Rfl: 3  •  DULoxetine (CYMBALTA) 30 MG Cap DR Particles, Take 30 mg by  mouth 2 times a day., Disp: , Rfl:   •  propranolol (INDERAL) 40 MG Tab, Take 1 tablet by mouth 2 times a day., Disp: 180 tablet, Rfl: 3  •  aspirin (ASA) 81 MG Chew Tab chewable tablet, Chew 1 Tab every day., Disp: 100 Tab, Rfl: 0  •  vitamin D, Ergocalciferol, (DRISDOL) 1.25 MG (61268 UT) Cap capsule, TAKE 1 CAP BY MOUTH EVERY 7 DAYS. WITH FOOD., Disp: , Rfl:   •  linaCLOtide (LINZESS) 72 MCG Cap, Take 1 Cap by mouth every day., Disp: 90 Cap, Rfl: 3  •  rosuvastatin (CRESTOR) 20 MG Tab, Take 1 Tab by mouth every day., Disp: 90 Tab, Rfl: 3  •  VITAMIN E PO, Take  by mouth., Disp: , Rfl:   •  COLLAGEN PO, Take  by mouth., Disp: , Rfl:   •  omeprazole (PRILOSEC) 20 MG delayed-release capsule, every evening., Disp: , Rfl:   •  Probiotic Product (PROBIOTIC DAILY PO), Take  by mouth every day., Disp: , Rfl:   •  tizanidine (ZANAFLEX) 4 MG Tab, TAKE 1/2 TO 2 TABS BY MOUTH 3 TIMES A DAY AS NEEDED FOR SPASMS, Disp: , Rfl: 2  ALLERGIES:   Allergies   Allergen Reactions   • Morphine Vomiting   • Nsaids Unspecified     History of gastric ulcers - cannot take NSAIDS   • Pcn [Penicillins] Hives   • Gabapentin      Cognitive changes    • Seasonal      SURGHX:   Past Surgical History:   Procedure Laterality Date   • THYROIDECTOMY TOTAL Bilateral 2/5/2020    Procedure: THYROIDECTOMY, TOTAL- COMPLETE;  Surgeon: Cydney Franklin M.D.;  Location: SURGERY SAME DAY [facility];  Service: General   • APPENDECTOMY     • BLADDER SLING FEMALE     • CATARACT EXTRACTION WITH IOL Left    • CATARACT EXTRACTION WITH IOL Bilateral    • CERVICAL FUSION POSTERIOR     • FOOT SURGERY Left     metatarsal fusion, hammer toes correction   • FUSION      disc fusion   • GYN SURGERY      Hysterectomy   • KNEE ARTHROPLASTY TOTAL Right     x3   • KNEE REPLACEMENT, TOTAL Right    • KNEE REVISION TOTAL Right      SOCHX:  reports that she has never smoked. She has never used smokeless tobacco. She reports current alcohol use of about 4.2 oz of alcohol per  "week. She reports previous drug use. Drug: Oral.  FH: Reviewed with patient, not pertinent to this visit.       Review of Systems   Constitutional: Negative for chills and fever.   Genitourinary: Positive for dysuria, frequency and urgency. Negative for flank pain and hematuria.   Musculoskeletal: Positive for back pain.        Objective:   /72 (BP Location: Right arm, Patient Position: Sitting, BP Cuff Size: Adult)   Pulse (!) 58   Temp 36.1 °C (97 °F) (Temporal)   Resp 16   Ht 1.702 m (5' 7\")   Wt 82.6 kg (182 lb)   SpO2 99%   BMI 28.51 kg/m²   Physical Exam  Vitals and nursing note reviewed.   Constitutional:       Appearance: Normal appearance. She is well-developed. She is not ill-appearing or toxic-appearing.   HENT:      Head: Normocephalic and atraumatic.      Right Ear: Hearing normal.      Left Ear: Hearing normal.   Eyes:      Conjunctiva/sclera: Conjunctivae normal.   Cardiovascular:      Rate and Rhythm: Normal rate.   Pulmonary:      Effort: Pulmonary effort is normal.   Abdominal:      Tenderness: There is abdominal tenderness in the suprapubic area. There is right CVA tenderness (Minimal). There is no left CVA tenderness.   Genitourinary:     Comments: Patient deferred  exam  Musculoskeletal:      Comments: Normal movement in all 4 extremities   Skin:     General: Skin is warm and dry.   Neurological:      Mental Status: She is alert.      Coordination: Coordination normal.   Psychiatric:         Mood and Affect: Mood normal.         UA consistent with UTI  Assessment/Plan:   Assessment    1. Acute cystitis with hematuria  - Urine Culture; Future  - sulfamethoxazole-trimethoprim (BACTRIM DS) 800-160 MG tablet; Take 1 tablet by mouth every 12 hours for 5 days.  Dispense: 10 tablet; Refill: 0  - phenazopyridine (PYRIDIUM) 200 MG Tab; Take 1 tablet by mouth 3 times a day for 2 days.  Dispense: 6 tablet; Refill: 0  - POCT Urinalysis  Symptoms and presentation are most consistent with " urinary tract infection at this time and will treat accordingly with antibiotics.  Discussed red flag signs and if any worsening symptoms she should return or go to the ER.  Continue monitoring blood pressure and follow-up with primary care as needed.  Differential diagnosis, natural history, supportive care, and indications for immediate follow-up discussed.   Patient given instructions and understanding of medications and treatment.    If not improving in 3-5 days, F/U with PCP or return to  if symptoms worsen.    Patient agreeable to plan.  Greater than 30 minutes were spent reviewing patient's chart, examining and obtaining history from patient, and discussing plan of care.     Please note that this dictation was created using voice recognition software. I have made every reasonable attempt to correct obvious errors, but I expect that there are errors of grammar and possibly content that I did not discover before finalizing the note.    Tonio Butler PA-C

## 2021-08-02 ENCOUNTER — OFFICE VISIT (OUTPATIENT)
Dept: CARDIOLOGY | Facility: MEDICAL CENTER | Age: 71
End: 2021-08-02
Payer: MEDICARE

## 2021-08-02 VITALS
DIASTOLIC BLOOD PRESSURE: 70 MMHG | WEIGHT: 180 LBS | OXYGEN SATURATION: 93 % | RESPIRATION RATE: 16 BRPM | SYSTOLIC BLOOD PRESSURE: 118 MMHG | HEIGHT: 67 IN | BODY MASS INDEX: 28.25 KG/M2 | HEART RATE: 66 BPM

## 2021-08-02 DIAGNOSIS — R00.2 PALPITATIONS: ICD-10-CM

## 2021-08-02 DIAGNOSIS — R06.02 SHORTNESS OF BREATH: ICD-10-CM

## 2021-08-02 LAB
BACTERIA UR CULT: NORMAL
SIGNIFICANT IND 70042: NORMAL
SITE SITE: NORMAL
SOURCE SOURCE: NORMAL

## 2021-08-02 PROCEDURE — 99214 OFFICE O/P EST MOD 30 MIN: CPT | Performed by: INTERNAL MEDICINE

## 2021-08-02 RX ORDER — DIAZEPAM 10 MG/1
TABLET ORAL
COMMUNITY
Start: 2021-07-01 | End: 2021-10-18

## 2021-08-02 ASSESSMENT — ENCOUNTER SYMPTOMS
SHORTNESS OF BREATH: 1
DIZZINESS: 0
LOSS OF CONSCIOUSNESS: 0
ABDOMINAL PAIN: 0
DEPRESSION: 0
PND: 0
PALPITATIONS: 1
FALLS: 0
ORTHOPNEA: 0

## 2021-08-02 ASSESSMENT — FIBROSIS 4 INDEX: FIB4 SCORE: 1.51

## 2021-08-02 NOTE — PROGRESS NOTES
Chief Complaint   Patient presents with   • HTN (Controlled)       Subjective:   Margoth Hopkins is a 70-year-old female presenting to clinic for evaluation of dyspnea.    Pertinent history:  Hypertension  Hyperlipidemia  History of goiter status post total thyroidectomy  History of GI bleed secondary to gastric ulcers, around 2016 or 2017  Patient has a Schatzki's ring and gets regular dilatations, most recent in January 2020      Patient reports occasionally when she is exerting herself that she has to gasp for air.  No associated chest discomfort or palpitations.    About 2-3 times a week she reports having palpitations that occurs with any clear triggers and usually spontaneously resolves within 2 to 3 minutes.  It is not related to her dyspnea.    Past Medical History:   Diagnosis Date   • Anemia     H/O   • Aortic insufficiency 01/31/2020    Pt. states this is mild and does not have any signs or symptoms.   • Arthritis     Osteo-Right Knee   • Cataract     IOL OU   • Gastric ulcer    • Goiter    • High cholesterol    • Hypertension    • Hypothyroidism    • Pain 01/31/2020    Right Knee & Lower Back   • Pulmonary embolism (HCC) 01/31/2020    Pt. states after joint replacement in 7-2018.   • Reactive arthritis (HCC)    • Thyroid disease     hasimotos   • Thyroid disease 01/31/2020    Thyroid Nodules     Past Surgical History:   Procedure Laterality Date   • THYROIDECTOMY TOTAL Bilateral 2/5/2020    Procedure: THYROIDECTOMY, TOTAL- COMPLETE;  Surgeon: Cydney Franklin M.D.;  Location: SURGERY SAME DAY [facility];  Service: General   • APPENDECTOMY     • BLADDER SLING FEMALE     • CATARACT EXTRACTION WITH IOL Left    • CATARACT EXTRACTION WITH IOL Bilateral    • CERVICAL FUSION POSTERIOR     • FOOT SURGERY Left     metatarsal fusion, hammer toes correction   • FUSION      disc fusion   • GYN SURGERY      Hysterectomy   • KNEE ARTHROPLASTY TOTAL Right     x3   • KNEE REPLACEMENT, TOTAL Right    • KNEE  REVISION TOTAL Right      Family History   Problem Relation Age of Onset   • Arthritis Mother    • Alzheimer's Disease Mother    • Cancer Mother         cervical cancer   • Heart Attack Father    • Other Brother         brain anurism   • Hypertension Brother    • Cancer Maternal Grandmother 66        colono cancer   • Heart Disease Brother    • Hypertension Brother    • Arthritis Brother    • Other Brother         Celiac Disease   • No Known Problems Maternal Grandfather    • No Known Problems Paternal Grandmother    • No Known Problems Paternal Grandfather    • Hypertension Brother    • Arthritis Brother    • Lung Disease Brother    • Asthma Brother      Social History     Socioeconomic History   • Marital status:      Spouse name: Not on file   • Number of children: Not on file   • Years of education: Not on file   • Highest education level: Not on file   Occupational History   • Not on file   Tobacco Use   • Smoking status: Never Smoker   • Smokeless tobacco: Never Used   Vaping Use   • Vaping Use: Never used   Substance and Sexual Activity   • Alcohol use: Yes     Alcohol/week: 4.2 oz     Types: 7 Standard drinks or equivalent per week     Comment: 3-5/day   • Drug use: Not Currently     Types: Oral     Comment: CBD ingested   • Sexual activity: Yes     Partners: Male   Other Topics Concern   • Not on file   Social History Narrative   • Not on file     Social Determinants of Health     Financial Resource Strain:    • Difficulty of Paying Living Expenses:    Food Insecurity:    • Worried About Running Out of Food in the Last Year:    • Ran Out of Food in the Last Year:    Transportation Needs:    • Lack of Transportation (Medical):    • Lack of Transportation (Non-Medical):    Physical Activity:    • Days of Exercise per Week:    • Minutes of Exercise per Session:    Stress:    • Feeling of Stress :    Social Connections:    • Frequency of Communication with Friends and Family:    • Frequency of Social  Gatherings with Friends and Family:    • Attends [employer] Services:    • Active Member of Clubs or Organizations:    • Attends Club or Organization Meetings:    • Marital Status:    Intimate Partner Violence:    • Fear of Current or Ex-Partner:    • Emotionally Abused:    • Physically Abused:    • Sexually Abused:      Allergies   Allergen Reactions   • Morphine Vomiting   • Nsaids Unspecified     History of gastric ulcers - cannot take NSAIDS   • Pcn [Penicillins] Hives   • Gabapentin      Cognitive changes    • Seasonal      Outpatient Encounter Medications as of 8/2/2021   Medication Sig Dispense Refill   • diazepam (VALIUM) 10 MG tablet TAKE 1 TABLET BY MOUTH THE NIGHT BEFORE DENTAL APPOINTMENT, THEN 1 TABLET BY MOUTH 1 HOUR BEFORE PROCEDURE.     • sulfamethoxazole-trimethoprim (BACTRIM DS) 800-160 MG tablet Take 1 tablet by mouth every 12 hours for 5 days. 10 tablet 0   • pregabalin (LYRICA) 75 MG Cap Take 1 capsule by mouth 2 times a day for 90 days. (Patient taking differently: Take 75 mg by mouth every day.) 180 capsule 0   • hydroCHLOROthiazide (HYDRODIURIL) 12.5 MG tablet Take 1 tablet by mouth every day. 90 tablet 3   • zolpidem (AMBIEN) 5 MG Tab Take 1 tablet by mouth at bedtime as needed for Sleep for up to 90 days. 30 tablet 2   • HYDROcodone-acetaminophen (NORCO) 7.5-325 MG per tablet      • SYNTHROID 75 MCG Tab Take 1 tablet by mouth every morning on an empty stomach. 90 tablet 1   • lisinopril (PRINIVIL) 20 MG Tab TAKE 1 TABLET BY MOUTH  DAILY 90 tablet 3   • DULoxetine (CYMBALTA) 30 MG Cap DR Particles Take 30 mg by mouth 2 times a day.     • propranolol (INDERAL) 40 MG Tab Take 1 tablet by mouth 2 times a day. 180 tablet 3   • aspirin (ASA) 81 MG Chew Tab chewable tablet Chew 1 Tab every day. 100 Tab 0   • vitamin D, Ergocalciferol, (DRISDOL) 1.25 MG (59173 UT) Cap capsule TAKE 1 CAP BY MOUTH EVERY 7 DAYS. WITH FOOD.     • linaCLOtide (LINZESS) 72 MCG Cap Take 1 Cap by mouth every day. 90 Cap 3  "  • rosuvastatin (CRESTOR) 20 MG Tab Take 1 Tab by mouth every day. 90 Tab 3   • VITAMIN E PO Take  by mouth.     • COLLAGEN PO Take  by mouth.     • omeprazole (PRILOSEC) 20 MG delayed-release capsule every day.     • tizanidine (ZANAFLEX) 4 MG Tab TAKE 1/2 TO 2 TABS BY MOUTH 3 TIMES A DAY AS NEEDED FOR SPASMS  2   • Probiotic Product (PROBIOTIC DAILY PO) Take  by mouth every day. (Patient not taking: Reported on 8/2/2021)       No facility-administered encounter medications on file as of 8/2/2021.     Review of Systems   Constitutional: Negative for malaise/fatigue.   Respiratory: Positive for shortness of breath.    Cardiovascular: Positive for palpitations. Negative for chest pain, orthopnea, leg swelling and PND.   Gastrointestinal: Negative for abdominal pain.   Musculoskeletal: Negative for falls.   Neurological: Negative for dizziness and loss of consciousness.   Psychiatric/Behavioral: Negative for depression.   All other systems reviewed and are negative.       Objective:   /70 (BP Location: Left arm, Patient Position: Sitting, BP Cuff Size: Adult)   Pulse 66   Resp 16   Ht 1.702 m (5' 7\")   Wt 81.6 kg (180 lb)   SpO2 93%   BMI 28.19 kg/m²     Physical Exam   Constitutional: She is oriented to person, place, and time. She appears well-developed. No distress.   HENT:   Head: Normocephalic and atraumatic.   Eyes: Conjunctivae are normal.   Musculoskeletal:      Cervical back: Neck supple.   Neurological: She is alert and oriented to person, place, and time.   Skin: No rash noted. She is not diaphoretic.   Psychiatric: Her behavior is normal. Judgment and thought content normal.   Nursing note and vitals reviewed.    Echocardiogram performed December 2020 showed normal LV function.  RVSP 37 mmHg.    Myocardial perfusion study performed December 2020 showed no reversible defects.  Normal LV function.    Coronary calcium scoring CT performed in January 2021.  Report was reviewed and showed a total " score of 264.    Assessment:     1. Shortness of breath  REFERRAL TO PULMONARY AND SLEEP MEDICINE   2. Palpitations       Medical Decision Making:  Today's Assessment / Status / Plan:     Patient reports having palpitations.  We discussed an ambulatory EKG monitor which the patient would like to defer for now.  We have also discussed lifestyle modification like drinking a lot of water which the patient is interested in.  If she has persistent symptoms, she will let us know.    For her dyspnea, her cardiac work-up has been unremarkable recently.  We discussed a pulmonary referral which the patient is interested in.  Referral placed today.    Hypertension is well controlled.  Continue lisinopril hydrochlorothiazide at current dose.    For hyperlipidemia continue Crestor.    Return to clinic in 6 months or earlier if needed.    Thank you for allowing me to participate in the care of this patient. Please do not hesitate to contact me with any questions.    Bianca Bermudez MD, [facility]  Cardiologist  [facility]    PLEASE NOTE: This dictation was created using voice recognition software.

## 2021-08-17 ENCOUNTER — HOSPITAL ENCOUNTER (OUTPATIENT)
Dept: LAB | Facility: MEDICAL CENTER | Age: 71
End: 2021-08-17
Attending: INTERNAL MEDICINE
Payer: MEDICARE

## 2021-08-17 DIAGNOSIS — E53.8 VITAMIN B 12 DEFICIENCY: ICD-10-CM

## 2021-08-17 DIAGNOSIS — E55.9 VITAMIN D DEFICIENCY: ICD-10-CM

## 2021-08-17 DIAGNOSIS — E89.0 POSTOPERATIVE HYPOTHYROIDISM: ICD-10-CM

## 2021-08-17 LAB
25(OH)D3 SERPL-MCNC: 43 NG/ML (ref 30–100)
ALBUMIN SERPL BCP-MCNC: 4.2 G/DL (ref 3.2–4.9)
ALBUMIN/GLOB SERPL: 1.6 G/DL
ALP SERPL-CCNC: 74 U/L (ref 30–99)
ALT SERPL-CCNC: 21 U/L (ref 2–50)
ANION GAP SERPL CALC-SCNC: 14 MMOL/L (ref 7–16)
AST SERPL-CCNC: 23 U/L (ref 12–45)
BILIRUB SERPL-MCNC: 0.6 MG/DL (ref 0.1–1.5)
BUN SERPL-MCNC: 19 MG/DL (ref 8–22)
CALCIUM SERPL-MCNC: 9.5 MG/DL (ref 8.5–10.5)
CHLORIDE SERPL-SCNC: 104 MMOL/L (ref 96–112)
CO2 SERPL-SCNC: 23 MMOL/L (ref 20–33)
CREAT SERPL-MCNC: 0.66 MG/DL (ref 0.5–1.4)
FASTING STATUS PATIENT QL REPORTED: NORMAL
GLOBULIN SER CALC-MCNC: 2.6 G/DL (ref 1.9–3.5)
GLUCOSE SERPL-MCNC: 85 MG/DL (ref 65–99)
POTASSIUM SERPL-SCNC: 4.1 MMOL/L (ref 3.6–5.5)
PROT SERPL-MCNC: 6.8 G/DL (ref 6–8.2)
SODIUM SERPL-SCNC: 141 MMOL/L (ref 135–145)
T4 FREE SERPL-MCNC: 1.32 NG/DL (ref 0.93–1.7)
TSH SERPL DL<=0.005 MIU/L-ACNC: 0.37 UIU/ML (ref 0.38–5.33)
VIT B12 SERPL-MCNC: 500 PG/ML (ref 211–911)

## 2021-08-17 PROCEDURE — 82306 VITAMIN D 25 HYDROXY: CPT

## 2021-08-17 PROCEDURE — 36415 COLL VENOUS BLD VENIPUNCTURE: CPT

## 2021-08-17 PROCEDURE — 84443 ASSAY THYROID STIM HORMONE: CPT

## 2021-08-17 PROCEDURE — 82607 VITAMIN B-12: CPT

## 2021-08-17 PROCEDURE — 80053 COMPREHEN METABOLIC PANEL: CPT

## 2021-08-17 PROCEDURE — 84439 ASSAY OF FREE THYROXINE: CPT

## 2021-08-24 ENCOUNTER — OFFICE VISIT (OUTPATIENT)
Dept: ENDOCRINOLOGY | Facility: MEDICAL CENTER | Age: 71
End: 2021-08-24
Attending: INTERNAL MEDICINE
Payer: MEDICARE

## 2021-08-24 VITALS
WEIGHT: 182 LBS | BODY MASS INDEX: 28.56 KG/M2 | OXYGEN SATURATION: 97 % | HEIGHT: 67 IN | HEART RATE: 65 BPM | RESPIRATION RATE: 15 BRPM | DIASTOLIC BLOOD PRESSURE: 76 MMHG | SYSTOLIC BLOOD PRESSURE: 116 MMHG

## 2021-08-24 DIAGNOSIS — E53.8 VITAMIN B 12 DEFICIENCY: ICD-10-CM

## 2021-08-24 DIAGNOSIS — E55.9 VITAMIN D DEFICIENCY: ICD-10-CM

## 2021-08-24 DIAGNOSIS — E89.0 POSTOPERATIVE HYPOTHYROIDISM: ICD-10-CM

## 2021-08-24 PROCEDURE — 99214 OFFICE O/P EST MOD 30 MIN: CPT | Performed by: INTERNAL MEDICINE

## 2021-08-24 PROCEDURE — 99211 OFF/OP EST MAY X REQ PHY/QHP: CPT | Performed by: INTERNAL MEDICINE

## 2021-08-24 RX ORDER — LEVOTHYROXINE SODIUM 75 MCG
75 TABLET ORAL
Qty: 90 TABLET | Refills: 3 | Status: SHIPPED | OUTPATIENT
Start: 2021-08-24 | End: 2021-12-12 | Stop reason: SDUPTHER

## 2021-08-24 RX ORDER — ERGOCALCIFEROL 1.25 MG/1
50000 CAPSULE ORAL
Qty: 12 CAPSULE | Refills: 3 | Status: SHIPPED | OUTPATIENT
Start: 2021-08-24 | End: 2021-12-12 | Stop reason: SDUPTHER

## 2021-08-24 RX ORDER — LEVOTHYROXINE SODIUM 75 MCG
75 TABLET ORAL
Qty: 90 TABLET | Refills: 2 | Status: SHIPPED | OUTPATIENT
Start: 2021-08-24 | End: 2021-08-24 | Stop reason: SDUPTHER

## 2021-08-24 ASSESSMENT — FIBROSIS 4 INDEX: FIB4 SCORE: 1.38

## 2021-08-24 NOTE — PROGRESS NOTES
Chief Complaint: Follow up for  Postsurgical hypothyroidism.      HPI:     Margoth Hopkins is a 70 y.o. female here for follow up of postsurgical hypothyroidism, she had a total thyroidectomy on February 5, 2020 with benign findings    Comorbid issues include osteopenia managed by primary care  For unclear reasons her previous endocrinologist was keeping her TSH suppressed despite her history of osteopenia  She was previously on 100 mcg of Synthroid and TSH was undetectable and free T4 levels were elevated      Since last visit patient reports feeling poor.  She remains on Synthroid 75 MCG daily which has been her dose for the past 3 months.   She reports excellent compliance and denies missing any daily doses.   She takes thyroid hormone prior to breakfast.   She  denies taking any iron, calcium supplements or antacids.      Weight has been stable    She no longer has palpitations  Her energy levels are good    Her TSH is 0.370 with a free T4 of 1.32 on August 2021    She takes ergocalciferol 50,000 units weekly  Vitamin D was 43 on August 2021 with a calcium of 9.5 and serum creatinine of 0.66    B12 is 500      She has osteopenia based upon her bone density showed the lowest T score of -1.2 for the lumbar spine on March 29, 2021  She is currently not on antiresorptive therapy  She exercises regularly and denies interval falls and fractures      Patient's medications, allergies, and social histories were reviewed and updated as appropriate.      ROS:     CONS:     No fever, no chills   EYES:     No diplopia, no blurry vision   CV:           No chest pain, no palpitations   PULM:     No SOB, no cough, no hemoptysis.   GI:            No nausea, no vomiting, no diarrhea, no constipation   ENDO:     No polyuria, no polydipsia, no heat intolerance, no cold intolerance       Past Medical History:  Problem List:  2021-03: Osteopenia of multiple sites  2020-06: Postoperative hypothyroidism_s/p thyroidectomy_  Mullen  2020-06: Generalized osteoarthritis  2020-06: Drug induced constipation  2020-02: Hashimoto's thyroiditis s/p total thyroidectom Dr. Franklin in   2/2020.   2019-10: Essential tremor  2019-08: Malaise  2019-08: Aortic valve sclerosis- without stenosis 2018 echo  2019-08: Mild aortic insufficiency- 2018 echo  2019-05: Nocturnal leg cramps  2018-10: Calculus of gallbladder with biliary obstruction but without   cholecystitis  2018-07: Chronic use of opiate drug for therapeutic purpose_contract   signed 6/2020, UDS consistent in 9/2020  2018-07: H/O right knee surgery  2018-07: Acute respiratory failure with hypoxia (HCC)  2018-07: Bilateral pulmonary embolism, provoked  2018-07: Pulmonary infarct (HCC)  2018-03: Chronic headache  2017-07: Risk for falls  2017-06: Vitiligo  2017-06: Chest pain  2017-02: FHx: colon cancer  2017-02: Schatzki's ring_DHA  2017-02: Benign essential HTN  2017-02: Pure hypercholesterolemia  2017-02: Migraine without aura, not intractable  2017-02: MOHAMUD (generalized anxiety disorder)  2017-02: Hypothyroidism due to Hashimoto's thyroiditis  2017-02: Multinodular goiter (nontoxic), s/p FNA in 12/2017, TR4 x1,   repeat US in 6/2020 2017-02: HLA B27 (HLA B27 positive)  2017-02: Primary osteoarthritis of right knee, S/P TKR + 2 revision   surgeries_Dr. Lomeli  2017-02: Lumbosacral pain, chronic, with right sciatica_ [place]  2017-02: Insomnia  2017-02: Risk for falls  2017-02: Vitamin D insufficiency      Past Surgical History:  Past Surgical History:   Procedure Laterality Date   • THYROIDECTOMY TOTAL Bilateral 2/5/2020    Procedure: THYROIDECTOMY, TOTAL- COMPLETE;  Surgeon: Cydney Franklin M.D.;  Location: SURGERY SAME DAY [facility];  Service: General   • APPENDECTOMY     • BLADDER SLING FEMALE     • CATARACT EXTRACTION WITH IOL Left    • CATARACT EXTRACTION WITH IOL Bilateral    • CERVICAL FUSION POSTERIOR     • FOOT SURGERY Left     metatarsal fusion, hammer toes correction   •  "FUSION      disc fusion   • GYN SURGERY      Hysterectomy   • KNEE ARTHROPLASTY TOTAL Right     x3   • KNEE REPLACEMENT, TOTAL Right    • KNEE REVISION TOTAL Right         Allergies:  Morphine, Nsaids, Pcn [penicillins], and Gabapentin     Social History:  Social History     Tobacco Use   • Smoking status: Never Smoker   • Smokeless tobacco: Never Used   Vaping Use   • Vaping Use: Never used   Substance Use Topics   • Alcohol use: Yes     Alcohol/week: 4.2 oz     Types: 7 Standard drinks or equivalent per week     Comment: 3-5/day   • Drug use: Not Currently     Types: Oral     Comment: CBD ingested        Family History:   family history includes Alzheimer's Disease in her mother; Arthritis in her brother, brother, and mother; Asthma in her brother; Cancer in her mother; Cancer (age of onset: 66) in her maternal grandmother; Heart Attack in her father; Heart Disease in her brother; Hypertension in her brother, brother, and brother; Lung Disease in her brother; No Known Problems in her maternal grandfather, paternal grandfather, and paternal grandmother; Other in her brother and brother.      PHYSICAL EXAM:   Vital signs: /76 (BP Location: Left arm, Patient Position: Sitting, BP Cuff Size: Adult)   Pulse 65   Resp 15   Ht 1.702 m (5' 7\")   Wt 82.6 kg (182 lb)   SpO2 97%   BMI 28.51 kg/m²   GENERAL: Well-developed, well-nourished in no apparent distress.   EYE:  No ocular asymmetry, PERRLA  HENT: Pink, moist mucous membranes.    NECK: No thyromegaly.   CARDIOVASCULAR:  No murmurs  LUNGS: Clear breath sounds  ABDOMEN: Soft, nontender   EXTREMITIES: No clubbing, cyanosis, or edema.   NEUROLOGICAL: No gross focal motor abnormalities   LYMPH: No cervical adenopathy seen.   SKIN: No rashes, lesions.       Labs:  Lab Results   Component Value Date/Time    SODIUM 141 08/17/2021 02:17 PM    POTASSIUM 4.1 08/17/2021 02:17 PM    CHLORIDE 104 08/17/2021 02:17 PM    CO2 23 08/17/2021 02:17 PM    ANION 14.0 08/17/2021 " 02:17 PM    GLUCOSE 85 08/17/2021 02:17 PM    BUN 19 08/17/2021 02:17 PM    CREATININE 0.66 08/17/2021 02:17 PM    CALCIUM 9.5 08/17/2021 02:17 PM    ASTSGOT 23 08/17/2021 02:17 PM    ALTSGPT 21 08/17/2021 02:17 PM    TBILIRUBIN 0.6 08/17/2021 02:17 PM    ALBUMIN 4.2 08/17/2021 02:17 PM    TOTPROTEIN 6.8 08/17/2021 02:17 PM    GLOBULIN 2.6 08/17/2021 02:17 PM    AGRATIO 1.6 08/17/2021 02:17 PM       Lab Results   Component Value Date/Time    SODIUM 139 06/03/2020 1117    POTASSIUM 3.9 06/03/2020 1117    CHLORIDE 104 06/03/2020 1117    CO2 23 06/03/2020 1117    GLUCOSE 95 06/03/2020 1117    BUN 19 06/03/2020 1117    CREATININE 0.62 06/03/2020 1117    CALCIUM 10.0 06/03/2020 1117    ANION 12.0 06/03/2020 1117       Lab Results   Component Value Date/Time    CHOLSTRLTOT 190 06/03/2020 1117    TRIGLYCERIDE 100 06/03/2020 1117    HDL 75 06/03/2020 1117    LDL 95 06/03/2020 1117       Lab Results   Component Value Date/Time    TSHULTRASEN 0.012 (L) 01/07/2021 1535     Lab Results   Component Value Date/Time    FREET4 2.10 (H) 01/07/2021 1535     Lab Results   Component Value Date/Time    FREET3 2.59 08/06/2020 1557     No results found for: THYSTIMIG    No results found for: MICROSOMALA      Imaging:      ASSESSMENT/PLAN:     1. Postoperative hypothyroidism  Improved    Continue Synthroid  75 mcg daily  I will see her again in 12 months with repeat of her TSH and free T4 levels    2. Vitamin D deficiency  Controlled   Can thank you she takes ergocalciferol continue current supplements repeat calcium and 25-hydroxy levels in 6-12 months    3. Vitamin B 12 deficiency  Controlled continue current supplements repeat B12 levels after 6 months      Return in about 1 year (around 8/24/2022).      Thank you kindly for allowing me to participate in the thyroid care plan for this patient.    Asad Mullen MD, FACE, ECNU  01/12/21    CC:   Charlee Walker M.D.

## 2021-08-27 ENCOUNTER — HOSPITAL ENCOUNTER (OUTPATIENT)
Dept: RADIOLOGY | Facility: MEDICAL CENTER | Age: 71
End: 2021-08-27
Attending: NURSE PRACTITIONER
Payer: MEDICARE

## 2021-08-27 DIAGNOSIS — L98.9 BENIGN SKIN LESION OF THIGH: ICD-10-CM

## 2021-08-27 PROCEDURE — 76882 US LMTD JT/FCL EVL NVASC XTR: CPT | Mod: RT

## 2021-09-14 ENCOUNTER — HOSPITAL ENCOUNTER (OUTPATIENT)
Dept: RADIOLOGY | Facility: MEDICAL CENTER | Age: 71
End: 2021-09-14
Attending: FAMILY MEDICINE
Payer: MEDICARE

## 2021-09-14 DIAGNOSIS — Z12.31 VISIT FOR SCREENING MAMMOGRAM: ICD-10-CM

## 2021-09-14 PROCEDURE — 77063 BREAST TOMOSYNTHESIS BI: CPT

## 2021-09-16 ENCOUNTER — PATIENT MESSAGE (OUTPATIENT)
Dept: MEDICAL GROUP | Age: 71
End: 2021-09-16

## 2021-09-16 DIAGNOSIS — R22.41 MASS OF RIGHT THIGH: ICD-10-CM

## 2021-09-16 NOTE — TELEPHONE ENCOUNTER
From: Margoth Hopkins  To: Physician Charlee Walker  Sent: 9/16/2021 3:55 PM PDT  Subject: Referral for Biopsy     Dr Walker,  I am being referred by Kyaw Lou at [facility] for a biopsy on my  Right leg. I had an ultrasound, which revealed a mass. You should have the report, I would like to see Dr. Franklin. Thanks Margoth Hopkins

## 2021-09-20 PROBLEM — R22.41 MASS OF RIGHT THIGH: Status: ACTIVE | Noted: 2021-09-20

## 2021-11-02 ENCOUNTER — PRE-ADMISSION TESTING (OUTPATIENT)
Dept: ADMISSIONS | Facility: MEDICAL CENTER | Age: 71
End: 2021-11-02
Attending: SURGERY
Payer: MEDICARE

## 2021-11-02 DIAGNOSIS — Z01.812 PRE-OPERATIVE LABORATORY EXAMINATION: ICD-10-CM

## 2021-11-02 DIAGNOSIS — Z01.810 PRE-OPERATIVE CARDIOVASCULAR EXAMINATION: ICD-10-CM

## 2021-11-02 LAB
ANION GAP SERPL CALC-SCNC: 12 MMOL/L (ref 7–16)
BUN SERPL-MCNC: 15 MG/DL (ref 8–22)
CALCIUM SERPL-MCNC: 9.5 MG/DL (ref 8.5–10.5)
CHLORIDE SERPL-SCNC: 106 MMOL/L (ref 96–112)
CO2 SERPL-SCNC: 25 MMOL/L (ref 20–33)
CREAT SERPL-MCNC: 0.68 MG/DL (ref 0.5–1.4)
EKG IMPRESSION: NORMAL
GLUCOSE SERPL-MCNC: 84 MG/DL (ref 65–99)
POTASSIUM SERPL-SCNC: 4.2 MMOL/L (ref 3.6–5.5)
SARS-COV-2 RNA RESP QL NAA+PROBE: NOTDETECTED
SODIUM SERPL-SCNC: 143 MMOL/L (ref 135–145)
SPECIMEN SOURCE: NORMAL

## 2021-11-02 PROCEDURE — C9803 HOPD COVID-19 SPEC COLLECT: HCPCS

## 2021-11-02 PROCEDURE — 80048 BASIC METABOLIC PNL TOTAL CA: CPT

## 2021-11-02 PROCEDURE — 93010 ELECTROCARDIOGRAM REPORT: CPT | Performed by: INTERNAL MEDICINE

## 2021-11-02 PROCEDURE — 36415 COLL VENOUS BLD VENIPUNCTURE: CPT

## 2021-11-02 PROCEDURE — U0005 INFEC AGEN DETEC AMPLI PROBE: HCPCS

## 2021-11-02 PROCEDURE — U0003 INFECTIOUS AGENT DETECTION BY NUCLEIC ACID (DNA OR RNA); SEVERE ACUTE RESPIRATORY SYNDROME CORONAVIRUS 2 (SARS-COV-2) (CORONAVIRUS DISEASE [COVID-19]), AMPLIFIED PROBE TECHNIQUE, MAKING USE OF HIGH THROUGHPUT TECHNOLOGIES AS DESCRIBED BY CMS-2020-01-R: HCPCS

## 2021-11-02 PROCEDURE — 93005 ELECTROCARDIOGRAM TRACING: CPT

## 2021-11-02 ASSESSMENT — FIBROSIS 4 INDEX: FIB4 SCORE: 1.38

## 2021-11-05 ENCOUNTER — ANESTHESIA EVENT (OUTPATIENT)
Dept: SURGERY | Facility: MEDICAL CENTER | Age: 71
End: 2021-11-05
Payer: MEDICARE

## 2021-11-05 ENCOUNTER — ANESTHESIA (OUTPATIENT)
Dept: SURGERY | Facility: MEDICAL CENTER | Age: 71
End: 2021-11-05
Payer: MEDICARE

## 2021-11-05 ENCOUNTER — HOSPITAL ENCOUNTER (OUTPATIENT)
Facility: MEDICAL CENTER | Age: 71
End: 2021-11-05
Attending: SURGERY | Admitting: SURGERY
Payer: MEDICARE

## 2021-11-05 VITALS
RESPIRATION RATE: 18 BRPM | DIASTOLIC BLOOD PRESSURE: 79 MMHG | BODY MASS INDEX: 28.75 KG/M2 | TEMPERATURE: 97.9 F | WEIGHT: 183.2 LBS | HEIGHT: 67 IN | SYSTOLIC BLOOD PRESSURE: 159 MMHG | HEART RATE: 63 BPM | OXYGEN SATURATION: 93 %

## 2021-11-05 DIAGNOSIS — G89.18 POSTOPERATIVE PAIN: ICD-10-CM

## 2021-11-05 LAB — PATHOLOGY CONSULT NOTE: NORMAL

## 2021-11-05 PROCEDURE — 500002 HCHG ADHESIVE, DERMABOND: Performed by: SURGERY

## 2021-11-05 PROCEDURE — 700105 HCHG RX REV CODE 258: Performed by: SURGERY

## 2021-11-05 PROCEDURE — 700101 HCHG RX REV CODE 250: Performed by: ANESTHESIOLOGY

## 2021-11-05 PROCEDURE — 501838 HCHG SUTURE GENERAL: Performed by: SURGERY

## 2021-11-05 PROCEDURE — 700101 HCHG RX REV CODE 250: Performed by: SURGERY

## 2021-11-05 PROCEDURE — 160002 HCHG RECOVERY MINUTES (STAT): Performed by: SURGERY

## 2021-11-05 PROCEDURE — A9270 NON-COVERED ITEM OR SERVICE: HCPCS | Performed by: ANESTHESIOLOGY

## 2021-11-05 PROCEDURE — 160028 HCHG SURGERY MINUTES - 1ST 30 MINS LEVEL 3: Performed by: SURGERY

## 2021-11-05 PROCEDURE — 160046 HCHG PACU - 1ST 60 MINS PHASE II: Performed by: SURGERY

## 2021-11-05 PROCEDURE — 700111 HCHG RX REV CODE 636 W/ 250 OVERRIDE (IP): Performed by: ANESTHESIOLOGY

## 2021-11-05 PROCEDURE — 700102 HCHG RX REV CODE 250 W/ 637 OVERRIDE(OP): Performed by: ANESTHESIOLOGY

## 2021-11-05 PROCEDURE — RXMED WILLOW AMBULATORY MEDICATION CHARGE: Performed by: SURGERY

## 2021-11-05 PROCEDURE — 160048 HCHG OR STATISTICAL LEVEL 1-5: Performed by: SURGERY

## 2021-11-05 PROCEDURE — 160009 HCHG ANES TIME/MIN: Performed by: SURGERY

## 2021-11-05 PROCEDURE — 88304 TISSUE EXAM BY PATHOLOGIST: CPT

## 2021-11-05 PROCEDURE — 160035 HCHG PACU - 1ST 60 MINS PHASE I: Performed by: SURGERY

## 2021-11-05 PROCEDURE — 160025 RECOVERY II MINUTES (STATS): Performed by: SURGERY

## 2021-11-05 RX ORDER — SODIUM CHLORIDE, SODIUM LACTATE, POTASSIUM CHLORIDE, CALCIUM CHLORIDE 600; 310; 30; 20 MG/100ML; MG/100ML; MG/100ML; MG/100ML
INJECTION, SOLUTION INTRAVENOUS CONTINUOUS
Status: DISCONTINUED | OUTPATIENT
Start: 2021-11-05 | End: 2021-11-05 | Stop reason: HOSPADM

## 2021-11-05 RX ORDER — MIDAZOLAM HYDROCHLORIDE 1 MG/ML
1 INJECTION INTRAMUSCULAR; INTRAVENOUS
Status: DISCONTINUED | OUTPATIENT
Start: 2021-11-05 | End: 2021-11-05 | Stop reason: HOSPADM

## 2021-11-05 RX ORDER — OXYCODONE HCL 5 MG/5 ML
5 SOLUTION, ORAL ORAL
Status: COMPLETED | OUTPATIENT
Start: 2021-11-05 | End: 2021-11-05

## 2021-11-05 RX ORDER — ONDANSETRON 2 MG/ML
INJECTION INTRAMUSCULAR; INTRAVENOUS PRN
Status: DISCONTINUED | OUTPATIENT
Start: 2021-11-05 | End: 2021-11-05 | Stop reason: SURG

## 2021-11-05 RX ORDER — LABETALOL HYDROCHLORIDE 5 MG/ML
5 INJECTION, SOLUTION INTRAVENOUS
Status: DISCONTINUED | OUTPATIENT
Start: 2021-11-05 | End: 2021-11-05 | Stop reason: HOSPADM

## 2021-11-05 RX ORDER — MIDAZOLAM HYDROCHLORIDE 1 MG/ML
INJECTION INTRAMUSCULAR; INTRAVENOUS PRN
Status: DISCONTINUED | OUTPATIENT
Start: 2021-11-05 | End: 2021-11-05 | Stop reason: SURG

## 2021-11-05 RX ORDER — LIDOCAINE HYDROCHLORIDE 20 MG/ML
INJECTION, SOLUTION EPIDURAL; INFILTRATION; INTRACAUDAL; PERINEURAL PRN
Status: DISCONTINUED | OUTPATIENT
Start: 2021-11-05 | End: 2021-11-05 | Stop reason: SURG

## 2021-11-05 RX ORDER — DIPHENHYDRAMINE HYDROCHLORIDE 50 MG/ML
12.5 INJECTION INTRAMUSCULAR; INTRAVENOUS
Status: DISCONTINUED | OUTPATIENT
Start: 2021-11-05 | End: 2021-11-05 | Stop reason: HOSPADM

## 2021-11-05 RX ORDER — OXYCODONE HCL 5 MG/5 ML
10 SOLUTION, ORAL ORAL
Status: COMPLETED | OUTPATIENT
Start: 2021-11-05 | End: 2021-11-05

## 2021-11-05 RX ORDER — HALOPERIDOL 5 MG/ML
1 INJECTION INTRAMUSCULAR
Status: DISCONTINUED | OUTPATIENT
Start: 2021-11-05 | End: 2021-11-05 | Stop reason: HOSPADM

## 2021-11-05 RX ORDER — HYDROMORPHONE HYDROCHLORIDE 1 MG/ML
0.4 INJECTION, SOLUTION INTRAMUSCULAR; INTRAVENOUS; SUBCUTANEOUS
Status: DISCONTINUED | OUTPATIENT
Start: 2021-11-05 | End: 2021-11-05 | Stop reason: HOSPADM

## 2021-11-05 RX ORDER — HYDROMORPHONE HYDROCHLORIDE 1 MG/ML
0.2 INJECTION, SOLUTION INTRAMUSCULAR; INTRAVENOUS; SUBCUTANEOUS
Status: DISCONTINUED | OUTPATIENT
Start: 2021-11-05 | End: 2021-11-05 | Stop reason: HOSPADM

## 2021-11-05 RX ORDER — DEXAMETHASONE SODIUM PHOSPHATE 4 MG/ML
INJECTION, SOLUTION INTRA-ARTICULAR; INTRALESIONAL; INTRAMUSCULAR; INTRAVENOUS; SOFT TISSUE PRN
Status: DISCONTINUED | OUTPATIENT
Start: 2021-11-05 | End: 2021-11-05 | Stop reason: SURG

## 2021-11-05 RX ORDER — CEFAZOLIN SODIUM 1 G/3ML
INJECTION, POWDER, FOR SOLUTION INTRAMUSCULAR; INTRAVENOUS PRN
Status: DISCONTINUED | OUTPATIENT
Start: 2021-11-05 | End: 2021-11-05 | Stop reason: SURG

## 2021-11-05 RX ORDER — ONDANSETRON 2 MG/ML
4 INJECTION INTRAMUSCULAR; INTRAVENOUS
Status: DISCONTINUED | OUTPATIENT
Start: 2021-11-05 | End: 2021-11-05 | Stop reason: HOSPADM

## 2021-11-05 RX ORDER — BUPIVACAINE HYDROCHLORIDE AND EPINEPHRINE 5; 5 MG/ML; UG/ML
INJECTION, SOLUTION EPIDURAL; INTRACAUDAL; PERINEURAL
Status: DISCONTINUED | OUTPATIENT
Start: 2021-11-05 | End: 2021-11-05 | Stop reason: HOSPADM

## 2021-11-05 RX ORDER — MEPERIDINE HYDROCHLORIDE 25 MG/ML
12.5 INJECTION INTRAMUSCULAR; INTRAVENOUS; SUBCUTANEOUS
Status: DISCONTINUED | OUTPATIENT
Start: 2021-11-05 | End: 2021-11-05 | Stop reason: HOSPADM

## 2021-11-05 RX ORDER — HYDROCODONE BITARTRATE AND ACETAMINOPHEN 5; 325 MG/1; MG/1
1 TABLET ORAL EVERY 4 HOURS PRN
Qty: 10 TABLET | Refills: 0 | Status: SHIPPED | OUTPATIENT
Start: 2021-11-05 | End: 2021-12-03

## 2021-11-05 RX ORDER — HYDROMORPHONE HYDROCHLORIDE 1 MG/ML
0.1 INJECTION, SOLUTION INTRAMUSCULAR; INTRAVENOUS; SUBCUTANEOUS
Status: DISCONTINUED | OUTPATIENT
Start: 2021-11-05 | End: 2021-11-05 | Stop reason: HOSPADM

## 2021-11-05 RX ADMIN — LIDOCAINE HYDROCHLORIDE 100 MG: 20 INJECTION, SOLUTION EPIDURAL; INFILTRATION; INTRACAUDAL at 10:33

## 2021-11-05 RX ADMIN — OXYCODONE HYDROCHLORIDE 10 MG: 5 SOLUTION ORAL at 11:47

## 2021-11-05 RX ADMIN — MIDAZOLAM HYDROCHLORIDE 2 MG: 1 INJECTION, SOLUTION INTRAMUSCULAR; INTRAVENOUS at 10:33

## 2021-11-05 RX ADMIN — ONDANSETRON 4 MG: 2 INJECTION INTRAMUSCULAR; INTRAVENOUS at 10:33

## 2021-11-05 RX ADMIN — FENTANYL CITRATE 100 MCG: 50 INJECTION, SOLUTION INTRAMUSCULAR; INTRAVENOUS at 10:33

## 2021-11-05 RX ADMIN — DEXAMETHASONE SODIUM PHOSPHATE 8 MG: 4 INJECTION, SOLUTION INTRA-ARTICULAR; INTRALESIONAL; INTRAMUSCULAR; INTRAVENOUS; SOFT TISSUE at 10:33

## 2021-11-05 RX ADMIN — SODIUM CHLORIDE, POTASSIUM CHLORIDE, SODIUM LACTATE AND CALCIUM CHLORIDE: 600; 310; 30; 20 INJECTION, SOLUTION INTRAVENOUS at 10:30

## 2021-11-05 RX ADMIN — CEFAZOLIN 2 G: 330 INJECTION, POWDER, FOR SOLUTION INTRAMUSCULAR; INTRAVENOUS at 10:30

## 2021-11-05 RX ADMIN — FENTANYL CITRATE 50 MCG: 50 INJECTION, SOLUTION INTRAMUSCULAR; INTRAVENOUS at 11:47

## 2021-11-05 RX ADMIN — PROPOFOL 100 MG: 10 INJECTION, EMULSION INTRAVENOUS at 10:33

## 2021-11-05 ASSESSMENT — FIBROSIS 4 INDEX: FIB4 SCORE: 1.38

## 2021-11-05 ASSESSMENT — PAIN SCALES - GENERAL: PAIN_LEVEL: 0

## 2021-11-05 NOTE — OR NURSING
1217 pt stating she is feeling better and her pain level is at a tolerable level to go home with    1230 Pt and  have been educated on D/C instructions, IV removed and pt escorted out with RN in wheelchair to car.

## 2021-11-05 NOTE — ANESTHESIA PREPROCEDURE EVALUATION
Relevant Problems   NEURO   (positive) Chronic headache      CARDIAC   (positive) Benign essential HTN      ENDO   (positive) Postoperative hypothyroidism_s/p thyroidectomy_Dr. Mullen      Other   (positive) MOHAMUD (generalized anxiety disorder)   (positive) Hashimoto's thyroiditis s/p total thyroidectom Dr. Franklin in 2/2020.    (positive) Multinodular goiter (nontoxic), s/p FNA in 12/2017, TR4 x1, repeat US in 6/2020 (Resolved)       Physical Exam    Airway   Mallampati: II  TM distance: >3 FB  Neck ROM: full       Cardiovascular - normal exam  Rhythm: regular  Rate: normal  (-) murmur     Dental - normal exam           Pulmonary - normal exam  Breath sounds clear to auscultation     Abdominal    Neurological - normal exam                 Anesthesia Plan    ASA 2       Plan - general       Airway plan will be LMA          Induction: intravenous    Postoperative Plan: Postoperative administration of opioids is intended.    Pertinent diagnostic labs and testing reviewed    Informed Consent:    Anesthetic plan and risks discussed with patient.    Use of blood products discussed with: patient whom consented to blood products.

## 2021-11-05 NOTE — PROGRESS NOTES
Discharge Instructions:    Care of Your Incisions:  • Leave the bandages on for 48 hours. You can shower with the dressing on as it is waterproof.  Avoid getting lotions, powders or deodorant on the incision while it is healing.  • You may have thin paper strips across the incision. These are called Steri-Strips. When they start to curl at the edges, you can peel them off. It is okay to shower with these on.  • Don’t worry if the area under either incision feels firm or hard. This is normal and usually softens within a few months.    How to Manage Discomfort After Surgery:  • It is normal to have tenderness, discomfort or mild swelling at the site of the incisions.     You may also feel:  - Numbness at the incisions.    • You will be given a prescription for pain medication prior to being discharged from the hospital. If you are having pain, take the medication as directed by your physician and by the label directions. You should be comfortable and moving around. Most people use some prescription pain medication, though some need only over the counter pain medication, such as ibuprofen (Motrin) or acetaminophen (Tylenol). Some women have little pain and take nothing at all. Whatever amount of pain you have, it is important to listen to your body and use the medication if needed during your recovery.    • Prescription pain medication may cause constipation. If you are having problems, use what you normally would or call your nurse for suggestions. It also helps to stay regular by including fiber in your diet (for example: bran or fruits and vegetables) and drink plenty of liquids (water, juice, etc.).    Activity:  • You may resume your normal routine, but avoid heavy lifting (over 10 pounds), pushing or pulling until your first post-operative visit, unless otherwise specified by your surgeon.  • Do not drive for at least 24-48 hours after surgery (unless otherwise directed by your surgeon), or while you are taking  prescription pain medicine. Prescription pain medicine causes drowsiness (makes you sleepy) so you should avoid doing any tasks where you should be awake and alert (driving, operating machinery, etc).    When to Call Your Doctor/Nurse:  • If you have a fever greater than 100.5, increased pain, redness or warmth at the area around the incision, drainage from the incision or around the drain. Call Dr. Franklin's office at [phone] with any other questions or concerns.    You should have an appointment to see Dr. Franklin about a week after surgery, call [phone] if you do not already have an appointment.    Office address:  [address] [place] [address] [address]  [place] [id number]      Cydney Franklin M.D.  [facility]  [phone]

## 2021-11-05 NOTE — DISCHARGE INSTRUCTIONS
Discharge Instructions:     Care of Your Incisions:  • Leave the bandages on for 48 hours. You can shower with the dressing on as it is waterproof.  Avoid getting lotions, powders or deodorant on the incision while it is healing.  • You may have thin paper strips across the incision. These are called Steri-Strips. When they start to curl at the edges, you can peel them off. It is okay to shower with these on.  • Don’t worry if the area under either incision feels firm or hard. This is normal and usually softens within a few months.     How to Manage Discomfort After Surgery:  • It is normal to have tenderness, discomfort or mild swelling at the site of the incisions.      You may also feel:  - Numbness at the incisions.     • You will be given a prescription for pain medication prior to being discharged from the hospital. If you are having pain, take the medication as directed by your physician and by the label directions. You should be comfortable and moving around. Most people use some prescription pain medication, though some need only over the counter pain medication, such as ibuprofen (Motrin) or acetaminophen (Tylenol). Some women have little pain and take nothing at all. Whatever amount of pain you have, it is important to listen to your body and use the medication if needed during your recovery.     • Prescription pain medication may cause constipation. If you are having problems, use what you normally would or call your nurse for suggestions. It also helps to stay regular by including fiber in your diet (for example: bran or fruits and vegetables) and drink plenty of liquids (water, juice, etc.).     Activity:  • You may resume your normal routine, but avoid heavy lifting (over 10 pounds), pushing or pulling until your first post-operative visit, unless otherwise specified by your surgeon.  • Do not drive for at least 24-48 hours after surgery (unless otherwise directed by your surgeon), or while you are  taking prescription pain medicine. Prescription pain medicine causes drowsiness (makes you sleepy) so you should avoid doing any tasks where you should be awake and alert (driving, operating machinery, etc).     When to Call Your Doctor/Nurse:  • If you have a fever greater than 100.5, increased pain, redness or warmth at the area around the incision, drainage from the incision or around the drain. Call Dr. Franklin's office at [phone] with any other questions or concerns.     You should have an appointment to see Dr. Franklin about a week after surgery, call [phone] if you do not already have an appointment.     Office address:  [address] [place] [address] [address]  [place] [address]        Cydney Franklin M.D.  [facility]  [phone]      ACTIVITY: Rest and take it easy for the first 24 hours.  A responsible adult is recommended to remain with you during that time.  It is normal to feel sleepy.  We encourage you to not do anything that requires balance, judgment or coordination.    MILD FLU-LIKE SYMPTOMS ARE NORMAL. YOU MAY EXPERIENCE GENERALIZED MUSCLE ACHES, THROAT IRRITATION, HEADACHE AND/OR SOME NAUSEA.    FOR 24 HOURS DO NOT:  Drive, operate machinery or run household appliances.  Drink beer or alcoholic beverages.   Make important decisions or sign legal documents.        DIET: To avoid nausea, slowly advance diet as tolerated, avoiding spicy or greasy foods for the first day.  Add more substantial food to your diet according to your physician's instructions.  INCREASE FLUIDS AND FIBER TO AVOID CONSTIPATION.        You should call [phone] if you develop problems with breathing or chest pain.    If you are unable to contact your doctor or surgical center, you should go to the nearest emergency room or urgent care center.  Physician's telephone #: Dr. Franklin [phone]    If any questions arise, call your doctor.  If your doctor is not available, please feel free to call the Surgical Center at  [phone]. The Contact Center is open Monday through Friday 7AM to 5PM and may speak to a nurse at [phone], or toll free at [phone].     A registered nurse may call you a few days after your surgery to see how you are doing after your procedure.    MEDICATIONS: Resume taking daily medication.  Take prescribed pain medication with food.  If no medication is prescribed, you may take non-aspirin pain medication if needed.  PAIN MEDICATION CAN BE VERY CONSTIPATING.  Take a stool softener or laxative such as senokot, pericolace, or milk of magnesia if needed.    Prescription given for NORCO.  Last pain medication given at ____________________.    If your physician has prescribed pain medication that includes Acetaminophen (Tylenol), do not take additional Acetaminophen (Tylenol) while taking the prescribed medication.    Depression / Suicide Risk    As you are discharged from this [facility] facility, it is important to learn how to keep safe from harming yourself.    Recognize the warning signs:  · Abrupt changes in personality, positive or negative- including increase in energy   · Giving away possessions  · Change in eating patterns- significant weight changes-  positive or negative  · Change in sleeping patterns- unable to sleep or sleeping all the time   · Unwillingness or inability to communicate  · Depression  · Unusual sadness, discouragement and loneliness  · Talk of wanting to die  · Neglect of personal appearance   · Rebelliousness- reckless behavior  · Withdrawal from people/activities they love  · Confusion- inability to concentrate     If you or a loved one observes any of these behaviors or has concerns about self-harm, here's what you can do:  · Talk about it- your feelings and reasons for harming yourself  · Remove any means that you might use to hurt yourself (examples: pills, rope, extension cords, firearm)  · Get professional help from the community (Mental Health, Substance Abuse,  psychological counseling)  · Do not be alone:Call your Safe Contact- someone whom you trust who will be there for you.  · Call your local CRISIS HOTLINE 439-7567 or 274-175-3570  · Call your local Children's Mobile Crisis Response Team [place] (646) 152-9077 or www.[employer]  · Call the toll free National Suicide Prevention Hotlines   · National Suicide Prevention Lifeline 612-095-COHZ (7854)  · National Hope Line Network 800-SUICIDE (594-3070)

## 2021-11-05 NOTE — ANESTHESIA POSTPROCEDURE EVALUATION
Patient: Margoth Hopkins    Procedure Summary     Date: 11/05/21 Room / Location: [facility] ROOM 23 / SURGERY SAME DAY [facility]    Anesthesia Start: 1030 Anesthesia Stop: 1055    Procedure: EXCISION, MASS - LATERAL THIGH (Right Leg Upper) Diagnosis: (MASS OF SUBCUTANEOUS TISSUE RIGHT THIGH)    Surgeons: Cydney Franklin M.D. Responsible Provider: Tavares Dan M.D.    Anesthesia Type: general ASA Status: 2          Final Anesthesia Type: general  Last vitals  BP   Blood Pressure : (!) 173/80    Temp   36.3 °C (97.3 °F)    Pulse   63   Resp   18    SpO2   94 %      Anesthesia Post Evaluation    Patient location during evaluation: PACU  Patient participation: complete - patient participated  Level of consciousness: awake and alert  Pain score: 0    Airway patency: patent  Anesthetic complications: no  Cardiovascular status: hemodynamically stable  Respiratory status: acceptable  Hydration status: euvolemic    PONV: none          No complications documented.     Nurse Pain Score: 7 (NPRS)

## 2021-11-05 NOTE — ANESTHESIA PROCEDURE NOTES
Airway    Date/Time: 11/5/2021 10:34 AM  Performed by: Tavares Dan M.D.  Authorized by: Tavares Dan M.D.     Location:  OR  Urgency:  Elective  Indications for Airway Management:  Anesthesia      Spontaneous Ventilation: absent    Sedation Level:  Deep  Preoxygenated: Yes    Final Airway Type:  Supraglottic airway  Final Supraglottic Airway:  Standard LMA    SGA Size:  3  Number of Attempts at Approach:  1

## 2021-11-05 NOTE — OP REPORT
Operative Report    Date: 11/5/2021    Surgeon: Cydney Franklin M.D.    Assistant: VENKATESH Jason    Anesthesiologist: Ni AGUIRRE    Pre-operative Diagnosis:  Right thigh subcutaneous mass     Post-operative Diagnosis: done     Procedure: excision subcutaneous mass, 1.5 cm x 1.5 cm    Procedure in detail: The patient was identified in the pre-operative holding area and brought to the operating room. Correct side and site were identified. Pre-operative antibiotics of ancef were administered prior to the procedure. Anesthesia was smoothly induced.The patient was then prepped and draped in the usual sterile fashion.    The skin was infiltrated with local anesthetic and a curvilinear incision was made over the mass.  The subcutaneous tissue was grasped and the mass excised using cautery. The specimen was then completely removed and was sent for permanent pathology. Meticulous hemostasis was achieved with electrocautery. The area was irrigated and suctioned. The skin was closed in two layers with vicryl and monocryl. Sterile dressings were applied.     The patient was awakened from anesthetic, and was taken to the recovery room in stable condition.    Sponge and needle counts were correct at the end of the case.     Specimen: right thigh mass    EBL: minimal    Dispo: stable, extubated, to PACU    Cydney Franklin M.D.  [facility]  [phone]

## 2021-11-05 NOTE — OR NURSING
"1053: patient arrived from OR, report received, attached to monitoring. VSS, patient oxygenating well on 4 L oxymask, incision to right thigh, dermabond in place: clean/dry/intact    1111: oral airway d/c, patient denies pain and nausea at this time, states feels \"groggy\", water given, tolerated well    1130: patient denies pain and nausea, tolerating PO, phase I recovery complete     1134: discharge instructions given to , Zak, questions answered    1147: patient up to change into clothes, c/o 7/10 pain, IV Fentanyl 50 mcg given and PO Oxycodone 10 mg given       "

## 2021-11-05 NOTE — ANESTHESIA TIME REPORT
Anesthesia Start and Stop Event Times     Date Time Event    11/5/2021 1027 Ready for Procedure     1030 Anesthesia Start     1055 Anesthesia Stop        Responsible Staff  11/05/21    Name Role Begin End    Tavares Dan M.D. Anesth 1030 1055        Preop Diagnosis (Free Text):  Pre-op Diagnosis     MASS OF SUBCUTANEOUS TISSUE RIGHT THIGH        Preop Diagnosis (Codes):    Premium Reason  Non-Premium    Comments:

## 2021-11-16 NOTE — PROGRESS NOTES
Cardiology Follow-up Consultation Note    Date of note:    11/17/2021    Primary Care Provider: Charlee Walker M.D.    Patient Name: Margoth Hopkins   YOB: 1950  MRN:              [id number]    Chief Complaint: Follow-up hypertension and dyslipidemia    History of Present Illness: Ms. Margoth Hopkins is a 70 y.o. female whose current medical problems include hypertension, dyslipidemia, goiter status post total thyroidectomy, GI bleed secondary to gastric ulcers, Schatzki's ring (gets regular dilatations) who is here for follow-up hypertension and dyslipidemia.    The patient was a patient of Dr. Bermudez, last seen 8/2/2021.  During the visit, she reported having palpitations.  They discussed getting event monitor, which the patient would like to defer at the time.  The patient was also referred to pulmonary clinic due to shortness of breath, as her cardiac work-up had been unremarkable.  No other changes were made to her medications.    The patient returns today for follow-up.  She reports feeling well today.  She continues to feel short of breath, and has not made an appointment with pulmonology yet.  She denies any chest pain.  Denies any palpitations.  No orthopnea, PND, or leg swelling.  No syncope or presyncopal episodes.    Cardiovascular Risk Factors:  1. Smoking status: Never smoker  2. Type II Diabetes Mellitus: None  Lab Results   Component Value Date/Time    HBA1C 5.2 02/13/2018 02:50 PM    HBA1C 5.3 03/03/2017 10:16 AM     3. Hypertension: On medication  4. Dyslipidemia: On statin  Cholesterol,Tot   Date Value Ref Range Status   06/04/2021 200 (H) 100 - 199 mg/dL Final     LDL   Date Value Ref Range Status   06/04/2021 104 (H) <100 mg/dL Final     HDL   Date Value Ref Range Status   06/04/2021 79 >=40 mg/dL Final     Triglycerides   Date Value Ref Range Status   06/04/2021 85 0 - 149 mg/dL Final     5. Family history of early Coronary Artery Disease in a first degree relative (Male less  than 55 years of age; Female less than 65 years of age): Brother with MI in 50s  6.  Obesity and/or Metabolic Syndrome: BMI 29.29  7. Sedentary lifestyle: Not sedentary, but not active    Review of Systems   Constitutional: Negative for fever, malaise/fatigue and night sweats.   Cardiovascular: Negative for chest pain, dyspnea on exertion, irregular heartbeat, leg swelling, near-syncope, orthopnea, palpitations, paroxysmal nocturnal dyspnea and syncope.   Respiratory: Negative for cough, shortness of breath and wheezing.    Gastrointestinal: Negative for abdominal pain, diarrhea, nausea and vomiting.   Neurological: Negative for dizziness, focal weakness and weakness.         All other systems reviewed and are negative.       Current Outpatient Medications   Medication Sig Dispense Refill   • tizanidine (ZANAFLEX) 2 MG tablet      • zolpidem (AMBIEN) 5 MG Tab zolpidem 5 mg tablet     • pregabalin (LYRICA) 75 MG Cap Take 75 mg by mouth every day.     • rosuvastatin (CRESTOR) 40 MG tablet Take 1 Tablet by mouth every day. 30 Tablet 11   • SYNTHROID 75 MCG Tab Take 1 Tablet by mouth every morning on an empty stomach. 90 Tablet 3   • vitamin D, Ergocalciferol, (DRISDOL) 1.25 MG (33250 UT) Cap capsule Take 1 Capsule by mouth every 7 days. 12 Capsule 3   • HYDROcodone-acetaminophen (NORCO) 7.5-325 MG per tablet as needed.     • lisinopril (PRINIVIL) 20 MG Tab TAKE 1 TABLET BY MOUTH  DAILY 90 tablet 3   • DULoxetine (CYMBALTA) 30 MG Cap DR Particles Take 30 mg by mouth 2 times a day.     • propranolol (INDERAL) 40 MG Tab Take 1 tablet by mouth 2 times a day. 180 tablet 3   • aspirin (ASA) 81 MG Chew Tab chewable tablet Chew 1 Tab every day. 100 Tab 0   • linaCLOtide (LINZESS) 72 MCG Cap Take 1 Cap by mouth every day. 90 Cap 3   • VITAMIN E PO Take  by mouth.     • COLLAGEN PO Take  by mouth.     • omeprazole (PRILOSEC) 20 MG delayed-release capsule every day.     • Probiotic Product (PROBIOTIC DAILY PO) Take  by mouth  "every day.       No current facility-administered medications for this visit.         Allergies   Allergen Reactions   • Morphine Vomiting   • Nsaids Unspecified     History of gastric ulcers - cannot take NSAIDS   • Pcn [Penicillins] Hives   • Gabapentin      Cognitive changes    • Seasonal        Physical Exam:  Ambulatory Vitals  /70 (BP Location: Left arm, Patient Position: Sitting, BP Cuff Size: Adult)   Pulse 71   Resp 14   Ht 1.702 m (5' 7\")   Wt 84.8 kg (187 lb)   SpO2 94%    Oxygen Therapy:  Pulse Oximetry: 94 %  BP Readings from Last 4 Encounters:   11/17/21 132/70   11/05/21 159/79   08/24/21 116/76   08/02/21 118/70       Weight/BMI: Body mass index is 29.29 kg/m².  Wt Readings from Last 4 Encounters:   11/17/21 84.8 kg (187 lb)   11/05/21 83.1 kg (183 lb 3.2 oz)   08/24/21 82.6 kg (182 lb)   08/02/21 81.6 kg (180 lb)         General: Well appearing and in no apparent distress  Eyes: nl conjunctiva, no icteric sclera  ENT: wearing a mask, normal external appearance of ears  Neck: no visible JVP,  no carotid bruits  Lungs: normal respiratory effort, CTAB  Heart: RRR, no murmurs, no rubs or gallops,  no edema bilateral lower extremities. No LV/RV heave on cardiac palpatation. + bilateral radial pulses.  + bilateral dp pulses.   Abdomen: soft, non tender, non distended, no masses, normal bowel sounds.  No HSM.  Extremities/MSK: no clubbing, no cyanosis  Neurological: No focal sensory deficits  Psychiatric: Appropriate affect, A/O x 3, intact judgement and insight  Skin: Warm extremities      Lab Data Review:  Lab Results   Component Value Date/Time    CHOLSTRLTOT 200 (H) 06/04/2021 09:38 AM     (H) 06/04/2021 09:38 AM    HDL 79 06/04/2021 09:38 AM    TRIGLYCERIDE 85 06/04/2021 09:38 AM       Lab Results   Component Value Date/Time    SODIUM 143 11/02/2021 01:53 PM    POTASSIUM 4.2 11/02/2021 01:53 PM    CHLORIDE 106 11/02/2021 01:53 PM    CO2 25 11/02/2021 01:53 PM    GLUCOSE 84 11/02/2021 " 01:53 PM    BUN 15 11/02/2021 01:53 PM    CREATININE 0.68 11/02/2021 01:53 PM     Lab Results   Component Value Date/Time    ALKPHOSPHAT 74 08/17/2021 02:17 PM    ASTSGOT 23 08/17/2021 02:17 PM    ALTSGPT 21 08/17/2021 02:17 PM    TBILIRUBIN 0.6 08/17/2021 02:17 PM      Lab Results   Component Value Date/Time    WBC 7.5 06/04/2021 09:38 AM     Lab Results   Component Value Date/Time    HBA1C 5.2 02/13/2018 02:50 PM    HBA1C 5.3 03/03/2017 10:16 AM         Cardiac Imaging and Procedures Review:    EKG dated 11/2/2021: My personal interpretation is sinus bradycardia, first-degree AV block    Echo dated 12/10/2020:   CONCLUSIONS  Normal left ventricular systolic function. Left ventricular ejection   fraction is visually estimated to be 65%.   Normal diastolic function.  Normal inferior vena cava size and inspiratory collapse.  Estimated right ventricular systolic pressure is 37 mmHg.    CTA  1/18/2021  FINDINGS:     Coronary calcification:  LMA - 0.0  LCX - 0.0  LAD - 264.1  RCA - 0.0  PDA - 0.0     Total Calcium Score: 264.1     Percentile: Calcium score is above the 75th percentile for the patient's age and sex.    Nuclear Perfusion Imaging (12/10/2020):   NUCLEAR IMAGING INTERPRETATION   No reversible defects that would indicate ischemia.    Small defect noted in the apical septal segment both at rest and stress.    Could represent apical thinning vs. possibly infarct.    Normal left ventricular systolic function.    The estimated ejection fraction is 65%.       ECG INTERPRETATION   Negative stress ECG for ischemia.      Assessment & Plan     1. Coronary artery calcification  rosuvastatin (CRESTOR) 40 MG tablet   2. Essential hypertension     3. Dyslipidemia  rosuvastatin (CRESTOR) 40 MG tablet   4. Shortness of breath           Shared Medical Decision Making:    Hypertension  BP controlled this visit  -Continue lisinopril 20mg daily and propranolol 40mg twice daily (pt takes propranolol for essential  tremor)    Dyslipidemia  Elevated calcium score  -Continue aspirin 81mg daily   -Increase rosuvastatin to 40 mg daily due to LDL still being elevated on previous dose    Shortness of breath  Previously evaluated by Dr. Bermudez.  Normal echo and nuclear stress test without reversible ischemia.  The patient had been referred to pulmonary clinic.    All of the patient's excellent questions were answered to the best of my knowledge and to her satisfaction.  It was a pleasure seeing Ms. Margoth Hopkins in my clinic today. Return in about 6 months (around 5/17/2022). Patient is aware to call the cardiology clinic with any questions or concerns.      Fiorella Worrell MD  [facility]  [facility], [address]  [address] [address]  Phone: [phone]  Fax: [phone]

## 2021-11-17 ENCOUNTER — OFFICE VISIT (OUTPATIENT)
Dept: CARDIOLOGY | Facility: MEDICAL CENTER | Age: 71
End: 2021-11-17
Payer: MEDICARE

## 2021-11-17 VITALS
WEIGHT: 187 LBS | HEART RATE: 71 BPM | HEIGHT: 67 IN | DIASTOLIC BLOOD PRESSURE: 70 MMHG | SYSTOLIC BLOOD PRESSURE: 132 MMHG | OXYGEN SATURATION: 94 % | RESPIRATION RATE: 14 BRPM | BODY MASS INDEX: 29.35 KG/M2

## 2021-11-17 DIAGNOSIS — I25.84 CORONARY ARTERY CALCIFICATION: ICD-10-CM

## 2021-11-17 DIAGNOSIS — I25.10 CORONARY ARTERY CALCIFICATION: ICD-10-CM

## 2021-11-17 DIAGNOSIS — E78.5 DYSLIPIDEMIA: ICD-10-CM

## 2021-11-17 DIAGNOSIS — R06.02 SHORTNESS OF BREATH: ICD-10-CM

## 2021-11-17 DIAGNOSIS — I10 ESSENTIAL HYPERTENSION: ICD-10-CM

## 2021-11-17 PROCEDURE — 99214 OFFICE O/P EST MOD 30 MIN: CPT | Performed by: STUDENT IN AN ORGANIZED HEALTH CARE EDUCATION/TRAINING PROGRAM

## 2021-11-17 RX ORDER — PREGABALIN 50 MG/1
CAPSULE ORAL
COMMUNITY
Start: 2021-11-11 | End: 2021-11-17

## 2021-11-17 RX ORDER — TIZANIDINE 2 MG/1
TABLET ORAL
COMMUNITY
End: 2021-11-17

## 2021-11-17 RX ORDER — PREGABALIN 50 MG/1
CAPSULE ORAL
COMMUNITY
End: 2021-11-17

## 2021-11-17 RX ORDER — PHENAZOPYRIDINE HYDROCHLORIDE 200 MG/1
TABLET, FILM COATED ORAL
COMMUNITY
End: 2021-11-17

## 2021-11-17 RX ORDER — TIZANIDINE 2 MG/1
TABLET ORAL
COMMUNITY
Start: 2021-11-11 | End: 2023-03-13

## 2021-11-17 RX ORDER — ZOLPIDEM TARTRATE 5 MG/1
TABLET ORAL
COMMUNITY
End: 2021-12-30

## 2021-11-17 RX ORDER — ROSUVASTATIN CALCIUM 40 MG/1
40 TABLET, COATED ORAL DAILY
Qty: 30 TABLET | Refills: 11 | Status: SHIPPED | OUTPATIENT
Start: 2021-11-17 | End: 2022-09-25

## 2021-11-17 RX ORDER — PREGABALIN 75 MG/1
75 CAPSULE ORAL DAILY
COMMUNITY
End: 2021-12-24

## 2021-11-17 ASSESSMENT — ENCOUNTER SYMPTOMS
COUGH: 0
ORTHOPNEA: 0
NEAR-SYNCOPE: 0
PND: 0
WHEEZING: 0
DIZZINESS: 0
NAUSEA: 0
SHORTNESS OF BREATH: 0
PALPITATIONS: 0
NIGHT SWEATS: 0
SYNCOPE: 0
ABDOMINAL PAIN: 0
DYSPNEA ON EXERTION: 0
FOCAL WEAKNESS: 0
IRREGULAR HEARTBEAT: 0
DIARRHEA: 0
FEVER: 0
VOMITING: 0
WEAKNESS: 0

## 2021-11-17 ASSESSMENT — FIBROSIS 4 INDEX: FIB4 SCORE: 1.38

## 2021-11-30 ENCOUNTER — PHARMACY VISIT (OUTPATIENT)
Dept: PHARMACY | Facility: MEDICAL CENTER | Age: 71
End: 2021-11-30
Payer: COMMERCIAL

## 2021-12-09 ENCOUNTER — APPOINTMENT (OUTPATIENT)
Dept: MEDICAL GROUP | Age: 71
End: 2021-12-09
Payer: MEDICARE

## 2021-12-10 ENCOUNTER — HOSPITAL ENCOUNTER (OUTPATIENT)
Dept: LAB | Facility: MEDICAL CENTER | Age: 71
End: 2021-12-10
Attending: FAMILY MEDICINE
Payer: MEDICARE

## 2021-12-10 DIAGNOSIS — E78.00 PURE HYPERCHOLESTEROLEMIA: ICD-10-CM

## 2021-12-10 DIAGNOSIS — I10 BENIGN ESSENTIAL HTN: ICD-10-CM

## 2021-12-10 DIAGNOSIS — R73.01 IMPAIRED FASTING BLOOD SUGAR: ICD-10-CM

## 2021-12-10 LAB
ALBUMIN SERPL BCP-MCNC: 4.2 G/DL (ref 3.2–4.9)
ALBUMIN/GLOB SERPL: 2 G/DL
ALP SERPL-CCNC: 65 U/L (ref 30–99)
ALT SERPL-CCNC: 22 U/L (ref 2–50)
ANION GAP SERPL CALC-SCNC: 12 MMOL/L (ref 7–16)
AST SERPL-CCNC: 18 U/L (ref 12–45)
BILIRUB SERPL-MCNC: 0.6 MG/DL (ref 0.1–1.5)
BUN SERPL-MCNC: 23 MG/DL (ref 8–22)
CALCIUM SERPL-MCNC: 9.2 MG/DL (ref 8.5–10.5)
CHLORIDE SERPL-SCNC: 105 MMOL/L (ref 96–112)
CHOLEST SERPL-MCNC: 172 MG/DL (ref 100–199)
CO2 SERPL-SCNC: 25 MMOL/L (ref 20–33)
CREAT SERPL-MCNC: 0.55 MG/DL (ref 0.5–1.4)
EST. AVERAGE GLUCOSE BLD GHB EST-MCNC: 117 MG/DL
FASTING STATUS PATIENT QL REPORTED: NORMAL
GLOBULIN SER CALC-MCNC: 2.1 G/DL (ref 1.9–3.5)
GLUCOSE SERPL-MCNC: 101 MG/DL (ref 65–99)
HBA1C MFR BLD: 5.7 % (ref 4–5.6)
HDLC SERPL-MCNC: 76 MG/DL
LDLC SERPL CALC-MCNC: 78 MG/DL
POTASSIUM SERPL-SCNC: 4.3 MMOL/L (ref 3.6–5.5)
PROT SERPL-MCNC: 6.3 G/DL (ref 6–8.2)
SODIUM SERPL-SCNC: 142 MMOL/L (ref 135–145)
TRIGL SERPL-MCNC: 92 MG/DL (ref 0–149)

## 2021-12-10 PROCEDURE — 36415 COLL VENOUS BLD VENIPUNCTURE: CPT

## 2021-12-10 PROCEDURE — 80061 LIPID PANEL: CPT

## 2021-12-10 PROCEDURE — 83036 HEMOGLOBIN GLYCOSYLATED A1C: CPT | Mod: GA

## 2021-12-10 PROCEDURE — 80053 COMPREHEN METABOLIC PANEL: CPT

## 2021-12-12 DIAGNOSIS — E55.9 VITAMIN D DEFICIENCY: ICD-10-CM

## 2021-12-12 DIAGNOSIS — E89.0 POSTOPERATIVE HYPOTHYROIDISM: ICD-10-CM

## 2021-12-13 RX ORDER — ERGOCALCIFEROL 1.25 MG/1
50000 CAPSULE ORAL
Qty: 12 CAPSULE | Refills: 3 | Status: SHIPPED | OUTPATIENT
Start: 2021-12-13 | End: 2022-08-10

## 2021-12-13 RX ORDER — LEVOTHYROXINE SODIUM 75 MCG
75 TABLET ORAL
Qty: 90 TABLET | Refills: 3 | Status: SHIPPED | OUTPATIENT
Start: 2021-12-13 | End: 2022-03-21 | Stop reason: SDUPTHER

## 2021-12-24 ENCOUNTER — OFFICE VISIT (OUTPATIENT)
Dept: URGENT CARE | Facility: CLINIC | Age: 71
End: 2021-12-24
Payer: MEDICARE

## 2021-12-24 VITALS
RESPIRATION RATE: 14 BRPM | HEIGHT: 67 IN | BODY MASS INDEX: 28.56 KG/M2 | TEMPERATURE: 97.8 F | DIASTOLIC BLOOD PRESSURE: 72 MMHG | HEART RATE: 72 BPM | SYSTOLIC BLOOD PRESSURE: 138 MMHG | OXYGEN SATURATION: 95 % | WEIGHT: 182 LBS

## 2021-12-24 DIAGNOSIS — M02.39 REACTIVE ARTHRITIS OF MULTIPLE SITES (HCC): ICD-10-CM

## 2021-12-24 DIAGNOSIS — M19.021 INFLAMMATION OF RIGHT ELBOW: ICD-10-CM

## 2021-12-24 PROCEDURE — 99214 OFFICE O/P EST MOD 30 MIN: CPT | Performed by: PHYSICIAN ASSISTANT

## 2021-12-24 RX ORDER — PREDNISONE 20 MG/1
20 TABLET ORAL 2 TIMES DAILY
Qty: 10 TABLET | Refills: 0 | Status: SHIPPED | OUTPATIENT
Start: 2021-12-24 | End: 2021-12-29

## 2021-12-24 ASSESSMENT — ENCOUNTER SYMPTOMS
MUSCLE WEAKNESS: 0
CHILLS: 0
FEVER: 0
NUMBNESS: 0
TINGLING: 0

## 2021-12-24 ASSESSMENT — FIBROSIS 4 INDEX: FIB4 SCORE: 1.07

## 2021-12-24 NOTE — PROGRESS NOTES
Subjective:   Margoth Hopkins is a 71 y.o. female who presents today with   Chief Complaint   Patient presents with   • Arm Pain     x 1 day, RT elbow pain swelling, hot to the touch     Arm Pain   The incident occurred 12 to 24 hours ago. The incident occurred at home. There was no injury mechanism. The pain is present in the right elbow. The quality of the pain is described as aching. The pain is mild. The pain has been constant since the incident. Pertinent negatives include no chest pain, muscle weakness, numbness or tingling. She has tried ice and rest for the symptoms. The treatment provided mild relief.     No injury or trauma.  Patient states she woke up with right elbow pain and warm to the touch this morning.  She states she has a history of reactive arthritis and also positive HLA-B27.  She states this is similar to when she has had reactive arthritis in her lower extremities or shoulder.  PMH:  has a past medical history of Anemia, Aortic insufficiency (01/31/2020), Arthritis, Breath shortness (11/02/2021), Cataract, Dental disorder, Gastric ulcer, Goiter, High cholesterol, Hypertension (11/02/2021), Hypothyroidism, Pain (01/31/2020), Psychiatric problem (11/02/2021), Pulmonary embolism (HCC) (01/31/2020), Reactive arthritis (HCC), Thyroid disease, and Thyroid disease (01/31/2020).  MEDS:   Current Outpatient Medications:   •  predniSONE (DELTASONE) 20 MG Tab, Take 1 Tablet by mouth 2 times a day for 5 days., Disp: 10 Tablet, Rfl: 0  •  SYNTHROID 75 MCG Tab, Take 1 Tablet by mouth every morning on an empty stomach., Disp: 90 Tablet, Rfl: 3  •  vitamin D2, Ergocalciferol, (DRISDOL) 1.25 MG (40664 UT) Cap capsule, Take 1 Capsule by mouth every 7 days., Disp: 12 Capsule, Rfl: 3  •  tizanidine (ZANAFLEX) 2 MG tablet, , Disp: , Rfl:   •  zolpidem (AMBIEN) 5 MG Tab, zolpidem 5 mg tablet, Disp: , Rfl:   •  rosuvastatin (CRESTOR) 40 MG tablet, Take 1 Tablet by mouth every day., Disp: 30 Tablet, Rfl: 11  •   HYDROcodone-acetaminophen (NORCO) 7.5-325 MG per tablet, as needed., Disp: , Rfl:   •  lisinopril (PRINIVIL) 20 MG Tab, TAKE 1 TABLET BY MOUTH  DAILY, Disp: 90 tablet, Rfl: 3  •  DULoxetine (CYMBALTA) 30 MG Cap DR Particles, Take 30 mg by mouth 2 times a day., Disp: , Rfl:   •  propranolol (INDERAL) 40 MG Tab, Take 1 tablet by mouth 2 times a day., Disp: 180 tablet, Rfl: 3  •  aspirin (ASA) 81 MG Chew Tab chewable tablet, Chew 1 Tab every day., Disp: 100 Tab, Rfl: 0  •  linaCLOtide (LINZESS) 72 MCG Cap, Take 1 Cap by mouth every day., Disp: 90 Cap, Rfl: 3  •  VITAMIN E PO, Take  by mouth., Disp: , Rfl:   •  COLLAGEN PO, Take  by mouth., Disp: , Rfl:   •  omeprazole (PRILOSEC) 20 MG delayed-release capsule, every day., Disp: , Rfl:   •  Probiotic Product (PROBIOTIC DAILY PO), Take  by mouth every day., Disp: , Rfl:   ALLERGIES:   Allergies   Allergen Reactions   • Morphine Vomiting   • Nsaids Unspecified     History of gastric ulcers - cannot take NSAIDS   • Pcn [Penicillins] Hives   • Gabapentin      Cognitive changes    • Seasonal      SURGHX:   Past Surgical History:   Procedure Laterality Date   • MASS EXCISION GENERAL Right 11/5/2021    Procedure: EXCISION, MASS - LATERAL THIGH;  Surgeon: Cydney Franklin M.D.;  Location: SURGERY SAME DAY [facility];  Service: General   • THYROIDECTOMY TOTAL Bilateral 2/5/2020    Procedure: THYROIDECTOMY, TOTAL- COMPLETE;  Surgeon: Cydney Franklin M.D.;  Location: SURGERY SAME DAY [facility];  Service: General   • APPENDECTOMY     • BLADDER SLING FEMALE     • CATARACT EXTRACTION WITH IOL Left    • CATARACT EXTRACTION WITH IOL Bilateral    • CERVICAL FUSION POSTERIOR     • FOOT SURGERY Left     metatarsal fusion, hammer toes correction   • FUSION      disc fusion   • GYN SURGERY      Hysterectomy   • KNEE ARTHROPLASTY TOTAL Right     x3   • KNEE REPLACEMENT, TOTAL Right    • KNEE REVISION TOTAL Right      SOCHX:  reports that she has never smoked. She has never used smokeless  "tobacco. She reports current alcohol use of about 4.2 - 8.4 oz of alcohol per week. She reports previous drug use. Drug: Oral.  FH: Reviewed with patient, not pertinent to this visit.     Review of Systems   Constitutional: Negative for chills and fever.   Cardiovascular: Negative for chest pain.   Musculoskeletal:        Right elbow pain and swelling   Neurological: Negative for tingling and numbness.      Objective:   /72   Pulse 72   Temp 36.6 °C (97.8 °F) (Temporal)   Resp 14   Ht 1.702 m (5' 7\")   Wt 82.6 kg (182 lb)   SpO2 95%   BMI 28.51 kg/m²   Physical Exam  Vitals and nursing note reviewed.   Constitutional:       General: She is not in acute distress.     Appearance: Normal appearance. She is well-developed. She is not ill-appearing or toxic-appearing.   HENT:      Head: Normocephalic and atraumatic.      Right Ear: Hearing normal.      Left Ear: Hearing normal.   Eyes:      Conjunctiva/sclera: Conjunctivae normal.   Cardiovascular:      Rate and Rhythm: Normal rate.   Pulmonary:      Effort: Pulmonary effort is normal.   Musculoskeletal:        Arms:       Comments: Patient still can fully flex and extend the right elbow.  Minimal swelling to the posterior aspect of the right elbow.  Erythema to this area and warm to the touch that is localized to the posterior aspect.  Neurovascular intact distally.  No fluid collection that can be drained at this time.  Still able to see you olecranon of the right elbow as swelling is not too significant.   Skin:     General: Skin is warm and dry.   Neurological:      Mental Status: She is alert.      Coordination: Coordination normal.   Psychiatric:         Mood and Affect: Mood normal.         Assessment/Plan:   Assessment    1. Inflammation of right elbow  - predniSONE (DELTASONE) 20 MG Tab; Take 1 Tablet by mouth 2 times a day for 5 days.  Dispense: 10 Tablet; Refill: 0    2. Reactive arthritis of multiple sites (HCC)  - Referral to " Rheumatology  Symptoms and presentation today are consistent with inflammation of the right elbow.  Presentation is more consistent with gout versus arthritis rather than septic arthritis at this time.  Patient is not having any flulike symptoms including fever or chills.  No injury or trauma leading up to this.  Not a large amount of swelling to the elbow at this time but did discuss with patient ER and red flag precautions if erythema worsens or any new symptoms or swelling occurs.  She is understanding of this.  Did discuss septic arthritis with patient although low suspicion for this at this time she will have a low threshold to go into the ER if symptoms are not improving with steroids.  Patient is understanding and agreeable with use of steroids and understands possible side effects.  She has had to use oral steroids in the past for reactive arthritis but states it has been a while since she has had any flareups.  She would also like to have a referral to rheumatology she has never seen one in the past.  Differential diagnosis, natural history, supportive care, and indications for immediate follow-up discussed.   Patient given instructions and understanding of medications and treatment.    If not improving in 3-5 days, F/U with PCP or return to UC if symptoms worsen.  Strict ER precautions given.  Patient agreeable to plan.  Greater than 30 minutes were spent reviewing patient's chart, examining and obtaining history from patient, and discussing plan of care.       Please note that this dictation was created using voice recognition software. I have made every reasonable attempt to correct obvious errors, but I expect that there are errors of grammar and possibly content that I did not discover before finalizing the note.    Tonio Butler PA-C

## 2021-12-30 ENCOUNTER — OFFICE VISIT (OUTPATIENT)
Dept: MEDICAL GROUP | Age: 71
End: 2021-12-30
Payer: MEDICARE

## 2021-12-30 VITALS
HEIGHT: 67 IN | HEART RATE: 69 BPM | OXYGEN SATURATION: 94 % | TEMPERATURE: 97.4 F | BODY MASS INDEX: 27.94 KG/M2 | DIASTOLIC BLOOD PRESSURE: 66 MMHG | SYSTOLIC BLOOD PRESSURE: 116 MMHG | WEIGHT: 178 LBS

## 2021-12-30 DIAGNOSIS — M54.50 LUMBOSACRAL PAIN, CHRONIC: ICD-10-CM

## 2021-12-30 DIAGNOSIS — R22.41 MASS OF RIGHT THIGH: ICD-10-CM

## 2021-12-30 DIAGNOSIS — G25.0 ESSENTIAL TREMOR: ICD-10-CM

## 2021-12-30 DIAGNOSIS — G89.29 LUMBOSACRAL PAIN, CHRONIC: ICD-10-CM

## 2021-12-30 DIAGNOSIS — F41.1 GENERALIZED ANXIETY DISORDER: ICD-10-CM

## 2021-12-30 DIAGNOSIS — L03.113 CELLULITIS OF RIGHT ELBOW: ICD-10-CM

## 2021-12-30 DIAGNOSIS — I10 BENIGN ESSENTIAL HTN: ICD-10-CM

## 2021-12-30 DIAGNOSIS — M70.21 OLECRANON BURSITIS OF RIGHT ELBOW: ICD-10-CM

## 2021-12-30 PROCEDURE — 99214 OFFICE O/P EST MOD 30 MIN: CPT | Performed by: FAMILY MEDICINE

## 2021-12-30 RX ORDER — CLINDAMYCIN HYDROCHLORIDE 300 MG/1
300 CAPSULE ORAL 3 TIMES DAILY
Qty: 30 CAPSULE | Refills: 0 | Status: SHIPPED | OUTPATIENT
Start: 2021-12-30 | End: 2022-01-09

## 2021-12-30 RX ORDER — KETOROLAC TROMETHAMINE 30 MG/ML
60 INJECTION, SOLUTION INTRAMUSCULAR; INTRAVENOUS ONCE
Status: COMPLETED | OUTPATIENT
Start: 2021-12-30 | End: 2021-12-30

## 2021-12-30 RX ORDER — PROPRANOLOL HYDROCHLORIDE 40 MG/1
40 TABLET ORAL 3 TIMES DAILY
Qty: 270 TABLET | Refills: 3 | Status: SHIPPED | OUTPATIENT
Start: 2021-12-30 | End: 2022-01-17

## 2021-12-30 RX ADMIN — KETOROLAC TROMETHAMINE 60 MG: 30 INJECTION, SOLUTION INTRAMUSCULAR; INTRAVENOUS at 16:05

## 2021-12-30 ASSESSMENT — FIBROSIS 4 INDEX: FIB4 SCORE: 1.07

## 2021-12-31 PROBLEM — R22.41 MASS OF RIGHT THIGH: Status: RESOLVED | Noted: 2021-09-20 | Resolved: 2021-12-31

## 2021-12-31 NOTE — PROGRESS NOTES
Subjective:   CC: right elbow pain     HPI:     Margoth Hopkins is a 71 y.o. female, established patient of the clinic.     Patient has chronic hypertension, MOHAMUD, essential tremor, and chronic lower back pain. Chronic medical conditions are under good control with medications. She is working with neurosurgery and physical therapy for lower back pain. Her symptoms are improving. She complained of worsening essential tremors over the past few months. She was recently prescribed oral steroid which causes her anxiety and tremors to get worse.      She woke up on 12/24/2021 with acute, severe pain and redness at the right elbow without history of trauma. She tried leftover Norco for pain but it only offers mild relief of symptoms. She went to urgent care and was prescribed 5-day course of oral prednisone 20 mg BID. She endorses pain relief while taking oral steroid. However, she went into relapse and experiences sharp shooting pain at the right elbow. The redness and swelling appear to get worse. Pain is radiating distally to forearm. Right elbow ROM is reduced and somewhat limits her daily activity. She is in a lot of pain during office visit and reports feeling miserable.     Current medicines (including changes today)  Current Outpatient Medications   Medication Sig Dispense Refill   • clindamycin (CLEOCIN) 300 MG Cap Take 1 Capsule by mouth 3 times a day for 10 days. 30 Capsule 0   • propranolol (INDERAL) 40 MG Tab Take 1 Tablet by mouth 3 times a day. 270 Tablet 3   • SYNTHROID 75 MCG Tab Take 1 Tablet by mouth every morning on an empty stomach. 90 Tablet 3   • vitamin D2, Ergocalciferol, (DRISDOL) 1.25 MG (70781 UT) Cap capsule Take 1 Capsule by mouth every 7 days. 12 Capsule 3   • tizanidine (ZANAFLEX) 2 MG tablet      • rosuvastatin (CRESTOR) 40 MG tablet Take 1 Tablet by mouth every day. 30 Tablet 11   • HYDROcodone-acetaminophen (NORCO) 7.5-325 MG per tablet as needed.     • lisinopril (PRINIVIL) 20 MG Tab  "TAKE 1 TABLET BY MOUTH  DAILY 90 tablet 3   • DULoxetine (CYMBALTA) 30 MG Cap DR Particles Take 30 mg by mouth 2 times a day.     • aspirin (ASA) 81 MG Chew Tab chewable tablet Chew 1 Tab every day. 100 Tab 0   • VITAMIN E PO Take  by mouth.     • COLLAGEN PO Take  by mouth.     • omeprazole (PRILOSEC) 20 MG delayed-release capsule every day.     • Probiotic Product (PROBIOTIC DAILY PO) Take  by mouth every day.       No current facility-administered medications for this visit.     She  has a past medical history of Anemia, Aortic insufficiency (01/31/2020), Arthritis, Breath shortness (11/02/2021), Cataract, Dental disorder, Gastric ulcer, Goiter, High cholesterol, Hypertension (11/02/2021), Hypothyroidism, Pain (01/31/2020), Psychiatric problem (11/02/2021), Pulmonary embolism (HCC) (01/31/2020), Reactive arthritis (HCC), Thyroid disease, and Thyroid disease (01/31/2020).    I reviewed patient's problem list, allergies, medications, family hx, social hx with patient and update EPIC.        Objective:     /66 (BP Location: Left arm, Patient Position: Sitting, BP Cuff Size: Adult long)   Pulse 69   Temp 36.3 °C (97.4 °F) (Temporal)   Ht 1.702 m (5' 7\")   Wt 80.7 kg (178 lb)   SpO2 94%  Body mass index is 27.88 kg/m².    Physical Exam:  Constitutional: awake, alert, in no distress.  Skin: Warm, dry, good turgor, no rashes, bruises, ulcers in visible areas.  Eye: conjunctiva clear, lids neg for edema or lesions.  Neck: Trachea midline, no masses, no thyromegaly. No cervical or supraclavicular lymphadenopathy  Respiratory: Unlabored respiratory effort, lungs clear to auscultation, no wheezes, no rales.  Cardiovascular: Normal S1, S2, no murmur, no pedal edema.  Psych: Oriented x3, affect and mood wnl, intact judgement and insight.   MSK: right olecranon bursa is moderately tender to palpation with moderate erythema and edema. There is mid-moderate surrounding soft tissue erythema/edema. Minimal reduction of " ROM of the right elbow joint.     Assessment and Plan:   The following treatment plan was discussed    1. Cellulitis of right elbow  2. Olecranon bursitis of right elbow  History and exam are concerning for acute cellulitis of the right elbow and olecranon bursitis. The affected area is marked during office visit. Patient was advised to go to urgent care or ER if erythema continues to spread outside the marked area.   - clindamycin (CLEOCIN) 300 MG Cap; Take 1 Capsule by mouth 3 times a day for 10 days.  Dispense: 30 Capsule; Refill: 0  - ketorolac (TORADOL) injection 60 mg  - ok to take left over Norco PRN for pain.   - follow up on 1/3/2022.     3. Mass of right thigh  Patient had right thigh mass biopsy in 11/2021.   Pathology reports lipoma. The wound on right thigh is healing well.   Appropriate counseling provided     4. Lumbosacral pain, chronic, with right sciatica_ sweet [place]  Chronic working with [facility]. She takes Norco PRN for pain but rare.   She works with physical therapy which has helped with symptoms.   Nerve stimulator was previously offered, but she declined.   - follow up with [facility] as directed  - continue to work with physical therapy   - weight loss, regular exercise, activity modification.     5. Essential tremor  Chronic, currently taking Propranolol 40 mg BID. She complained of recent worsening of essential tremors. Will increase dose to 40 mg TID. If not improve, can consider to add Primidone.   - propranolol (INDERAL) 40 MG Tab; Take 1 Tablet by mouth 3 times a day.  Dispense: 270 Tablet; Refill: 3    6. MOHAMUD (generalized anxiety disorder)  Chronic, controlled with Cymbalta 30 mg BID, no s/e reported, will continue.      7. Benign essential HTN  Chronic, controlled with Lisinopril 20 mg qd, no s/e reported, will continue.       Charlee Walker M.D.      Followup: Return in about 3 days (around 1/2/2022) for cellulitis .    Please note that this dictation was created using voice recognition  software. I have made every reasonable attempt to correct obvious errors, but I expect that there are errors of grammar and possibly content that I did not discover before finalizing the note.

## 2022-01-03 ENCOUNTER — OFFICE VISIT (OUTPATIENT)
Dept: MEDICAL GROUP | Age: 72
End: 2022-01-03
Payer: MEDICARE

## 2022-01-03 VITALS
OXYGEN SATURATION: 94 % | HEIGHT: 67 IN | BODY MASS INDEX: 27.94 KG/M2 | TEMPERATURE: 96.9 F | WEIGHT: 178 LBS | HEART RATE: 63 BPM | DIASTOLIC BLOOD PRESSURE: 64 MMHG | SYSTOLIC BLOOD PRESSURE: 130 MMHG

## 2022-01-03 DIAGNOSIS — M70.21 OLECRANON BURSITIS OF RIGHT ELBOW: ICD-10-CM

## 2022-01-03 DIAGNOSIS — L03.113 CELLULITIS OF RIGHT ELBOW: ICD-10-CM

## 2022-01-03 PROCEDURE — 20605 DRAIN/INJ JOINT/BURSA W/O US: CPT | Mod: RT | Performed by: FAMILY MEDICINE

## 2022-01-03 PROCEDURE — 99213 OFFICE O/P EST LOW 20 MIN: CPT | Mod: 25 | Performed by: FAMILY MEDICINE

## 2022-01-03 RX ORDER — KETOROLAC TROMETHAMINE 30 MG/ML
60 INJECTION, SOLUTION INTRAMUSCULAR; INTRAVENOUS ONCE
Status: COMPLETED | OUTPATIENT
Start: 2022-01-03 | End: 2022-01-03

## 2022-01-03 RX ADMIN — KETOROLAC TROMETHAMINE 60 MG: 30 INJECTION, SOLUTION INTRAMUSCULAR; INTRAVENOUS at 12:27

## 2022-01-03 ASSESSMENT — PATIENT HEALTH QUESTIONNAIRE - PHQ9: CLINICAL INTERPRETATION OF PHQ2 SCORE: 0

## 2022-01-03 ASSESSMENT — FIBROSIS 4 INDEX: FIB4 SCORE: 1.07

## 2022-01-04 NOTE — PROGRESS NOTES
Subjective:   CC: cellulitis right elbow follow up     HPI:     Margoth Hopkins is a 71 y.o. female, established patient of the clinic.  Patient was seen by myself on December 30, 2021 for acute right elbow pain, redness.  Below his previous HPI:    She woke up on 12/24/2021 with acute, severe pain and redness at the right elbow without history of trauma. She tried leftover Norco for pain but it only offers mild relief of symptoms. She went to urgent care and was prescribed 5-day course of oral prednisone 20 mg BID. She endorses pain relief while taking oral steroid. However, she went into relapse and experiences sharp shooting pain at the right elbow. The redness and swelling appear to get worse. Pain is radiating distally to forearm. Right elbow ROM is reduced and somewhat limits her daily activity. She is in a lot of pain during office visit and reports feeling miserable.     Interval history:   Patient was diagnosed with olecranon bursitis and right elbow cellulitis.  She was treated with clindamycin 300 mg 3 times daily x10 days.  Patient is on day number fourth of the treatment.  The redness at the right elbow has improved approximately 50%.  However, her pain is still persistent.  She has been taking 0.5 leftover Norco 7.5-325 mg twice daily for pain.  She is not able to tolerate oral NSAIDs due to stomach problem.  At previous office visit, she received injection of Toradol which was helpful with her symptoms.  Negative fever, chills, nausea, vomiting.      Current medicines (including changes today)  Current Outpatient Medications   Medication Sig Dispense Refill   • clindamycin (CLEOCIN) 300 MG Cap Take 1 Capsule by mouth 3 times a day for 10 days. 30 Capsule 0   • propranolol (INDERAL) 40 MG Tab Take 1 Tablet by mouth 3 times a day. 270 Tablet 3   • SYNTHROID 75 MCG Tab Take 1 Tablet by mouth every morning on an empty stomach. 90 Tablet 3   • vitamin D2, Ergocalciferol, (DRISDOL) 1.25 MG (59826 UT) Cap  "capsule Take 1 Capsule by mouth every 7 days. 12 Capsule 3   • tizanidine (ZANAFLEX) 2 MG tablet      • rosuvastatin (CRESTOR) 40 MG tablet Take 1 Tablet by mouth every day. 30 Tablet 11   • HYDROcodone-acetaminophen (NORCO) 7.5-325 MG per tablet as needed.     • lisinopril (PRINIVIL) 20 MG Tab TAKE 1 TABLET BY MOUTH  DAILY 90 tablet 3   • DULoxetine (CYMBALTA) 30 MG Cap DR Particles Take 30 mg by mouth 2 times a day.     • aspirin (ASA) 81 MG Chew Tab chewable tablet Chew 1 Tab every day. 100 Tab 0   • VITAMIN E PO Take  by mouth.     • COLLAGEN PO Take  by mouth.     • omeprazole (PRILOSEC) 20 MG delayed-release capsule every day.     • Probiotic Product (PROBIOTIC DAILY PO) Take  by mouth every day.       No current facility-administered medications for this visit.     She  has a past medical history of Anemia, Aortic insufficiency (01/31/2020), Arthritis, Breath shortness (11/02/2021), Cataract, Dental disorder, Gastric ulcer, Goiter, High cholesterol, Hypertension (11/02/2021), Hypothyroidism, Pain (01/31/2020), Psychiatric problem (11/02/2021), Pulmonary embolism (HCC) (01/31/2020), Reactive arthritis (HCC), Thyroid disease, and Thyroid disease (01/31/2020).    I reviewed patient's problem list, allergies, medications, family hx, social hx with patient and update EPIC.        Objective:     /64 (BP Location: Left arm, Patient Position: Sitting, BP Cuff Size: Adult)   Pulse 63   Temp 36.1 °C (96.9 °F) (Temporal)   Ht 1.702 m (5' 7\")   Wt 80.7 kg (178 lb)   SpO2 94%  Body mass index is 27.88 kg/m².    Physical Exam:  Constitutional: awake, alert, in no distress.  Skin: Warm, dry, good turgor, no rashes, bruises, ulcers in visible areas.  - 50% reduction of right elbow erythema from the marked area.  - mild-moderate tenderness to palpation   - fluid collection noted at the right olecranon bursa.   Eye: conjunctiva clear, lids neg for edema or lesions.  Neck: Trachea midline, no masses, no thyromegaly. " No cervical or supraclavicular lymphadenopathy  Respiratory: Unlabored respiratory effort, lungs clear to auscultation, no wheezes, no rales.  Cardiovascular: Normal S1, S2, no murmur, no pedal edema.  Psych: Oriented x3, affect and mood wnl, intact judgement and insight.       Assessment and Plan:   The following treatment plan was discussed    1. Cellulitis of right elbow  2. Olecranon bursitis of right elbow  72 yo female who was recently diagnosed with right elbow cellulitis and right olecranon bursitis. She is on day #5 of oral Clindamycin. Erythema has reduced approximately 50% from the previously marked area, but her pain is still persistent. She takes left over 0.5 tablet of Norco 7.5-325 mg BID for pain. She is not able to tolerate NSAID due to GI problems. Toradol injection at previous office visit was helpful.   - ketorolac (TORADOL) injection 60 mg  - continue Clindamycin as directed.   - 0.5 tablet of Norco 7.5-325 mg QID PRN for pain   - olecranon aspiration to relieve pressure and hopefully improve pain.   - follow up in 3 days       Procedure note: Olecranon Aspiration  Indication: olecranon bursitis   Verbal PARQ obtained. Risks and benefits discussed with patient. Pt consented to the procedure.   The aspiration site was identified and cleansed with alcohol.   Local anesthesia is achieved vaso-coolant sprays.   Tiny bit of clear fluid is aspirated. Some is drained from injection site.   The injection site is dressed with sterile bandage.   Pt is advised to monitor for s/s of infection, call if he/she has concerns.   Wound care instructions provided.   Pt tolerates the procedure well, no immediate complications noted.         Charlee Walker M.D.      Followup: Return in about 3 days (around 1/6/2022) for cellulitis .    Please note that this dictation was created using voice recognition software. I have made every reasonable attempt to correct obvious errors, but I expect that there are errors of  grammar and possibly content that I did not discover before finalizing the note.

## 2022-01-07 ENCOUNTER — TELEMEDICINE (OUTPATIENT)
Dept: MEDICAL GROUP | Age: 72
End: 2022-01-07
Payer: MEDICARE

## 2022-01-07 VITALS — WEIGHT: 180 LBS | HEIGHT: 67 IN | BODY MASS INDEX: 28.25 KG/M2

## 2022-01-07 DIAGNOSIS — M70.21 OLECRANON BURSITIS OF RIGHT ELBOW: ICD-10-CM

## 2022-01-07 DIAGNOSIS — L03.113 CELLULITIS OF RIGHT ELBOW: ICD-10-CM

## 2022-01-07 PROCEDURE — 99213 OFFICE O/P EST LOW 20 MIN: CPT | Mod: 95 | Performed by: FAMILY MEDICINE

## 2022-01-07 ASSESSMENT — FIBROSIS 4 INDEX: FIB4 SCORE: 1.07

## 2022-01-07 NOTE — Clinical Note
This patient will come in on Monday 1/10 to see you for wound check. Please call me when you see this patient. Thanks.   Charlee Walker M.D.

## 2022-01-09 NOTE — PROGRESS NOTES
Patient straight cathed at this time for urine specimen. Patient tolerated without difficulty.  Patient had a complete bed and linen change at this time      Shanita Viramontes RN  04/07/19 2587 Virtual Visit: Established Patient   This visit was conducted via Zoom using secure and encrypted videoconferencing technology. The patient was in a private location in the state of [place].    The patient's identity was confirmed and verbal consent was obtained for this virtual visit.    Subjective:   CC: cellulitis of right elbow     Margoth Hopkins is a 71 y.o. female who was diagnosed with cellulitis and olecranon bursitis of the right elbow. She is taking Clindamycin 300 mg TID for about 7 days. We attempted I&D of the olecranon bursa to release pressure couple days ago, but no much fluid was collected. Erythema and edema of the right elbow are improving significantly. Her pain has improved about 25%. She is not able to tolerate NSAID due to stomach issue. She has been taking leftover Norco 7.5-325 m.5 tablet TID PRN. Negative fever, chills, nausea, vomiting. She continues to be able to function normally.       Allergies   Allergen Reactions   • Morphine Vomiting   • Nsaids Unspecified     History of gastric ulcers - cannot take NSAIDS   • Pcn [Penicillins] Hives   • Gabapentin      Cognitive changes    • Seasonal        Current medicines (including changes today)  Current Outpatient Medications   Medication Sig Dispense Refill   • clindamycin (CLEOCIN) 300 MG Cap Take 1 Capsule by mouth 3 times a day for 10 days. 30 Capsule 0   • propranolol (INDERAL) 40 MG Tab Take 1 Tablet by mouth 3 times a day. 270 Tablet 3   • SYNTHROID 75 MCG Tab Take 1 Tablet by mouth every morning on an empty stomach. 90 Tablet 3   • vitamin D2, Ergocalciferol, (DRISDOL) 1.25 MG (63343 UT) Cap capsule Take 1 Capsule by mouth every 7 days. 12 Capsule 3   • tizanidine (ZANAFLEX) 2 MG tablet      • rosuvastatin (CRESTOR) 40 MG tablet Take 1 Tablet by mouth every day. 30 Tablet 11   • HYDROcodone-acetaminophen (NORCO) 7.5-325 MG per tablet as needed.     • lisinopril (PRINIVIL) 20 MG Tab TAKE 1 TABLET BY MOUTH  DAILY 90 tablet 3   •  DULoxetine (CYMBALTA) 30 MG Cap DR Particles Take 30 mg by mouth 2 times a day.     • aspirin (ASA) 81 MG Chew Tab chewable tablet Chew 1 Tab every day. 100 Tab 0   • VITAMIN E PO Take  by mouth.     • COLLAGEN PO Take  by mouth.     • omeprazole (PRILOSEC) 20 MG delayed-release capsule every day.     • Probiotic Product (PROBIOTIC DAILY PO) Take  by mouth every day.       No current facility-administered medications for this visit.       Patient Active Problem List    Diagnosis Date Noted   • Osteopenia of multiple sites 03/31/2021   • Postoperative hypothyroidism_s/p thyroidectomy_Dr. Mullen 06/12/2020   • Generalized osteoarthritis 06/12/2020   • Drug induced constipation 06/12/2020   • Hashimoto's thyroiditis s/p total thyroidectom Dr. Franklin in 2/2020.  02/20/2020   • Essential tremor 10/31/2019   • Calculus of gallbladder with biliary obstruction but without cholecystitis 10/14/2018   • Chronic headache 03/20/2018   • Vitiligo 06/26/2017   • FHx: colon cancer 02/23/2017   • Schatzki's ring_DHA 02/23/2017   • Benign essential HTN 02/23/2017   • Pure hypercholesterolemia 02/23/2017   • MOHAMUD (generalized anxiety disorder) 02/23/2017   • HLA B27 (HLA B27 positive) 02/23/2017   • Primary osteoarthritis of right knee, S/P TKR + 2 revision surgeries_Dr. Lomeli 02/23/2017   • Lumbosacral pain, chronic, with right sciatica_ [place] 02/23/2017   • Insomnia 02/23/2017       Family History   Problem Relation Age of Onset   • Arthritis Mother    • Alzheimer's Disease Mother    • Cancer Mother         cervical cancer   • Heart Attack Father    • Other Brother         brain anurism   • Hypertension Brother    • Cancer Maternal Grandmother 66        colono cancer   • Heart Disease Brother    • Hypertension Brother    • Arthritis Brother    • Other Brother         Celiac Disease   • No Known Problems Maternal Grandfather    • No Known Problems Paternal Grandmother    • No Known Problems Paternal Grandfather    •  "Hypertension Brother    • Arthritis Brother    • Lung Disease Brother    • Asthma Brother        She  has a past medical history of Anemia, Aortic insufficiency (01/31/2020), Arthritis, Breath shortness (11/02/2021), Cataract, Dental disorder, Gastric ulcer, Goiter, High cholesterol, Hypertension (11/02/2021), Hypothyroidism, Pain (01/31/2020), Psychiatric problem (11/02/2021), Pulmonary embolism (HCC) (01/31/2020), Reactive arthritis (HCC), Thyroid disease, and Thyroid disease (01/31/2020).  She  has a past surgical history that includes knee replacement, total (Right); fusion; cervical fusion posterior; bladder sling female; cataract extraction with iol (Left); cataract extraction with iol (Bilateral); thyroidectomy total (Bilateral, 2/5/2020); appendectomy; gyn surgery; foot surgery (Left); knee arthroplasty total (Right); knee revision total (Right); and mass excision general (Right, 11/5/2021).       Objective:   Ht 1.702 m (5' 7\")   Wt 81.6 kg (180 lb) Comment: pt stated  BMI 28.19 kg/m²     Physical Exam:  Constitutional: Alert, no distress, well-groomed.  Skin: No rashes in visible areas.  Eye: Round. Conjunctiva clear, lids normal. No icterus.   ENMT: Lips pink without lesions, good dentition, moist mucous membranes. Phonation normal.  Neck: No masses, no thyromegaly. Moves freely without pain.  Respiratory: Unlabored respiratory effort, no cough or audible wheeze  Psych: Alert and oriented x3, normal affect and mood.       Assessment and Plan:   The following treatment plan was discussed:     1. Cellulitis of right elbow  2. Olecranon bursitis of right elbow  - continue clindamycin  - icing, elbow sleeve  - ER precautions discussed   - she will return to the clinic in 3 days for wound check with our RN.          Follow-up: Return in about 3 days (around 1/10/2022) for Wound Check.         "

## 2022-01-10 ENCOUNTER — NON-PROVIDER VISIT (OUTPATIENT)
Dept: MEDICAL GROUP | Age: 72
End: 2022-01-10
Payer: MEDICARE

## 2022-01-10 DIAGNOSIS — M71.021 OLECRANON BURSA ABSCESS, RIGHT: ICD-10-CM

## 2022-01-10 NOTE — PROGRESS NOTES
Pt returns to office for right elbow cellulitis check. Pt completed her clindamycin prescription last night. Her right elbow is red, warm to the touch, painful (5/10) and swollen. Pt stated this all has improved since last week's visit with Dr Walker.   Dr Walker in to assess site. Dr Walker stated the redness/swelling is much improved. Dr Walker outlined the redness. She prescribed diclofenac gel for pain relief. Educated pt to ice her elbow and apply a light compression.   Pt provided RN direct line for further questions or concerns. Pt verbalized understanding to all instructions.

## 2022-01-16 DIAGNOSIS — G25.0 ESSENTIAL TREMOR: ICD-10-CM

## 2022-01-17 RX ORDER — PROPRANOLOL HYDROCHLORIDE 40 MG/1
TABLET ORAL
Qty: 180 TABLET | Refills: 3 | Status: SHIPPED | OUTPATIENT
Start: 2022-01-17 | End: 2023-01-05 | Stop reason: SDUPTHER

## 2022-03-15 DIAGNOSIS — I10 BENIGN ESSENTIAL HTN: ICD-10-CM

## 2022-03-17 RX ORDER — LISINOPRIL 20 MG/1
TABLET ORAL
Qty: 90 TABLET | Refills: 3 | Status: SHIPPED | OUTPATIENT
Start: 2022-03-17 | End: 2023-03-13 | Stop reason: SDUPTHER

## 2022-03-21 DIAGNOSIS — E89.0 POSTOPERATIVE HYPOTHYROIDISM: ICD-10-CM

## 2022-03-21 RX ORDER — LEVOTHYROXINE SODIUM 75 MCG
75 TABLET ORAL
Qty: 90 TABLET | Refills: 3 | Status: SHIPPED | OUTPATIENT
Start: 2022-03-21 | End: 2022-08-24 | Stop reason: SDUPTHER

## 2022-03-21 NOTE — TELEPHONE ENCOUNTER
Received request via: Patient    Was the patient seen in the last year in this department? Yes    Does the patient have an active prescription (recently filled or refills available) for medication(s) requested? No     Patient would like us to call in a 5 day supply to [facility] on [facility] until her [employer]  Mail Order arrives.

## 2022-03-23 ENCOUNTER — TELEPHONE (OUTPATIENT)
Dept: ENDOCRINOLOGY | Facility: MEDICAL CENTER | Age: 72
End: 2022-03-23
Payer: MEDICARE

## 2022-03-23 NOTE — TELEPHONE ENCOUNTER
VOICEMAIL  1. Caller Name: Margoth Hopkins                        Call Back Number: [phone] (home)      2. Message: Patient called and left a message stating that she needed a refill on her SYNTHROID 75 MCG Tab medication. I have called patient and spoke with her letting her know that medication has been sent over to her pharmacy.     3. Patient approves office to leave a detailed voicemail/MyChart message: N\A

## 2022-05-12 ENCOUNTER — TELEPHONE (OUTPATIENT)
Dept: MEDICAL GROUP | Age: 72
End: 2022-05-12
Payer: MEDICARE

## 2022-05-12 DIAGNOSIS — I10 BENIGN ESSENTIAL HTN: ICD-10-CM

## 2022-05-12 DIAGNOSIS — E78.00 PURE HYPERCHOLESTEROLEMIA: ICD-10-CM

## 2022-05-12 NOTE — TELEPHONE ENCOUNTER
Called patient to reschedule her June 7th appointment due to template and scheduling errors and provider being over fill-rate.   Patient requested annual blood work be ordered for new AWV appointment on 7/14    Vanessa RUSSELL, Panel Coordinator  [facility] [address]

## 2022-05-17 ASSESSMENT — ENCOUNTER SYMPTOMS
WHEEZING: 0
PALPITATIONS: 0
FOCAL WEAKNESS: 0
NEAR-SYNCOPE: 0
ABDOMINAL PAIN: 0
WEAKNESS: 0
VOMITING: 0
DYSPNEA ON EXERTION: 0
COUGH: 0
DIZZINESS: 0
NIGHT SWEATS: 0
PND: 0
IRREGULAR HEARTBEAT: 0
SYNCOPE: 0
SHORTNESS OF BREATH: 0
DIARRHEA: 0
ORTHOPNEA: 0
NAUSEA: 0
FEVER: 0

## 2022-05-17 NOTE — PROGRESS NOTES
Cardiology Follow-up Consultation Note    Date of note: 05/18/22  Primary Care Provider: Charlee Walker M.D.    Patient Name: Margoth Hopkins   YOB: 1950  MRN:              [id number]    Chief Complaint: Follow-up hypertension and dyslipidemia    History of Present Illness: Ms. Margoth Hopkins is a 71 y.o. female whose current medical problems include hypertension, dyslipidemia, goiter status post total thyroidectomy, GI bleed secondary to gastric ulcers, Schatzki's ring (gets regular dilatations) who is here for follow-up hypertension and dyslipidemia.    The patient was last seen in my clinic on 11/17/2021 (her initial visit with me).  The patient was doing well during the last visit.  Rosuvastatin was increased due to LDL being elevated.  No other changes were made to her medications.    The patient returns today for follow-up.  She reports feeling well today.  She denies any chest pain or shortness of breath.  Denies any palpitations.  No orthopnea, PND, or leg swelling.  No syncope or presyncopal episodes.    Cardiovascular Risk Factors:  1. Smoking status: Never smoker  2. Type II Diabetes Mellitus: None  Lab Results   Component Value Date/Time    HBA1C 5.7 (H) 12/10/2021 08:48 AM    HBA1C 5.2 02/13/2018 02:50 PM     3. Hypertension: On medication  4. Dyslipidemia: On statin  Cholesterol,Tot   Date Value Ref Range Status   06/04/2021 200 (H) 100 - 199 mg/dL Final     LDL   Date Value Ref Range Status   06/04/2021 104 (H) <100 mg/dL Final     HDL   Date Value Ref Range Status   06/04/2021 79 >=40 mg/dL Final     Triglycerides   Date Value Ref Range Status   06/04/2021 85 0 - 149 mg/dL Final     5. Family history of early Coronary Artery Disease in a first degree relative (Male less than 55 years of age; Female less than 65 years of age): Brother with MI in 50s  6.  Obesity and/or Metabolic Syndrome: BMI 29.29  7. Sedentary lifestyle: Not sedentary, but not active    Review of Systems    Constitutional: Negative for fever, malaise/fatigue and night sweats.   Cardiovascular: Negative for chest pain, dyspnea on exertion, irregular heartbeat, leg swelling, near-syncope, orthopnea, palpitations, paroxysmal nocturnal dyspnea and syncope.   Respiratory: Negative for cough, shortness of breath and wheezing.    Gastrointestinal: Negative for abdominal pain, diarrhea, nausea and vomiting.   Neurological: Negative for dizziness, focal weakness and weakness.         All other systems reviewed and are negative.       Current Outpatient Medications   Medication Sig Dispense Refill   • pregabalin (LYRICA) 50 MG capsule Take 50 mg by mouth 2 times a day.     • SYNTHROID 75 MCG Tab Take 1 Tablet by mouth every morning on an empty stomach. 90 Tablet 3   • lisinopril (PRINIVIL) 20 MG Tab TAKE 1 TABLET BY MOUTH  DAILY 90 Tablet 3   • propranolol (INDERAL) 40 MG Tab TAKE 1 TABLET BY MOUTH  TWICE DAILY (Patient taking differently: 3 times a day.) 180 Tablet 3   • vitamin D2, Ergocalciferol, (DRISDOL) 1.25 MG (55108 UT) Cap capsule Take 1 Capsule by mouth every 7 days. 12 Capsule 3   • tizanidine (ZANAFLEX) 2 MG tablet      • rosuvastatin (CRESTOR) 40 MG tablet Take 1 Tablet by mouth every day. 30 Tablet 11   • HYDROcodone-acetaminophen (NORCO) 7.5-325 MG per tablet as needed.     • DULoxetine (CYMBALTA) 30 MG Cap DR Particles Take 30 mg by mouth 2 times a day.     • VITAMIN E PO Take  by mouth.     • COLLAGEN PO Take  by mouth.     • omeprazole (PRILOSEC) 20 MG delayed-release capsule every day.     • Probiotic Product (PROBIOTIC DAILY PO) Take  by mouth every day.     • HYDROcodone-acetaminophen (NORCO) 5-325 MG Tab per tablet TAKE ONE TABLET BY MOUTH EVERY FOUR TO SIX HOURS AS NEEDED FOR PAIN     • aspirin (ASA) 81 MG Chew Tab chewable tablet Chew 1 Tab every day. (Patient not taking: Reported on 5/18/2022) 100 Tab 0     No current facility-administered medications for this visit.         Allergies   Allergen  "Reactions   • Morphine Vomiting   • Nsaids Unspecified     History of gastric ulcers - cannot take NSAIDS   • Pcn [Penicillins] Hives   • Gabapentin      Cognitive changes    • Seasonal        Physical Exam:  Ambulatory Vitals  /70 (BP Location: Left arm, Patient Position: Sitting, BP Cuff Size: Adult)   Pulse 68   Resp 16   Ht 1.702 m (5' 7\")   Wt 83 kg (183 lb)   SpO2 96%    Oxygen Therapy:  Pulse Oximetry: 96 %  BP Readings from Last 4 Encounters:   05/18/22 132/70   01/03/22 130/64   12/30/21 116/66   12/24/21 138/72       Weight/BMI: Body mass index is 28.66 kg/m².  Wt Readings from Last 4 Encounters:   05/18/22 83 kg (183 lb)   05/03/22 80.7 kg (178 lb)   01/07/22 81.6 kg (180 lb)   01/03/22 80.7 kg (178 lb)         General: Well appearing and in no apparent distress  Eyes: nl conjunctiva, no icteric sclera  ENT: wearing a mask, normal external appearance of ears  Neck: no visible JVP,  no carotid bruits  Lungs: normal respiratory effort, CTAB  Heart: RRR, no murmurs, no rubs or gallops,  no edema bilateral lower extremities. No LV/RV heave on cardiac palpatation. + bilateral radial pulses.  + bilateral dp pulses.   Abdomen: soft, non tender, non distended, no masses, normal bowel sounds.  No HSM.  Extremities/MSK: no clubbing, no cyanosis  Neurological: No focal sensory deficits  Psychiatric: Appropriate affect, A/O x 3, intact judgement and insight  Skin: Warm extremities      Lab Data Review:  Lab Results   Component Value Date/Time    CHOLSTRLTOT 172 12/10/2021 08:48 AM    LDL 78 12/10/2021 08:48 AM    HDL 76 12/10/2021 08:48 AM    TRIGLYCERIDE 92 12/10/2021 08:48 AM       Lab Results   Component Value Date/Time    SODIUM 142 12/10/2021 08:48 AM    POTASSIUM 4.3 12/10/2021 08:48 AM    CHLORIDE 105 12/10/2021 08:48 AM    CO2 25 12/10/2021 08:48 AM    GLUCOSE 101 (H) 12/10/2021 08:48 AM    BUN 23 (H) 12/10/2021 08:48 AM    CREATININE 0.55 12/10/2021 08:48 AM     Lab Results   Component Value " Date/Time    ALKPHOSPHAT 65 12/10/2021 08:48 AM    ASTSGOT 18 12/10/2021 08:48 AM    ALTSGPT 22 12/10/2021 08:48 AM    TBILIRUBIN 0.6 12/10/2021 08:48 AM      Lab Results   Component Value Date/Time    WBC 7.5 06/04/2021 09:38 AM     Lab Results   Component Value Date/Time    HBA1C 5.7 (H) 12/10/2021 08:48 AM    HBA1C 5.2 02/13/2018 02:50 PM         Cardiac Imaging and Procedures Review:    EKG dated 11/2/2021: My personal interpretation is sinus bradycardia, first-degree AV block    Echo dated 12/10/2020:   CONCLUSIONS  Normal left ventricular systolic function. Left ventricular ejection   fraction is visually estimated to be 65%.   Normal diastolic function.  Normal inferior vena cava size and inspiratory collapse.  Estimated right ventricular systolic pressure is 37 mmHg.    CTA  1/18/2021  FINDINGS:     Coronary calcification:  LMA - 0.0  LCX - 0.0  LAD - 264.1  RCA - 0.0  PDA - 0.0     Total Calcium Score: 264.1     Percentile: Calcium score is above the 75th percentile for the patient's age and sex.    Nuclear Perfusion Imaging (12/10/2020):   NUCLEAR IMAGING INTERPRETATION   No reversible defects that would indicate ischemia.    Small defect noted in the apical septal segment both at rest and stress.    Could represent apical thinning vs. possibly infarct.    Normal left ventricular systolic function.    The estimated ejection fraction is 65%.       ECG INTERPRETATION   Negative stress ECG for ischemia.      Assessment & Plan     1. Coronary artery calcification     2. Essential hypertension     3. Dyslipidemia     4. Pre-op evaluation           Shared Medical Decision Making:    Hypertension  BP controlled this visit  -Continue lisinopril 20mg daily and propranolol 40mg twice daily (pt takes propranolol for essential tremor)    Dyslipidemia  Elevated calcium score  -Continue rosuvastatin to 40 mg daily    Pre-op evaluation for knee surgery  No active cardiac symptoms.  Stress test from 12/2020 with no  evidence of  reversible ischemia  -The patient is a moderate risk patient for an intermediate risk procedure.  No further cardiac work-up is necessary prior to surgery.    All of the patient's excellent questions were answered to the best of my knowledge and to her satisfaction.  It was a pleasure seeing Ms. Margoth Hopkins in my clinic today. Return in about 6 months (around 11/18/2022), or if symptoms worsen or fail to improve. Patient is aware to call the cardiology clinic with any questions or concerns.      Fiorella Worrell MD  [facility]  [facility]  [address] [address]  Phone: [phone]  Fax: [phone]

## 2022-05-18 ENCOUNTER — OFFICE VISIT (OUTPATIENT)
Dept: CARDIOLOGY | Facility: MEDICAL CENTER | Age: 72
End: 2022-05-18
Payer: MEDICARE

## 2022-05-18 VITALS
DIASTOLIC BLOOD PRESSURE: 70 MMHG | HEIGHT: 67 IN | HEART RATE: 68 BPM | RESPIRATION RATE: 16 BRPM | OXYGEN SATURATION: 96 % | SYSTOLIC BLOOD PRESSURE: 132 MMHG | BODY MASS INDEX: 28.72 KG/M2 | WEIGHT: 183 LBS

## 2022-05-18 DIAGNOSIS — I25.10 CORONARY ARTERY CALCIFICATION: ICD-10-CM

## 2022-05-18 DIAGNOSIS — I10 ESSENTIAL HYPERTENSION: ICD-10-CM

## 2022-05-18 DIAGNOSIS — Z01.818 PRE-OP EVALUATION: ICD-10-CM

## 2022-05-18 DIAGNOSIS — E78.5 DYSLIPIDEMIA: ICD-10-CM

## 2022-05-18 DIAGNOSIS — I25.84 CORONARY ARTERY CALCIFICATION: ICD-10-CM

## 2022-05-18 PROCEDURE — 99214 OFFICE O/P EST MOD 30 MIN: CPT | Performed by: STUDENT IN AN ORGANIZED HEALTH CARE EDUCATION/TRAINING PROGRAM

## 2022-05-18 RX ORDER — HYDROCODONE BITARTRATE AND ACETAMINOPHEN 5; 325 MG/1; MG/1
TABLET ORAL
COMMUNITY
Start: 2022-03-07 | End: 2022-12-15

## 2022-05-18 RX ORDER — PREGABALIN 50 MG/1
50 CAPSULE ORAL 2 TIMES DAILY
COMMUNITY
Start: 2022-03-16 | End: 2023-01-19 | Stop reason: SDUPTHER

## 2022-05-18 ASSESSMENT — FIBROSIS 4 INDEX: FIB4 SCORE: 1.07

## 2022-05-18 NOTE — LETTER
[facility]   [address], [facility] [address]  [place] [address]  Phone: [phone]  Fax: [phone]              Margoth Hokpins  1950    Encounter Date: 5/18/2022    Fiorella Worrell M.D.          PROGRESS NOTE:      Cardiology Follow-up Consultation Note    Date of note: 05/18/22  Primary Care Provider: Charlee Walker M.D.    Patient Name: Margoth Hopkins   YOB: 1950  MRN:              [id number]    Chief Complaint: Follow-up hypertension and dyslipidemia    History of Present Illness: Ms. Margoth Hopkins is a 71 y.o. female whose current medical problems include hypertension, dyslipidemia, goiter status post total thyroidectomy, GI bleed secondary to gastric ulcers, Schatzki's ring (gets regular dilatations) who is here for follow-up hypertension and dyslipidemia.    The patient was last seen in my clinic on 11/17/2021 (her initial visit with me).  The patient was doing well during the last visit.  Rosuvastatin was increased due to LDL being elevated.  No other changes were made to her medications.    The patient returns today for follow-up.  She reports feeling well today.  She denies any chest pain or shortness of breath.  Denies any palpitations.  No orthopnea, PND, or leg swelling.  No syncope or presyncopal episodes.    Cardiovascular Risk Factors:  1. Smoking status: Never smoker  2. Type II Diabetes Mellitus: None  Lab Results   Component Value Date/Time    HBA1C 5.7 (H) 12/10/2021 08:48 AM    HBA1C 5.2 02/13/2018 02:50 PM     3. Hypertension: On medication  4. Dyslipidemia: On statin  Cholesterol,Tot   Date Value Ref Range Status   06/04/2021 200 (H) 100 - 199 mg/dL Final     LDL   Date Value Ref Range Status   06/04/2021 104 (H) <100 mg/dL Final     HDL   Date Value Ref Range Status   06/04/2021 79 >=40 mg/dL Final     Triglycerides   Date Value Ref Range Status   06/04/2021 85 0 - 149 mg/dL Final     5. Family history of early Coronary Artery  Disease in a first degree relative (Male less than 55 years of age; Female less than 65 years of age): Brother with MI in 50s  6.  Obesity and/or Metabolic Syndrome: BMI 29.29  7. Sedentary lifestyle: Not sedentary, but not active    Review of Systems   Constitutional: Negative for fever, malaise/fatigue and night sweats.   Cardiovascular: Negative for chest pain, dyspnea on exertion, irregular heartbeat, leg swelling, near-syncope, orthopnea, palpitations, paroxysmal nocturnal dyspnea and syncope.   Respiratory: Negative for cough, shortness of breath and wheezing.    Gastrointestinal: Negative for abdominal pain, diarrhea, nausea and vomiting.   Neurological: Negative for dizziness, focal weakness and weakness.         All other systems reviewed and are negative.       Current Outpatient Medications   Medication Sig Dispense Refill   • pregabalin (LYRICA) 50 MG capsule Take 50 mg by mouth 2 times a day.     • SYNTHROID 75 MCG Tab Take 1 Tablet by mouth every morning on an empty stomach. 90 Tablet 3   • lisinopril (PRINIVIL) 20 MG Tab TAKE 1 TABLET BY MOUTH  DAILY 90 Tablet 3   • propranolol (INDERAL) 40 MG Tab TAKE 1 TABLET BY MOUTH  TWICE DAILY (Patient taking differently: 3 times a day.) 180 Tablet 3   • vitamin D2, Ergocalciferol, (DRISDOL) 1.25 MG (60138 UT) Cap capsule Take 1 Capsule by mouth every 7 days. 12 Capsule 3   • tizanidine (ZANAFLEX) 2 MG tablet      • rosuvastatin (CRESTOR) 40 MG tablet Take 1 Tablet by mouth every day. 30 Tablet 11   • HYDROcodone-acetaminophen (NORCO) 7.5-325 MG per tablet as needed.     • DULoxetine (CYMBALTA) 30 MG Cap DR Particles Take 30 mg by mouth 2 times a day.     • VITAMIN E PO Take  by mouth.     • COLLAGEN PO Take  by mouth.     • omeprazole (PRILOSEC) 20 MG delayed-release capsule every day.     • Probiotic Product (PROBIOTIC DAILY PO) Take  by mouth every day.     • HYDROcodone-acetaminophen (NORCO) 5-325 MG Tab per tablet TAKE ONE TABLET BY MOUTH EVERY FOUR TO SIX  "HOURS AS NEEDED FOR PAIN     • aspirin (ASA) 81 MG Chew Tab chewable tablet Chew 1 Tab every day. (Patient not taking: Reported on 5/18/2022) 100 Tab 0     No current facility-administered medications for this visit.         Allergies   Allergen Reactions   • Morphine Vomiting   • Nsaids Unspecified     History of gastric ulcers - cannot take NSAIDS   • Pcn [Penicillins] Hives   • Gabapentin      Cognitive changes    • Seasonal        Physical Exam:  Ambulatory Vitals  /70 (BP Location: Left arm, Patient Position: Sitting, BP Cuff Size: Adult)   Pulse 68   Resp 16   Ht 1.702 m (5' 7\")   Wt 83 kg (183 lb)   SpO2 96%    Oxygen Therapy:  Pulse Oximetry: 96 %  BP Readings from Last 4 Encounters:   05/18/22 132/70   01/03/22 130/64   12/30/21 116/66   12/24/21 138/72       Weight/BMI: Body mass index is 28.66 kg/m².  Wt Readings from Last 4 Encounters:   05/18/22 83 kg (183 lb)   05/03/22 80.7 kg (178 lb)   01/07/22 81.6 kg (180 lb)   01/03/22 80.7 kg (178 lb)         General: Well appearing and in no apparent distress  Eyes: nl conjunctiva, no icteric sclera  ENT: wearing a mask, normal external appearance of ears  Neck: no visible JVP,  no carotid bruits  Lungs: normal respiratory effort, CTAB  Heart: RRR, no murmurs, no rubs or gallops,  no edema bilateral lower extremities. No LV/RV heave on cardiac palpatation. + bilateral radial pulses.  + bilateral dp pulses.   Abdomen: soft, non tender, non distended, no masses, normal bowel sounds.  No HSM.  Extremities/MSK: no clubbing, no cyanosis  Neurological: No focal sensory deficits  Psychiatric: Appropriate affect, A/O x 3, intact judgement and insight  Skin: Warm extremities      Lab Data Review:  Lab Results   Component Value Date/Time    CHOLSTRLTOT 172 12/10/2021 08:48 AM    LDL 78 12/10/2021 08:48 AM    HDL 76 12/10/2021 08:48 AM    TRIGLYCERIDE 92 12/10/2021 08:48 AM       Lab Results   Component Value Date/Time    SODIUM 142 12/10/2021 08:48 AM    " POTASSIUM 4.3 12/10/2021 08:48 AM    CHLORIDE 105 12/10/2021 08:48 AM    CO2 25 12/10/2021 08:48 AM    GLUCOSE 101 (H) 12/10/2021 08:48 AM    BUN 23 (H) 12/10/2021 08:48 AM    CREATININE 0.55 12/10/2021 08:48 AM     Lab Results   Component Value Date/Time    ALKPHOSPHAT 65 12/10/2021 08:48 AM    ASTSGOT 18 12/10/2021 08:48 AM    ALTSGPT 22 12/10/2021 08:48 AM    TBILIRUBIN 0.6 12/10/2021 08:48 AM      Lab Results   Component Value Date/Time    WBC 7.5 06/04/2021 09:38 AM     Lab Results   Component Value Date/Time    HBA1C 5.7 (H) 12/10/2021 08:48 AM    HBA1C 5.2 02/13/2018 02:50 PM         Cardiac Imaging and Procedures Review:    EKG dated 11/2/2021: My personal interpretation is sinus bradycardia, first-degree AV block    Echo dated 12/10/2020:   CONCLUSIONS  Normal left ventricular systolic function. Left ventricular ejection   fraction is visually estimated to be 65%.   Normal diastolic function.  Normal inferior vena cava size and inspiratory collapse.  Estimated right ventricular systolic pressure is 37 mmHg.    CTA  1/18/2021  FINDINGS:     Coronary calcification:  LMA - 0.0  LCX - 0.0  LAD - 264.1  RCA - 0.0  PDA - 0.0     Total Calcium Score: 264.1     Percentile: Calcium score is above the 75th percentile for the patient's age and sex.    Nuclear Perfusion Imaging (12/10/2020):   NUCLEAR IMAGING INTERPRETATION   No reversible defects that would indicate ischemia.    Small defect noted in the apical septal segment both at rest and stress.    Could represent apical thinning vs. possibly infarct.    Normal left ventricular systolic function.    The estimated ejection fraction is 65%.       ECG INTERPRETATION   Negative stress ECG for ischemia.      Assessment & Plan     1. Coronary artery calcification     2. Essential hypertension     3. Dyslipidemia     4. Pre-op evaluation           Shared Medical Decision Making:    Hypertension  BP controlled this visit  -Continue lisinopril 20mg daily and propranolol  40mg twice daily (pt takes propranolol for essential tremor)    Dyslipidemia  Elevated calcium score  -Continue rosuvastatin to 40 mg daily    Pre-op evaluation for knee surgery  No active cardiac symptoms.  Stress test from 12/2020 with no evidence of  reversible ischemia  -The patient is a moderate risk patient for an intermediate risk procedure.  No further cardiac work-up is necessary prior to surgery.    All of the patient's excellent questions were answered to the best of my knowledge and to her satisfaction.  It was a pleasure seeing Ms. Margoth Hopkins in my clinic today. Return in about 6 months (around 11/18/2022), or if symptoms worsen or fail to improve. Patient is aware to call the cardiology clinic with any questions or concerns.      Fiorella Worrell MD  [facility]  [facility].  [address] [address]  Phone: [phone]  Fax: [phone]          Jp Morocho M.D.  [address]  [address] [address]  Via Fax: [phone]

## 2022-06-07 SDOH — ECONOMIC STABILITY: HOUSING INSECURITY: IN THE LAST 12 MONTHS, HOW MANY PLACES HAVE YOU LIVED?: 1

## 2022-06-07 SDOH — ECONOMIC STABILITY: FOOD INSECURITY: WITHIN THE PAST 12 MONTHS, THE FOOD YOU BOUGHT JUST DIDN'T LAST AND YOU DIDN'T HAVE MONEY TO GET MORE.: NEVER TRUE

## 2022-06-07 SDOH — ECONOMIC STABILITY: HOUSING INSECURITY
IN THE LAST 12 MONTHS, WAS THERE A TIME WHEN YOU DID NOT HAVE A STEADY PLACE TO SLEEP OR SLEPT IN A SHELTER (INCLUDING NOW)?: NO

## 2022-06-07 SDOH — HEALTH STABILITY: PHYSICAL HEALTH: ON AVERAGE, HOW MANY MINUTES DO YOU ENGAGE IN EXERCISE AT THIS LEVEL?: 10 MIN

## 2022-06-07 SDOH — HEALTH STABILITY: MENTAL HEALTH
STRESS IS WHEN SOMEONE FEELS TENSE, NERVOUS, ANXIOUS, OR CAN'T SLEEP AT NIGHT BECAUSE THEIR MIND IS TROUBLED. HOW STRESSED ARE YOU?: VERY MUCH

## 2022-06-07 SDOH — ECONOMIC STABILITY: TRANSPORTATION INSECURITY
IN THE PAST 12 MONTHS, HAS LACK OF RELIABLE TRANSPORTATION KEPT YOU FROM MEDICAL APPOINTMENTS, MEETINGS, WORK OR FROM GETTING THINGS NEEDED FOR DAILY LIVING?: NO

## 2022-06-07 SDOH — HEALTH STABILITY: PHYSICAL HEALTH: ON AVERAGE, HOW MANY DAYS PER WEEK DO YOU ENGAGE IN MODERATE TO STRENUOUS EXERCISE (LIKE A BRISK WALK)?: 1 DAY

## 2022-06-07 SDOH — ECONOMIC STABILITY: FOOD INSECURITY: WITHIN THE PAST 12 MONTHS, YOU WORRIED THAT YOUR FOOD WOULD RUN OUT BEFORE YOU GOT MONEY TO BUY MORE.: NEVER TRUE

## 2022-06-07 SDOH — ECONOMIC STABILITY: INCOME INSECURITY: IN THE LAST 12 MONTHS, WAS THERE A TIME WHEN YOU WERE NOT ABLE TO PAY THE MORTGAGE OR RENT ON TIME?: NO

## 2022-06-07 SDOH — ECONOMIC STABILITY: INCOME INSECURITY: HOW HARD IS IT FOR YOU TO PAY FOR THE VERY BASICS LIKE FOOD, HOUSING, MEDICAL CARE, AND HEATING?: NOT HARD AT ALL

## 2022-06-07 ASSESSMENT — SOCIAL DETERMINANTS OF HEALTH (SDOH)
IN A TYPICAL WEEK, HOW MANY TIMES DO YOU TALK ON THE PHONE WITH FAMILY, FRIENDS, OR NEIGHBORS?: THREE TIMES A WEEK
HOW OFTEN DO YOU ATTEND CHURCH OR RELIGIOUS SERVICES?: MORE THAN 4 TIMES PER YEAR
HOW OFTEN DO YOU HAVE A DRINK CONTAINING ALCOHOL: 2-4 TIMES A MONTH
IN A TYPICAL WEEK, HOW MANY TIMES DO YOU TALK ON THE PHONE WITH FAMILY, FRIENDS, OR NEIGHBORS?: THREE TIMES A WEEK
HOW OFTEN DO YOU ATTENT MEETINGS OF THE CLUB OR ORGANIZATION YOU BELONG TO?: MORE THAN 4 TIMES PER YEAR
HOW OFTEN DO YOU GET TOGETHER WITH FRIENDS OR RELATIVES?: TWICE A WEEK
DO YOU BELONG TO ANY CLUBS OR ORGANIZATIONS SUCH AS CHURCH GROUPS UNIONS, FRATERNAL OR ATHLETIC GROUPS, OR SCHOOL GROUPS?: YES
HOW OFTEN DO YOU HAVE SIX OR MORE DRINKS ON ONE OCCASION: NEVER
HOW OFTEN DO YOU ATTENT MEETINGS OF THE CLUB OR ORGANIZATION YOU BELONG TO?: MORE THAN 4 TIMES PER YEAR
HOW OFTEN DO YOU GET TOGETHER WITH FRIENDS OR RELATIVES?: TWICE A WEEK
HOW HARD IS IT FOR YOU TO PAY FOR THE VERY BASICS LIKE FOOD, HOUSING, MEDICAL CARE, AND HEATING?: NOT HARD AT ALL
HOW OFTEN DO YOU ATTEND CHURCH OR RELIGIOUS SERVICES?: MORE THAN 4 TIMES PER YEAR
DO YOU BELONG TO ANY CLUBS OR ORGANIZATIONS SUCH AS CHURCH GROUPS UNIONS, FRATERNAL OR ATHLETIC GROUPS, OR SCHOOL GROUPS?: YES
WITHIN THE PAST 12 MONTHS, YOU WORRIED THAT YOUR FOOD WOULD RUN OUT BEFORE YOU GOT THE MONEY TO BUY MORE: NEVER TRUE
HOW MANY DRINKS CONTAINING ALCOHOL DO YOU HAVE ON A TYPICAL DAY WHEN YOU ARE DRINKING: 1 OR 2

## 2022-06-07 ASSESSMENT — LIFESTYLE VARIABLES
HOW OFTEN DO YOU HAVE A DRINK CONTAINING ALCOHOL: 2-4 TIMES A MONTH
HOW MANY STANDARD DRINKS CONTAINING ALCOHOL DO YOU HAVE ON A TYPICAL DAY: 1 OR 2
AUDIT-C TOTAL SCORE: 2
SKIP TO QUESTIONS 9-10: 1
HOW OFTEN DO YOU HAVE SIX OR MORE DRINKS ON ONE OCCASION: NEVER

## 2022-06-08 ENCOUNTER — HOSPITAL ENCOUNTER (OUTPATIENT)
Dept: LAB | Facility: MEDICAL CENTER | Age: 72
End: 2022-06-08
Attending: FAMILY MEDICINE
Payer: MEDICARE

## 2022-06-08 DIAGNOSIS — I10 BENIGN ESSENTIAL HTN: ICD-10-CM

## 2022-06-08 DIAGNOSIS — E78.00 PURE HYPERCHOLESTEROLEMIA: ICD-10-CM

## 2022-06-08 LAB
ALBUMIN SERPL BCP-MCNC: 4.1 G/DL (ref 3.2–4.9)
ALBUMIN/GLOB SERPL: 1.6 G/DL
ALP SERPL-CCNC: 87 U/L (ref 30–99)
ALT SERPL-CCNC: 22 U/L (ref 2–50)
ANION GAP SERPL CALC-SCNC: 12 MMOL/L (ref 7–16)
AST SERPL-CCNC: 28 U/L (ref 12–45)
BASOPHILS # BLD AUTO: 1 % (ref 0–1.8)
BASOPHILS # BLD: 0.07 K/UL (ref 0–0.12)
BILIRUB SERPL-MCNC: 0.8 MG/DL (ref 0.1–1.5)
BUN SERPL-MCNC: 24 MG/DL (ref 8–22)
CALCIUM SERPL-MCNC: 9.1 MG/DL (ref 8.5–10.5)
CHLORIDE SERPL-SCNC: 102 MMOL/L (ref 96–112)
CHOLEST SERPL-MCNC: 187 MG/DL (ref 100–199)
CO2 SERPL-SCNC: 23 MMOL/L (ref 20–33)
CREAT SERPL-MCNC: 0.75 MG/DL (ref 0.5–1.4)
CREAT UR-MCNC: 102.38 MG/DL
EOSINOPHIL # BLD AUTO: 0.41 K/UL (ref 0–0.51)
EOSINOPHIL NFR BLD: 5.8 % (ref 0–6.9)
ERYTHROCYTE [DISTWIDTH] IN BLOOD BY AUTOMATED COUNT: 45.8 FL (ref 35.9–50)
FASTING STATUS PATIENT QL REPORTED: NORMAL
GFR SERPLBLD CREATININE-BSD FMLA CKD-EPI: 84 ML/MIN/1.73 M 2
GLOBULIN SER CALC-MCNC: 2.6 G/DL (ref 1.9–3.5)
GLUCOSE SERPL-MCNC: 91 MG/DL (ref 65–99)
HCT VFR BLD AUTO: 41.2 % (ref 37–47)
HDLC SERPL-MCNC: 73 MG/DL
HGB BLD-MCNC: 13.2 G/DL (ref 12–16)
IMM GRANULOCYTES # BLD AUTO: 0.05 K/UL (ref 0–0.11)
IMM GRANULOCYTES NFR BLD AUTO: 0.7 % (ref 0–0.9)
LDLC SERPL CALC-MCNC: 90 MG/DL
LYMPHOCYTES # BLD AUTO: 2.04 K/UL (ref 1–4.8)
LYMPHOCYTES NFR BLD: 28.9 % (ref 22–41)
MCH RBC QN AUTO: 29.3 PG (ref 27–33)
MCHC RBC AUTO-ENTMCNC: 32 G/DL (ref 33.6–35)
MCV RBC AUTO: 91.4 FL (ref 81.4–97.8)
MICROALBUMIN UR-MCNC: <1.2 MG/DL
MICROALBUMIN/CREAT UR: NORMAL MG/G (ref 0–30)
MONOCYTES # BLD AUTO: 0.65 K/UL (ref 0–0.85)
MONOCYTES NFR BLD AUTO: 9.2 % (ref 0–13.4)
NEUTROPHILS # BLD AUTO: 3.85 K/UL (ref 2–7.15)
NEUTROPHILS NFR BLD: 54.4 % (ref 44–72)
NRBC # BLD AUTO: 0 K/UL
NRBC BLD-RTO: 0 /100 WBC
PLATELET # BLD AUTO: 302 K/UL (ref 164–446)
PMV BLD AUTO: 10.6 FL (ref 9–12.9)
POTASSIUM SERPL-SCNC: 4.2 MMOL/L (ref 3.6–5.5)
PROT SERPL-MCNC: 6.7 G/DL (ref 6–8.2)
RBC # BLD AUTO: 4.51 M/UL (ref 4.2–5.4)
SODIUM SERPL-SCNC: 137 MMOL/L (ref 135–145)
TRIGL SERPL-MCNC: 120 MG/DL (ref 0–149)
WBC # BLD AUTO: 7.1 K/UL (ref 4.8–10.8)

## 2022-06-08 PROCEDURE — 80061 LIPID PANEL: CPT

## 2022-06-08 PROCEDURE — 36415 COLL VENOUS BLD VENIPUNCTURE: CPT

## 2022-06-08 PROCEDURE — 82043 UR ALBUMIN QUANTITATIVE: CPT

## 2022-06-08 PROCEDURE — 85025 COMPLETE CBC W/AUTO DIFF WBC: CPT

## 2022-06-08 PROCEDURE — 80053 COMPREHEN METABOLIC PANEL: CPT

## 2022-06-08 PROCEDURE — 82570 ASSAY OF URINE CREATININE: CPT

## 2022-06-10 ENCOUNTER — OFFICE VISIT (OUTPATIENT)
Dept: MEDICAL GROUP | Age: 72
End: 2022-06-10
Payer: MEDICARE

## 2022-06-10 VITALS
OXYGEN SATURATION: 96 % | BODY MASS INDEX: 28.25 KG/M2 | HEART RATE: 69 BPM | SYSTOLIC BLOOD PRESSURE: 138 MMHG | DIASTOLIC BLOOD PRESSURE: 70 MMHG | TEMPERATURE: 96.8 F | HEIGHT: 67 IN | RESPIRATION RATE: 14 BRPM | WEIGHT: 180 LBS

## 2022-06-10 DIAGNOSIS — E78.00 PURE HYPERCHOLESTEROLEMIA: ICD-10-CM

## 2022-06-10 DIAGNOSIS — K22.2 SCHATZKI'S RING: ICD-10-CM

## 2022-06-10 DIAGNOSIS — M17.12 PRIMARY OSTEOARTHRITIS OF LEFT KNEE: ICD-10-CM

## 2022-06-10 DIAGNOSIS — L80 VITILIGO: ICD-10-CM

## 2022-06-10 DIAGNOSIS — K80.21 CALCULUS OF GALLBLADDER WITH BILIARY OBSTRUCTION BUT WITHOUT CHOLECYSTITIS: ICD-10-CM

## 2022-06-10 DIAGNOSIS — M15.9 GENERALIZED OSTEOARTHRITIS: ICD-10-CM

## 2022-06-10 DIAGNOSIS — I10 BENIGN ESSENTIAL HTN: ICD-10-CM

## 2022-06-10 DIAGNOSIS — E06.3 HASHIMOTO'S THYROIDITIS: ICD-10-CM

## 2022-06-10 DIAGNOSIS — G89.29 LUMBOSACRAL PAIN, CHRONIC: ICD-10-CM

## 2022-06-10 DIAGNOSIS — Z00.00 MEDICARE ANNUAL WELLNESS VISIT, SUBSEQUENT: ICD-10-CM

## 2022-06-10 DIAGNOSIS — R51.9 CHRONIC NONINTRACTABLE HEADACHE, UNSPECIFIED HEADACHE TYPE: ICD-10-CM

## 2022-06-10 DIAGNOSIS — K59.03 DRUG INDUCED CONSTIPATION: ICD-10-CM

## 2022-06-10 DIAGNOSIS — M54.50 LUMBOSACRAL PAIN, CHRONIC: ICD-10-CM

## 2022-06-10 DIAGNOSIS — E89.0 POSTOPERATIVE HYPOTHYROIDISM: ICD-10-CM

## 2022-06-10 DIAGNOSIS — G47.00 ACUTE INSOMNIA: ICD-10-CM

## 2022-06-10 DIAGNOSIS — Z01.818 PRE-OP EVALUATION: ICD-10-CM

## 2022-06-10 DIAGNOSIS — G89.29 CHRONIC NONINTRACTABLE HEADACHE, UNSPECIFIED HEADACHE TYPE: ICD-10-CM

## 2022-06-10 DIAGNOSIS — M85.89 OSTEOPENIA OF MULTIPLE SITES: ICD-10-CM

## 2022-06-10 DIAGNOSIS — G25.0 ESSENTIAL TREMOR: ICD-10-CM

## 2022-06-10 DIAGNOSIS — F41.1 GENERALIZED ANXIETY DISORDER: ICD-10-CM

## 2022-06-10 PROCEDURE — 99214 OFFICE O/P EST MOD 30 MIN: CPT | Mod: 25 | Performed by: FAMILY MEDICINE

## 2022-06-10 PROCEDURE — G0439 PPPS, SUBSEQ VISIT: HCPCS | Mod: 25 | Performed by: FAMILY MEDICINE

## 2022-06-10 RX ORDER — DOXYCYCLINE HYCLATE 100 MG/1
CAPSULE ORAL
COMMUNITY
End: 2022-12-08

## 2022-06-10 RX ORDER — DOXYCYCLINE HYCLATE 100 MG/1
CAPSULE ORAL
COMMUNITY
Start: 2022-06-07 | End: 2022-06-10

## 2022-06-10 RX ORDER — ZOLPIDEM TARTRATE 5 MG/1
5 TABLET ORAL NIGHTLY PRN
Qty: 30 TABLET | Refills: 0 | Status: SHIPPED | OUTPATIENT
Start: 2022-06-10 | End: 2022-10-06

## 2022-06-10 RX ORDER — APIXABAN 2.5 MG/1
TABLET, FILM COATED ORAL
COMMUNITY
Start: 2022-06-07 | End: 2022-06-10

## 2022-06-10 ASSESSMENT — ACTIVITIES OF DAILY LIVING (ADL): BATHING_REQUIRES_ASSISTANCE: 0

## 2022-06-10 ASSESSMENT — ENCOUNTER SYMPTOMS: GENERAL WELL-BEING: GOOD

## 2022-06-10 ASSESSMENT — FIBROSIS 4 INDEX: FIB4 SCORE: 1.44

## 2022-06-10 ASSESSMENT — PAIN SCALES - GENERAL: PAINLEVEL: 6=MODERATE PAIN

## 2022-06-10 ASSESSMENT — PATIENT HEALTH QUESTIONNAIRE - PHQ9: CLINICAL INTERPRETATION OF PHQ2 SCORE: 0

## 2022-06-10 NOTE — PROGRESS NOTES
Chief Complaint   Patient presents with   • Annual Exam       HPI:  Margoth Hopkins is a 71 y.o. here for Medicare Annual Wellness Visit     Patient is doing well except for acute left knee pain. She was diagnosed with severe OSTEOARTHRITIS of the left knee and will have TKR with Dr. Morocho on 6/21/2022. She needs surgical clearance.     Patient Active Problem List    Diagnosis Date Noted   • Osteopenia of multiple sites 03/31/2021   • Postoperative hypothyroidism_s/p thyroidectomy_Dr. Mullen 06/12/2020   • Generalized osteoarthritis 06/12/2020   • Drug induced constipation 06/12/2020   • Hashimoto's thyroiditis s/p total thyroidectom Dr. Franklin in 2/2020.  02/20/2020   • Essential tremor 10/31/2019   • Calculus of gallbladder with biliary obstruction but without cholecystitis 10/14/2018   • Chronic headache 03/20/2018   • Vitiligo 06/26/2017   • FHx: colon cancer 02/23/2017   • Schatzki's ring_DHA 02/23/2017   • Benign essential HTN 02/23/2017   • Pure hypercholesterolemia 02/23/2017   • MOHAMUD (generalized anxiety disorder) 02/23/2017   • HLA B27 (HLA B27 positive) 02/23/2017   • Primary osteoarthritis of right knee, S/P TKR + 2 revision surgeries_Dr. Lomeli 02/23/2017   • Lumbosacral pain, chronic, with right sciatica_ sweet water 02/23/2017       Current Outpatient Medications   Medication Sig Dispense Refill   • apixaban (ELIQUIS) 2.5mg Tab Eliquis 2.5 mg tablet   Take 1 tablet twice a day by oral route as directed for 42 days.     • doxycycline (VIBRAMYCIN) 100 MG Cap doxycycline hyclate 100 mg capsule   Take 1 capsule twice a day by oral route as directed for 14 days.     • zolpidem (AMBIEN) 5 MG Tab Take 1 Tablet by mouth at bedtime as needed for Sleep for up to 30 days. 30 Tablet 0   • pregabalin (LYRICA) 50 MG capsule Take 50 mg by mouth 2 times a day.     • HYDROcodone-acetaminophen (NORCO) 5-325 MG Tab per tablet TAKE ONE TABLET BY MOUTH EVERY FOUR TO SIX HOURS AS NEEDED FOR PAIN     • SYNTHROID 75 MCG  Tab Take 1 Tablet by mouth every morning on an empty stomach. 90 Tablet 3   • lisinopril (PRINIVIL) 20 MG Tab TAKE 1 TABLET BY MOUTH  DAILY 90 Tablet 3   • propranolol (INDERAL) 40 MG Tab TAKE 1 TABLET BY MOUTH  TWICE DAILY (Patient taking differently: 3 times a day.) 180 Tablet 3   • vitamin D2, Ergocalciferol, (DRISDOL) 1.25 MG (17031 UT) Cap capsule Take 1 Capsule by mouth every 7 days. 12 Capsule 3   • tizanidine (ZANAFLEX) 2 MG tablet      • rosuvastatin (CRESTOR) 40 MG tablet Take 1 Tablet by mouth every day. 30 Tablet 11   • HYDROcodone-acetaminophen (NORCO) 7.5-325 MG per tablet as needed.     • DULoxetine (CYMBALTA) 30 MG Cap DR Particles Take 30 mg by mouth 2 times a day.     • VITAMIN E PO Take  by mouth.     • COLLAGEN PO Take  by mouth.     • omeprazole (PRILOSEC) 20 MG delayed-release capsule every day.     • Probiotic Product (PROBIOTIC DAILY PO) Take  by mouth every day.       No current facility-administered medications for this visit.          Current supplements as per medication list.     Allergies: Morphine, Nsaids, Pcn [penicillins], Gabapentin, and Seasonal    Current social contact/activities: , travel      She  reports that she has never smoked. She has never used smokeless tobacco. She reports current alcohol use of about 4.2 - 8.4 oz of alcohol per week. She reports previous drug use. Drug: Oral.  Counseling given: Not Answered      DPA/Advanced Directive:  Patient has Advanced Directive on file.     ROS:    Gait: Uses a walker  Ostomy: No  Other tubes: No  Amputations: No  Chronic oxygen use: No  Last eye exam:   Wears hearing aids: No   : Denies any urinary leakage during the last 6 months    Screening:  Annual Exam   Depression Screening  Little interest or pleasure in doing things?  0 - not at all  Feeling down, depressed , or hopeless? 0 - not at all  Patient Health Questionnaire Score: 0     If depressive symptoms identified deferred to follow up visit unless specifically  addressed in assessment and plan.    Interpretation of PHQ-9 Total Score   Score Severity   1-4 No Depression   5-9 Mild Depression   10-14 Moderate Depression   15-19 Moderately Severe Depression   20-27 Severe Depression    Screening for Cognitive Impairment  Three Minute Recall (daughter, heaven, mountain)  /3    Shawn clock face with all 12 numbers and set the hands to show 10 past 11.       Cognitive concerns identified deferred for follow up unless specifically addressed in assessment and plan.    Fall Risk Assessment  Has the patient had two or more falls in the last year or any fall with injury in the last year?  No    Safety Assessment  Throw rugs on floor.  Yes  Handrails on all stairs.  Yes  Good lighting in all hallways.  Yes  Difficulty hearing.  No  Patient counseled about all safety risks that were identified.    Functional Assessment ADLs  Are there any barriers preventing you from cooking for yourself or meeting nutritional needs?  No.    Are there any barriers preventing you from driving safely or obtaining transportation?  No.    Are there any barriers preventing you from using a telephone or calling for help?  No.    Are there any barriers preventing you from shopping?  No.    Are there any barriers preventing you from taking care of your own finances?  No.    Are there any barriers preventing you from managing your medications?  No.    Are there any barriers preventing you from showering, bathing or dressing yourself?  No.    Are you currently engaging in any exercise or physical activity?  Yes.     What is your perception of your health?  Good.      Health Maintenance Summary          Overdue - IMM ZOSTER VACCINES (3 of 3) Overdue since 8/5/2021    06/10/2021  Imm Admin: Zoster Vaccine Recombinant (RZV) (SHINGRIX)    02/23/2011  Imm Admin: Zoster Vaccine Live (ZVL) (Zostavax) - HISTORICAL DATA    02/15/2011  Imm Admin: Zoster Vaccine Live (ZVL) (Zostavax) - HISTORICAL DATA          MAMMOGRAM  (Yearly) Next due on 9/14/2022 09/14/2021  MA-SCREENING MAMMO BILAT W/TOMOSYNTHESIS W/CAD    07/25/2020  MA-SCREENING MAMMO BILAT W/TOMOSYNTHESIS W/CAD    04/24/2019  MA-SCREENING MAMMO BILAT W/TOMOSYNTHESIS W/CAD    03/30/2018  MA-MAMMO SCREENING BILAT W/BRUNA W/CAD          Annual Wellness Visit (Every 366 Days) Next due on 6/11/2023    06/10/2022  Visit Dx: Medicare annual wellness visit, subsequent    06/12/2020  Level of Service: ME ANNUAL WELLNESS VISIT-INCLUDES PPPS SUBSEQUE*    06/12/2020  Visit Dx: Medicare annual wellness visit, subsequent    11/13/2018  Visit Dx: Medicare annual wellness visit, subsequent    07/24/2017  Visit Dx: Medicare annual wellness visit, subsequent          COLORECTAL CANCER SCREENING (COLONOSCOPY - Every 5 Years) Next due on 3/12/2026    03/12/2021  REFERRAL TO GI FOR COLONOSCOPY    03/24/2016  REFERRAL TO GI FOR COLONOSCOPY          BONE DENSITY (Every 5 Years) Next due on 3/29/2026    03/29/2021  DS-BONE DENSITY STUDY (DEXA)    03/30/2018  DS-BONE DENSITY STUDY (DEXA)          IMM DTaP/Tdap/Td Vaccine (2 - Td or Tdap) Next due on 5/14/2029 05/14/2019  Imm Admin: Tdap Vaccine    02/23/2011  Imm Admin: TD Vaccine          IMM PNEUMOCOCCAL VACCINE: 65+ Years (Series Information) Completed    11/13/2018  Imm Admin: Pneumococcal polysaccharide vaccine (PPSV-23)    07/24/2017  Imm Admin: Pneumococcal Conjugate Vaccine (Prevnar/PCV-13)          IMM INFLUENZA (Series Information) Completed    09/14/2021  Imm Admin: Influenza Vaccine Adult HD    09/17/2020  Imm Admin: Influenza Vaccine Adult HD    09/16/2020  Imm Admin: Influenza Vaccine Adult HD    09/16/2019  Imm Admin: Influenza Vaccine Adult HD    08/30/2019  Imm Admin: Influenza Seasonal Injectable - Historical Data    Only the first 5 history entries have been loaded, but more history exists.          COVID-19 Vaccine (Series Information) Completed    05/05/2022  Imm Admin: PFIZER GARCÍA CAP SARS-COV-2 VACCINATION (12+)     10/04/2021  Imm Admin: PFIZER PURPLE CAP SARS-COV-2 VACCINATION (12+)    10/01/2021  Imm Admin: Moderna SARS-CoV-2 Vaccine    02/19/2021  Imm Admin: PFIZER PURPLE CAP SARS-COV-2 VACCINATION (12+)    01/27/2021  Imm Admin: PFIZER PURPLE CAP SARS-COV-2 VACCINATION (12+)          IMM HEP B VACCINE (Series Information) Aged Out    No completion history exists for this topic.          IMM MENINGOCOCCAL VACCINE (MCV4) (Series Information) Aged Out    No completion history exists for this topic.          Discontinued - PAP SMEAR  Discontinued    No completion history exists for this topic.          Discontinued - HEPATITIS C SCREENING  Discontinued    No completion history exists for this topic.                Patient Care Team:  hCarlee Walker M.D. as PCP - General (Family Medicine)  Alonzo Montes De Oca M.D. as Consulting Physician (Orthopedic Surgery)  Tay Garcia M.D. as Consulting Physician (Endocrinology)  Ishmael Lomeli M.D. as Consulting Physician (Orthopedic Surgery)  [facility] as Attending Team Physician (Optometry)  Jp Morocho M.D. (Orthopedic Surgery)        Social History     Tobacco Use   • Smoking status: Never Smoker   • Smokeless tobacco: Never Used   Vaping Use   • Vaping Use: Never used   Substance Use Topics   • Alcohol use: Yes     Alcohol/week: 4.2 - 8.4 oz     Types: 7 - 14 Glasses of wine per week     Comment: wine daily   • Drug use: Not Currently     Types: Oral     Comment: CBD ingested 9/21 pt denies at this time     Family History   Problem Relation Age of Onset   • Arthritis Mother    • Alzheimer's Disease Mother    • Cancer Mother         cervical cancer   • Heart Attack Father    • Other Brother         brain anurism   • Hypertension Brother    • Cancer Maternal Grandmother 66        colono cancer   • Heart Disease Brother    • Hypertension Brother    • Arthritis Brother    • Other Brother         Celiac Disease   • No Known Problems Maternal Grandfather    • No Known  "Problems Paternal Grandmother    • No Known Problems Paternal Grandfather    • Hypertension Brother    • Arthritis Brother    • Lung Disease Brother    • Asthma Brother      She  has a past medical history of Anemia, Aortic insufficiency (01/31/2020), Arthritis, Breath shortness (11/02/2021), Cataract, Dental disorder, Gastric ulcer, Goiter, High cholesterol, Hypertension (11/02/2021), Hypothyroidism, Pain (01/31/2020), Psychiatric problem (11/02/2021), Pulmonary embolism (HCC) (01/31/2020), Reactive arthritis (HCC), Thyroid disease, and Thyroid disease (01/31/2020).   Past Surgical History:   Procedure Laterality Date   • MASS EXCISION GENERAL Right 11/5/2021    Procedure: EXCISION, MASS - LATERAL THIGH;  Surgeon: Cydney Franklin M.D.;  Location: SURGERY SAME DAY [facility];  Service: General   • THYROIDECTOMY TOTAL Bilateral 2/5/2020    Procedure: THYROIDECTOMY, TOTAL- COMPLETE;  Surgeon: Cydney Franklin M.D.;  Location: SURGERY SAME DAY [facility];  Service: General   • APPENDECTOMY     • BLADDER SLING FEMALE     • CATARACT EXTRACTION WITH IOL Left    • CATARACT EXTRACTION WITH IOL Bilateral    • CERVICAL FUSION POSTERIOR     • FOOT SURGERY Left     metatarsal fusion, hammer toes correction   • FUSION      disc fusion   • GYN SURGERY      Hysterectomy   • KNEE ARTHROPLASTY TOTAL Right     x3   • KNEE REPLACEMENT, TOTAL Right    • KNEE REVISION TOTAL Right        Exam:   /70 (BP Location: Right arm, Patient Position: Sitting, BP Cuff Size: Adult)   Pulse 69   Temp 36 °C (96.8 °F) (Temporal)   Resp 14   Ht 1.702 m (5' 7\")   Wt 81.6 kg (180 lb)   SpO2 96%  Body mass index is 28.19 kg/m².    Hearing excellent.    Dentition good  Alert, oriented in no acute distress.  Eye contact is good, speech goal directed, affect calm    Assessment and Plan. The following treatment and monitoring plan is recommended:      1. Pure hypercholesterolemia  Chronic, controlled with Crestor 40 mg qd, no s/e reported, will " continue.    - dietary modification, exercise, and weight loss.   - avoid alcohol, drugs, tobacco products     2. Osteopenia of multiple sites  - Vitamin D: 2000 units per day, maintain serum vitamin D level > 30   - Calcium 600 mg BID  - Weight-bearing, balance, resistance exercises   - maintain healthy active lifestyle  - avoid or stop smoking  - Limit alcohol consumption to one drink per day  - pt was counseled on fall prevention    - home fall-proofing information provided.      3. MOHAMUD (generalized anxiety disorder)  Controlled with Propranolol 40 mg TID which she also takes for essential tremor.   She also takes Duloxetine 30 mg BID.     4. Essential tremor  Controlled with Propranolol 40 mg TID     5. Chronic nonintractable headache, unspecified headache type  Controlled with Propranolol 40 mg TID    6. Benign essential HTN  Chronic, controlled with Propranolol 40 mg TID as well as Lisinopril 20 mg qd, no s/e reported, will continue.      7. Postoperative hypothyroidism_s/p thyroidectomy_Dr. Mullen  8. Hashimoto's thyroiditis s/p total thyroidectom Dr. Franklin in 2/2020.   Chronic, taking Synthroid 75 mcg qd  This condition is managed by endocrinology   - follow up with endocrinology as directed.     9. Lumbosacral pain, chronic, with right sciatica_ sweet water  10. Generalized osteoarthritis  Chronic, on chronic Norco and Lyrica managed by SWP&S.   She is doing well except for acute left knee pain from severe osteoarthritis.   - continue Norco and Lyrica as directed  - follow up with SWP&S    11. Calculus of gallbladder with biliary obstruction but without cholecystitis  Asymptomatic, will monitor.     12. Drug induced constipation  Doing relatively well with hydration and over-the-counter laxatives.     13. Vitiligo  Mild, does not require treatment, will monitor.     14. Schatzki's ring_DHA  Doing well, continue Omeprazole 20 mg qd.     15. Medicare annual wellness visit, subsequent  General health and  wellness counseling provided.        16. Pre-op evaluation  17. Primary osteoarthritis of left knee  Chronic primary knee osteoarthritis, will need TKR on 6/21/2022.   Tobacco consumption within one year: no   Alcohol consumption: no   Hx of chronic lung disease (COPD, asthma, etc): negative   Hx of cardiovascular disease: negative   Hx of stroke: negative   Hx of steroid use for chronic conditions: negative   Hx of ascites 30 days prior to surgery: negative   Hx of heart failure 30 days prior to surgery: negative   Hx of renal failure: negative   Hx of dialysis: negative   Hx of disseminated cancer: negative   HTN: controlled with medications   DM: negative   Independent status: yes   Is the patient ventilator dependent?: no   Pt denies chest pain, SOB, IVAN, hemoptysis, hx of allergic reaction to anesthesia.   - surgical clearance provided.     18. Acute insomnia  Patient complained of of acute insomnia due to severe knee pain. She had Ambien in the past for insomnia and appears to tolerate it well.   - zolpidem (AMBIEN) 5 MG Tab; Take 1 Tablet by mouth at bedtime as needed for Sleep for up to 30 days.  Dispense: 30 Tablet; Refill: 0   - avoid taking Ambien with Norco, Lyrica, or Alcohol.   - Risks, benefits, side effects, as well as potential health complications associated with Ambien was discussed with patient. Appropriate counseling provided.      Services suggested: No services needed at this time  Health Care Screening: Age-appropriate preventive services recommended by USPTF and ACIP covered by Medicare were discussed today. Services ordered if indicated and agreed upon by the patient.  Referrals offered: Community-based lifestyle interventions to reduce health risks and promote self-management and wellness, fall prevention, nutrition, physical activity, tobacco-use cessation, weight loss, and mental health services as per orders if indicated.    Discussion today about general wellness and lifestyle habits:     · Prevent falls and reduce trip hazards; Cautioned about securing or removing rugs.  · Have a working fire alarm and carbon monoxide detector;   · Engage in regular physical activity and social activities     Follow-up: Return in about 6 months (around 12/10/2022) for Multiple issues.

## 2022-06-10 NOTE — LETTER
Elvia 10, 2022        Re: Margoth Hopkins    To whom it may concern,    My name is Charlee Walker MD. I am taking care of Margoth Hopkins, who has chronic left knee osteoarthritis needing total knee replacement with Dr. Jp Morocho on 6/21/2022. Patient is cleared for surgery.     If you have any questions, please do not hesitate to call my office.     Best regards,                  Charlee Walker M.D.

## 2022-08-18 ENCOUNTER — HOSPITAL ENCOUNTER (OUTPATIENT)
Dept: LAB | Facility: MEDICAL CENTER | Age: 72
End: 2022-08-18
Attending: INTERNAL MEDICINE
Payer: MEDICARE

## 2022-08-18 DIAGNOSIS — E89.0 POSTOPERATIVE HYPOTHYROIDISM: ICD-10-CM

## 2022-08-18 DIAGNOSIS — E53.8 VITAMIN B 12 DEFICIENCY: ICD-10-CM

## 2022-08-18 DIAGNOSIS — E55.9 VITAMIN D DEFICIENCY: ICD-10-CM

## 2022-08-18 PROCEDURE — 84439 ASSAY OF FREE THYROXINE: CPT

## 2022-08-18 PROCEDURE — 82306 VITAMIN D 25 HYDROXY: CPT

## 2022-08-18 PROCEDURE — 36415 COLL VENOUS BLD VENIPUNCTURE: CPT

## 2022-08-18 PROCEDURE — 82607 VITAMIN B-12: CPT

## 2022-08-18 PROCEDURE — 84443 ASSAY THYROID STIM HORMONE: CPT

## 2022-08-19 LAB
25(OH)D3 SERPL-MCNC: 54 NG/ML (ref 30–100)
T4 FREE SERPL-MCNC: 1.64 NG/DL (ref 0.93–1.7)
TSH SERPL DL<=0.005 MIU/L-ACNC: 1.24 UIU/ML (ref 0.38–5.33)
VIT B12 SERPL-MCNC: 1167 PG/ML (ref 211–911)

## 2022-08-24 ENCOUNTER — OFFICE VISIT (OUTPATIENT)
Dept: ENDOCRINOLOGY | Facility: MEDICAL CENTER | Age: 72
End: 2022-08-24
Attending: INTERNAL MEDICINE
Payer: MEDICARE

## 2022-08-24 VITALS
SYSTOLIC BLOOD PRESSURE: 120 MMHG | WEIGHT: 173 LBS | BODY MASS INDEX: 27.15 KG/M2 | OXYGEN SATURATION: 100 % | HEART RATE: 68 BPM | HEIGHT: 67 IN | DIASTOLIC BLOOD PRESSURE: 84 MMHG

## 2022-08-24 DIAGNOSIS — E89.0 POSTOPERATIVE HYPOTHYROIDISM: ICD-10-CM

## 2022-08-24 DIAGNOSIS — E55.9 VITAMIN D DEFICIENCY: ICD-10-CM

## 2022-08-24 DIAGNOSIS — R73.03 PREDIABETES: ICD-10-CM

## 2022-08-24 DIAGNOSIS — E53.8 VITAMIN B 12 DEFICIENCY: ICD-10-CM

## 2022-08-24 PROCEDURE — 99211 OFF/OP EST MAY X REQ PHY/QHP: CPT | Performed by: INTERNAL MEDICINE

## 2022-08-24 PROCEDURE — 99214 OFFICE O/P EST MOD 30 MIN: CPT | Performed by: INTERNAL MEDICINE

## 2022-08-24 RX ORDER — ERGOCALCIFEROL 1.25 MG/1
50000 CAPSULE ORAL
Qty: 13 CAPSULE | Refills: 3 | Status: SHIPPED | OUTPATIENT
Start: 2022-08-24 | End: 2022-10-17

## 2022-08-24 RX ORDER — LEVOTHYROXINE SODIUM 75 MCG
75 TABLET ORAL
Qty: 90 TABLET | Refills: 3 | Status: SHIPPED | OUTPATIENT
Start: 2022-08-24 | End: 2022-12-26

## 2022-08-24 ASSESSMENT — FIBROSIS 4 INDEX: FIB4 SCORE: 1.44

## 2022-08-24 NOTE — PROGRESS NOTES
Chief Complaint: Follow up for  Postsurgical hypothyroidism.      HPI:     Margoth Hopkins is a 71 y.o. female here for follow up of postsurgical hypothyroidism, she had a total thyroidectomy on February 5, 2020 with benign findings    Comorbid issues include osteopenia managed by primary care  For unclear reasons her previous endocrinologist was keeping her TSH suppressed despite her history of osteopenia  She was previously on 100 mcg of Synthroid and TSH was undetectable and free T4 levels were elevated          Since last visit patient reports feeling poor.  She remains on Synthroid 75 MCG daily which has been her dose for the past 12 months.   She reports excellent compliance and denies missing any daily doses.   She takes thyroid hormone prior to breakfast.   She  denies taking any iron, calcium supplements or antacids.          Weight has been stable  Her energy levels are good  She denies constipation and cold intolerance  She recently had left knee replacement surgery and is still undergoing physical therapy but overall she is getting better      Her TSH is 1.2 with a free T4 of 1.6 on August 18, 2022  Her TSH was 0.370 with a free T4 of 1.32 on August 2021    She takes ergocalciferol 50,000 units weekly  Her vitamin D is 54    Her B12 is 1167    She also has prediabetes with an A1c of 5.7%      She has osteopenia based upon her bone density showed the lowest T score of -1.2 for the lumbar spine on March 29, 2021  She is currently not on antiresorptive therapy  She exercises regularly and denies interval falls and fractures      Patient's medications, allergies, and social histories were reviewed and updated as appropriate.      ROS:     CONS:     No fever, no chills   EYES:     No diplopia, no blurry vision   CV:           No chest pain, no palpitations   PULM:     No SOB, no cough, no hemoptysis.   GI:            No nausea, no vomiting, no diarrhea, no constipation   ENDO:     No polyuria, no polydipsia,  no heat intolerance, no cold intolerance       Past Medical History:  Problem List:  2021-09: Mass of right thigh  2021-03: Osteopenia of multiple sites  2020-06: Postoperative hypothyroidism_s/p thyroidectomy_Dr. Mullen  2020-06: Generalized osteoarthritis  2020-06: Drug induced constipation  2020-02: Hashimoto's thyroiditis s/p total thyroidectom Dr. Franklin in   2/2020.   2019-10: Essential tremor  2019-08: Malaise  2019-08: Aortic valve sclerosis- without stenosis 2018 echo  2019-08: Mild aortic insufficiency- 2018 echo  2019-05: Nocturnal leg cramps  2018-10: Calculus of gallbladder with biliary obstruction but without   cholecystitis  2018-07: Chronic use of opiate drug for therapeutic purpose_contract   signed 6/2020, UDS consistent in 9/2020 2018-07: H/O right knee surgery  2018-07: Acute respiratory failure with hypoxia (HCC)  2018-07: Bilateral pulmonary embolism, provoked  2018-07: Pulmonary infarct (HCC)  2018-03: Chronic headache  2017-07: Risk for falls  2017-06: Vitiligo  2017-06: Chest pain  2017-02: FHx: colon cancer  2017-02: Schatzki's ring_DHA  2017-02: Benign essential HTN  2017-02: Pure hypercholesterolemia  2017-02: Migraine without aura, not intractable  2017-02: MOHAMUD (generalized anxiety disorder)  2017-02: Hypothyroidism due to Hashimoto's thyroiditis  2017-02: Multinodular goiter (nontoxic), s/p FNA in 12/2017, TR4 x1,   repeat US in 6/2020 2017-02: HLA B27 (HLA B27 positive)  2017-02: Primary osteoarthritis of right knee, S/P TKR + 2 revision   surgeries_Dr. Lomeli  2017-02: Lumbosacral pain, chronic, with right sciatica_ [place]  2017-02: Insomnia  2017-02: Risk for falls  2017-02: Vitamin D insufficiency    Past Surgical History:  Past Surgical History:   Procedure Laterality Date    MASS EXCISION GENERAL Right 11/5/2021    Procedure: EXCISION, MASS - LATERAL THIGH;  Surgeon: Cydney Franklin M.D.;  Location: SURGERY SAME DAY [facility];  Service: General    THYROIDECTOMY TOTAL  "Bilateral 2/5/2020    Procedure: THYROIDECTOMY, TOTAL- COMPLETE;  Surgeon: Cydney Franklin M.D.;  Location: SURGERY SAME DAY [facility];  Service: General    APPENDECTOMY      BLADDER SLING FEMALE      CATARACT EXTRACTION WITH IOL Left     CATARACT EXTRACTION WITH IOL Bilateral     CERVICAL FUSION POSTERIOR      FOOT SURGERY Left     metatarsal fusion, hammer toes correction    FUSION      disc fusion    GYN SURGERY      Hysterectomy    KNEE ARTHROPLASTY TOTAL Right     x3    KNEE REPLACEMENT, TOTAL Right     KNEE REVISION TOTAL Right         Allergies:  Morphine, Nsaids, Pcn [penicillins], and Gabapentin     Social History:  Social History     Tobacco Use    Smoking status: Never    Smokeless tobacco: Never   Vaping Use    Vaping Use: Never used   Substance Use Topics    Alcohol use: Yes     Alcohol/week: 4.2 - 8.4 oz     Types: 7 - 14 Glasses of wine per week     Comment: wine daily    Drug use: Not Currently     Types: Oral     Comment: CBD ingested 9/21 pt denies at this time        Family History:   family history includes Alzheimer's Disease in her mother; Arthritis in her brother, brother, and mother; Asthma in her brother; Cancer in her mother; Cancer (age of onset: 66) in her maternal grandmother; Heart Attack in her father; Heart Disease in her brother; Hypertension in her brother, brother, and brother; Lung Disease in her brother; No Known Problems in her maternal grandfather, paternal grandfather, and paternal grandmother; Other in her brother and brother.      PHYSICAL EXAM:   Vital signs: /84   Pulse 68   Ht 1.702 m (5' 7\")   Wt 78.5 kg (173 lb)   SpO2 100%   BMI 27.10 kg/m²   GENERAL: Well-developed, well-nourished in no apparent distress.   EYE:  No ocular asymmetry, PERRLA  HENT: Pink, moist mucous membranes.    NECK: No thyromegaly.   CARDIOVASCULAR:  No murmurs  LUNGS: Clear breath sounds  ABDOMEN: Soft, nontender   EXTREMITIES: No clubbing, cyanosis, or edema.   NEUROLOGICAL: No " gross focal motor abnormalities   LYMPH: No cervical adenopathy seen.   SKIN: No rashes, lesions.       Labs:  Lab Results   Component Value Date/Time    SODIUM 137 06/08/2022 09:18 AM    POTASSIUM 4.2 06/08/2022 09:18 AM    CHLORIDE 102 06/08/2022 09:18 AM    CO2 23 06/08/2022 09:18 AM    ANION 12.0 06/08/2022 09:18 AM    GLUCOSE 91 06/08/2022 09:18 AM    BUN 24 (H) 06/08/2022 09:18 AM    CREATININE 0.75 06/08/2022 09:18 AM    CALCIUM 9.1 06/08/2022 09:18 AM    ASTSGOT 28 06/08/2022 09:18 AM    ALTSGPT 22 06/08/2022 09:18 AM    TBILIRUBIN 0.8 06/08/2022 09:18 AM    ALBUMIN 4.1 06/08/2022 09:18 AM    TOTPROTEIN 6.7 06/08/2022 09:18 AM    GLOBULIN 2.6 06/08/2022 09:18 AM    AGRATIO 1.6 06/08/2022 09:18 AM       Lab Results   Component Value Date/Time    SODIUM 139 06/03/2020 1117    POTASSIUM 3.9 06/03/2020 1117    CHLORIDE 104 06/03/2020 1117    CO2 23 06/03/2020 1117    GLUCOSE 95 06/03/2020 1117    BUN 19 06/03/2020 1117    CREATININE 0.62 06/03/2020 1117    CALCIUM 10.0 06/03/2020 1117    ANION 12.0 06/03/2020 1117       Lab Results   Component Value Date/Time    CHOLSTRLTOT 190 06/03/2020 1117    TRIGLYCERIDE 100 06/03/2020 1117    HDL 75 06/03/2020 1117    LDL 95 06/03/2020 1117       Lab Results   Component Value Date/Time    TSHULTRASEN 0.012 (L) 01/07/2021 1535     Lab Results   Component Value Date/Time    FREET4 2.10 (H) 01/07/2021 1535     Lab Results   Component Value Date/Time    FREET3 2.59 08/06/2020 1557     No results found for: THYSTIMIG    No results found for: MICROSOMALA      Imaging:      ASSESSMENT/PLAN:     1. Postoperative hypothyroidism  Improved  TSH and free T4 levels are normal  Continue Synthroid  75 mcg daily  I will see her again in 12 months with repeat of her TSH and free T4 levels    2. Vitamin D deficiency  Controlled   Continue weekly ergocalciferol   Continue monitoring    3. Vitamin B 12 deficiency  Controlled   continue current supplements   Continue monitoring    4.  Prediabetes  Stable A1c of 5.7%  Continue monitoring      Return in about 11 months (around 7/24/2023).      Thank you kindly for allowing me to participate in the thyroid care plan for this patient.    Asad Mullen MD, FACE, [facility]  01/12/21    CC:   Charlee Walker M.D.

## 2022-09-16 ENCOUNTER — OFFICE VISIT (OUTPATIENT)
Dept: MEDICAL GROUP | Age: 72
End: 2022-09-16
Payer: MEDICARE

## 2022-09-16 VITALS
HEIGHT: 67 IN | TEMPERATURE: 96.6 F | BODY MASS INDEX: 27.15 KG/M2 | SYSTOLIC BLOOD PRESSURE: 124 MMHG | DIASTOLIC BLOOD PRESSURE: 64 MMHG | WEIGHT: 173 LBS | OXYGEN SATURATION: 97 % | HEART RATE: 68 BPM

## 2022-09-16 DIAGNOSIS — Z12.31 ENCOUNTER FOR SCREENING MAMMOGRAM FOR BREAST CANCER: ICD-10-CM

## 2022-09-16 DIAGNOSIS — F41.1 GENERALIZED ANXIETY DISORDER: ICD-10-CM

## 2022-09-16 DIAGNOSIS — I10 BENIGN ESSENTIAL HTN: ICD-10-CM

## 2022-09-16 DIAGNOSIS — Z23 NEED FOR VACCINATION: ICD-10-CM

## 2022-09-16 PROCEDURE — 99214 OFFICE O/P EST MOD 30 MIN: CPT | Performed by: FAMILY MEDICINE

## 2022-09-16 RX ORDER — BUSPIRONE HYDROCHLORIDE 10 MG/1
10 TABLET ORAL 2 TIMES DAILY
Qty: 180 TABLET | Refills: 0 | Status: SHIPPED | OUTPATIENT
Start: 2022-09-16 | End: 2022-12-06

## 2022-09-16 ASSESSMENT — ANXIETY QUESTIONNAIRES
3. WORRYING TOO MUCH ABOUT DIFFERENT THINGS: SEVERAL DAYS
6. BECOMING EASILY ANNOYED OR IRRITABLE: SEVERAL DAYS
GAD7 TOTAL SCORE: 10
1. FEELING NERVOUS, ANXIOUS, OR ON EDGE: NEARLY EVERY DAY
7. FEELING AFRAID AS IF SOMETHING AWFUL MIGHT HAPPEN: NOT AT ALL
4. TROUBLE RELAXING: MORE THAN HALF THE DAYS
2. NOT BEING ABLE TO STOP OR CONTROL WORRYING: SEVERAL DAYS
5. BEING SO RESTLESS THAT IT IS HARD TO SIT STILL: MORE THAN HALF THE DAYS
IF YOU CHECKED OFF ANY PROBLEMS ON THIS QUESTIONNAIRE, HOW DIFFICULT HAVE THESE PROBLEMS MADE IT FOR YOU TO DO YOUR WORK, TAKE CARE OF THINGS AT HOME, OR GET ALONG WITH OTHER PEOPLE: VERY DIFFICULT

## 2022-09-16 ASSESSMENT — FIBROSIS 4 INDEX: FIB4 SCORE: 1.44

## 2022-09-16 NOTE — PROGRESS NOTES
Subjective:   CC: MOHAMUD follow up     HPI:     Margoth Hopkins is a 71 y.o. female, established patient of the clinic.     Patient has chronic MOHAMUD, previously taking Paxil 40 mg qd. She is now taking Cymbalta 30 mg BiD for MOHAMUD as well as chronic osteoarthritic pain. She complained of worsening anxiety over the past few months/years. She also complained of mild depressed mood, social isolation, lack of interest to do things due to chronic, uncontrolled osteoarthritic pain that goes on for almost 12 years. She thinks that Paxil was more beneficial to her symptoms in the past. No SI/HI reported.     Current medicines (including changes today)  Current Outpatient Medications   Medication Sig Dispense Refill    busPIRone (BUSPAR) 10 MG Tab tablet Take 1 Tablet by mouth 2 times a day. 180 Tablet 0    SYNTHROID 75 MCG Tab Take 1 Tablet by mouth every morning on an empty stomach. 90 Tablet 3    vitamin D2, Ergocalciferol, (DRISDOL) 1.25 MG (10820 UT) Cap capsule Take 1 Capsule by mouth every 7 days. 13 Capsule 3    apixaban (ELIQUIS) 2.5mg Tab Eliquis 2.5 mg tablet   Take 1 tablet twice a day by oral route as directed for 42 days.      doxycycline (VIBRAMYCIN) 100 MG Cap doxycycline hyclate 100 mg capsule   Take 1 capsule twice a day by oral route as directed for 14 days.      pregabalin (LYRICA) 50 MG capsule Take 50 mg by mouth 2 times a day.      HYDROcodone-acetaminophen (NORCO) 5-325 MG Tab per tablet TAKE ONE TABLET BY MOUTH EVERY FOUR TO SIX HOURS AS NEEDED FOR PAIN      lisinopril (PRINIVIL) 20 MG Tab TAKE 1 TABLET BY MOUTH  DAILY 90 Tablet 3    propranolol (INDERAL) 40 MG Tab TAKE 1 TABLET BY MOUTH  TWICE DAILY (Patient taking differently: 3 times a day.) 180 Tablet 3    tizanidine (ZANAFLEX) 2 MG tablet       rosuvastatin (CRESTOR) 40 MG tablet Take 1 Tablet by mouth every day. 30 Tablet 11    DULoxetine (CYMBALTA) 30 MG Cap DR Particles Take 30 mg by mouth 2 times a day.      VITAMIN E PO Take  by mouth.       "COLLAGEN PO Take  by mouth.      omeprazole (PRILOSEC) 20 MG delayed-release capsule every day.      Probiotic Product (PROBIOTIC DAILY PO) Take  by mouth every day.       No current facility-administered medications for this visit.     She  has a past medical history of Anemia, Aortic insufficiency (01/31/2020), Arthritis, Breath shortness (11/02/2021), Cataract, Dental disorder, Gastric ulcer, Goiter, High cholesterol, Hypertension (11/02/2021), Hypothyroidism, Pain (01/31/2020), Psychiatric problem (11/02/2021), Pulmonary embolism (HCC) (01/31/2020), Reactive arthritis (HCC), Thyroid disease, and Thyroid disease (01/31/2020).    I reviewed patient's problem list, allergies, medications, family hx, social hx with patient and update EPIC.        Objective:     /64 (BP Location: Right arm, Patient Position: Sitting, BP Cuff Size: Adult)   Pulse 68   Temp 35.9 °C (96.6 °F) (Temporal)   Ht 1.702 m (5' 7\")   Wt 78.5 kg (173 lb)   SpO2 97%  Body mass index is 27.1 kg/m².    Physical Exam:  Constitutional: awake, alert, in no distress.  Skin: Warm, dry, good turgor, no rashes, bruises, ulcers in visible areas.  Eye: conjunctiva clear, lids neg for edema or lesions.  Psych: Oriented x3, affect and mood wnl, intact judgement and insight.       Assessment and Plan:   The following treatment plan was discussed    1. MOHAMUD (generalized anxiety disorder)  Patient complained of worsening MOHAMUD symptoms and mild depression secondary to chronic osteoarthritic pain. I had long discussion with patient about her options. She agree to the following plans:   - continue Cymbalta 30 mg BID to help with MOHAMUD, depression, and chronic pain.   - add busPIRone (BUSPAR) 10 MG Tab tablet; Take 1 Tablet by mouth 2 times a day.  Dispense: 180 Tablet; Refill: 0  - offered , but patient declined at this time. She will email me if she changes her mind regarding this.   - regular exercises as tolerated  - she previously took Paxil 40 mg qd " for MOHAMUD, which did help in the past. We can consider to restart Paxil if above regimen fails to improve her symptoms.   - follow up in 3 months.     2. Benign essential HTN  Chronic, controlled with Lisinopril 20 mg qd, no s/e reported, will continue.    She is taking Propranolol 40 mg BID for migraine prophylaxis, which might help with BP.     3. Encounter for screening mammogram for breast cancer  - MA-SCREENING MAMMO BILAT W/TOMOSYNTHESIS W/CAD; Future    4. Need for vaccination  Patient declined vaccines today.       Charlee Walker M.D.      Followup: Return in about 3 months (around 12/16/2022) for Mental Health F/U.    Please note that this dictation was created using voice recognition software. I have made every reasonable attempt to correct obvious errors, but I expect that there are errors of grammar and possibly content that I did not discover before finalizing the note.

## 2022-10-06 ENCOUNTER — PATIENT MESSAGE (OUTPATIENT)
Dept: MEDICAL GROUP | Age: 72
End: 2022-10-06
Payer: MEDICARE

## 2022-10-06 DIAGNOSIS — G47.00 ACUTE INSOMNIA: ICD-10-CM

## 2022-10-09 RX ORDER — ZOLPIDEM TARTRATE 5 MG/1
5 TABLET ORAL NIGHTLY PRN
Qty: 30 TABLET | Refills: 0 | Status: SHIPPED | OUTPATIENT
Start: 2022-10-09 | End: 2022-11-08

## 2022-10-16 DIAGNOSIS — E55.9 VITAMIN D DEFICIENCY: ICD-10-CM

## 2022-10-17 RX ORDER — ERGOCALCIFEROL 1.25 MG/1
50000 CAPSULE ORAL
Qty: 13 CAPSULE | Refills: 3 | Status: SHIPPED | OUTPATIENT
Start: 2022-10-17 | End: 2023-06-09 | Stop reason: SDUPTHER

## 2022-11-11 ENCOUNTER — PATIENT MESSAGE (OUTPATIENT)
Dept: HEALTH INFORMATION MANAGEMENT | Facility: OTHER | Age: 72
End: 2022-11-11

## 2022-12-06 DIAGNOSIS — F41.1 GENERALIZED ANXIETY DISORDER: ICD-10-CM

## 2022-12-06 RX ORDER — BUSPIRONE HYDROCHLORIDE 10 MG/1
TABLET ORAL
Qty: 180 TABLET | Refills: 3 | Status: SHIPPED | OUTPATIENT
Start: 2022-12-06 | End: 2023-03-09 | Stop reason: SDUPTHER

## 2022-12-07 ASSESSMENT — ENCOUNTER SYMPTOMS
DIARRHEA: 0
NEAR-SYNCOPE: 0
ABDOMINAL PAIN: 0
NAUSEA: 0
FOCAL WEAKNESS: 0
WHEEZING: 0
COUGH: 0
ORTHOPNEA: 0
PALPITATIONS: 0
SHORTNESS OF BREATH: 0
IRREGULAR HEARTBEAT: 0
DIZZINESS: 0
NIGHT SWEATS: 0
SYNCOPE: 0
PND: 0
WEAKNESS: 0
VOMITING: 0
FEVER: 0
DYSPNEA ON EXERTION: 0

## 2022-12-07 NOTE — PROGRESS NOTES
Cardiology Follow-up Consultation Note/Virtual Video Visit Note    This evaluation was conducted via Zoom using secure and encrypted videoconferencing technology. The patient was in their home in the [facility].    The patient's identity was confirmed and verbal consent was obtained for this virtual visit.    Date of note: 12/08/22  Primary Care Provider: Charlee Walker M.D.    Patient Name: Margoth Hopkins   YOB: 1950  MRN:              [id number]    Chief Complaint: Follow-up hypertension and dyslipidemia    History of Present Illness: Ms. Margoth Hopkins is a 71 y.o. female whose current medical problems include hypertension, dyslipidemia, goiter status post total thyroidectomy, GI bleed secondary to gastric ulcers, Schatzki's ring (gets regular dilatations) who is here for follow-up hypertension and dyslipidemia.    The patient was last seen in my clinic on 05/18/22.  The patient was doing well during the last visit, and no changes were made to her medications.    The patient returns today for follow-up. The patient had a knee replacement in June 2022.  The patient was walking her neighbor's dog previously but since giving it back to her neighbor.   She reports feeling well today.  She denies any chest pain or shortness of breath on exertion.  Denies any palpitations.  No orthopnea, PND, or leg swelling.  No syncope or presyncopal episodes.    Cardiovascular Risk Factors:  1. Smoking status: Never smoker  2. Type II Diabetes Mellitus: None  Lab Results   Component Value Date/Time    HBA1C 5.7 (H) 12/10/2021 08:48 AM    HBA1C 5.2 02/13/2018 02:50 PM     3. Hypertension: On medication  4. Dyslipidemia: On statin  Lab Results   Component Value Date/Time    CHOLSTRLTOT 187 06/08/2022 0918    TRIGLYCERIDE 120 06/08/2022 0918    HDL 73 06/08/2022 0918    LDL 90 06/08/2022 0918     5. Family history of early Coronary Artery Disease in a first degree relative (Male less than 55 years of age; Female  less than 65 years of age): Brother with MI in 50s  6.  Obesity and/or Metabolic Syndrome: BMI 29.29  7. Sedentary lifestyle: Not sedentary, but not active    Review of Systems   Constitutional: Negative for fever, malaise/fatigue and night sweats.   Cardiovascular:  Negative for chest pain, dyspnea on exertion, irregular heartbeat, leg swelling, near-syncope, orthopnea, palpitations, paroxysmal nocturnal dyspnea and syncope.   Respiratory:  Negative for cough, shortness of breath and wheezing.    Gastrointestinal:  Negative for abdominal pain, diarrhea, nausea and vomiting.   Neurological:  Negative for dizziness, focal weakness and weakness.       All other systems reviewed and are negative.       Current Outpatient Medications   Medication Sig Dispense Refill    busPIRone (BUSPAR) 10 MG Tab tablet TAKE ONE TABLET BY MOUTH TWICE DAILY 180 Tablet 3    vitamin D2, Ergocalciferol, (DRISDOL) 1.25 MG (96907 UT) Cap capsule TAKE 1 CAPSULE BY MOUTH  EVERY 7 DAYS 13 Capsule 3    rosuvastatin (CRESTOR) 40 MG tablet TAKE 1 TABLET BY MOUTH  DAILY 90 Tablet 3    SYNTHROID 75 MCG Tab Take 1 Tablet by mouth every morning on an empty stomach. 90 Tablet 3    pregabalin (LYRICA) 50 MG capsule Take 50 mg by mouth 2 times a day.      HYDROcodone-acetaminophen (NORCO) 5-325 MG Tab per tablet TAKE ONE TABLET BY MOUTH EVERY FOUR TO SIX HOURS AS NEEDED FOR PAIN      lisinopril (PRINIVIL) 20 MG Tab TAKE 1 TABLET BY MOUTH  DAILY 90 Tablet 3    propranolol (INDERAL) 40 MG Tab TAKE 1 TABLET BY MOUTH  TWICE DAILY (Patient taking differently: Take 40 mg by mouth 2 times a day.) 180 Tablet 3    tizanidine (ZANAFLEX) 2 MG tablet       DULoxetine (CYMBALTA) 30 MG Cap DR Particles Take 30 mg by mouth 2 times a day.      COLLAGEN PO Take  by mouth.      Probiotic Product (PROBIOTIC DAILY PO) Take  by mouth every day.       No current facility-administered medications for this visit.         Allergies   Allergen Reactions    Morphine Vomiting     "Nsaids Unspecified     History of gastric ulcers - cannot take NSAIDS    Pcn [Penicillins] Hives    Gabapentin      Cognitive changes     Seasonal        Physical Exam:  Ambulatory Vitals  BP (!) 141/84 (BP Location: Right arm, Patient Position: Sitting, BP Cuff Size: Adult)   Pulse 75   Ht 1.702 m (5' 7\")   Wt 77.6 kg (171 lb)   SpO2 95%    Oxygen Therapy:  Pulse Oximetry: 95 %  BP Readings from Last 4 Encounters:   12/08/22 (!) 141/84   09/16/22 124/64   08/24/22 120/84   06/10/22 138/70       Weight/BMI: Body mass index is 26.78 kg/m².  Wt Readings from Last 4 Encounters:   12/08/22 77.6 kg (171 lb)   09/16/22 78.5 kg (173 lb)   08/24/22 78.5 kg (173 lb)   06/10/22 81.6 kg (180 lb)         General: Well appearing and in no apparent distress  HEENT: Normal  Neurological: No focal sensory deficits  Psychiatric: Appropriate affect, A/O x 3, intact judgement and insight    Lab Data Review:  Lab Results   Component Value Date/Time    CHOLSTRLTOT 187 06/08/2022 09:18 AM    LDL 90 06/08/2022 09:18 AM    HDL 73 06/08/2022 09:18 AM    TRIGLYCERIDE 120 06/08/2022 09:18 AM       Lab Results   Component Value Date/Time    SODIUM 137 06/08/2022 09:18 AM    POTASSIUM 4.2 06/08/2022 09:18 AM    CHLORIDE 102 06/08/2022 09:18 AM    CO2 23 06/08/2022 09:18 AM    GLUCOSE 91 06/08/2022 09:18 AM    BUN 24 (H) 06/08/2022 09:18 AM    CREATININE 0.75 06/08/2022 09:18 AM     Lab Results   Component Value Date/Time    ALKPHOSPHAT 87 06/08/2022 09:18 AM    ASTSGOT 28 06/08/2022 09:18 AM    ALTSGPT 22 06/08/2022 09:18 AM    TBILIRUBIN 0.8 06/08/2022 09:18 AM      Lab Results   Component Value Date/Time    WBC 7.1 06/08/2022 09:18 AM     Lab Results   Component Value Date/Time    HBA1C 5.7 (H) 12/10/2021 08:48 AM    HBA1C 5.2 02/13/2018 02:50 PM         Cardiac Imaging and Procedures Review:    EKG dated 11/2/2021: My personal interpretation is sinus bradycardia, first-degree AV block    Echo dated 12/10/2020:   CONCLUSIONS  Normal left " ventricular systolic function. Left ventricular ejection   fraction is visually estimated to be 65%.   Normal diastolic function.  Normal inferior vena cava size and inspiratory collapse.  Estimated right ventricular systolic pressure is 37 mmHg.    CTA  1/18/2021  FINDINGS:     Coronary calcification:  LMA - 0.0  LCX - 0.0  LAD - 264.1  RCA - 0.0  PDA - 0.0     Total Calcium Score: 264.1     Percentile: Calcium score is above the 75th percentile for the patient's age and sex.    Nuclear Perfusion Imaging (12/10/2020):   NUCLEAR IMAGING INTERPRETATION   No reversible defects that would indicate ischemia.    Small defect noted in the apical septal segment both at rest and stress.    Could represent apical thinning vs. possibly infarct.    Normal left ventricular systolic function.    The estimated ejection fraction is 65%.       ECG INTERPRETATION   Negative stress ECG for ischemia.      Assessment & Plan     1. Essential hypertension        2. Dyslipidemia        3. Coronary artery calcification                Shared Medical Decision Making:    Hypertension  BP borderline this visit but reports that usually it's 120s/70s.   -Continue lisinopril 20mg daily and propranolol 40mg twice daily (pt takes propranolol for essential tremor)  -Counseled the patient to measure BP daily.  If BP consistently remains above 130/80, consider increasing lisinopril.    Dyslipidemia  Elevated calcium score  -Continue rosuvastatin 40 mg daily  -Counseled on exercise and heart healthy diet    All of the patient's excellent questions were answered to the best of my knowledge and to her satisfaction.  It was a pleasure seeing Ms. Margoth Hopkins in my clinic today. Return in about 1 year (around 12/8/2023), or if symptoms worsen or fail to improve. Patient is aware to call the cardiology clinic with any questions or concerns.      Fiorella Worrell MD  [facility]  [facility], [facility]  [address]  [place] [address]  [place] [address]  Phone: [phone]  Fax: [phone]

## 2022-12-08 ENCOUNTER — TELEMEDICINE (OUTPATIENT)
Dept: CARDIOLOGY | Facility: MEDICAL CENTER | Age: 72
End: 2022-12-08
Payer: MEDICARE

## 2022-12-08 VITALS
WEIGHT: 171 LBS | BODY MASS INDEX: 26.84 KG/M2 | HEART RATE: 75 BPM | SYSTOLIC BLOOD PRESSURE: 141 MMHG | DIASTOLIC BLOOD PRESSURE: 84 MMHG | OXYGEN SATURATION: 95 % | HEIGHT: 67 IN

## 2022-12-08 DIAGNOSIS — I25.84 CORONARY ARTERY CALCIFICATION: ICD-10-CM

## 2022-12-08 DIAGNOSIS — E78.5 DYSLIPIDEMIA: ICD-10-CM

## 2022-12-08 DIAGNOSIS — I25.10 CORONARY ARTERY CALCIFICATION: ICD-10-CM

## 2022-12-08 DIAGNOSIS — I10 ESSENTIAL HYPERTENSION: ICD-10-CM

## 2022-12-08 PROCEDURE — 99214 OFFICE O/P EST MOD 30 MIN: CPT | Mod: 95 | Performed by: STUDENT IN AN ORGANIZED HEALTH CARE EDUCATION/TRAINING PROGRAM

## 2022-12-08 RX ORDER — CLINDAMYCIN HYDROCHLORIDE 300 MG/1
CAPSULE ORAL
COMMUNITY
Start: 2022-10-31 | End: 2022-12-08

## 2022-12-08 ASSESSMENT — FIBROSIS 4 INDEX: FIB4 SCORE: 1.44

## 2022-12-14 ENCOUNTER — APPOINTMENT (OUTPATIENT)
Dept: RADIOLOGY | Facility: MEDICAL CENTER | Age: 72
End: 2022-12-14
Attending: FAMILY MEDICINE
Payer: MEDICARE

## 2022-12-14 DIAGNOSIS — Z12.31 ENCOUNTER FOR SCREENING MAMMOGRAM FOR BREAST CANCER: ICD-10-CM

## 2022-12-14 PROCEDURE — 77067 SCR MAMMO BI INCL CAD: CPT

## 2022-12-15 ENCOUNTER — TELEMEDICINE (OUTPATIENT)
Dept: MEDICAL GROUP | Age: 72
End: 2022-12-15
Payer: MEDICARE

## 2022-12-15 VITALS
OXYGEN SATURATION: 98 % | WEIGHT: 167 LBS | DIASTOLIC BLOOD PRESSURE: 75 MMHG | HEART RATE: 58 BPM | HEIGHT: 67 IN | SYSTOLIC BLOOD PRESSURE: 134 MMHG | BODY MASS INDEX: 26.21 KG/M2

## 2022-12-15 DIAGNOSIS — F51.01 PRIMARY INSOMNIA: ICD-10-CM

## 2022-12-15 DIAGNOSIS — G89.29 LUMBOSACRAL PAIN, CHRONIC: ICD-10-CM

## 2022-12-15 DIAGNOSIS — M54.50 LUMBOSACRAL PAIN, CHRONIC: ICD-10-CM

## 2022-12-15 DIAGNOSIS — M53.3 PAIN OF RIGHT SACROILIAC JOINT: ICD-10-CM

## 2022-12-15 DIAGNOSIS — F41.1 GENERALIZED ANXIETY DISORDER: ICD-10-CM

## 2022-12-15 PROCEDURE — 99214 OFFICE O/P EST MOD 30 MIN: CPT | Mod: 95 | Performed by: FAMILY MEDICINE

## 2022-12-15 RX ORDER — ZOLPIDEM TARTRATE 5 MG/1
5 TABLET ORAL NIGHTLY PRN
Qty: 30 TABLET | Refills: 0 | Status: SHIPPED | OUTPATIENT
Start: 2022-12-15 | End: 2023-02-02 | Stop reason: SDUPTHER

## 2022-12-15 ASSESSMENT — FIBROSIS 4 INDEX: FIB4 SCORE: 1.44

## 2022-12-15 NOTE — PROGRESS NOTES
Virtual Visit: Established Patient   This visit was conducted via Zoom using secure and encrypted videoconferencing technology.   The patient was in their home in the [facility].    The patient's identity was confirmed and verbal consent was obtained for this virtual visit.     Subjective:   CC: Mental health follow-up    HPI:     Margoth Hopkins is a 71 y.o. female, established patient of the clinic.     Patient had chronic MOHAMUD and chronic pain.  She is currently taking duloxetine 30 mg twice daily.  She was recently started on BuSpar 10 mg twice daily for persistent anxiety.  Patient tolerated BuSpar well, no side effect reported.  She endorses significant improvement of anxiety with BuSpar.  She is pleased with the outcome of the treatment.  She does not work with behavioral therapist currently.    Patient also has chronic primary insomnia, both with sleep initiation and sleep maintenance.  She takes Ambien 5 mg as needed for worsening sleep.  She tolerated Ambien well, no side effect reported.    Patient is working with [facility] for management of chronic low back pain with right-sided sciatica and right SI joint pain.  Patient gets injections, ablations as needed for pain control.  She is taking Lyrica 50 mg twice daily and Tylenol as needed for pain control.  She is also taking tizanidine as needed.  Overall, her symptoms are controlled.      Current medicines (including changes today)  Current Outpatient Medications   Medication Sig Dispense Refill    zolpidem (AMBIEN) 5 MG Tab Take 1 Tablet by mouth at bedtime as needed for Sleep for up to 30 days. 30 Tablet 0    busPIRone (BUSPAR) 10 MG Tab tablet TAKE ONE TABLET BY MOUTH TWICE DAILY 180 Tablet 3    vitamin D2, Ergocalciferol, (DRISDOL) 1.25 MG (78233 UT) Cap capsule TAKE 1 CAPSULE BY MOUTH  EVERY 7 DAYS 13 Capsule 3    rosuvastatin (CRESTOR) 40 MG tablet TAKE 1 TABLET BY MOUTH  DAILY 90 Tablet 3    SYNTHROID 75 MCG Tab Take 1  "Tablet by mouth every morning on an empty stomach. 90 Tablet 3    pregabalin (LYRICA) 50 MG capsule Take 50 mg by mouth 2 times a day.      lisinopril (PRINIVIL) 20 MG Tab TAKE 1 TABLET BY MOUTH  DAILY 90 Tablet 3    propranolol (INDERAL) 40 MG Tab TAKE 1 TABLET BY MOUTH  TWICE DAILY (Patient taking differently: Take 40 mg by mouth 2 times a day.) 180 Tablet 3    tizanidine (ZANAFLEX) 2 MG tablet       DULoxetine (CYMBALTA) 30 MG Cap DR Particles Take 30 mg by mouth 2 times a day.      COLLAGEN PO Take  by mouth.      Probiotic Product (PROBIOTIC DAILY PO) Take  by mouth every day.       No current facility-administered medications for this visit.       Patient Active Problem List    Diagnosis Date Noted    Primary insomnia 12/15/2022    Pain of right sacroiliac joint 12/15/2022    Osteopenia of multiple sites 03/31/2021    Postoperative hypothyroidism_s/p thyroidectomy_Dr. Mullen 06/12/2020    Generalized osteoarthritis 06/12/2020    Drug induced constipation 06/12/2020    Hashimoto's thyroiditis s/p total thyroidectom Dr. Franklin in 2/2020.  02/20/2020    Essential tremor 10/31/2019    Calculus of gallbladder with biliary obstruction but without cholecystitis 10/14/2018    Chronic headache 03/20/2018    Vitiligo 06/26/2017    FHx: colon cancer 02/23/2017    Schatzki's ring_[facility] 02/23/2017    Benign essential HTN 02/23/2017    Pure hypercholesterolemia 02/23/2017    MOHAMUD (generalized anxiety disorder) 02/23/2017    HLA B27 (HLA B27 positive) 02/23/2017    Primary osteoarthritis of right knee, S/P TKR + 2 revision surgeries_Dr. Lomeli 02/23/2017    Lumbosacral pain, chronic, with right sciatica_[facility] 02/23/2017        Objective:   /75 Comment: pt states  Pulse (!) 58 Comment: pt states  Ht 1.702 m (5' 7\")   Wt 75.8 kg (167 lb)   SpO2 98% Comment: pt states  BMI 26.16 kg/m²     Physical Exam:  Constitutional: Alert, no distress, well-groomed.  Skin: No rashes in visible areas.  Eye: Round. " Conjunctiva clear, lids normal. No icterus.   ENMT: Lips pink without lesions, good dentition, moist mucous membranes. Phonation normal.  Neck: No masses, no thyromegaly. Moves freely without pain.  Respiratory: Unlabored respiratory effort, no cough or audible wheeze  Psych: Alert and oriented x3, normal affect and mood.     Assessment and Plan:   The following treatment plan was discussed:     1. MOHAMUD (generalized anxiety disorder)  Chronic, in excellent control with Cymbalta 30 mg twice daily and BuSpar 10 mg twice daily.  She tolerates both medication well, no side effect reported.  We will continue both medication at this time.  Patient does not actively works with behavioral therapist.    2. Primary insomnia  Chronic primary insomnia, both with sleep initiation and sleep maintenance.  Patient take Ambien every now and then for severe symptoms.  She tolerates Ambien well, no side effect reported.  Negative history of illicit drugs or alcohol abuse.  - zolpidem (AMBIEN) 5 MG Tab; Take 1 Tablet by mouth at bedtime as needed for Sleep for up to 30 days.  Dispense: 30 Tablet; Refill: 0  - Risks, benefits, side effects, as well as potential health complications associated with Ambien were previously discussed with patient. Appropriate counseling provided.    -Follow-up PRN     3. Pain of right sacroiliac joint  4. Lumbosacral pain, chronic, with right sciatica_[facility]  Chronic, working with [facility].  Symptoms are controlled with Lyrica 50 mg twice daily and Tylenol as needed.  -Follow-up with [facility] as directed.    Follow-up: Return in about 3 months (around 3/15/2023) for Mental Health F/U.

## 2022-12-25 DIAGNOSIS — E89.0 POSTOPERATIVE HYPOTHYROIDISM: ICD-10-CM

## 2022-12-26 RX ORDER — LEVOTHYROXINE SODIUM 75 MCG
TABLET ORAL
Qty: 90 TABLET | Refills: 3 | Status: SHIPPED | OUTPATIENT
Start: 2022-12-26 | End: 2023-02-21 | Stop reason: SDUPTHER

## 2023-01-05 DIAGNOSIS — F41.1 GENERALIZED ANXIETY DISORDER: ICD-10-CM

## 2023-01-05 DIAGNOSIS — I25.84 CORONARY ARTERY CALCIFICATION: ICD-10-CM

## 2023-01-05 DIAGNOSIS — I25.10 CORONARY ARTERY CALCIFICATION: ICD-10-CM

## 2023-01-05 DIAGNOSIS — G25.0 ESSENTIAL TREMOR: ICD-10-CM

## 2023-01-05 DIAGNOSIS — E78.5 DYSLIPIDEMIA: ICD-10-CM

## 2023-01-05 NOTE — TELEPHONE ENCOUNTER
Received request via: Pharmacy    Was the patient seen in the last year in this department? Yes    Does the patient have an active prescription (recently filled or refills available) for medication(s) requested? No, Pharmacy change    Does the patient have correction Plus and need 100 day supply (blood pressure, diabetes and cholesterol meds only)? Patient does not have SCP

## 2023-01-06 RX ORDER — DULOXETIN HYDROCHLORIDE 30 MG/1
30 CAPSULE, DELAYED RELEASE ORAL 2 TIMES DAILY
Qty: 180 CAPSULE | Refills: 3 | Status: SHIPPED | OUTPATIENT
Start: 2023-01-06 | End: 2023-03-13 | Stop reason: SDUPTHER

## 2023-01-06 RX ORDER — PROPRANOLOL HYDROCHLORIDE 40 MG/1
40 TABLET ORAL 2 TIMES DAILY
Qty: 180 TABLET | Refills: 3 | Status: SHIPPED | OUTPATIENT
Start: 2023-01-06 | End: 2023-04-17 | Stop reason: SDUPTHER

## 2023-01-06 RX ORDER — ROSUVASTATIN CALCIUM 40 MG/1
40 TABLET, COATED ORAL DAILY
Qty: 90 TABLET | Refills: 3 | Status: SHIPPED | OUTPATIENT
Start: 2023-01-06 | End: 2024-02-25

## 2023-01-19 ENCOUNTER — PATIENT MESSAGE (OUTPATIENT)
Dept: MEDICAL GROUP | Age: 73
End: 2023-01-19
Payer: COMMERCIAL

## 2023-01-19 DIAGNOSIS — G89.29 LUMBOSACRAL PAIN, CHRONIC: ICD-10-CM

## 2023-01-19 DIAGNOSIS — M54.50 LUMBOSACRAL PAIN, CHRONIC: ICD-10-CM

## 2023-01-20 ENCOUNTER — PATIENT MESSAGE (OUTPATIENT)
Dept: MEDICAL GROUP | Age: 73
End: 2023-01-20
Payer: COMMERCIAL

## 2023-01-20 DIAGNOSIS — F51.01 PRIMARY INSOMNIA: ICD-10-CM

## 2023-01-20 RX ORDER — PREGABALIN 50 MG/1
50 CAPSULE ORAL 2 TIMES DAILY
Qty: 60 CAPSULE | Refills: 1 | Status: SHIPPED | OUTPATIENT
Start: 2023-01-20 | End: 2023-03-09 | Stop reason: SDUPTHER

## 2023-02-02 RX ORDER — ZOLPIDEM TARTRATE 5 MG/1
5 TABLET ORAL NIGHTLY PRN
Qty: 30 TABLET | Refills: 0 | Status: SHIPPED | OUTPATIENT
Start: 2023-02-02 | End: 2023-03-04

## 2023-02-21 DIAGNOSIS — E89.0 POSTOPERATIVE HYPOTHYROIDISM: ICD-10-CM

## 2023-02-21 RX ORDER — LEVOTHYROXINE SODIUM 75 MCG
75 TABLET ORAL
Qty: 90 TABLET | Refills: 3 | Status: SHIPPED | OUTPATIENT
Start: 2023-02-21 | End: 2023-06-09 | Stop reason: SDUPTHER

## 2023-03-09 DIAGNOSIS — M54.50 LUMBOSACRAL PAIN, CHRONIC: ICD-10-CM

## 2023-03-09 DIAGNOSIS — F41.1 GENERALIZED ANXIETY DISORDER: ICD-10-CM

## 2023-03-09 DIAGNOSIS — G89.29 LUMBOSACRAL PAIN, CHRONIC: ICD-10-CM

## 2023-03-09 RX ORDER — BUSPIRONE HYDROCHLORIDE 10 MG/1
10 TABLET ORAL 2 TIMES DAILY
Qty: 180 TABLET | Refills: 3 | Status: ON HOLD | OUTPATIENT
Start: 2023-03-09 | End: 2023-06-07

## 2023-03-09 RX ORDER — PREGABALIN 50 MG/1
50 CAPSULE ORAL 2 TIMES DAILY
Qty: 180 CAPSULE | Refills: 1 | Status: SHIPPED | OUTPATIENT
Start: 2023-03-09 | End: 2023-05-08

## 2023-03-09 NOTE — TELEPHONE ENCOUNTER
Received request via: Pharmacy    Was the patient seen in the last year in this department? Yes  12/15/2022  Does the patient have an active prescription (recently filled or refills available) for medication(s) requested? No    Does the patient have assisted Plus and need 100 day supply (blood pressure, diabetes and cholesterol meds only)? Patient does not have SCP

## 2023-03-13 ENCOUNTER — OFFICE VISIT (OUTPATIENT)
Dept: MEDICAL GROUP | Age: 73
End: 2023-03-13
Payer: COMMERCIAL

## 2023-03-13 VITALS
HEIGHT: 67 IN | BODY MASS INDEX: 26.68 KG/M2 | TEMPERATURE: 98 F | SYSTOLIC BLOOD PRESSURE: 120 MMHG | OXYGEN SATURATION: 95 % | WEIGHT: 170 LBS | DIASTOLIC BLOOD PRESSURE: 62 MMHG | HEART RATE: 70 BPM

## 2023-03-13 DIAGNOSIS — M54.50 LUMBOSACRAL PAIN, CHRONIC: ICD-10-CM

## 2023-03-13 DIAGNOSIS — G89.29 LUMBOSACRAL PAIN, CHRONIC: ICD-10-CM

## 2023-03-13 DIAGNOSIS — F41.1 GENERALIZED ANXIETY DISORDER: ICD-10-CM

## 2023-03-13 DIAGNOSIS — R55 SYNCOPE, UNSPECIFIED SYNCOPE TYPE: ICD-10-CM

## 2023-03-13 DIAGNOSIS — I10 BENIGN ESSENTIAL HTN: ICD-10-CM

## 2023-03-13 DIAGNOSIS — F51.01 PRIMARY INSOMNIA: ICD-10-CM

## 2023-03-13 PROCEDURE — 99215 OFFICE O/P EST HI 40 MIN: CPT | Performed by: FAMILY MEDICINE

## 2023-03-13 RX ORDER — DULOXETINE 40 MG/1
30 CAPSULE, DELAYED RELEASE ORAL 2 TIMES DAILY
Qty: 180 CAPSULE | Refills: 3 | Status: SHIPPED | OUTPATIENT
Start: 2023-03-13 | End: 2023-03-13

## 2023-03-13 RX ORDER — LISINOPRIL 20 MG/1
20 TABLET ORAL DAILY
Qty: 90 TABLET | Refills: 3 | Status: ON HOLD | OUTPATIENT
Start: 2023-03-13 | End: 2023-08-23

## 2023-03-13 RX ORDER — DULOXETINE 40 MG/1
40 CAPSULE, DELAYED RELEASE ORAL 2 TIMES DAILY
Qty: 180 CAPSULE | Refills: 3 | Status: SHIPPED | OUTPATIENT
Start: 2023-03-13 | End: 2024-03-20 | Stop reason: SDUPTHER

## 2023-03-13 RX ORDER — ZOLPIDEM TARTRATE 5 MG/1
5 TABLET ORAL NIGHTLY PRN
Qty: 90 TABLET | Refills: 0 | Status: SHIPPED | OUTPATIENT
Start: 2023-03-13 | End: 2023-06-06 | Stop reason: SDUPTHER

## 2023-03-13 ASSESSMENT — FIBROSIS 4 INDEX: FIB4 SCORE: 1.46

## 2023-03-13 ASSESSMENT — PATIENT HEALTH QUESTIONNAIRE - PHQ9: CLINICAL INTERPRETATION OF PHQ2 SCORE: 0

## 2023-03-13 NOTE — PROGRESS NOTES
"Subjective:   CC: MOHAMUD, insomnia follow up, syncope    HPI:     Margoth Hopkins is a 72 y.o. female, established patient of the clinic.     Patient had chronic GERD, currently taking duloxetine 30 mg twice daily and BuSpar 10 mg twice daily.  Her symptoms are partially controlled.   She decided to take BuSpar only at night before bed.  She complains of \"weird feeling\" when taking BuSpar twice daily.  Patient does not work with behavioral health.    Patient also has chronic primary insomnia.  She is taking Ambien 5 mg nightly as needed for sleep.  She tolerated Ambien well, no side effect reported.    Patient is taking lisinopril 10 mg daily for hypertension.  She is also taking propranolol 40 mg twice daily for headaches prophylaxis.      Patient also have chronic lumbosacral pain.  She is taking Lyrica 50 mg twice daily for pain control.  Her symptoms are improving.    Patient complains of acute syncope on 1/30/2023.  Patient states that she was sitting on her chair with both legs elevated and sorting her mail.  She thinks that she passed out transiently onto the couch.  One of her leg drop to the floor.  When she was trying to get up, she experienced the sensation of chest heaviness and shortness of breath.  As she was trying to sit up, it feels as if somebody trying to push her down.  After well, she was able to sit up and regains full awareness of surroundings.  He describes sensation of swimming peacefully in a blue, beautiful ocean during the time of transient loss of consciousness.  Denies fever, chills, recent illnesses, recent changes in medication.  Negative history of drugs, alcohol, tobacco abuse.  Patient is concerned due to familial history of early cardiac death and multiple first-degree family member.      Current medicines (including changes today)  Current Outpatient Medications   Medication Sig Dispense Refill    DULoxetine 40 MG Cap DR Particles Take 30 mg by mouth 2 times a day. 180 Capsule 3    " "lisinopril (PRINIVIL) 20 MG Tab Take 1 Tablet by mouth every day. 90 Tablet 3    zolpidem (AMBIEN) 5 MG Tab Take 1 Tablet by mouth at bedtime as needed for Sleep for up to 90 days. 90 Tablet 0    pregabalin (LYRICA) 50 MG capsule Take 1 Capsule by mouth 2 times a day for 60 days. 180 Capsule 1    busPIRone (BUSPAR) 10 MG Tab tablet Take 1 Tablet by mouth 2 times a day. 180 Tablet 3    SYNTHROID 75 MCG Tab Take 1 Tablet by mouth every morning on an empty stomach. 90 Tablet 3    rosuvastatin (CRESTOR) 40 MG tablet Take 1 Tablet by mouth every day. 90 Tablet 3    propranolol (INDERAL) 40 MG Tab Take 1 Tablet by mouth 2 times a day. 180 Tablet 3    vitamin D2, Ergocalciferol, (DRISDOL) 1.25 MG (20456 UT) Cap capsule TAKE 1 CAPSULE BY MOUTH  EVERY 7 DAYS 13 Capsule 3    COLLAGEN PO Take  by mouth.      Probiotic Product (PROBIOTIC DAILY PO) Take  by mouth every day.       No current facility-administered medications for this visit.     She  has a past medical history of Anemia, Aortic insufficiency (01/31/2020), Arthritis, Breath shortness (11/02/2021), Cataract, Dental disorder, Gastric ulcer, Goiter, High cholesterol, Hypertension (11/02/2021), Hypothyroidism, Pain (01/31/2020), Psychiatric problem (11/02/2021), Pulmonary embolism (HCC) (01/31/2020), Reactive arthritis (HCC), Thyroid disease, and Thyroid disease (01/31/2020).    I reviewed patient's problem list, allergies, medications, family hx, social hx with patient and update EPIC.        Objective:     /62 (BP Location: Right arm, Patient Position: Sitting, BP Cuff Size: Adult)   Pulse 70   Temp 36.7 °C (98 °F) (Temporal)   Ht 1.702 m (5' 7\")   Wt 77.1 kg (170 lb)   SpO2 95%  Body mass index is 26.63 kg/m².    Physical Exam:  Constitutional: awake, alert, in no distress.  Skin: Warm, dry, good turgor, no rashes, bruises, ulcers in visible areas.  Eye: conjunctiva clear, lids neg for edema or lesions.  ENMT: TM and auditory canals wnl.    Neck: Trachea " "midline, no masses, no thyromegaly. No cervical or supraclavicular lymphadenopathy  Respiratory: Unlabored respiratory effort, lungs clear to auscultation, no wheezes, no rales.  Cardiovascular: Normal S1, S2, no murmur, no pedal edema.  Psych: Oriented x3, affect and mood wnl, intact judgement and insight.       Assessment and Plan:   The following treatment plan was discussed    1. MOHAMUD (generalized anxiety disorder)  Chronic, partially controlled with duloxetine 30 mg twice daily.  Patient was taking BuSpar 10 mg twice daily, but decided to reduce BuSpar to 10 mg before bed due to \"weird feeling\" if she takes Buspar BID.   - Increase Duloxetine to 40 mg BID: DULoxetine 40 MG Cap DR Particles; Take 30 mg by mouth 2 times a day.  Dispense: 180 Capsule; Refill: 3  - Buspar 10 mg qhs     2. Primary insomnia  Chronic, controlled with Ambien 5 mg nightly as needed, no s/e reported, will continue.    - zolpidem (AMBIEN) 5 MG Tab; Take 1 Tablet by mouth at bedtime as needed for Sleep for up to 90 days.  Dispense: 90 Tablet; Refill: 0  - Risks, benefits, side effects, as well as potential health complications associated with Ambien were previously discussed with patient. Appropriate counseling provided.    -Sleep hygiene    3. Lumbosacral pain, chronic, with right sciatica_[facility]  Chronic, controlled with Lyrica 50 mg twice daily, no s/e reported, will continue.    - Risks, benefits, side effects, as well as potential health complications associated with Lyrica were previously discussed with patient. Appropriate counseling provided.      4. Benign essential HTN  Chronic, controlled with lisinopril 20 mg daily, no s/e reported, will continue.    - lisinopril (PRINIVIL) 20 MG Tab; Take 1 Tablet by mouth every day.  Dispense: 90 Tablet; Refill: 3  - Comp Metabolic Panel; Future  - CBC WITH DIFFERENTIAL; Future  - MICROALBUMIN CREAT RATIO URINE; Future    5. Syncope, unspecified syncope type  - RIH ZIO PATCH " MONITOR; Future  - EC-ECHOCARDIOGRAM COMPLETE W/O CONT; Future  - NM-CARDIAC STRESS TEST; Future   - ER precaution discussed.   - schedule appointment to follow up with her cardiologist.     Total time spent reviewing pt's chart, labs, notes, imaging, and counseling/prescribing medications to patient before, during, and after the visit: 44 minutes.      Charlee Walker M.D.      Followup: Return in about 3 months (around 6/13/2023) for As needed, controlled substance refill.    Please note that this dictation was created using voice recognition software. I have made every reasonable attempt to correct obvious errors, but I expect that there are errors of grammar and possibly content that I did not discover before finalizing the note.

## 2023-03-16 ENCOUNTER — ANCILLARY PROCEDURE (OUTPATIENT)
Dept: CARDIOLOGY | Facility: MEDICAL CENTER | Age: 73
End: 2023-03-16
Attending: FAMILY MEDICINE
Payer: COMMERCIAL

## 2023-03-16 DIAGNOSIS — R55 SYNCOPE, UNSPECIFIED SYNCOPE TYPE: ICD-10-CM

## 2023-03-16 LAB
LV EJECT FRACT  99904: 60
LV EJECT FRACT MOD 2C 99903: 57.98
LV EJECT FRACT MOD 4C 99902: 75.28
LV EJECT FRACT MOD BP 99901: 68.03

## 2023-03-16 PROCEDURE — 93306 TTE W/DOPPLER COMPLETE: CPT | Mod: 26 | Performed by: INTERNAL MEDICINE

## 2023-03-16 PROCEDURE — 93306 TTE W/DOPPLER COMPLETE: CPT

## 2023-03-17 ENCOUNTER — NON-PROVIDER VISIT (OUTPATIENT)
Dept: CARDIOLOGY | Facility: MEDICAL CENTER | Age: 73
End: 2023-03-17
Attending: FAMILY MEDICINE
Payer: COMMERCIAL

## 2023-03-17 DIAGNOSIS — R00.1 SINUS BRADYCARDIA: ICD-10-CM

## 2023-03-17 DIAGNOSIS — I44.1 MOBITZ (TYPE) I (WENCKEBACH'S) ATRIOVENTRICULAR BLOCK: ICD-10-CM

## 2023-03-17 DIAGNOSIS — R55 SYNCOPE, UNSPECIFIED SYNCOPE TYPE: ICD-10-CM

## 2023-03-17 PROBLEM — I35.1 NONRHEUMATIC AORTIC VALVE INSUFFICIENCY: Status: ACTIVE | Noted: 2023-03-17

## 2023-03-22 ENCOUNTER — HOSPITAL ENCOUNTER (OUTPATIENT)
Dept: RADIOLOGY | Facility: MEDICAL CENTER | Age: 73
End: 2023-03-22
Attending: FAMILY MEDICINE
Payer: COMMERCIAL

## 2023-03-22 DIAGNOSIS — R55 SYNCOPE, UNSPECIFIED SYNCOPE TYPE: ICD-10-CM

## 2023-03-22 PROCEDURE — 700111 HCHG RX REV CODE 636 W/ 250 OVERRIDE (IP): Performed by: FAMILY MEDICINE

## 2023-03-22 PROCEDURE — 93018 CV STRESS TEST I&R ONLY: CPT | Performed by: INTERNAL MEDICINE

## 2023-03-22 PROCEDURE — 78452 HT MUSCLE IMAGE SPECT MULT: CPT

## 2023-03-22 PROCEDURE — 78452 HT MUSCLE IMAGE SPECT MULT: CPT | Mod: 26 | Performed by: INTERNAL MEDICINE

## 2023-03-22 RX ORDER — AMINOPHYLLINE 25 MG/ML
100 INJECTION, SOLUTION INTRAVENOUS
Status: DISCONTINUED | OUTPATIENT
Start: 2023-03-22 | End: 2023-03-23 | Stop reason: HOSPADM

## 2023-03-22 RX ORDER — REGADENOSON 0.08 MG/ML
0.4 INJECTION, SOLUTION INTRAVENOUS ONCE
Status: COMPLETED | OUTPATIENT
Start: 2023-03-22 | End: 2023-03-22

## 2023-03-22 RX ADMIN — REGADENOSON 0.4 MG: 0.08 INJECTION, SOLUTION INTRAVENOUS at 10:45

## 2023-04-17 ENCOUNTER — OFFICE VISIT (OUTPATIENT)
Dept: MEDICAL GROUP | Age: 73
End: 2023-04-17
Payer: COMMERCIAL

## 2023-04-17 ENCOUNTER — TELEPHONE (OUTPATIENT)
Dept: CARDIOLOGY | Facility: MEDICAL CENTER | Age: 73
End: 2023-04-17

## 2023-04-17 VITALS
HEART RATE: 57 BPM | SYSTOLIC BLOOD PRESSURE: 118 MMHG | HEIGHT: 67 IN | DIASTOLIC BLOOD PRESSURE: 64 MMHG | WEIGHT: 178.2 LBS | TEMPERATURE: 97.7 F | OXYGEN SATURATION: 98 % | BODY MASS INDEX: 27.97 KG/M2

## 2023-04-17 DIAGNOSIS — G25.0 ESSENTIAL TREMOR: ICD-10-CM

## 2023-04-17 DIAGNOSIS — R55 SYNCOPE, UNSPECIFIED SYNCOPE TYPE: ICD-10-CM

## 2023-04-17 DIAGNOSIS — Z96.652 STATUS POST TOTAL LEFT KNEE REPLACEMENT: ICD-10-CM

## 2023-04-17 DIAGNOSIS — I10 BENIGN ESSENTIAL HTN: ICD-10-CM

## 2023-04-17 DIAGNOSIS — M17.12 PRIMARY OSTEOARTHRITIS OF LEFT KNEE: ICD-10-CM

## 2023-04-17 DIAGNOSIS — I45.9 HEART BLOCK: ICD-10-CM

## 2023-04-17 DIAGNOSIS — I35.1 NONRHEUMATIC AORTIC VALVE INSUFFICIENCY: ICD-10-CM

## 2023-04-17 PROCEDURE — 93268 ECG RECORD/REVIEW: CPT | Performed by: STUDENT IN AN ORGANIZED HEALTH CARE EDUCATION/TRAINING PROGRAM

## 2023-04-17 PROCEDURE — 99214 OFFICE O/P EST MOD 30 MIN: CPT | Performed by: FAMILY MEDICINE

## 2023-04-17 RX ORDER — PROPRANOLOL HYDROCHLORIDE 40 MG/1
40 TABLET ORAL 3 TIMES DAILY
Qty: 270 TABLET | Refills: 3 | Status: SHIPPED | OUTPATIENT
Start: 2023-04-17 | End: 2023-11-21

## 2023-04-17 RX ORDER — HYDROCODONE BITARTRATE AND ACETAMINOPHEN 7.5; 325 MG/1; MG/1
1 TABLET ORAL EVERY 6 HOURS PRN
COMMUNITY
End: 2023-04-17 | Stop reason: SDUPTHER

## 2023-04-17 RX ORDER — PROPRANOLOL HYDROCHLORIDE 40 MG/1
40 TABLET ORAL 3 TIMES DAILY
Qty: 270 TABLET | Refills: 3 | Status: SHIPPED | OUTPATIENT
Start: 2023-04-17 | End: 2023-04-17 | Stop reason: SDUPTHER

## 2023-04-17 RX ORDER — HYDROCODONE BITARTRATE AND ACETAMINOPHEN 7.5; 325 MG/1; MG/1
1 TABLET ORAL 2 TIMES DAILY PRN
Qty: 60 TABLET | Refills: 0 | Status: SHIPPED | OUTPATIENT
Start: 2023-04-17 | End: 2023-05-17

## 2023-04-17 ASSESSMENT — FIBROSIS 4 INDEX: FIB4 SCORE: 1.46

## 2023-04-19 NOTE — PROGRESS NOTES
Subjective:   CC: heart block      HPI:     Margoth Hopkins is a 72 y.o. female, established patient of the clinic.     Patient was seen a few months ago for acute syncope. She has history of non-rheumatic aortic valve insufficiency, hypertension, hyperlipidemia. She usually follows up with Dr. Worrell, cardiology.    Echo, 3/2023:   Compared to the prior study on 12/10/20, aortic regurgitation is worse.  The left ventricular ejection fraction is visually estimated to be 60%.  Moderate appearing aortic regurgitation, if clinically indicated   consider transesophageal echocardiogram.     Cardiac stress test, 3/2023:   Myocardial Perfusion Report   NUCLEAR IMAGING INTERPRETATION   Normal left ventricular size, ejection fraction, and wall motion.    Small sized, nonreversible, decreased uptake of mild severity in the apex and  apical septal segments during post stress images.   Small sized, partially reversible, decreased uptake of mild severity in the    apical inferior, mid inferior, mid inferolateral segments during post stress    images   Homogenous normal tracer uptake of the myocardium during rest and stress in  all other segments   ECG INTERPRETATION   No ischemic changes with stress compared to baseline ECG.    Event monitor completed in 4/2023:   The patient was monitored for 26 days 1 hour and 27 minutes.  Predominant underlying rhythm was sinus rhythm.   No evidence of tachyarrhythmias.   No evidence of PACs or PVCs.   The patient had 1 episode of second-degree AV block type I.     Symptoms correlated with sinus rhythm and sinus bradycardia, not associated with heart block.     Interval history:   Patient did not have any syncopal event since last office visit.   She takes all medications as directed and tolerates them well.   She has follow up appointment with Dr. Worrell in a few months.   She complained of slightly worse essential tremors.   She needs refill of Holcomb to be taken as needed for severe knee  pain.     Current medicines (including changes today)  Current Outpatient Medications   Medication Sig Dispense Refill    Celecoxib (CELEBREX PO) Take  by mouth.      propranolol (INDERAL) 40 MG Tab Take 1 Tablet by mouth 3 times a day. 270 Tablet 3    HYDROcodone-acetaminophen (NORCO) 7.5-325 MG tab Take 1 Tablet by mouth 2 times a day as needed for Severe Pain for up to 30 days. 60 Tablet 0    lisinopril (PRINIVIL) 20 MG Tab Take 1 Tablet by mouth every day. 90 Tablet 3    zolpidem (AMBIEN) 5 MG Tab Take 1 Tablet by mouth at bedtime as needed for Sleep for up to 90 days. 90 Tablet 0    DULoxetine HCl 40 MG Cap DR Particles Take 40 mg by mouth 2 times a day. 180 Capsule 3    pregabalin (LYRICA) 50 MG capsule Take 1 Capsule by mouth 2 times a day for 60 days. 180 Capsule 1    busPIRone (BUSPAR) 10 MG Tab tablet Take 1 Tablet by mouth 2 times a day. 180 Tablet 3    SYNTHROID 75 MCG Tab Take 1 Tablet by mouth every morning on an empty stomach. 90 Tablet 3    rosuvastatin (CRESTOR) 40 MG tablet Take 1 Tablet by mouth every day. 90 Tablet 3    vitamin D2, Ergocalciferol, (DRISDOL) 1.25 MG (79340 UT) Cap capsule TAKE 1 CAPSULE BY MOUTH  EVERY 7 DAYS 13 Capsule 3    COLLAGEN PO Take  by mouth.      Probiotic Product (PROBIOTIC DAILY PO) Take  by mouth every day.       No current facility-administered medications for this visit.     She  has a past medical history of Anemia, Aortic insufficiency (01/31/2020), Arthritis, Breath shortness (11/02/2021), Cataract, Dental disorder, Gastric ulcer, Goiter, High cholesterol, Hypertension (11/02/2021), Hypothyroidism, Pain (01/31/2020), Psychiatric problem (11/02/2021), Pulmonary embolism (HCC) (01/31/2020), Reactive arthritis (HCC), Thyroid disease, and Thyroid disease (01/31/2020).    I reviewed patient's problem list, allergies, medications, family hx, social hx with patient and update EPIC.        Objective:     /64 (BP Location: Right arm, Patient Position: Sitting, BP  "Cuff Size: Adult)   Pulse (!) 57   Temp 36.5 °C (97.7 °F) (Temporal)   Ht 1.702 m (5' 7\")   Wt 80.8 kg (178 lb 3.2 oz)   SpO2 98%  Body mass index is 27.91 kg/m².    Physical Exam:  Constitutional: awake, alert, in no distress.  Skin: Warm, dry, good turgor, no rashes, bruises, ulcers in visible areas.  Eye: conjunctiva clear, lids neg for edema or lesions.  Neck: Trachea midline, no masses, no thyromegaly. No cervical or supraclavicular lymphadenopathy  Respiratory: Unlabored respiratory effort, lungs clear to auscultation, no wheezes, no rales.  Cardiovascular: Normal S1, S2, no pedal edema.  Psych: Oriented x3, affect and mood wnl, intact judgement and insight.       Assessment and Plan:   The following treatment plan was discussed    1. Heart block  2. Syncope, unspecified syncope type  3. Nonrheumatic aortic valve insufficiency  I discussed the results of the echo, NM stress test, and event monitor with patient. No major concerns at this time. Aortic insufficiency is slightly worse compared to previous study. Patient is due for labs and should have labs done to look for other causes of syncope. She feels fine today and did not have any additional syncopal events.   - follow up with Dr. Worrell as directed  - continue to monitor symptoms and follow up PRN   - avoid alcohol, illicit drugs, excessive consumption of caffeine.   - hydration discussed.      4. Benign essential HTN  Chronic, controlled with Lisinopril 20 mg qd, no s/e reported, will continue.      5. Essential tremor  Chronic, partially controlled with Inderal 40 mg BID. Will increase Inderal to 40 mg TID.   - propranolol (INDERAL) 40 MG Tab; Take 1 Tablet by mouth 3 times a day.  Dispense: 270 Tablet; Refill: 3    6. Primary osteoarthritis of left knee s/p TKR in 6/2022  7. Status post total left knee replacement  - HYDROcodone-acetaminophen (NORCO) 7.5-325 MG tab; Take 1 Tablet by mouth 2 times a day as needed for Severe Pain for up to 30 days.  " Dispense: 60 Tablet; Refill: 0  - Risks, benefits, side effects, as well as potential health complications associated with Norco were previously discussed with patient. Appropriate counseling provided.        Charlee Walker M.D.      Followup: Return in about 3 months (around 7/17/2023) for Annual wellness visit.    Please note that this dictation was created using voice recognition software. I have made every reasonable attempt to correct obvious errors, but I expect that there are errors of grammar and possibly content that I did not discover before finalizing the note.

## 2023-05-15 ENCOUNTER — OFFICE VISIT (OUTPATIENT)
Dept: CARDIOLOGY | Facility: MEDICAL CENTER | Age: 73
End: 2023-05-15
Attending: NURSE PRACTITIONER
Payer: COMMERCIAL

## 2023-05-15 ENCOUNTER — TELEPHONE (OUTPATIENT)
Dept: CARDIOLOGY | Facility: MEDICAL CENTER | Age: 73
End: 2023-05-15

## 2023-05-15 VITALS
WEIGHT: 174 LBS | HEART RATE: 63 BPM | DIASTOLIC BLOOD PRESSURE: 74 MMHG | SYSTOLIC BLOOD PRESSURE: 116 MMHG | HEIGHT: 67 IN | BODY MASS INDEX: 27.31 KG/M2 | OXYGEN SATURATION: 98 % | RESPIRATION RATE: 12 BRPM

## 2023-05-15 DIAGNOSIS — I25.10 CORONARY ARTERY CALCIFICATION: ICD-10-CM

## 2023-05-15 DIAGNOSIS — I10 ESSENTIAL HYPERTENSION: ICD-10-CM

## 2023-05-15 DIAGNOSIS — R00.1 SINUS BRADYCARDIA: ICD-10-CM

## 2023-05-15 DIAGNOSIS — E78.5 DYSLIPIDEMIA: ICD-10-CM

## 2023-05-15 DIAGNOSIS — I25.84 CORONARY ARTERY CALCIFICATION: ICD-10-CM

## 2023-05-15 DIAGNOSIS — R55 SYNCOPE, UNSPECIFIED SYNCOPE TYPE: ICD-10-CM

## 2023-05-15 DIAGNOSIS — R94.39 ABNORMAL CARDIOVASCULAR STRESS TEST: ICD-10-CM

## 2023-05-15 DIAGNOSIS — I35.1 AORTIC VALVE INSUFFICIENCY, ETIOLOGY OF CARDIAC VALVE DISEASE UNSPECIFIED: ICD-10-CM

## 2023-05-15 DIAGNOSIS — I44.1 MOBITZ (TYPE) I (WENCKEBACH'S) ATRIOVENTRICULAR BLOCK: ICD-10-CM

## 2023-05-15 PROCEDURE — 99213 OFFICE O/P EST LOW 20 MIN: CPT | Performed by: NURSE PRACTITIONER

## 2023-05-15 PROCEDURE — 99214 OFFICE O/P EST MOD 30 MIN: CPT | Performed by: NURSE PRACTITIONER

## 2023-05-15 PROCEDURE — 1125F AMNT PAIN NOTED PAIN PRSNT: CPT | Performed by: NURSE PRACTITIONER

## 2023-05-15 PROCEDURE — 3078F DIAST BP <80 MM HG: CPT | Performed by: NURSE PRACTITIONER

## 2023-05-15 PROCEDURE — 3074F SYST BP LT 130 MM HG: CPT | Performed by: NURSE PRACTITIONER

## 2023-05-15 ASSESSMENT — ENCOUNTER SYMPTOMS
COUGH: 0
CLAUDICATION: 0
SHORTNESS OF BREATH: 1
PALPITATIONS: 0
ORTHOPNEA: 0
PND: 0
DIZZINESS: 1
MYALGIAS: 0
ABDOMINAL PAIN: 0
FEVER: 0

## 2023-05-15 ASSESSMENT — FIBROSIS 4 INDEX: FIB4 SCORE: 1.46

## 2023-05-15 NOTE — TELEPHONE ENCOUNTER
Patient is scheduled on 6-7-23 for a C w/poss with Dr. Cortez. No meds to stop and patient to check in at 6:00 for  a 7:30 procedure. H&P was done on 5-15-23 by Lucy Raman. Pre admit to call patient. Patient will be out of town until 6-4-23.

## 2023-05-15 NOTE — PROGRESS NOTES
Chief Complaint   Patient presents with    Follow-Up     FV Dx: Coronary artery calcification       Subjective     Margoth Hopkins is a 72 y.o. female who presents today for follow-up after she had testing done by her PCP.    Patient of Dr. Worrell.  She was last seen in clinic on 12/8/2022 with Dr. Worrell.  During that visit, no changes were made during that visit.    Patient reports she had a syncopal episode while sitting on her couch doing paperwork 1 day.  She states she passed out and reports having a vision with her episode.  She reports seeing waves or water.  When she awoke, she had some chest pressure, eventually sat up and checked her blood pressure which was elevated.  She denies dizziness at that time.    She did see her primary care and her PCP ordered an event monitor, an echocardiogram and a stress test.    Patient is here to follow-up on those results.    Patient mentions having current symptoms of dizziness, shortness of breath.  She denies chest pain, palpitations, orthopnea, PND or edema.    She reports her and her family are planning on going to [place] on Sunday, she will stay there for a week then will visit her children in [facility] for a week.  2 will be out of town for 2 weeks.    She reports her father and brother had heart disease/heart attacks.    She is a retired pediatric nurse.    Additionally, patient has the following medical problems:     -Hypertension    -Dyslipidemia    -History of goiter, s/p total thyroidectomy    -History of GI bleed secondary to gastric ulcers    -Schatzki's ring  Past Medical History:   Diagnosis Date    Anemia     H/O    Aortic insufficiency 01/31/2020    Pt. states this is mild and does not have any signs or symptoms.    Arthritis     Osteo-Right Knee    Breath shortness 11/02/2021    with exertion or anxiety    Cataract     IOL OU    Dental disorder     upper flipper/ also implanted front left central tooth    Gastric ulcer     Goiter     High  cholesterol     Hypertension 11/02/2021    well controlled on meds    Hypothyroidism     Pain 01/31/2020    Right Knee & Lower Back    Psychiatric problem 11/02/2021    anxiety    Pulmonary embolism (HCC) 01/31/2020    Pt. states after joint replacement in 7-2018.    Reactive arthritis (HCC)     Thyroid disease     hasimotos    Thyroid disease 01/31/2020    Thyroid Nodules     Past Surgical History:   Procedure Laterality Date    MASS EXCISION GENERAL Right 11/5/2021    Procedure: EXCISION, MASS - LATERAL THIGH;  Surgeon: Cydney Franklin M.D.;  Location: SURGERY SAME DAY [facility];  Service: General    THYROIDECTOMY TOTAL Bilateral 2/5/2020    Procedure: THYROIDECTOMY, TOTAL- COMPLETE;  Surgeon: Cydney Franklin M.D.;  Location: SURGERY SAME DAY [facility];  Service: General    APPENDECTOMY      BLADDER SLING FEMALE      CATARACT EXTRACTION WITH IOL Left     CATARACT EXTRACTION WITH IOL Bilateral     CERVICAL FUSION POSTERIOR      FOOT SURGERY Left     metatarsal fusion, hammer toes correction    FUSION      disc fusion    GYN SURGERY      Hysterectomy    KNEE ARTHROPLASTY TOTAL Right     x3    KNEE REPLACEMENT, TOTAL Right     KNEE REVISION TOTAL Right      Family History   Problem Relation Age of Onset    Arthritis Mother     Alzheimer's Disease Mother     Cancer Mother         cervical cancer    Heart Attack Father     Other Brother         brain anurism    Hypertension Brother     Cancer Maternal Grandmother 66        colono cancer    Heart Disease Brother     Hypertension Brother     Arthritis Brother     Other Brother         Celiac Disease    No Known Problems Maternal Grandfather     No Known Problems Paternal Grandmother     No Known Problems Paternal Grandfather     Hypertension Brother     Arthritis Brother     Lung Disease Brother     Asthma Brother      Social History     Socioeconomic History    Marital status:      Spouse name: Not on file    Number of children: Not on file    Years of  education: Not on file    Highest education level: Bachelor's degree (e.g., BA, AB, BS)   Occupational History    Not on file   Tobacco Use    Smoking status: Never    Smokeless tobacco: Never   Vaping Use    Vaping Use: Never used   Substance and Sexual Activity    Alcohol use: Yes     Alcohol/week: 1.8 oz     Types: 3 Glasses of wine per week    Drug use: Not Currently     Types: Oral     Comment: CBD ingested 9/21 pt denies at this time    Sexual activity: Yes     Partners: Male   Other Topics Concern    Not on file   Social History Narrative    Not on file     Social Determinants of Health     Financial Resource Strain: Low Risk  (6/7/2022)    Overall Financial Resource Strain (CARDIA)     Difficulty of Paying Living Expenses: Not hard at all   Food Insecurity: No Food Insecurity (6/7/2022)    Hunger Vital Sign     Worried About Running Out of Food in the Last Year: Never true     Ran Out of Food in the Last Year: Never true   Transportation Needs: No Transportation Needs (6/7/2022)    PRAPARE - Transportation     Lack of Transportation (Medical): No     Lack of Transportation (Non-Medical): No   Physical Activity: Insufficiently Active (6/7/2022)    Exercise Vital Sign     Days of Exercise per Week: 1 day     Minutes of Exercise per Session: 10 min   Stress: Stress Concern Present (6/7/2022)    [place] [employer] - Occupational Stress Questionnaire     Feeling of Stress : Very much   Social Connections: Socially Integrated (6/7/2022)    Social Connection and Isolation Panel [NHANES]     Frequency of Communication with Friends and Family: Three times a week     Frequency of Social Gatherings with Friends and Family: Twice a week     Attends [employer] Services: More than 4 times per year     Active Member of Clubs or Organizations: Yes     Attends Club or Organization Meetings: More than 4 times per year     Marital Status:    Intimate Partner Violence: Not on file   Housing  Stability: Low Risk  (6/7/2022)    Housing Stability Vital Sign     Unable to Pay for Housing in the Last Year: No     Number of Places Lived in the Last Year: 1     Unstable Housing in the Last Year: No     Allergies   Allergen Reactions    Morphine Vomiting    Nsaids Unspecified     History of gastric ulcers - cannot take NSAIDS    Pcn [Penicillins] Hives    Seasonal      Outpatient Encounter Medications as of 5/15/2023   Medication Sig Dispense Refill    Celecoxib (CELEBREX PO) Take  by mouth.      propranolol (INDERAL) 40 MG Tab Take 1 Tablet by mouth 3 times a day. 270 Tablet 3    HYDROcodone-acetaminophen (NORCO) 7.5-325 MG tab Take 1 Tablet by mouth 2 times a day as needed for Severe Pain for up to 30 days. 60 Tablet 0    lisinopril (PRINIVIL) 20 MG Tab Take 1 Tablet by mouth every day. 90 Tablet 3    zolpidem (AMBIEN) 5 MG Tab Take 1 Tablet by mouth at bedtime as needed for Sleep for up to 90 days. 90 Tablet 0    DULoxetine HCl 40 MG Cap DR Particles Take 40 mg by mouth 2 times a day. 180 Capsule 3    busPIRone (BUSPAR) 10 MG Tab tablet Take 1 Tablet by mouth 2 times a day. 180 Tablet 3    SYNTHROID 75 MCG Tab Take 1 Tablet by mouth every morning on an empty stomach. 90 Tablet 3    rosuvastatin (CRESTOR) 40 MG tablet Take 1 Tablet by mouth every day. 90 Tablet 3    vitamin D2, Ergocalciferol, (DRISDOL) 1.25 MG (68991 UT) Cap capsule TAKE 1 CAPSULE BY MOUTH  EVERY 7 DAYS 13 Capsule 3    COLLAGEN PO Take  by mouth.      Probiotic Product (PROBIOTIC DAILY PO) Take  by mouth every day.       No facility-administered encounter medications on file as of 5/15/2023.     Review of Systems   Constitutional:  Negative for fever and malaise/fatigue.   Respiratory:  Positive for shortness of breath. Negative for cough.    Cardiovascular:  Positive for chest pain. Negative for palpitations, orthopnea, claudication, leg swelling and PND.   Gastrointestinal:  Negative for abdominal pain.   Musculoskeletal:  Negative for  "myalgias.   Neurological:  Positive for dizziness.        Syncope   All other systems reviewed and are negative.             Objective     /74 (BP Location: Left arm, Patient Position: Sitting, BP Cuff Size: Adult)   Pulse 63   Resp 12   Ht 1.702 m (5' 7\")   Wt 78.9 kg (174 lb)   SpO2 98%   BMI 27.25 kg/m²     Physical Exam  Vitals reviewed.   Constitutional:       Appearance: She is well-developed.   HENT:      Head: Normocephalic and atraumatic.   Eyes:      Pupils: Pupils are equal, round, and reactive to light.   Neck:      Vascular: No JVD.   Cardiovascular:      Rate and Rhythm: Normal rate and regular rhythm.      Heart sounds: Normal heart sounds.   Pulmonary:      Effort: Pulmonary effort is normal. No respiratory distress.      Breath sounds: Normal breath sounds. No wheezing or rales.   Abdominal:      General: Bowel sounds are normal.      Palpations: Abdomen is soft.   Musculoskeletal:         General: Normal range of motion.      Cervical back: Normal range of motion and neck supple.      Right lower leg: No edema.      Left lower leg: No edema.   Skin:     General: Skin is warm and dry.   Neurological:      General: No focal deficit present.      Mental Status: She is alert and oriented to person, place, and time.   Psychiatric:         Behavior: Behavior normal.       Lab Results   Component Value Date/Time    CHOLSTRLTOT 187 06/08/2022 09:18 AM    LDL 90 06/08/2022 09:18 AM    HDL 73 06/08/2022 09:18 AM    TRIGLYCERIDE 120 06/08/2022 09:18 AM       Lab Results   Component Value Date/Time    SODIUM 137 06/08/2022 09:18 AM    POTASSIUM 4.2 06/08/2022 09:18 AM    CHLORIDE 102 06/08/2022 09:18 AM    CO2 23 06/08/2022 09:18 AM    GLUCOSE 91 06/08/2022 09:18 AM    BUN 24 (H) 06/08/2022 09:18 AM    CREATININE 0.75 06/08/2022 09:18 AM     Lab Results   Component Value Date/Time    ALKPHOSPHAT 87 06/08/2022 09:18 AM    ASTSGOT 28 06/08/2022 09:18 AM    ALTSGPT 22 06/08/2022 09:18 AM    TBILIRUBIN " 0.8 06/08/2022 09:18 AM          Myocardial Perfusion Report 7/6/2017   NUCLEAR IMAGING INTERPRETATION   Myocardial perfusion imaging is normal.    No evidence of significant jeopardized viable myocardium or prior myocardial infarction.   Normal left ventricular size, ejection fraction, and wall motion.   ECG INTERPRETATION   Negative Lexiscan stress ECG for ischemia.     Transthoracic Echo Report 7/5/2018  No prior study is available for comparison.   Normal left ventricular chamber size.  Normal left ventricular systolic function.  Left ventricular ejection fraction is visually estimated to be 65%.  Normal regional wall motion.  Aortic sclerosis without stenosis.  Aortic insufficiency, probably mild.  Estimated right ventricular systolic pressure  is 35 mmHg.  Normal pericardium without effusion.     Transthoracic Echo Report 12/10/2020  Normal left ventricular systolic function. Left ventricular ejection fraction is visually estimated to be 65%.   Normal diastolic function.  Normal inferior vena cava size and inspiratory collapse.  Estimated right ventricular systolic pressure is 37 mmHg.       Myocardial Perfusion Report 12/10/2020   NUCLEAR IMAGING INTERPRETATION   No reversible defects that would indicate ischemia.    Small defect noted in the apical septal segment both at rest and stress.    Could represent apical thinning vs. possibly infarct.    Normal left ventricular systolic function.    The estimated ejection fraction is 65%.       ECG INTERPRETATION   Negative stress ECG for ischemia.      Transthoracic Echo Report 3/16/2023  Compared to the prior study on 12/10/20, aortic regurgitation is worse.  The left ventricular ejection fraction is visually estimated to be 60%.  Moderate appearing aortic regurgitation, if clinically indicated consider transesophageal echocardiogram.      Myocardial Perfusion Report 3/22/2023   NUCLEAR IMAGING INTERPRETATION   Normal left ventricular size, ejection fraction, and  wall motion.      Small sized, nonreversible, decreased uptake of mild severity in the apex and apical septal segments during post stress images.   Small sized, partially reversible, decreased uptake of mild severity in the apical inferior, mid inferior, mid inferolateral segments during post stress images   Homogenous normal tracer uptake of the myocardium during rest and stress in all other segments   ECG INTERPRETATION   No ischemic changes with stress compared to baseline ECG.    [employer] 3/17/2023 through 4/15/2023  DOS: 3/17/2023-4/15/2023   Summary:   The patient was monitored for 26 days 1 hour and 27 minutes.  Predominant underlying rhythm was sinus rhythm.     No evidence of tachyarrhythmias.   No evidence of PACs or PVCs.   The patient had 1 episode of second-degree AV block type I.       Symptoms correlated with sinus rhythm and sinus bradycardia, not associated with heart block.          Assessment & Plan     1. Syncope, unspecified syncope type  CL-LEFT HEART CATHETERIZATION WITH POSSIBLE INTERVENTION      2. Mobitz (type) I (Wenckebach's) atrioventricular block        3. Coronary artery calcification  CL-LEFT HEART CATHETERIZATION WITH POSSIBLE INTERVENTION      4. Abnormal cardiovascular stress test  CL-LEFT HEART CATHETERIZATION WITH POSSIBLE INTERVENTION      5. Aortic valve insufficiency, etiology of cardiac valve disease unspecified  REFERRAL TO CARDIOLOGY      6. Essential hypertension        7. Dyslipidemia        8. Sinus bradycardia            Medical Decision Making: Today's Assessment/Status/Plan:        Abnormal stress test:  -Reviewed results of stress test  -Patient does have a history of elevated coronary artery calcium score  -Would recommend coronary angiogram to further evaluate.  Unable to get patient scheduled prior to her trip to [place].  Discussed with patient that I am unsure if it would be safe for her to travel at this time.  -Patient to schedule angiogram matter convenience.   Reviewed procedure, risks and benefits reviewed.  No further questions at this time  -Discussed ER precautions    Moderate aortic insufficiency:  -Patient does have some shortness of breath, unsure if it is related to her valve, at this time I would recommend referral over to structural heart team to evaluate.  She is agreeable  -Reviewed heart failure symptoms with patient and to alert our clinic if present    Reviewed results of event monitor.  Her syncopal episode not associated with heart block or pauses    Hypertension:  -Stable  -Continue lisinopril 20 mg daily  -Continue propanolol 40 mg 3 times a day for tremors    Dyslipidemia, elevated coronary artery calcium:  -Continue rosuvastatin 40 mg daily    FU in clinic in 5 weeks with Dr. Worrell as scheduled hopefully after her angiogram. Sooner if needed.    Patient verbalizes understanding and agrees with the plan of care.     PLEASE NOTE: This Note was created using voice recognition Software. I have made every reasonable attempt to correct obvious errors, but I expect that there are errors of grammar and possibly content that I did not discover before finalizing the note

## 2023-05-16 ENCOUNTER — HOSPITAL ENCOUNTER (OUTPATIENT)
Dept: LAB | Facility: MEDICAL CENTER | Age: 73
End: 2023-05-16
Attending: FAMILY MEDICINE
Payer: COMMERCIAL

## 2023-05-16 ENCOUNTER — TELEPHONE (OUTPATIENT)
Dept: CARDIOLOGY | Facility: MEDICAL CENTER | Age: 73
End: 2023-05-16

## 2023-05-16 ENCOUNTER — HOSPITAL ENCOUNTER (OUTPATIENT)
Dept: LAB | Facility: MEDICAL CENTER | Age: 73
End: 2023-05-16
Attending: INTERNAL MEDICINE
Payer: COMMERCIAL

## 2023-05-16 DIAGNOSIS — E55.9 VITAMIN D DEFICIENCY: ICD-10-CM

## 2023-05-16 DIAGNOSIS — R73.03 PREDIABETES: ICD-10-CM

## 2023-05-16 DIAGNOSIS — E89.0 POSTOPERATIVE HYPOTHYROIDISM: ICD-10-CM

## 2023-05-16 DIAGNOSIS — I10 BENIGN ESSENTIAL HTN: ICD-10-CM

## 2023-05-16 DIAGNOSIS — E53.8 VITAMIN B 12 DEFICIENCY: ICD-10-CM

## 2023-05-16 LAB
ALBUMIN SERPL BCP-MCNC: 4 G/DL (ref 3.2–4.9)
ALBUMIN SERPL BCP-MCNC: 4.1 G/DL (ref 3.2–4.9)
ALBUMIN/GLOB SERPL: 1.7 G/DL
ALBUMIN/GLOB SERPL: 1.7 G/DL
ALP SERPL-CCNC: 62 U/L (ref 30–99)
ALP SERPL-CCNC: 63 U/L (ref 30–99)
ALT SERPL-CCNC: 21 U/L (ref 2–50)
ALT SERPL-CCNC: 21 U/L (ref 2–50)
ANION GAP SERPL CALC-SCNC: 12 MMOL/L (ref 7–16)
ANION GAP SERPL CALC-SCNC: 12 MMOL/L (ref 7–16)
AST SERPL-CCNC: 22 U/L (ref 12–45)
AST SERPL-CCNC: 24 U/L (ref 12–45)
BASOPHILS # BLD AUTO: 1.4 % (ref 0–1.8)
BASOPHILS # BLD: 0.09 K/UL (ref 0–0.12)
BILIRUB SERPL-MCNC: 0.6 MG/DL (ref 0.1–1.5)
BILIRUB SERPL-MCNC: 0.6 MG/DL (ref 0.1–1.5)
BUN SERPL-MCNC: 21 MG/DL (ref 8–22)
BUN SERPL-MCNC: 22 MG/DL (ref 8–22)
CALCIUM ALBUM COR SERPL-MCNC: 9.1 MG/DL (ref 8.5–10.5)
CALCIUM ALBUM COR SERPL-MCNC: 9.1 MG/DL (ref 8.5–10.5)
CALCIUM SERPL-MCNC: 9.1 MG/DL (ref 8.5–10.5)
CALCIUM SERPL-MCNC: 9.2 MG/DL (ref 8.5–10.5)
CHLORIDE SERPL-SCNC: 107 MMOL/L (ref 96–112)
CHLORIDE SERPL-SCNC: 107 MMOL/L (ref 96–112)
CO2 SERPL-SCNC: 24 MMOL/L (ref 20–33)
CO2 SERPL-SCNC: 25 MMOL/L (ref 20–33)
CREAT SERPL-MCNC: 0.62 MG/DL (ref 0.5–1.4)
CREAT SERPL-MCNC: 0.71 MG/DL (ref 0.5–1.4)
CREAT UR-MCNC: 100.97 MG/DL
EOSINOPHIL # BLD AUTO: 0.47 K/UL (ref 0–0.51)
EOSINOPHIL NFR BLD: 7.2 % (ref 0–6.9)
ERYTHROCYTE [DISTWIDTH] IN BLOOD BY AUTOMATED COUNT: 44.6 FL (ref 35.9–50)
EST. AVERAGE GLUCOSE BLD GHB EST-MCNC: 108 MG/DL
GFR SERPLBLD CREATININE-BSD FMLA CKD-EPI: 89 ML/MIN/1.73 M 2
GFR SERPLBLD CREATININE-BSD FMLA CKD-EPI: 93 ML/MIN/1.73 M 2
GLOBULIN SER CALC-MCNC: 2.3 G/DL (ref 1.9–3.5)
GLOBULIN SER CALC-MCNC: 2.4 G/DL (ref 1.9–3.5)
GLUCOSE SERPL-MCNC: 101 MG/DL (ref 65–99)
GLUCOSE SERPL-MCNC: 102 MG/DL (ref 65–99)
HBA1C MFR BLD: 5.4 % (ref 4–5.6)
HCT VFR BLD AUTO: 42.2 % (ref 37–47)
HGB BLD-MCNC: 13.7 G/DL (ref 12–16)
IMM GRANULOCYTES # BLD AUTO: 0.02 K/UL (ref 0–0.11)
IMM GRANULOCYTES NFR BLD AUTO: 0.3 % (ref 0–0.9)
LYMPHOCYTES # BLD AUTO: 1.77 K/UL (ref 1–4.8)
LYMPHOCYTES NFR BLD: 27.3 % (ref 22–41)
MCH RBC QN AUTO: 29.7 PG (ref 27–33)
MCHC RBC AUTO-ENTMCNC: 32.5 G/DL (ref 33.6–35)
MCV RBC AUTO: 91.3 FL (ref 81.4–97.8)
MICROALBUMIN UR-MCNC: <1.2 MG/DL
MICROALBUMIN/CREAT UR: NORMAL MG/G (ref 0–30)
MONOCYTES # BLD AUTO: 0.47 K/UL (ref 0–0.85)
MONOCYTES NFR BLD AUTO: 7.2 % (ref 0–13.4)
NEUTROPHILS # BLD AUTO: 3.67 K/UL (ref 2–7.15)
NEUTROPHILS NFR BLD: 56.6 % (ref 44–72)
NRBC # BLD AUTO: 0 K/UL
NRBC BLD-RTO: 0 /100 WBC
PLATELET # BLD AUTO: 249 K/UL (ref 164–446)
PMV BLD AUTO: 10.8 FL (ref 9–12.9)
POTASSIUM SERPL-SCNC: 4.3 MMOL/L (ref 3.6–5.5)
POTASSIUM SERPL-SCNC: 4.4 MMOL/L (ref 3.6–5.5)
PROT SERPL-MCNC: 6.3 G/DL (ref 6–8.2)
PROT SERPL-MCNC: 6.5 G/DL (ref 6–8.2)
RBC # BLD AUTO: 4.62 M/UL (ref 4.2–5.4)
SODIUM SERPL-SCNC: 143 MMOL/L (ref 135–145)
SODIUM SERPL-SCNC: 144 MMOL/L (ref 135–145)
WBC # BLD AUTO: 6.5 K/UL (ref 4.8–10.8)

## 2023-05-16 PROCEDURE — 84439 ASSAY OF FREE THYROXINE: CPT

## 2023-05-16 PROCEDURE — 83036 HEMOGLOBIN GLYCOSYLATED A1C: CPT | Mod: GA

## 2023-05-16 PROCEDURE — 80053 COMPREHEN METABOLIC PANEL: CPT

## 2023-05-16 PROCEDURE — 82570 ASSAY OF URINE CREATININE: CPT

## 2023-05-16 PROCEDURE — 80053 COMPREHEN METABOLIC PANEL: CPT | Mod: 91

## 2023-05-16 PROCEDURE — 36415 COLL VENOUS BLD VENIPUNCTURE: CPT

## 2023-05-16 PROCEDURE — 82607 VITAMIN B-12: CPT

## 2023-05-16 PROCEDURE — 82043 UR ALBUMIN QUANTITATIVE: CPT

## 2023-05-16 PROCEDURE — 84443 ASSAY THYROID STIM HORMONE: CPT

## 2023-05-16 PROCEDURE — 85025 COMPLETE CBC W/AUTO DIFF WBC: CPT

## 2023-05-16 PROCEDURE — 82306 VITAMIN D 25 HYDROXY: CPT

## 2023-05-16 NOTE — TELEPHONE ENCOUNTER
Referral from: Lucy RIDDLE for mod AI    Patient called on 5/16/2023. LVM for patient to call back and discuss.    First attempt

## 2023-05-17 LAB
25(OH)D3 SERPL-MCNC: 61 NG/ML (ref 30–100)
T4 FREE SERPL-MCNC: 1.7 NG/DL (ref 0.93–1.7)
TSH SERPL DL<=0.005 MIU/L-ACNC: 0.58 UIU/ML (ref 0.38–5.33)
VIT B12 SERPL-MCNC: 526 PG/ML (ref 211–911)

## 2023-05-18 NOTE — TELEPHONE ENCOUNTER
Called patient again. Scheduled consult with Mary RIDDLE for after patient's angiogram, per her request.

## 2023-05-22 ENCOUNTER — TELEPHONE (OUTPATIENT)
Dept: CARDIOLOGY | Facility: MEDICAL CENTER | Age: 73
End: 2023-05-22

## 2023-05-22 ENCOUNTER — APPOINTMENT (OUTPATIENT)
Dept: ADMISSIONS | Facility: MEDICAL CENTER | Age: 73
End: 2023-05-22
Attending: INTERNAL MEDICINE
Payer: COMMERCIAL

## 2023-05-22 NOTE — TELEPHONE ENCOUNTER
Patient called wanting to know what pre-admit's phone number was. Provided her with the phone number and transferred her directly.

## 2023-05-30 ENCOUNTER — PRE-ADMISSION TESTING (OUTPATIENT)
Dept: ADMISSIONS | Facility: MEDICAL CENTER | Age: 73
End: 2023-05-30
Attending: NURSE PRACTITIONER
Payer: COMMERCIAL

## 2023-05-30 DIAGNOSIS — Z01.810 PRE-OPERATIVE CARDIOVASCULAR EXAMINATION: ICD-10-CM

## 2023-05-30 DIAGNOSIS — Z01.812 PRE-OPERATIVE LABORATORY EXAMINATION: ICD-10-CM

## 2023-06-05 ENCOUNTER — PRE-ADMISSION TESTING (OUTPATIENT)
Dept: ADMISSIONS | Facility: MEDICAL CENTER | Age: 73
End: 2023-06-05
Attending: NURSE PRACTITIONER
Payer: COMMERCIAL

## 2023-06-05 DIAGNOSIS — Z01.810 PRE-OPERATIVE CARDIOVASCULAR EXAMINATION: ICD-10-CM

## 2023-06-05 DIAGNOSIS — Z01.812 PRE-OPERATIVE LABORATORY EXAMINATION: ICD-10-CM

## 2023-06-05 LAB
ALBUMIN SERPL BCP-MCNC: 4.1 G/DL (ref 3.2–4.9)
ALBUMIN/GLOB SERPL: 1.8 G/DL
ALP SERPL-CCNC: 58 U/L (ref 30–99)
ALT SERPL-CCNC: 21 U/L (ref 2–50)
ANION GAP SERPL CALC-SCNC: 11 MMOL/L (ref 7–16)
APTT PPP: 26 SEC (ref 24.7–36)
AST SERPL-CCNC: 25 U/L (ref 12–45)
BILIRUB SERPL-MCNC: 0.8 MG/DL (ref 0.1–1.5)
BUN SERPL-MCNC: 18 MG/DL (ref 8–22)
CALCIUM ALBUM COR SERPL-MCNC: 9.3 MG/DL (ref 8.5–10.5)
CALCIUM SERPL-MCNC: 9.4 MG/DL (ref 8.5–10.5)
CHLORIDE SERPL-SCNC: 104 MMOL/L (ref 96–112)
CO2 SERPL-SCNC: 27 MMOL/L (ref 20–33)
CREAT SERPL-MCNC: 0.69 MG/DL (ref 0.5–1.4)
EKG IMPRESSION: NORMAL
ERYTHROCYTE [DISTWIDTH] IN BLOOD BY AUTOMATED COUNT: 45.6 FL (ref 35.9–50)
GFR SERPLBLD CREATININE-BSD FMLA CKD-EPI: 91 ML/MIN/1.73 M 2
GLOBULIN SER CALC-MCNC: 2.3 G/DL (ref 1.9–3.5)
GLUCOSE SERPL-MCNC: 103 MG/DL (ref 65–99)
HCT VFR BLD AUTO: 41.6 % (ref 37–47)
HGB BLD-MCNC: 13.3 G/DL (ref 12–16)
INR PPP: 0.95 (ref 0.87–1.13)
MCH RBC QN AUTO: 29.7 PG (ref 27–33)
MCHC RBC AUTO-ENTMCNC: 32 G/DL (ref 32.2–35.5)
MCV RBC AUTO: 92.9 FL (ref 81.4–97.8)
PLATELET # BLD AUTO: 235 K/UL (ref 164–446)
PMV BLD AUTO: 10.6 FL (ref 9–12.9)
POTASSIUM SERPL-SCNC: 3.9 MMOL/L (ref 3.6–5.5)
PROT SERPL-MCNC: 6.4 G/DL (ref 6–8.2)
PROTHROMBIN TIME: 12.6 SEC (ref 12–14.6)
RBC # BLD AUTO: 4.48 M/UL (ref 4.2–5.4)
SODIUM SERPL-SCNC: 142 MMOL/L (ref 135–145)
WBC # BLD AUTO: 6.9 K/UL (ref 4.8–10.8)

## 2023-06-05 PROCEDURE — 93010 ELECTROCARDIOGRAM REPORT: CPT | Performed by: INTERNAL MEDICINE

## 2023-06-05 PROCEDURE — 80053 COMPREHEN METABOLIC PANEL: CPT

## 2023-06-05 PROCEDURE — 85027 COMPLETE CBC AUTOMATED: CPT

## 2023-06-05 PROCEDURE — 85730 THROMBOPLASTIN TIME PARTIAL: CPT

## 2023-06-05 PROCEDURE — 93005 ELECTROCARDIOGRAM TRACING: CPT

## 2023-06-05 PROCEDURE — 85610 PROTHROMBIN TIME: CPT

## 2023-06-05 PROCEDURE — 36415 COLL VENOUS BLD VENIPUNCTURE: CPT

## 2023-06-06 DIAGNOSIS — F51.01 PRIMARY INSOMNIA: ICD-10-CM

## 2023-06-07 ENCOUNTER — HOSPITAL ENCOUNTER (OUTPATIENT)
Facility: MEDICAL CENTER | Age: 73
End: 2023-06-07
Attending: INTERNAL MEDICINE | Admitting: INTERNAL MEDICINE
Payer: COMMERCIAL

## 2023-06-07 ENCOUNTER — APPOINTMENT (OUTPATIENT)
Dept: CARDIOLOGY | Facility: MEDICAL CENTER | Age: 73
End: 2023-06-07
Attending: NURSE PRACTITIONER
Payer: COMMERCIAL

## 2023-06-07 VITALS
HEIGHT: 67 IN | SYSTOLIC BLOOD PRESSURE: 137 MMHG | BODY MASS INDEX: 27.3 KG/M2 | HEART RATE: 55 BPM | RESPIRATION RATE: 16 BRPM | WEIGHT: 173.94 LBS | OXYGEN SATURATION: 96 % | DIASTOLIC BLOOD PRESSURE: 62 MMHG | TEMPERATURE: 98 F

## 2023-06-07 DIAGNOSIS — R55 SYNCOPE, UNSPECIFIED SYNCOPE TYPE: ICD-10-CM

## 2023-06-07 DIAGNOSIS — I25.84 CORONARY ARTERY CALCIFICATION: ICD-10-CM

## 2023-06-07 DIAGNOSIS — I25.10 CORONARY ARTERY CALCIFICATION: ICD-10-CM

## 2023-06-07 DIAGNOSIS — R94.39 ABNORMAL CARDIOVASCULAR STRESS TEST: ICD-10-CM

## 2023-06-07 DIAGNOSIS — I35.1 NONRHEUMATIC AORTIC VALVE INSUFFICIENCY: ICD-10-CM

## 2023-06-07 PROCEDURE — A9270 NON-COVERED ITEM OR SERVICE: HCPCS

## 2023-06-07 PROCEDURE — 700117 HCHG RX CONTRAST REV CODE 255: Performed by: INTERNAL MEDICINE

## 2023-06-07 PROCEDURE — 93567 NJX CAR CTH SPRVLV AORTGRPHY: CPT | Performed by: INTERNAL MEDICINE

## 2023-06-07 PROCEDURE — 160035 HCHG PACU - 1ST 60 MINS PHASE I

## 2023-06-07 PROCEDURE — 160046 HCHG PACU - 1ST 60 MINS PHASE II

## 2023-06-07 PROCEDURE — 700101 HCHG RX REV CODE 250

## 2023-06-07 PROCEDURE — 160036 HCHG PACU - EA ADDL 30 MINS PHASE I

## 2023-06-07 PROCEDURE — 93567 NJX CAR CTH SPRVLV AORTGRPHY: CPT

## 2023-06-07 PROCEDURE — 700102 HCHG RX REV CODE 250 W/ 637 OVERRIDE(OP)

## 2023-06-07 PROCEDURE — 93458 L HRT ARTERY/VENTRICLE ANGIO: CPT | Mod: 26 | Performed by: INTERNAL MEDICINE

## 2023-06-07 PROCEDURE — 700111 HCHG RX REV CODE 636 W/ 250 OVERRIDE (IP)

## 2023-06-07 PROCEDURE — 160002 HCHG RECOVERY MINUTES (STAT)

## 2023-06-07 PROCEDURE — 99152 MOD SED SAME PHYS/QHP 5/>YRS: CPT | Performed by: INTERNAL MEDICINE

## 2023-06-07 RX ORDER — MIDAZOLAM HYDROCHLORIDE 1 MG/ML
INJECTION INTRAMUSCULAR; INTRAVENOUS
Status: COMPLETED
Start: 2023-06-07 | End: 2023-06-07

## 2023-06-07 RX ORDER — HEPARIN SODIUM 200 [USP'U]/100ML
INJECTION, SOLUTION INTRAVENOUS
Status: COMPLETED
Start: 2023-06-07 | End: 2023-06-07

## 2023-06-07 RX ORDER — FUROSEMIDE 20 MG/1
20 TABLET ORAL DAILY
Qty: 90 TABLET | Refills: 3 | Status: SHIPPED | OUTPATIENT
Start: 2023-06-07 | End: 2023-12-14

## 2023-06-07 RX ORDER — VERAPAMIL HYDROCHLORIDE 2.5 MG/ML
INJECTION, SOLUTION INTRAVENOUS
Status: COMPLETED
Start: 2023-06-07 | End: 2023-06-07

## 2023-06-07 RX ORDER — ASPIRIN 81 MG/1
TABLET, CHEWABLE ORAL
Status: COMPLETED
Start: 2023-06-07 | End: 2023-06-07

## 2023-06-07 RX ORDER — LORATADINE 10 MG/1
10 TABLET ORAL PRN
Status: ON HOLD | COMMUNITY
End: 2023-08-16

## 2023-06-07 RX ORDER — HEPARIN SODIUM 1000 [USP'U]/ML
INJECTION, SOLUTION INTRAVENOUS; SUBCUTANEOUS
Status: COMPLETED
Start: 2023-06-07 | End: 2023-06-07

## 2023-06-07 RX ORDER — BUSPIRONE HYDROCHLORIDE 10 MG/1
10 TABLET ORAL EVERY EVENING
COMMUNITY
End: 2024-03-01 | Stop reason: SDUPTHER

## 2023-06-07 RX ORDER — LIDOCAINE HYDROCHLORIDE 20 MG/ML
INJECTION, SOLUTION INFILTRATION; PERINEURAL
Status: COMPLETED
Start: 2023-06-07 | End: 2023-06-07

## 2023-06-07 RX ADMIN — HEPARIN SODIUM 2000 UNITS: 200 INJECTION, SOLUTION INTRAVENOUS at 08:14

## 2023-06-07 RX ADMIN — MIDAZOLAM 2 MG: 1 INJECTION INTRAMUSCULAR; INTRAVENOUS at 08:21

## 2023-06-07 RX ADMIN — HEPARIN SODIUM: 1000 INJECTION, SOLUTION INTRAVENOUS; SUBCUTANEOUS at 08:15

## 2023-06-07 RX ADMIN — IOHEXOL 30 ML: 350 INJECTION, SOLUTION INTRAVENOUS at 08:36

## 2023-06-07 RX ADMIN — FENTANYL CITRATE 100 MCG: 50 INJECTION, SOLUTION INTRAMUSCULAR; INTRAVENOUS at 08:21

## 2023-06-07 RX ADMIN — ASPIRIN 81 MG 324 MG: 81 TABLET ORAL at 08:05

## 2023-06-07 RX ADMIN — LIDOCAINE HYDROCHLORIDE: 20 INJECTION, SOLUTION INFILTRATION; PERINEURAL at 08:15

## 2023-06-07 RX ADMIN — VERAPAMIL HYDROCHLORIDE 2.5 MG: 2.5 INJECTION, SOLUTION INTRAVENOUS at 08:15

## 2023-06-07 RX ADMIN — NITROGLYCERIN 10 ML: 20 INJECTION INTRAVENOUS at 08:15

## 2023-06-07 ASSESSMENT — PAIN DESCRIPTION - PAIN TYPE
TYPE: SURGICAL PAIN

## 2023-06-07 ASSESSMENT — FIBROSIS 4 INDEX: FIB4 SCORE: 1.72

## 2023-06-07 NOTE — OR NURSING
0849: Pt arrives to PACU awake and alert. VSS. Right TR band in place. CMS intact to right hand.     1030: Right radial TR band removed. Dressing applied. Small bruising noted to wrist. VENKATESH Rene to see before pt goes home. Report called to Laura in phase 2.

## 2023-06-07 NOTE — PROGRESS NOTES
1103: Pt. Arrived from PACU, via gurney. Report received from Yanique. Pt. Right wrist cath site, covered in gauze and tegaderm, CDI bruising and mild swelling at site no change from PACU. Pt. Ambulated steady on feet to restroom voided without difficulty.  1110: Pt. Attached to monitors, denies pain tolerating PO fluids.  1120: Spouse brought to bedside updates given. Nurse practioner at bedside. OK for discharge.   1130: Discharge instructions discussed with pt and spouse, questions answered. Assisted pt in getting dressed arm sling placed to right arm.   1145: Spouse going to get car.   1155: Pt. Discharged home escorted out via w/c in stable condition.

## 2023-06-07 NOTE — DISCHARGE INSTRUCTIONS
HOME CARE INSTRUCTIONS    ACTIVITY: Rest and take it easy for the first 24 hours.  A responsible adult is recommended to remain with you during that time.  It is normal to feel sleepy.  We encourage you to not do anything that requires balance, judgment or coordination.    FOR 24 HOURS DO NOT:  Drive, operate machinery or run household appliances.  Drink beer or alcoholic beverages.  Make important decisions or sign legal documents.    SPECIAL INSTRUCTIONS:  POST ANGIOGRAM  General Care Instructions  Maintain a bandage over the incision site for 24 hours.  It's normal to find a small bruise or dime-sized lump at the insertion site. This should disappear within a few weeks.  Do not apply lotions or powders to the site.  Do not immerse the catheter insertion site in water (bathtub/swimming) for five days. It is ok to shower 24 hours after the procedure.  You may resume your normal diet immediately; on the day of your procedure, drink 6-10 glasses of water to help flush the contrast liquid out of your system.  If the doctor inserted the catheter in your arm:  For 24 hours, DO NOT lift anything heavier than 10 pounds (approximately a gallon of milk). DO NOT do any heavy pushing, pulling, or twisting.    Medications  If your current medications need to be changed, you will be provided with an updated list of your medications prior to discharge.  If you take warfarin (Coumadin), resume taking your usual dose the evening after the procedure.  DO NOT STOP taking prescribed blood thinning (anti-platelet) medications unless instructed by your cardiologist.  These medications include:  Aspirin, Clopidogrel (Plavix), Ticagrelor (Brilinta), or Prasugrel (Effient)   If you take one of the following anticoagulants, RESUME 24 HOURS after your procedure:  Apixiban (Eliquis), Rivaroxaban (Xarelto), Dabigatran (Pradaxa), Edoxaban (Savaysa)  If you take metformin (Glucophage), RESUME 48 HOURS after your procedure.    When to call your  healthcare provider  Call your cardiologist right away at [phone] if you have any of the following:   Problems/Concerns taking any of your prescribed heart medicines.   The insertion site has increasing pain, swelling, redness, bleeding, or drainage.   Your arm or leg below where the insertion site changes color, is cool, or is numb.   You have chest pain or shortness of breath that does not go away with rest.   Your pulse feels irregular -- very slow (less than 60 beats/minute) or very fast (over 100 beats/minute).   You have dizziness, fainting, or you are very tired.   You are coughing up blood or yellow or green mucus.   You have chills or a fever over 101°F (38.3°C).    If there is bleeding at the catheter insertion site, apply pressure for 10 minutes.  If bleeding persists, call 911, and continue to hold pressure until advanced medical support arrives.    DIET: To avoid nausea, slowly advance diet as tolerated, avoiding spicy or greasy foods for the first day.  Add more substantial food to your diet according to your physician's instructions.  Babies can be fed formula or breast milk as soon as they are hungry.  INCREASE FLUIDS AND FIBER TO AVOID CONSTIPATION.    SURGICAL DRESSING/BATHING: may remove dressing in 24 hours and then shower; limit wrist movement for 48 hours    MEDICATIONS: Resume taking daily medication.  Take prescribed pain medication with food.  If no medication is prescribed, you may take non-aspirin pain medication if needed.  PAIN MEDICATION CAN BE VERY CONSTIPATING.  Take a stool softener or laxative such as senokot, pericolace, or milk of magnesia if needed.    A follow-up appointment should be arranged with your doctor; call to schedule.    You should CALL YOUR PHYSICIAN if you develop:  Fever greater than 101 degrees F.  Pain not relieved by medication, or persistent nausea or vomiting.  Excessive bleeding (blood soaking through dressing) or unexpected drainage from the  wound.  Extreme redness or swelling around the incision site, drainage of pus or foul smelling drainage.  Inability to urinate or empty your bladder within 8 hours.  Problems with breathing or chest pain.    You should call 911 if you develop problems with breathing or chest pain.  If you are unable to contact your doctor or surgical center, you should go to the nearest emergency room or urgent care center.  Physician's telephone #: [phone]    MILD FLU-LIKE SYMPTOMS ARE NORMAL.  YOU MAY EXPERIENCE GENERALIZED MUSCLE ACHES, THROAT IRRITATION, HEADACHE AND/OR SOME NAUSEA.    If any questions arise, call your doctor.  If your doctor is not available, please feel free to call the Surgical Center at [phone].  The Center is open Monday through Friday from 7AM to 7PM.      A registered nurse may call you a few days after your surgery to see how you are doing after your procedure.    You may also receive a survey in the mail within the next two weeks and we ask that you take a few moments to complete the survey and return it to us.  Our goal is to provide you with very good care and we value your comments.     Depression / Suicide Risk    As you are discharged from this [facility] facility, it is important to learn how to keep safe from harming yourself.    Recognize the warning signs:  Abrupt changes in personality, positive or negative- including increase in energy   Giving away possessions  Change in eating patterns- significant weight changes-  positive or negative  Change in sleeping patterns- unable to sleep or sleeping all the time   Unwillingness or inability to communicate  Depression  Unusual sadness, discouragement and loneliness  Talk of wanting to die  Neglect of personal appearance   Rebelliousness- reckless behavior  Withdrawal from people/activities they love  Confusion- inability to concentrate     If you or a loved one observes any of these behaviors or has concerns about self-harm, here's  what you can do:  Talk about it- your feelings and reasons for harming yourself  Remove any means that you might use to hurt yourself (examples: pills, rope, extension cords, firearm)  Get professional help from the community (Mental Health, Substance Abuse, psychological counseling)  Do not be alone:Call your Safe Contact- someone whom you trust who will be there for you.  Call your local CRISIS HOTLINE 476-9421 or 117-254-7551  Call your local Children's Mobile Crisis Response Team [place] (645) 762-4587 or www.[employer]  Call the toll free National Suicide Prevention Hotlines   National Suicide Prevention Lifeline 790-710-PFSM (1032)  National Hope Line Network 800-SUICIDE (264-6595)    I acknowledge receipt and understanding of these Home Care instructions.

## 2023-06-07 NOTE — PROCEDURES
Cardiac Catheterization report    6/7/2023  8:49 AM    Referring MD: /    Primary Care Provider: Charlee Walker M.D.    Indication for procedure: Chest pain, shortness of breath    Procedures performed:   Coronary arteriograms  Left heart catheterization and Aortic root arteriogram     Final impression:  Single-vessel moderate left anterior descending artery stenosis 60% narrowing  3+ (moderate to severe) aortic regurgitation  Elevated left ventricular end-diastolic pressure    Recommendations:  Transesophageal echocardiogram to assess aortic regurgitation, if severe recommend CT surgery consultation for aortic valve replacement +LIMA to LAD    Findings:  1.  Left main coronary artery:  Normal.  2.  Left anterior descending artery: Mid LAD is severely calcified with 60% stenosis just after bifurcation of major diagonal.  Slow flow into apical LAD.  3.  Left circumflex coronary artery: Large dominant vessel, no significant stenosis.    4.  Right coronary artery: Small codominant vessel, luminal irregularities no significant disease.  5.  Left ventricular end diastolic pressure:  31 mmHg.  No signficant gradient across the aortic valve.  Aortic root angiogram showed normal sized ascending aorta, 3+ aortic regurgitation      EBL: <10 CC    Specimens: None    Procedure details:  The risks and benefits of cardiac catheterization and possible intervention were explained to the patient including death, heart attack, stroke, and emergency surgery.  The patient verbalized understanding and wished to proceed.  The patient was brought to the cardiac catheterization laboratory in the fasting state and prepped and draped in the usual sterile fashion.  Timeout was performed.  The right wrist was locally anesthetized with lidocaine and the right radial artery was cannulated with 5/6-Arabic equipment and standard radial cocktail was given.  Coronary angiography was performed using JR 4 and JL 3.5  diagnostic  catheters in the usual fashion, results mentioned below.  JR 4 catheter was used to cross the aortic valve to perform  left heart catheterization.  Pigtail catheter was used to perform aortic root angiogram.    Once all the views were obtained, all wires and catheters were removed from the patient without difficulty.  A Vasc-Band was placed over the right radial artery and the radial artery sheath was removed without difficulty.      Complications:  None    Contrast: 30 cc    Sedation time:  I supervised moderate sedation over a trained independent nursing staff,  Sedation Start time:  08:14   Sedation Stop time: 08:36      MAKENZIE Leon  [facility]

## 2023-06-07 NOTE — PROGRESS NOTES
Med rec updated and complete, per pt   Allergies reviewed, per pt  Interviewed pt with  at bedside with permission from pt.  Pt reports that she has not had to take her CELEBREX 200MG in the last 30 days or longer.

## 2023-06-08 RX ORDER — ZOLPIDEM TARTRATE 5 MG/1
5 TABLET ORAL NIGHTLY PRN
Qty: 30 TABLET | Refills: 0 | Status: SHIPPED | OUTPATIENT
Start: 2023-06-08 | End: 2023-07-11 | Stop reason: SDUPTHER

## 2023-06-09 ENCOUNTER — OFFICE VISIT (OUTPATIENT)
Dept: ENDOCRINOLOGY | Facility: MEDICAL CENTER | Age: 73
End: 2023-06-09
Attending: INTERNAL MEDICINE
Payer: COMMERCIAL

## 2023-06-09 VITALS
BODY MASS INDEX: 26.95 KG/M2 | HEIGHT: 67 IN | DIASTOLIC BLOOD PRESSURE: 74 MMHG | WEIGHT: 171.7 LBS | OXYGEN SATURATION: 100 % | HEART RATE: 87 BPM | SYSTOLIC BLOOD PRESSURE: 122 MMHG

## 2023-06-09 DIAGNOSIS — E55.9 VITAMIN D DEFICIENCY: ICD-10-CM

## 2023-06-09 DIAGNOSIS — R73.03 PREDIABETES: ICD-10-CM

## 2023-06-09 DIAGNOSIS — E89.0 POSTOPERATIVE HYPOTHYROIDISM: ICD-10-CM

## 2023-06-09 PROCEDURE — 3074F SYST BP LT 130 MM HG: CPT | Performed by: INTERNAL MEDICINE

## 2023-06-09 PROCEDURE — 3078F DIAST BP <80 MM HG: CPT | Performed by: INTERNAL MEDICINE

## 2023-06-09 PROCEDURE — 99211 OFF/OP EST MAY X REQ PHY/QHP: CPT | Performed by: INTERNAL MEDICINE

## 2023-06-09 PROCEDURE — 99214 OFFICE O/P EST MOD 30 MIN: CPT | Performed by: INTERNAL MEDICINE

## 2023-06-09 RX ORDER — CELECOXIB 200 MG/1
200 CAPSULE ORAL
COMMUNITY
Start: 2023-04-03 | End: 2023-07-11

## 2023-06-09 RX ORDER — LEVOTHYROXINE SODIUM 75 MCG
75 TABLET ORAL
Qty: 90 TABLET | Refills: 3 | Status: SHIPPED | OUTPATIENT
Start: 2023-06-09

## 2023-06-09 RX ORDER — ERGOCALCIFEROL 1.25 MG/1
50000 CAPSULE ORAL
Qty: 12 CAPSULE | Refills: 3 | Status: SHIPPED | OUTPATIENT
Start: 2023-06-09

## 2023-06-09 ASSESSMENT — FIBROSIS 4 INDEX: FIB4 SCORE: 1.72

## 2023-06-09 NOTE — PROGRESS NOTES
Chief Complaint: Follow up for  Postsurgical hypothyroidism.      HPI:     Margoth Hopkins is a 72 y.o. female here for follow up of postsurgical hypothyroidism, she had a total thyroidectomy on February 5, 2020 with benign findings    Comorbid issues include osteopenia managed by primary care  For unclear reasons her previous endocrinologist was keeping her TSH suppressed despite her history of osteopenia  She was previously on 100 mcg of Synthroid and TSH was undetectable and free T4 levels were elevated          Since last visit patient reports feeling great  She remains on Synthroid 75 MCG daily which has been her dose for the past 24 months.   She reports excellent compliance and denies missing any daily doses.   She takes thyroid hormone prior to breakfast.   She  denies taking any iron, calcium supplements or antacids.          Weight has been stable  Her energy levels are good  She denies constipation and cold intolerance  She is scheduled now for TAVR for AR      Her TSH is stable at 0.58 on May 2023  Her TSH was 1.2 with a free T4 of 1.6 on August 18, 2022  Her TSH was 0.370 with a free T4 of 1.32 on August 2021    She takes ergocalciferol 50,000 units weekly  Her vitamin D is 54    Her B12 is 1167    She also has prediabetes with an A1c of 5.7%      She has osteopenia based upon her bone density showed the lowest T score of -1.2 for the lumbar spine on March 29, 2021  She is currently not on antiresorptive therapy  She exercises regularly and denies interval falls and fractures      Patient's medications, allergies, and social histories were reviewed and updated as appropriate.      ROS:     CONS:     No fever, no chills   EYES:     No diplopia, no blurry vision   CV:           No chest pain, no palpitations   PULM:     No SOB, no cough, no hemoptysis.   GI:            No nausea, no vomiting, no diarrhea, no constipation   ENDO:     No polyuria, no polydipsia, no heat intolerance, no cold intolerance        Past Medical History:  Problem List:  2023-04: Heart block  2023-04: Primary osteoarthritis of left knee s/p TKR in 6/2022 2023-03: Nonrheumatic aortic valve insufficiency  2022-12: Primary insomnia  2022-12: Pain of right sacroiliac joint  2021-09: Mass of right thigh  2021-03: Osteopenia of multiple sites  2020-06: Postoperative hypothyroidism_s/p thyroidectomy_Dr. Mullen  2020-06: Generalized osteoarthritis  2020-06: Drug induced constipation  2020-02: Hashimoto's thyroiditis s/p total thyroidectom Dr. Franklin in   2/2020.   2019-10: Essential tremor  2019-08: Malaise  2019-08: Aortic valve sclerosis- without stenosis 2018 echo  2019-08: Mild aortic insufficiency- 2018 echo  2019-05: Nocturnal leg cramps  2018-10: Calculus of gallbladder with biliary obstruction but without   cholecystitis  2018-07: Chronic use of opiate drug for therapeutic purpose_contract   signed 6/2020, UDS consistent in 9/2020 2018-07: H/O right knee surgery  2018-07: Acute respiratory failure with hypoxia (HCC)  2018-07: Bilateral pulmonary embolism, provoked  2018-07: Pulmonary infarct (HCC)  2018-03: Chronic headache  2017-07: Risk for falls  2017-06: Vitiligo  2017-06: Chest pain  2017-02: FHx: colon cancer  2017-02: Schatzki's ring_DHA  2017-02: Benign essential HTN  2017-02: Pure hypercholesterolemia  2017-02: Migraine without aura, not intractable  2017-02: MOHAMUD (generalized anxiety disorder)  2017-02: Hypothyroidism due to Hashimoto's thyroiditis  2017-02: Multinodular goiter (nontoxic), s/p FNA in 12/2017, TR4 x1,   repeat US in 6/2020 2017-02: HLA B27 (HLA B27 positive)  2017-02: Primary osteoarthritis of right knee, S/P TKR + 2 revision   surgeries_Dr. Lomeli  2017-02: Lumbosacral pain, chronic, with right sciatica_[place] [facility]  2017-02: Insomnia  2017-02: Risk for falls  2017-02: Vitamin D insufficiency      Past Surgical History:  Past Surgical History:   Procedure Laterality Date    KNEE ARTHROPLASTY TOTAL  "Left 06/2022    MASS EXCISION GENERAL Right 11/05/2021    Procedure: EXCISION, MASS - LATERAL THIGH;  Surgeon: Cydney Franklin M.D.;  Location: SURGERY SAME DAY [facility];  Service: General    THYROIDECTOMY TOTAL Bilateral 02/05/2020    Procedure: THYROIDECTOMY, TOTAL- COMPLETE;  Surgeon: Cydney Franklin M.D.;  Location: SURGERY SAME DAY [facility];  Service: General    APPENDECTOMY      BLADDER SLING FEMALE      CATARACT EXTRACTION WITH IOL Left     CATARACT EXTRACTION WITH IOL Bilateral     CERVICAL FUSION POSTERIOR      FOOT SURGERY Left     metatarsal fusion, hammer toes correction    FUSION      disc fusion    GYN SURGERY      Hysterectomy    KNEE ARTHROPLASTY TOTAL Right     x3    KNEE REPLACEMENT, TOTAL Right     KNEE REVISION TOTAL Right         Allergies:  Morphine, Nsaids, Pcn [penicillins], and Gabapentin     Social History:  Social History     Tobacco Use    Smoking status: Never    Smokeless tobacco: Never   Vaping Use    Vaping Use: Never used   Substance Use Topics    Alcohol use: Yes     Alcohol/week: 1.8 oz     Types: 3 Glasses of wine per week    Drug use: Not Currently     Types: Oral     Comment: CBD ingested 9/21 pt denies at this time        Family History:   family history includes Alzheimer's Disease in her mother; Arthritis in her brother, brother, and mother; Asthma in her brother; Cancer in her mother; Cancer (age of onset: 66) in her maternal grandmother; Heart Attack in her father; Heart Disease in her brother; Hypertension in her brother, brother, and brother; Lung Disease in her brother; No Known Problems in her maternal grandfather, paternal grandfather, and paternal grandmother; Other in her brother and brother.      PHYSICAL EXAM:   Vital signs: /74   Pulse 87   Ht 1.702 m (5' 7\")   Wt 77.9 kg (171 lb 11.2 oz)   SpO2 100%   BMI 26.89 kg/m²   GENERAL: Well-developed, well-nourished in no apparent distress.   EYE:  No ocular asymmetry, PERRLA  HENT: Pink, moist mucous " membranes.    NECK: No thyromegaly.   CARDIOVASCULAR:  No murmurs  LUNGS: Clear breath sounds  ABDOMEN: Soft, nontender   EXTREMITIES: No clubbing, cyanosis, or edema.   NEUROLOGICAL: No gross focal motor abnormalities   LYMPH: No cervical adenopathy seen.   SKIN: No rashes, lesions.       Labs:  Lab Results   Component Value Date/Time    SODIUM 142 06/05/2023 10:49 AM    POTASSIUM 3.9 06/05/2023 10:49 AM    CHLORIDE 104 06/05/2023 10:49 AM    CO2 27 06/05/2023 10:49 AM    ANION 11.0 06/05/2023 10:49 AM    GLUCOSE 103 (H) 06/05/2023 10:49 AM    BUN 18 06/05/2023 10:49 AM    CREATININE 0.69 06/05/2023 10:49 AM    CALCIUM 9.4 06/05/2023 10:49 AM    ASTSGOT 25 06/05/2023 10:49 AM    ALTSGPT 21 06/05/2023 10:49 AM    TBILIRUBIN 0.8 06/05/2023 10:49 AM    ALBUMIN 4.1 06/05/2023 10:49 AM    TOTPROTEIN 6.4 06/05/2023 10:49 AM    GLOBULIN 2.3 06/05/2023 10:49 AM    AGRATIO 1.8 06/05/2023 10:49 AM       Lab Results   Component Value Date/Time    SODIUM 139 06/03/2020 1117    POTASSIUM 3.9 06/03/2020 1117    CHLORIDE 104 06/03/2020 1117    CO2 23 06/03/2020 1117    GLUCOSE 95 06/03/2020 1117    BUN 19 06/03/2020 1117    CREATININE 0.62 06/03/2020 1117    CALCIUM 10.0 06/03/2020 1117    ANION 12.0 06/03/2020 1117       Lab Results   Component Value Date/Time    CHOLSTRLTOT 190 06/03/2020 1117    TRIGLYCERIDE 100 06/03/2020 1117    HDL 75 06/03/2020 1117    LDL 95 06/03/2020 1117       Lab Results   Component Value Date/Time    TSHULTRASEN 0.012 (L) 01/07/2021 1535     Lab Results   Component Value Date/Time    FREET4 2.10 (H) 01/07/2021 1535     Lab Results   Component Value Date/Time    FREET3 2.59 08/06/2020 1557     No results found for: THYSTIMIG    No results found for: MICROSOMALA      Imaging:      ASSESSMENT/PLAN:     1. Postoperative hypothyroidism  Stable    TSH and free T4 levels are consistent  Continue Synthroid  75 mcg daily  follow-up with primary care    2. Vitamin D deficiency  Controlled   Continue weekly  ergocalciferol   Continue monitoring with primary care    3. Vitamin B 12 deficiency  Controlled   continue current supplements   Continue monitor with primary care    4. Prediabetes  Stable   A1c is 5.7%  Continue monitor with primary care      Return if symptoms worsen or fail to improve.      Thank you kindly for allowing me to participate in the thyroid care plan for this patient.    Aasd Mullen MD, FACE, [facility]      CC:   Charlee Walker M.D.

## 2023-06-12 ENCOUNTER — TELEPHONE (OUTPATIENT)
Dept: CARDIOLOGY | Facility: MEDICAL CENTER | Age: 73
End: 2023-06-12

## 2023-06-12 NOTE — TELEPHONE ENCOUNTER
Patient is scheduled on 6-21-23 for a YESSI with anesthesia with . No meds to stop and patient to check in at 10:00 for a 12:00 procedure. Updated H&P to be done on admit by NP. Pre admit to call patient.

## 2023-06-12 NOTE — TELEPHONE ENCOUNTER
----- Message from Anju Wang P.A.-C. sent at 6/7/2023  1:47 PM PDT -----  Please reach out to schedule ISAAC for patient. Dr Worrell is her primary cardiologist, if scheduled with Gm she prefers to use anesthesia for case. Isaac should occur in the next 1-2 weeks so can be with any ADR if HK not available.     Thanks,  LINO  ----- Message -----  From: Fiorella Worrell M.D.  Sent: 6/7/2023  12:39 PM PDT  To: Anju Wang P.A.-C.; #    Yes, fine to order ISAAC.  If scheduling with me, would like anesthesia. But can schedule with whomever available next. Thank you.   ----- Message -----  From: Anju Wang P.A.-C.  Sent: 6/7/2023  11:17 AM PDT  To: BIANCA Balbuena; Fiorella Worrell M.D.; #    Patient here today for C with Dr Cortez, he recommends a ISAAC for AI. I put in order but please confirm you agree (I'm just seeing patient post cath).     AK's cath note:   Recommendations:  Transesophageal echocardiogram to assess aortic regurgitation, if severe recommend CT surgery consultation for aortic valve replacement +DEVYN to RENNY HUYNH

## 2023-06-13 ENCOUNTER — APPOINTMENT (OUTPATIENT)
Dept: ADMISSIONS | Facility: MEDICAL CENTER | Age: 73
End: 2023-06-13
Attending: INTERNAL MEDICINE
Payer: COMMERCIAL

## 2023-06-14 ENCOUNTER — OFFICE VISIT (OUTPATIENT)
Dept: CARDIOLOGY | Facility: MEDICAL CENTER | Age: 73
End: 2023-06-14
Attending: NURSE PRACTITIONER
Payer: COMMERCIAL

## 2023-06-14 VITALS
RESPIRATION RATE: 14 BRPM | SYSTOLIC BLOOD PRESSURE: 130 MMHG | WEIGHT: 170 LBS | BODY MASS INDEX: 26.68 KG/M2 | DIASTOLIC BLOOD PRESSURE: 76 MMHG | OXYGEN SATURATION: 96 % | HEIGHT: 67 IN | HEART RATE: 75 BPM

## 2023-06-14 DIAGNOSIS — I10 BENIGN ESSENTIAL HTN: ICD-10-CM

## 2023-06-14 DIAGNOSIS — I35.1 NONRHEUMATIC AORTIC VALVE INSUFFICIENCY: ICD-10-CM

## 2023-06-14 DIAGNOSIS — I25.10 CORONARY ARTERY DISEASE INVOLVING NATIVE CORONARY ARTERY OF NATIVE HEART WITHOUT ANGINA PECTORIS: ICD-10-CM

## 2023-06-14 DIAGNOSIS — E78.00 PURE HYPERCHOLESTEROLEMIA: ICD-10-CM

## 2023-06-14 DIAGNOSIS — I45.9 HEART BLOCK: ICD-10-CM

## 2023-06-14 PROCEDURE — 99212 OFFICE O/P EST SF 10 MIN: CPT | Performed by: NURSE PRACTITIONER

## 2023-06-14 PROCEDURE — 99213 OFFICE O/P EST LOW 20 MIN: CPT | Performed by: NURSE PRACTITIONER

## 2023-06-14 PROCEDURE — 3075F SYST BP GE 130 - 139MM HG: CPT | Performed by: NURSE PRACTITIONER

## 2023-06-14 PROCEDURE — 99214 OFFICE O/P EST MOD 30 MIN: CPT | Performed by: NURSE PRACTITIONER

## 2023-06-14 PROCEDURE — 3078F DIAST BP <80 MM HG: CPT | Performed by: NURSE PRACTITIONER

## 2023-06-14 RX ORDER — POTASSIUM CHLORIDE 750 MG/1
10 CAPSULE, EXTENDED RELEASE ORAL DAILY
Qty: 100 CAPSULE | Refills: 3 | Status: SHIPPED | OUTPATIENT
Start: 2023-06-14 | End: 2023-12-14

## 2023-06-14 ASSESSMENT — ENCOUNTER SYMPTOMS
PALPITATIONS: 0
ORTHOPNEA: 1
CLAUDICATION: 0
MYALGIAS: 0
COUGH: 0
SHORTNESS OF BREATH: 1
ABDOMINAL PAIN: 0
FEVER: 0
DIZZINESS: 1
PND: 0

## 2023-06-14 ASSESSMENT — FIBROSIS 4 INDEX: FIB4 SCORE: 1.72

## 2023-06-14 NOTE — LETTER
[facility] - Operated by [facility]   [address], [address]  [place] [address]  Phone: [phone]  Fax: [phone]              Margoth Hopkins  1950    Encounter Date: 6/14/2023    Mary Heath, A.P.R.N.    To Whom it may concern:    Margoth Hopkins is unable to travel due to a medical condition.    This medical condition will limit her ability to travel until October 2023.            Mary Heath DNP, APRN, [facility] [employer] [facility] [employer]  Structural Heart Program, [facility]

## 2023-06-14 NOTE — PROGRESS NOTES
Chief Complaint   Patient presents with    Aortic Stenosis     Subjective     Margoth Hopkins is a 72 y.o. female who presents today to review symptoms and plan of care.    She is a patient of Dr. Worrell in our office. Hx of mod-severe AI, CAD with + stress imaging (pending CABG v. Stent placement), HLD, HTN, and prior syncopal event with second degree heart block.    She presents today with her spouse. She is stable at rest but with any exertion she has lightheadedness, shortness of breath, sometimes angina, and orthopnea at night.    She is pending YESSI next week to evaluate her AI further.    No edema.    Past Medical History:   Diagnosis Date    Anemia     H/O    Aortic insufficiency 01/31/2020    Pt. states this is mild and does not have any signs or symptoms.    Arthritis     Osteo-Right Knee    Breath shortness 11/02/2021    with exertion or anxiety    Cataract     IOL OU    Dental disorder     upper flipper/ also implanted front left central tooth    Gastric ulcer     Goiter     High cholesterol     Hypertension 11/02/2021    well controlled on meds    Hypothyroidism     Pain 01/31/2020    Right Knee & Lower Back    Psychiatric problem 11/02/2021    anxiety    Pulmonary embolism (HCC) 01/31/2020    Pt. states after joint replacement in 7-2018.    Reactive arthritis (HCC)     Thyroid disease     hasimotos    Thyroid disease 01/31/2020    Thyroid Nodules     Past Surgical History:   Procedure Laterality Date    KNEE ARTHROPLASTY TOTAL Left 06/2022    MASS EXCISION GENERAL Right 11/05/2021    Procedure: EXCISION, MASS - LATERAL THIGH;  Surgeon: Cydney Franklin M.D.;  Location: SURGERY SAME DAY [facility];  Service: General    THYROIDECTOMY TOTAL Bilateral 02/05/2020    Procedure: THYROIDECTOMY, TOTAL- COMPLETE;  Surgeon: Cydney Franklin M.D.;  Location: SURGERY SAME DAY [facility];  Service: General    APPENDECTOMY      BLADDER SLING FEMALE      CATARACT EXTRACTION WITH IOL Left     CATARACT EXTRACTION WITH  IOL Bilateral     CERVICAL FUSION POSTERIOR      FOOT SURGERY Left     metatarsal fusion, hammer toes correction    FUSION      disc fusion    GYN SURGERY      Hysterectomy    KNEE ARTHROPLASTY TOTAL Right     x3    KNEE REPLACEMENT, TOTAL Right     KNEE REVISION TOTAL Right      Family History   Problem Relation Age of Onset    Arthritis Mother     Alzheimer's Disease Mother     Cancer Mother         cervical cancer    Heart Attack Father     Other Brother         brain anurism    Hypertension Brother     Cancer Maternal Grandmother 66        colono cancer    Heart Disease Brother     Hypertension Brother     Arthritis Brother     Other Brother         Celiac Disease    No Known Problems Maternal Grandfather     No Known Problems Paternal Grandmother     No Known Problems Paternal Grandfather     Hypertension Brother     Arthritis Brother     Lung Disease Brother     Asthma Brother      Social History     Socioeconomic History    Marital status:      Spouse name: Not on file    Number of children: Not on file    Years of education: Not on file    Highest education level: Bachelor's degree (e.g., BA, AB, BS)   Occupational History    Not on file   Tobacco Use    Smoking status: Never    Smokeless tobacco: Never   Vaping Use    Vaping Use: Never used   Substance and Sexual Activity    Alcohol use: Yes     Alcohol/week: 1.8 oz     Types: 3 Glasses of wine per week    Drug use: Not Currently     Types: Oral     Comment: CBD ingested 9/21 pt denies at this time    Sexual activity: Yes     Partners: Male   Other Topics Concern    Not on file   Social History Narrative    Not on file     Social Determinants of Health     Financial Resource Strain: Low Risk  (6/7/2022)    Overall Financial Resource Strain (CARDIA)     Difficulty of Paying Living Expenses: Not hard at all   Food Insecurity: No Food Insecurity (6/7/2022)    Hunger Vital Sign     Worried About Running Out of Food in the Last Year: Never true     Ran  Out of Food in the Last Year: Never true   Transportation Needs: No Transportation Needs (6/7/2022)    PRAPARE - Transportation     Lack of Transportation (Medical): No     Lack of Transportation (Non-Medical): No   Physical Activity: Insufficiently Active (6/7/2022)    Exercise Vital Sign     Days of Exercise per Week: 1 day     Minutes of Exercise per Session: 10 min   Stress: Stress Concern Present (6/7/2022)    [employer] - Occupational Stress Questionnaire     Feeling of Stress : Very much   Social Connections: Socially Integrated (6/7/2022)    Social Connection and Isolation Panel [NHANES]     Frequency of Communication with Friends and Family: Three times a week     Frequency of Social Gatherings with Friends and Family: Twice a week     Attends Zoroastrianism Services: More than 4 times per year     Active Member of Clubs or Organizations: Yes     Attends Club or Organization Meetings: More than 4 times per year     Marital Status:    Intimate Partner Violence: Not on file   Housing Stability: Low Risk  (6/7/2022)    Housing Stability Vital Sign     Unable to Pay for Housing in the Last Year: No     Number of Places Lived in the Last Year: 1     Unstable Housing in the Last Year: No     Allergies   Allergen Reactions    Morphine Vomiting    Nsaids Unspecified     History of gastric ulcers - cannot take NSAIDS    Pcn [Penicillins] Hives    Seasonal Runny Nose and Itching     .     Outpatient Encounter Medications as of 6/14/2023   Medication Sig Dispense Refill    potassium chloride (MICRO-K) 10 MEQ capsule Take 1 Capsule by mouth every day. 100 Capsule 3    celecoxib (CELEBREX) 200 MG Cap Take 200 mg by mouth 1 time a day as needed.      vitamin D2, Ergocalciferol, (DRISDOL) 1.25 MG (81695 UT) Cap capsule Take 1 Capsule by mouth every 7 days. On Sat 12 Capsule 3    SYNTHROID 75 MCG Tab Take 1 Tablet by mouth every morning on an empty stomach. 90 Tablet 3    zolpidem (AMBIEN) 5  "MG Tab Take 1 Tablet by mouth at bedtime as needed for Sleep for up to 30 days. 30 Tablet 0    busPIRone (BUSPAR) 10 MG Tab tablet Take 10 mg by mouth every evening. Pt takes once a day, not BID      loratadine (CLARITIN) 10 MG Tab Take 10 mg by mouth as needed. Indications: Hayfever      furosemide (LASIX) 20 MG Tab Take 1 Tablet by mouth every day. 90 Tablet 3    propranolol (INDERAL) 40 MG Tab Take 1 Tablet by mouth 3 times a day. 270 Tablet 3    lisinopril (PRINIVIL) 20 MG Tab Take 1 Tablet by mouth every day. 90 Tablet 3    DULoxetine HCl 40 MG Cap DR Particles Take 40 mg by mouth 2 times a day. 180 Capsule 3    rosuvastatin (CRESTOR) 40 MG tablet Take 1 Tablet by mouth every day. (Patient taking differently: Take 40 mg by mouth every evening.) 90 Tablet 3    COLLAGEN PO Take 1 Scoop by mouth every day.       No facility-administered encounter medications on file as of 6/14/2023.     Review of Systems   Constitutional:  Positive for malaise/fatigue. Negative for fever.   Respiratory:  Positive for shortness of breath. Negative for cough.    Cardiovascular:  Positive for chest pain and orthopnea. Negative for palpitations, claudication, leg swelling and PND.   Gastrointestinal:  Negative for abdominal pain.   Musculoskeletal:  Negative for myalgias.   Neurological:  Positive for dizziness.              Objective     /76 (BP Location: Left arm, Patient Position: Sitting, BP Cuff Size: Adult)   Pulse 75   Resp 14   Ht 1.702 m (5' 7\")   Wt 77.1 kg (170 lb)   SpO2 96%   BMI 26.63 kg/m²     Physical Exam  Vitals and nursing note reviewed.   Constitutional:       Appearance: Normal appearance. She is well-developed and normal weight.   HENT:      Head: Normocephalic and atraumatic.   Neck:      Vascular: No JVD.   Cardiovascular:      Rate and Rhythm: Normal rate and regular rhythm.      Pulses: Normal pulses.      Heart sounds: Normal heart sounds.   Pulmonary:      Effort: Pulmonary effort is normal.     "  Breath sounds: Normal breath sounds.   Musculoskeletal:         General: Normal range of motion.   Skin:     General: Skin is warm and dry.      Capillary Refill: Capillary refill takes less than 2 seconds.   Neurological:      General: No focal deficit present.      Mental Status: She is alert and oriented to person, place, and time. Mental status is at baseline.   Psychiatric:         Mood and Affect: Mood normal.         Behavior: Behavior normal.         Thought Content: Thought content normal.         Judgment: Judgment normal.                Assessment & Plan     1. Benign essential HTN  potassium chloride (MICRO-K) 10 MEQ capsule      2. Heart block        3. Nonrheumatic aortic valve insufficiency  potassium chloride (MICRO-K) 10 MEQ capsule      4. Pure hypercholesterolemia          Medical Decision Making: Today's Assessment/Status/Plan:       1. HTN  -good control on lisinopril 20 mg QD and furosemide 20 mg QD  -propanolol for tremor  -BP goal <130/80    2. AI  -follow up after YESSI for possible CTS consultation for CABG/AVR  -follow in 2 weeks  -diuresis for comfort, consider increase in furosemide to 40 mg at next apt if symptoms progressing  -add potassium 10 mEq for leg cramps and low normal K    3. HLD with CAD  -cont rosuvastatin 40 mg QPM  -follow labs annually  -LDL goal <70 with CAD  -add aspirin 81 mg at next apt    Patient is to follow up with Mary RIDDLE in 2 weeks with review of YESSI and symptoms.

## 2023-06-15 PROBLEM — I25.10 CORONARY ARTERY DISEASE INVOLVING NATIVE CORONARY ARTERY OF NATIVE HEART WITHOUT ANGINA PECTORIS: Status: ACTIVE | Noted: 2023-06-15

## 2023-06-16 ENCOUNTER — PRE-ADMISSION TESTING (OUTPATIENT)
Dept: ADMISSIONS | Facility: MEDICAL CENTER | Age: 73
End: 2023-06-16
Attending: INTERNAL MEDICINE
Payer: COMMERCIAL

## 2023-06-16 VITALS — HEIGHT: 67 IN | WEIGHT: 170 LBS | BODY MASS INDEX: 26.68 KG/M2

## 2023-06-16 ASSESSMENT — FIBROSIS 4 INDEX: FIB4 SCORE: 1.72

## 2023-06-21 ENCOUNTER — ANESTHESIA EVENT (OUTPATIENT)
Dept: CARDIOLOGY | Facility: MEDICAL CENTER | Age: 73
End: 2023-06-21
Payer: COMMERCIAL

## 2023-06-21 ENCOUNTER — APPOINTMENT (OUTPATIENT)
Dept: CARDIOLOGY | Facility: MEDICAL CENTER | Age: 73
End: 2023-06-21
Attending: PHYSICIAN ASSISTANT
Payer: COMMERCIAL

## 2023-06-21 ENCOUNTER — ANESTHESIA (OUTPATIENT)
Dept: CARDIOLOGY | Facility: MEDICAL CENTER | Age: 73
End: 2023-06-21
Payer: COMMERCIAL

## 2023-06-21 ENCOUNTER — HOSPITAL ENCOUNTER (OUTPATIENT)
Facility: MEDICAL CENTER | Age: 73
End: 2023-06-21
Attending: INTERNAL MEDICINE | Admitting: INTERNAL MEDICINE
Payer: COMMERCIAL

## 2023-06-21 VITALS
HEART RATE: 60 BPM | RESPIRATION RATE: 18 BRPM | OXYGEN SATURATION: 94 % | SYSTOLIC BLOOD PRESSURE: 169 MMHG | HEIGHT: 67 IN | TEMPERATURE: 96.6 F | WEIGHT: 169.53 LBS | BODY MASS INDEX: 26.61 KG/M2 | DIASTOLIC BLOOD PRESSURE: 70 MMHG

## 2023-06-21 DIAGNOSIS — I35.1 NONRHEUMATIC AORTIC VALVE INSUFFICIENCY: ICD-10-CM

## 2023-06-21 PROCEDURE — 99100 ANES PT EXTEME AGE<1 YR&>70: CPT | Performed by: ANESTHESIOLOGY

## 2023-06-21 PROCEDURE — 93325 DOPPLER ECHO COLOR FLOW MAPG: CPT | Mod: 26 | Performed by: INTERNAL MEDICINE

## 2023-06-21 PROCEDURE — 160046 HCHG PACU - 1ST 60 MINS PHASE II

## 2023-06-21 PROCEDURE — 160035 HCHG PACU - 1ST 60 MINS PHASE I

## 2023-06-21 PROCEDURE — 700105 HCHG RX REV CODE 258: Performed by: INTERNAL MEDICINE

## 2023-06-21 PROCEDURE — 700101 HCHG RX REV CODE 250: Performed by: ANESTHESIOLOGY

## 2023-06-21 PROCEDURE — 160002 HCHG RECOVERY MINUTES (STAT)

## 2023-06-21 PROCEDURE — 700111 HCHG RX REV CODE 636 W/ 250 OVERRIDE (IP): Performed by: ANESTHESIOLOGY

## 2023-06-21 PROCEDURE — 01922 ANES N-INVAS IMG/RADJ THER: CPT | Performed by: ANESTHESIOLOGY

## 2023-06-21 PROCEDURE — 93325 DOPPLER ECHO COLOR FLOW MAPG: CPT

## 2023-06-21 PROCEDURE — 93312 ECHO TRANSESOPHAGEAL: CPT | Mod: 26 | Performed by: INTERNAL MEDICINE

## 2023-06-21 RX ORDER — LABETALOL HYDROCHLORIDE 5 MG/ML
5 INJECTION, SOLUTION INTRAVENOUS
Status: DISCONTINUED | OUTPATIENT
Start: 2023-06-21 | End: 2023-06-21 | Stop reason: HOSPADM

## 2023-06-21 RX ORDER — METOPROLOL TARTRATE 1 MG/ML
1 INJECTION, SOLUTION INTRAVENOUS
Status: DISCONTINUED | OUTPATIENT
Start: 2023-06-21 | End: 2023-06-21 | Stop reason: HOSPADM

## 2023-06-21 RX ORDER — HYDRALAZINE HYDROCHLORIDE 20 MG/ML
5 INJECTION INTRAMUSCULAR; INTRAVENOUS
Status: DISCONTINUED | OUTPATIENT
Start: 2023-06-21 | End: 2023-06-21 | Stop reason: HOSPADM

## 2023-06-21 RX ORDER — SODIUM CHLORIDE, SODIUM LACTATE, POTASSIUM CHLORIDE, CALCIUM CHLORIDE 600; 310; 30; 20 MG/100ML; MG/100ML; MG/100ML; MG/100ML
INJECTION, SOLUTION INTRAVENOUS CONTINUOUS
Status: DISCONTINUED | OUTPATIENT
Start: 2023-06-21 | End: 2023-06-21 | Stop reason: HOSPADM

## 2023-06-21 RX ORDER — LIDOCAINE HYDROCHLORIDE 20 MG/ML
INJECTION, SOLUTION EPIDURAL; INFILTRATION; INTRACAUDAL; PERINEURAL PRN
Status: DISCONTINUED | OUTPATIENT
Start: 2023-06-21 | End: 2023-06-21 | Stop reason: SURG

## 2023-06-21 RX ORDER — OXYCODONE HCL 5 MG/5 ML
10 SOLUTION, ORAL ORAL
Status: DISCONTINUED | OUTPATIENT
Start: 2023-06-21 | End: 2023-06-21 | Stop reason: HOSPADM

## 2023-06-21 RX ORDER — DIPHENHYDRAMINE HYDROCHLORIDE 50 MG/ML
12.5 INJECTION INTRAMUSCULAR; INTRAVENOUS
Status: DISCONTINUED | OUTPATIENT
Start: 2023-06-21 | End: 2023-06-21 | Stop reason: HOSPADM

## 2023-06-21 RX ORDER — IPRATROPIUM BROMIDE AND ALBUTEROL SULFATE 2.5; .5 MG/3ML; MG/3ML
3 SOLUTION RESPIRATORY (INHALATION)
Status: DISCONTINUED | OUTPATIENT
Start: 2023-06-21 | End: 2023-06-21 | Stop reason: HOSPADM

## 2023-06-21 RX ORDER — HALOPERIDOL 5 MG/ML
1 INJECTION INTRAMUSCULAR
Status: DISCONTINUED | OUTPATIENT
Start: 2023-06-21 | End: 2023-06-21 | Stop reason: HOSPADM

## 2023-06-21 RX ORDER — EPHEDRINE SULFATE 50 MG/ML
5 INJECTION, SOLUTION INTRAVENOUS
Status: DISCONTINUED | OUTPATIENT
Start: 2023-06-21 | End: 2023-06-21 | Stop reason: HOSPADM

## 2023-06-21 RX ORDER — OXYCODONE HCL 5 MG/5 ML
5 SOLUTION, ORAL ORAL
Status: DISCONTINUED | OUTPATIENT
Start: 2023-06-21 | End: 2023-06-21 | Stop reason: HOSPADM

## 2023-06-21 RX ORDER — ONDANSETRON 2 MG/ML
4 INJECTION INTRAMUSCULAR; INTRAVENOUS
Status: DISCONTINUED | OUTPATIENT
Start: 2023-06-21 | End: 2023-06-21 | Stop reason: HOSPADM

## 2023-06-21 RX ADMIN — PROPOFOL 125 MG: 10 INJECTION, EMULSION INTRAVENOUS at 12:25

## 2023-06-21 RX ADMIN — EPHEDRINE SULFATE 10 MG: 50 INJECTION, SOLUTION INTRAVENOUS at 12:52

## 2023-06-21 RX ADMIN — LIDOCAINE HYDROCHLORIDE 100 MG: 20 INJECTION, SOLUTION EPIDURAL; INFILTRATION; INTRACAUDAL at 12:25

## 2023-06-21 RX ADMIN — PROPOFOL 100 MG: 10 INJECTION, EMULSION INTRAVENOUS at 12:37

## 2023-06-21 RX ADMIN — FENTANYL CITRATE 50 MCG: 50 INJECTION, SOLUTION INTRAMUSCULAR; INTRAVENOUS at 12:18

## 2023-06-21 RX ADMIN — PROPOFOL 50 MG: 10 INJECTION, EMULSION INTRAVENOUS at 12:45

## 2023-06-21 RX ADMIN — SODIUM CHLORIDE, POTASSIUM CHLORIDE, SODIUM LACTATE AND CALCIUM CHLORIDE: 600; 310; 30; 20 INJECTION, SOLUTION INTRAVENOUS at 12:11

## 2023-06-21 RX ADMIN — PROPOFOL 50 MG: 10 INJECTION, EMULSION INTRAVENOUS at 12:55

## 2023-06-21 RX ADMIN — EPHEDRINE SULFATE 10 MG: 50 INJECTION, SOLUTION INTRAVENOUS at 12:59

## 2023-06-21 ASSESSMENT — FIBROSIS 4 INDEX: FIB4 SCORE: 1.72

## 2023-06-21 ASSESSMENT — PAIN SCALES - GENERAL: PAIN_LEVEL: 0

## 2023-06-21 NOTE — OR NURSING
Arrived from PACU AXO, Pt's VSS; denies N/V; states pain is at tolerable level.     D/c orders received. IV dc'd. Pt changed into clothing with assistance. Discharge reviewed, Pt and family verbalized understanding and questions answered. Patient states ready to d/c home. Pt dc'd in w/c with CNA.

## 2023-06-21 NOTE — ANESTHESIA TIME REPORT
Anesthesia Start and Stop Event Times     Date Time Event    6/21/2023 1209 Ready for Procedure     1211 Anesthesia Start     1314 Anesthesia Stop        Responsible Staff  06/21/23    Name Role Begin End    Beau Sutherland M.D. Anesth 1211 1314        Overtime Reason:  no overtime (within assigned shift)    Comments:

## 2023-06-21 NOTE — ANESTHESIA POSTPROCEDURE EVALUATION
Patient: Margoth Hopkins    Procedure Summary     Date: 06/21/23 Room / Location: [facility] - ECHOCARDIOLOGY [facility]    Anesthesia Start: 1211 Anesthesia Stop: 1314    Procedure: EC-YESSI W/O CONT Diagnosis:       Nonrheumatic aortic valve insufficiency      Nonrheumatic aortic (valve) insufficiency    Scheduled Providers: Enrique LEDESMA M.D.; Gabino Barron M.D. Responsible Provider: Beau Sutherland M.D.    Anesthesia Type: general ASA Status: 2          Final Anesthesia Type: general  Last vitals  BP   Blood Pressure : (!) 142/63    Temp   36.1 °C (96.9 °F)    Pulse   65   Resp   16    SpO2   96 %      Anesthesia Post Evaluation    Patient location during evaluation: PACU  Patient participation: complete - patient participated  Level of consciousness: awake and alert  Pain score: 0    Airway patency: patent  Anesthetic complications: no  Cardiovascular status: hemodynamically stable  Respiratory status: acceptable  Hydration status: euvolemic    PONV: none          There were no known notable events for this encounter.     Nurse Pain Score: 0 (NPRS)        
[de-identified] : 01/12/22; B/L Prophylactic mastectomy and CYNTHIA Flap Reconstruction may/ [place]

## 2023-06-21 NOTE — OR NURSING
1310: Pt arrived via gurney with anesthesia and RN. VSS. Orders reviewed and initiated.   1416: Report given to CYNTHIA Young. All questions answered. VSS. No patient distress. Alert and oriented x4. Tolerating fluids. Pt transported to phase II on room air with 1 bag of belongings. Family updated.

## 2023-06-21 NOTE — DISCHARGE INSTRUCTIONS
HOME CARE INSTRUCTIONS    ACTIVITY: Rest and take it easy for the first 24 hours.  A responsible adult is recommended to remain with you during that time.  It is normal to feel sleepy.  We encourage you to not do anything that requires balance, judgment or coordination.    FOR 24 HOURS DO NOT:  Drive, operate machinery or run household appliances.  Drink beer or alcoholic beverages.  Make important decisions or sign legal documents.    SPECIAL INSTRUCTIONS:     YESSI - TRANSESOPHAGEAL ECHOCARDIOGRAM  1. Don't drive or drink alcohol for 24 hours. The medication you received will make you too drowsy.  2. If you begin to vomit bloody material, or develop black or bloody stools, call your doctor as soon as possible.  3. If you have any neck, chest, abdominal pain or temp of 100 degrees, call your doctor.      DIET: To avoid nausea, slowly advance diet as tolerated, avoiding spicy or greasy foods for the first day.  Add more substantial food to your diet according to your physician's instructions.  Babies can be fed formula or breast milk as soon as they are hungry.  INCREASE FLUIDS AND FIBER TO AVOID CONSTIPATION.      MEDICATIONS: Resume taking daily medication.  Take prescribed pain medication with food.  If no medication is prescribed, you may take non-aspirin pain medication if needed.  PAIN MEDICATION CAN BE VERY CONSTIPATING.  Take a stool softener or laxative such as senokot, pericolace, or milk of magnesia if needed.      A follow-up appointment should be arranged with your doctor; call to schedule.    You should CALL YOUR PHYSICIAN if you develop:  Fever greater than 101 degrees F.  Pain not relieved by medication, or persistent nausea or vomiting.  Excessive bleeding (blood soaking through dressing) or unexpected drainage from the wound.  Extreme redness or swelling around the incision site, drainage of pus or foul smelling drainage.  Inability to urinate or empty your bladder within 8 hours.  Problems with  breathing or chest pain.    You should call 911 if you develop problems with breathing or chest pain.  If you are unable to contact your doctor or surgical center, you should go to the nearest emergency room or urgent care center.  Physician's telephone #: [phone]    MILD FLU-LIKE SYMPTOMS ARE NORMAL.  YOU MAY EXPERIENCE GENERALIZED MUSCLE ACHES, THROAT IRRITATION, HEADACHE AND/OR SOME NAUSEA.    If any questions arise, call your doctor.  If your doctor is not available, please feel free to call the Surgical Center at [phone].  The Center is open Monday through Friday from 7AM to 7PM.      A registered nurse may call you a few days after your surgery to see how you are doing after your procedure.    You may also receive a survey in the mail within the next two weeks and we ask that you take a few moments to complete the survey and return it to us.  Our goal is to provide you with very good care and we value your comments.     Depression / Suicide Risk    As you are discharged from this [facility] facility, it is important to learn how to keep safe from harming yourself.    Recognize the warning signs:  Abrupt changes in personality, positive or negative- including increase in energy   Giving away possessions  Change in eating patterns- significant weight changes-  positive or negative  Change in sleeping patterns- unable to sleep or sleeping all the time   Unwillingness or inability to communicate  Depression  Unusual sadness, discouragement and loneliness  Talk of wanting to die  Neglect of personal appearance   Rebelliousness- reckless behavior  Withdrawal from people/activities they love  Confusion- inability to concentrate     If you or a loved one observes any of these behaviors or has concerns about self-harm, here's what you can do:  Talk about it- your feelings and reasons for harming yourself  Remove any means that you might use to hurt yourself (examples: pills, rope, extension cords,  firearm)  Get professional help from the community (Mental Health, Substance Abuse, psychological counseling)  Do not be alone:Call your Safe Contact- someone whom you trust who will be there for you.  Call your local CRISIS HOTLINE 155-6839 or 699-541-0101  Call your local Children's Mobile Crisis Response Team [place] (385) 530-9514 or www.[employer]  Call the toll free National Suicide Prevention Hotlines   National Suicide Prevention Lifeline 884-269-TBZR (0355)  [facility] Line Network 800-SUICIDE (296-4974)    I acknowledge receipt and understanding of these Home Care instructions.

## 2023-06-21 NOTE — ANESTHESIA PREPROCEDURE EVALUATION
Date/Time: 06/21/23 1200    Scheduled providers: Enrique LEDESMA M.D.; Gabino Barron M.D.    Procedure: EC-YESSI W/O CONT    Diagnosis:       Nonrheumatic aortic valve insufficiency [I35.1]      Nonrheumatic aortic (valve) insufficiency [I35.1]    Location: [facility] - ECHOCARDIOLOGY [facility]          Relevant Problems   NEURO   (positive) Chronic headache      CARDIAC   (positive) Benign essential HTN   (positive) Coronary artery disease involving native coronary artery of native heart without angina pectoris   (positive) Heart block   (positive) Nonrheumatic aortic valve insufficiency      ENDO   (positive) Postoperative hypothyroidism_s/p thyroidectomy_Dr. Mullen       Physical Exam    Airway   Mallampati: II  TM distance: >3 FB  Neck ROM: full       Cardiovascular - normal exam  Rhythm: regular  Rate: normal  (-) murmur     Dental - normal exam           Pulmonary - normal exam  Breath sounds clear to auscultation     Abdominal    Neurological - normal exam                 Anesthesia Plan    ASA 2       Plan - general       Airway plan will be mask          Induction: intravenous      Pertinent diagnostic labs and testing reviewed    Informed Consent:    Anesthetic plan and risks discussed with patient.    Use of blood products discussed with: patient whom consented to blood products.

## 2023-06-28 ENCOUNTER — OFFICE VISIT (OUTPATIENT)
Dept: CARDIOLOGY | Facility: MEDICAL CENTER | Age: 73
End: 2023-06-28
Attending: NURSE PRACTITIONER
Payer: COMMERCIAL

## 2023-06-28 VITALS
WEIGHT: 171 LBS | DIASTOLIC BLOOD PRESSURE: 60 MMHG | RESPIRATION RATE: 16 BRPM | BODY MASS INDEX: 26.84 KG/M2 | OXYGEN SATURATION: 96 % | HEART RATE: 64 BPM | HEIGHT: 67 IN | SYSTOLIC BLOOD PRESSURE: 122 MMHG

## 2023-06-28 DIAGNOSIS — I45.9 HEART BLOCK: ICD-10-CM

## 2023-06-28 DIAGNOSIS — I10 BENIGN ESSENTIAL HTN: ICD-10-CM

## 2023-06-28 DIAGNOSIS — I25.10 CORONARY ARTERY DISEASE INVOLVING NATIVE CORONARY ARTERY OF NATIVE HEART WITHOUT ANGINA PECTORIS: ICD-10-CM

## 2023-06-28 DIAGNOSIS — E78.00 PURE HYPERCHOLESTEROLEMIA: ICD-10-CM

## 2023-06-28 DIAGNOSIS — I35.1 NONRHEUMATIC AORTIC VALVE INSUFFICIENCY: ICD-10-CM

## 2023-06-28 PROCEDURE — 3078F DIAST BP <80 MM HG: CPT | Performed by: NURSE PRACTITIONER

## 2023-06-28 PROCEDURE — 99214 OFFICE O/P EST MOD 30 MIN: CPT | Performed by: NURSE PRACTITIONER

## 2023-06-28 PROCEDURE — 3074F SYST BP LT 130 MM HG: CPT | Performed by: NURSE PRACTITIONER

## 2023-06-28 PROCEDURE — 99213 OFFICE O/P EST LOW 20 MIN: CPT | Performed by: NURSE PRACTITIONER

## 2023-06-28 PROCEDURE — 99212 OFFICE O/P EST SF 10 MIN: CPT | Performed by: NURSE PRACTITIONER

## 2023-06-28 RX ORDER — PREGABALIN 50 MG/1
50 CAPSULE ORAL 2 TIMES DAILY
COMMUNITY
Start: 2023-06-15 | End: 2023-09-13 | Stop reason: SDUPTHER

## 2023-06-28 ASSESSMENT — ENCOUNTER SYMPTOMS
ORTHOPNEA: 1
ABDOMINAL PAIN: 0
PND: 0
MYALGIAS: 0
SHORTNESS OF BREATH: 1
COUGH: 0
DIZZINESS: 1
FEVER: 0
CLAUDICATION: 0
PALPITATIONS: 0

## 2023-06-28 ASSESSMENT — FIBROSIS 4 INDEX: FIB4 SCORE: 1.72

## 2023-06-28 NOTE — PROGRESS NOTES
No chief complaint on file.    Subjective     Margoth Hopkins is a 72 y.o. female who presents today to review symptoms and plan of care.    She is a patient of Dr. Worrell in our office. Hx of mod-severe AI, CAD with + stress imaging (pending CABG v. Stent placement), HLD, HTN, and prior syncopal event with second degree heart block.    She presents today with her spouse. She is stable at rest but with any exertion she has lightheadedness, shortness of breath, sometimes angina, and orthopnea at night. She also has palpitations during the day.    Her YESSI showed moderate aortic insufficiency.    No edema.    Past Medical History:   Diagnosis Date    Anemia     H/O    Aortic insufficiency 01/31/2020    Pt. states this is mild and does not have any signs or symptoms.    Arthritis 06/16/2023    general body    Breath shortness 11/02/2021    with exertion or anxiety    Cataract 06/16/2023    IOL OU 2018    Dental disorder 06/16/2023    upper/lower front 2 teeth crowned    Gastric ulcer     Goiter     High cholesterol 06/16/2023    medicated    Hypertension 06/16/2023    well controlled on meds    Hypothyroidism 06/16/2023    medicated    Pain 06/16/2023    bilat knees, back    Psychiatric problem 06/16/2023    anxiety, medicated    Pulmonary embolism (HCC) 01/31/2020    Pt. states after joint replacement in 7-2018.    Reactive arthritis (HCC)     Snoring 06/16/2023    Thyroid disease     hasimotos    Thyroid disease 01/31/2020    Thyroid Nodules     Past Surgical History:   Procedure Laterality Date    FUSION  06/16/2023    neck, 2013    OTHER CARDIAC SURGERY  06/16/2023    heart cath 2023    KNEE ARTHROPLASTY TOTAL Left 06/2022    MASS EXCISION GENERAL Right 11/05/2021    Procedure: EXCISION, MASS - LATERAL THIGH;  Surgeon: Cydney Franklin M.D.;  Location: SURGERY SAME DAY [facility];  Service: General    THYROIDECTOMY TOTAL Bilateral 02/05/2020    Procedure: THYROIDECTOMY, TOTAL- COMPLETE;  Surgeon: Cydney Franklin  M.D.;  Location: SURGERY SAME DAY [facility];  Service: General    APPENDECTOMY      BLADDER SLING FEMALE      CATARACT EXTRACTION WITH IOL Left     CATARACT EXTRACTION WITH IOL Bilateral     CERVICAL FUSION POSTERIOR      FOOT SURGERY Left     metatarsal fusion, hammer toes correction    GYN SURGERY      Hysterectomy    KNEE ARTHROPLASTY TOTAL Right     x3    KNEE REPLACEMENT, TOTAL Right     KNEE REVISION TOTAL Right      Family History   Problem Relation Age of Onset    Arthritis Mother     Alzheimer's Disease Mother     Cancer Mother         cervical cancer    Heart Attack Father     Other Brother         brain anurism    Hypertension Brother     Cancer Maternal Grandmother 66        colono cancer    Heart Disease Brother     Hypertension Brother     Arthritis Brother     Other Brother         Celiac Disease    No Known Problems Maternal Grandfather     No Known Problems Paternal Grandmother     No Known Problems Paternal Grandfather     Hypertension Brother     Arthritis Brother     Lung Disease Brother     Asthma Brother      Social History     Socioeconomic History    Marital status:      Spouse name: Not on file    Number of children: Not on file    Years of education: Not on file    Highest education level: Bachelor's degree (e.g., BA, AB, BS)   Occupational History    Not on file   Tobacco Use    Smoking status: Never    Smokeless tobacco: Never   Vaping Use    Vaping Use: Never used   Substance and Sexual Activity    Alcohol use: Yes     Alcohol/week: 1.8 oz     Types: 3 Glasses of wine per week    Drug use: Not Currently    Sexual activity: Yes     Partners: Male   Other Topics Concern    Not on file   Social History Narrative    Not on file     Social Determinants of Health     Financial Resource Strain: Low Risk  (6/7/2022)    Overall Financial Resource Strain (CARDIA)     Difficulty of Paying Living Expenses: Not hard at all   Food Insecurity: No Food Insecurity (6/7/2022)    Hunger Vital  Sign     Worried About Running Out of Food in the Last Year: Never true     Ran Out of Food in the Last Year: Never true   Transportation Needs: No Transportation Needs (6/7/2022)    PRAPARE - Transportation     Lack of Transportation (Medical): No     Lack of Transportation (Non-Medical): No   Physical Activity: Insufficiently Active (6/7/2022)    Exercise Vital Sign     Days of Exercise per Week: 1 day     Minutes of Exercise per Session: 10 min   Stress: Stress Concern Present (6/7/2022)    [employer] [place] [employer] - Occupational Stress Questionnaire     Feeling of Stress : Very much   Social Connections: Socially Integrated (6/7/2022)    Social Connection and Isolation Panel [NHANES]     Frequency of Communication with Friends and Family: Three times a week     Frequency of Social Gatherings with Friends and Family: Twice a week     Attends [employer] Services: More than 4 times per year     Active Member of Clubs or Organizations: Yes     Attends Club or Organization Meetings: More than 4 times per year     Marital Status:    Intimate Partner Violence: Not on file   Housing Stability: Low Risk  (6/7/2022)    Housing Stability Vital Sign     Unable to Pay for Housing in the Last Year: No     Number of Places Lived in the Last Year: 1     Unstable Housing in the Last Year: No     Allergies   Allergen Reactions    Morphine Vomiting    Nsaids Unspecified     History of gastric ulcers - cannot take NSAIDS    Pcn [Penicillins] Hives    Seasonal Runny Nose and Itching     .     Outpatient Encounter Medications as of 6/28/2023   Medication Sig Dispense Refill    potassium chloride (MICRO-K) 10 MEQ capsule Take 1 Capsule by mouth every day. 100 Capsule 3    celecoxib (CELEBREX) 200 MG Cap Take 200 mg by mouth 1 time a day as needed.      vitamin D2, Ergocalciferol, (DRISDOL) 1.25 MG (55572 UT) Cap capsule Take 1 Capsule by mouth every 7 days. On Sat 12 Capsule 3    SYNTHROID 75 MCG Tab Take 1  "Tablet by mouth every morning on an empty stomach. 90 Tablet 3    zolpidem (AMBIEN) 5 MG Tab Take 1 Tablet by mouth at bedtime as needed for Sleep for up to 30 days. 30 Tablet 0    busPIRone (BUSPAR) 10 MG Tab tablet Take 10 mg by mouth every evening. Pt takes once a day, not BID      loratadine (CLARITIN) 10 MG Tab Take 10 mg by mouth as needed. Indications: Hayfever      furosemide (LASIX) 20 MG Tab Take 1 Tablet by mouth every day. 90 Tablet 3    propranolol (INDERAL) 40 MG Tab Take 1 Tablet by mouth 3 times a day. 270 Tablet 3    lisinopril (PRINIVIL) 20 MG Tab Take 1 Tablet by mouth every day. 90 Tablet 3    DULoxetine HCl 40 MG Cap DR Particles Take 40 mg by mouth 2 times a day. 180 Capsule 3    rosuvastatin (CRESTOR) 40 MG tablet Take 1 Tablet by mouth every day. (Patient taking differently: Take 40 mg by mouth every evening.) 90 Tablet 3    COLLAGEN PO Take 1 Scoop by mouth every day.      pregabalin (LYRICA) 50 MG capsule        No facility-administered encounter medications on file as of 6/28/2023.     Review of Systems   Constitutional:  Positive for malaise/fatigue. Negative for fever.   Respiratory:  Positive for shortness of breath. Negative for cough.    Cardiovascular:  Positive for chest pain and orthopnea. Negative for palpitations, claudication, leg swelling and PND.   Gastrointestinal:  Negative for abdominal pain.   Musculoskeletal:  Negative for myalgias.   Neurological:  Positive for dizziness.              Objective     /60 (BP Location: Left arm, Patient Position: Sitting, BP Cuff Size: Adult)   Pulse 64   Resp 16   Ht 1.702 m (5' 7\")   Wt 77.6 kg (171 lb)   SpO2 96%   BMI 26.78 kg/m²     Physical Exam  Vitals and nursing note reviewed.   Constitutional:       Appearance: Normal appearance. She is well-developed and normal weight.   HENT:      Head: Normocephalic and atraumatic.   Neck:      Vascular: No JVD.   Cardiovascular:      Rate and Rhythm: Normal rate and regular rhythm. "      Pulses: Normal pulses.      Heart sounds: Normal heart sounds.   Pulmonary:      Effort: Pulmonary effort is normal.      Breath sounds: Normal breath sounds.   Musculoskeletal:         General: Normal range of motion.   Skin:     General: Skin is warm and dry.      Capillary Refill: Capillary refill takes less than 2 seconds.   Neurological:      General: No focal deficit present.      Mental Status: She is alert and oriented to person, place, and time. Mental status is at baseline.   Psychiatric:         Mood and Affect: Mood normal.         Behavior: Behavior normal.         Thought Content: Thought content normal.         Judgment: Judgment normal.                Assessment & Plan     1. Benign essential HTN        2. Coronary artery disease involving native coronary artery of native heart without angina pectoris        3. Heart block        4. Nonrheumatic aortic valve insufficiency        5. Pure hypercholesterolemia          Medical Decision Making: Today's Assessment/Status/Plan:       1. HTN  -good control on lisinopril 20 mg QD and furosemide 20 mg QD  -propanolol for tremor  -BP goal <130/80    2. AI  -YESSI inconclusive and doesn't match symptoms, recommend cardiac MRI for AI evaluation per Dr. Graves's recommendations  -will review case with Dr. Lawrence next week and determine if he wants to see her in clinic for consultation  -follow in 1-2 weeks with discussion over phone  -diuresis for comfort, consider increase in furosemide to 40 mg at next apt if symptoms progressing, cont K 10 mEq QD    3. HLD with CAD  -cont rosuvastatin 40 mg QPM  -follow labs annually  -LDL goal <70 with CAD  -add aspirin 81 mg at next apt    Patient is to follow up with Mary RIDDLE over the phone in 1-2 weeks after further discussion with Dr. Lawrence and Dr. Cortez reviewing cardiac MRI and YESSI.

## 2023-07-06 ENCOUNTER — PATIENT MESSAGE (OUTPATIENT)
Dept: CARDIOLOGY | Facility: MEDICAL CENTER | Age: 73
End: 2023-07-06

## 2023-07-06 NOTE — TELEPHONE ENCOUNTER
SC- Please advise on patients mychart. Wanting to know if she can have Valium for scan and if you have discussed with Dr. Lawrence?

## 2023-07-07 DIAGNOSIS — F41.9 ANXIETY: ICD-10-CM

## 2023-07-07 RX ORDER — DIAZEPAM 2 MG/1
2 TABLET ORAL ONCE
Qty: 1 TABLET | Refills: 0 | Status: SHIPPED | OUTPATIENT
Start: 2023-07-07 | End: 2023-07-07

## 2023-07-11 ENCOUNTER — OFFICE VISIT (OUTPATIENT)
Dept: MEDICAL GROUP | Facility: MEDICAL CENTER | Age: 73
End: 2023-07-11
Payer: COMMERCIAL

## 2023-07-11 VITALS
TEMPERATURE: 96.8 F | BODY MASS INDEX: 26.99 KG/M2 | SYSTOLIC BLOOD PRESSURE: 120 MMHG | WEIGHT: 171.96 LBS | HEART RATE: 58 BPM | HEIGHT: 67 IN | OXYGEN SATURATION: 99 % | DIASTOLIC BLOOD PRESSURE: 70 MMHG

## 2023-07-11 DIAGNOSIS — E78.00 PURE HYPERCHOLESTEROLEMIA: ICD-10-CM

## 2023-07-11 DIAGNOSIS — I35.1 NONRHEUMATIC AORTIC VALVE INSUFFICIENCY: ICD-10-CM

## 2023-07-11 DIAGNOSIS — I25.10 CORONARY ARTERY CALCIFICATION: ICD-10-CM

## 2023-07-11 DIAGNOSIS — I45.9 HEART BLOCK: ICD-10-CM

## 2023-07-11 DIAGNOSIS — F51.01 PRIMARY INSOMNIA: ICD-10-CM

## 2023-07-11 DIAGNOSIS — I10 BENIGN ESSENTIAL HTN: ICD-10-CM

## 2023-07-11 DIAGNOSIS — F41.8 SITUATIONAL ANXIETY: ICD-10-CM

## 2023-07-11 DIAGNOSIS — I25.84 CORONARY ARTERY CALCIFICATION: ICD-10-CM

## 2023-07-11 DIAGNOSIS — I25.10 CORONARY ARTERY DISEASE INVOLVING NATIVE CORONARY ARTERY OF NATIVE HEART WITHOUT ANGINA PECTORIS: ICD-10-CM

## 2023-07-11 PROCEDURE — 3078F DIAST BP <80 MM HG: CPT | Performed by: FAMILY MEDICINE

## 2023-07-11 PROCEDURE — 3074F SYST BP LT 130 MM HG: CPT | Performed by: FAMILY MEDICINE

## 2023-07-11 PROCEDURE — 99214 OFFICE O/P EST MOD 30 MIN: CPT | Performed by: FAMILY MEDICINE

## 2023-07-11 RX ORDER — ZOLPIDEM TARTRATE 5 MG/1
5 TABLET ORAL NIGHTLY PRN
Qty: 60 TABLET | Refills: 0 | Status: SHIPPED | OUTPATIENT
Start: 2023-07-11 | End: 2023-09-09

## 2023-07-11 RX ORDER — FUROSEMIDE 20 MG/1
20 TABLET ORAL DAILY
Qty: 90 TABLET | Refills: 3 | Status: CANCELLED | OUTPATIENT
Start: 2023-07-11

## 2023-07-11 RX ORDER — DIAZEPAM 5 MG/1
TABLET ORAL
Qty: 1 TABLET | Refills: 0 | Status: SHIPPED | OUTPATIENT
Start: 2023-07-11 | End: 2023-08-09

## 2023-07-11 ASSESSMENT — FIBROSIS 4 INDEX: FIB4 SCORE: 1.72

## 2023-07-11 NOTE — PROGRESS NOTES
Subjective:   CC: Insomnia follow-up    HPI:     Margoth Hopkins is a 72 y.o. female, established patient of the clinic.     Patient has chronic primary insomnia, she is currently taking Ambien 5 mg nightly as needed for sleep.  She tolerated Ambien well, no side effect reported.  Negative history of illicit drugs, alcohol, tobacco abuse.    Patient recently underwent cardiac work-ups due to intermittent dizziness, syncope, chest pain.  She was found to have moderate nonrheumatic aortic valve insufficiency, heart block, coronary artery calcification.  Patient is on statin 40 mg daily.  She is scheduled for cardiac MRI in August 2023.  She complains of anxiety with MRI procedure and wishes to receive prescription for Valium for the procedure.    Her blood pressure is controlled with medication.  No side effect reported.    Cardiac cath 6/7/2023:   Findings:  1.  Left main coronary artery:  Normal.  2.  Left anterior descending artery: Mid LAD is severely calcified with 60% stenosis just after bifurcation of major diagonal.  Slow flow into apical LAD.  3.  Left circumflex coronary artery: Large dominant vessel, no significant stenosis.    4.  Right coronary artery: Small codominant vessel, luminal irregularities no significant disease.  5.  Left ventricular end diastolic pressure:  31 mmHg.  No signficant gradient across the aortic valve.  Aortic root angiogram showed normal sized ascending aorta, 3+ aortic regurgitation        Current medicines (including changes today)  Current Outpatient Medications   Medication Sig Dispense Refill    diazePAM (VALIUM) 5 MG Tab Take 5 mg of Diazepam 15-30 minutes before MRI in 8/2023. Please arrange transportation home after MRI. 1 Tablet 0    zolpidem (AMBIEN) 5 MG Tab Take 1 Tablet by mouth at bedtime as needed for Sleep for up to 60 days. 60 Tablet 0    pregabalin (LYRICA) 50 MG capsule       potassium chloride (MICRO-K) 10 MEQ capsule Take 1 Capsule by mouth every day. 100  "Capsule 3    vitamin D2, Ergocalciferol, (DRISDOL) 1.25 MG (35876 UT) Cap capsule Take 1 Capsule by mouth every 7 days. On Sat 12 Capsule 3    SYNTHROID 75 MCG Tab Take 1 Tablet by mouth every morning on an empty stomach. 90 Tablet 3    busPIRone (BUSPAR) 10 MG Tab tablet Take 10 mg by mouth every evening. Pt takes once a day, not BID      loratadine (CLARITIN) 10 MG Tab Take 10 mg by mouth as needed. Indications: Hayfever      furosemide (LASIX) 20 MG Tab Take 1 Tablet by mouth every day. 90 Tablet 3    propranolol (INDERAL) 40 MG Tab Take 1 Tablet by mouth 3 times a day. 270 Tablet 3    lisinopril (PRINIVIL) 20 MG Tab Take 1 Tablet by mouth every day. 90 Tablet 3    DULoxetine HCl 40 MG Cap DR Particles Take 40 mg by mouth 2 times a day. 180 Capsule 3    rosuvastatin (CRESTOR) 40 MG tablet Take 1 Tablet by mouth every day. (Patient taking differently: Take 40 mg by mouth every evening.) 90 Tablet 3    COLLAGEN PO Take 1 Scoop by mouth every day.       No current facility-administered medications for this visit.     She  has a past medical history of Anemia, Aortic insufficiency (01/31/2020), Arthritis (06/16/2023), Breath shortness (11/02/2021), Cataract (06/16/2023), Dental disorder (06/16/2023), Gastric ulcer, Goiter, High cholesterol (06/16/2023), Hypertension (06/16/2023), Hypothyroidism (06/16/2023), Pain (06/16/2023), Psychiatric problem (06/16/2023), Pulmonary embolism (HCC) (01/31/2020), Reactive arthritis (HCC), Snoring (06/16/2023), Thyroid disease, and Thyroid disease (01/31/2020).    She has no past medical history of Arrhythmia.    I reviewed patient's problem list, allergies, medications, family hx, social hx with patient and update EPIC.        Objective:     /70 (BP Location: Left arm, Patient Position: Sitting, BP Cuff Size: Adult)   Pulse (!) 58   Temp 36 °C (96.8 °F) (Temporal)   Ht 1.702 m (5' 7\")   Wt 78 kg (171 lb 15.3 oz)   SpO2 99%  Body mass index is 26.93 kg/m².    Physical " Exam:  Constitutional: awake, alert, in no distress.  Skin: Warm, dry, good turgor, no rashes, bruises, ulcers in visible areas.  Eye: conjunctiva clear, lids neg for edema or lesions.  Neck: Trachea midline, no masses, no thyromegaly. No cervical or supraclavicular lymphadenopathy  Respiratory: Unlabored respiratory effort, lungs clear to auscultation, no wheezes, no rales.  Cardiovascular: Normal S1, S2, no murmur, no pedal edema.  Psych: Oriented x3, affect and mood wnl, intact judgement and insight.       Assessment and Plan:   The following treatment plan was discussed    1. Coronary artery calcification  2. Heart block  3. Nonrheumatic aortic valve insufficiency  4. Coronary artery disease involving native coronary artery of native heart without angina pectoris  -Continue crestor 40 mg daily  -Keep appointment for cardiac MRI in August 2023.  -Follow-up with cardiologist as directed.    5. Situational anxiety  We will prescribe diazepam to be taken prior to cardiac MRI in August 2023.  - diazePAM (VALIUM) 5 MG Tab; Take 5 mg of Diazepam 15-30 minutes before MRI in 8/2023. Please arrange transportation home after MRI.  Dispense: 1 Tablet; Refill: 0    6. Benign essential HTN  Chronic, controlled with propanolol 40 mg 3 times daily, Lasix 20 mg daily, lisinopril 20 mg daily, no s/e reported, will continue.      7. Pure hypercholesterolemia  Chronic, controlled with Crestor 40 mg daily, no s/e reported, will continue.      8. Primary insomnia  Chronic, controlled with Ambien 5 mg nightly as needed.   No side effect reported with Ambien.  Negative history of illicit drugs or alcohol abuse.  - zolpidem (AMBIEN) 5 MG Tab; Take 1 Tablet by mouth at bedtime as needed for Sleep for up to 60 days.  Dispense: 60 Tablet; Refill: 0   - Risks, benefits, side effects, as well as potential health complications associated with Ambien were previously discussed with patient. Appropriate counseling provided.    -Follow-up in 3  months for medication refill.      Charlee Walker M.D.      Followup: Return in about 3 months (around 10/11/2023) for Multiple issues.    Please note that this dictation was created using voice recognition software. I have made every reasonable attempt to correct obvious errors, but I expect that there are errors of grammar and possibly content that I did not discover before finalizing the note.

## 2023-07-11 NOTE — PATIENT INSTRUCTIONS
Findings:  1.  Left main coronary artery:  Normal.  2.  Left anterior descending artery: Mid LAD is severely calcified with 60% stenosis just after bifurcation of major diagonal.  Slow flow into apical LAD.  3.  Left circumflex coronary artery: Large dominant vessel, no significant stenosis.    4.  Right coronary artery: Small codominant vessel, luminal irregularities no significant disease.  5.  Left ventricular end diastolic pressure:  31 mmHg.  No signficant gradient across the aortic valve.  Aortic root angiogram showed normal sized ascending aorta, 3+ aortic regurgitation

## 2023-07-18 DIAGNOSIS — I25.10 CORONARY ARTERY DISEASE INVOLVING NATIVE CORONARY ARTERY OF NATIVE HEART WITHOUT ANGINA PECTORIS: ICD-10-CM

## 2023-07-18 DIAGNOSIS — I35.1 NONRHEUMATIC AORTIC VALVE INSUFFICIENCY: ICD-10-CM

## 2023-07-19 NOTE — PROGRESS NOTES
Called patient and let her know that I reviewed her imaging with Dr. Cortez and she needs to be referred to CTS for review of moderate AI with CAD with single vessel moderate LAD stenosis. She was thankful for the call. We will keep cardiac MRI appointment in August if she doesn't proceed with surgery. If she does end up having open heart surgery, we can cancel this order.

## 2023-07-21 ENCOUNTER — PATIENT MESSAGE (OUTPATIENT)
Dept: CARDIOLOGY | Facility: MEDICAL CENTER | Age: 73
End: 2023-07-21

## 2023-07-25 ENCOUNTER — PATIENT MESSAGE (OUTPATIENT)
Dept: CARDIOLOGY | Facility: MEDICAL CENTER | Age: 73
End: 2023-07-25

## 2023-07-25 NOTE — H&P
REFERRING PHYSICIAN: Harsh Cortez MD    CONSULTING PHYSICIAN: Tammy Lawrence MD, FACS    CHIEF COMPLAINT: Dyspnea upon exertion    HISTORY OF PRESENT ILLNESS: The patient is a 72 y.o. female with history of hypertension, hyperlipidemia, s/p thyroidectomy for Hashimoto's thyroiditis, s/p bilateral total knee replacements, moderate to severe aortic regurgitation and coronary artery disease.  She complains of having chest pain, frequent dizziness 1 syncopal episode in January. Today she states she can walk 2 blocks before having to stop to rest. Patient denies lower extremity edema and PND.    PAST MEDICAL HISTORY:   Active Ambulatory Problems     Diagnosis Date Noted    FHx: colon cancer 02/23/2017    Schatzki's ring_DHA 02/23/2017    Benign essential HTN 02/23/2017    Pure hypercholesterolemia 02/23/2017    MOHAMUD (generalized anxiety disorder) 02/23/2017    HLA B27 (HLA B27 positive) 02/23/2017    Primary osteoarthritis of right knee, S/P TKR + 2 revision surgeries_Dr. Lomeli 02/23/2017    Lumbosacral pain, chronic, with right sciatica_[facility] 02/23/2017    Vitiligo 06/26/2017    Chronic headache 03/20/2018    Calculus of gallbladder with biliary obstruction but without cholecystitis 10/14/2018    Essential tremor 10/31/2019    Hashimoto's thyroiditis s/p total thyroidectom Dr. Franklin in 2/2020.  02/20/2020    Postoperative hypothyroidism_s/p thyroidectomy_Dr. Mullen 06/12/2020    Generalized osteoarthritis 06/12/2020    Drug induced constipation 06/12/2020    Osteopenia of multiple sites 03/31/2021    Primary insomnia 12/15/2022    Pain of right sacroiliac joint 12/15/2022    Nonrheumatic aortic valve insufficiency 03/17/2023    Heart block 04/17/2023    Primary osteoarthritis of left knee s/p TKR in 6/2022 04/17/2023    Coronary artery disease involving native coronary artery of native heart without angina pectoris 06/15/2023     Resolved Ambulatory Problems     Diagnosis Date Noted     Migraine without aura, not intractable 02/23/2017    Hypothyroidism due to Hashimoto's thyroiditis 02/23/2017    Multinodular goiter (nontoxic), s/p FNA in 12/2017, TR4 x1, repeat US in 6/2020 02/23/2017    Insomnia 02/23/2017    Risk for falls 02/23/2017    Vitamin D insufficiency 02/23/2017    Chest pain 06/26/2017    Risk for falls 07/24/2017    Bilateral pulmonary embolism, provoked 07/05/2018    Pulmonary infarct (HCC) 07/05/2018    H/O right knee surgery 07/20/2018    Acute respiratory failure with hypoxia (HCC) 07/20/2018    Chronic use of opiate drug for therapeutic purpose_contract signed 6/2020, UDS consistent in 9/2020 07/25/2018    Nocturnal leg cramps 05/14/2019    Malaise 08/12/2019    Aortic valve sclerosis- without stenosis 2018 echo 08/12/2019    Mild aortic insufficiency- 2018 echo 08/12/2019    Mass of right thigh 09/20/2021     Past Medical History:   Diagnosis Date    Anemia     Aortic insufficiency 01/31/2020    Arthritis 06/16/2023    Breath shortness 11/02/2021    Cataract 06/16/2023    Dental disorder 06/16/2023    Gastric ulcer     Goiter     High cholesterol 06/16/2023    Hypertension 06/16/2023    Hypothyroidism 06/16/2023    Pain 06/16/2023    Psychiatric problem 06/16/2023    Pulmonary embolism (HCC) 01/31/2020    Reactive arthritis (HCC)     Snoring 06/16/2023    Thyroid disease     Thyroid disease 01/31/2020     PAST SURGICAL HISTORY:   Past Surgical History:   Procedure Laterality Date    FUSION  06/16/2023    neck, 2013    OTHER CARDIAC SURGERY  06/16/2023    heart cath 2023    KNEE ARTHROPLASTY TOTAL Left 06/2022    MASS EXCISION GENERAL Right 11/05/2021    Procedure: EXCISION, MASS - LATERAL THIGH;  Surgeon: Cydney Franklin M.D.;  Location: SURGERY SAME DAY [facility];  Service: General    THYROIDECTOMY TOTAL Bilateral 02/05/2020    Procedure: THYROIDECTOMY, TOTAL- COMPLETE;  Surgeon: Cydney Franklni M.D.;  Location: SURGERY SAME DAY [facility];  Service: General    APPENDECTOMY       BLADDER SLING FEMALE      CATARACT EXTRACTION WITH IOL Left     CATARACT EXTRACTION WITH IOL Bilateral     CERVICAL FUSION POSTERIOR      FOOT SURGERY Left     metatarsal fusion, hammer toes correction    GYN SURGERY      Hysterectomy    KNEE ARTHROPLASTY TOTAL Right     x3    KNEE REPLACEMENT, TOTAL Right     KNEE REVISION TOTAL Right      ALLERGIES:   Allergies   Allergen Reactions    Morphine Vomiting    Nsaids Unspecified     History of gastric ulcers - cannot take NSAIDS    Pcn [Penicillins] Hives    Seasonal Runny Nose and Itching     .     CURRENT MEDICATIONS:   Current Outpatient Medications:     propranolol (INDERAL) 40 MG Tab, Take 1 Tablet by mouth 3 times a day., Disp: , Rfl:     rosuvastatin (CRESTOR) 20 MG Tab, , Disp: , Rfl:     furosemide (LASIX) 20 MG Tab, Take 1 Tablet by mouth every day., Disp: , Rfl:     potassium chloride (MICRO-K) 10 MEQ capsule, Take 1 Capsule by mouth every day., Disp: , Rfl:     zolpidem (AMBIEN) 5 MG Tab, Take 1 Tablet by mouth at bedtime as needed., Disp: , Rfl:     linaCLOtide (LINZESS) 72 MCG Cap, , Disp: , Rfl:     tizanidine (ZANAFLEX) 2 MG tablet, , Disp: , Rfl:     diazePAM (VALIUM) 5 MG Tab, Take 5 mg of Diazepam 15-30 minutes before MRI in 8/2023. Please arrange transportation home after MRI., Disp: 1 Tablet, Rfl: 0    zolpidem (AMBIEN) 5 MG Tab, Take 1 Tablet by mouth at bedtime as needed for Sleep for up to 60 days., Disp: 60 Tablet, Rfl: 0    pregabalin (LYRICA) 50 MG capsule, , Disp: , Rfl:     potassium chloride (MICRO-K) 10 MEQ capsule, Take 1 Capsule by mouth every day., Disp: 100 Capsule, Rfl: 3    vitamin D2, Ergocalciferol, (DRISDOL) 1.25 MG (29064 UT) Cap capsule, Take 1 Capsule by mouth every 7 days. On Sat, Disp: 12 Capsule, Rfl: 3    SYNTHROID 75 MCG Tab, Take 1 Tablet by mouth every morning on an empty stomach., Disp: 90 Tablet, Rfl: 3    busPIRone (BUSPAR) 10 MG Tab tablet, Take 10 mg by mouth every evening. Pt takes once a day, not BID, Disp:  , Rfl:     loratadine (CLARITIN) 10 MG Tab, Take 10 mg by mouth as needed. Indications: Hayfever, Disp: , Rfl:     furosemide (LASIX) 20 MG Tab, Take 1 Tablet by mouth every day., Disp: 90 Tablet, Rfl: 3    propranolol (INDERAL) 40 MG Tab, Take 1 Tablet by mouth 3 times a day., Disp: 270 Tablet, Rfl: 3    lisinopril (PRINIVIL) 20 MG Tab, Take 1 Tablet by mouth every day., Disp: 90 Tablet, Rfl: 3    DULoxetine HCl 40 MG Cap DR Particles, Take 40 mg by mouth 2 times a day., Disp: 180 Capsule, Rfl: 3    rosuvastatin (CRESTOR) 40 MG tablet, Take 1 Tablet by mouth every day. (Patient taking differently: Take 40 mg by mouth every evening.), Disp: 90 Tablet, Rfl: 3    COLLAGEN PO, Take 1 Scoop by mouth every day., Disp: , Rfl:     FAMILY HISTORY:   Family History   Problem Relation Age of Onset    Arthritis Mother     Alzheimer's Disease Mother     Cancer Mother         cervical cancer    Heart Attack Father     Other Brother         brain anurism    Hypertension Brother     Cancer Maternal Grandmother 66        colono cancer    Heart Disease Brother     Hypertension Brother     Arthritis Brother     Other Brother         Celiac Disease    No Known Problems Maternal Grandfather     No Known Problems Paternal Grandmother     No Known Problems Paternal Grandfather     Hypertension Brother     Arthritis Brother     Lung Disease Brother     Asthma Brother      SOCIAL HISTORY:   Social History     Socioeconomic History    Marital status:      Spouse name: Not on file    Number of children: Not on file    Years of education: Not on file    Highest education level: Bachelor's degree (e.g., BA, AB, BS)   Occupational History    Not on file   Tobacco Use    Smoking status: Never    Smokeless tobacco: Never   Vaping Use    Vaping Use: Never used   Substance and Sexual Activity    Alcohol use: Yes     Alcohol/week: 1.8 oz     Types: 3 Glasses of wine per week    Drug use: Not Currently    Sexual activity: Yes     Partners:  Male   Other Topics Concern    Not on file   Social History Narrative    Not on file     Social Determinants of Health     Financial Resource Strain: Low Risk  (6/7/2022)    Overall Financial Resource Strain (CARDIA)     Difficulty of Paying Living Expenses: Not hard at all   Food Insecurity: No Food Insecurity (6/7/2022)    Hunger Vital Sign     Worried About Running Out of Food in the Last Year: Never true     Ran Out of Food in the Last Year: Never true   Transportation Needs: No Transportation Needs (6/7/2022)    PRAPARE - Transportation     Lack of Transportation (Medical): No     Lack of Transportation (Non-Medical): No   Physical Activity: Insufficiently Active (6/7/2022)    Exercise Vital Sign     Days of Exercise per Week: 1 day     Minutes of Exercise per Session: 10 min   Stress: Stress Concern Present (6/7/2022)    [employer] [place] [employer] - Occupational Stress Questionnaire     Feeling of Stress : Very much   Social Connections: Socially Integrated (6/7/2022)    Social Connection and Isolation Panel [NHANES]     Frequency of Communication with Friends and Family: Three times a week     Frequency of Social Gatherings with Friends and Family: Twice a week     Attends Taoism Services: More than 4 times per year     Active Member of Clubs or Organizations: Yes     Attends Club or Organization Meetings: More than 4 times per year     Marital Status:    Intimate Partner Violence: Not on file   Housing Stability: Low Risk  (6/7/2022)    Housing Stability Vital Sign     Unable to Pay for Housing in the Last Year: No     Number of Places Lived in the Last Year: 1     Unstable Housing in the Last Year: No     REVIEW OF SYSTEMS:  Review of Systems   Constitutional:  Positive for malaise/fatigue.   HENT: Negative.     Eyes: Negative.    Respiratory:  Positive for shortness of breath.    Cardiovascular:  Positive for chest pain.   Gastrointestinal: Negative.    Genitourinary: Negative.   "  Musculoskeletal: Negative.    Skin: Negative.    Neurological:  Positive for dizziness.   Endo/Heme/Allergies: Negative.    Psychiatric/Behavioral:  The patient is nervous/anxious.      PHYSICAL EXAMINATION:    /68 (BP Location: Left arm, Patient Position: Sitting, BP Cuff Size: Adult)   Pulse 64   Temp 36.3 °C (97.3 °F) (Temporal)   Ht 1.702 m (5' 7\")   Wt 78.6 kg (173 lb 4.8 oz)   SpO2 90%   BMI 27.14 kg/m²      Physical Exam  Constitutional:       General: She is not in acute distress.  HENT:      Head: Normocephalic.      Nose: Nose normal.   Eyes:      Pupils: Pupils are equal, round, and reactive to light.   Cardiovascular:      Rate and Rhythm: Normal rate and regular rhythm.      Heart sounds: Murmur heard.      Systolic murmur is present with a grade of 3/6.      No gallop.   Pulmonary:      Effort: Pulmonary effort is normal. No respiratory distress.      Breath sounds: Normal breath sounds. No wheezing or rales.   Abdominal:      General: Bowel sounds are normal. There is no distension.      Palpations: Abdomen is soft.      Tenderness: There is no abdominal tenderness.   Musculoskeletal:         General: Normal range of motion.      Cervical back: Neck supple.   Skin:     General: Skin is warm and dry.   Neurological:      Mental Status: She is alert and oriented to person, place, and time.   Psychiatric:         Mood and Affect: Mood and affect normal.         Cognition and Memory: Memory normal.         Judgment: Judgment normal.     LABS REVIEWED:  Lab Results   Component Value Date/Time    SODIUM 142 06/05/2023 10:49 AM    POTASSIUM 3.9 06/05/2023 10:49 AM    CHLORIDE 104 06/05/2023 10:49 AM    CO2 27 06/05/2023 10:49 AM    GLUCOSE 103 (H) 06/05/2023 10:49 AM    BUN 18 06/05/2023 10:49 AM    CREATININE 0.69 06/05/2023 10:49 AM      Lab Results   Component Value Date/Time    PROTHROMBTM 12.6 06/05/2023 10:49 AM    INR 0.95 06/05/2023 10:49 AM      Lab Results   Component Value Date/Time "    WBC 6.9 06/05/2023 10:49 AM    RBC 4.48 06/05/2023 10:49 AM    HEMOGLOBIN 13.3 06/05/2023 10:49 AM    HEMATOCRIT 41.6 06/05/2023 10:49 AM    MCV 92.9 06/05/2023 10:49 AM    MCH 29.7 06/05/2023 10:49 AM    MCHC 32.0 (L) 06/05/2023 10:49 AM    MPV 10.6 06/05/2023 10:49 AM    NEUTSPOLYS 56.60 05/16/2023 07:46 AM    LYMPHOCYTES 27.30 05/16/2023 07:46 AM    MONOCYTES 7.20 05/16/2023 07:46 AM    EOSINOPHILS 7.20 (H) 05/16/2023 07:46 AM    BASOPHILS 1.40 05/16/2023 07:46 AM      IMAGING REVIEWED AND INTERPRETED:    ECHOCARDIOGRAM YESSI 6/26/23  There is moderate aortic insufficiency with central jet due to poor   leaflet coaptation.  There is mild mitral regurgitation.  The left ventricular ejection fraction is visually estimated to be 65%.    ECHOCARDIOGRAM 6/21/23  Mild concentric left ventricular hypertrophy.  Normal left ventricular systolic function.  The left ventricular ejection fraction is visually estimated to be 65%.  Normal right ventricular size and systolic function.  There is moderate aortic insufficiency with a central jet due to poor   leaflet coaptation    CARDIAC CATHETERIZATION 6/7/23  Single-vessel moderate left anterior descending artery stenosis 60% narrowing  3+ (moderate to severe) aortic regurgitation  Elevated left ventricular end-diastolic pressure  Transesophageal echocardiogram to assess aortic regurgitation, if severe recommend CT surgery consultation for aortic valve replacement +LIMA to LAD      IMPRESSION:  Severe aortic regurgitation, hypertension, hyperlipidemia, s/p thyroidectomy for Hashimoto's thyroiditis, s/p bilateral total knee replacements    PLAN:  I recommend that she undergo aortic valve replacement, coronary artery bypass grafting x1, possible endoscopic vein harvest and intraoperative transesophageal echocardiography.    The procedure, its risks, benefits, potential complications and alternative treatments were discussed with the patient in detail including the risks should  she decide not to undergo my recommended treatment. All of her questions were answered to her satisfaction and she is willing to proceed with the operation. The risks include death, stroke, infection: to include a rare bacterial infection related to the use of the heart/lung machine, mary lou-operative myocardial infarction, dysrhythmias, diaphragmatic paralysis, chest wall paresthesia, tracheostomy, kidney or other organ failure, possible return to the operating room for bleeding, bleeding requiring transfusion with its attendant risks including AIDS or hepatitis, dehiscence of surgical incisions, respiratory complications including the need for prolonged ventilator support, Protamine or other drug reaction, peripheral neuropathy, loss of limb, and miscount of surgical items. The operative mortality risk is approximately 2%. The STS mortality risk score is 1.3% and the morbidity and mortality risk score is 7.2% for aortic valve replacement and CABG. The scores were discussed with patient.    The operation is scheduled for Wednesday, August 16, 2023 at 8:00 AM at [facility].    Findings and recommendations have been discussed with the patient’s cardiologist, Harsh Cortez MD.  Thank you for this very challenging consultation and participation in the patient’s care.  I will keep you apprised of all future developments.    Sincerely,    Tammy Lawrence MD, FACS

## 2023-07-26 ENCOUNTER — OFFICE VISIT (OUTPATIENT)
Dept: CARDIOTHORACIC SURGERY | Facility: MEDICAL CENTER | Age: 73
End: 2023-07-26
Payer: COMMERCIAL

## 2023-07-26 VITALS
WEIGHT: 173.3 LBS | HEIGHT: 67 IN | HEART RATE: 64 BPM | SYSTOLIC BLOOD PRESSURE: 122 MMHG | OXYGEN SATURATION: 90 % | TEMPERATURE: 97.3 F | DIASTOLIC BLOOD PRESSURE: 68 MMHG | BODY MASS INDEX: 27.2 KG/M2

## 2023-07-26 DIAGNOSIS — I25.10 CORONARY ARTERY DISEASE INVOLVING NATIVE CORONARY ARTERY OF NATIVE HEART WITHOUT ANGINA PECTORIS: ICD-10-CM

## 2023-07-26 DIAGNOSIS — I35.1 NONRHEUMATIC AORTIC VALVE INSUFFICIENCY: Primary | ICD-10-CM

## 2023-07-26 PROCEDURE — 99205 OFFICE O/P NEW HI 60 MIN: CPT | Performed by: THORACIC SURGERY (CARDIOTHORACIC VASCULAR SURGERY)

## 2023-07-26 PROCEDURE — 3078F DIAST BP <80 MM HG: CPT | Performed by: THORACIC SURGERY (CARDIOTHORACIC VASCULAR SURGERY)

## 2023-07-26 PROCEDURE — 3074F SYST BP LT 130 MM HG: CPT | Performed by: THORACIC SURGERY (CARDIOTHORACIC VASCULAR SURGERY)

## 2023-07-26 RX ORDER — ZOLPIDEM TARTRATE 5 MG/1
1 TABLET ORAL
COMMUNITY
End: 2023-08-09

## 2023-07-26 RX ORDER — ROSUVASTATIN CALCIUM 20 MG/1
TABLET, COATED ORAL
Status: ON HOLD | COMMUNITY
End: 2023-08-16

## 2023-07-26 RX ORDER — PROPRANOLOL HYDROCHLORIDE 40 MG/1
40 TABLET ORAL 3 TIMES DAILY
Status: ON HOLD | COMMUNITY
End: 2023-08-16

## 2023-07-26 RX ORDER — POTASSIUM CHLORIDE 750 MG/1
1 CAPSULE, EXTENDED RELEASE ORAL
Status: ON HOLD | COMMUNITY
End: 2023-08-16

## 2023-07-26 RX ORDER — FUROSEMIDE 20 MG/1
1 TABLET ORAL DAILY
Status: ON HOLD | COMMUNITY
End: 2023-08-16

## 2023-07-26 RX ORDER — TIZANIDINE 2 MG/1
TABLET ORAL
Status: ON HOLD | COMMUNITY
End: 2023-08-16

## 2023-07-26 RX ORDER — LINACLOTIDE 72 UG/1
CAPSULE, GELATIN COATED ORAL
COMMUNITY
End: 2023-08-09

## 2023-07-26 ASSESSMENT — ENCOUNTER SYMPTOMS
GASTROINTESTINAL NEGATIVE: 1
EYES NEGATIVE: 1
SHORTNESS OF BREATH: 1
MUSCULOSKELETAL NEGATIVE: 1
DIZZINESS: 1
NERVOUS/ANXIOUS: 1

## 2023-07-26 ASSESSMENT — FIBROSIS 4 INDEX: FIB4 SCORE: 1.72

## 2023-07-26 NOTE — PROGRESS NOTES
Problem: Prehabilitation    Goal: optimize identified modifiable risk factors prior to cardiac surgery.     Intervention: Screening and interventions for the following  risk factors with educational materials provided if indicated  and patient demonstrates readiness to participate.  Dentition, malnutrition, CAD and dietary cholesterol,  obesity, alcohol and tobacco abuse, illegal drug use, and   social support system for post discharge planning.    Additionally, home exercise regimen initiation or continuation  (if appropriate), and teaching of and participation of  inspiratory muscle training via incentive spirometer (provided).    Review of post-surgical physical limitations, upcoming  appointments & testing, ordered diagnostics, and medication review  to identify and educate on anticoagulant and antihypertensives  that need cessation in the days prior to surgery.    The cardiac surgery prehabilitation sheet, surgery instruction sheet,  MAP, education booklet, vitals logbook, normals after heart surgery,  and cardiac rehab flier was provided for patient to read and review.    Surgical CHG wipes were also provided with instructions on use.    DENTITION:  Routine dental appointment prior to surgery is   indicated for valve procedure.    she was not encouraged to get a dental   cleaning as she  does  get regular  dental cleaning and denies current dental   infection or issues that should be  addressed prior to surgery.    INCENTIVE SPIROMETRY:  Discussed importance of incentive spirometry (IS) use,  20 times a day AT MINIMUM but more often if possible to  optimize cardio-pulmonary function prior to surgery.  They were instructed to allow a 5 minute rest in  between uses to achieve max IS volume.  Education with return demonstration performed.   Patient is effective, coaching was needed.    Prehabilitation IS baseline is 2750.    HOME EXERCISE REGIMEN:  We discussed importance of preventing deconditioning and  muscle  wasting in the time preceding surgery as this will occur  post surgery.    > 50 % left main disease present? NO  EF-- 65%  Active sub lingual nitro use? NO  she is appropriate for initiation  of a home exercise regimen.    she is minimally physically active; current  exercise tolerance/level is moderate due to occasional  symptoms of dizziness with gardening activity.    she  was educated to start an   exercise regimen of walking on   a flat surface 30 minutes a day, adjusting the  length for tolerance level.    And continue her current exercise regimen of   Gardening most days.     she was educated  that if chest pain or SOB occurs patient is to stop  immediately and if symptoms do not  resolve after stopping to call 911.    she was also encouraged to practice sit to stand  from a chair with one arm to assist or no arm assistance  12 times a day to strengthen quadriceps and improve balance.    CAD:  Patient does have known CAD; education on  cholesterol, diet, and the heart are indicated  and were provided.    OBESITY:  Patient's BMI is not over 30.  Education regarding portion sizing  and diet are not indicated but were provided.    MALNUTRITION:  Malnutrition screening tool (MST) shows she is not at risk  with a score of 0. (0-1 not at risk; greater or equal to 2 is at risk).    Given MST score, functional capacity,   and no reported muscle loss, it was not  recommended they increase their protein  intake to 1.2 to 2.5 gram/kg/day    However given age it was recommended to add Ensure Max protein daily for 7-14 days prior to surgery.    SMOKING:  Patient denies current smoking history.  Smoking risks  and cessation education not indicated and was not provided.    ALCOHOL ABUSE:  Patient denies alcohol abuse.  Alcohol risks and cessation  education not indicated and was not provided.    ILLEGAL DRUG USE:  Patient denies illicit drug use.  Illicit drug use risks  and cessation education not indicated and was not  provided    SOCIAL SUPPORT SYSTEM FOR DISCHARGE NEEDS:    Patient does have family,  her  Zak to stay for  1-week post discharge to assist with ADL's. No barriers  to hospital discharge anticipated.    PSYCHOLOGICAL PREPARATION:  We discussed the basics of physical limitation post op to include:               No driving for 4 weeks               No lifting, pushing or pulling > 10 lbs for 6 weeks  Sternal precautions to include moving within the tube and  safe mobility in and out of bed and the chair.    ANTIBIOTIC STEWARDSHIP:  MRSA swab will be collected by [facility] during pre-admit testing.    MEDICATION AN UPCOMING APPOINTMENTS REVIEW:  Current medications were reviewed that may need  specific stop dates prior to surgery. The patient was instructed   to stop the following medications after the indicated date.    Lisinopril to stop 2 full days prior to surgery; last dose 8/13    Vascular scheduling sheet was provided and explained.    Walk test Times: 5 5 5    A phone appointment for pre op education was made  for August 11 th at 1000 to review all provided education materials.

## 2023-07-28 ENCOUNTER — HOSPITAL ENCOUNTER (OUTPATIENT)
Dept: RADIOLOGY | Facility: MEDICAL CENTER | Age: 73
End: 2023-07-28
Payer: COMMERCIAL

## 2023-07-28 DIAGNOSIS — I25.10 CORONARY ARTERY DISEASE INVOLVING NATIVE CORONARY ARTERY OF NATIVE HEART WITHOUT ANGINA PECTORIS: ICD-10-CM

## 2023-07-28 DIAGNOSIS — I35.1 NONRHEUMATIC AORTIC VALVE INSUFFICIENCY: ICD-10-CM

## 2023-07-28 PROCEDURE — 93970 EXTREMITY STUDY: CPT | Mod: 26 | Performed by: INTERNAL MEDICINE

## 2023-07-28 PROCEDURE — 93880 EXTRACRANIAL BILAT STUDY: CPT | Mod: 26 | Performed by: INTERNAL MEDICINE

## 2023-07-28 PROCEDURE — 93970 EXTREMITY STUDY: CPT

## 2023-07-28 PROCEDURE — 93880 EXTRACRANIAL BILAT STUDY: CPT

## 2023-08-02 ENCOUNTER — APPOINTMENT (OUTPATIENT)
Dept: ADMISSIONS | Facility: MEDICAL CENTER | Age: 73
DRG: 219 | End: 2023-08-02
Attending: THORACIC SURGERY (CARDIOTHORACIC VASCULAR SURGERY)
Payer: COMMERCIAL

## 2023-08-09 ENCOUNTER — PRE-ADMISSION TESTING (OUTPATIENT)
Dept: ADMISSIONS | Facility: MEDICAL CENTER | Age: 73
DRG: 219 | End: 2023-08-09
Attending: THORACIC SURGERY (CARDIOTHORACIC VASCULAR SURGERY)
Payer: COMMERCIAL

## 2023-08-09 RX ORDER — HYDROCODONE BITARTRATE AND ACETAMINOPHEN 7.5; 325 MG/1; MG/1
1 TABLET ORAL EVERY 6 HOURS PRN
Status: ON HOLD | COMMUNITY
End: 2023-08-23

## 2023-08-11 ENCOUNTER — TELEPHONE (OUTPATIENT)
Dept: CARDIOTHORACIC SURGERY | Facility: MEDICAL CENTER | Age: 73
End: 2023-08-11

## 2023-08-11 NOTE — TELEPHONE ENCOUNTER
Patient was reminded that AVR CABG X 1 possible EVH surgery with Dr. Lawrence is on 8/16 at 0800 in the morning. she   are aware check in is at 0515 am.    Baseline IS was 2500. she has been compliant   with the IS. Volume has not improved.    she was prescribed a walking regimen or  told to continue she current regimen and   she has been compliant.   she has been practicing sit to stand.    The CHG wipes instructions were reviewed and understood.    PAT date and time was reviewed with patient and  verified it is within 72 hours of surgery.    Vascular studies and procedures: carotids and vein mapping  were scheduled, completed,  and reviewed by myself for concerning  results did not need escalation to the YUDELKA/MD.    she did not need a dental check/work.    she was reminded that lisinopril needs to stop after 8/13.    she was told that synthroid, pregabalin, stool softener, and duloxetine medication (s) are okay to take the morning of surgery prior to check in.     she was reminded no food after midnight.    Call time 13 minutes

## 2023-08-15 ENCOUNTER — PRE-ADMISSION TESTING (OUTPATIENT)
Dept: ADMISSIONS | Facility: MEDICAL CENTER | Age: 73
DRG: 219 | End: 2023-08-15
Attending: THORACIC SURGERY (CARDIOTHORACIC VASCULAR SURGERY)
Payer: COMMERCIAL

## 2023-08-15 ENCOUNTER — HOSPITAL ENCOUNTER (OUTPATIENT)
Dept: RADIOLOGY | Facility: MEDICAL CENTER | Age: 73
DRG: 219 | End: 2023-08-15
Attending: THORACIC SURGERY (CARDIOTHORACIC VASCULAR SURGERY) | Admitting: THORACIC SURGERY (CARDIOTHORACIC VASCULAR SURGERY)
Payer: COMMERCIAL

## 2023-08-15 DIAGNOSIS — Z01.811 PRE-OPERATIVE RESPIRATORY EXAMINATION: ICD-10-CM

## 2023-08-15 DIAGNOSIS — Z01.812 PRE-OPERATIVE LABORATORY EXAMINATION: ICD-10-CM

## 2023-08-15 DIAGNOSIS — Z01.810 PRE-OPERATIVE CARDIOVASCULAR EXAMINATION: ICD-10-CM

## 2023-08-15 LAB
ABO GROUP BLD: NORMAL
ALBUMIN SERPL BCP-MCNC: 4.3 G/DL (ref 3.2–4.9)
ALBUMIN/GLOB SERPL: 1.7 G/DL
ALP SERPL-CCNC: 79 U/L (ref 30–99)
ALT SERPL-CCNC: 20 U/L (ref 2–50)
AMPHET UR QL SCN: NEGATIVE
ANION GAP SERPL CALC-SCNC: 10 MMOL/L (ref 7–16)
APPEARANCE UR: CLEAR
APTT PPP: 26.1 SEC (ref 24.7–36)
AST SERPL-CCNC: 22 U/L (ref 12–45)
BARBITURATES UR QL SCN: NEGATIVE
BASOPHILS # BLD AUTO: 1.3 % (ref 0–1.8)
BASOPHILS # BLD: 0.1 K/UL (ref 0–0.12)
BENZODIAZ UR QL SCN: NEGATIVE
BILIRUB SERPL-MCNC: 0.6 MG/DL (ref 0.1–1.5)
BILIRUB UR QL STRIP.AUTO: NEGATIVE
BLD GP AB SCN SERPL QL: NORMAL
BUN SERPL-MCNC: 21 MG/DL (ref 8–22)
BZE UR QL SCN: NEGATIVE
CALCIUM ALBUM COR SERPL-MCNC: 9.2 MG/DL (ref 8.5–10.5)
CALCIUM SERPL-MCNC: 9.4 MG/DL (ref 8.5–10.5)
CANNABINOIDS UR QL SCN: NEGATIVE
CHLORIDE SERPL-SCNC: 104 MMOL/L (ref 96–112)
CO2 SERPL-SCNC: 28 MMOL/L (ref 20–33)
COLOR UR: YELLOW
CREAT SERPL-MCNC: 0.6 MG/DL (ref 0.5–1.4)
EKG IMPRESSION: NORMAL
EOSINOPHIL # BLD AUTO: 0.47 K/UL (ref 0–0.51)
EOSINOPHIL NFR BLD: 6.1 % (ref 0–6.9)
ERYTHROCYTE [DISTWIDTH] IN BLOOD BY AUTOMATED COUNT: 46.9 FL (ref 35.9–50)
EST. AVERAGE GLUCOSE BLD GHB EST-MCNC: 120 MG/DL
FENTANYL UR QL: NEGATIVE
GFR SERPLBLD CREATININE-BSD FMLA CKD-EPI: 94 ML/MIN/1.73 M 2
GLOBULIN SER CALC-MCNC: 2.6 G/DL (ref 1.9–3.5)
GLUCOSE SERPL-MCNC: 90 MG/DL (ref 65–99)
GLUCOSE UR STRIP.AUTO-MCNC: NEGATIVE MG/DL
HBA1C MFR BLD: 5.8 % (ref 4–5.6)
HCT VFR BLD AUTO: 42.7 % (ref 37–47)
HGB BLD-MCNC: 13.7 G/DL (ref 12–16)
IMM GRANULOCYTES # BLD AUTO: 0.03 K/UL (ref 0–0.11)
IMM GRANULOCYTES NFR BLD AUTO: 0.4 % (ref 0–0.9)
INR PPP: 0.96 (ref 0.87–1.13)
KETONES UR STRIP.AUTO-MCNC: NEGATIVE MG/DL
LEUKOCYTE ESTERASE UR QL STRIP.AUTO: NEGATIVE
LYMPHOCYTES # BLD AUTO: 1.99 K/UL (ref 1–4.8)
LYMPHOCYTES NFR BLD: 25.9 % (ref 22–41)
MCH RBC QN AUTO: 29.3 PG (ref 27–33)
MCHC RBC AUTO-ENTMCNC: 32.1 G/DL (ref 32.2–35.5)
MCV RBC AUTO: 91.2 FL (ref 81.4–97.8)
METHADONE UR QL SCN: NEGATIVE
MICRO URNS: NORMAL
MONOCYTES # BLD AUTO: 0.73 K/UL (ref 0–0.85)
MONOCYTES NFR BLD AUTO: 9.5 % (ref 0–13.4)
NEUTROPHILS # BLD AUTO: 4.35 K/UL (ref 1.82–7.42)
NEUTROPHILS NFR BLD: 56.8 % (ref 44–72)
NITRITE UR QL STRIP.AUTO: NEGATIVE
NRBC # BLD AUTO: 0 K/UL
NRBC BLD-RTO: 0 /100 WBC (ref 0–0.2)
OPIATES UR QL SCN: NEGATIVE
OXYCODONE UR QL SCN: NEGATIVE
PCP UR QL SCN: NEGATIVE
PH UR STRIP.AUTO: 6 [PH] (ref 5–8)
PLATELET # BLD AUTO: 295 K/UL (ref 164–446)
PMV BLD AUTO: 10.1 FL (ref 9–12.9)
POTASSIUM SERPL-SCNC: 4.1 MMOL/L (ref 3.6–5.5)
PROPOXYPH UR QL SCN: NEGATIVE
PROT SERPL-MCNC: 6.9 G/DL (ref 6–8.2)
PROT UR QL STRIP: NEGATIVE MG/DL
PROTHROMBIN TIME: 12.7 SEC (ref 12–14.6)
RBC # BLD AUTO: 4.68 M/UL (ref 4.2–5.4)
RBC UR QL AUTO: NEGATIVE
RH BLD: NORMAL
SCCMEC + MECA PNL NOSE NAA+PROBE: NEGATIVE
SCCMEC + MECA PNL NOSE NAA+PROBE: NEGATIVE
SODIUM SERPL-SCNC: 142 MMOL/L (ref 135–145)
SP GR UR STRIP.AUTO: 1.01
UROBILINOGEN UR STRIP.AUTO-MCNC: 0.2 MG/DL
WBC # BLD AUTO: 7.7 K/UL (ref 4.8–10.8)

## 2023-08-15 PROCEDURE — 93010 ELECTROCARDIOGRAM REPORT: CPT | Performed by: INTERNAL MEDICINE

## 2023-08-15 PROCEDURE — 86850 RBC ANTIBODY SCREEN: CPT

## 2023-08-15 PROCEDURE — 93005 ELECTROCARDIOGRAM TRACING: CPT

## 2023-08-15 PROCEDURE — 85610 PROTHROMBIN TIME: CPT

## 2023-08-15 PROCEDURE — 87641 MR-STAPH DNA AMP PROBE: CPT

## 2023-08-15 PROCEDURE — 87640 STAPH A DNA AMP PROBE: CPT

## 2023-08-15 PROCEDURE — 85025 COMPLETE CBC W/AUTO DIFF WBC: CPT

## 2023-08-15 PROCEDURE — 86901 BLOOD TYPING SEROLOGIC RH(D): CPT

## 2023-08-15 PROCEDURE — 36415 COLL VENOUS BLD VENIPUNCTURE: CPT

## 2023-08-15 PROCEDURE — 86900 BLOOD TYPING SEROLOGIC ABO: CPT

## 2023-08-15 PROCEDURE — 71046 X-RAY EXAM CHEST 2 VIEWS: CPT

## 2023-08-15 PROCEDURE — 81003 URINALYSIS AUTO W/O SCOPE: CPT

## 2023-08-15 PROCEDURE — 80053 COMPREHEN METABOLIC PANEL: CPT

## 2023-08-15 PROCEDURE — 80307 DRUG TEST PRSMV CHEM ANLYZR: CPT

## 2023-08-15 PROCEDURE — 85730 THROMBOPLASTIN TIME PARTIAL: CPT

## 2023-08-15 PROCEDURE — 83036 HEMOGLOBIN GLYCOSYLATED A1C: CPT

## 2023-08-16 ENCOUNTER — ANESTHESIA EVENT (OUTPATIENT)
Dept: SURGERY | Facility: MEDICAL CENTER | Age: 73
DRG: 219 | End: 2023-08-16
Payer: COMMERCIAL

## 2023-08-16 ENCOUNTER — APPOINTMENT (OUTPATIENT)
Dept: CARDIOLOGY | Facility: MEDICAL CENTER | Age: 73
DRG: 219 | End: 2023-08-16
Attending: THORACIC SURGERY (CARDIOTHORACIC VASCULAR SURGERY)
Payer: COMMERCIAL

## 2023-08-16 ENCOUNTER — APPOINTMENT (OUTPATIENT)
Dept: RADIOLOGY | Facility: MEDICAL CENTER | Age: 73
DRG: 219 | End: 2023-08-16
Attending: THORACIC SURGERY (CARDIOTHORACIC VASCULAR SURGERY)
Payer: COMMERCIAL

## 2023-08-16 ENCOUNTER — HOSPITAL ENCOUNTER (INPATIENT)
Facility: MEDICAL CENTER | Age: 73
LOS: 7 days | DRG: 219 | End: 2023-08-23
Attending: THORACIC SURGERY (CARDIOTHORACIC VASCULAR SURGERY) | Admitting: THORACIC SURGERY (CARDIOTHORACIC VASCULAR SURGERY)
Payer: COMMERCIAL

## 2023-08-16 ENCOUNTER — ANESTHESIA (OUTPATIENT)
Dept: SURGERY | Facility: MEDICAL CENTER | Age: 73
DRG: 219 | End: 2023-08-16
Payer: COMMERCIAL

## 2023-08-16 DIAGNOSIS — Z95.1 S/P CABG X 1: ICD-10-CM

## 2023-08-16 DIAGNOSIS — Z95.2 S/P AORTIC VALVE REPLACEMENT: Primary | ICD-10-CM

## 2023-08-16 DIAGNOSIS — Z95.2 S/P AVR: ICD-10-CM

## 2023-08-16 DIAGNOSIS — G89.18 ACUTE POST-OPERATIVE PAIN: ICD-10-CM

## 2023-08-16 PROBLEM — Z99.11 ENCOUNTER FOR WEANING FROM VENTILATOR (HCC): Status: ACTIVE | Noted: 2023-08-16

## 2023-08-16 LAB
ABO + RH BLD: NORMAL
ACT BLD: 131 SEC (ref 74–137)
ACT BLD: 137 SEC (ref 74–137)
ACT BLD: 732 SEC (ref 74–137)
ACT BLD: 805 SEC (ref 74–137)
ACT BLD: >1000 SEC (ref 74–137)
ACT BLD: >1000 SEC (ref 74–137)
APTT PPP: 32.4 SEC (ref 24.7–36)
ARTERIAL PATENCY WRIST A: ABNORMAL
BASE EXCESS BLDA CALC-SCNC: 0 MMOL/L (ref -4–3)
BASE EXCESS BLDA CALC-SCNC: 1 MMOL/L (ref -4–3)
BASE EXCESS BLDA CALC-SCNC: 1 MMOL/L (ref -4–3)
BASE EXCESS BLDA CALC-SCNC: 2 MMOL/L (ref -4–3)
BASE EXCESS BLDA CALC-SCNC: 2 MMOL/L (ref -4–3)
BASE EXCESS BLDA CALC-SCNC: 4 MMOL/L (ref -4–3)
BASE EXCESS BLDA CALC-SCNC: 5 MMOL/L (ref -4–3)
BASE EXCESS BLDA CALC-SCNC: 5 MMOL/L (ref -4–3)
BASE EXCESS BLDA CALC-SCNC: 6 MMOL/L (ref -4–3)
BASE EXCESS BLDA CALC-SCNC: 7 MMOL/L (ref -4–3)
BASE EXCESS BLDV CALC-SCNC: 2 MMOL/L (ref -4–3)
BASE EXCESS BLDV CALC-SCNC: 5 MMOL/L (ref -4–3)
BODY TEMPERATURE: ABNORMAL DEGREES
CA-I BLD ISE-SCNC: 0.97 MMOL/L (ref 1.1–1.3)
CA-I BLD ISE-SCNC: 0.98 MMOL/L (ref 1.1–1.3)
CA-I BLD ISE-SCNC: 1.01 MMOL/L (ref 1.1–1.3)
CA-I BLD ISE-SCNC: 1.04 MMOL/L (ref 1.1–1.3)
CA-I BLD ISE-SCNC: 1.05 MMOL/L (ref 1.1–1.3)
CA-I BLD ISE-SCNC: 1.15 MMOL/L (ref 1.1–1.3)
CA-I BLD ISE-SCNC: 1.22 MMOL/L (ref 1.1–1.3)
CA-I BLD ISE-SCNC: 1.25 MMOL/L (ref 1.1–1.3)
CA-I BLD ISE-SCNC: 1.25 MMOL/L (ref 1.1–1.3)
CO2 BLDA-SCNC: 23 MMOL/L (ref 20–33)
CO2 BLDA-SCNC: 27 MMOL/L (ref 20–33)
CO2 BLDA-SCNC: 27 MMOL/L (ref 20–33)
CO2 BLDA-SCNC: 28 MMOL/L (ref 20–33)
CO2 BLDA-SCNC: 28 MMOL/L (ref 20–33)
CO2 BLDA-SCNC: 29 MMOL/L (ref 20–33)
CO2 BLDA-SCNC: 30 MMOL/L (ref 20–33)
CO2 BLDA-SCNC: 32 MMOL/L (ref 20–33)
CO2 BLDA-SCNC: 33 MMOL/L (ref 20–33)
CO2 BLDA-SCNC: 34 MMOL/L (ref 20–33)
CO2 BLDV-SCNC: 27 MMOL/L (ref 20–33)
CO2 BLDV-SCNC: 33 MMOL/L (ref 20–33)
DELSYS IDSYS: ABNORMAL
END TIDAL CARBON DIOXIDE IECO2: 21 MMHG
END TIDAL CARBON DIOXIDE IECO2: 23 MMHG
END TIDAL CARBON DIOXIDE IECO2: 38 MMHG
END TIDAL CARBON DIOXIDE IECO2: 48 MMHG
HCO3 BLDA-SCNC: 22.1 MMOL/L (ref 17–25)
HCO3 BLDA-SCNC: 25.5 MMOL/L (ref 17–25)
HCO3 BLDA-SCNC: 25.7 MMOL/L (ref 17–25)
HCO3 BLDA-SCNC: 26.2 MMOL/L (ref 17–25)
HCO3 BLDA-SCNC: 26.6 MMOL/L (ref 17–25)
HCO3 BLDA-SCNC: 27.9 MMOL/L (ref 17–25)
HCO3 BLDA-SCNC: 28.5 MMOL/L (ref 17–25)
HCO3 BLDA-SCNC: 30.9 MMOL/L (ref 17–25)
HCO3 BLDA-SCNC: 31.5 MMOL/L (ref 17–25)
HCO3 BLDA-SCNC: 31.6 MMOL/L (ref 17–25)
HCO3 BLDV-SCNC: 26.2 MMOL/L (ref 24–28)
HCO3 BLDV-SCNC: 31.4 MMOL/L (ref 24–28)
HCT VFR BLD AUTO: 32.5 % (ref 37–47)
HCT VFR BLD CALC: 24 % (ref 37–47)
HCT VFR BLD CALC: 25 % (ref 37–47)
HCT VFR BLD CALC: 26 % (ref 37–47)
HCT VFR BLD CALC: 26 % (ref 37–47)
HCT VFR BLD CALC: 34 % (ref 37–47)
HCT VFR BLD CALC: 36 % (ref 37–47)
HGB BLD-MCNC: 11.1 G/DL (ref 12–16)
HGB BLD-MCNC: 11.6 G/DL (ref 12–16)
HGB BLD-MCNC: 12.2 G/DL (ref 12–16)
HGB BLD-MCNC: 8.2 G/DL (ref 12–16)
HGB BLD-MCNC: 8.5 G/DL (ref 12–16)
HGB BLD-MCNC: 8.8 G/DL (ref 12–16)
HGB BLD-MCNC: 8.8 G/DL (ref 12–16)
HOROWITZ INDEX BLDA+IHG-RTO: 118 MM[HG]
HOROWITZ INDEX BLDA+IHG-RTO: 245 MM[HG]
HOROWITZ INDEX BLDA+IHG-RTO: 303 MM[HG]
HOROWITZ INDEX BLDA+IHG-RTO: 323 MM[HG]
INR PPP: 1.28 (ref 0.87–1.13)
MAGNESIUM SERPL-MCNC: 3.5 MG/DL (ref 1.5–2.5)
MODE IMODE: ABNORMAL
O2/TOTAL GAS SETTING VFR VENT: 100 %
O2/TOTAL GAS SETTING VFR VENT: 100 %
O2/TOTAL GAS SETTING VFR VENT: 40 %
O2/TOTAL GAS SETTING VFR VENT: 40 %
PATHOLOGY CONSULT NOTE: NORMAL
PCO2 BLDA: 22.1 MMHG (ref 26–37)
PCO2 BLDA: 32.1 MMHG (ref 26–37)
PCO2 BLDA: 37.4 MMHG (ref 26–37)
PCO2 BLDA: 38.4 MMHG (ref 26–37)
PCO2 BLDA: 38.6 MMHG (ref 26–37)
PCO2 BLDA: 39.1 MMHG (ref 26–37)
PCO2 BLDA: 45.6 MMHG (ref 26–37)
PCO2 BLDA: 49.6 MMHG (ref 26–37)
PCO2 BLDA: 50.1 MMHG (ref 26–37)
PCO2 BLDA: 71.9 MMHG (ref 26–37)
PCO2 BLDV: 36 MMHG (ref 41–51)
PCO2 BLDV: 54.2 MMHG (ref 41–51)
PCO2 TEMP ADJ BLDA: 21 MMHG (ref 26–37)
PCO2 TEMP ADJ BLDA: 31 MMHG (ref 26–37)
PCO2 TEMP ADJ BLDA: 34.7 MMHG (ref 26–37)
PCO2 TEMP ADJ BLDA: 35.8 MMHG (ref 26–37)
PCO2 TEMP ADJ BLDA: 38.1 MMHG (ref 26–37)
PCO2 TEMP ADJ BLDA: 44.6 MMHG (ref 26–37)
PCO2 TEMP ADJ BLDA: 47.6 MMHG (ref 26–37)
PCO2 TEMP ADJ BLDA: 49.4 MMHG (ref 26–37)
PCO2 TEMP ADJ BLDA: 63.1 MMHG (ref 26–37)
PCO2 TEMP ADJ BLDV: 32.8 MMHG (ref 41–51)
PCO2 TEMP ADJ BLDV: 48.6 MMHG (ref 41–51)
PEEP END EXPIRATORY PRESSURE IPEEP: 8 CMH20
PERCENT MINUTE VOLUME IPMV: 100
PERCENT MINUTE VOLUME IPMV: 160
PERCENT MINUTE VOLUME IPMV: 160
PH BLDA: 7.25 [PH] (ref 7.4–7.5)
PH BLDA: 7.34 [PH] (ref 7.4–7.5)
PH BLDA: 7.37 [PH] (ref 7.4–7.5)
PH BLDA: 7.41 [PH] (ref 7.4–7.5)
PH BLDA: 7.42 [PH] (ref 7.4–7.5)
PH BLDA: 7.45 [PH] (ref 7.4–7.5)
PH BLDA: 7.48 [PH] (ref 7.4–7.5)
PH BLDA: 7.51 [PH] (ref 7.4–7.5)
PH BLDA: 7.55 [PH] (ref 7.4–7.5)
PH BLDA: 7.61 [PH] (ref 7.4–7.5)
PH BLDV: 7.37 [PH] (ref 7.31–7.45)
PH BLDV: 7.47 [PH] (ref 7.31–7.45)
PH TEMP ADJ BLDA: 7.29 [PH] (ref 7.4–7.5)
PH TEMP ADJ BLDA: 7.34 [PH] (ref 7.4–7.5)
PH TEMP ADJ BLDA: 7.38 [PH] (ref 7.4–7.5)
PH TEMP ADJ BLDA: 7.42 [PH] (ref 7.4–7.5)
PH TEMP ADJ BLDA: 7.46 [PH] (ref 7.4–7.5)
PH TEMP ADJ BLDA: 7.51 [PH] (ref 7.4–7.5)
PH TEMP ADJ BLDA: 7.52 [PH] (ref 7.4–7.5)
PH TEMP ADJ BLDA: 7.56 [PH] (ref 7.4–7.5)
PH TEMP ADJ BLDA: 7.63 [PH] (ref 7.4–7.5)
PH TEMP ADJ BLDV: 7.41 [PH] (ref 7.31–7.45)
PH TEMP ADJ BLDV: 7.5 [PH] (ref 7.31–7.45)
PLATELET # BLD AUTO: 186 K/UL (ref 164–446)
PO2 BLDA: 118 MMHG (ref 64–87)
PO2 BLDA: 121 MMHG (ref 64–87)
PO2 BLDA: 129 MMHG (ref 64–87)
PO2 BLDA: 245 MMHG (ref 64–87)
PO2 BLDA: 257 MMHG (ref 64–87)
PO2 BLDA: 321 MMHG (ref 64–87)
PO2 BLDA: 382 MMHG (ref 64–87)
PO2 BLDA: 399 MMHG (ref 64–87)
PO2 BLDA: 427 MMHG (ref 64–87)
PO2 BLDA: 454 MMHG (ref 64–87)
PO2 BLDV: 48 MMHG (ref 25–40)
PO2 BLDV: 49 MMHG (ref 25–40)
PO2 TEMP ADJ BLDA: 111 MMHG (ref 64–87)
PO2 TEMP ADJ BLDA: 120 MMHG (ref 64–87)
PO2 TEMP ADJ BLDA: 126 MMHG (ref 64–87)
PO2 TEMP ADJ BLDA: 241 MMHG (ref 64–87)
PO2 TEMP ADJ BLDA: 256 MMHG (ref 64–87)
PO2 TEMP ADJ BLDA: 377 MMHG (ref 64–87)
PO2 TEMP ADJ BLDA: 392 MMHG (ref 64–87)
PO2 TEMP ADJ BLDA: 409 MMHG (ref 64–87)
PO2 TEMP ADJ BLDA: 440 MMHG (ref 64–87)
PO2 TEMP ADJ BLDV: 40 MMHG (ref 25–40)
PO2 TEMP ADJ BLDV: 42 MMHG (ref 25–40)
POTASSIUM BLD-SCNC: 3.4 MMOL/L (ref 3.6–5.5)
POTASSIUM BLD-SCNC: 3.4 MMOL/L (ref 3.6–5.5)
POTASSIUM BLD-SCNC: 3.6 MMOL/L (ref 3.6–5.5)
POTASSIUM BLD-SCNC: 3.8 MMOL/L (ref 3.6–5.5)
POTASSIUM BLD-SCNC: 4.1 MMOL/L (ref 3.6–5.5)
POTASSIUM BLD-SCNC: 4.4 MMOL/L (ref 3.6–5.5)
POTASSIUM BLD-SCNC: 4.5 MMOL/L (ref 3.6–5.5)
POTASSIUM BLD-SCNC: 4.5 MMOL/L (ref 3.6–5.5)
POTASSIUM BLD-SCNC: 4.6 MMOL/L (ref 3.6–5.5)
POTASSIUM SERPL-SCNC: 3.3 MMOL/L (ref 3.6–5.5)
POTASSIUM SERPL-SCNC: 3.4 MMOL/L (ref 3.6–5.5)
PRESSURE SUPPORT SETTING VENT: 5 CM[H2O]
PROTHROMBIN TIME: 15.8 SEC (ref 12–14.6)
SAO2 % BLDA: 100 % (ref 93–99)
SAO2 % BLDA: 98 % (ref 93–99)
SAO2 % BLDA: 99 % (ref 93–99)
SAO2 % BLDA: 99 % (ref 93–99)
SAO2 % BLDV: 81 %
SAO2 % BLDV: 87 %
SODIUM BLD-SCNC: 137 MMOL/L (ref 135–145)
SODIUM BLD-SCNC: 137 MMOL/L (ref 135–145)
SODIUM BLD-SCNC: 139 MMOL/L (ref 135–145)
SODIUM BLD-SCNC: 141 MMOL/L (ref 135–145)
SODIUM BLD-SCNC: 143 MMOL/L (ref 135–145)
SPECIMEN DRAWN FROM PATIENT: ABNORMAL

## 2023-08-16 PROCEDURE — 37799 UNLISTED PX VASCULAR SURGERY: CPT

## 2023-08-16 PROCEDURE — 83735 ASSAY OF MAGNESIUM: CPT

## 2023-08-16 PROCEDURE — 82330 ASSAY OF CALCIUM: CPT | Mod: 91

## 2023-08-16 PROCEDURE — 85347 COAGULATION TIME ACTIVATED: CPT

## 2023-08-16 PROCEDURE — 85049 AUTOMATED PLATELET COUNT: CPT

## 2023-08-16 PROCEDURE — C1894 INTRO/SHEATH, NON-LASER: HCPCS | Performed by: THORACIC SURGERY (CARDIOTHORACIC VASCULAR SURGERY)

## 2023-08-16 PROCEDURE — C9248 INJ, CLEVIDIPINE BUTYRATE: HCPCS | Mod: JZ,JG

## 2023-08-16 PROCEDURE — 700105 HCHG RX REV CODE 258: Performed by: THORACIC SURGERY (CARDIOTHORACIC VASCULAR SURGERY)

## 2023-08-16 PROCEDURE — A9270 NON-COVERED ITEM OR SERVICE: HCPCS | Performed by: THORACIC SURGERY (CARDIOTHORACIC VASCULAR SURGERY)

## 2023-08-16 PROCEDURE — 700111 HCHG RX REV CODE 636 W/ 250 OVERRIDE (IP)

## 2023-08-16 PROCEDURE — C1898 LEAD, PMKR, OTHER THAN TRANS: HCPCS | Performed by: THORACIC SURGERY (CARDIOTHORACIC VASCULAR SURGERY)

## 2023-08-16 PROCEDURE — 85730 THROMBOPLASTIN TIME PARTIAL: CPT

## 2023-08-16 PROCEDURE — 700111 HCHG RX REV CODE 636 W/ 250 OVERRIDE (IP): Performed by: THORACIC SURGERY (CARDIOTHORACIC VASCULAR SURGERY)

## 2023-08-16 PROCEDURE — 33411 REPLACEMENT OF AORTIC VALVE: CPT | Mod: AS

## 2023-08-16 PROCEDURE — 5A1223Z PERFORMANCE OF CARDIAC PACING, CONTINUOUS: ICD-10-PCS | Performed by: THORACIC SURGERY (CARDIOTHORACIC VASCULAR SURGERY)

## 2023-08-16 PROCEDURE — 160042 HCHG SURGERY MINUTES - EA ADDL 1 MIN LEVEL 5: Performed by: THORACIC SURGERY (CARDIOTHORACIC VASCULAR SURGERY)

## 2023-08-16 PROCEDURE — A9270 NON-COVERED ITEM OR SERVICE: HCPCS

## 2023-08-16 PROCEDURE — 160048 HCHG OR STATISTICAL LEVEL 1-5: Performed by: THORACIC SURGERY (CARDIOTHORACIC VASCULAR SURGERY)

## 2023-08-16 PROCEDURE — 84132 ASSAY OF SERUM POTASSIUM: CPT | Mod: 91

## 2023-08-16 PROCEDURE — 160009 HCHG ANES TIME/MIN: Performed by: THORACIC SURGERY (CARDIOTHORACIC VASCULAR SURGERY)

## 2023-08-16 PROCEDURE — P9047 ALBUMIN (HUMAN), 25%, 50ML: HCPCS | Mod: JG

## 2023-08-16 PROCEDURE — 93005 ELECTROCARDIOGRAM TRACING: CPT

## 2023-08-16 PROCEDURE — 700111 HCHG RX REV CODE 636 W/ 250 OVERRIDE (IP): Performed by: ANESTHESIOLOGY

## 2023-08-16 PROCEDURE — 5A1221Z PERFORMANCE OF CARDIAC OUTPUT, CONTINUOUS: ICD-10-PCS | Performed by: THORACIC SURGERY (CARDIOTHORACIC VASCULAR SURGERY)

## 2023-08-16 PROCEDURE — 99291 CRITICAL CARE FIRST HOUR: CPT | Performed by: INTERNAL MEDICINE

## 2023-08-16 PROCEDURE — 160031 HCHG SURGERY MINUTES - 1ST 30 MINS LEVEL 5: Performed by: THORACIC SURGERY (CARDIOTHORACIC VASCULAR SURGERY)

## 2023-08-16 PROCEDURE — 94799 UNLISTED PULMONARY SVC/PX: CPT

## 2023-08-16 PROCEDURE — 82803 BLOOD GASES ANY COMBINATION: CPT

## 2023-08-16 PROCEDURE — 700101 HCHG RX REV CODE 250: Performed by: ANESTHESIOLOGY

## 2023-08-16 PROCEDURE — 700101 HCHG RX REV CODE 250

## 2023-08-16 PROCEDURE — 85018 HEMOGLOBIN: CPT

## 2023-08-16 PROCEDURE — 110372 HCHG SHELL REV 278: Performed by: THORACIC SURGERY (CARDIOTHORACIC VASCULAR SURGERY)

## 2023-08-16 PROCEDURE — 02100Z9 BYPASS CORONARY ARTERY, ONE ARTERY FROM LEFT INTERNAL MAMMARY, OPEN APPROACH: ICD-10-PCS | Performed by: THORACIC SURGERY (CARDIOTHORACIC VASCULAR SURGERY)

## 2023-08-16 PROCEDURE — 700105 HCHG RX REV CODE 258

## 2023-08-16 PROCEDURE — 85610 PROTHROMBIN TIME: CPT

## 2023-08-16 PROCEDURE — 85014 HEMATOCRIT: CPT | Mod: 91

## 2023-08-16 PROCEDURE — 503001 HCHG PERFUSION: Performed by: THORACIC SURGERY (CARDIOTHORACIC VASCULAR SURGERY)

## 2023-08-16 PROCEDURE — 93319 3D ECHO IMG CGEN CAR ANOMAL: CPT

## 2023-08-16 PROCEDURE — B246ZZ4 ULTRASONOGRAPHY OF RIGHT AND LEFT HEART, TRANSESOPHAGEAL: ICD-10-PCS | Performed by: ANESTHESIOLOGY

## 2023-08-16 PROCEDURE — C1713 ANCHOR/SCREW BN/BN,TIS/BN: HCPCS | Performed by: THORACIC SURGERY (CARDIOTHORACIC VASCULAR SURGERY)

## 2023-08-16 PROCEDURE — 700101 HCHG RX REV CODE 250: Performed by: THORACIC SURGERY (CARDIOTHORACIC VASCULAR SURGERY)

## 2023-08-16 PROCEDURE — 36415 COLL VENOUS BLD VENIPUNCTURE: CPT

## 2023-08-16 PROCEDURE — C1729 CATH, DRAINAGE: HCPCS | Performed by: THORACIC SURGERY (CARDIOTHORACIC VASCULAR SURGERY)

## 2023-08-16 PROCEDURE — 700102 HCHG RX REV CODE 250 W/ 637 OVERRIDE(OP)

## 2023-08-16 PROCEDURE — C1781 MESH (IMPLANTABLE): HCPCS | Performed by: THORACIC SURGERY (CARDIOTHORACIC VASCULAR SURGERY)

## 2023-08-16 PROCEDURE — 770022 HCHG ROOM/CARE - ICU (200)

## 2023-08-16 PROCEDURE — C1751 CATH, INF, PER/CENT/MIDLINE: HCPCS | Performed by: THORACIC SURGERY (CARDIOTHORACIC VASCULAR SURGERY)

## 2023-08-16 PROCEDURE — 94669 MECHANICAL CHEST WALL OSCILL: CPT

## 2023-08-16 PROCEDURE — 82962 GLUCOSE BLOOD TEST: CPT

## 2023-08-16 PROCEDURE — 700102 HCHG RX REV CODE 250 W/ 637 OVERRIDE(OP): Performed by: THORACIC SURGERY (CARDIOTHORACIC VASCULAR SURGERY)

## 2023-08-16 PROCEDURE — 88305 TISSUE EXAM BY PATHOLOGIST: CPT

## 2023-08-16 PROCEDURE — 84295 ASSAY OF SERUM SODIUM: CPT | Mod: 91

## 2023-08-16 PROCEDURE — 700105 HCHG RX REV CODE 258: Performed by: ANESTHESIOLOGY

## 2023-08-16 PROCEDURE — C1768 GRAFT, VASCULAR: HCPCS | Performed by: THORACIC SURGERY (CARDIOTHORACIC VASCULAR SURGERY)

## 2023-08-16 PROCEDURE — 33411 REPLACEMENT OF AORTIC VALVE: CPT | Performed by: THORACIC SURGERY (CARDIOTHORACIC VASCULAR SURGERY)

## 2023-08-16 PROCEDURE — 02RF08Z REPLACEMENT OF AORTIC VALVE WITH ZOOPLASTIC TISSUE, OPEN APPROACH: ICD-10-PCS | Performed by: THORACIC SURGERY (CARDIOTHORACIC VASCULAR SURGERY)

## 2023-08-16 PROCEDURE — 33533 CABG ARTERIAL SINGLE: CPT | Mod: AS

## 2023-08-16 PROCEDURE — 71045 X-RAY EXAM CHEST 1 VIEW: CPT

## 2023-08-16 PROCEDURE — 94002 VENT MGMT INPAT INIT DAY: CPT

## 2023-08-16 PROCEDURE — 33533 CABG ARTERIAL SINGLE: CPT | Performed by: THORACIC SURGERY (CARDIOTHORACIC VASCULAR SURGERY)

## 2023-08-16 DEVICE — PATCH 2X9CM XENOSURE BIOLOGIC VASCULAR---ORDER IN MULTIPLES OF 5---: Type: IMPLANTABLE DEVICE | Site: CHEST | Status: FUNCTIONAL

## 2023-08-16 DEVICE — BONE PUTTY HEMOSTATIC ABSORBABLE (12/BX): Type: IMPLANTABLE DEVICE | Site: CHEST | Status: FUNCTIONAL

## 2023-08-16 DEVICE — VALVE INSPIRIS AORTIC 23MM: Type: IMPLANTABLE DEVICE | Site: CHEST | Status: FUNCTIONAL

## 2023-08-16 RX ORDER — DEXTROSE MONOHYDRATE 25 G/50ML
12.5-25 INJECTION, SOLUTION INTRAVENOUS PRN
Status: ACTIVE | OUTPATIENT
Start: 2023-08-16 | End: 2023-08-17

## 2023-08-16 RX ORDER — LEVOTHYROXINE SODIUM 0.07 MG/1
75 TABLET ORAL
Status: DISCONTINUED | OUTPATIENT
Start: 2023-08-17 | End: 2023-08-23 | Stop reason: HOSPADM

## 2023-08-16 RX ORDER — NITROGLYCERIN 20 MG/100ML
0-100 INJECTION INTRAVENOUS CONTINUOUS
Status: DISCONTINUED | OUTPATIENT
Start: 2023-08-16 | End: 2023-08-20

## 2023-08-16 RX ORDER — ACETAMINOPHEN 325 MG/1
650 TABLET ORAL EVERY 4 HOURS PRN
Status: DISCONTINUED | OUTPATIENT
Start: 2023-08-16 | End: 2023-08-23 | Stop reason: HOSPADM

## 2023-08-16 RX ORDER — SODIUM CHLORIDE, SODIUM GLUCONATE, SODIUM ACETATE, POTASSIUM CHLORIDE AND MAGNESIUM CHLORIDE 526; 502; 368; 37; 30 MG/100ML; MG/100ML; MG/100ML; MG/100ML; MG/100ML
INJECTION, SOLUTION INTRAVENOUS PRN
Status: DISCONTINUED | OUTPATIENT
Start: 2023-08-16 | End: 2023-08-21 | Stop reason: HOSPADM

## 2023-08-16 RX ORDER — POTASSIUM CHLORIDE 14.9 MG/ML
20 INJECTION INTRAVENOUS ONCE
Status: COMPLETED | OUTPATIENT
Start: 2023-08-16 | End: 2023-08-16

## 2023-08-16 RX ORDER — SODIUM CHLORIDE, SODIUM LACTATE, POTASSIUM CHLORIDE, CALCIUM CHLORIDE 600; 310; 30; 20 MG/100ML; MG/100ML; MG/100ML; MG/100ML
INJECTION, SOLUTION INTRAVENOUS
Status: DISCONTINUED | OUTPATIENT
Start: 2023-08-16 | End: 2023-08-16 | Stop reason: SURG

## 2023-08-16 RX ORDER — SODIUM CHLORIDE 9 MG/ML
INJECTION, SOLUTION INTRAVENOUS CONTINUOUS
Status: DISCONTINUED | OUTPATIENT
Start: 2023-08-16 | End: 2023-08-21

## 2023-08-16 RX ORDER — OMEPRAZOLE 20 MG/1
20 CAPSULE, DELAYED RELEASE ORAL DAILY
Status: DISCONTINUED | OUTPATIENT
Start: 2023-08-17 | End: 2023-08-23 | Stop reason: HOSPADM

## 2023-08-16 RX ORDER — DEXMEDETOMIDINE HYDROCHLORIDE 4 UG/ML
0-1.5 INJECTION, SOLUTION INTRAVENOUS CONTINUOUS
Status: DISCONTINUED | OUTPATIENT
Start: 2023-08-16 | End: 2023-08-20

## 2023-08-16 RX ORDER — ACETAMINOPHEN 500 MG
1000 TABLET ORAL ONCE
Status: COMPLETED | OUTPATIENT
Start: 2023-08-16 | End: 2023-08-16

## 2023-08-16 RX ORDER — ACETAMINOPHEN 500 MG
1000 TABLET ORAL EVERY 6 HOURS
Status: DISPENSED | OUTPATIENT
Start: 2023-08-16 | End: 2023-08-21

## 2023-08-16 RX ORDER — EPINEPHRINE HCL IN 0.9 % NACL 4MG/250ML
0-.5 PLASTIC BAG, INJECTION (ML) INTRAVENOUS CONTINUOUS
Status: DISCONTINUED | OUTPATIENT
Start: 2023-08-16 | End: 2023-08-20

## 2023-08-16 RX ORDER — HEPARIN SODIUM,PORCINE 1000/ML
VIAL (ML) INJECTION PRN
Status: DISCONTINUED | OUTPATIENT
Start: 2023-08-16 | End: 2023-08-16 | Stop reason: SURG

## 2023-08-16 RX ORDER — ASPIRIN 81 MG/1
81 TABLET ORAL DAILY
Status: DISCONTINUED | OUTPATIENT
Start: 2023-08-17 | End: 2023-08-16

## 2023-08-16 RX ORDER — ZOLPIDEM TARTRATE 5 MG/1
5 TABLET ORAL NIGHTLY PRN
Status: DISCONTINUED | OUTPATIENT
Start: 2023-08-17 | End: 2023-08-23 | Stop reason: HOSPADM

## 2023-08-16 RX ORDER — DIPHENHYDRAMINE HCL 25 MG
25 TABLET ORAL
Status: DISCONTINUED | OUTPATIENT
Start: 2023-08-16 | End: 2023-08-23 | Stop reason: HOSPADM

## 2023-08-16 RX ORDER — BUSPIRONE HYDROCHLORIDE 10 MG/1
10 TABLET ORAL EVERY EVENING
Status: DISCONTINUED | OUTPATIENT
Start: 2023-08-17 | End: 2023-08-23 | Stop reason: HOSPADM

## 2023-08-16 RX ORDER — MIDAZOLAM HYDROCHLORIDE 1 MG/ML
2 INJECTION INTRAMUSCULAR; INTRAVENOUS
Status: DISCONTINUED | OUTPATIENT
Start: 2023-08-16 | End: 2023-08-22

## 2023-08-16 RX ORDER — ACETAMINOPHEN 650 MG/1
650 SUPPOSITORY RECTAL EVERY 4 HOURS PRN
Status: DISCONTINUED | OUTPATIENT
Start: 2023-08-16 | End: 2023-08-23 | Stop reason: HOSPADM

## 2023-08-16 RX ORDER — ALUMINA, MAGNESIA, AND SIMETHICONE 2400; 2400; 240 MG/30ML; MG/30ML; MG/30ML
30 SUSPENSION ORAL EVERY 4 HOURS PRN
Status: DISCONTINUED | OUTPATIENT
Start: 2023-08-16 | End: 2023-08-23 | Stop reason: HOSPADM

## 2023-08-16 RX ORDER — NOREPINEPHRINE BITARTRATE 0.03 MG/ML
0-1 INJECTION, SOLUTION INTRAVENOUS CONTINUOUS
Status: DISCONTINUED | OUTPATIENT
Start: 2023-08-16 | End: 2023-08-20

## 2023-08-16 RX ORDER — PROCHLORPERAZINE EDISYLATE 5 MG/ML
10 INJECTION INTRAMUSCULAR; INTRAVENOUS EVERY 6 HOURS PRN
Status: DISCONTINUED | OUTPATIENT
Start: 2023-08-16 | End: 2023-08-23 | Stop reason: HOSPADM

## 2023-08-16 RX ORDER — DIAZEPAM 2 MG/1
2 TABLET ORAL EVERY 6 HOURS PRN
COMMUNITY
End: 2023-09-14

## 2023-08-16 RX ORDER — EPINEPHRINE HCL IN 0.9 % NACL 4MG/250ML
0-.5 PLASTIC BAG, INJECTION (ML) INTRAVENOUS CONTINUOUS
Status: DISCONTINUED | OUTPATIENT
Start: 2023-08-16 | End: 2023-08-16

## 2023-08-16 RX ORDER — TRAMADOL HYDROCHLORIDE 50 MG/1
50 TABLET ORAL EVERY 4 HOURS PRN
Status: DISCONTINUED | OUTPATIENT
Start: 2023-08-16 | End: 2023-08-23 | Stop reason: HOSPADM

## 2023-08-16 RX ORDER — CEFAZOLIN SODIUM 1 G/3ML
INJECTION, POWDER, FOR SOLUTION INTRAMUSCULAR; INTRAVENOUS PRN
Status: DISCONTINUED | OUTPATIENT
Start: 2023-08-16 | End: 2023-08-16 | Stop reason: SURG

## 2023-08-16 RX ORDER — ASPIRIN 81 MG/1
81 TABLET ORAL DAILY
Status: DISCONTINUED | OUTPATIENT
Start: 2023-08-17 | End: 2023-08-23 | Stop reason: HOSPADM

## 2023-08-16 RX ORDER — SODIUM CHLORIDE 9 MG/ML
INJECTION, SOLUTION INTRAVENOUS
Status: DISCONTINUED | OUTPATIENT
Start: 2023-08-16 | End: 2023-08-16 | Stop reason: SURG

## 2023-08-16 RX ORDER — NOREPINEPHRINE BITARTRATE 0.03 MG/ML
0-1 INJECTION, SOLUTION INTRAVENOUS CONTINUOUS
Status: DISCONTINUED | OUTPATIENT
Start: 2023-08-16 | End: 2023-08-16

## 2023-08-16 RX ORDER — ONDANSETRON 2 MG/ML
8 INJECTION INTRAMUSCULAR; INTRAVENOUS EVERY 6 HOURS PRN
Status: DISCONTINUED | OUTPATIENT
Start: 2023-08-16 | End: 2023-08-23 | Stop reason: HOSPADM

## 2023-08-16 RX ORDER — POLYETHYLENE GLYCOL 3350 17 G/17G
1 POWDER, FOR SOLUTION ORAL DAILY
Status: DISCONTINUED | OUTPATIENT
Start: 2023-08-17 | End: 2023-08-23 | Stop reason: HOSPADM

## 2023-08-16 RX ORDER — MAGNESIUM SULFATE 1 G/100ML
1 INJECTION INTRAVENOUS DAILY
Status: COMPLETED | OUTPATIENT
Start: 2023-08-16 | End: 2023-08-18

## 2023-08-16 RX ORDER — ACETAMINOPHEN 500 MG
1000 TABLET ORAL EVERY 6 HOURS PRN
Status: DISCONTINUED | OUTPATIENT
Start: 2023-08-21 | End: 2023-08-23 | Stop reason: HOSPADM

## 2023-08-16 RX ORDER — ROCURONIUM BROMIDE 10 MG/ML
INJECTION, SOLUTION INTRAVENOUS PRN
Status: DISCONTINUED | OUTPATIENT
Start: 2023-08-16 | End: 2023-08-16 | Stop reason: SURG

## 2023-08-16 RX ORDER — PAPAVERINE HYDROCHLORIDE 30 MG/ML
INJECTION INTRAMUSCULAR; INTRAVENOUS
Status: DISCONTINUED | OUTPATIENT
Start: 2023-08-16 | End: 2023-08-16 | Stop reason: HOSPADM

## 2023-08-16 RX ORDER — SODIUM CHLORIDE, SODIUM GLUCONATE, SODIUM ACETATE, POTASSIUM CHLORIDE AND MAGNESIUM CHLORIDE 526; 502; 368; 37; 30 MG/100ML; MG/100ML; MG/100ML; MG/100ML; MG/100ML
INJECTION, SOLUTION INTRAVENOUS
Status: DISCONTINUED | OUTPATIENT
Start: 2023-08-16 | End: 2023-08-16

## 2023-08-16 RX ORDER — MIDAZOLAM HYDROCHLORIDE 1 MG/ML
INJECTION INTRAMUSCULAR; INTRAVENOUS PRN
Status: DISCONTINUED | OUTPATIENT
Start: 2023-08-16 | End: 2023-08-16

## 2023-08-16 RX ORDER — OXYCODONE HYDROCHLORIDE 5 MG/1
5 TABLET ORAL
Status: DISCONTINUED | OUTPATIENT
Start: 2023-08-16 | End: 2023-08-23 | Stop reason: HOSPADM

## 2023-08-16 RX ORDER — DULOXETIN HYDROCHLORIDE 20 MG/1
40 CAPSULE, DELAYED RELEASE ORAL 2 TIMES DAILY
Status: DISCONTINUED | OUTPATIENT
Start: 2023-08-17 | End: 2023-08-23 | Stop reason: HOSPADM

## 2023-08-16 RX ORDER — ROSUVASTATIN CALCIUM 20 MG/1
40 TABLET, COATED ORAL EVERY EVENING
Status: DISCONTINUED | OUTPATIENT
Start: 2023-08-17 | End: 2023-08-23 | Stop reason: HOSPADM

## 2023-08-16 RX ORDER — OXYCODONE HYDROCHLORIDE 10 MG/1
10 TABLET ORAL
Status: DISCONTINUED | OUTPATIENT
Start: 2023-08-16 | End: 2023-08-23 | Stop reason: HOSPADM

## 2023-08-16 RX ORDER — PREGABALIN 25 MG/1
50 CAPSULE ORAL 2 TIMES DAILY
Status: DISCONTINUED | OUTPATIENT
Start: 2023-08-16 | End: 2023-08-23 | Stop reason: HOSPADM

## 2023-08-16 RX ORDER — DEXMEDETOMIDINE HYDROCHLORIDE 4 UG/ML
0-1.5 INJECTION, SOLUTION INTRAVENOUS CONTINUOUS
Status: DISCONTINUED | OUTPATIENT
Start: 2023-08-16 | End: 2023-08-16

## 2023-08-16 RX ORDER — BISACODYL 10 MG
10 SUPPOSITORY, RECTAL RECTAL
Status: DISCONTINUED | OUTPATIENT
Start: 2023-08-16 | End: 2023-08-23 | Stop reason: HOSPADM

## 2023-08-16 RX ORDER — CLOPIDOGREL BISULFATE 75 MG/1
75 TABLET ORAL DAILY
Status: DISCONTINUED | OUTPATIENT
Start: 2023-08-17 | End: 2023-08-22

## 2023-08-16 RX ORDER — AMOXICILLIN 250 MG
2 CAPSULE ORAL 2 TIMES DAILY
Status: DISCONTINUED | OUTPATIENT
Start: 2023-08-16 | End: 2023-08-23 | Stop reason: HOSPADM

## 2023-08-16 RX ORDER — INSULIN LISPRO 100 [IU]/ML
0-14 INJECTION, SOLUTION INTRAVENOUS; SUBCUTANEOUS
Status: DISCONTINUED | OUTPATIENT
Start: 2023-08-16 | End: 2023-08-17

## 2023-08-16 RX ORDER — ENOXAPARIN SODIUM 100 MG/ML
40 INJECTION SUBCUTANEOUS DAILY
Status: DISCONTINUED | OUTPATIENT
Start: 2023-08-17 | End: 2023-08-22

## 2023-08-16 RX ORDER — METHADONE HYDROCHLORIDE 10 MG/ML
20 INJECTION, SOLUTION INTRAMUSCULAR; INTRAVENOUS; SUBCUTANEOUS ONCE
Status: COMPLETED | OUTPATIENT
Start: 2023-08-16 | End: 2023-08-16

## 2023-08-16 RX ORDER — AMIODARONE HYDROCHLORIDE 150 MG/3ML
INJECTION, SOLUTION INTRAVENOUS PRN
Status: DISCONTINUED | OUTPATIENT
Start: 2023-08-16 | End: 2023-08-16 | Stop reason: SURG

## 2023-08-16 RX ORDER — MORPHINE SULFATE 4 MG/ML
4 INJECTION INTRAVENOUS
Status: DISCONTINUED | OUTPATIENT
Start: 2023-08-16 | End: 2023-08-22

## 2023-08-16 RX ADMIN — PREGABALIN 50 MG: 25 CAPSULE ORAL at 19:20

## 2023-08-16 RX ADMIN — METHADONE HYDROCHLORIDE 10 MG: 10 INJECTION, SOLUTION INTRAMUSCULAR; INTRAVENOUS; SUBCUTANEOUS at 08:07

## 2023-08-16 RX ADMIN — METOPROLOL TARTRATE 12.5 MG: 25 TABLET, FILM COATED ORAL at 07:04

## 2023-08-16 RX ADMIN — ACETAMINOPHEN 1000 MG: 500 TABLET, FILM COATED ORAL at 19:20

## 2023-08-16 RX ADMIN — ROCURONIUM BROMIDE 100 MG: 50 INJECTION, SOLUTION INTRAVENOUS at 08:08

## 2023-08-16 RX ADMIN — AMINOCAPROIC ACID 7860 MG: 250 INJECTION, SOLUTION INTRAVENOUS at 08:51

## 2023-08-16 RX ADMIN — SODIUM CHLORIDE 3.5 UNITS/HR: 9 INJECTION, SOLUTION INTRAVENOUS at 13:25

## 2023-08-16 RX ADMIN — POTASSIUM CHLORIDE 20 MEQ: 14.9 INJECTION, SOLUTION INTRAVENOUS at 19:03

## 2023-08-16 RX ADMIN — METHADONE HYDROCHLORIDE 10 MG: 10 INJECTION, SOLUTION INTRAMUSCULAR; INTRAVENOUS; SUBCUTANEOUS at 08:37

## 2023-08-16 RX ADMIN — OXYCODONE HYDROCHLORIDE 10 MG: 10 TABLET ORAL at 23:59

## 2023-08-16 RX ADMIN — AMIODARONE HYDROCHLORIDE 150 MG: 50 INJECTION, SOLUTION INTRAVENOUS at 11:14

## 2023-08-16 RX ADMIN — SODIUM CHLORIDE, SODIUM GLUCONATE, SODIUM ACETATE, POTASSIUM CHLORIDE AND MAGNESIUM CHLORIDE: 526; 502; 368; 37; 30 INJECTION, SOLUTION INTRAVENOUS at 14:05

## 2023-08-16 RX ADMIN — OXYCODONE HYDROCHLORIDE 10 MG: 10 TABLET ORAL at 19:21

## 2023-08-16 RX ADMIN — SODIUM CHLORIDE: 9 INJECTION, SOLUTION INTRAVENOUS at 08:05

## 2023-08-16 RX ADMIN — HEPARIN SODIUM 32000 UNITS: 1000 INJECTION, SOLUTION INTRAVENOUS; SUBCUTANEOUS at 09:05

## 2023-08-16 RX ADMIN — Medication 1 APPLICATOR: at 20:57

## 2023-08-16 RX ADMIN — PROPOFOL 200 MG: 10 INJECTION, EMULSION INTRAVENOUS at 08:08

## 2023-08-16 RX ADMIN — ONDANSETRON 8 MG: 2 INJECTION INTRAMUSCULAR; INTRAVENOUS at 19:07

## 2023-08-16 RX ADMIN — SODIUM CHLORIDE 5 UNITS/HR: 9 INJECTION, SOLUTION INTRAVENOUS at 10:43

## 2023-08-16 RX ADMIN — CEFAZOLIN 2 G: 1 INJECTION, POWDER, FOR SOLUTION INTRAMUSCULAR; INTRAVENOUS at 08:36

## 2023-08-16 RX ADMIN — DEXMEDETOMIDINE 0.4 MCG/KG/HR: 100 INJECTION, SOLUTION INTRAVENOUS at 08:37

## 2023-08-16 RX ADMIN — SENNOSIDES AND DOCUSATE SODIUM 2 TABLET: 50; 8.6 TABLET ORAL at 19:21

## 2023-08-16 RX ADMIN — ACETAMINOPHEN 1000 MG: 500 TABLET, FILM COATED ORAL at 23:59

## 2023-08-16 RX ADMIN — AMINOCAPROIC ACID 2 G/HR: 250 INJECTION, SOLUTION INTRAVENOUS at 09:27

## 2023-08-16 RX ADMIN — ROCURONIUM BROMIDE 30 MG: 50 INJECTION, SOLUTION INTRAVENOUS at 09:31

## 2023-08-16 RX ADMIN — ROCURONIUM BROMIDE 20 MG: 50 INJECTION, SOLUTION INTRAVENOUS at 10:59

## 2023-08-16 RX ADMIN — SODIUM CHLORIDE, POTASSIUM CHLORIDE, SODIUM LACTATE AND CALCIUM CHLORIDE: 600; 310; 30; 20 INJECTION, SOLUTION INTRAVENOUS at 08:12

## 2023-08-16 RX ADMIN — CLEVIPIDINE 6 MG/HR: 0.5 EMULSION INTRAVENOUS at 22:46

## 2023-08-16 RX ADMIN — POTASSIUM CHLORIDE 20 MEQ: 14.9 INJECTION, SOLUTION INTRAVENOUS at 12:48

## 2023-08-16 RX ADMIN — MIDAZOLAM 2 MG: 1 INJECTION, SOLUTION INTRAMUSCULAR; INTRAVENOUS at 08:07

## 2023-08-16 RX ADMIN — ACETAMINOPHEN 1000 MG: 500 TABLET, FILM COATED ORAL at 07:04

## 2023-08-16 RX ADMIN — EPINEPHRINE 0.02 MCG/KG/MIN: 1 INJECTION INTRAMUSCULAR; INTRAVENOUS; SUBCUTANEOUS at 12:00

## 2023-08-16 RX ADMIN — Medication 1 APPLICATOR: at 13:03

## 2023-08-16 RX ADMIN — SODIUM CHLORIDE, SODIUM GLUCONATE, SODIUM ACETATE, POTASSIUM CHLORIDE AND MAGNESIUM CHLORIDE: 526; 502; 368; 37; 30 INJECTION, SOLUTION INTRAVENOUS at 08:05

## 2023-08-16 RX ADMIN — CLEVIPIDINE 2 MG/HR: 0.5 EMULSION INTRAVENOUS at 18:27

## 2023-08-16 RX ADMIN — NOREPINEPHRINE BITARTRATE 0.04 MCG/KG/MIN: 1 INJECTION INTRAVENOUS at 12:00

## 2023-08-16 RX ADMIN — SODIUM CHLORIDE: 9 INJECTION, SOLUTION INTRAVENOUS at 12:45

## 2023-08-16 RX ADMIN — MAGNESIUM SULFATE IN DEXTROSE 1 G: 10 INJECTION, SOLUTION INTRAVENOUS at 13:02

## 2023-08-16 RX ADMIN — CEFAZOLIN 2 G: 2 INJECTION, POWDER, FOR SOLUTION INTRAMUSCULAR; INTRAVENOUS at 16:07

## 2023-08-16 ASSESSMENT — FIBROSIS 4 INDEX: FIB4 SCORE: 1.23

## 2023-08-16 ASSESSMENT — PAIN DESCRIPTION - PAIN TYPE
TYPE: SURGICAL PAIN
TYPE: ACUTE PAIN

## 2023-08-16 NOTE — ANESTHESIA PROCEDURE NOTES
Central Venous Line    Performed by: Hao Marx M.D.  Authorized by: Hao Marx M.D.    Start Time:  8/16/2023 8:18 AM  End Time:  8/16/2023 8:22 AM  Patient Location:  OR  Indication: central venous access and hemodynamic monitoring        provider hand hygiene performed prior to central venous catheter insertion, all 5 sterile barriers used (gloves, gown, cap, mask, large sterile drape) during central venous catheter insertion and skin prep agent completely dried prior to procedure    Patient Position:  Trendelenburg  Laterality:  Right  Site:  Internal jugular  Prep:  Chlorhexidine  Catheter Size:  7 Fr  Catheter Length (cm):  20  Number of Lumens:  Triple lumen  target vein identified, needle advanced into vein and blood aspirated and guidewire advanced into vein    Seldinger Technique?: Yes    Ultrasound-Guided: ultrasound-guided  Image captured, interpreted and electronically stored.  Sterile Gel and Probe Cover Used for Ultrasound?: Yes    Intravenous Verification: verified by ultrasound, venous blood return and chest x-ray pending    all ports aspirated, all ports flushed easily, guidewire was removed intact, biopatch was applied, line was sutured in place and dressing was applied    Events: patient tolerated procedure well with no complications    PA Catheter Placed?: No

## 2023-08-16 NOTE — ANESTHESIA PREPROCEDURE EVALUATION
" Case: [id number] Date/Time: 08/16/23 0745    Procedures:       CORONARY ARTERY BYPASS GRAFT X1, AORTIC VALVE REPLACEMENT, ENDOSCOPIC VEIN HARVEST, TRANSESOPHAGEAL ECHOCARDIOGRAM      REPLACEMENT, AORTIC VALVE      ECHOCARDIOGRAM, TRANSESOPHAGEAL, INTRAOPERATIVE    Pre-op diagnosis: CORONARY ARTERY DISEASE    Location: [facility] OR 02 / SURGERY [facility]    Surgeons: Tammy Lawrence M.D.          Relevant Problems   NEURO   (positive) Chronic headache      CARDIAC   (positive) Benign essential HTN   (positive) Coronary artery disease involving native coronary artery of native heart without angina pectoris   (positive) Heart block   (positive) Nonrheumatic aortic valve insufficiency      ENDO   (positive) Postoperative hypothyroidism_s/p thyroidectomy_Dr. Mullen     BP (!) 192/70 Comment: RN notified.  Pulse 73   Temp 36.3 °C (97.4 °F) (Temporal)   Resp 18   Ht 1.702 m (5' 7\")   Wt 77.8 kg (171 lb 8.3 oz)   SpO2 97%   BMI 26.86 kg/m²     Physical Exam    Airway   Mallampati: II  TM distance: >3 FB  Neck ROM: full       Cardiovascular - normal exam  Rhythm: regular  Rate: normal  (-) murmur     Dental - normal exam           Pulmonary - normal exam  Breath sounds clear to auscultation     Abdominal    Neurological - normal exam                 Anesthesia Plan    ASA 4   ASA physical status 4 criteria: CAD/stents - recent (< 3 months)    Plan - general       Airway plan will be ETT          Induction: intravenous    Postoperative Plan: Postoperative administration of opioids is intended.    Pertinent diagnostic labs and testing reviewed    Informed Consent:    Anesthetic plan and risks discussed with patient.    Use of blood products discussed with: patient whom consented to blood products.         "

## 2023-08-16 NOTE — H&P
Physician H&P    Patient ID:  Margoth Hopkins  [id number]  72 y.o. female  1950    History:  Primary Diagnosis: ***    HPI:  ***    Past Medical History:  has a past medical history of Adverse effect of anesthesia (15 yrs ago ?), Anemia, Anesthesia, Anginal syndrome (HCC), Aortic insufficiency (01/31/2020), Arthritis (06/16/2023), Breath shortness (11/02/2021), Bronchitis (60+ yrs ago), Cataract (06/16/2023), Dental disorder (06/16/2023), Gastric ulcer, Goiter, Heart burn (2022), Heart murmur (1965), High cholesterol (06/16/2023), Hypertension (06/16/2023), Hypothyroidism (06/16/2023), Pain (06/16/2023), PONV (postoperative nausea and vomiting) (6/21/22), Psychiatric problem (06/16/2023), Pulmonary embolism (HCC) (01/31/2020), Reactive arthritis (HCC), Snoring (06/16/2023), Thyroid disease, Thyroid disease (01/31/2020), and Urinary bladder disorder (15 yrs ago).    She has no past medical history of Acute nasopharyngitis, Arrhythmia, Asthma, Awareness under anesthesia, Bowel habit changes, Cancer (HCC), Carcinoma in situ of respiratory system, Congestive heart failure (HCC), Continuous ambulatory peritoneal dialysis status (HCC), Delayed emergence from general anesthesia, Diabetes (HCC), Dialysis patient (HCC), Emphysema of lung (HCC), Glaucoma, Gynecological disorder, Hard to intubate, Hemorrhagic disorder (HCC), Hepatitis A, Hepatitis B, Hepatitis C, Hiatus hernia syndrome, Indigestion, Infectious disease, Jaundice, Malignant hyperthermia, Myocardial infarct (HCC), Pacemaker, Pneumonia, Pregnant, Pseudocholinesterase deficiency, Renal disorder, Rheumatic fever, Seizure (HCC), Sleep apnea, Spinal headache, Stroke (HCC), Tuberculosis, or Urinary incontinence.  Past Surgical History:  has a past surgical history that includes knee replacement, total (Right); fusion (06/16/2023); cervical fusion posterior; bladder sling female; cataract extraction with iol (Left); cataract extraction with iol (Bilateral);  thyroidectomy total (Bilateral, 02/05/2020); appendectomy; gyn surgery; foot surgery (Left); knee arthroplasty total (Right); knee revision total (Right); mass excision general (Right, 11/05/2021); knee arthroplasty total (Left, 06/2022); other cardiac surgery (06/16/2023); other neurological surg (2016); and no pertinent past surgical history (2017).  Past Social History:  reports that she has never smoked. She has never used smokeless tobacco. She reports current alcohol use of about 4.2 - 8.4 oz of alcohol per week. She reports that she does not currently use drugs after having used the following drugs: Oral.  Past Family History:   Family History   Problem Relation Age of Onset   • Arthritis Mother    • Alzheimer's Disease Mother    • Cancer Mother         Cervical Cancer   • Heart Attack Father    • Cancer Father         Hypertrophy of the heart, CAD   • Other Brother         brain anurism   • Hypertension Brother    • Cancer Maternal Grandmother 66        colono cancer   • Colorectal Cancer Maternal Grandmother         Colon Cancer   • Heart Disease Brother         Quadruple bypass   • Hypertension Brother    • Arthritis Brother    • Other Brother         Celiac Disease   • No Known Problems Maternal Grandfather    • No Known Problems Paternal Grandmother    • No Known Problems Paternal Grandfather    • Hypertension Brother         Spinal stenosis   • Arthritis Brother    • Lung Disease Brother         Asthma, emphysema   • Asthma Brother      Allergies: Morphine, Nsaids, Pcn [penicillins], and Seasonal    Current Medications:  Prior to Admission medications    Medication Sig Start Date End Date Taking? Authorizing Provider   diclofenac sodium (VOLTAREN) 1 % Gel Apply 2 g topically 4 times a day as needed (Apply's on both knees).   Yes Physician Outpatient   diazePAM (VALIUM) 2 MG Tab Take 2 mg by mouth every 6 hours as needed for Anxiety.   Yes Physician Outpatient   HYDROcodone-acetaminophen (NORCO) 7.5-325  "MG tab Take 1 Tablet by mouth every 6 hours as needed for Moderate Pain (As needed for moderate pain).    Physician Outpatient   zolpidem (AMBIEN) 5 MG Tab Take 1 Tablet by mouth at bedtime as needed for Sleep for up to 60 days. 7/11/23 9/9/23  Charlee Walker M.D.   pregabalin (LYRICA) 50 MG capsule Take 50 mg by mouth 2 times a day. 6/15/23   Physician Outpatient   potassium chloride (MICRO-K) 10 MEQ capsule Take 1 Capsule by mouth every day.  Patient taking differently: Take 10 mEq by mouth every evening. 6/14/23   Mary Heath, A.P.R.NEmir   vitamin D2, Ergocalciferol, (DRISDOL) 1.25 MG (37694 UT) Cap capsule Take 1 Capsule by mouth every 7 days. On Sat 6/9/23   Asad Mullen M.D.   SYNTHROID 75 MCG Tab Take 1 Tablet by mouth every morning on an empty stomach. 6/9/23   Asad Mullen M.D.   busPIRone (BUSPAR) 10 MG Tab tablet Take 10 mg by mouth every evening. Pt takes once a day, not BID    Physician Outpatient   furosemide (LASIX) 20 MG Tab Take 1 Tablet by mouth every day. 6/7/23   Hrash Cortez M.D.   propranolol (INDERAL) 40 MG Tab Take 1 Tablet by mouth 3 times a day. 4/17/23   Charlee Walker M.D.   lisinopril (PRINIVIL) 20 MG Tab Take 1 Tablet by mouth every day.  Patient taking differently: Take 20 mg by mouth every evening. 3/13/23   Charlee Walker M.D.   DULoxetine HCl 40 MG Cap DR Particles Take 40 mg by mouth 2 times a day. 3/13/23   Charlee Walker M.D.   rosuvastatin (CRESTOR) 40 MG tablet Take 1 Tablet by mouth every day.  Patient taking differently: Take 40 mg by mouth every evening. 1/6/23   Charlee Walker M.D.       Review of Systems:  ROS  BP (!) 192/70   Pulse 73   Temp 36.3 °C (97.4 °F) (Temporal)   Resp 18   Ht 1.702 m (5' 7\")   Wt 77.8 kg (171 lb 8.3 oz)   SpO2 97%     Physical Examination:  Physical Exam    Impression:  ***    Plan:  ***    Pre Procedure Assessment:  Pre-Procedure      Pre Procedure update:   No changes.    Jorge Rousseau  8/16/2023  "

## 2023-08-16 NOTE — ASSESSMENT & PLAN NOTE
08/16/23 [place]  AORTIC VALVE REPLACEMENT (23 mm Inspiris Díaz bovine pericardial valve), aortic root enlargement (2 cm wide bovine pericardial patch)    Monitor and optimize hemodynamics  Goals SBP< 120 mmHg, MAP> 65  Titrate vasoactive/ionotropic medications vs BP reducing medications to achieve hemodynamic goals.  Maintain intravascular euvolemia  Monitor chest tube output for postoperative hemorrhage.

## 2023-08-16 NOTE — ANESTHESIA PROCEDURE NOTES
YESSI    Date/Time: 8/16/2023 8:21 AM    Performed by: Hao Marx M.D.  Authorized by: Hao Marx M.D.    Start Time:8/16/2023 8:21 AM  Preanesthetic Checklist: patient identified, IV checked, site marked, risks and benefits discussed, surgical consent, monitors and equipment checked, pre-op evaluation and timeout performed    Indication for YESSI: diagnostic   Patient Location: OR  Intubated: Yes  Bite Block: Yes  Heart Visualized: Yes  Insertion: atraumatic    **See FULL YESSI report in patient's chart via CV [facility]**

## 2023-08-16 NOTE — CONSULTS
Critical Care Consultation    Date of consult: 8/16/2023    Referring Physician  Tammy Lawrence M.D.    Reason for Consultation  S/p AVR and CABG x 1    History of Presenting Illness  72 y.o. female with a history of severe aortic regurgitation, disease, hypertension, hyperlipidemia, s/p thyroidectomy, s/p bilateral total knee replacements who presented 8/16/2023 to the ICU following CABG x 1 and AVR by Melinda. The case was uncomplicated, normal LVEF. She arrived to the ICU on the ventilator, sedated with precedex.     Code Status  Full Code    Review of Systems  Review of Systems   Unable to perform ROS: Critical illness       Past Medical History   has a past medical history of Adverse effect of anesthesia (15 yrs ago ?), Anemia, Anesthesia, Anginal syndrome (HCC), Aortic insufficiency (01/31/2020), Arthritis (06/16/2023), Breath shortness (11/02/2021), Bronchitis (60+ yrs ago), Cataract (06/16/2023), Dental disorder (06/16/2023), Gastric ulcer, Goiter, Heart burn (2022), Heart murmur (1965), High cholesterol (06/16/2023), Hypertension (06/16/2023), Hypothyroidism (06/16/2023), Pain (06/16/2023), PONV (postoperative nausea and vomiting) (6/21/22), Psychiatric problem (06/16/2023), Pulmonary embolism (HCC) (01/31/2020), Reactive arthritis (HCC), Snoring (06/16/2023), Thyroid disease, Thyroid disease (01/31/2020), and Urinary bladder disorder (15 yrs ago).    She has no past medical history of Acute nasopharyngitis, Arrhythmia, Asthma, Awareness under anesthesia, Bowel habit changes, Cancer (HCC), Carcinoma in situ of respiratory system, Congestive heart failure (HCC), Continuous ambulatory peritoneal dialysis status (HCC), Delayed emergence from general anesthesia, Diabetes (HCC), Dialysis patient (HCC), Emphysema of lung (HCC), Glaucoma, Gynecological disorder, Hard to intubate, Hemorrhagic disorder (HCC), Hepatitis A, Hepatitis B, Hepatitis C, Hiatus hernia syndrome, Indigestion, Infectious disease, Jaundice,  Malignant hyperthermia, Myocardial infarct (HCC), Pacemaker, Pneumonia, Pregnant, Pseudocholinesterase deficiency, Renal disorder, Rheumatic fever, Seizure (HCC), Sleep apnea, Spinal headache, Stroke (HCC), Tuberculosis, or Urinary incontinence.    Surgical History   has a past surgical history that includes knee replacement, total (Right); fusion (06/16/2023); cervical fusion posterior; bladder sling female; cataract extraction with iol (Left); cataract extraction with iol (Bilateral); thyroidectomy total (Bilateral, 02/05/2020); appendectomy; gyn surgery; foot surgery (Left); knee arthroplasty total (Right); knee revision total (Right); mass excision general (Right, 11/05/2021); knee arthroplasty total (Left, 06/2022); other cardiac surgery (06/16/2023); other neurological surg (2016); and no pertinent past surgical history (2017).    Family History  family history includes Alzheimer's Disease in her mother; Arthritis in her brother, brother, and mother; Asthma in her brother; Cancer in her father and mother; Cancer (age of onset: 66) in her maternal grandmother; Colorectal Cancer in her maternal grandmother; Heart Attack in her father; Heart Disease in her brother; Hypertension in her brother, brother, and brother; Lung Disease in her brother; No Known Problems in her maternal grandfather, paternal grandfather, and paternal grandmother; Other in her brother and brother.    Social History   reports that she has never smoked. She has never used smokeless tobacco. She reports current alcohol use of about 4.2 - 8.4 oz of alcohol per week. She reports that she does not currently use drugs after having used the following drugs: Oral.    Medications  Home Medications       Reviewed by Disha Jain (Pharmacy Tech) on 08/16/23 at 0607  Med List Status: Complete     Medication Last Dose Status   busPIRone (BUSPAR) 10 MG Tab tablet 8/15/2023 Active   diazePAM (VALIUM) 2 MG Tab 8/15/2023 Active   diclofenac sodium  (VOLTAREN) 1 % Gel > 2 weeks Active   DULoxetine HCl 40 MG Cap DR Particles 8/16/2023 Active   furosemide (LASIX) 20 MG Tab 8/16/2023 Active   HYDROcodone-acetaminophen (NORCO) 7.5-325 MG tab >4 days Active   lisinopril (PRINIVIL) 20 MG Tab 8/13/2023 Active   potassium chloride (MICRO-K) 10 MEQ capsule 8/15/2023 Active   pregabalin (LYRICA) 50 MG capsule 8/16/2023 Active   propranolol (INDERAL) 40 MG Tab 8/15/2023 Active   rosuvastatin (CRESTOR) 40 MG tablet 8/15/2023 Active   SYNTHROID 75 MCG Tab 8/16/2023 Active   vitamin D2, Ergocalciferol, (DRISDOL) 1.25 MG (14552 UT) Cap capsule 8/12/2023 Active   zolpidem (AMBIEN) 5 MG Tab > 3 nights Active                  Current Facility-Administered Medications   Medication Dose Route Frequency Provider Last Rate Last Admin    [START ON 8/17/2023] busPIRone (Buspar) tablet 10 mg  10 mg Oral Q EVENING DESI Gerardo, A.P.R.N.        [START ON 8/17/2023] DULoxetine (Cymbalta) capsule 40 mg  40 mg Oral BID DESI Gerardo, A.P.R.N.        pregabalin (Lyrica) capsule 50 mg  50 mg Oral BID DESI Gerardo, A.P.R.N.        [START ON 8/17/2023] rosuvastatin (Crestor) tablet 40 mg  40 mg Oral Q EVENING DESI Gerardo, A.P.R.N.        [START ON 8/17/2023] levothyroxine (Synthroid) tablet 75 mcg  75 mcg Oral AM ES DESI Gerardo, A.P.R.N.        [START ON 8/17/2023] zolpidem (Ambien) tablet 5 mg  5 mg Oral HS PRN DESI Gerardo, A.P.R.N.        Respiratory Therapy Consult   Nebulization Continuous RT ISAI Herrera.P.R.NEmir        NS infusion   Intravenous Continuous ISAI Herrera.P.R.N. 10 mL/hr at 08/16/23 1245 New Bag at 08/16/23 1245    NS infusion   Intravenous Continuous MARIO HerreraPEmirR.N. 30 mL/hr at 08/16/23 1230 Rate Verify at 08/16/23 1230    electrolyte-A (Plasmalyte-A) infusion   Intravenous PRN MARIO HerreraPEmirR.N.        ceFAZolin (Ancef) 2 g in  mL IVPB  2 g Intravenous Once DESI Gerardo,  A.P.R.N.        Nozin nasal  swab  1 Applicator Each Nostril BID ANGELIA HerreraREmirN.   1 Applicator at 08/16/23 1303    magnesium sulfate in D5W IVPB premix 1 g  1 g Intravenous DAILY MARIO HerreraP.REmirN. 100 mL/hr at 08/16/23 1302 1 g at 08/16/23 1302    K+ Scale: Goal of 4.5  1 Each Intravenous Q6HRS MARIO HerreraPEmirREmirN.   1 Each at 08/16/23 1230    [START ON 8/17/2023] metoprolol tartrate (Lopressor) tablet 12.5 mg  12.5 mg Oral BID MARIO HerreraPEmirRYUAN        Followed by    [START ON 8/18/2023] metoprolol tartrate (Lopressor) tablet 25 mg  25 mg Oral BID MARIO HerreraP.RALEIDA.        [START ON 8/17/2023] aspirin EC tablet 81 mg  81 mg Oral DAILY MARIO HerreraPEmirRALEIDA.        clevidipine (Cleviprex) IV emulsion  0-21 mg/hr Intravenous Continuous MARIO HerreraPEmirREmirN.   Dose not Required at 08/16/23 1230    nitroglycerin 50 mg in D5W 250 ml infusion  0-100 mcg/min Intravenous Continuous MARIO HerreraPEmirR.HOWARD   Dose not Required at 08/16/23 1230    Pharmacy Consult Request ...Pain Management Review 1 Each  1 Each Other PHARMACY TO DOSE MARIO HerreraPEmirRYUAN        acetaminophen (Tylenol) tablet 1,000 mg  1,000 mg Oral Q6HRS MARIO HerreraP.RALEIDA.        Followed by    [START ON 8/21/2023] acetaminophen (Tylenol) tablet 1,000 mg  1,000 mg Oral Q6HRS PRN MARIO HerreraPEmirRALEIDA.        oxyCODONE immediate-release (Roxicodone) tablet 5 mg  5 mg Oral Q3HRS PRN DESI Gerardo, A.P.R.N.        Or    oxyCODONE immediate release (Roxicodone) tablet 10 mg  10 mg Oral Q3HRS PRN DESI Gerardo, A.P.R.N.        Or    fentaNYL (Sublimaze) injection 50 mcg  50 mcg Intravenous Q3HRS PRN DESI Gerardo, A.P.R.N.        traMADol (Ultram) 50 MG tablet 50 mg  50 mg Oral Q4HRS PRN DESI Gerardo, A.P.R.N.        midazolam (Versed) injection 2 mg  2 mg Intravenous Q HOUR PRN DESI Gerardo A.P.R.N.         dexmedetomidine (PRECEDEX) 400 mcg/100mL NS premix infusion  0-1.5 mcg/kg/hr (Ideal) Intravenous Continuous DESI Gerardo, A.P.R.N. 6.2 mL/hr at 08/16/23 1215 0.4 mcg/kg/hr at 08/16/23 1215    sodium bicarbonate 8.4 % injection 50 mEq  50 mEq Intravenous Q HOUR PRN DESI Gerardo, A.P.R.N.        morphine 4 MG/ML injection 4 mg  4 mg Intravenous Q HOUR PRN DESI Gerardo, A.P.R.N.        ondansetron (Zofran) syringe/vial injection 8 mg  8 mg Intravenous Q6HRS PRN DESI Gerardo, A.P.R.N.        Or    prochlorperazine (Compazine) injection 10 mg  10 mg Intravenous Q6HRS PRN DESI Gerardo, A.P.R.N.        acetaminophen (Tylenol) tablet 650 mg  650 mg Oral Q4HRS PRN DESI Gerardo, A.P.R.N.        Or    acetaminophen (Tylenol) suppository 650 mg  650 mg Rectal Q4HRS PRN DESI Gerardo, A.P.R.N.        senna-docusate (Pericolace Or Senokot S) 8.6-50 MG per tablet 2 Tablet  2 Tablet Oral BID DESI Gerardo, A.P.R.N.        And    [START ON 8/17/2023] polyethylene glycol/lytes (Miralax) PACKET 1 Packet  1 Packet Oral DAILY DESI Gerardo, A.P.R.N.        And    [START ON 8/18/2023] magnesium hydroxide (Milk Of Magnesia) suspension 30 mL  30 mL Oral DAILY DESI Gerardo, A.P.R.N.        And    bisacodyl (Dulcolax) suppository 10 mg  10 mg Rectal QDAY PRN DESI Gerardo, A.P.R.N.        [START ON 8/17/2023] omeprazole (PriLOSEC) capsule 20 mg  20 mg Oral DAILY DESI Gerardo, A.P.R.N.        mag hydrox-al hydrox-simeth (Maalox Plus Es Or Mylanta Ds) suspension 30 mL  30 mL Oral Q4HRS PRN MARIO HerreraPEmirRYUAN        diphenhydrAMINE (Benadryl) tablet/capsule 25 mg  25 mg Oral HS PRN - MR X 1 MARIO HerreraP.R.NEmir        [START ON 8/17/2023] enoxaparin (Lovenox) inj 40 mg  40 mg Subcutaneous DAILY AT 1800 MARIO HerreraP.REmirNEmir        [START ON 8/17/2023] clopidogrel (Plavix) tablet 75 mg  75 mg Oral DAILY MARIO HerreraPEmirRYUAN         insulin regular (HumuLIN R,NovoLIN R) injection  0-14 Units Intravenous Once Tammy Lawrence M.D.        insulin lispro (HumaLOG,AdmeLOG) injection  0-14 Units Subcutaneous TID AC Tammy Lawrence M.D.        insulin regular (Humulin R) 100 Units in  mL Infusion  0-29 Units/hr Intravenous Continuous Tammy Lawrence M.D.   Paused at 08/16/23 1230    dextrose 50% (D50W) injection 12.5-25 g  12.5-25 g Intravenous PRN Tammy Lawrence M.D. MD Alert...Pharmacy to initiate transition from insulin infusion to subcutaneous insulin for cardiothoracic surgery 1 Each  1 Each Other Continuous Tammy Lawrence M.D.        norepinephrine (Levophed) 8 mg in 250 mL NS infusion (premix)  0-1 mcg/kg/min (Ideal) Intravenous Continuous Tammy Lawrence M.D. 2.3 mL/hr at 08/16/23 1256 0.02 mcg/kg/min at 08/16/23 1256    EPINEPHrine (Adrenalin) infusion 4 mg/250 mL (premix)  0-0.5 mcg/kg/min (Ideal) Intravenous Continuous Tammy Lawrence M.D.   Dose not Required at 08/16/23 1230    potassium chloride in water (Kcl) ivpb **Administer in ICU only** 20 mEq  20 mEq Intravenous Once Tammy Lawrence M.D. 100 mL/hr at 08/16/23 1248 20 mEq at 08/16/23 1248       Allergies  Allergies   Allergen Reactions    Morphine Vomiting    Nsaids Unspecified     History of gastric ulcers - cannot take NSAIDS    Pcn [Penicillins] Hives    Seasonal Runny Nose and Itching     .       Vital Signs last 24 hours  Temp:  [36.3 °C (97.4 °F)] 36.3 °C (97.4 °F)  Pulse:  [73] 73  Resp:  [18] 18  BP: (192-218)/(70-94) 192/70  SpO2:  [97 %-99 %] 99 %    Physical Exam  Physical Exam  Vitals and nursing note reviewed.   HENT:      Head: Normocephalic and atraumatic.      Right Ear: External ear normal.      Left Ear: External ear normal.      Nose: Nose normal.      Mouth/Throat:      Mouth: Mucous membranes are moist.   Eyes:      Pupils: Pupils are equal, round, and reactive to light.   Cardiovascular:      Rate and Rhythm: Normal rate.      Comments: MS chest  tubes; dressing c/d/i  Pulmonary:      Comments: Equal and symmetric breath sounds with mechanical ventilation     Abdominal:      General: Abdomen is flat. There is no distension.      Palpations: Abdomen is soft.      Tenderness: There is no abdominal tenderness. There is no guarding or rebound.   Musculoskeletal:      Right lower leg: No edema.      Left lower leg: No edema.   Skin:     General: Skin is dry.      Capillary Refill: Capillary refill takes less than 2 seconds.   Neurological:      Comments: Deeply sedated s/p cardiac surgery          Fluids    Intake/Output Summary (Last 24 hours) at 8/16/2023 1320  Last data filed at 8/16/2023 1303  Gross per 24 hour   Intake 1746.73 ml   Output 400 ml   Net 1346.73 ml       Laboratory  Recent Results (from the past 48 hour(s))   PreAdmit COD    Collection Time: 08/15/23 10:46 AM   Result Value Ref Range    ABO Grouping Only O     Rh Grouping Only NEG     Antibody Screen Scrn NEG    S. Aureus By PCR, Nasal Complete    Collection Time: 08/15/23 10:46 AM   Result Value Ref Range    Staph aureus by PCR Negative Negative    MRSA by PCR Negative Negative   CBC WITH DIFFERENTIAL    Collection Time: 08/15/23 10:49 AM   Result Value Ref Range    WBC 7.7 4.8 - 10.8 K/uL    RBC 4.68 4.20 - 5.40 M/uL    Hemoglobin 13.7 12.0 - 16.0 g/dL    Hematocrit 42.7 37.0 - 47.0 %    MCV 91.2 81.4 - 97.8 fL    MCH 29.3 27.0 - 33.0 pg    MCHC 32.1 (L) 32.2 - 35.5 g/dL    RDW 46.9 35.9 - 50.0 fL    Platelet Count 295 164 - 446 K/uL    MPV 10.1 9.0 - 12.9 fL    Neutrophils-Polys 56.80 44.00 - 72.00 %    Lymphocytes 25.90 22.00 - 41.00 %    Monocytes 9.50 0.00 - 13.40 %    Eosinophils 6.10 0.00 - 6.90 %    Basophils 1.30 0.00 - 1.80 %    Immature Granulocytes 0.40 0.00 - 0.90 %    Nucleated RBC 0.00 0.00 - 0.20 /100 WBC    Neutrophils (Absolute) 4.35 1.82 - 7.42 K/uL    Lymphs (Absolute) 1.99 1.00 - 4.80 K/uL    Monos (Absolute) 0.73 0.00 - 0.85 K/uL    Eos (Absolute) 0.47 0.00 - 0.51 K/uL     Baso (Absolute) 0.10 0.00 - 0.12 K/uL    Immature Granulocytes (abs) 0.03 0.00 - 0.11 K/uL    NRBC (Absolute) 0.00 K/uL   Comp Metabolic Panel    Collection Time: 08/15/23 10:49 AM   Result Value Ref Range    Sodium 142 135 - 145 mmol/L    Potassium 4.1 3.6 - 5.5 mmol/L    Chloride 104 96 - 112 mmol/L    Co2 28 20 - 33 mmol/L    Anion Gap 10.0 7.0 - 16.0    Glucose 90 65 - 99 mg/dL    Bun 21 8 - 22 mg/dL    Creatinine 0.60 0.50 - 1.40 mg/dL    Calcium 9.4 8.5 - 10.5 mg/dL    Correct Calcium 9.2 8.5 - 10.5 mg/dL    AST(SGOT) 22 12 - 45 U/L    ALT(SGPT) 20 2 - 50 U/L    Alkaline Phosphatase 79 30 - 99 U/L    Total Bilirubin 0.6 0.1 - 1.5 mg/dL    Albumin 4.3 3.2 - 4.9 g/dL    Total Protein 6.9 6.0 - 8.2 g/dL    Globulin 2.6 1.9 - 3.5 g/dL    A-G Ratio 1.7 g/dL   PT    Collection Time: 08/15/23 10:49 AM   Result Value Ref Range    PT 12.7 12.0 - 14.6 sec    INR 0.96 0.87 - 1.13   APTT    Collection Time: 08/15/23 10:49 AM   Result Value Ref Range    APTT 26.1 24.7 - 36.0 sec   HEMOGLOBIN A1C    Collection Time: 08/15/23 10:49 AM   Result Value Ref Range    Glycohemoglobin 5.8 (H) 4.0 - 5.6 %    Est Avg Glucose 120 mg/dL   ESTIMATED GFR    Collection Time: 08/15/23 10:49 AM   Result Value Ref Range    GFR (CKD-EPI) 94 >60 mL/min/1.73 m 2   URINALYSIS    Collection Time: 08/15/23 10:50 AM    Specimen: Urine   Result Value Ref Range    Color Yellow     Character Clear     Specific Gravity 1.011 <1.035    Ph 6.0 5.0 - 8.0    Glucose Negative Negative mg/dL    Ketones Negative Negative mg/dL    Protein Negative Negative mg/dL    Bilirubin Negative Negative    Urobilinogen, Urine 0.2 Negative    Nitrite Negative Negative    Leukocyte Esterase Negative Negative    Occult Blood Negative Negative    Micro Urine Req see below    Urine Drug Screen    Collection Time: 08/15/23 10:50 AM   Result Value Ref Range    Amphetamines Urine Negative Negative    Barbiturates Negative Negative    Benzodiazepines Negative Negative    Cocaine  Metabolite Negative Negative    Fentanyl, Urine Negative Negative    Methadone Negative Negative    Opiates Negative Negative    Oxycodone Negative Negative    Phencyclidine -Pcp Negative Negative    Propoxyphene Negative Negative    Cannabinoid Metab Negative Negative   EKG    Collection Time: 08/15/23 11:04 AM   Result Value Ref Range    Report       [facility]    Test Date:  2023-08-15  Pt Name:    IAN JOHNSON                   Department: [facility]  MRN:        [id number]                      Room:  Gender:     Female                       Technician: ELHELIO  :        [id number]                   Requested By:ROSITA BUCHANAN  Order #:    [id number]                    Reading MD: Ranjeet Enrique MD    Measurements  Intervals                                Axis  Rate:       66                           P:          33  NV:         204                          QRS:        -8  QRSD:       98                           T:          32  QT:         387  QTc:        406    Interpretive Statements  Sinus rhythm  Compared to ECG 2023 10:58:40  No significant changes  Electronically Signed On 08- 11:13:51 PDT by Ranjeet Enrique MD     ABO Rh Confirm    Collection Time: 23  7:06 AM   Result Value Ref Range    ABO Rh Confirm O NEG    Histology Request    Collection Time: 23  7:45 AM   Result Value Ref Range    Pathology Request Sent to Histo    POCT activated clotting time device results    Collection Time: 23  8:21 AM   Result Value Ref Range    Istat Activated Clotting Time 137 74 - 137 sec   POCT arterial blood gas device results    Collection Time: 23  8:21 AM   Result Value Ref Range    Ph 7.422 7.400 - 7.500    Pco2 39.1 (H) 26.0 - 37.0 mmHg    Po2 321 (H) 64 - 87 mmHg    Tco2 27 20 - 33 mmol/L    S02 100 (H) 93 - 99 %    Hco3 25.5 (H) 17.0 - 25.0 mmol/L    BE 1 -4 - 3 mmol/L    Body Temp see below degrees    Specimen Arterial    POCT sodium device results    Collection Time: 23   8:21 AM   Result Value Ref Range    Istat Sodium 139 135 - 145 mmol/L   POCT potassium device results    Collection Time: 08/16/23  8:21 AM   Result Value Ref Range    Istat Potassium 3.8 3.6 - 5.5 mmol/L   POCT ionized CA device results    Collection Time: 08/16/23  8:21 AM   Result Value Ref Range    Istat Ionized Calcium 1.22 1.10 - 1.30 mmol/L   POCT hematocrit and hemoglobin device results    Collection Time: 08/16/23  8:21 AM   Result Value Ref Range    Istat Hematocrit 36 (L) 37 - 47 %    Istat Hemoglobin 12.2 12.0 - 16.0 g/dL   POCT activated clotting time device results    Collection Time: 08/16/23  9:10 AM   Result Value Ref Range    Istat Activated Clotting Time >1000 (H) 74 - 137 sec   POCT arterial blood gas device results    Collection Time: 08/16/23  9:11 AM   Result Value Ref Range    Ph 7.445 7.400 - 7.500    Pco2 37.4 (H) 26.0 - 37.0 mmHg    Po2 382 (H) 64 - 87 mmHg    Tco2 27 20 - 33 mmol/L    S02 100 (H) 93 - 99 %    Hco3 25.7 (H) 17.0 - 25.0 mmol/L    BE 2 -4 - 3 mmol/L    Body Temp 36.0 C degrees    Ph Temp Gunner 7.460 7.400 - 7.500    Pco2 Temp Co 35.8 26.0 - 37.0 mmHg    Po2 Temp Cor 377 (H) 64 - 87 mmHg    Specimen Arterial    POCT sodium device results    Collection Time: 08/16/23  9:11 AM   Result Value Ref Range    Istat Sodium 139 135 - 145 mmol/L   POCT potassium device results    Collection Time: 08/16/23  9:11 AM   Result Value Ref Range    Istat Potassium 3.4 (L) 3.6 - 5.5 mmol/L   POCT ionized CA device results    Collection Time: 08/16/23  9:11 AM   Result Value Ref Range    Istat Ionized Calcium 1.15 1.10 - 1.30 mmol/L   POCT hematocrit and hemoglobin device results    Collection Time: 08/16/23  9:11 AM   Result Value Ref Range    Istat Hematocrit 34 (L) 37 - 47 %    Istat Hemoglobin 11.6 (L) 12.0 - 16.0 g/dL   POCT activated clotting time device results    Collection Time: 08/16/23  9:52 AM   Result Value Ref Range    Istat Activated Clotting Time >1000 (H) 74 - 137 sec   POCT  venous blood gas device results    Collection Time: 08/16/23  9:52 AM   Result Value Ref Range    Ph 7.470 (H) 7.310 - 7.450    Pco2 36.0 (L) 41.0 - 51.0 mmHg    Po2 49 (H) 25 - 40 mmHg    Tco2 27 20 - 33 mmol/L    SO2 87 %    Hco3 26.2 24.0 - 28.0 mmol/L    BE 2 -4 - 3 mmol/L    Body Temp 34.9 C degrees    Ph Temp Correc 7.502 (H) 7.310 - 7.450    Pco2 Temp Gunner 32.8 (L) 41.0 - 51.0 mmHg    Po2 Temp Corre 42 (H) 25 - 40 mmHg    Specimen Venous    POCT sodium device results    Collection Time: 08/16/23  9:52 AM   Result Value Ref Range    Istat Sodium 139 135 - 145 mmol/L   POCT potassium device results    Collection Time: 08/16/23  9:52 AM   Result Value Ref Range    Istat Potassium 4.1 3.6 - 5.5 mmol/L   POCT ionized CA device results    Collection Time: 08/16/23  9:52 AM   Result Value Ref Range    Istat Ionized Calcium 0.97 (L) 1.10 - 1.30 mmol/L   POCT hematocrit and hemoglobin device results    Collection Time: 08/16/23  9:52 AM   Result Value Ref Range    Istat Hematocrit 24 (L) 37 - 47 %    Istat Hemoglobin 8.2 (L) 12.0 - 16.0 g/dL   POCT arterial blood gas device results    Collection Time: 08/16/23  9:59 AM   Result Value Ref Range    Ph 7.478 7.400 - 7.500    Pco2 38.4 (H) 26.0 - 37.0 mmHg    Po2 454 (H) 64 - 87 mmHg    Tco2 30 20 - 33 mmol/L    S02 100 (H) 93 - 99 %    Hco3 28.5 (H) 17.0 - 25.0 mmol/L    BE 5 (H) -4 - 3 mmol/L    Body Temp 34.7 C degrees    Ph Temp Gunner 7.513 (H) 7.400 - 7.500    Pco2 Temp Co 34.7 26.0 - 37.0 mmHg    Po2 Temp Cor 440 (H) 64 - 87 mmHg    Specimen Arterial    POCT sodium device results    Collection Time: 08/16/23  9:59 AM   Result Value Ref Range    Istat Sodium 139 135 - 145 mmol/L   POCT potassium device results    Collection Time: 08/16/23  9:59 AM   Result Value Ref Range    Istat Potassium 4.4 3.6 - 5.5 mmol/L   POCT ionized CA device results    Collection Time: 08/16/23  9:59 AM   Result Value Ref Range    Istat Ionized Calcium 0.98 (L) 1.10 - 1.30 mmol/L   POCT  hematocrit and hemoglobin device results    Collection Time: 08/16/23  9:59 AM   Result Value Ref Range    Istat Hematocrit 25 (L) 37 - 47 %    Istat Hemoglobin 8.5 (L) 12.0 - 16.0 g/dL   POCT arterial blood gas device results    Collection Time: 08/16/23 10:28 AM   Result Value Ref Range    Ph 7.251 (LL) 7.400 - 7.500    Pco2 71.9 (HH) 26.0 - 37.0 mmHg    Po2 427 (H) 64 - 87 mmHg    Tco2 34 (H) 20 - 33 mmol/L    S02 100 (H) 93 - 99 %    Hco3 31.6 (H) 17.0 - 25.0 mmol/L    BE 4 (H) -4 - 3 mmol/L    Body Temp 34.0 C degrees    Ph Temp Gunner 7.292 (LL) 7.400 - 7.500    Pco2 Temp Co 63.1 (HH) 26.0 - 37.0 mmHg    Po2 Temp Cor 409 (H) 64 - 87 mmHg    Specimen Arterial    POCT sodium device results    Collection Time: 08/16/23 10:28 AM   Result Value Ref Range    Istat Sodium 137 135 - 145 mmol/L   POCT potassium device results    Collection Time: 08/16/23 10:28 AM   Result Value Ref Range    Istat Potassium 4.5 3.6 - 5.5 mmol/L   POCT ionized CA device results    Collection Time: 08/16/23 10:28 AM   Result Value Ref Range    Istat Ionized Calcium 1.05 (L) 1.10 - 1.30 mmol/L   POCT hematocrit and hemoglobin device results    Collection Time: 08/16/23 10:28 AM   Result Value Ref Range    Istat Hematocrit 25 (L) 37 - 47 %    Istat Hemoglobin 8.5 (L) 12.0 - 16.0 g/dL   POCT activated clotting time device results    Collection Time: 08/16/23 10:36 AM   Result Value Ref Range    Istat Activated Clotting Time 805 (H) 74 - 137 sec   POCT venous blood gas device results    Collection Time: 08/16/23 10:43 AM   Result Value Ref Range    Ph 7.370 7.310 - 7.450    Pco2 54.2 (H) 41.0 - 51.0 mmHg    Po2 48 (H) 25 - 40 mmHg    Tco2 33 20 - 33 mmol/L    SO2 81 %    Hco3 31.4 (H) 24.0 - 28.0 mmol/L    BE 5 (H) -4 - 3 mmol/L    Body Temp 34.5 C degrees    Ph Temp Correc 7.407 7.310 - 7.450    Pco2 Temp Gunner 48.6 41.0 - 51.0 mmHg    Po2 Temp Corre 40 25 - 40 mmHg    Specimen Venous    POCT sodium device results    Collection Time: 08/16/23  10:43 AM   Result Value Ref Range    Istat Sodium 139 135 - 145 mmol/L   POCT potassium device results    Collection Time: 08/16/23 10:43 AM   Result Value Ref Range    Istat Potassium 4.6 3.6 - 5.5 mmol/L   POCT ionized CA device results    Collection Time: 08/16/23 10:43 AM   Result Value Ref Range    Istat Ionized Calcium 1.04 (L) 1.10 - 1.30 mmol/L   POCT hematocrit and hemoglobin device results    Collection Time: 08/16/23 10:43 AM   Result Value Ref Range    Istat Hematocrit 26 (L) 37 - 47 %    Istat Hemoglobin 8.8 (L) 12.0 - 16.0 g/dL   POCT arterial blood gas device results    Collection Time: 08/16/23 11:01 AM   Result Value Ref Range    Ph 7.406 7.400 - 7.500    Pco2 50.1 (H) 26.0 - 37.0 mmHg    Po2 399 (H) 64 - 87 mmHg    Tco2 33 20 - 33 mmol/L    S02 100 (H) 93 - 99 %    Hco3 31.5 (H) 17.0 - 25.0 mmol/L    BE 6 (H) -4 - 3 mmol/L    Body Temp 35.8 C degrees    Ph Temp Gunner 7.424 7.400 - 7.500    Pco2 Temp Co 47.6 (H) 26.0 - 37.0 mmHg    Po2 Temp Cor 392 (H) 64 - 87 mmHg    Specimen Arterial    POCT sodium device results    Collection Time: 08/16/23 11:01 AM   Result Value Ref Range    Istat Sodium 137 135 - 145 mmol/L   POCT potassium device results    Collection Time: 08/16/23 11:01 AM   Result Value Ref Range    Istat Potassium 4.5 3.6 - 5.5 mmol/L   POCT ionized CA device results    Collection Time: 08/16/23 11:01 AM   Result Value Ref Range    Istat Ionized Calcium 1.01 (L) 1.10 - 1.30 mmol/L   POCT hematocrit and hemoglobin device results    Collection Time: 08/16/23 11:01 AM   Result Value Ref Range    Istat Hematocrit 25 (L) 37 - 47 %    Istat Hemoglobin 8.5 (L) 12.0 - 16.0 g/dL   POCT activated clotting time device results    Collection Time: 08/16/23 11:06 AM   Result Value Ref Range    Istat Activated Clotting Time 732 (H) 74 - 137 sec   POCT activated clotting time device results    Collection Time: 08/16/23 11:46 AM   Result Value Ref Range    Istat Activated Clotting Time 131 74 - 137 sec    POCT arterial blood gas device results    Collection Time: 23 11:47 AM   Result Value Ref Range    Ph 7.512 (H) 7.400 - 7.500    Pco2 38.6 (H) 26.0 - 37.0 mmHg    Po2 257 (H) 64 - 87 mmHg    Tco2 32 20 - 33 mmol/L    S02 100 (H) 93 - 99 %    Hco3 30.9 (H) 17.0 - 25.0 mmol/L    BE 7 (H) -4 - 3 mmol/L    Body Temp 36.7 C degrees    Ph Temp Gunner 7.516 (H) 7.400 - 7.500    Pco2 Temp Co 38.1 (H) 26.0 - 37.0 mmHg    Po2 Temp Cor 256 (H) 64 - 87 mmHg    Specimen Arterial    POCT sodium device results    Collection Time: 23 11:47 AM   Result Value Ref Range    Istat Sodium 141 135 - 145 mmol/L   POCT potassium device results    Collection Time: 23 11:47 AM   Result Value Ref Range    Istat Potassium 3.6 3.6 - 5.5 mmol/L   POCT ionized CA device results    Collection Time: 23 11:47 AM   Result Value Ref Range    Istat Ionized Calcium 1.25 1.10 - 1.30 mmol/L   POCT hematocrit and hemoglobin device results    Collection Time: 23 11:47 AM   Result Value Ref Range    Istat Hematocrit 26 (L) 37 - 47 %    Istat Hemoglobin 8.8 (L) 12.0 - 16.0 g/dL   Platelet count    Collection Time: 23 12:20 PM   Result Value Ref Range    Platelet Count 186 164 - 446 K/uL   Magnesium    Collection Time: 23 12:20 PM   Result Value Ref Range    Magnesium 3.5 (H) 1.5 - 2.5 mg/dL   HEMOGLOBIN AND HEMATOCRIT    Collection Time: 23 12:20 PM   Result Value Ref Range    Hemoglobin 11.1 (L) 12.0 - 16.0 g/dL    Hematocrit 32.5 (L) 37.0 - 47.0 %   POTASSIUM SERUM (K)    Collection Time: 23 12:20 PM   Result Value Ref Range    Potassium 3.4 (L) 3.6 - 5.5 mmol/L   EKG on arrival to U    Collection Time: 23 12:20 PM   Result Value Ref Range    Report       [facility]    Test Date:  2023  Pt Name:    IAN JOHNSON                   Department: 161  MRN:        [id number]                      Room:       T626  Gender:     Female                       Technician: EUN  :        1950                    Requested By:ARMIDA SMITH IV  Order #:    [id number]                    Reading MD:    Measurements  Intervals                                Axis  Rate:       55                           P:          53  VT:         236                          QRS:        -43  QRSD:       99                           T:          75  QT:         477  QTc:        457    Interpretive Statements  Sinus bradycardia  Prolonged VT interval  Left anterior fascicular block  Compared to ECG 08/15/2023 11:04:28  First degree AV block now present  Left anterior fascicular block now present  Sinus rhythm no longer present     POCT arterial blood gas device results    Collection Time: 08/16/23 12:25 PM   Result Value Ref Range    Ph 7.547 (H) 7.400 - 7.500    Pco2 32.1 26.0 - 37.0 mmHg    Po2 245 (H) 64 - 87 mmHg    Tco2 29 20 - 33 mmol/L    S02 100 (H) 93 - 99 %    Hco3 27.9 (H) 17.0 - 25.0 mmol/L    BE 5 (H) -4 - 3 mmol/L    Body Temp 36.2 C degrees    O2 Therapy 100 %    iPF Ratio 245     Ph Temp Gunner 7.560 (H) 7.400 - 7.500    Pco2 Temp Co 31.0 26.0 - 37.0 mmHg    Po2 Temp Cor 241 (H) 64 - 87 mmHg    Specimen Arterial     [place] Test N/A     DelSys Vent     End Tidal Carbon Dioxide 23 mmhg    Peep End Expiratory Pressure 8 cmh20    Percent Minute Volume 160     Mode ASV    POCT sodium device results    Collection Time: 08/16/23 12:25 PM   Result Value Ref Range    Istat Sodium 143 135 - 145 mmol/L   POCT potassium device results    Collection Time: 08/16/23 12:25 PM   Result Value Ref Range    Istat Potassium 3.4 (L) 3.6 - 5.5 mmol/L   POCT ionized CA device results    Collection Time: 08/16/23 12:25 PM   Result Value Ref Range    Istat Ionized Calcium 1.25 1.10 - 1.30 mmol/L       Imaging  EC-YESSI W/O CONT         DX-CHEST-PORTABLE (1 VIEW)   Final Result      1.  Low lung volumes. Medial left base atelectasis.   2.  Support apparatus as above.          Assessment/Plan  Encounter for weaning from ventilator  (HCC)  Assessment & Plan  08/16/23 intubated for cardiac surgery    Lung protective ventilation strategies  Optimize oxygenation, ventilation, and acid base balance  ABCDEF Bundle     S/P AVR  Assessment & Plan  08/16/23 [place]  AORTIC VALVE REPLACEMENT (23 mm Inspiris Díaz bovine pericardial valve), aortic root enlargement (2 cm wide bovine pericardial patch)    Monitor and optimize hemodynamics  Goals SBP< 120 mmHg, MAP> 65  Titrate vasoactive/ionotropic medications vs BP reducing medications to achieve hemodynamic goals.  Maintain intravascular euvolemia  Monitor chest tube output for postoperative hemorrhage.     S/P CABG x 1  Assessment & Plan  08/16/23 [place]  Normal LVEF    Monitor and optimize hemodynamics  Goals SBP< 120 mmHg, MAP> 65  Titrate vasoactive/ionotropic medications vs BP reducing medications to achieve hemodynamic goals.  Maintain intravascular euvolemia  Monitor chest tube output for postoperative hemorrhage.     Hashimoto's thyroiditis s/p total thyroidectom Dr. Franklin in 2/2020. - (present on admission)  Assessment & Plan  Continue home synthroid     Pure hypercholesterolemia- (present on admission)  Assessment & Plan  Continue statin    Benign essential HTN- (present on admission)  Assessment & Plan  Reintroduce oral antihypertensives when clinically appropriate         Discussed patient condition and risk of morbidity and/or mortality with RN, RT, Pharmacy, and Charge nurse / hot rounds.    The patient remains critically ill.  Critical care time = 52 minutes in directly providing and coordinating critical care and extensive data review.  No time overlap and excludes procedures.

## 2023-08-16 NOTE — OR SURGEON
OPERATIVE REPORT    Operation date: 8/16/2023    [facility] physician: Harsh Cortez MD    PreOp Diagnosis: Severe aortic regurgitation, disease, hypertension, hyperlipidemia, s/p thyroidectomy for Hashimoto's thyroiditis, s/p bilateral total knee replacements    PostOp Diagnosis: Severe aortic regurgitation, coronary artery disease, hypertension, hyperlipidemia, s/p thyroidectomy for Hashimoto's thyroiditis, s/p bilateral total knee replacements    Procedure(s):  CORONARY ARTERY BYPASS GRAFTING x1 (left internal mammary artery to the left anterior descending artery), AORTIC VALVE REPLACEMENT (23 mm Inspiris Díaz bovine pericardial valve), aortic root enlargement (2 cm wide bovine pericardial patch) and intraoperative transesophageal echocardiography    Surgeon(s):  Tammy Lawrence M.D.    Assistant:  LOLY Durbin    Anesthesiologist/Type of Anesthesia:  Anesthesiologist: Hao Marx M.D./General    Surgical Staff:  Assistant: BIANCA Herrera  Circulator: Alexys Pearson R.N.; Harini Cortes R.N.  Operating Room Assistant (ORA): Britton Meyers  Perfusionist: Jorge Rousseau  Scrub Person: Delores Chen    Specimens removed if any:  ID Type Source Tests Collected by Time Destination   A : Aortic valve Tissue Heart Valve PATHOLOGY SPECIMEN Tammy Lawrence M.D. 8/16/2023 10:06 AM        Estimated Blood Loss: Minimal    Findings: At least moderate aortic regurgitation, mild mitral regurgitation, LVEF approximately 60%    Complications: None    Indications:  Ms. Hopkins 72-year-old female with severe symptomatic aortic regurgitation and single-vessel coronary artery disease.    Procedure:  The patient was brought to the operating room and placed on the operating room table in the supine position.  After successful induction of general anesthesia endotracheal intubation, the patient was prepped and draped in the usual sterile fashion.  LOLY Durbin assisted with retraction and exposure  during the operation and closed the sternal wound.  Intraoperative transesophageal echocardiography showed at least moderate aortic regurgitation, mild mitral regurgitation and good left ventricular ejection fraction of approximately 60%.    An incision was made from the sternal notch to the xiphoid.  The sternum was opened longitudinally with a sternal saw.  Hemostasis was obtained with electrocautery at the sternal edges.  The left internal mammary artery was then dissected out in the usual fashion.  It had a 1.5 mm distal luminal diameter normal vessel wall and good flow.  The patient was systemically heparinized.  Papaverine was instilled in the left internal mammary artery lumen.  The pericardium was opened longitudinally and tented anteriorly with Ethibond stay stitches.  The aortic cannula was inserted first followed by a dual stage venous cannula.  An antegrade cardioplegia cannula was placed in the ascending aorta.  Cardiopulmonary bypass was instituted.  The aorta was crossclamped and the patient was given 1 L of cold [place] cardioplegia in an antegrade fashion.  There was prompt cardiac arrest.  Ice slush was placed on the heart for further myocardial protection.  A phrenic nerve protector pad was used.  The left anterior descending artery had a 1.5 mm luminal diameter and was free of plaques and calcifications.  An end-to-side anastomosis was performed between the left internal mammary artery and the left anterior descending artery.    A transverse aortotomy was performed and the incision was carried into the noncoronary sinus and through the annulus in order to perform a root enlargement.  This was done with a 2 cm wide bovine pericardial patch which was attached in place with a #5-0 Prolene suture in a running fashion.  The aortic valve leaflets were excised.  Pledgeted #2-0 Ethibond stitches were then placed around the aortic valve annulus in a horizontal mattress fashion with the pledgets in the  subannular position.  A 23 mm Inspiris Díaz bovine pericardial valve was then placed in the aortic valve annulus and secured in place with the Ethibond stitches utilizing the CorKnot device.  The valve was properly positioned and both coronary ostia were visualized and were patent.  Rewarming of the patient was initiated.  The remainder of the aortotomy was closed in 2 layers using #5-0 Prolene sutures.  Approximately 300 mL of warm blood was given in an antegrade fashion and the aortic cross-clamp was removed.  Aortic cross-clamp time was 98 minutes and total cardiopulmonary bypass time was 111 minutes.  The left ventricle was de-aired in the usual fashion.  The carbon dioxide which had been released over the operative field during the operation was discontinued.  A straight and an angled 32 Kinyarwanda chest tubes were placed in the mediastinum.  The patient was electrically cardioverted into third-degree heart block.  Temporary epicardial ventricular pacemaker wires were inserted and the patient was externally paced in the ventricular fashion (VVI) at a rate of 80.  The antegrade cardioplegia cannula was removed.  When she was adequately warmed, she was slowly taken off cardiopulmonary bypass which she tolerated well.  The dual stage venous cannula was removed.  Protamine was given to reverse the effects of the heparin.  The aortic cannula was removed.  When adequate hemostasis had been obtained, the sternum was reapproximated using size 5 sternal wires and the remainder of the incision was closed in several layers using Vicryl sutures.    Intraoperative transesophageal echocardiography showed a properly positioned and functioning aortic valve bioprosthesis without any paravalvular or central leaks.  There was mild to moderate mitral regurgitation and her left ventricular ejection fraction remained normal approximately 60%.  There were no apparent complications.  The patient tolerated the procedure well and left  the operating room in guarded condition.      8/16/2023 11:50 AM Tammy Lawrence MD. FACS

## 2023-08-16 NOTE — ANESTHESIA PROCEDURE NOTES
Airway    Date/Time: 8/16/2023 8:09 AM    Performed by: Hao Marx M.D.  Authorized by: Hao Marx M.D.    Location:  OR  Urgency:  Elective  Difficult Airway: No    Indications for Airway Management:  Anesthesia      Spontaneous Ventilation: absent    Sedation Level:  Deep  Preoxygenated: Yes    Patient Position:  Sniffing  Mask Difficulty Assessment:  0 - not attempted  Final Airway Type:  Endotracheal airway  Final Endotracheal Airway:  ETT  Cuffed: Yes    Technique Used for Successful ETT Placement:  Direct laryngoscopy    Insertion Site:  Oral  Blade Type:  Rohit  Laryngoscope Blade/Videolaryngoscope Blade Size:  3  ETT Size (mm):  8.0  Measured from:  Teeth  ETT to Teeth (cm):  22  Placement Verified by: auscultation and capnometry    Cormack-Lehane Classification:  Grade I - full view of glottis  Number of Attempts at Approach:  1

## 2023-08-16 NOTE — PROGRESS NOTES
Breathing 100% spontaneously on Spont mode, but EtCO2 increased to 50-52. ABG PCo2 45.6. Still lethargic, placed back on %.

## 2023-08-16 NOTE — ASSESSMENT & PLAN NOTE
08/16/23 Melinda  Normal LVEF    Monitor and optimize hemodynamics  Goals SBP< 120 mmHg, MAP> 65  Titrate vasoactive/ionotropic medications vs BP reducing medications to achieve hemodynamic goals.  Maintain intravascular euvolemia  Monitor chest tube output for postoperative hemorrhage.

## 2023-08-16 NOTE — PROGRESS NOTES
The patient Margoth Hopkins is a 72-year-old female with history of hypertension, hyperlipidemia, status post thyroidectomy for Hashimoto's thyroiditis, status post bilateral total knee replacements, moderate to severe aortic regurgitation and coronary artery disease.  There have been no interval changes in her H&P.  Plan to proceed today with scheduled surgery of aortic valve replacement, coronary artery bypass grafting x1 with possible endoscopic vein harvesting and intraoperative transesophageal echocardiography on Wednesday, August 16, 2023.

## 2023-08-16 NOTE — PROGRESS NOTES
1215: Patient arrived to unit with Dr. Marx and OR team. Dr. Banegas at bedside. Report received, lines/drips verified. Arrived with Levo, Dex, Insulin,  gtts infusing, see MAR.    1400: Patient moves all extremities, follows commands, though still lethargic, and breathing spontaneously intermittently on 130% ASV.

## 2023-08-16 NOTE — ANESTHESIA PROCEDURE NOTES
Arterial Line    Performed by: Hao Marx M.D.  Authorized by: Hao Marx M.D.    Start Time:  8/16/2023 8:14 AM  End Time:  8/16/2023 8:16 AM  Localization: surface landmarks    Patient Location:  OR  Indication: continuous blood pressure monitoring        Catheter Size:  20 G  Seldinger Technique?: Yes    Laterality:  Right  Site:  Radial artery  Line Secured:  Antimicrobial disc, tape and transparent dressing  Events: patient tolerated procedure well with no complications

## 2023-08-16 NOTE — ANESTHESIA TIME REPORT
Anesthesia Start and Stop Event Times     Date Time Event    8/16/2023 0754 Ready for Procedure     0805 Anesthesia Start     1222 Anesthesia Stop        Responsible Staff  08/16/23    Name Role Begin End    Hao Marx M.D. Anesth 0805 1222        Overtime Reason:  no overtime (within assigned shift)    Comments:

## 2023-08-17 ENCOUNTER — APPOINTMENT (OUTPATIENT)
Dept: RADIOLOGY | Facility: MEDICAL CENTER | Age: 73
DRG: 219 | End: 2023-08-17
Payer: COMMERCIAL

## 2023-08-17 LAB
ANION GAP SERPL CALC-SCNC: 12 MMOL/L (ref 7–16)
BUN SERPL-MCNC: 26 MG/DL (ref 8–22)
CALCIUM SERPL-MCNC: 8.7 MG/DL (ref 8.5–10.5)
CHLORIDE SERPL-SCNC: 108 MMOL/L (ref 96–112)
CO2 SERPL-SCNC: 24 MMOL/L (ref 20–33)
CREAT SERPL-MCNC: 0.68 MG/DL (ref 0.5–1.4)
EKG IMPRESSION: NORMAL
EKG IMPRESSION: NORMAL
ERYTHROCYTE [DISTWIDTH] IN BLOOD BY AUTOMATED COUNT: 47 FL (ref 35.9–50)
GFR SERPLBLD CREATININE-BSD FMLA CKD-EPI: 91 ML/MIN/1.73 M 2
GLUCOSE BLD STRIP.AUTO-MCNC: 114 MG/DL (ref 65–99)
GLUCOSE BLD STRIP.AUTO-MCNC: 118 MG/DL (ref 65–99)
GLUCOSE BLD STRIP.AUTO-MCNC: 126 MG/DL (ref 65–99)
GLUCOSE BLD STRIP.AUTO-MCNC: 132 MG/DL (ref 65–99)
GLUCOSE BLD STRIP.AUTO-MCNC: 137 MG/DL (ref 65–99)
GLUCOSE BLD STRIP.AUTO-MCNC: 137 MG/DL (ref 65–99)
GLUCOSE BLD STRIP.AUTO-MCNC: 138 MG/DL (ref 65–99)
GLUCOSE BLD STRIP.AUTO-MCNC: 140 MG/DL (ref 65–99)
GLUCOSE BLD STRIP.AUTO-MCNC: 141 MG/DL (ref 65–99)
GLUCOSE BLD STRIP.AUTO-MCNC: 143 MG/DL (ref 65–99)
GLUCOSE BLD STRIP.AUTO-MCNC: 145 MG/DL (ref 65–99)
GLUCOSE BLD STRIP.AUTO-MCNC: 148 MG/DL (ref 65–99)
GLUCOSE BLD STRIP.AUTO-MCNC: 150 MG/DL (ref 65–99)
GLUCOSE BLD STRIP.AUTO-MCNC: 151 MG/DL (ref 65–99)
GLUCOSE BLD STRIP.AUTO-MCNC: 152 MG/DL (ref 65–99)
GLUCOSE BLD STRIP.AUTO-MCNC: 160 MG/DL (ref 65–99)
GLUCOSE BLD STRIP.AUTO-MCNC: 167 MG/DL (ref 65–99)
GLUCOSE BLD STRIP.AUTO-MCNC: 173 MG/DL (ref 65–99)
GLUCOSE BLD STRIP.AUTO-MCNC: 186 MG/DL (ref 65–99)
GLUCOSE BLD STRIP.AUTO-MCNC: 193 MG/DL (ref 65–99)
GLUCOSE BLD STRIP.AUTO-MCNC: 362 MG/DL (ref 65–99)
GLUCOSE SERPL-MCNC: 157 MG/DL (ref 65–99)
HCT VFR BLD AUTO: 35.9 % (ref 37–47)
HGB BLD-MCNC: 11.8 G/DL (ref 12–16)
LV EJECT FRACT  99904: 55
MCH RBC QN AUTO: 29.1 PG (ref 27–33)
MCHC RBC AUTO-ENTMCNC: 32.9 G/DL (ref 32.2–35.5)
MCV RBC AUTO: 88.6 FL (ref 81.4–97.8)
PLATELET # BLD AUTO: 203 K/UL (ref 164–446)
PMV BLD AUTO: 10.8 FL (ref 9–12.9)
POTASSIUM SERPL-SCNC: 3.9 MMOL/L (ref 3.6–5.5)
POTASSIUM SERPL-SCNC: 3.9 MMOL/L (ref 3.6–5.5)
POTASSIUM SERPL-SCNC: 4.1 MMOL/L (ref 3.6–5.5)
RBC # BLD AUTO: 4.05 M/UL (ref 4.2–5.4)
SODIUM SERPL-SCNC: 144 MMOL/L (ref 135–145)
WBC # BLD AUTO: 13.3 K/UL (ref 4.8–10.8)

## 2023-08-17 PROCEDURE — 94669 MECHANICAL CHEST WALL OSCILL: CPT

## 2023-08-17 PROCEDURE — 700102 HCHG RX REV CODE 250 W/ 637 OVERRIDE(OP)

## 2023-08-17 PROCEDURE — 82962 GLUCOSE BLOOD TEST: CPT | Mod: 91

## 2023-08-17 PROCEDURE — 700111 HCHG RX REV CODE 636 W/ 250 OVERRIDE (IP): Mod: JZ,JG

## 2023-08-17 PROCEDURE — 84132 ASSAY OF SERUM POTASSIUM: CPT | Mod: 91

## 2023-08-17 PROCEDURE — A9270 NON-COVERED ITEM OR SERVICE: HCPCS

## 2023-08-17 PROCEDURE — 71045 X-RAY EXAM CHEST 1 VIEW: CPT

## 2023-08-17 PROCEDURE — 85027 COMPLETE CBC AUTOMATED: CPT

## 2023-08-17 PROCEDURE — 700111 HCHG RX REV CODE 636 W/ 250 OVERRIDE (IP): Performed by: THORACIC SURGERY (CARDIOTHORACIC VASCULAR SURGERY)

## 2023-08-17 PROCEDURE — C9248 INJ, CLEVIDIPINE BUTYRATE: HCPCS | Mod: JZ,JG

## 2023-08-17 PROCEDURE — 80048 BASIC METABOLIC PNL TOTAL CA: CPT

## 2023-08-17 PROCEDURE — 770022 HCHG ROOM/CARE - ICU (200)

## 2023-08-17 PROCEDURE — 99024 POSTOP FOLLOW-UP VISIT: CPT | Performed by: THORACIC SURGERY (CARDIOTHORACIC VASCULAR SURGERY)

## 2023-08-17 PROCEDURE — 93005 ELECTROCARDIOGRAM TRACING: CPT

## 2023-08-17 PROCEDURE — 93010 ELECTROCARDIOGRAM REPORT: CPT | Performed by: INTERNAL MEDICINE

## 2023-08-17 PROCEDURE — 99291 CRITICAL CARE FIRST HOUR: CPT | Performed by: INTERNAL MEDICINE

## 2023-08-17 RX ORDER — METOLAZONE 5 MG/1
2.5 TABLET ORAL
Status: DISCONTINUED | OUTPATIENT
Start: 2023-08-17 | End: 2023-08-18

## 2023-08-17 RX ORDER — POTASSIUM CHLORIDE 20 MEQ/1
10 TABLET, EXTENDED RELEASE ORAL 2 TIMES DAILY
Status: DISCONTINUED | OUTPATIENT
Start: 2023-08-17 | End: 2023-08-17

## 2023-08-17 RX ORDER — FUROSEMIDE 10 MG/ML
20 INJECTION INTRAMUSCULAR; INTRAVENOUS 2 TIMES DAILY
Status: DISCONTINUED | OUTPATIENT
Start: 2023-08-17 | End: 2023-08-19

## 2023-08-17 RX ORDER — POTASSIUM CHLORIDE 7.45 MG/ML
10 INJECTION INTRAVENOUS ONCE
Status: COMPLETED | OUTPATIENT
Start: 2023-08-17 | End: 2023-08-17

## 2023-08-17 RX ORDER — FUROSEMIDE 10 MG/ML
20 INJECTION INTRAMUSCULAR; INTRAVENOUS 2 TIMES DAILY
Status: DISCONTINUED | OUTPATIENT
Start: 2023-08-17 | End: 2023-08-17

## 2023-08-17 RX ORDER — DEXTROSE MONOHYDRATE 25 G/50ML
25 INJECTION, SOLUTION INTRAVENOUS
Status: DISCONTINUED | OUTPATIENT
Start: 2023-08-17 | End: 2023-08-19

## 2023-08-17 RX ORDER — POTASSIUM CHLORIDE 20 MEQ/1
10 TABLET, EXTENDED RELEASE ORAL 2 TIMES DAILY
Status: DISCONTINUED | OUTPATIENT
Start: 2023-08-17 | End: 2023-08-19

## 2023-08-17 RX ORDER — LISINOPRIL 5 MG/1
5 TABLET ORAL
Status: DISCONTINUED | OUTPATIENT
Start: 2023-08-17 | End: 2023-08-23 | Stop reason: HOSPADM

## 2023-08-17 RX ADMIN — OXYCODONE HYDROCHLORIDE 10 MG: 10 TABLET ORAL at 17:41

## 2023-08-17 RX ADMIN — Medication 1 APPLICATOR: at 10:16

## 2023-08-17 RX ADMIN — ACETAMINOPHEN 1000 MG: 500 TABLET, FILM COATED ORAL at 13:23

## 2023-08-17 RX ADMIN — LISINOPRIL 5 MG: 5 TABLET ORAL at 19:53

## 2023-08-17 RX ADMIN — CLOPIDOGREL BISULFATE 75 MG: 75 TABLET ORAL at 06:07

## 2023-08-17 RX ADMIN — POTASSIUM CHLORIDE 10 MEQ: 7.46 INJECTION, SOLUTION INTRAVENOUS at 08:15

## 2023-08-17 RX ADMIN — ONDANSETRON 8 MG: 2 INJECTION INTRAMUSCULAR; INTRAVENOUS at 05:55

## 2023-08-17 RX ADMIN — POTASSIUM CHLORIDE 10 MEQ: 1500 TABLET, EXTENDED RELEASE ORAL at 17:41

## 2023-08-17 RX ADMIN — PREGABALIN 50 MG: 25 CAPSULE ORAL at 17:42

## 2023-08-17 RX ADMIN — Medication 1 APPLICATOR: at 19:54

## 2023-08-17 RX ADMIN — POLYETHYLENE GLYCOL 3350 1 PACKET: 17 POWDER, FOR SOLUTION ORAL at 06:08

## 2023-08-17 RX ADMIN — METOLAZONE 2.5 MG: 5 TABLET ORAL at 20:01

## 2023-08-17 RX ADMIN — ENOXAPARIN SODIUM 40 MG: 100 INJECTION SUBCUTANEOUS at 17:42

## 2023-08-17 RX ADMIN — MAGNESIUM SULFATE IN DEXTROSE 1 G: 10 INJECTION, SOLUTION INTRAVENOUS at 06:24

## 2023-08-17 RX ADMIN — OMEPRAZOLE 20 MG: 20 CAPSULE, DELAYED RELEASE ORAL at 06:07

## 2023-08-17 RX ADMIN — PREGABALIN 50 MG: 25 CAPSULE ORAL at 06:07

## 2023-08-17 RX ADMIN — BUSPIRONE HYDROCHLORIDE 10 MG: 10 TABLET ORAL at 17:41

## 2023-08-17 RX ADMIN — FUROSEMIDE 20 MG: 10 INJECTION, SOLUTION INTRAVENOUS at 17:42

## 2023-08-17 RX ADMIN — METOPROLOL TARTRATE 12.5 MG: 25 TABLET, FILM COATED ORAL at 06:07

## 2023-08-17 RX ADMIN — ASPIRIN 81 MG: 81 TABLET, COATED ORAL at 06:06

## 2023-08-17 RX ADMIN — DULOXETINE HYDROCHLORIDE 40 MG: 20 CAPSULE, DELAYED RELEASE ORAL at 06:19

## 2023-08-17 RX ADMIN — CLEVIPIDINE 5 MG/HR: 0.5 EMULSION INTRAVENOUS at 02:41

## 2023-08-17 RX ADMIN — SENNOSIDES AND DOCUSATE SODIUM 2 TABLET: 50; 8.6 TABLET ORAL at 06:07

## 2023-08-17 RX ADMIN — POTASSIUM CHLORIDE 10 MEQ: 7.46 INJECTION, SOLUTION INTRAVENOUS at 02:42

## 2023-08-17 RX ADMIN — SENNOSIDES AND DOCUSATE SODIUM 2 TABLET: 50; 8.6 TABLET ORAL at 17:41

## 2023-08-17 RX ADMIN — OXYCODONE HYDROCHLORIDE 10 MG: 10 TABLET ORAL at 22:22

## 2023-08-17 RX ADMIN — ACETAMINOPHEN 1000 MG: 500 TABLET, FILM COATED ORAL at 06:07

## 2023-08-17 RX ADMIN — ACETAMINOPHEN 1000 MG: 500 TABLET, FILM COATED ORAL at 17:42

## 2023-08-17 RX ADMIN — OXYCODONE HYDROCHLORIDE 10 MG: 10 TABLET ORAL at 04:15

## 2023-08-17 RX ADMIN — OXYCODONE HYDROCHLORIDE 10 MG: 10 TABLET ORAL at 08:04

## 2023-08-17 RX ADMIN — DULOXETINE HYDROCHLORIDE 40 MG: 20 CAPSULE, DELAYED RELEASE ORAL at 17:41

## 2023-08-17 RX ADMIN — LEVOTHYROXINE SODIUM 75 MCG: 0.07 TABLET ORAL at 06:07

## 2023-08-17 RX ADMIN — METOPROLOL TARTRATE 12.5 MG: 25 TABLET, FILM COATED ORAL at 17:41

## 2023-08-17 RX ADMIN — ROSUVASTATIN CALCIUM 40 MG: 20 TABLET, FILM COATED ORAL at 17:41

## 2023-08-17 ASSESSMENT — PAIN DESCRIPTION - PAIN TYPE
TYPE: SURGICAL PAIN

## 2023-08-17 NOTE — DISCHARGE PLANNING
Discharge Appointments/outpatient referrals/ and  set up:    Cardiac surgery follow up appointment made for 4-5 weeks out    Hospital discharge team/schedulers called for cardiology f/u appointment for MD or APRN within 3-4 weeks if possible.    Aortic surveillance program/vascular medicine referral not needed, orders not placed.    Anticoagulation referral/ coumadin clinic referral not needed and orders not placed .    INR draws are not indicated for AVR CABG  1 procedure.    Will await PT/OT notes for further DC needs

## 2023-08-17 NOTE — PROGRESS NOTES
4 Eyes Skin Assessment Completed by CYNTHIA Ricks and CYNTHIA Rodriguez.    Head WDL  Ears WDL  Nose WDL  Mouth WDL  Neck WDL  Breast/Chest Incision  Shoulder Blades WDL  Spine WDL  (R) Arm/Elbow/Hand Bruising  (L) Arm/Elbow/Hand Bruising  Abdomen WDL  Groin WDL  Scrotum/Coccyx/Buttocks WDL  (R) Leg WDL  (L) Leg WDL  (R) Heel/Foot/Toe WDL  (L) Heel/Foot/Toe WDL          Devices In Places ECG, Blood Pressure Cuff, Pulse Ox, Quiles, Arterial Line, Central Line, and Nasal Cannula      Interventions In Place NC W/Ear Foams, Pillows, Q2 Turns, and Pressure Redistribution Mattress    Possible Skin Injury No    Pictures Uploaded Into Epic N/A  Wound Consult Placed N/A  RN Wound Prevention Protocol Ordered No

## 2023-08-17 NOTE — PROGRESS NOTES
Cardiovascular Surgery Progress Note    Name: Margoth Hopkins  MRN: [id number]  : 1950  Admit Date: 2023  5:14 AM  1 Day Post-Op     Procedure:  Procedure(s) and Anesthesia Type:     * CORONARY ARTERY BYPASS GRAFT X1 - General     * REPLACEMENT, AORTIC VALVE - General     * ECHOCARDIOGRAM, TRANSESOPHAGEAL, INTRAOPERATIVE - General    Vitals:  Vitals:    23 0630 23 0639 23 0700 23 0800   BP:   112/64 90/58   Pulse: 82 (!) 104 72 70   Resp: 14  15   Temp:       TempSrc:       SpO2: 95% 94% 96% 97%   Weight:       Height:          Temp (24hrs), Av.4 °C (97.5 °F), Min:36.3 °C (97.3 °F), Max:36.5 °C (97.7 °F)      Respiratory:  Vent Mode: Spont, PEEP/CPAP: 8, PIP: 17 Respiration: 15, Pulse Oximetry: 97 %       Fluids:    Intake/Output Summary (Last 24 hours) at 2023 0831  Last data filed at 2023 0629  Gross per 24 hour   Intake 4276.35 ml   Output 2160 ml   Net 2116.35 ml     Admit weight: Weight: 77.8 kg (171 lb 8.3 oz)  Current weight: Weight: 77.8 kg (171 lb 8.3 oz) (23 0630)    Labs:  Recent Labs     08/15/23  1049 23  1220 23  0205   WBC 7.7  --  13.3*   RBC 4.68  --  4.05*   HEMOGLOBIN 13.7 11.1* 11.8*   HEMATOCRIT 42.7 32.5* 35.9*   MCV 91.2  --  88.6   MCH 29.3  --  29.1   MCHC 32.1*  --  32.9   RDW 46.9  --  47.0   PLATELETCT 295 186 203   MPV 10.1  --  10.8     Recent Labs     08/15/23  1049 23  1220 23  0005 23  0205 23  0525   SODIUM 142  --   --  144  --    POTASSIUM 4.1   < > 4.1 3.9 3.9   CHLORIDE 104  --   --  108  --    CO2 28  --   --  24  --    GLUCOSE 90  --   --  157*  --    BUN 21  --   --  26*  --    CREATININE 0.60  --   --  0.68  --    CALCIUM 9.4  --   --  8.7  --     < > = values in this interval not displayed.     Recent Labs     08/15/23  1049 23  1220   APTT 26.1 32.4   INR 0.96 1.28*           Medications:  Scheduled Medications   Medication Dose Frequency    potassium chloride  10 mEq  Once    furosemide  20 mg BID    potassium chloride SA  10 mEq BID    busPIRone  10 mg Q EVENING    DULoxetine  40 mg BID    pregabalin  50 mg BID    rosuvastatin  40 mg Q EVENING    levothyroxine  75 mcg AM ES    Nozin nasal  swab  1 Applicator BID    magnesium sulfate  1 g DAILY    metoprolol tartrate  12.5 mg BID    Followed by    [START ON 8/18/2023] metoprolol tartrate  25 mg BID    aspirin  81 mg DAILY    Pharmacy Consult Request  1 Each PHARMACY TO DOSE    acetaminophen  1,000 mg Q6HRS    senna-docusate  2 Tablet BID    And    polyethylene glycol/lytes  1 Packet DAILY    And    [START ON 8/18/2023] magnesium hydroxide  30 mL DAILY    omeprazole  20 mg DAILY    enoxaparin (LOVENOX) injection  40 mg DAILY AT 1800    clopidogrel  75 mg DAILY    insulin regular  0-14 Units Once    insulin lispro  0-14 Units TID AC        Exam:   Physical Exam  Vitals and nursing note reviewed.   Constitutional:       Appearance: Normal appearance. She is obese.   HENT:      Head: Normocephalic.      Nose: Nose normal.   Eyes:      Extraocular Movements: Extraocular movements intact.      Pupils: Pupils are equal, round, and reactive to light.   Cardiovascular:      Rate and Rhythm: Regular rhythm. Tachycardia present.      Pulses: Normal pulses.      Heart sounds: Normal heart sounds.   Pulmonary:      Effort: Pulmonary effort is normal.      Breath sounds: Examination of the right-lower field reveals decreased breath sounds. Examination of the left-lower field reveals decreased breath sounds. Decreased breath sounds present.   Abdominal:      Palpations: Abdomen is soft.   Musculoskeletal:         General: Normal range of motion.      Cervical back: Normal range of motion and neck supple.      Right lower leg: Edema present.      Left lower leg: Edema present.   Skin:     General: Skin is warm and dry.      Capillary Refill: Capillary refill takes less than 2 seconds.   Neurological:      Mental Status: She is alert.  Mental status is at baseline.   Psychiatric:         Mood and Affect: Mood normal.       Cardiac Medications:    ASA - Yes    Plavix - Yes    Post-operative Beta Blockers - Yes    Ace/ARB- No; contraindicated because of Normal EF    Statin - Yes    Aldactone- No; contraindicated because of Normal EF    SGLT2i-  No contraindicated because of cost prohibitive    Ejection Fraction:  60%    Telemetry:   8/17 SR    Assessment/Plan:  POD 1  HDS, Extubated off gtts., 1L NC, neuro intact, wounds [place] dressing, abdomen soft n/t hypoactive, Hgb 11.8, Cr 0.68,  fluid balance pos, wt up. Plan:  Keep wires one day, D/C mediastinal tubes and maynard, begin diuresis, Wean Oxygen, Encourage IS/ambulate.     Disposition:  TBD

## 2023-08-17 NOTE — FLOWSHEET NOTE
08/17/23 1602   Incentive Spirometry Treatment   Incentive Spirometer Volume 1000 mL   Chest Physiotherapy Treatment   $ PEP/CPT Performed PEP / Flutter

## 2023-08-17 NOTE — CARE PLAN
The patient is Stable - Low risk of patient condition declining or worsening    Shift Goals  Clinical Goals: Control BP within parameters  Patient Goals: Rest    Progress made toward(s) clinical / shift goals:    Problem: Pain - Standard  Goal: Alleviation of pain or a reduction in pain to the patient’s comfort goal  Description: Target End Date:  Prior to discharge or change in level of care    Document on Vitals flowsheet    1.  Document pain using the appropriate pain scale per order or unit policy  2.  Educate and implement non-pharmacologic comfort measures (i.e. relaxation, distraction, massage, cold/heat therapy, etc.)  3.  Pain management medications as ordered  4.  Reassess pain after pain med administration per policy  5.  If opiods administered assess patient's response to pain medication is appropriate per POSS sedation scale  6.  Follow pain management plan developed in collaboration with patient and interdisciplinary team (including palliative care or pain specialists if applicable)  Outcome: Progressing  Note: Pain assessed, interventions given as indicated     Problem: Day of surgery post CABG/Heart valve replacement  Goal: Stabilization in immediate post op period  Outcome: Progressing  Intervention: VS q 15 min x 4 hours, then q 1 hour. Include temperature immediately upon arrival. Check CO/CI q 2-4 hours and PRN  Note: Vital signs per protocol  Intervention: Serum K q 6 hours x 24 hours.  ABG and CBC prn.  Note: K scale per protocol  Intervention: Initiate post cardiac insulin infusion protocol orders for FSBS greater than 140 and check frequency per protocol  Note: BG per insulin protocol  Intervention: For patients on Beta Blockers: verify dose given prior to surgery or within 6 hours after arrival to the unit  Note: Beta blocker given this AM  Intervention: Chest tube to 20 cm suction, record CT drainage with VS, and check for air leak  Note: CT no air leak  Intervention: For CT drainage >300  mL in first hour post op and/or 150 mL in subsequent hours: Stat platelets, PT, INR, TEG, iSTAT, and H&H per order  Note: 170 out from CT  Intervention: Titrate and wean off vasoactive drips per patient's condition and per MD order while maintaining SBP  mmHg per MD order  Note: Weaned off of cleviprex  Intervention: IS q 1 hour while awake post extubation  Note: Best   Intervention: Dangle within 4 hours post extubation  Note: EOB within 4 hours of extubation  Intervention: Up in chair 4 hours, day of extubation  Note: Up in chair this AM,  Intervention: Maintain all original surgical dressings per provider orders and specifications  Note: Island dressing  Intervention: Clear liquids post extubation, order carbohydrate free (post cardiac surgery) diet, advance as tolerated  Note: Ordered       Patient is not progressing towards the following goals:

## 2023-08-17 NOTE — THERAPY
Occupational Therapy Contact Note    Patient Name: Margoth Hopkins  Age:  72 y.o., Sex:  female  Medical Record #: [id number]  Today's Date: 8/17/2023 08/17/23 0825   Interdisciplinary Plan of Care Collaboration   Collaboration Comments OT referral received. Pt is POD #1 OHS. Will initiate OT eval on or after POD #2.

## 2023-08-17 NOTE — PROGRESS NOTES
Updated Dr. Oh about patient breathing spont, but CO2 is elevated (ABG pH 7.339, CO2 49.4). NIF -35, VC 1490, RSBI 25. OK to extubate.     Extubated to 3 L NC at 1808.

## 2023-08-17 NOTE — PROGRESS NOTES
"Critical Care Progress Note    Date of admission  8/16/2023    Chief Complaint  \"72 y.o. female with a history of severe aortic regurgitation, disease, hypertension, hyperlipidemia, s/p thyroidectomy, s/p bilateral total knee replacements who presented 8/16/2023 to the ICU following CABG x 1 and AVR by Melinda. The case was uncomplicated, normal LVEF. She arrived to the ICU on the ventilator, sedated with precedex. \"    Hospital Course  8/16 admitted to the ICU  8/17 POD 1    Interval Problem Update  Reviewed last 24 hour events:  Awake and alert  Extubated  Afebrile  MS chest tubes  VTE/GI ppx per CTS    Review of Systems  Review of Systems   Unable to perform ROS: Critical illness        Vital Signs for last 24 hours   Temp:  [36.3 °C (97.3 °F)-36.5 °C (97.7 °F)] 36.3 °C (97.3 °F)  Pulse:  [] 70  Resp:  [9-50] 15  BP: ()/(46-80) 90/58  SpO2:  [88 %-100 %] 97 %    Hemodynamic parameters for last 24 hours  CVP:  [6 MM HG-230 MM HG] 230 MM HG    Respiratory Information for the last 24 hours  Vent Mode: Spont  PEEP/CPAP: 8  P Support: 5  Control VTE (exp VT): 552    Physical Exam   Physical Exam  Vitals and nursing note reviewed.   Constitutional:       Appearance: She is not ill-appearing.   HENT:      Head: Normocephalic and atraumatic.      Right Ear: External ear normal.      Left Ear: External ear normal.      Nose: Nose normal.      Mouth/Throat:      Mouth: Mucous membranes are moist.   Eyes:      Pupils: Pupils are equal, round, and reactive to light.   Cardiovascular:      Rate and Rhythm: Normal rate.      Pulses: Normal pulses.      Comments: MS chest tubes; dressing c/d/i  Pulmonary:      Effort: Pulmonary effort is normal. No respiratory distress.   Abdominal:      General: Abdomen is flat. There is no distension.   Musculoskeletal:      Right lower leg: No edema.      Left lower leg: No edema.   Skin:     General: Skin is warm and dry.      Capillary Refill: Capillary refill takes less than " 2 seconds.   Neurological:      Mental Status: She is alert and oriented to person, place, and time.   Psychiatric:         Mood and Affect: Mood normal.         Medications  Current Facility-Administered Medications   Medication Dose Route Frequency Provider Last Rate Last Admin    furosemide (Lasix) injection 20 mg  20 mg Intravenous BID ISAI Herrera.P.R.NEmir        potassium chloride SA (Kdur) tablet 10 mEq  10 mEq Oral BID ISAI Herrera.P.R.NEmir        busPIRone (Buspar) tablet 10 mg  10 mg Oral Q EVENING ISAI Herrera.P.R.NEmir        DULoxetine (Cymbalta) capsule 40 mg  40 mg Oral BID ISAI Herrera.P.R.N.   40 mg at 08/17/23 0619    pregabalin (Lyrica) capsule 50 mg  50 mg Oral BID ISAI Herrera.P.R.N.   50 mg at 08/17/23 0607    rosuvastatin (Crestor) tablet 40 mg  40 mg Oral Q EVENING ISAI Herrera.P.R.N.        levothyroxine (Synthroid) tablet 75 mcg  75 mcg Oral AM ES ISAI Herrera.P.R.N.   75 mcg at 08/17/23 0607    zolpidem (Ambien) tablet 5 mg  5 mg Oral HS PRN ISAI Herrera.P.R.HOWARD        Respiratory Therapy Consult   Nebulization Continuous RT MARIO HerreraP.R.HOWARD        NS infusion   Intravenous Continuous MARIO HerreraP.R.N. 10 mL/hr at 08/17/23 0348 Restarted at 08/17/23 0348    NS infusion   Intravenous Continuous ISAI Herrera.P.R.N.   Stopped at 08/16/23 2003    electrolyte-A (Plasmalyte-A) infusion   Intravenous PRN ISAI Herrera.P.R.N. 999 mL/hr at 08/16/23 1405 New Bag at 08/16/23 1405    Nozin nasal  swab  1 Applicator Each Nostril BID MARIO eHrreraP.R.CATHY.   1 Applicator at 08/16/23 2057    magnesium sulfate in D5W IVPB premix 1 g  1 g Intravenous DAILY MARIO HerreraP.REmirN.   Stopped at 08/17/23 0724    metoprolol tartrate (Lopressor) tablet 12.5 mg  12.5 mg Oral BID MARIO HerreraP.REmirNEmir   12.5 mg at 08/17/23 0607    Followed by    [START ON 8/18/2023]  metoprolol tartrate (Lopressor) tablet 25 mg  25 mg Oral BID MARIO HerreraP.RALEIDA.        aspirin EC tablet 81 mg  81 mg Oral DAILY ISAI Herrera.P.R.N.   81 mg at 08/17/23 0606    clevidipine (Cleviprex) IV emulsion  0-21 mg/hr Intravenous Continuous MARIO HerreraP.R.N.   Stopped at 08/17/23 0543    nitroglycerin 50 mg in D5W 250 ml infusion  0-100 mcg/min Intravenous Continuous ISAI Herrera.P.R.HOWARD   Dose not Required at 08/16/23 1230    Pharmacy Consult Request ...Pain Management Review 1 Each  1 Each Other PHARMACY TO DOSE ISAI Herrera.P.R.CATHY.        acetaminophen (Tylenol) tablet 1,000 mg  1,000 mg Oral Q6HRS MARIO HerreraP.R.N.   1,000 mg at 08/17/23 0607    Followed by    [START ON 8/21/2023] acetaminophen (Tylenol) tablet 1,000 mg  1,000 mg Oral Q6HRS PRN DESI Gerardo A.P.R.N.        oxyCODONE immediate-release (Roxicodone) tablet 5 mg  5 mg Oral Q3HRS PRN DESI Gerardo A.P.R.N.        Or    oxyCODONE immediate release (Roxicodone) tablet 10 mg  10 mg Oral Q3HRS PRN DESI Gerardo A.P.R.N.   10 mg at 08/17/23 0804    Or    fentaNYL (Sublimaze) injection 50 mcg  50 mcg Intravenous Q3HRS PRN DESI Gerardo A.P.R.N.        traMADol (Ultram) 50 MG tablet 50 mg  50 mg Oral Q4HRS PRN DESI Gerardo A.P.R.N.        midazolam (Versed) injection 2 mg  2 mg Intravenous Q HOUR PRN ISAI Herrera.P.R.CATHY.        dexmedetomidine (PRECEDEX) 400 mcg/100mL NS premix infusion  0-1.5 mcg/kg/hr (Ideal) Intravenous Continuous MARIO HerreraP.R.N.   Stopped at 08/17/23 0515    sodium bicarbonate 8.4 % injection 50 mEq  50 mEq Intravenous Q HOUR PRN MARIO HerreraP.R.CATHY.        morphine 4 MG/ML injection 4 mg  4 mg Intravenous Q HOUR PRN MARIO HerreraP.R.N.        ondansetron (Zofran) syringe/vial injection 8 mg  8 mg Intravenous Q6HRS PRN MARIO HerreraP.R.N.   8 mg at 08/17/23 0555    Or     prochlorperazine (Compazine) injection 10 mg  10 mg Intravenous Q6HRS PRN DESI Gerardo A.P.R.N.        acetaminophen (Tylenol) tablet 650 mg  650 mg Oral Q4HRS PRN ISAI Herrera.P.R.NEmir        Or    acetaminophen (Tylenol) suppository 650 mg  650 mg Rectal Q4HRS PRN ISAI Herrera.P.R.CATHY.        senna-docusate (Pericolace Or Senokot S) 8.6-50 MG per tablet 2 Tablet  2 Tablet Oral BID ISAI Herrera.P.R.N.   2 Tablet at 08/17/23 0607    And    polyethylene glycol/lytes (Miralax) PACKET 1 Packet  1 Packet Oral DAILY ISAI Herrera.P.R.N.   1 Packet at 08/17/23 0608    And    [START ON 8/18/2023] magnesium hydroxide (Milk Of Magnesia) suspension 30 mL  30 mL Oral DAILY ISAI Herrera.P.R.NEmir        And    bisacodyl (Dulcolax) suppository 10 mg  10 mg Rectal QDAY PRN DESI Gerardo A.P.R.N.        omeprazole (PriLOSEC) capsule 20 mg  20 mg Oral DAILY DESI Gerardo A.P.R.N.   20 mg at 08/17/23 0607    mag hydrox-al hydrox-simeth (Maalox Plus Es Or Mylanta Ds) suspension 30 mL  30 mL Oral Q4HRS PRN DESI Gerardo A.P.R.N.        diphenhydrAMINE (Benadryl) tablet/capsule 25 mg  25 mg Oral HS PRN - MR X 1 ISAI Herrera.P.R.N.        enoxaparin (Lovenox) inj 40 mg  40 mg Subcutaneous DAILY AT 1800 ISAI Herrera.P.R.CATHY.        clopidogrel (Plavix) tablet 75 mg  75 mg Oral DAILY ISAI Herrera.P.R.N.   75 mg at 08/17/23 0607    insulin regular (HumuLIN R,NovoLIN R) injection  0-14 Units Intravenous Once Athan Roumanas, M.D.        insulin lispro (HumaLOG,AdmeLOG) injection  0-14 Units Subcutaneous TID AC Tammy Lawrence M.D.        insulin regular (Humulin R) 100 Units in  mL Infusion  0-29 Units/hr Intravenous Continuous Tammy Lawrence M.D.   Stopped at 08/17/23 0900    dextrose 50% (D50W) injection 12.5-25 g  12.5-25 g Intravenous PRN Tammy Lawrence M.D.        MD Alert...Pharmacy to initiate transition from insulin infusion to  subcutaneous insulin for cardiothoracic surgery 1 Each  1 Each Other Continuous Tammy Lawrence M.D.        norepinephrine (Levophed) 8 mg in 250 mL NS infusion (premix)  0-1 mcg/kg/min (Ideal) Intravenous Continuous Tammy Lawrence M.D.   Stopped at 08/16/23 1639    EPINEPHrine (Adrenalin) infusion 4 mg/250 mL (premix)  0-0.5 mcg/kg/min (Ideal) Intravenous Continuous Tammy Lawrence M.D.   Dose not Required at 08/16/23 1230       Fluids    Intake/Output Summary (Last 24 hours) at 8/17/2023 1015  Last data filed at 8/17/2023 0629  Gross per 24 hour   Intake 4276.35 ml   Output 2160 ml   Net 2116.35 ml       Laboratory  Recent Labs     08/16/23  1331 08/16/23  1529 08/16/23  1704   ISTATAPH 7.609* 7.368* 7.338*   ISTATAPCO2 22.1* 45.6* 49.6*   ISTATAPO2 118* 129* 121*   ISTATATCO2 23 28 28   UCXJJDR3EIN 99 99 98   ISTATARTHCO3 22.1 26.2* 26.6*   ISTATARTBE 2 1 0   ISTATTEMP 35.9 C 36.5 C 36.9 C   ISTATFIO2 100 40 40   ISTATSPEC Arterial Arterial Arterial   ISTATAPHTC 7.626* 7.376* 7.339*   [id number] 111* 126* 120*         Recent Labs     08/15/23  1049 08/16/23  1220 08/16/23  1700 08/17/23  0005 08/17/23  0205 08/17/23  0525   SODIUM 142  --   --   --  144  --    POTASSIUM 4.1 3.4*   < > 4.1 3.9 3.9   CHLORIDE 104  --   --   --  108  --    CO2 28  --   --   --  24  --    BUN 21  --   --   --  26*  --    CREATININE 0.60  --   --   --  0.68  --    MAGNESIUM  --  3.5*  --   --   --   --    CALCIUM 9.4  --   --   --  8.7  --     < > = values in this interval not displayed.     Recent Labs     08/15/23  1049 08/17/23  0205   ALTSGPT 20  --    ASTSGOT 22  --    ALKPHOSPHAT 79  --    TBILIRUBIN 0.6  --    GLUCOSE 90 157*     Recent Labs     08/15/23  1049 08/17/23  0205   WBC 7.7 13.3*   NEUTSPOLYS 56.80  --    LYMPHOCYTES 25.90  --    MONOCYTES 9.50  --    EOSINOPHILS 6.10  --    BASOPHILS 1.30  --    ASTSGOT 22  --    ALTSGPT 20  --    ALKPHOSPHAT 79  --    TBILIRUBIN 0.6  --      Recent Labs     08/15/23  1049  08/16/23  1220 08/17/23  0205   RBC 4.68  --  4.05*   HEMOGLOBIN 13.7 11.1* 11.8*   HEMATOCRIT 42.7 32.5* 35.9*   PLATELETCT 295 186 203   PROTHROMBTM 12.7 15.8*  --    APTT 26.1 32.4  --    INR 0.96 1.28*  --        Imaging  X-Ray:  I have personally reviewed the images and compared with prior images.    Assessment/Plan  Encounter for weaning from ventilator (HCC)  Assessment & Plan  08/16/23 intubated for cardiac surgery  8/16 extubated    Pulmonary hygiene  SpO2 >92%    S/P AVR  Assessment & Plan  08/16/23 Roumanas  AORTIC VALVE REPLACEMENT (23 mm Inspiris Díaz bovine pericardial valve), aortic root enlargement (2 cm wide bovine pericardial patch)    Monitor and optimize hemodynamics  Goals SBP< 120 mmHg, MAP> 65  Titrate vasoactive/ionotropic medications vs BP reducing medications to achieve hemodynamic goals.  Maintain intravascular euvolemia  Monitor chest tube output for postoperative hemorrhage.     S/P CABG x 1  Assessment & Plan  08/16/23 Roumanas  Normal LVEF    Monitor and optimize hemodynamics  Goals SBP< 120 mmHg, MAP> 65  Titrate vasoactive/ionotropic medications vs BP reducing medications to achieve hemodynamic goals.  Maintain intravascular euvolemia  Monitor chest tube output for postoperative hemorrhage.     Hashimoto's thyroiditis s/p total thyroidectom Dr. Franklin in 2/2020. - (present on admission)  Assessment & Plan  Continue home synthroid     Pure hypercholesterolemia- (present on admission)  Assessment & Plan  Continue statin    Benign essential HTN- (present on admission)  Assessment & Plan  Reintroduce oral antihypertensives when clinically appropriate          VTE:   per CTS protocol  Ulcer:  per CTS protocol  Lines: Central Line  Ongoing indication addressed, Arterial Line  Ongoing indication addressed, and Quiles Catheter  Ongoing indication addressed    I have performed a physical exam and reviewed and updated ROS and Plan today (8/17/2023). In review of yesterday's note (8/16/2023),  there are no changes except as documented above.     Discussed patient condition and risk of morbidity and/or mortality with RN, RT, Pharmacy, Charge nurse / hot rounds, and Patient  The patient remains critically ill.  Critical care time = 42 minutes in directly providing and coordinating critical care and extensive data review.  No time overlap and excludes procedures.

## 2023-08-17 NOTE — THERAPY
Physical Therapy Contact Note    Patient Name: Margoth Hopkins  Age:  72 y.o., Sex:  female  Medical Record #: [id number]  Today's Date: 8/17/2023 08/17/23 0739   Interdisciplinary Plan of Care Collaboration   Collaboration Comments PT consult received.  Per chart review pt is POD #1 OHS. Will HOLD until at least POD #2 per orders.        Yes

## 2023-08-18 LAB
ANION GAP SERPL CALC-SCNC: 8 MMOL/L (ref 7–16)
BUN SERPL-MCNC: 24 MG/DL (ref 8–22)
CALCIUM SERPL-MCNC: 8.1 MG/DL (ref 8.5–10.5)
CHLORIDE SERPL-SCNC: 102 MMOL/L (ref 96–112)
CO2 SERPL-SCNC: 27 MMOL/L (ref 20–33)
CREAT SERPL-MCNC: 0.7 MG/DL (ref 0.5–1.4)
ERYTHROCYTE [DISTWIDTH] IN BLOOD BY AUTOMATED COUNT: 49 FL (ref 35.9–50)
GFR SERPLBLD CREATININE-BSD FMLA CKD-EPI: 90 ML/MIN/1.73 M 2
GLUCOSE BLD STRIP.AUTO-MCNC: 102 MG/DL (ref 65–99)
GLUCOSE BLD STRIP.AUTO-MCNC: 121 MG/DL (ref 65–99)
GLUCOSE BLD STRIP.AUTO-MCNC: 132 MG/DL (ref 65–99)
GLUCOSE SERPL-MCNC: 145 MG/DL (ref 65–99)
HCT VFR BLD AUTO: 31.6 % (ref 37–47)
HGB BLD-MCNC: 9.9 G/DL (ref 12–16)
MCH RBC QN AUTO: 29.3 PG (ref 27–33)
MCHC RBC AUTO-ENTMCNC: 31.3 G/DL (ref 32.2–35.5)
MCV RBC AUTO: 93.5 FL (ref 81.4–97.8)
PLATELET # BLD AUTO: 185 K/UL (ref 164–446)
PMV BLD AUTO: 10.6 FL (ref 9–12.9)
POTASSIUM SERPL-SCNC: 4.2 MMOL/L (ref 3.6–5.5)
RBC # BLD AUTO: 3.38 M/UL (ref 4.2–5.4)
SODIUM SERPL-SCNC: 137 MMOL/L (ref 135–145)
WBC # BLD AUTO: 16.1 K/UL (ref 4.8–10.8)

## 2023-08-18 PROCEDURE — 770020 HCHG ROOM/CARE - TELE (206)

## 2023-08-18 PROCEDURE — 85027 COMPLETE CBC AUTOMATED: CPT

## 2023-08-18 PROCEDURE — 97166 OT EVAL MOD COMPLEX 45 MIN: CPT

## 2023-08-18 PROCEDURE — 700111 HCHG RX REV CODE 636 W/ 250 OVERRIDE (IP): Mod: JZ

## 2023-08-18 PROCEDURE — 82962 GLUCOSE BLOOD TEST: CPT

## 2023-08-18 PROCEDURE — 97165 OT EVAL LOW COMPLEX 30 MIN: CPT

## 2023-08-18 PROCEDURE — 99024 POSTOP FOLLOW-UP VISIT: CPT

## 2023-08-18 PROCEDURE — A9270 NON-COVERED ITEM OR SERVICE: HCPCS

## 2023-08-18 PROCEDURE — 700102 HCHG RX REV CODE 250 W/ 637 OVERRIDE(OP)

## 2023-08-18 PROCEDURE — 97535 SELF CARE MNGMENT TRAINING: CPT

## 2023-08-18 PROCEDURE — 97162 PT EVAL MOD COMPLEX 30 MIN: CPT

## 2023-08-18 PROCEDURE — 80048 BASIC METABOLIC PNL TOTAL CA: CPT

## 2023-08-18 RX ADMIN — OXYCODONE HYDROCHLORIDE 10 MG: 10 TABLET ORAL at 01:42

## 2023-08-18 RX ADMIN — BUSPIRONE HYDROCHLORIDE 10 MG: 10 TABLET ORAL at 17:53

## 2023-08-18 RX ADMIN — ASPIRIN 81 MG: 81 TABLET, COATED ORAL at 05:22

## 2023-08-18 RX ADMIN — ENOXAPARIN SODIUM 40 MG: 100 INJECTION SUBCUTANEOUS at 17:49

## 2023-08-18 RX ADMIN — OMEPRAZOLE 20 MG: 20 CAPSULE, DELAYED RELEASE ORAL at 05:22

## 2023-08-18 RX ADMIN — POTASSIUM CHLORIDE 10 MEQ: 1500 TABLET, EXTENDED RELEASE ORAL at 17:51

## 2023-08-18 RX ADMIN — Medication 1 APPLICATOR: at 09:52

## 2023-08-18 RX ADMIN — ACETAMINOPHEN 1000 MG: 500 TABLET, FILM COATED ORAL at 05:20

## 2023-08-18 RX ADMIN — DULOXETINE HYDROCHLORIDE 40 MG: 20 CAPSULE, DELAYED RELEASE ORAL at 17:53

## 2023-08-18 RX ADMIN — FUROSEMIDE 20 MG: 10 INJECTION, SOLUTION INTRAVENOUS at 18:46

## 2023-08-18 RX ADMIN — MAGNESIUM SULFATE IN DEXTROSE 1 G: 10 INJECTION, SOLUTION INTRAVENOUS at 06:18

## 2023-08-18 RX ADMIN — ACETAMINOPHEN 1000 MG: 500 TABLET, FILM COATED ORAL at 17:46

## 2023-08-18 RX ADMIN — METOPROLOL TARTRATE 25 MG: 25 TABLET, FILM COATED ORAL at 06:11

## 2023-08-18 RX ADMIN — SENNOSIDES AND DOCUSATE SODIUM 2 TABLET: 50; 8.6 TABLET ORAL at 17:46

## 2023-08-18 RX ADMIN — ACETAMINOPHEN 1000 MG: 500 TABLET, FILM COATED ORAL at 01:40

## 2023-08-18 RX ADMIN — OXYCODONE HYDROCHLORIDE 10 MG: 10 TABLET ORAL at 11:44

## 2023-08-18 RX ADMIN — CLOPIDOGREL BISULFATE 75 MG: 75 TABLET ORAL at 05:22

## 2023-08-18 RX ADMIN — SENNOSIDES AND DOCUSATE SODIUM 2 TABLET: 50; 8.6 TABLET ORAL at 05:21

## 2023-08-18 RX ADMIN — TRAMADOL HYDROCHLORIDE 50 MG: 50 TABLET ORAL at 17:52

## 2023-08-18 RX ADMIN — FUROSEMIDE 20 MG: 10 INJECTION, SOLUTION INTRAVENOUS at 06:11

## 2023-08-18 RX ADMIN — PREGABALIN 50 MG: 25 CAPSULE ORAL at 05:21

## 2023-08-18 RX ADMIN — POLYETHYLENE GLYCOL 3350 1 PACKET: 17 POWDER, FOR SOLUTION ORAL at 05:20

## 2023-08-18 RX ADMIN — ROSUVASTATIN CALCIUM 40 MG: 20 TABLET, FILM COATED ORAL at 17:46

## 2023-08-18 RX ADMIN — METOPROLOL TARTRATE 25 MG: 25 TABLET, FILM COATED ORAL at 17:53

## 2023-08-18 RX ADMIN — LEVOTHYROXINE SODIUM 75 MCG: 0.07 TABLET ORAL at 05:21

## 2023-08-18 RX ADMIN — PREGABALIN 50 MG: 25 CAPSULE ORAL at 17:52

## 2023-08-18 RX ADMIN — OXYCODONE HYDROCHLORIDE 10 MG: 10 TABLET ORAL at 07:22

## 2023-08-18 RX ADMIN — Medication 1 APPLICATOR: at 20:28

## 2023-08-18 RX ADMIN — MAGNESIUM HYDROXIDE 30 ML: 400 SUSPENSION ORAL at 11:45

## 2023-08-18 RX ADMIN — DULOXETINE HYDROCHLORIDE 40 MG: 20 CAPSULE, DELAYED RELEASE ORAL at 05:29

## 2023-08-18 RX ADMIN — ACETAMINOPHEN 1000 MG: 500 TABLET, FILM COATED ORAL at 12:49

## 2023-08-18 RX ADMIN — POTASSIUM CHLORIDE 10 MEQ: 1500 TABLET, EXTENDED RELEASE ORAL at 05:23

## 2023-08-18 ASSESSMENT — COGNITIVE AND FUNCTIONAL STATUS - GENERAL
HELP NEEDED FOR BATHING: A LITTLE
MOBILITY SCORE: 23
SUGGESTED CMS G CODE MODIFIER MOBILITY: CI
TOILETING: A LITTLE
SUGGESTED CMS G CODE MODIFIER DAILY ACTIVITY: CJ
DRESSING REGULAR LOWER BODY CLOTHING: A LITTLE
CLIMB 3 TO 5 STEPS WITH RAILING: A LITTLE
DAILY ACTIVITIY SCORE: 21

## 2023-08-18 ASSESSMENT — ACTIVITIES OF DAILY LIVING (ADL): TOILETING: INDEPENDENT

## 2023-08-18 ASSESSMENT — PAIN DESCRIPTION - PAIN TYPE
TYPE: ACUTE PAIN
TYPE: ACUTE PAIN
TYPE: SURGICAL PAIN
TYPE: SURGICAL PAIN
TYPE: ACUTE PAIN
TYPE: ACUTE PAIN
TYPE: SURGICAL PAIN
TYPE: SURGICAL PAIN
TYPE: ACUTE PAIN
TYPE: ACUTE PAIN

## 2023-08-18 ASSESSMENT — GAIT ASSESSMENTS
GAIT LEVEL OF ASSIST: CONTACT GUARD ASSIST
DEVIATION: BRADYKINETIC
DISTANCE (FEET): 250
DISTANCE (FEET): 20
ASSISTIVE DEVICE: FRONT WHEEL WALKER

## 2023-08-18 ASSESSMENT — FIBROSIS 4 INDEX: FIB4 SCORE: 1.97

## 2023-08-18 NOTE — PROGRESS NOTES
Cardiovascular Surgery Progress Note    Name: Margoth Hopkins  MRN: [id number]  : 1950  Admit Date: 2023  5:14 AM  2 Days Post-Op     Procedure:  Procedure(s) and Anesthesia Type:     * CORONARY ARTERY BYPASS GRAFT X1 - General     * REPLACEMENT, AORTIC VALVE - General     * ECHOCARDIOGRAM, TRANSESOPHAGEAL, INTRAOPERATIVE - General    Vitals:  Vitals:    23 0000 23 0100 23 0527 23 0611   BP: 103/60 109/58  121/55   Pulse: 84 83  97   Resp:       Temp:       TempSrc:       SpO2:       Weight:   80 kg (176 lb 5.9 oz)    Height:          Temp (24hrs), Av.7 °C (98 °F), Min:36.6 °C (97.8 °F), Max:36.8 °C (98.2 °F)      Respiratory:    Respiration: 16, Pulse Oximetry: 95 %       Fluids:    Intake/Output Summary (Last 24 hours) at 2023 0658  Last data filed at 2023 1900  Gross per 24 hour   Intake 1401.16 ml   Output 580 ml   Net 821.16 ml     Admit weight: Weight: 77.8 kg (171 lb 8.3 oz)  Current weight: Weight: 80 kg (176 lb 5.9 oz) (23)    Labs:  Recent Labs     08/15/23  1049 23  1220 23  0205 23  0139   WBC 7.7  --  13.3* 16.1*   RBC 4.68  --  4.05* 3.38*   HEMOGLOBIN 13.7 11.1* 11.8* 9.9*   HEMATOCRIT 42.7 32.5* 35.9* 31.6*   MCV 91.2  --  88.6 93.5   MCH 29.3  --  29.1 29.3   MCHC 32.1*  --  32.9 31.3*   RDW 46.9  --  47.0 49.0   PLATELETCT 295 186 203 185   MPV 10.1  --  10.8 10.6     Recent Labs     08/15/23  1049 23  1220 23  0205 23  0525 23  0139   SODIUM 142  --  144  --  137   POTASSIUM 4.1   < > 3.9 3.9 4.2   CHLORIDE 104  --  108  --  102   CO2 28  --  24  --  27   GLUCOSE 90  --  157*  --  145*   BUN 21  --  26*  --  24*   CREATININE 0.60  --  0.68  --  0.70   CALCIUM 9.4  --  8.7  --  8.1*    < > = values in this interval not displayed.     Recent Labs     08/15/23  1049 23  1220   APTT 26.1 32.4   INR 0.96 1.28*       Medications:  Scheduled Medications   Medication Dose Frequency    furosemide   20 mg BID    potassium chloride SA  10 mEq BID    insulin regular  2-9 Units 4X/DAY ACHS    metOLazone  2.5 mg Q DAY    lisinopril  5 mg Q DAY    busPIRone  10 mg Q EVENING    DULoxetine  40 mg BID    pregabalin  50 mg BID    rosuvastatin  40 mg Q EVENING    levothyroxine  75 mcg AM ES    Nozin nasal  swab  1 Applicator BID    magnesium sulfate  1 g DAILY    metoprolol tartrate  25 mg BID    aspirin  81 mg DAILY    Pharmacy Consult Request  1 Each PHARMACY TO DOSE    acetaminophen  1,000 mg Q6HRS    senna-docusate  2 Tablet BID    And    polyethylene glycol/lytes  1 Packet DAILY    And    magnesium hydroxide  30 mL DAILY    omeprazole  20 mg DAILY    enoxaparin (LOVENOX) injection  40 mg DAILY AT 1800    clopidogrel  75 mg DAILY        Exam:   Physical Exam  Vitals and nursing note reviewed.   Constitutional:       Appearance: Normal appearance. She is obese.   HENT:      Head: Normocephalic.      Nose: Nose normal.   Eyes:      Extraocular Movements: Extraocular movements intact.      Pupils: Pupils are equal, round, and reactive to light.   Cardiovascular:      Rate and Rhythm: Regular rhythm.      Pulses: Normal pulses.      Heart sounds: Normal heart sounds.   Pulmonary:      Effort: Pulmonary effort is normal.      Breath sounds: Decreased breath sounds present.   Abdominal:      Palpations: Abdomen is soft.   Musculoskeletal:         General: Normal range of motion.      Cervical back: Normal range of motion and neck supple.      Right lower leg: Edema present.      Left lower leg: Edema present.   Skin:     General: Skin is warm and dry.      Capillary Refill: Capillary refill takes less than 2 seconds.   Neurological:      Mental Status: She is alert. Mental status is at baseline.   Psychiatric:         Mood and Affect: Mood normal.       Cardiac Medications:    ASA - Yes    Plavix - Yes    Post-operative Beta Blockers - Yes    Ace/ARB- Yes    Statin - Yes    Aldactone- No; contraindicated because  of Normal EF    SGLT2i-  No contraindicated because of cost prohibitive    Ejection Fraction:  60%    Telemetry:   8/17 SR  8/18 SR    Assessment/Plan:  POD 1  HDS, Extubated off gtts., 1L NC, neuro intact, wounds Island dressing, abdomen soft n/t hypoactive, Hgb 11.8, Cr 0.68,  fluid balance pos, wt up. Plan:  Keep wires one day, D/C mediastinal tubes and maynard, begin diuresis, Wean Oxygen, Encourage IS/ambulate.     POD 2  HDS, neuro intact, wounds CDI, abdomen soft n/t, Hgb 9.9, Cr 0.70,  fluid balance down, wt down. Plan:  Continue diuresis. Added home ACE last night. D/C wires patient tolerated well. Wean Oxygen, Encourage IS/ambulate. Transfer to tele.    Disposition:  TBD

## 2023-08-18 NOTE — THERAPY
"Occupational Therapy   Initial Evaluation     Patient Name: Margoth Hopkins  Age:  72 y.o., Sex:  female  Medical Record #: [id number]  Today's Date: 8/18/2023     Precautions: Fall Risk, Cardiac Precautions (See Comments), Sternal Precautions (See Comments)    Assessment    Patient is 72 y.o. female with h/o HTN, HLD, thyroidectomy, CAD, aortic regurgitation, admitted for elective CABG x 1, AVR. Pt seen for OT eval and treatment. See grid below for details. Pt participated in transfer training, toileting, standing grooming, LB dressing. Treatment included education as outline in Education Group. Pt demos mild cognitive deficit with regards to new learning and sequencing. She will benefit from ongoing acute OT to maximize functional independence and safety but should be appropriate to DC home with spousal support.     Plan    Occupational Therapy Initial Treatment Plan   Treatment Interventions: Self Care / Activities of Daily Living, Adaptive Equipment, Neuro Re-Education / Balance, Therapeutic Activity  Treatment Frequency: 3 Times per Week  Duration: Until Therapy Goals Met    DC Equipment Recommendations: None  Discharge Recommendations: Recommend home health for continued occupational therapy services     Subjective    \"I kind of know what to do because of my knee replacements.\"     Objective       08/18/23 1247   Prior Living Situation   Prior Services None;Home-Independent   Housing / Facility 1 Story House   Steps Into Home 0   Steps In Home 0   Bathroom Set up Walk In Shower;Grab Bars;Shower Chair   Equipment Owned Front-Wheel Walker;4-Wheel Walker;Single Point Cane;Tub / Shower Seat;Bed Side Commode;Reacher;Grab Bar(s) In Tub / Shower   Comments Pt reports spouse is retired and can assist as needed on DC   Prior Level of ADL Function   Comments Pt was independent with BADL PTA   Prior Level of IADL Function   Prior Level Of Mobility Independent Without Device in Community;Independent Without Device in " Home  (limited community distances due to endurance)   Driving / Transportation Driving Independent   Occupation (Pre-Hospital Vocational) Retired Due To Age   Leisure Interests Crafts;Gardening;Reading  (beading, knitting)   Comments Pt was independent with I-ADL and functional mobility PTA   Cognition    Level of Consciousness Alert   New Learning Impaired   Sequencing Impaired   Comments Pt with mild difficulty retaining new information (demonstrated bilateral shoulder flexion right after instruction not to do so); mild difficulty sequencing tasks   Active ROM Upper Body   Active ROM Upper Body  WDL   Dominant Hand Right   Comments unilateral SF due to sternal prec   Strength Upper Body   Upper Body Strength  WDL   Comments proximal resistance deferred due to sternal prec   Coordination Upper Body   Coordination WDL   Balance Assessment   Sitting Balance (Static) Good   Sitting Balance (Dynamic) Fair +   Standing Balance (Static) Fair   Standing Balance (Dynamic) Fair -   Weight Shift Sitting Good   Weight Shift Standing Fair   Comments FWW for standing   Bed Mobility    Supine to Sit Standby Assist  (HOB elevated)   Sit to Supine   (up to chair post)   Scooting Standby Assist  (seated)   Comments pt plans to sleep in recliner on initial DC   ADL Assessment   Grooming Supervision  (hand hygiene at sink)   Lower Body Dressing Standby Assist  (doff/don B socks in modified tailor sit; pants NT (anticipate min A))   Toileting Standby Assist  (urination on toilet; simulated BM hygiene (posterior approach))   Functional Mobility   Sit to Stand Minimal Assist  (from standard height toilet)   Bed, Chair, Wheelchair Transfer Standby Assist   Toilet Transfers Contact Guard Assist   Transfer Method Stand Step  (FWW)   Mobility Supine > EOB, short gait and transfers with FWW   Activity Tolerance   Sitting in Chair >15 min; up post   Sitting Edge of Bed 5 min   Standing 7 min   Comments no overt signs of fatigue, dizziness,  SOB   Short Term Goals   Short Term Goal # 1 Pt will complete STS from toilet with supv   Short Term Goal # 2 Pt will complete LB dressing with supv using AE PRN   Short Term Goal # 3 Pt will recall & demo 3 sternal precaution independently   Education Group   Cardiac Precautions Patient Response Patient;Acceptance;Explanation;Handout;Verbal Demonstration;Reinforcement Needed  (rest breaks as needed; compensatory ADL)   Sternal Precautions Patient Response Patient;Acceptance;Explanation;Demonstration;Handout;Verbal Demonstration;Action Demonstration;Reinforcement Needed  (lifting restriction; avoid bilateral shoulder flexion and extension)   Home Safety Patient Response Patient;Acceptance;Explanation;Verbal Demonstration  (use of shower chair for fall reduction, use of commode over toilet for ease of STS, supv with nighttime toileting)

## 2023-08-18 NOTE — DIETARY
Nutrition Services: Cardiac Diet Education Consult   Day 2 of admit.  Margoth Hopkins is a 72 y.o. female with admitting DX of Moderate aortic regurgitation [I35.1]    RD able to visit pt at bedside to provide heart healthy diet education. RD discussed increasing intake of whole grains, fruits, vegetables, nuts/seeds, and legumes; recommendations for lower-fat diet with focus on unsaturated fats over saturated fats, reviewed examples of each, moderating sodium intake, and utilizing the plate method for balanced meals. Pt's  also at bedside and very supportive; pt and  both demonstrated readiness to change and verbal evidence of learning. RD able to answer all questions to patient's satisfaction.     No other education needs identified at this time. Consider referral to outpatient nutrition services for continuation of education as indicated or per pt preferences.     Please re-consult RD as indicated.

## 2023-08-18 NOTE — CARE PLAN
The patient is Stable - Low risk of patient condition declining or worsening    Shift Goals  Clinical Goals: Control BP within parameters  Patient Goals: Rest    Progress made toward(s) clinical / shift goals:    Problem: Pain - Standard  Goal: Alleviation of pain or a reduction in pain to the patient’s comfort goal  Outcome: Progressing  Note:   1. Document pain using the appropriate pain scale per order or unit policy   2. Educate and implement non-pharmacologic comfort measures (i.e. relaxation, distraction, massage, cold/heat therapy, etc.)   3. Pain management medications as ordered   4. Reassess pain after pain med administration per policy   5. If opiods administered assess patient's response to pain medication is appropriate per POSS sedation scale   6. Follow pain management plan developed in collaboration with patient and interdisciplinary team (including palliative care or pain specialists if applicable)     Problem: Knowledge Deficit - Standard  Goal: Patient and family/care givers will demonstrate understanding of plan of care, disease process/condition, diagnostic tests and medications  Outcome: Progressing     Problem: Post Op Day 1 CABG/Heart Valve Replacement  Goal: Optimal care of the post op CABG/heart valve replacement Post Op Day 1  Outcome: Progressing  Intervention: EKG and CXR completed  Note: Completed in AM  Intervention: Up in chair for all meals  Note: Up to chair for breakfast, lunch and dinner.   Intervention: Ambulate in am if stable. First ambulation 25 feet. Repeat x 3 as tolerated  Note: Ambulated 80 ft x 1  Intervention: Discontinue maynard catheter unless documented reason for continuation  Note: Maynard catheter discontinued.  Intervention: Assess surgical dressing and check provider orders for potential removal  Note: Island dressing removed, sternal care completed.   Intervention: OHS trained RN to remove chest tubes if ordered by provider  Note: Chest tubes removed per order. LDA  opened.   Intervention: IS q 1 hour while awake and record best IS volume  Note: Best IS 1000  Baseline 2500  Intervention: Knee high VIRGIE hose, on during the day, off at night  Note: VIRGIE hose in place for shift  Intervention: Saline lock IV  Note: Ivs saline locked  Intervention: After 24th hour post-anesthesia end time, transition patient to Cardiac Surgery SQ Insulin Protocol  Note: Insulin gtt discontinued. FSBG per order.   Intervention: If patient is CABG or on home beta-blocker, start/resume beta-blocker on POD 1 or POD 2 or document contraindication  Note: Metoprolol started.        Patient is not progressing towards the following goals:

## 2023-08-18 NOTE — CARE PLAN
Problem: Pain - Standard  Goal: Alleviation of pain or a reduction in pain to the patient’s comfort goal  Outcome: Progressing     Problem: Knowledge Deficit - Standard  Goal: Patient and family/care givers will demonstrate understanding of plan of care, disease process/condition, diagnostic tests and medications  Outcome: Progressing     Problem: Post Op Day 1 CABG/Heart Valve Replacement  Goal: Optimal care of the post op CABG/heart valve replacement Post Op Day 1  Intervention: EKG and CXR completed  Note: Complete  Intervention: Antibiotics are discontinued within 24 hours of anesthesia end time unless indication documented for continuation beyond 24 hours  Note: Complete   Intervention: Daily weights in the morning  Note: AM weight: 80 kg   Intervention: Up in chair for all meals  Note: Complete   Intervention: Ambulate in am if stable. First ambulation 25 feet. Repeat x 3 as tolerated  Note: Ambulated x1 this shift   Intervention: Discontinue maynard catheter unless documented reason for continuation  Note: Completed by day shift RN   Intervention: Assess surgical dressing and check provider orders for potential removal  Note: Complete, deborah, cdi   Intervention: Ensure referal to intensive cardiac rehab is ordered, and smoking cessation education if appropriate  Note: Complete   Intervention: OHS trained RN to remove chest tubes if ordered by provider  Note: complete  Intervention: IS q 1 hour while awake and record best IS volume  Note: Best IS: 1000   Intervention: Knee high VIRGIE hose, on during the day, off at night  Note: Complete   Intervention: Saline lock IV  Note: Complete   Intervention: After 24th hour post-anesthesia end time, transition patient to Cardiac Surgery SQ Insulin Protocol  Note: Completed   Intervention: If patient is CABG or on home beta-blocker, start/resume beta-blocker on POD 1 or POD 2 or document contraindication  Note: Completed    The patient is Stable - Low risk of patient  condition declining or worsening    Shift Goals  Clinical Goals: control BPS, comfort  Patient Goals: comfort, rest    Progress made toward(s) clinical / shift goals:  medicated per MAR, progressing thorough POD 1     Patient is not progressing towards the following goals:

## 2023-08-18 NOTE — THERAPY
Physical Therapy   Initial Evaluation     Patient Name: Margoth Hopkins  Age:  72 y.o., Sex:  female  Medical Record #: [id number]  Today's Date: 8/18/2023     Precautions  Precautions: Fall Risk;Cardiac Precautions (See Comments);Sternal Precautions (See Comments)    Assessment  Patient is 72 y.o. female who underwent elective CABG x 1 on 8/16. PMH includes HTN, HLD, CAD, aortic regurgitation.     Pt tolerated session well. She presents with impaired strength, activity tolerance, and impaired balance. She ambulated 250' with FWW and CGA with slow controlled gait pattern. She had minimal carryover of her sternal precautions despite OT introducing shortly before this PT session. She will benefit from PT services to improve her cardiovascular endurance, functional independence, continue ongoing cardiac/sternal education prior to discharge.     Anticipate discharge home with home health PT services when medically appropriate.     Plan    Physical Therapy Initial Treatment Plan   Treatment Plan : Bed Mobility, Debridement, Equipment, Group Therapy, Gait Training, Manual Therapy, Neuro Re-Education / Balance, Orthotics Training , Self Care / Home Evaluation, Stair Training, Therapeutic Activities, Therapeutic Exercise  Treatment Frequency: 3 Times per Week  Duration: Until Therapy Goals Met    DC Equipment Recommendations: None          Subjective    Pt is pleasant and cooperative, mild cognitive/memory impairments. She was unable to recall sternal precautions from OT who saw her less than 30 mins prior.      Objective       08/18/23 1356   Precautions   Precautions Fall Risk;Cardiac Precautions (See Comments);Sternal Precautions (See Comments)   Pain 0 - 10 Group   Location Sternum   Location Orientation Mid   Pain Rating Scale (NPRS) 3   Prior Living Situation   Housing / Facility 1 Story House   Steps Into Home 0   Steps In Home 0   Equipment Owned Front-Wheel Walker   Lives with - Patient's Self Care Capacity Spouse    Prior Level of Functional Mobility   Bed Mobility Independent   Transfer Status Independent   Ambulation Independent   Ambulation Distance limited community   Assistive Devices Used None   Stairs Independent   Comments Pt reports mostly sedentary lifestyle after receiving knee replacements.   Passive ROM Lower Body   Passive ROM Lower Body WDL   Active ROM Lower Body    Active ROM Lower Body  WDL   Strength Lower Body   Lower Body Strength  WDL   Balance Assessment   Sitting Balance (Static) Good   Sitting Balance (Dynamic) Fair +   Standing Balance (Static) Fair   Standing Balance (Dynamic) Fair -   Weight Shift Sitting Good   Weight Shift Standing Fair   Bed Mobility    Comments Bed mobility not assessed due to preference for remaining in chair.   Gait Analysis   Gait Level Of Assist Contact Guard Assist   Assistive Device Front Wheel Walker   Distance (Feet) 250   # of Times Distance was Traveled 1   Deviation Bradykinetic   Functional Mobility   Sit to Stand Contact Guard Assist   How much difficulty does the patient currently have...   Turning over in bed (including adjusting bedclothes, sheets and blankets)? 4   Sitting down on and standing up from a chair with arms (e.g., wheelchair, bedside commode, etc.) 4   Moving from lying on back to sitting on the side of the bed? 4   How much help from another person does the patient currently need...   Moving to and from a bed to a chair (including a wheelchair)? 4   Need to walk in a hospital room? 4   Climbing 3-5 steps with a railing? 3   6 clicks Mobility Score 23   Short Term Goals    Short Term Goal # 1 Pt will ambulate 500' with LRAD and supervision.   Short Term Goal # 2 Pt will perform 4 stairs with supervision and handrails.   Education Group   Education Provided Role of Physical Therapist;Sternal Precautions;Cardiac Precautions   Cardiac Precautions Patient Response Patient;Acceptance;Handout;Demonstration;Explanation;Verbal Demonstration   Sternal  Precautions Patient Response Patient;Acceptance;Handout;Demonstration;Explanation;Verbal Demonstration   Role of Physical Therapist Patient Response Patient;Acceptance;Explanation;Demonstration;Verbal Demonstration   Physical Therapy Initial Treatment Plan    Treatment Plan  Bed Mobility;Debridement;Equipment;Group Therapy;Gait Training;Manual Therapy;Neuro Re-Education / Balance;Orthotics Training ;Self Care / Home Evaluation;Stair Training;Therapeutic Activities;Therapeutic Exercise   Treatment Frequency 3 Times per Week   Duration Until Therapy Goals Met   Problem List    Problems Pain;Impaired Bed Mobility;Impaired Transfers;Functional Strength Deficit;Impaired Balance;Decreased Activity Tolerance;Safety Awareness Deficits / Cognition;Impaired Ambulation   Anticipated Discharge Equipment and Recommendations   DC Equipment Recommendations None   Interdisciplinary Plan of Care Collaboration   IDT Collaboration with  Nursing   Patient Position at End of Therapy Seated;Chair Alarm On;Phone within Reach;Tray Table within Reach;Call Light within Reach   Session Information   Date / Session Number  8/18-1 (1/3, 8/24)

## 2023-08-18 NOTE — CARE PLAN
Problem: Hyperinflation  Goal: Prevent or improve atelectasis  Description: Target End Date:  3 to 4 days    1. Instruct incentive spirometry usage  2.  Perform hyperinflation therapy as indicated  Outcome: Progressing       PEP QID  IS -1000

## 2023-08-19 LAB
ANION GAP SERPL CALC-SCNC: 9 MMOL/L (ref 7–16)
BUN SERPL-MCNC: 27 MG/DL (ref 8–22)
CALCIUM SERPL-MCNC: 8.5 MG/DL (ref 8.5–10.5)
CHLORIDE SERPL-SCNC: 96 MMOL/L (ref 96–112)
CO2 SERPL-SCNC: 29 MMOL/L (ref 20–33)
CREAT SERPL-MCNC: 0.7 MG/DL (ref 0.5–1.4)
ERYTHROCYTE [DISTWIDTH] IN BLOOD BY AUTOMATED COUNT: 47.4 FL (ref 35.9–50)
GFR SERPLBLD CREATININE-BSD FMLA CKD-EPI: 90 ML/MIN/1.73 M 2
GLUCOSE BLD STRIP.AUTO-MCNC: 107 MG/DL (ref 65–99)
GLUCOSE BLD STRIP.AUTO-MCNC: 121 MG/DL (ref 65–99)
GLUCOSE BLD STRIP.AUTO-MCNC: 129 MG/DL (ref 65–99)
GLUCOSE BLD STRIP.AUTO-MCNC: 134 MG/DL (ref 65–99)
GLUCOSE BLD STRIP.AUTO-MCNC: 136 MG/DL (ref 65–99)
GLUCOSE BLD STRIP.AUTO-MCNC: 152 MG/DL (ref 65–99)
GLUCOSE SERPL-MCNC: 118 MG/DL (ref 65–99)
HCT VFR BLD AUTO: 29.6 % (ref 37–47)
HGB BLD-MCNC: 9.8 G/DL (ref 12–16)
MAGNESIUM SERPL-MCNC: 2.6 MG/DL (ref 1.5–2.5)
MCH RBC QN AUTO: 30.1 PG (ref 27–33)
MCHC RBC AUTO-ENTMCNC: 33.1 G/DL (ref 32.2–35.5)
MCV RBC AUTO: 90.8 FL (ref 81.4–97.8)
PLATELET # BLD AUTO: 176 K/UL (ref 164–446)
PMV BLD AUTO: 10.4 FL (ref 9–12.9)
POTASSIUM SERPL-SCNC: 3.2 MMOL/L (ref 3.6–5.5)
POTASSIUM SERPL-SCNC: 3.6 MMOL/L (ref 3.6–5.5)
RBC # BLD AUTO: 3.26 M/UL (ref 4.2–5.4)
SODIUM SERPL-SCNC: 134 MMOL/L (ref 135–145)
WBC # BLD AUTO: 13.3 K/UL (ref 4.8–10.8)

## 2023-08-19 PROCEDURE — 84132 ASSAY OF SERUM POTASSIUM: CPT

## 2023-08-19 PROCEDURE — A9270 NON-COVERED ITEM OR SERVICE: HCPCS

## 2023-08-19 PROCEDURE — 700102 HCHG RX REV CODE 250 W/ 637 OVERRIDE(OP)

## 2023-08-19 PROCEDURE — 700111 HCHG RX REV CODE 636 W/ 250 OVERRIDE (IP): Performed by: NURSE PRACTITIONER

## 2023-08-19 PROCEDURE — 94669 MECHANICAL CHEST WALL OSCILL: CPT

## 2023-08-19 PROCEDURE — 700102 HCHG RX REV CODE 250 W/ 637 OVERRIDE(OP): Mod: JZ | Performed by: NURSE PRACTITIONER

## 2023-08-19 PROCEDURE — 82962 GLUCOSE BLOOD TEST: CPT | Mod: 91

## 2023-08-19 PROCEDURE — 93005 ELECTROCARDIOGRAM TRACING: CPT | Performed by: THORACIC SURGERY (CARDIOTHORACIC VASCULAR SURGERY)

## 2023-08-19 PROCEDURE — A9270 NON-COVERED ITEM OR SERVICE: HCPCS | Mod: JZ | Performed by: NURSE PRACTITIONER

## 2023-08-19 PROCEDURE — 700111 HCHG RX REV CODE 636 W/ 250 OVERRIDE (IP): Mod: JZ

## 2023-08-19 PROCEDURE — 85027 COMPLETE CBC AUTOMATED: CPT

## 2023-08-19 PROCEDURE — 770020 HCHG ROOM/CARE - TELE (206)

## 2023-08-19 PROCEDURE — 99024 POSTOP FOLLOW-UP VISIT: CPT | Performed by: NURSE PRACTITIONER

## 2023-08-19 PROCEDURE — 83735 ASSAY OF MAGNESIUM: CPT

## 2023-08-19 PROCEDURE — 80048 BASIC METABOLIC PNL TOTAL CA: CPT

## 2023-08-19 RX ORDER — FUROSEMIDE 10 MG/ML
40 INJECTION INTRAMUSCULAR; INTRAVENOUS 2 TIMES DAILY
Status: DISCONTINUED | OUTPATIENT
Start: 2023-08-19 | End: 2023-08-23 | Stop reason: HOSPADM

## 2023-08-19 RX ORDER — AMIODARONE HYDROCHLORIDE 200 MG/1
400 TABLET ORAL 2 TIMES DAILY
Status: DISCONTINUED | OUTPATIENT
Start: 2023-08-20 | End: 2023-08-23 | Stop reason: HOSPADM

## 2023-08-19 RX ORDER — POTASSIUM CHLORIDE 20 MEQ/1
20 TABLET, EXTENDED RELEASE ORAL 2 TIMES DAILY
Status: DISCONTINUED | OUTPATIENT
Start: 2023-08-19 | End: 2023-08-23 | Stop reason: HOSPADM

## 2023-08-19 RX ORDER — POTASSIUM CHLORIDE 7.45 MG/ML
10 INJECTION INTRAVENOUS
Status: COMPLETED | OUTPATIENT
Start: 2023-08-19 | End: 2023-08-19

## 2023-08-19 RX ADMIN — ACETAMINOPHEN 1000 MG: 500 TABLET, FILM COATED ORAL at 05:02

## 2023-08-19 RX ADMIN — SENNOSIDES AND DOCUSATE SODIUM 2 TABLET: 50; 8.6 TABLET ORAL at 17:20

## 2023-08-19 RX ADMIN — POLYETHYLENE GLYCOL 3350 1 PACKET: 17 POWDER, FOR SOLUTION ORAL at 05:01

## 2023-08-19 RX ADMIN — POTASSIUM CHLORIDE 10 MEQ: 1500 TABLET, EXTENDED RELEASE ORAL at 05:02

## 2023-08-19 RX ADMIN — AMIODARONE HYDROCHLORIDE 0.5 MG/MIN: 1.8 INJECTION, SOLUTION INTRAVENOUS at 22:15

## 2023-08-19 RX ADMIN — ROSUVASTATIN CALCIUM 40 MG: 20 TABLET, FILM COATED ORAL at 17:19

## 2023-08-19 RX ADMIN — LEVOTHYROXINE SODIUM 75 MCG: 0.07 TABLET ORAL at 05:02

## 2023-08-19 RX ADMIN — ACETAMINOPHEN 1000 MG: 500 TABLET, FILM COATED ORAL at 12:51

## 2023-08-19 RX ADMIN — MAGNESIUM HYDROXIDE 30 ML: 400 SUSPENSION ORAL at 05:01

## 2023-08-19 RX ADMIN — METOPROLOL TARTRATE 25 MG: 25 TABLET, FILM COATED ORAL at 17:21

## 2023-08-19 RX ADMIN — OMEPRAZOLE 20 MG: 20 CAPSULE, DELAYED RELEASE ORAL at 05:03

## 2023-08-19 RX ADMIN — CLOPIDOGREL BISULFATE 75 MG: 75 TABLET ORAL at 05:01

## 2023-08-19 RX ADMIN — PREGABALIN 50 MG: 25 CAPSULE ORAL at 05:00

## 2023-08-19 RX ADMIN — FUROSEMIDE 40 MG: 10 INJECTION INTRAMUSCULAR; INTRAVENOUS at 17:18

## 2023-08-19 RX ADMIN — AMIODARONE HYDROCHLORIDE 150 MG: 1.5 INJECTION, SOLUTION INTRAVENOUS at 16:30

## 2023-08-19 RX ADMIN — POTASSIUM CHLORIDE 10 MEQ: 7.46 INJECTION, SOLUTION INTRAVENOUS at 17:47

## 2023-08-19 RX ADMIN — LISINOPRIL 5 MG: 5 TABLET ORAL at 05:03

## 2023-08-19 RX ADMIN — TRAMADOL HYDROCHLORIDE 50 MG: 50 TABLET ORAL at 16:34

## 2023-08-19 RX ADMIN — Medication 1 APPLICATOR: at 09:23

## 2023-08-19 RX ADMIN — DULOXETINE HYDROCHLORIDE 40 MG: 20 CAPSULE, DELAYED RELEASE ORAL at 05:01

## 2023-08-19 RX ADMIN — AMIODARONE HYDROCHLORIDE 1 MG/MIN: 1.8 INJECTION, SOLUTION INTRAVENOUS at 16:44

## 2023-08-19 RX ADMIN — AMIODARONE HYDROCHLORIDE 150 MG: 1.5 INJECTION, SOLUTION INTRAVENOUS at 20:20

## 2023-08-19 RX ADMIN — FUROSEMIDE 20 MG: 10 INJECTION, SOLUTION INTRAVENOUS at 05:01

## 2023-08-19 RX ADMIN — POTASSIUM CHLORIDE 20 MEQ: 1500 TABLET, EXTENDED RELEASE ORAL at 17:19

## 2023-08-19 RX ADMIN — AMIODARONE HYDROCHLORIDE 0.5 MG/MIN: 1.8 INJECTION, SOLUTION INTRAVENOUS at 22:21

## 2023-08-19 RX ADMIN — ASPIRIN 81 MG: 81 TABLET, COATED ORAL at 05:01

## 2023-08-19 RX ADMIN — DULOXETINE HYDROCHLORIDE 40 MG: 20 CAPSULE, DELAYED RELEASE ORAL at 17:21

## 2023-08-19 RX ADMIN — ENOXAPARIN SODIUM 40 MG: 100 INJECTION SUBCUTANEOUS at 17:20

## 2023-08-19 RX ADMIN — METOPROLOL TARTRATE 25 MG: 25 TABLET, FILM COATED ORAL at 05:01

## 2023-08-19 RX ADMIN — ACETAMINOPHEN 1000 MG: 500 TABLET, FILM COATED ORAL at 17:19

## 2023-08-19 RX ADMIN — BUSPIRONE HYDROCHLORIDE 10 MG: 10 TABLET ORAL at 17:19

## 2023-08-19 RX ADMIN — ACETAMINOPHEN 1000 MG: 500 TABLET, FILM COATED ORAL at 00:51

## 2023-08-19 RX ADMIN — SENNOSIDES AND DOCUSATE SODIUM 2 TABLET: 50; 8.6 TABLET ORAL at 05:02

## 2023-08-19 RX ADMIN — Medication 1 APPLICATOR: at 21:18

## 2023-08-19 RX ADMIN — PREGABALIN 50 MG: 25 CAPSULE ORAL at 17:21

## 2023-08-19 RX ADMIN — POTASSIUM CHLORIDE 10 MEQ: 7.46 INJECTION, SOLUTION INTRAVENOUS at 19:00

## 2023-08-19 ASSESSMENT — LIFESTYLE VARIABLES
AVERAGE NUMBER OF DAYS PER WEEK YOU HAVE A DRINK CONTAINING ALCOHOL: 0
HAVE YOU EVER FELT YOU SHOULD CUT DOWN ON YOUR DRINKING: NO
ALCOHOL_USE: NO
HOW MANY TIMES IN THE PAST YEAR HAVE YOU HAD 5 OR MORE DRINKS IN A DAY: 0
TOTAL SCORE: 0
EVER HAD A DRINK FIRST THING IN THE MORNING TO STEADY YOUR NERVES TO GET RID OF A HANGOVER: NO
ON A TYPICAL DAY WHEN YOU DRINK ALCOHOL HOW MANY DRINKS DO YOU HAVE: 0
HAVE PEOPLE ANNOYED YOU BY CRITICIZING YOUR DRINKING: NO
EVER FELT BAD OR GUILTY ABOUT YOUR DRINKING: NO
TOTAL SCORE: 0
TOTAL SCORE: 0
CONSUMPTION TOTAL: NEGATIVE

## 2023-08-19 ASSESSMENT — PAIN DESCRIPTION - PAIN TYPE
TYPE: ACUTE PAIN
TYPE: ACUTE PAIN

## 2023-08-19 ASSESSMENT — FIBROSIS 4 INDEX: FIB4 SCORE: 1.97

## 2023-08-19 NOTE — CARE PLAN
The patient is Watcher - Medium risk of patient condition declining or worsening    Shift Goals  Clinical Goals: ambulating., PT,OT, Monitor VS  Patient Goals: get better  Family Goals: HELEN    Progress made toward(s) clinical / shift goals:    Problem: Post op day 2 CABG/Heart Valve Replacement  Goal: Optimal care of the post op CABG/heart valve replacement post op day 2  Outcome: Progressing  Intervention: FSBS: when 2 consecutive BS < 130 after post op day 2, discontinue FSBS unless patient is insulin dependent diabetic  Note: Last blood sugar 132. Unable discontinue blood sugars at this time.  Intervention: Daily weights in the morning  Note: Daily weight obtained @ 0527. 80Kg  Intervention: Up in chair for all meals  Note: Patient has been up in chair for all meals  Intervention: Ambulate 4 times daily, increasing the distance each time  Note: Patient has ambulated twice on this RN shift, and once in ICU  Intervention: Stand at sink and wash up with assistance.  Clean incisions twice daily with soap and water.  Note: Pt instructed and educated on how to clean incision properly   Intervention: IS q 1 hour while awake and record best IS volume  Note: Patient reached 1000 on IS  Intervention: Consider pacer wire removal by MD  Note: Pacer wires removed today   Intervention: Consider removal of maynard and chest tube if not already done  Note: Maynard and chest tubes are both removed. Chest tube sites CDI. Patient voiding appropriately        Patient is not progressing towards the following goals:

## 2023-08-19 NOTE — CARE PLAN
Problem: Hyperinflation  Goal: Prevent or improve atelectasis  Description: Target End Date:  3 to 4 days    1. Instruct incentive spirometry usage  2.  Perform hyperinflation therapy as indicated  Outcome: Progressing    PEP BID

## 2023-08-19 NOTE — PROGRESS NOTES
Assumed care of patient from day shift RN at 1910, Patient AOX4, VSS.   Fall education reinforced, call bell within reach, bed locked and in lowest position. POC discussed and all questions answered.  Purposeful and or hourly rounding in place.

## 2023-08-19 NOTE — PROGRESS NOTES
Cardiovascular Surgery Progress Note    Name: Margoth Hopkins  MRN: [id number]  : 1950  Admit Date: 2023  5:14 AM  3 Days Post-Op     Procedure:  Procedure(s) and Anesthesia Type:     * CORONARY ARTERY BYPASS GRAFT X1 - General     * REPLACEMENT, AORTIC VALVE - General     * ECHOCARDIOGRAM, TRANSESOPHAGEAL, INTRAOPERATIVE - General    Vitals:  Vitals:    23 0054 23 0205 23 0453 23 0732   BP: 104/70  109/69 113/61   Pulse: 85  85 76   Resp: 18  18 17   Temp: 36.5 °C (97.7 °F)  36.5 °C (97.7 °F) 37.1 °C (98.8 °F)   TempSrc: Temporal  Temporal Temporal   SpO2: 96%  94% 93%   Weight:  80 kg (176 lb 5.9 oz)     Height:          Temp (24hrs), Av.5 °C (97.7 °F), Min:36.2 °C (97.2 °F), Max:37.1 °C (98.8 °F)      Respiratory:    Respiration: 17, Pulse Oximetry: 93 %       Fluids:    Intake/Output Summary (Last 24 hours) at 2023 0847  Last data filed at 2023 0453  Gross per 24 hour   Intake --   Output 1300 ml   Net -1300 ml       Admit weight: Weight: 77.8 kg (171 lb 8.3 oz)  Current weight: Weight: 80 kg (176 lb 5.9 oz) (23 020)    Labs:  Recent Labs     23  0205 23  01323  013   WBC 13.3* 16.1* 13.3*   RBC 4.05* 3.38* 3.26*   HEMOGLOBIN 11.8* 9.9* 9.8*   HEMATOCRIT 35.9* 31.6* 29.6*   MCV 88.6 93.5 90.8   MCH 29.1 29.3 30.1   MCHC 32.9 31.3* 33.1   RDW 47.0 49.0 47.4   PLATELETCT 203 185 176   MPV 10.8 10.6 10.4       Recent Labs     23  0205 23  0525 23  0139 23  0139   SODIUM 144  --  137 134*   POTASSIUM 3.9 3.9 4.2 3.6   CHLORIDE 108  --  102 96   CO2 24  --  27 29   GLUCOSE 157*  --  145* 118*   BUN 26*  --  24* 27*   CREATININE 0.68  --  0.70 0.70   CALCIUM 8.7  --  8.1* 8.5       Recent Labs     23  1220   APTT 32.4   INR 1.28*         Medications:  Scheduled Medications   Medication Dose Frequency    furosemide  40 mg BID    potassium chloride SA  20 mEq BID    insulin regular  2-9 Units 4X/DAY ACHS     lisinopril  5 mg Q DAY    busPIRone  10 mg Q EVENING    DULoxetine  40 mg BID    pregabalin  50 mg BID    rosuvastatin  40 mg Q EVENING    levothyroxine  75 mcg AM ES    Nozin nasal  swab  1 Applicator BID    metoprolol tartrate  25 mg BID    aspirin  81 mg DAILY    Pharmacy Consult Request  1 Each PHARMACY TO DOSE    acetaminophen  1,000 mg Q6HRS    senna-docusate  2 Tablet BID    And    polyethylene glycol/lytes  1 Packet DAILY    And    magnesium hydroxide  30 mL DAILY    omeprazole  20 mg DAILY    enoxaparin (LOVENOX) injection  40 mg DAILY AT 1800    clopidogrel  75 mg DAILY        Exam:   Physical Exam  Constitutional:       General: She is not in acute distress.  HENT:      Mouth/Throat:      Mouth: Mucous membranes are moist.   Eyes:      Pupils: Pupils are equal, round, and reactive to light.   Cardiovascular:      Rate and Rhythm: Normal rate and regular rhythm.      Pulses: Normal pulses.      Heart sounds: Normal heart sounds.   Pulmonary:      Effort: No respiratory distress.      Breath sounds: Examination of the right-lower field reveals decreased breath sounds. Examination of the left-lower field reveals decreased breath sounds. Decreased breath sounds present.   Abdominal:      Palpations: Abdomen is soft.   Musculoskeletal:      Right lower leg: Edema present.      Left lower leg: Edema present.   Skin:     General: Skin is warm and dry.      Comments: Sternum- CDI     Neurological:      General: No focal deficit present.      Mental Status: She is alert and oriented to person, place, and time.       Cardiac Medications:    ASA - Yes    Plavix - Yes    Post-operative Beta Blockers - Yes    Ace/ARB- Yes    Statin - Yes    Aldactone- No; contraindicated because of Normal EF    SGLT2i-  No contraindicated because of Normal EF    Ejection Fraction:  60%    Telemetry:   8/17 SR  8/18 SR  8/19 SR    Assessment/Plan:  POD 1  HDS, Extubated off gtts., 1L NC, neuro intact, wounds Island dressing,  abdomen soft n/t hypoactive, Hgb 11.8, Cr 0.68,  fluid balance pos, wt up. Plan:  Keep wires one day, D/C mediastinal tubes and maynard, begin diuresis, Wean Oxygen, Encourage IS/ambulate.     POD 2  HDS, neuro intact, wounds CDI, abdomen soft n/t, Hgb 9.9, Cr 0.70,  fluid balance down, wt down. Plan:  Continue diuresis. Added home ACE last night. D/C wires patient tolerated well. Wean Oxygen, Encourage IS/ambulate. Transfer to tele.    POD 3 HDS, SR, cont diuresis, wean oxygen, amb/enc IS, plan home in 1-2 days    Disposition:  PT/OT cleared for home

## 2023-08-19 NOTE — PROGRESS NOTES
Pt converted to new afib 120s-140s while on walk.   Maddy RIDDLE notified. BP stable 113/50.   Amio to be ordered.

## 2023-08-19 NOTE — PROGRESS NOTES
4 Eyes Skin Assessment Completed by CYNTHIA Peña and CYNTHIA Andersen.    Head WDL  Ears Redness and Blanching  Nose WDL  Mouth WDL  Neck WDL  Breast/Chest Incision  Shoulder Blades WDL  Spine WDL  (R) Arm/Elbow/Hand WDL  (L) Arm/Elbow/Hand WDL  Abdomen Incision  Groin WDL  Scrotum/Coccyx/Buttocks Redness and Blanching  (R) Leg Edema  (L) Leg Edema  (R) Heel/Foot/Toe Redness and Blanching  (L) Heel/Foot/Toe Redness and Blanching          Devices In Places Tele Box, Pulse Ox, and VIRGIE's      Interventions In Place Gray Ear Foams, NC W/Ear Foams, Chair Waffle, Pillows, and Heels Loaded W/Pillows    Possible Skin Injury No    Pictures Uploaded Into Epic N/A  Wound Consult Placed N/A  RN Wound Prevention Protocol Ordered No

## 2023-08-20 LAB
ANION GAP SERPL CALC-SCNC: 10 MMOL/L (ref 7–16)
BUN SERPL-MCNC: 19 MG/DL (ref 8–22)
CALCIUM SERPL-MCNC: 8.2 MG/DL (ref 8.5–10.5)
CHLORIDE SERPL-SCNC: 95 MMOL/L (ref 96–112)
CO2 SERPL-SCNC: 31 MMOL/L (ref 20–33)
CREAT SERPL-MCNC: 0.72 MG/DL (ref 0.5–1.4)
EKG IMPRESSION: NORMAL
ERYTHROCYTE [DISTWIDTH] IN BLOOD BY AUTOMATED COUNT: 46.4 FL (ref 35.9–50)
GFR SERPLBLD CREATININE-BSD FMLA CKD-EPI: 87 ML/MIN/1.73 M 2
GLUCOSE SERPL-MCNC: 114 MG/DL (ref 65–99)
HCT VFR BLD AUTO: 29.2 % (ref 37–47)
HGB BLD-MCNC: 9.4 G/DL (ref 12–16)
MCH RBC QN AUTO: 28.7 PG (ref 27–33)
MCHC RBC AUTO-ENTMCNC: 32.2 G/DL (ref 32.2–35.5)
MCV RBC AUTO: 89 FL (ref 81.4–97.8)
PLATELET # BLD AUTO: 222 K/UL (ref 164–446)
PMV BLD AUTO: 10.4 FL (ref 9–12.9)
POTASSIUM SERPL-SCNC: 3.3 MMOL/L (ref 3.6–5.5)
RBC # BLD AUTO: 3.28 M/UL (ref 4.2–5.4)
SODIUM SERPL-SCNC: 136 MMOL/L (ref 135–145)
WBC # BLD AUTO: 10.5 K/UL (ref 4.8–10.8)

## 2023-08-20 PROCEDURE — 700111 HCHG RX REV CODE 636 W/ 250 OVERRIDE (IP): Mod: JZ

## 2023-08-20 PROCEDURE — 93010 ELECTROCARDIOGRAM REPORT: CPT | Performed by: INTERNAL MEDICINE

## 2023-08-20 PROCEDURE — 85027 COMPLETE CBC AUTOMATED: CPT

## 2023-08-20 PROCEDURE — A9270 NON-COVERED ITEM OR SERVICE: HCPCS | Mod: JZ | Performed by: NURSE PRACTITIONER

## 2023-08-20 PROCEDURE — 700102 HCHG RX REV CODE 250 W/ 637 OVERRIDE(OP)

## 2023-08-20 PROCEDURE — 80048 BASIC METABOLIC PNL TOTAL CA: CPT

## 2023-08-20 PROCEDURE — 99024 POSTOP FOLLOW-UP VISIT: CPT | Performed by: NURSE PRACTITIONER

## 2023-08-20 PROCEDURE — 770020 HCHG ROOM/CARE - TELE (206)

## 2023-08-20 PROCEDURE — 700111 HCHG RX REV CODE 636 W/ 250 OVERRIDE (IP): Mod: JZ | Performed by: NURSE PRACTITIONER

## 2023-08-20 PROCEDURE — A9270 NON-COVERED ITEM OR SERVICE: HCPCS

## 2023-08-20 PROCEDURE — 700102 HCHG RX REV CODE 250 W/ 637 OVERRIDE(OP): Mod: JZ | Performed by: NURSE PRACTITIONER

## 2023-08-20 RX ORDER — POTASSIUM CHLORIDE 20 MEQ/1
40 TABLET, EXTENDED RELEASE ORAL ONCE
Status: COMPLETED | OUTPATIENT
Start: 2023-08-20 | End: 2023-08-20

## 2023-08-20 RX ADMIN — DULOXETINE HYDROCHLORIDE 40 MG: 20 CAPSULE, DELAYED RELEASE ORAL at 16:47

## 2023-08-20 RX ADMIN — POTASSIUM CHLORIDE 40 MEQ: 1500 TABLET, EXTENDED RELEASE ORAL at 09:56

## 2023-08-20 RX ADMIN — Medication 1 APPLICATOR: at 09:00

## 2023-08-20 RX ADMIN — ACETAMINOPHEN 1000 MG: 500 TABLET, FILM COATED ORAL at 01:13

## 2023-08-20 RX ADMIN — AMIODARONE HYDROCHLORIDE 400 MG: 200 TABLET ORAL at 16:46

## 2023-08-20 RX ADMIN — POLYETHYLENE GLYCOL 3350 1 PACKET: 17 POWDER, FOR SOLUTION ORAL at 04:42

## 2023-08-20 RX ADMIN — ACETAMINOPHEN 1000 MG: 500 TABLET, FILM COATED ORAL at 12:00

## 2023-08-20 RX ADMIN — FUROSEMIDE 40 MG: 10 INJECTION INTRAMUSCULAR; INTRAVENOUS at 04:45

## 2023-08-20 RX ADMIN — PREGABALIN 50 MG: 25 CAPSULE ORAL at 04:43

## 2023-08-20 RX ADMIN — ENOXAPARIN SODIUM 40 MG: 100 INJECTION SUBCUTANEOUS at 16:47

## 2023-08-20 RX ADMIN — ROSUVASTATIN CALCIUM 40 MG: 20 TABLET, FILM COATED ORAL at 16:47

## 2023-08-20 RX ADMIN — SENNOSIDES AND DOCUSATE SODIUM 2 TABLET: 50; 8.6 TABLET ORAL at 04:43

## 2023-08-20 RX ADMIN — POTASSIUM CHLORIDE 20 MEQ: 1500 TABLET, EXTENDED RELEASE ORAL at 16:46

## 2023-08-20 RX ADMIN — POTASSIUM CHLORIDE 20 MEQ: 1500 TABLET, EXTENDED RELEASE ORAL at 04:44

## 2023-08-20 RX ADMIN — ACETAMINOPHEN 1000 MG: 500 TABLET, FILM COATED ORAL at 04:42

## 2023-08-20 RX ADMIN — OMEPRAZOLE 20 MG: 20 CAPSULE, DELAYED RELEASE ORAL at 04:44

## 2023-08-20 RX ADMIN — AMIODARONE HYDROCHLORIDE 400 MG: 200 TABLET ORAL at 04:44

## 2023-08-20 RX ADMIN — LISINOPRIL 5 MG: 5 TABLET ORAL at 04:44

## 2023-08-20 RX ADMIN — METOPROLOL TARTRATE 25 MG: 25 TABLET, FILM COATED ORAL at 16:46

## 2023-08-20 RX ADMIN — SENNOSIDES AND DOCUSATE SODIUM 2 TABLET: 50; 8.6 TABLET ORAL at 16:46

## 2023-08-20 RX ADMIN — PREGABALIN 50 MG: 25 CAPSULE ORAL at 16:47

## 2023-08-20 RX ADMIN — METOPROLOL TARTRATE 25 MG: 25 TABLET, FILM COATED ORAL at 04:44

## 2023-08-20 RX ADMIN — ASPIRIN 81 MG: 81 TABLET, COATED ORAL at 04:42

## 2023-08-20 RX ADMIN — LEVOTHYROXINE SODIUM 75 MCG: 0.07 TABLET ORAL at 04:44

## 2023-08-20 RX ADMIN — FUROSEMIDE 40 MG: 10 INJECTION INTRAMUSCULAR; INTRAVENOUS at 16:46

## 2023-08-20 RX ADMIN — DULOXETINE HYDROCHLORIDE 40 MG: 20 CAPSULE, DELAYED RELEASE ORAL at 04:43

## 2023-08-20 RX ADMIN — CLOPIDOGREL BISULFATE 75 MG: 75 TABLET ORAL at 04:44

## 2023-08-20 RX ADMIN — BUSPIRONE HYDROCHLORIDE 10 MG: 10 TABLET ORAL at 16:47

## 2023-08-20 ASSESSMENT — FIBROSIS 4 INDEX: FIB4 SCORE: 1.64

## 2023-08-20 ASSESSMENT — PAIN DESCRIPTION - PAIN TYPE
TYPE: SURGICAL PAIN
TYPE: SURGICAL PAIN

## 2023-08-20 NOTE — PROGRESS NOTES
Cardiovascular Surgery Progress Note    Name: Margoth Hopkins  MRN: [id number]  : 1950  Admit Date: 2023  5:14 AM  4 Days Post-Op     Procedure:  Procedure(s) and Anesthesia Type:     * CORONARY ARTERY BYPASS GRAFT X1 - General     * REPLACEMENT, AORTIC VALVE - General     * ECHOCARDIOGRAM, TRANSESOPHAGEAL, INTRAOPERATIVE - General    Vitals:  Vitals:    23 2100 23 0003 23 0425 23 0600   BP: 90/71 101/62 115/66    Pulse: 78 78 77    Resp: 18 16 16    Temp:       TempSrc: Temporal Temporal Temporal    SpO2: 95% 92% 90%    Weight:    80.8 kg (178 lb 3.2 oz)   Height:          Temp (24hrs), Av.7 °C (98 °F), Min:36.6 °C (97.9 °F), Max:36.7 °C (98 °F)      Respiratory:    Respiration: 16, Pulse Oximetry: 90 %       Fluids:    Intake/Output Summary (Last 24 hours) at 2023 0733  Last data filed at 2023 0550  Gross per 24 hour   Intake --   Output 1250 ml   Net -1250 ml       Admit weight: Weight: 77.8 kg (171 lb 8.3 oz)  Current weight: Weight: 80.8 kg (178 lb 3.2 oz) (23 0600)    Labs:  Recent Labs     23  01323  0139 23  0230   WBC 16.1* 13.3* 10.5   RBC 3.38* 3.26* 3.28*   HEMOGLOBIN 9.9* 9.8* 9.4*   HEMATOCRIT 31.6* 29.6* 29.2*   MCV 93.5 90.8 89.0   MCH 29.3 30.1 28.7   MCHC 31.3* 33.1 32.2   RDW 49.0 47.4 46.4   PLATELETCT 185 176 222   MPV 10.6 10.4 10.4       Recent Labs     23  0139 23  0139 23  1614 23  0230   SODIUM 137 134*  --  136   POTASSIUM 4.2 3.6 3.2* 3.3*   CHLORIDE 102 96  --  95*   CO2 27 29  --  31   GLUCOSE 145* 118*  --  114*   BUN 24* 27*  --  19   CREATININE 0.70 0.70  --  0.72   CALCIUM 8.1* 8.5  --  8.2*               Medications:  Scheduled Medications   Medication Dose Frequency    furosemide  40 mg BID    potassium chloride SA  20 mEq BID    amiodarone  400 mg BID    lisinopril  5 mg Q DAY    busPIRone  10 mg Q EVENING    DULoxetine  40 mg BID    pregabalin  50 mg BID    rosuvastatin  40 mg Q  EVENING    levothyroxine  75 mcg AM ES    Nozin nasal  swab  1 Applicator BID    metoprolol tartrate  25 mg BID    aspirin  81 mg DAILY    Pharmacy Consult Request  1 Each PHARMACY TO DOSE    acetaminophen  1,000 mg Q6HRS    senna-docusate  2 Tablet BID    And    polyethylene glycol/lytes  1 Packet DAILY    And    magnesium hydroxide  30 mL DAILY    omeprazole  20 mg DAILY    enoxaparin (LOVENOX) injection  40 mg DAILY AT 1800    clopidogrel  75 mg DAILY        Exam:   Physical Exam  Constitutional:       General: She is not in acute distress.  HENT:      Mouth/Throat:      Mouth: Mucous membranes are moist.   Eyes:      Pupils: Pupils are equal, round, and reactive to light.   Cardiovascular:      Rate and Rhythm: Normal rate and regular rhythm.      Pulses: Normal pulses.      Heart sounds: Normal heart sounds.   Pulmonary:      Effort: No respiratory distress.      Breath sounds: Examination of the right-lower field reveals decreased breath sounds. Examination of the left-lower field reveals decreased breath sounds. Decreased breath sounds present.   Abdominal:      Palpations: Abdomen is soft.   Musculoskeletal:      Right lower leg: Edema present.      Left lower leg: Edema present.   Skin:     General: Skin is warm and dry.      Comments: Sternum- CDI     Neurological:      General: No focal deficit present.      Mental Status: She is alert and oriented to person, place, and time.       Cardiac Medications:    ASA - Yes    Plavix - Yes    Post-operative Beta Blockers - Yes    Ace/ARB- Yes    Statin - Yes    Aldactone- No; contraindicated because of Normal EF    SGLT2i-  No contraindicated because of Normal EF    Ejection Fraction:  60%    Telemetry:   8/17 SR  8/18 SR  8/19 SR, afib RVR  8/20 SR    Assessment/Plan:  POD 1  HDS, Extubated off gtts., 1L NC, neuro intact, wounds [place] dressing, abdomen soft n/t hypoactive, Hgb 11.8, Cr 0.68,  fluid balance pos, wt up. Plan:  Keep wires one day, D/C  mediastinal tubes and maynard, begin diuresis, Wean Oxygen, Encourage IS/ambulate.     POD 2  HDS, neuro intact, wounds CDI, abdomen soft n/t, Hgb 9.9, Cr 0.70,  fluid balance down, wt down. Plan:  Continue diuresis. Added home ACE last night. D/C wires patient tolerated well. Wean Oxygen, Encourage IS/ambulate. Transfer to tele.    POD 3 HDS, SR, cont diuresis, wean oxygen, amb/enc IS, plan home in 1-2 days    POD 4 HDS, SR- AFib yesterday with RVR- on amio gtt- change to PO, cont diuresis, replace K+, wean O2, ambulate, plan home when rhythm stable    Disposition:  PT/OT cleared for home

## 2023-08-20 NOTE — CARE PLAN
Problem: Post Op Day 4 CABG/Heart Valve Replacement  Goal: Optimal care of the Post Op CABG/Heart Valve replacement Post Op Day 4  8/20/2023 0143 by Tai Maxwell R.N.  Outcome: Progressing  8/20/2023 0132 by Tai Maxwell R.N.  Outcome: Progressing  Intervention: Daily weights in the morning  8/20/2023 0143 by Tai Maxwell R.N.  Note: Daily weights complete.  8/20/2023 0132 by Tai Maxwell R.N.  Note: Pt will have am weight completed.  Intervention: Shower daily and clean incisions twice daily with soap and water  Note: Incision cleansed twice during this shift.  Intervention: Up in chair for all meals  Note: Pt up for dinner.  Intervention: IS q 1 hour while awake and record best IS volume  Note: IS use reinforced. 1,500 witnessed by this nurse.  Intervention: Consider removal of maynard, chest tube and pacer wires if not already done  Note: Maynard removed.   The patient is Watcher - Medium risk of patient condition declining or worsening    Shift Goals  Clinical Goals: monitor heart R/R  Patient Goals: Sleep  Family Goals:  (HELEN)    Progress made toward(s) clinical / shift goals:      Patient is not progressing towards the following goals:

## 2023-08-20 NOTE — PROGRESS NOTES
Bedside report received from night shift RN. Assumed care. Pt is A&O x 4. Currently in SR. Pt is in bed resting. Pt denies pain at this time. Assessment completed. Pt was updated on plan of care. Pt has call light, personal belongings, and bedside table within reach. Bed is in the lowest position and bed alarm is on. Will continue to monitor.    See patient portal message

## 2023-08-20 NOTE — PROGRESS NOTES
Monitor Summary  Rhythm: SR, converted to AF at 1533  Rate:   Ectopy: fPVC  .15 / .09 / .40

## 2023-08-20 NOTE — CARE PLAN
The patient is Watcher - Medium risk of patient condition declining or worsening    Shift Goals  Clinical Goals: wean o2, monitor HR  Patient Goals: rest  Family Goals:  (HELEN)    Progress made toward(s) clinical / shift goals:  Pt updated on POC. Pt showered and walked.    Patient is not progressing towards the following goals:      Problem: Post Op Day 4 CABG/Heart Valve Replacement  Goal: Optimal care of the Post Op CABG/Heart Valve replacement Post Op Day 4  Outcome: Progressing  Intervention: Daily weights in the morning  Note: Daily weight obtained.  Intervention: Shower daily and clean incisions twice daily with soap and water  Note: Pt showered, incisions cleaned with soap and water.  Intervention: Up in chair for all meals  Note: Pt up in cardiac chair for all meals.  Intervention: Ambulate 4 times daily, increasing the distance each time  Note: Pt ambulating twice for day shift and twice for night shift.  Intervention: IS q 1 hour while awake and record best IS volume  Note: Best IS volume 1250.  Intervention: Consider removal of maynard, chest tube and pacer wires if not already done  Note: Maynard, chest tube and pacer wires already removed.  Intervention: Discharge Education  Note: Pt and significant other updated on patient possibly discharging tomorrow. Education provided.  Intervention: Add special instructions for cardiac surgery discharge instructions to after visit summary and review with patient  Note: Cardiac surgery discharge instructions will be provided and reviewed with patient the day of DC.     Problem: Post Op Day 5 CABG/Heart Valve Replacement  Goal: Optimal care of the Post Op CABG/Heart Valve replacement Post Op Day 5  Description: Target End Date:  end of post op day 5  Outcome: Progressing

## 2023-08-20 NOTE — PROGRESS NOTES
Assumed care of patient from day shift RN at 1900, Patient AOX4, VSS.   Fall education reinforced, call bell within reach, bed locked and in lowest position. POC discussed and all questions answered.  Purposeful and or hourly rounding in place.

## 2023-08-20 NOTE — CARE PLAN
The patient is Watcher - Medium risk of patient condition declining or worsening    Shift Goals  Clinical Goals: ambuating, monitor VS and labs  Patient Goals: get better  Family Goals:  (HELEN)    Progress made toward(s) clinical / shift goals:    Problem: Post op day 2 CABG/Heart Valve Replacement  Goal: Optimal care of the post op CABG/heart valve replacement post op day 2  Outcome: Progressing  Intervention: FSBS: when 2 consecutive BS < 130 after post op day 2, discontinue FSBS unless patient is insulin dependent diabetic  Note: Blood sugars below 130 x3  Intervention: Daily weights in the morning  Note: Morning weight obtained, 80Kg  Intervention: Up in chair for all meals  Note: Pt up in chair for breakfast and lunch. Patient unable to sit up in chair d/t heart rate  Intervention: Ambulate 4 times daily, increasing the distance each time  Note: Patient was able to ambulate twice on this RN shift  Intervention: Stand at sink and wash up with assistance.  Clean incisions twice daily with soap and water.  Note: Incision care done  Intervention: IS q 1 hour while awake and record best IS volume  Note: Patient able to pull 1500 on IS  Intervention: Consider pacer wire removal by MD  Note: Pacer wires have been removed  Intervention: Consider removal of maynard and chest tube if not already done  Note: Maynard, chest tube already removed       Patient is not progressing towards the following goals:

## 2023-08-20 NOTE — PROGRESS NOTES
Pt continues to be afib 130-160 after amio bolus and gtt were started. APRN notified. Received orders to give another amio bolus.

## 2023-08-20 NOTE — PROGRESS NOTES
Spoke with Maddy VALLEJO. Pt trialed off of 02, pt's oxygen saturation dropped to 87. Will try again later. Received order to discontinue amio drip.

## 2023-08-21 ENCOUNTER — APPOINTMENT (OUTPATIENT)
Dept: RADIOLOGY | Facility: MEDICAL CENTER | Age: 73
DRG: 219 | End: 2023-08-21
Attending: THORACIC SURGERY (CARDIOTHORACIC VASCULAR SURGERY)
Payer: COMMERCIAL

## 2023-08-21 LAB
ANION GAP SERPL CALC-SCNC: 8 MMOL/L (ref 7–16)
BUN SERPL-MCNC: 17 MG/DL (ref 8–22)
CALCIUM SERPL-MCNC: 8.2 MG/DL (ref 8.5–10.5)
CHLORIDE SERPL-SCNC: 94 MMOL/L (ref 96–112)
CO2 SERPL-SCNC: 32 MMOL/L (ref 20–33)
CREAT SERPL-MCNC: 0.71 MG/DL (ref 0.5–1.4)
EKG IMPRESSION: NORMAL
ERYTHROCYTE [DISTWIDTH] IN BLOOD BY AUTOMATED COUNT: 46.5 FL (ref 35.9–50)
GFR SERPLBLD CREATININE-BSD FMLA CKD-EPI: 89 ML/MIN/1.73 M 2
GLUCOSE SERPL-MCNC: 134 MG/DL (ref 65–99)
HCT VFR BLD AUTO: 28.5 % (ref 37–47)
HGB BLD-MCNC: 9.3 G/DL (ref 12–16)
MAGNESIUM SERPL-MCNC: 2.3 MG/DL (ref 1.5–2.5)
MCH RBC QN AUTO: 29 PG (ref 27–33)
MCHC RBC AUTO-ENTMCNC: 32.6 G/DL (ref 32.2–35.5)
MCV RBC AUTO: 88.8 FL (ref 81.4–97.8)
PLATELET # BLD AUTO: 250 K/UL (ref 164–446)
PMV BLD AUTO: 10.2 FL (ref 9–12.9)
POTASSIUM SERPL-SCNC: 3.2 MMOL/L (ref 3.6–5.5)
POTASSIUM SERPL-SCNC: 3.5 MMOL/L (ref 3.6–5.5)
RBC # BLD AUTO: 3.21 M/UL (ref 4.2–5.4)
SODIUM SERPL-SCNC: 134 MMOL/L (ref 135–145)
WBC # BLD AUTO: 9 K/UL (ref 4.8–10.8)

## 2023-08-21 PROCEDURE — A9270 NON-COVERED ITEM OR SERVICE: HCPCS

## 2023-08-21 PROCEDURE — 84132 ASSAY OF SERUM POTASSIUM: CPT

## 2023-08-21 PROCEDURE — 700102 HCHG RX REV CODE 250 W/ 637 OVERRIDE(OP): Mod: JZ | Performed by: NURSE PRACTITIONER

## 2023-08-21 PROCEDURE — 80048 BASIC METABOLIC PNL TOTAL CA: CPT

## 2023-08-21 PROCEDURE — A9270 NON-COVERED ITEM OR SERVICE: HCPCS | Mod: JZ | Performed by: NURSE PRACTITIONER

## 2023-08-21 PROCEDURE — 93010 ELECTROCARDIOGRAM REPORT: CPT | Performed by: INTERNAL MEDICINE

## 2023-08-21 PROCEDURE — 700111 HCHG RX REV CODE 636 W/ 250 OVERRIDE (IP): Mod: JZ | Performed by: NURSE PRACTITIONER

## 2023-08-21 PROCEDURE — 71046 X-RAY EXAM CHEST 2 VIEWS: CPT

## 2023-08-21 PROCEDURE — 770020 HCHG ROOM/CARE - TELE (206)

## 2023-08-21 PROCEDURE — 93005 ELECTROCARDIOGRAM TRACING: CPT | Performed by: THORACIC SURGERY (CARDIOTHORACIC VASCULAR SURGERY)

## 2023-08-21 PROCEDURE — 700102 HCHG RX REV CODE 250 W/ 637 OVERRIDE(OP)

## 2023-08-21 PROCEDURE — 700111 HCHG RX REV CODE 636 W/ 250 OVERRIDE (IP): Performed by: NURSE PRACTITIONER

## 2023-08-21 PROCEDURE — 700111 HCHG RX REV CODE 636 W/ 250 OVERRIDE (IP): Mod: JZ

## 2023-08-21 PROCEDURE — 85027 COMPLETE CBC AUTOMATED: CPT

## 2023-08-21 PROCEDURE — 83735 ASSAY OF MAGNESIUM: CPT

## 2023-08-21 PROCEDURE — 97535 SELF CARE MNGMENT TRAINING: CPT | Mod: CO

## 2023-08-21 PROCEDURE — 36415 COLL VENOUS BLD VENIPUNCTURE: CPT

## 2023-08-21 RX ORDER — POTASSIUM CHLORIDE 20 MEQ/1
40 TABLET, EXTENDED RELEASE ORAL 2 TIMES DAILY
Status: COMPLETED | OUTPATIENT
Start: 2023-08-21 | End: 2023-08-21

## 2023-08-21 RX ADMIN — Medication 1 APPLICATOR: at 00:30

## 2023-08-21 RX ADMIN — METOPROLOL TARTRATE 25 MG: 25 TABLET, FILM COATED ORAL at 19:14

## 2023-08-21 RX ADMIN — CLOPIDOGREL BISULFATE 75 MG: 75 TABLET ORAL at 04:56

## 2023-08-21 RX ADMIN — AMIODARONE HYDROCHLORIDE 400 MG: 200 TABLET ORAL at 04:54

## 2023-08-21 RX ADMIN — PREGABALIN 50 MG: 25 CAPSULE ORAL at 19:13

## 2023-08-21 RX ADMIN — DULOXETINE HYDROCHLORIDE 40 MG: 20 CAPSULE, DELAYED RELEASE ORAL at 04:55

## 2023-08-21 RX ADMIN — POLYETHYLENE GLYCOL 3350 1 PACKET: 17 POWDER, FOR SOLUTION ORAL at 04:59

## 2023-08-21 RX ADMIN — POTASSIUM CHLORIDE 40 MEQ: 1500 TABLET, EXTENDED RELEASE ORAL at 19:19

## 2023-08-21 RX ADMIN — METOPROLOL TARTRATE 25 MG: 25 TABLET, FILM COATED ORAL at 04:55

## 2023-08-21 RX ADMIN — PREGABALIN 50 MG: 25 CAPSULE ORAL at 04:58

## 2023-08-21 RX ADMIN — ACETAMINOPHEN 1000 MG: 500 TABLET, FILM COATED ORAL at 04:54

## 2023-08-21 RX ADMIN — LEVOTHYROXINE SODIUM 75 MCG: 0.07 TABLET ORAL at 04:54

## 2023-08-21 RX ADMIN — SENNOSIDES AND DOCUSATE SODIUM 2 TABLET: 50; 8.6 TABLET ORAL at 04:55

## 2023-08-21 RX ADMIN — Medication 1 APPLICATOR: at 08:50

## 2023-08-21 RX ADMIN — OXYCODONE HYDROCHLORIDE 10 MG: 10 TABLET ORAL at 00:29

## 2023-08-21 RX ADMIN — ASPIRIN 81 MG: 81 TABLET, COATED ORAL at 04:54

## 2023-08-21 RX ADMIN — FUROSEMIDE 40 MG: 10 INJECTION INTRAMUSCULAR; INTRAVENOUS at 04:59

## 2023-08-21 RX ADMIN — ONDANSETRON 8 MG: 2 INJECTION INTRAMUSCULAR; INTRAVENOUS at 18:18

## 2023-08-21 RX ADMIN — AMIODARONE HYDROCHLORIDE 0.5 MG/MIN: 1.8 INJECTION, SOLUTION INTRAVENOUS at 16:47

## 2023-08-21 RX ADMIN — AMIODARONE HYDROCHLORIDE 1 MG/MIN: 1.8 INJECTION, SOLUTION INTRAVENOUS at 11:04

## 2023-08-21 RX ADMIN — ACETAMINOPHEN 1000 MG: 500 TABLET, FILM COATED ORAL at 00:30

## 2023-08-21 RX ADMIN — BUSPIRONE HYDROCHLORIDE 10 MG: 10 TABLET ORAL at 19:14

## 2023-08-21 RX ADMIN — AMIODARONE HYDROCHLORIDE 400 MG: 200 TABLET ORAL at 16:47

## 2023-08-21 RX ADMIN — OMEPRAZOLE 20 MG: 20 CAPSULE, DELAYED RELEASE ORAL at 04:55

## 2023-08-21 RX ADMIN — ACETAMINOPHEN 1000 MG: 500 TABLET, FILM COATED ORAL at 12:20

## 2023-08-21 RX ADMIN — POTASSIUM CHLORIDE 40 MEQ: 1500 TABLET, EXTENDED RELEASE ORAL at 12:20

## 2023-08-21 RX ADMIN — FUROSEMIDE 40 MG: 10 INJECTION INTRAMUSCULAR; INTRAVENOUS at 19:11

## 2023-08-21 RX ADMIN — DULOXETINE HYDROCHLORIDE 40 MG: 20 CAPSULE, DELAYED RELEASE ORAL at 19:16

## 2023-08-21 RX ADMIN — ROSUVASTATIN CALCIUM 40 MG: 20 TABLET, FILM COATED ORAL at 19:10

## 2023-08-21 RX ADMIN — Medication 1 APPLICATOR: at 20:18

## 2023-08-21 RX ADMIN — POTASSIUM CHLORIDE 20 MEQ: 1500 TABLET, EXTENDED RELEASE ORAL at 04:56

## 2023-08-21 RX ADMIN — ENOXAPARIN SODIUM 40 MG: 100 INJECTION SUBCUTANEOUS at 19:14

## 2023-08-21 RX ADMIN — AMIODARONE HYDROCHLORIDE 150 MG: 1.5 INJECTION, SOLUTION INTRAVENOUS at 10:47

## 2023-08-21 ASSESSMENT — COGNITIVE AND FUNCTIONAL STATUS - GENERAL
DRESSING REGULAR LOWER BODY CLOTHING: A LITTLE
DAILY ACTIVITIY SCORE: 21
TOILETING: A LITTLE
HELP NEEDED FOR BATHING: A LITTLE
SUGGESTED CMS G CODE MODIFIER DAILY ACTIVITY: CJ

## 2023-08-21 ASSESSMENT — PAIN DESCRIPTION - PAIN TYPE: TYPE: SURGICAL PAIN

## 2023-08-21 ASSESSMENT — FIBROSIS 4 INDEX: FIB4 SCORE: 1.46

## 2023-08-21 NOTE — PROGRESS NOTES
Cardiovascular Surgery Progress Note    Name: Margoth Hopkins  MRN: [id number]  : 1950  Admit Date: 2023  5:14 AM  5 Days Post-Op     Procedure:  Procedure(s) and Anesthesia Type:     * CORONARY ARTERY BYPASS GRAFT X1 - General     * REPLACEMENT, AORTIC VALVE - General     * ECHOCARDIOGRAM, TRANSESOPHAGEAL, INTRAOPERATIVE - General    Vitals:  Vitals:    23 0454 23 0500 23 0600 23 0747   BP: 98/66 98/66     Pulse: 88   92   Resp:    16   Temp:       TempSrc:       SpO2:       Weight:   79.1 kg (174 lb 6.1 oz)    Height:          Temp (24hrs), Av.4 °C (97.6 °F), Min:36.2 °C (97.2 °F), Max:36.6 °C (97.9 °F)      Respiratory:    Respiration: 16, Pulse Oximetry: 92 %       Fluids:    Intake/Output Summary (Last 24 hours) at 2023 1047  Last data filed at 2023 0600  Gross per 24 hour   Intake --   Output 2350 ml   Net -2350 ml       Admit weight: Weight: 77.8 kg (171 lb 8.3 oz)  Current weight: Weight: 79.1 kg (174 lb 6.1 oz) (23 0600)    Labs:  Recent Labs     23  0139 23  0230 23  0220   WBC 13.3* 10.5 9.0   RBC 3.26* 3.28* 3.21*   HEMOGLOBIN 9.8* 9.4* 9.3*   HEMATOCRIT 29.6* 29.2* 28.5*   MCV 90.8 89.0 88.8   MCH 30.1 28.7 29.0   MCHC 33.1 32.2 32.6   RDW 47.4 46.4 46.5   PLATELETCT 176 222 250   MPV 10.4 10.4 10.2       Recent Labs     23  0139 23  1614 23  0230 23  0220   SODIUM 134*  --  136 134*   POTASSIUM 3.6 3.2* 3.3* 3.2*   CHLORIDE 96  --  95* 94*   CO2 29  --  31 32   GLUCOSE 118*  --  114* 134*   BUN 27*  --  19 17   CREATININE 0.70  --  0.72 0.71   CALCIUM 8.5  --  8.2* 8.2*               Medications:  Scheduled Medications   Medication Dose Frequency    amiodarone infusion  1 mg/min Once    amiodarone 150 mg IVPB (LOAD)  150 mg Once    furosemide  40 mg BID    potassium chloride SA  20 mEq BID    amiodarone  400 mg BID    lisinopril  5 mg Q DAY    busPIRone  10 mg Q EVENING    DULoxetine  40 mg BID     pregabalin  50 mg BID    rosuvastatin  40 mg Q EVENING    levothyroxine  75 mcg AM ES    Nozin nasal  swab  1 Applicator BID    metoprolol tartrate  25 mg BID    aspirin  81 mg DAILY    Pharmacy Consult Request  1 Each PHARMACY TO DOSE    acetaminophen  1,000 mg Q6HRS    senna-docusate  2 Tablet BID    And    polyethylene glycol/lytes  1 Packet DAILY    And    magnesium hydroxide  30 mL DAILY    omeprazole  20 mg DAILY    enoxaparin (LOVENOX) injection  40 mg DAILY AT 1800    clopidogrel  75 mg DAILY        Exam:   Physical Exam  Constitutional:       General: She is not in acute distress.  HENT:      Mouth/Throat:      Mouth: Mucous membranes are moist.   Eyes:      Pupils: Pupils are equal, round, and reactive to light.   Cardiovascular:      Rate and Rhythm: Normal rate and regular rhythm.      Pulses: Normal pulses.      Heart sounds: Normal heart sounds.   Pulmonary:      Effort: No respiratory distress.      Breath sounds: Examination of the right-lower field reveals decreased breath sounds. Examination of the left-lower field reveals decreased breath sounds. Decreased breath sounds present.   Abdominal:      Palpations: Abdomen is soft.   Musculoskeletal:      Right lower leg: Edema present.      Left lower leg: Edema present.   Skin:     General: Skin is warm and dry.      Comments: Sternum- CDI     Neurological:      General: No focal deficit present.      Mental Status: She is alert and oriented to person, place, and time.       Cardiac Medications:    ASA - Yes    Plavix - Yes    Post-operative Beta Blockers - Yes    Ace/ARB- Yes    Statin - Yes    Aldactone- No; contraindicated because of Normal EF    SGLT2i-  No contraindicated because of Normal EF    Ejection Fraction:  60%    Telemetry:   8/17 SR  8/18 SR  8/19 SR, afib RVR  8/20 SR  8/21 a fib rvr    Assessment/Plan:  POD 1  HDS, Extubated off gtts., 1L NC, neuro intact, wounds [place] dressing, abdomen soft n/t hypoactive, Hgb 11.8, Cr 0.68,   fluid balance pos, wt up. Plan:  Keep wires one day, D/C mediastinal tubes and maynard, begin diuresis, Wean Oxygen, Encourage IS/ambulate.     POD 2  HDS, neuro intact, wounds CDI, abdomen soft n/t, Hgb 9.9, Cr 0.70,  fluid balance down, wt down. Plan:  Continue diuresis. Added home ACE last night. D/C wires patient tolerated well. Wean Oxygen, Encourage IS/ambulate. Transfer to tele.    POD 3 HDS, SR, cont diuresis, wean oxygen, amb/enc IS, plan home in 1-2 days    POD 4 HDS, SR- AFib yesterday with RVR- on amio gtt- change to PO, cont diuresis, replace K+, wean O2, ambulate, plan home when rhythm stable    POD 5  HDS, A fib RVR this morning, neuro intact, wounds intact, abdomen soft +BM, fluid balance negative, wt 2 kg up from admit weight,  1 L NC. K 3.2.  Plan: Amio bolus and resume gtt. Mg pending, K replaced IS/ambulate. 2 view cxr this am. Home o2 test.       Disposition:  PT/OT cleared for home

## 2023-08-21 NOTE — THERAPY
Occupational Therapy  Daily Treatment     Patient Name: Margoth Hopkins  Age:  72 y.o., Sex:  female  Medical Record #: [id number]  Today's Date: 8/21/2023       Precautions: Fall Risk, Cardiac Precautions (See Comments), Sternal Precautions (See Comments)    Assessment    Pt seen for OT tx. Continues to be limited by decreased activity tolerance and balance deficits impacting ability to complete ADLs and ADL transfers independently. Min A sit > stand from chair d/t low surface height and cues to maintain sternal precautions. Amb w/ FWW in room w/ CGA for balance and safety. CGA toilet transfer and standing at the sink to groom. Required re-education on sternal precautions. Pt w/ difficulty sequencing on sit > stands while maintaining precautions. Pt SOB after txf BTB and reported fatigue, RN stated pt tachy w/ OOB activity. Will continue to follow while in house.     Plan    Treatment Plan Status: Continue Current Treatment Plan    DC Equipment Recommendations: None  Discharge Recommendations: Recommend home health for continued occupational therapy services       08/21/23 1425   Cognition    Cognition / Consciousness X   New Learning Impaired   Sequencing Impaired   Comments difficulty sequencing and motor planning. required re education on sternal precautions   Balance   Sitting Balance (Static) Fair +   Sitting Balance (Dynamic) Fair   Standing Balance (Static) Fair -   Standing Balance (Dynamic) Fair -   Weight Shift Sitting Good   Weight Shift Standing Fair   Bed Mobility    Comments up in chair pre and post session   Activities of Daily Living   Grooming Contact Guard Assist;Standing   Upper Body Dressing Minimal Assist   Lower Body Dressing Standby Assist   Toileting Standby Assist   Functional Mobility   Sit to Stand Minimal Assist  (from chair d/t low surface height)   Bed, Chair, Wheelchair Transfer Contact Guard Assist   Toilet Transfers Contact Guard Assist   Short Term Goals   Short Term Goal # 1 Pt will  complete STS from toilet with supv   Goal Outcome # 1 Progressing as expected   Short Term Goal # 2 Pt will complete LB dressing with supv using AE PRN   Goal Outcome # 2 Progressing as expected   Short Term Goal # 3 Pt will recall & demo 3 sternal precaution independently   Goal Outcome # 3 Goal not met   Anticipated Discharge Equipment and Recommendations   DC Equipment Recommendations None   Discharge Recommendations Recommend home health for continued occupational therapy services

## 2023-08-21 NOTE — CARE PLAN
Problem: Post Op Day 4 CABG/Heart Valve Replacement  Goal: Optimal care of the Post Op CABG/Heart Valve replacement Post Op Day 4  Outcome: Progressing  Intervention: Daily weights in the morning  Note: Weights completed  Intervention: Shower daily and clean incisions twice daily with soap and water  Note: Shower completed, incision care completed  Intervention: Up in chair for all meals  Note: Pt up for breakfast and lunch.  Intervention: Ambulate 4 times daily, increasing the distance each time  Note: Ambulated twice for day shift. Discussed rehab as option for optimal outcome.  Intervention: IS q 1 hour while awake and record best IS volume  Note: IS reinforced  Intervention: Consider removal of maynard, chest tube and pacer wires if not already done  Note: Maynard removed   The patient is Watcher - Medium risk of patient condition declining or worsening    Shift Goals  Clinical Goals: wean o2,monitor heart R/R, ambulate in the morning  Patient Goals: rest  Family Goals:  (HELEN)    Progress made toward(s) clinical / shift goals:      Patient is not progressing towards the following goals:

## 2023-08-21 NOTE — PROGRESS NOTES
Assumed care of patient from day shift RN at 1845, Patient AOX4, VSS. Fall education reinforced, call bell within reach, bed locked and in lowest position. POC discussed and all questions answered.  Purposeful and or hourly rounding in place.

## 2023-08-22 LAB
ANION GAP SERPL CALC-SCNC: 11 MMOL/L (ref 7–16)
BUN SERPL-MCNC: 20 MG/DL (ref 8–22)
CALCIUM SERPL-MCNC: 8.9 MG/DL (ref 8.5–10.5)
CHLORIDE SERPL-SCNC: 94 MMOL/L (ref 96–112)
CO2 SERPL-SCNC: 29 MMOL/L (ref 20–33)
CREAT SERPL-MCNC: 0.86 MG/DL (ref 0.5–1.4)
ERYTHROCYTE [DISTWIDTH] IN BLOOD BY AUTOMATED COUNT: 46.5 FL (ref 35.9–50)
GFR SERPLBLD CREATININE-BSD FMLA CKD-EPI: 71 ML/MIN/1.73 M 2
GLUCOSE SERPL-MCNC: 116 MG/DL (ref 65–99)
HCT VFR BLD AUTO: 30.4 % (ref 37–47)
HGB BLD-MCNC: 9.9 G/DL (ref 12–16)
MCH RBC QN AUTO: 28.9 PG (ref 27–33)
MCHC RBC AUTO-ENTMCNC: 32.6 G/DL (ref 32.2–35.5)
MCV RBC AUTO: 88.9 FL (ref 81.4–97.8)
PLATELET # BLD AUTO: 311 K/UL (ref 164–446)
PMV BLD AUTO: 10.2 FL (ref 9–12.9)
POTASSIUM SERPL-SCNC: 4.2 MMOL/L (ref 3.6–5.5)
RBC # BLD AUTO: 3.42 M/UL (ref 4.2–5.4)
SODIUM SERPL-SCNC: 134 MMOL/L (ref 135–145)
WBC # BLD AUTO: 9.9 K/UL (ref 4.8–10.8)

## 2023-08-22 PROCEDURE — 700102 HCHG RX REV CODE 250 W/ 637 OVERRIDE(OP)

## 2023-08-22 PROCEDURE — 80048 BASIC METABOLIC PNL TOTAL CA: CPT

## 2023-08-22 PROCEDURE — 770020 HCHG ROOM/CARE - TELE (206)

## 2023-08-22 PROCEDURE — 85027 COMPLETE CBC AUTOMATED: CPT

## 2023-08-22 PROCEDURE — A9270 NON-COVERED ITEM OR SERVICE: HCPCS | Mod: JZ | Performed by: NURSE PRACTITIONER

## 2023-08-22 PROCEDURE — 700111 HCHG RX REV CODE 636 W/ 250 OVERRIDE (IP): Mod: JZ | Performed by: NURSE PRACTITIONER

## 2023-08-22 PROCEDURE — 97535 SELF CARE MNGMENT TRAINING: CPT | Mod: CO

## 2023-08-22 PROCEDURE — A9270 NON-COVERED ITEM OR SERVICE: HCPCS | Performed by: NURSE PRACTITIONER

## 2023-08-22 PROCEDURE — A9270 NON-COVERED ITEM OR SERVICE: HCPCS

## 2023-08-22 PROCEDURE — 700102 HCHG RX REV CODE 250 W/ 637 OVERRIDE(OP): Mod: JZ | Performed by: NURSE PRACTITIONER

## 2023-08-22 PROCEDURE — 700102 HCHG RX REV CODE 250 W/ 637 OVERRIDE(OP): Performed by: NURSE PRACTITIONER

## 2023-08-22 RX ADMIN — POTASSIUM CHLORIDE 20 MEQ: 1500 TABLET, EXTENDED RELEASE ORAL at 04:24

## 2023-08-22 RX ADMIN — ASPIRIN 81 MG: 81 TABLET, COATED ORAL at 04:23

## 2023-08-22 RX ADMIN — FUROSEMIDE 40 MG: 10 INJECTION INTRAMUSCULAR; INTRAVENOUS at 17:09

## 2023-08-22 RX ADMIN — LEVOTHYROXINE SODIUM 75 MCG: 0.07 TABLET ORAL at 04:23

## 2023-08-22 RX ADMIN — APIXABAN 5 MG: 5 TABLET, FILM COATED ORAL at 20:21

## 2023-08-22 RX ADMIN — METOPROLOL TARTRATE 25 MG: 25 TABLET, FILM COATED ORAL at 17:14

## 2023-08-22 RX ADMIN — FUROSEMIDE 40 MG: 10 INJECTION INTRAMUSCULAR; INTRAVENOUS at 05:07

## 2023-08-22 RX ADMIN — AMIODARONE HYDROCHLORIDE 400 MG: 200 TABLET ORAL at 16:08

## 2023-08-22 RX ADMIN — POTASSIUM CHLORIDE 20 MEQ: 1500 TABLET, EXTENDED RELEASE ORAL at 17:14

## 2023-08-22 RX ADMIN — LISINOPRIL 5 MG: 5 TABLET ORAL at 04:23

## 2023-08-22 RX ADMIN — TRAMADOL HYDROCHLORIDE 50 MG: 50 TABLET ORAL at 20:46

## 2023-08-22 RX ADMIN — CLOPIDOGREL BISULFATE 75 MG: 75 TABLET ORAL at 04:23

## 2023-08-22 RX ADMIN — ROSUVASTATIN CALCIUM 40 MG: 20 TABLET, FILM COATED ORAL at 17:14

## 2023-08-22 RX ADMIN — METOPROLOL TARTRATE 25 MG: 25 TABLET, FILM COATED ORAL at 04:24

## 2023-08-22 RX ADMIN — OMEPRAZOLE 20 MG: 20 CAPSULE, DELAYED RELEASE ORAL at 04:23

## 2023-08-22 RX ADMIN — PREGABALIN 50 MG: 25 CAPSULE ORAL at 04:24

## 2023-08-22 RX ADMIN — DULOXETINE HYDROCHLORIDE 40 MG: 20 CAPSULE, DELAYED RELEASE ORAL at 04:24

## 2023-08-22 RX ADMIN — SENNOSIDES AND DOCUSATE SODIUM 2 TABLET: 50; 8.6 TABLET ORAL at 04:23

## 2023-08-22 RX ADMIN — DULOXETINE HYDROCHLORIDE 40 MG: 20 CAPSULE, DELAYED RELEASE ORAL at 17:14

## 2023-08-22 RX ADMIN — Medication 1 APPLICATOR: at 08:27

## 2023-08-22 RX ADMIN — AMIODARONE HYDROCHLORIDE 400 MG: 200 TABLET ORAL at 04:24

## 2023-08-22 RX ADMIN — PREGABALIN 50 MG: 25 CAPSULE ORAL at 17:14

## 2023-08-22 RX ADMIN — AMIODARONE HYDROCHLORIDE 0.5 MG/MIN: 1.8 INJECTION, SOLUTION INTRAVENOUS at 04:20

## 2023-08-22 RX ADMIN — APIXABAN 5 MG: 5 TABLET, FILM COATED ORAL at 10:22

## 2023-08-22 RX ADMIN — BUSPIRONE HYDROCHLORIDE 10 MG: 10 TABLET ORAL at 17:14

## 2023-08-22 ASSESSMENT — COGNITIVE AND FUNCTIONAL STATUS - GENERAL
SUGGESTED CMS G CODE MODIFIER DAILY ACTIVITY: CJ
HELP NEEDED FOR BATHING: A LITTLE
TOILETING: A LITTLE
DAILY ACTIVITIY SCORE: 21
DRESSING REGULAR LOWER BODY CLOTHING: A LITTLE

## 2023-08-22 ASSESSMENT — FIBROSIS 4 INDEX: FIB4 SCORE: 1.17

## 2023-08-22 ASSESSMENT — PAIN DESCRIPTION - PAIN TYPE: TYPE: ACUTE PAIN

## 2023-08-22 NOTE — CARE PLAN
The patient is Watcher - Medium risk of patient condition declining or worsening    Shift Goals  Clinical Goals: CXR, wean O2, walk, shower  Patient Goals: comfort  Family Goals: HELEN    Progress made toward(s) clinical / shift goals:  CXR complete, patient on room air this morning, shower complete    Patient is not progressing towards the following goals: patient I/O of afib/RVR with amio gtt restarted, unable to ambulate in smith d/t high HR, patient ambulated to bathroom multiple times and shower complete    Problem: Post Op Day 5 CABG/Heart Valve Replacement  Goal: Optimal care of the Post Op CABG/Heart Valve replacement Post Op Day 5  Intervention: Daily weights in the morning  Note: Patient weighed daily, patient still up from preop weight  Intervention: Shower daily and clean incisions twice daily with soap and water  Note: Patient showered with help of staff, incisions cleaned  Intervention: Up in chair for all meals  Note: Patient up in recliner for all meals  Intervention: IS q 1 hour while awake and record best IS volume  Note: Patient demonstrated proper use of IS and able to pull 1500  Intervention: Discharge Education  Note: Discharge education started in preparation of discharge in 1-2 days     Problem: Pain - Standard  Goal: Alleviation of pain or a reduction in pain to the patient’s comfort goal  Outcome: Progressing     Problem: Fall Risk  Goal: Patient will remain free from falls  Outcome: Progressing

## 2023-08-22 NOTE — DISCHARGE PLANNING
Met with pt who reports she is independent with ADLs and IADLs. She initially did not want HH services but accepted this afternoon. Choice faxed to [facility]. Need HH order.     Dr. Walker is PCP.  [email or web address]    Care Transition Team Assessment    Information Source  Orientation Level: Oriented X4  Information Given By: Patient  Who is responsible for making decisions for patient? : Patient    Readmission Evaluation  Is this a readmission?: No    Elopement Risk  Legal Hold: No  Ambulatory or Self Mobile in Wheelchair: Yes  Disoriented: No  Psychiatric Symptoms: None  History of Wandering: No  Elopement this Admit: No  Vocalizing Wanting to Leave: No  Displays Behaviors, Body Language Wanting to Leave: No-Not at Risk for Elopement  Elopement Risk: Not at Risk for Elopement    Interdisciplinary Discharge Planning  Does Admitting Nurse Feel This Could be a Complex Discharge?: No  Primary Care Physician: Dr WALKER  Lives with - Patient's Self Care Capacity: Spouse  Patient or legal guardian wants to designate a caregiver: No  Support Systems: Spouse / Significant Other  Housing / Facility: [address]  Do You Take your Prescribed Medications Regularly: Yes  Able to Return to Previous ADL's: Yes  Mobility Issues: No  Prior Services: Home-Independent  Patient Prefers to be Discharged to:: Home  Assistance Needed: No  Durable Medical Equipment: Not Applicable    Discharge Preparedness  What is your plan after discharge?: Home health care  What are your discharge supports?: Spouse  Prior Functional Level: Ambulatory, Drives Self, Independent with Activities of Daily Living, Independent with Medication Management  Difficulity with ADLs: None  Difficulity with IADLs: Driving    Functional Assesment  Prior Functional Level: Ambulatory, Drives Self, Independent with Activities of Daily Living, Independent with Medication Management    Finances  Financial Barriers to Discharge: No  Prescription Coverage: Yes    Vision /  Hearing Impairment  Vision Impairment : Yes  Right Eye Vision: Impaired, Wears Glasses  Left Eye Vision: Impaired, Wears Glasses  Hearing Impairment : No    Values / Beliefs / Concerns  Values / Beliefs Concerns : No    Advance Directive  Advance Directive?: Living Will    Domestic Abuse  Have you ever been the victim of abuse or violence?: No  Physical Abuse or Sexual Abuse: No  Verbal Abuse or Emotional Abuse: No  Possible Abuse/Neglect Reported to:: Not Applicable         Discharge Risks or Barriers  Discharge risks or barriers?: Post-acute placement / services  Patient risk factors: Vulnerable adult    Anticipated Discharge Information  Discharge Disposition: D/T to home under HHA care in anticipation of covered skilled care (06)

## 2023-08-22 NOTE — PROGRESS NOTES
Assumed care of patient. Bedside report received from Elsy MARIE. Updated POC, call light within reach, fall precautions in place. Bed locked, and in lowest position. Patient is A&Ox4, states 0/10 pain. Patient up in chair. Patient instructed to call for assistance. All questions answered, no further needs at this time.     2004: Patient got up to commode, and on way to bed, the monitor tech notified this RN that patient was back in afib, with heart rate 140's at this time. Patient said she feels tired and would like to go to bed. No other complaints, patient resting.     2322: Patient converted back to sinus rhythm at 2151.

## 2023-08-22 NOTE — THERAPY
Occupational Therapy  Daily Treatment     Patient Name: Margoth Hopkins  Age:  72 y.o., Sex:  female  Medical Record #: [id number]  Today's Date: 8/22/2023       Precautions: Fall Risk, Cardiac Precautions (See Comments), Sternal Precautions (See Comments)    Assessment    Pt seen for OT tx. Continues to be limited by decreased activity tolerance and balance deficits impacting ability to complete ADLs and ADL transfers independently. Up in chair pre and post session. Thorough discussion regarding maintaining sternal precautions during ADLs and ADL transfers. She reports that she owns proper DME for home to increase independence in ADL transfers. She also states that her  will be home to assist and if he's not there she has friends who will come help her. Required min cues to verbalize sternal precautions. Will continue to follow while in house.     Plan    Treatment Plan Status: Continue Current Treatment Plan    DC Equipment Recommendations: None  Discharge Recommendations: Recommend home health for continued occupational therapy services       08/22/23 1031   Cognition    Cognition / Consciousness X   New Learning Impaired   Sequencing Impaired   Comments continues to require re education on sternal precautions   Balance   Sitting Balance (Static) Fair +   Sitting Balance (Dynamic) Fair   Standing Balance (Static) Fair -   Standing Balance (Dynamic) Fair -   Weight Shift Sitting Good   Weight Shift Standing Fair   Bed Mobility    Comments up in chair pre and post session   Activities of Daily Living   Grooming Contact Guard Assist;Standing   Toileting Standby Assist   Short Term Goals   Short Term Goal # 1 Pt will complete STS from toilet with supv   Goal Outcome # 1 Progressing as expected   Short Term Goal # 2 Pt will complete LB dressing with supv using AE PRN   Goal Outcome # 2 Progressing as expected   Short Term Goal # 3 Pt will recall & demo 3 sternal precaution independently   Goal Outcome # 3  Progressing as expected   Anticipated Discharge Equipment and Recommendations   DC Equipment Recommendations None   Discharge Recommendations Recommend home health for continued occupational therapy services

## 2023-08-22 NOTE — DISCHARGE PLANNING
note:  CM went to room and discussed IMM. Pt is agreeable with possible dc tomorrow. She was unable to sign since her  was giving her a bath but they wanted to know why CM came in the room so CM explained.       Addendum: Valerie from [facility] confirmed acceptance.

## 2023-08-22 NOTE — CARE PLAN
"The patient is Stable - Low risk of patient condition declining or worsening    Shift Goals  Clinical Goals: OPH ADL's, monitor HR and rhythm  Patient Goals: Rest, comfort  Family Goals: HELEN      Problem: Post Op Day 5 CABG/Heart Valve Replacement  Goal: Optimal care of the Post Op CABG/Heart Valve replacement Post Op Day 5  Outcome: Progressing  Intervention: Ambulate 4 times daily, increasing the distance each time  Note: Patient ambulating as tolerated, heart rate and rhythm went into AFIB 140's after dinner, did not ambulate at that time, but was up to chair for dinner.   Intervention: Complete \"Home O2 Assessment' in vitals flowsheets if unable to wean off O2  Note: Will assess home O2 needs during morning walk.   Intervention: Consider removal of maynard, chest tube and pacer wires if not already done  Note: Maynard, chest tubes, and wires out.     Problem: Fall Risk  Goal: Patient will remain free from falls  Outcome: Progressing     Problem: Skin Integrity  Goal: Skin integrity is maintained or improved  Outcome: Progressing       Progress made toward(s) clinical / shift goals:  Progressing          "

## 2023-08-22 NOTE — PROGRESS NOTES
MONITOR SUMMARY    .20/.08/.31    SR/ST     Ectopy: Occ PVC, Rare PAC    MONITOR SUMMARY    -/.08/-    Afib/-160's    Ectopy: N/A    Afib -> SR at 1515

## 2023-08-22 NOTE — DOCUMENTATION QUERY
"                                                                         [facility]                                                                       Query Response Note      PATIENT:               IAN JOHNSON  ACCT #:                  [id number]  MRN:                     [id number]  :                      1950  ADMIT DATE:       2023 5:14 AM  DISCH DATE:          RESPONDING  PROVIDER #:        [id number]           QUERY TEXT:    Afib is noted occuring after CABG and AVR.  Please clarify the findings in the medical record.  [Afib is routinely expected and integral/inherent to the procedure performed]]    Documentation indicators that raised basis for question:   71yo with dx of CAD, aortic regurgitation s/p CABG x 1 and AVR     [facility]   [facility] \"[facility] yesterday with RVR on amio gtt\"    Risk factors: advanced age, CABG and AVR on   Treatments: amiodarone drip, cardiac monitoring, labwork, imaging    Contact me with questions.      Thank you,  Karla Elias, KIMP, CDI  [email or web address]  Options provided:   -- Afib is unexpected and a complication of the procedure performed   -- Afib is not a complication due to or associated with the procedure   -- Afib is related to another etiology, (please document underlying etiology)   -- Other explanation:other explanation-, please specify   -- Unable to determine      Query created by: Karla Elias on 2023 5:31 AM    RESPONSE TEXT:    Afib is unexpected and a complication of the procedure performed          Electronically signed by:  ROSITA BUCHANAN MD 2023 6:57 AM              "

## 2023-08-22 NOTE — PROGRESS NOTES
Cardiovascular Surgery Progress Note    Name: Margoth Hopkins  MRN: [id number]  : 1950  Admit Date: 2023  5:14 AM  6 Days Post-Op     Procedure:  Procedure(s) and Anesthesia Type:     * CORONARY ARTERY BYPASS GRAFT X1 - General     * REPLACEMENT, AORTIC VALVE - General     * ECHOCARDIOGRAM, TRANSESOPHAGEAL, INTRAOPERATIVE - General    Vitals:  Vitals:    23 0409 23 0424 23 0500 23 0736   BP: 133/79  133/79    Pulse: 79 78  72   Resp: 16   16   Temp: 36.3 °C (97.3 °F)      TempSrc: Temporal      SpO2: 95%  98% 92%   Weight:   78.1 kg (172 lb 2.9 oz)    Height:          Temp (24hrs), Av.4 °C (97.5 °F), Min:36 °C (96.8 °F), Max:36.6 °C (97.9 °F)      Respiratory:    Respiration: 16, Pulse Oximetry: 92 %       Fluids:    Intake/Output Summary (Last 24 hours) at 2023 0953  Last data filed at 2023 0600  Gross per 24 hour   Intake 240 ml   Output 1200 ml   Net -960 ml       Admit weight: Weight: 77.8 kg (171 lb 8.3 oz)  Current weight: Weight: 78.1 kg (172 lb 2.9 oz) (23 0500)    Labs:  Recent Labs     23  0230 23  0220 23  0030   WBC 10.5 9.0 9.9   RBC 3.28* 3.21* 3.42*   HEMOGLOBIN 9.4* 9.3* 9.9*   HEMATOCRIT 29.2* 28.5* 30.4*   MCV 89.0 88.8 88.9   MCH 28.7 29.0 28.9   MCHC 32.2 32.6 32.6   RDW 46.4 46.5 46.5   PLATELETCT 222 250 311   MPV 10.4 10.2 10.2       Recent Labs     23  0230 23  0220 23  1035 23  0030   SODIUM 136 134*  --  134*   POTASSIUM 3.3* 3.2* 3.5* 4.2   CHLORIDE 95* 94*  --  94*   CO2 31 32  --  29   GLUCOSE 114* 134*  --  116*   BUN 19 17  --  20   CREATININE 0.72 0.71  --  0.86   CALCIUM 8.2* 8.2*  --  8.9               Medications:  Scheduled Medications   Medication Dose Frequency    furosemide  40 mg BID    potassium chloride SA  20 mEq BID    amiodarone  400 mg BID    lisinopril  5 mg Q DAY    busPIRone  10 mg Q EVENING    DULoxetine  40 mg BID    pregabalin  50 mg BID    rosuvastatin  40 mg Q  EVENING    levothyroxine  75 mcg AM ES    Nozin nasal  swab  1 Applicator BID    metoprolol tartrate  25 mg BID    aspirin  81 mg DAILY    Pharmacy Consult Request  1 Each PHARMACY TO DOSE    senna-docusate  2 Tablet BID    And    polyethylene glycol/lytes  1 Packet DAILY    And    magnesium hydroxide  30 mL DAILY    omeprazole  20 mg DAILY    enoxaparin (LOVENOX) injection  40 mg DAILY AT 1800    clopidogrel  75 mg DAILY        Exam:   Physical Exam  Constitutional:       General: She is not in acute distress.  HENT:      Mouth/Throat:      Mouth: Mucous membranes are moist.   Eyes:      Pupils: Pupils are equal, round, and reactive to light.   Cardiovascular:      Rate and Rhythm: Normal rate and regular rhythm.      Pulses: Normal pulses.      Heart sounds: Normal heart sounds.   Pulmonary:      Effort: No respiratory distress.      Breath sounds: Examination of the right-lower field reveals decreased breath sounds. Examination of the left-lower field reveals decreased breath sounds. Decreased breath sounds present.   Abdominal:      Palpations: Abdomen is soft.   Musculoskeletal:      Right lower leg: Edema present.      Left lower leg: Edema present.   Skin:     General: Skin is warm and dry.      Comments: Sternum- CDI     Neurological:      General: No focal deficit present.      Mental Status: She is alert and oriented to person, place, and time.       Cardiac Medications:    ASA - Yes    Plavix - Yes    Post-operative Beta Blockers - Yes    Ace/ARB- Yes    Statin - Yes    Aldactone- No; contraindicated because of Normal EF    SGLT2i-  No contraindicated because of Normal EF    Ejection Fraction:  60%    Telemetry:   8/17 SR  8/18 SR  8/19 SR, afib RVR  8/20 SR  8/21 a fib rvr  8/22 SR/afib     Assessment/Plan:  POD 1  HDS, Extubated off gtts., 1L NC, neuro intact, wounds Island dressing, abdomen soft n/t hypoactive, Hgb 11.8, Cr 0.68,  fluid balance pos, wt up. Plan:  Keep wires one day, D/C  mediastinal tubes and maynard, begin diuresis, Wean Oxygen, Encourage IS/ambulate.     POD 2  HDS, neuro intact, wounds CDI, abdomen soft n/t, Hgb 9.9, Cr 0.70,  fluid balance down, wt down. Plan:  Continue diuresis. Added home ACE last night. D/C wires patient tolerated well. Wean Oxygen, Encourage IS/ambulate. Transfer to tele.    POD 3 HDS, SR, cont diuresis, wean oxygen, amb/enc IS, plan home in 1-2 days    POD 4 HDS, SR- AFib yesterday with RVR- on amio gtt- change to PO, cont diuresis, replace K+, wean O2, ambulate, plan home when rhythm stable    POD 5  HDS, A fib RVR this morning, neuro intact, wounds intact, abdomen soft +BM, fluid balance negative, wt 2 kg up from admit weight,  1 L NC. K 3.2.  Plan: Amio bolus and resume gtt. Mg pending, K replaced IS/ambulate. 2 view cxr this am. Home o2 test.     POD 6 HDS. SR/afib rate controlled.  Neuro intact, tremor-chronic.  Abdomen soft +BM. RA.  Cr stable Hgb 9.9.  Plan: Start Noac. DC plavix.  DC amio gtt, continue amio PO.  Probable home tomorrow AM.    Disposition:  PT/OT cleared for home

## 2023-08-22 NOTE — PROGRESS NOTES
Monitor summary    Rhythm: SR, converted Afib at 2003, converted back to SR at 2157, BBB  Rate: 76-96 in SR, 110-140 in Afib  Ectopy: (R) PVC, 4 beats Vtach prior to converting to afib  Measurement:.21/.12/.40

## 2023-08-22 NOTE — DISCHARGE PLANNING
Case Management Discharge Planning    Admission Date: 8/16/2023  GMLOS: 3.3  ALOS: 6    6-Clicks ADL Score: 21  6-Clicks Mobility Score: 23      Anticipated Discharge Dispo: Discharge Disposition: Discharged to home/self care (01)  Homehealth    DME Needed: none    Action(s) Taken: Initially pt declined homehealth and then CM was asked to talk to pt again and pt agreed to have home health services.     Pt signed choice. CM called several HH agency and [facility] accepts pt's insurance.     Faxed choice to [facility].   Message sent to VENKATESH Curtis for HH order.     Escalations Completed: n/a    Medically Clear: no-tomorrow per pt    Next Steps: follow up with [facility]    Barriers to Discharge: surgical clearance.    Is the patient up for discharge tomorrow: yes

## 2023-08-22 NOTE — FACE TO FACE
Face to Face Supporting Documentation - Home Health    The encounter with this patient was in whole or in part the primary reason for home health admission.    Date of encounter:   Patient:                    MRN:                       YOB: 2025  Margoth Hopkins  [id number]  1950     Home health to see patient for:  Skilled Nursing care for assessment, interventions & education, Physical Therapy evaluation and treatment, and Occupational therapy evaluation and treatment    Skilled need for:  Surgical Aftercare s/p cabg    Skilled nursing interventions to include:  Comment: Medication management, PT OT     Homebound status evidenced by:  Have a condition such that leaving his or her home is medically contraindicated. Leaving home requires a considerable and taxing effort. There is a normal inability to leave the home.    Community Physician to provide follow up care: Charlee Walker M.D.     Optional Interventions? No      I certify the face to face encounter for this home health care referral meets the CMS requirements and the encounter/clinical assessment with the patient was, in whole, or in part, for the medical condition(s) listed above, which is the primary reason for home health care. Based on my clinical findings: the service(s) are medically necessary, support the need for home health care, and the homebound criteria are met.  I certify that this patient has had a face to face encounter by myself.  ROSELINE Strong. - NPI: [id number]

## 2023-08-23 ENCOUNTER — APPOINTMENT (OUTPATIENT)
Dept: RADIOLOGY | Facility: MEDICAL CENTER | Age: 73
DRG: 219 | End: 2023-08-23
Attending: EMERGENCY MEDICINE
Payer: COMMERCIAL

## 2023-08-23 ENCOUNTER — HOSPITAL ENCOUNTER (INPATIENT)
Facility: MEDICAL CENTER | Age: 73
LOS: 7 days | DRG: 219 | End: 2023-08-30
Attending: EMERGENCY MEDICINE | Admitting: INTERNAL MEDICINE
Payer: COMMERCIAL

## 2023-08-23 ENCOUNTER — PHARMACY VISIT (OUTPATIENT)
Dept: PHARMACY | Facility: MEDICAL CENTER | Age: 73
End: 2023-08-23
Payer: MEDICARE

## 2023-08-23 VITALS
RESPIRATION RATE: 16 BRPM | BODY MASS INDEX: 26.78 KG/M2 | TEMPERATURE: 97.5 F | HEART RATE: 75 BPM | WEIGHT: 170.64 LBS | SYSTOLIC BLOOD PRESSURE: 98 MMHG | HEIGHT: 67 IN | OXYGEN SATURATION: 91 % | DIASTOLIC BLOOD PRESSURE: 68 MMHG

## 2023-08-23 DIAGNOSIS — K81.9 CHOLECYSTITIS: ICD-10-CM

## 2023-08-23 DIAGNOSIS — E87.20 METABOLIC ACIDOSIS: ICD-10-CM

## 2023-08-23 DIAGNOSIS — K92.1 GASTROINTESTINAL HEMORRHAGE WITH MELENA: ICD-10-CM

## 2023-08-23 DIAGNOSIS — Z95.1 S/P CABG X 1: ICD-10-CM

## 2023-08-23 DIAGNOSIS — R40.4 ALTERED LEVEL OF CONSCIOUSNESS: ICD-10-CM

## 2023-08-23 DIAGNOSIS — Z95.3 H/O AORTIC VALVE REPLACEMENT WITH PORCINE VALVE: ICD-10-CM

## 2023-08-23 DIAGNOSIS — I48.0 PAROXYSMAL ATRIAL FIBRILLATION (HCC): ICD-10-CM

## 2023-08-23 DIAGNOSIS — R65.21 SEPSIS WITH ENCEPHALOPATHY AND SEPTIC SHOCK, DUE TO UNSPECIFIED ORGANISM (HCC): ICD-10-CM

## 2023-08-23 DIAGNOSIS — A41.9 SEPSIS WITH ENCEPHALOPATHY AND SEPTIC SHOCK, DUE TO UNSPECIFIED ORGANISM (HCC): ICD-10-CM

## 2023-08-23 DIAGNOSIS — G93.41 SEPSIS WITH ENCEPHALOPATHY AND SEPTIC SHOCK, DUE TO UNSPECIFIED ORGANISM (HCC): ICD-10-CM

## 2023-08-23 DIAGNOSIS — Z95.2 HISTORY OF AORTIC VALVE REPLACEMENT: ICD-10-CM

## 2023-08-23 DIAGNOSIS — R74.01 TRANSAMINITIS: ICD-10-CM

## 2023-08-23 DIAGNOSIS — Z95.1 HISTORY OF SINGLE VESSEL CORONARY ARTERY BYPASS: ICD-10-CM

## 2023-08-23 PROBLEM — R57.9 SHOCK (HCC): Status: ACTIVE | Noted: 2023-08-23

## 2023-08-23 PROBLEM — Z99.11 ENCOUNTER FOR WEANING FROM VENTILATOR (HCC): Status: RESOLVED | Noted: 2023-08-16 | Resolved: 2023-08-23

## 2023-08-23 LAB
ALBUMIN SERPL BCP-MCNC: 2.8 G/DL (ref 3.2–4.9)
ALBUMIN/GLOB SERPL: 1.2 G/DL
ALP SERPL-CCNC: 352 U/L (ref 30–99)
ALT SERPL-CCNC: 97 U/L (ref 2–50)
ANION GAP SERPL CALC-SCNC: 11 MMOL/L (ref 7–16)
ANION GAP SERPL CALC-SCNC: 20 MMOL/L (ref 7–16)
ANISOCYTOSIS BLD QL SMEAR: ABNORMAL
AST SERPL-CCNC: 67 U/L (ref 12–45)
BASOPHILS # BLD AUTO: 0 % (ref 0–1.8)
BASOPHILS # BLD: 0 K/UL (ref 0–0.12)
BILIRUB SERPL-MCNC: 0.8 MG/DL (ref 0.1–1.5)
BUN SERPL-MCNC: 21 MG/DL (ref 8–22)
BUN SERPL-MCNC: 23 MG/DL (ref 8–22)
BURR CELLS BLD QL SMEAR: NORMAL
CALCIUM ALBUM COR SERPL-MCNC: 8.8 MG/DL (ref 8.5–10.5)
CALCIUM SERPL-MCNC: 7.8 MG/DL (ref 8.5–10.5)
CALCIUM SERPL-MCNC: 9.2 MG/DL (ref 8.5–10.5)
CHLORIDE SERPL-SCNC: 103 MMOL/L (ref 96–112)
CHLORIDE SERPL-SCNC: 93 MMOL/L (ref 96–112)
CO2 SERPL-SCNC: 16 MMOL/L (ref 20–33)
CO2 SERPL-SCNC: 29 MMOL/L (ref 20–33)
CREAT SERPL-MCNC: 0.98 MG/DL (ref 0.5–1.4)
CREAT SERPL-MCNC: 1.3 MG/DL (ref 0.5–1.4)
EOSINOPHIL # BLD AUTO: 0.1 K/UL (ref 0–0.51)
EOSINOPHIL NFR BLD: 0.8 % (ref 0–6.9)
ERYTHROCYTE [DISTWIDTH] IN BLOOD BY AUTOMATED COUNT: 47.4 FL (ref 35.9–50)
ERYTHROCYTE [DISTWIDTH] IN BLOOD BY AUTOMATED COUNT: 48.7 FL (ref 35.9–50)
GFR SERPLBLD CREATININE-BSD FMLA CKD-EPI: 43 ML/MIN/1.73 M 2
GFR SERPLBLD CREATININE-BSD FMLA CKD-EPI: 60 ML/MIN/1.73 M 2
GLOBULIN SER CALC-MCNC: 2.4 G/DL (ref 1.9–3.5)
GLUCOSE SERPL-MCNC: 121 MG/DL (ref 65–99)
GLUCOSE SERPL-MCNC: 191 MG/DL (ref 65–99)
HCT VFR BLD AUTO: 30.2 % (ref 37–47)
HCT VFR BLD AUTO: 31.9 % (ref 37–47)
HGB BLD-MCNC: 10.3 G/DL (ref 12–16)
HGB BLD-MCNC: 9.8 G/DL (ref 12–16)
LACTATE SERPL-SCNC: 2.6 MMOL/L (ref 0.5–2)
LACTATE SERPL-SCNC: 9.7 MMOL/L (ref 0.5–2)
LIPASE SERPL-CCNC: 295 U/L (ref 11–82)
LYMPHOCYTES # BLD AUTO: 3.01 K/UL (ref 1–4.8)
LYMPHOCYTES NFR BLD: 23.3 % (ref 22–41)
MANUAL DIFF BLD: NORMAL
MCH RBC QN AUTO: 29.2 PG (ref 27–33)
MCH RBC QN AUTO: 29.7 PG (ref 27–33)
MCHC RBC AUTO-ENTMCNC: 32.3 G/DL (ref 32.2–35.5)
MCHC RBC AUTO-ENTMCNC: 32.5 G/DL (ref 32.2–35.5)
MCV RBC AUTO: 89.9 FL (ref 81.4–97.8)
MCV RBC AUTO: 91.9 FL (ref 81.4–97.8)
METAMYELOCYTES NFR BLD MANUAL: 0.9 %
MICROCYTES BLD QL SMEAR: ABNORMAL
MONOCYTES # BLD AUTO: 0.55 K/UL (ref 0–0.85)
MONOCYTES NFR BLD AUTO: 4.3 % (ref 0–13.4)
MORPHOLOGY BLD-IMP: NORMAL
MYELOCYTES NFR BLD MANUAL: 1.7 %
NEUTROPHILS # BLD AUTO: 8.78 K/UL (ref 1.82–7.42)
NEUTROPHILS NFR BLD: 68.1 % (ref 44–72)
NRBC # BLD AUTO: 0.06 K/UL
NRBC BLD-RTO: 0.5 /100 WBC (ref 0–0.2)
PLATELET # BLD AUTO: 352 K/UL (ref 164–446)
PLATELET # BLD AUTO: 377 K/UL (ref 164–446)
PLATELET BLD QL SMEAR: NORMAL
PMV BLD AUTO: 9.7 FL (ref 9–12.9)
PMV BLD AUTO: 9.9 FL (ref 9–12.9)
POIKILOCYTOSIS BLD QL SMEAR: NORMAL
POTASSIUM SERPL-SCNC: 3.2 MMOL/L (ref 3.6–5.5)
POTASSIUM SERPL-SCNC: 3.8 MMOL/L (ref 3.6–5.5)
PROMYELOCYTES NFR BLD MANUAL: 0.9 %
PROT SERPL-MCNC: 5.2 G/DL (ref 6–8.2)
RBC # BLD AUTO: 3.36 M/UL (ref 4.2–5.4)
RBC # BLD AUTO: 3.47 M/UL (ref 4.2–5.4)
RBC BLD AUTO: PRESENT
SODIUM SERPL-SCNC: 133 MMOL/L (ref 135–145)
SODIUM SERPL-SCNC: 139 MMOL/L (ref 135–145)
WBC # BLD AUTO: 11.6 K/UL (ref 4.8–10.8)
WBC # BLD AUTO: 12.9 K/UL (ref 4.8–10.8)

## 2023-08-23 PROCEDURE — 96365 THER/PROPH/DIAG IV INF INIT: CPT

## 2023-08-23 PROCEDURE — 96375 TX/PRO/DX INJ NEW DRUG ADDON: CPT

## 2023-08-23 PROCEDURE — 700105 HCHG RX REV CODE 258: Performed by: EMERGENCY MEDICINE

## 2023-08-23 PROCEDURE — 700111 HCHG RX REV CODE 636 W/ 250 OVERRIDE (IP): Mod: JZ | Performed by: NURSE PRACTITIONER

## 2023-08-23 PROCEDURE — 97116 GAIT TRAINING THERAPY: CPT

## 2023-08-23 PROCEDURE — 80048 BASIC METABOLIC PNL TOTAL CA: CPT

## 2023-08-23 PROCEDURE — A9270 NON-COVERED ITEM OR SERVICE: HCPCS | Mod: JZ | Performed by: NURSE PRACTITIONER

## 2023-08-23 PROCEDURE — 83690 ASSAY OF LIPASE: CPT

## 2023-08-23 PROCEDURE — A9270 NON-COVERED ITEM OR SERVICE: HCPCS

## 2023-08-23 PROCEDURE — 700111 HCHG RX REV CODE 636 W/ 250 OVERRIDE (IP): Performed by: EMERGENCY MEDICINE

## 2023-08-23 PROCEDURE — RXMED WILLOW AMBULATORY MEDICATION CHARGE

## 2023-08-23 PROCEDURE — 83605 ASSAY OF LACTIC ACID: CPT

## 2023-08-23 PROCEDURE — 99292 CRITICAL CARE ADDL 30 MIN: CPT | Performed by: INTERNAL MEDICINE

## 2023-08-23 PROCEDURE — 700102 HCHG RX REV CODE 250 W/ 637 OVERRIDE(OP): Mod: JZ | Performed by: NURSE PRACTITIONER

## 2023-08-23 PROCEDURE — 99291 CRITICAL CARE FIRST HOUR: CPT

## 2023-08-23 PROCEDURE — 97110 THERAPEUTIC EXERCISES: CPT

## 2023-08-23 PROCEDURE — 85027 COMPLETE CBC AUTOMATED: CPT

## 2023-08-23 PROCEDURE — 85007 BL SMEAR W/DIFF WBC COUNT: CPT

## 2023-08-23 PROCEDURE — A9270 NON-COVERED ITEM OR SERVICE: HCPCS | Performed by: NURSE PRACTITIONER

## 2023-08-23 PROCEDURE — 36415 COLL VENOUS BLD VENIPUNCTURE: CPT

## 2023-08-23 PROCEDURE — 76705 ECHO EXAM OF ABDOMEN: CPT

## 2023-08-23 PROCEDURE — 700117 HCHG RX CONTRAST REV CODE 255: Performed by: EMERGENCY MEDICINE

## 2023-08-23 PROCEDURE — 80053 COMPREHEN METABOLIC PANEL: CPT

## 2023-08-23 PROCEDURE — 99291 CRITICAL CARE FIRST HOUR: CPT | Performed by: INTERNAL MEDICINE

## 2023-08-23 PROCEDURE — 71260 CT THORAX DX C+: CPT

## 2023-08-23 PROCEDURE — 94760 N-INVAS EAR/PLS OXIMETRY 1: CPT

## 2023-08-23 PROCEDURE — 700102 HCHG RX REV CODE 250 W/ 637 OVERRIDE(OP): Performed by: NURSE PRACTITIONER

## 2023-08-23 PROCEDURE — 85025 COMPLETE CBC W/AUTO DIFF WBC: CPT

## 2023-08-23 PROCEDURE — 99024 POSTOP FOLLOW-UP VISIT: CPT

## 2023-08-23 PROCEDURE — 82533 TOTAL CORTISOL: CPT

## 2023-08-23 PROCEDURE — 770022 HCHG ROOM/CARE - ICU (200)

## 2023-08-23 PROCEDURE — 87040 BLOOD CULTURE FOR BACTERIA: CPT

## 2023-08-23 PROCEDURE — 71045 X-RAY EXAM CHEST 1 VIEW: CPT

## 2023-08-23 PROCEDURE — 93005 ELECTROCARDIOGRAM TRACING: CPT | Performed by: EMERGENCY MEDICINE

## 2023-08-23 PROCEDURE — 700102 HCHG RX REV CODE 250 W/ 637 OVERRIDE(OP)

## 2023-08-23 RX ORDER — SODIUM CHLORIDE 9 MG/ML
1000 INJECTION, SOLUTION INTRAVENOUS ONCE
Status: COMPLETED | OUTPATIENT
Start: 2023-08-23 | End: 2023-08-24

## 2023-08-23 RX ORDER — ONDANSETRON 2 MG/ML
4 INJECTION INTRAMUSCULAR; INTRAVENOUS ONCE
Status: COMPLETED | OUTPATIENT
Start: 2023-08-23 | End: 2023-08-23

## 2023-08-23 RX ORDER — ACETAMINOPHEN 500 MG
1000 TABLET ORAL EVERY 6 HOURS PRN
Qty: 30 TABLET | Refills: 0 | Status: ON HOLD | COMMUNITY
Start: 2023-08-23 | End: 2023-08-30

## 2023-08-23 RX ORDER — SODIUM CHLORIDE, SODIUM LACTATE, POTASSIUM CHLORIDE, CALCIUM CHLORIDE 600; 310; 30; 20 MG/100ML; MG/100ML; MG/100ML; MG/100ML
INJECTION, SOLUTION INTRAVENOUS CONTINUOUS
Status: DISCONTINUED | OUTPATIENT
Start: 2023-08-24 | End: 2023-08-26

## 2023-08-23 RX ORDER — ASPIRIN 81 MG/1
81 TABLET ORAL DAILY
Qty: 100 TABLET | Refills: 2 | Status: SHIPPED | OUTPATIENT
Start: 2023-08-24 | End: 2023-09-14

## 2023-08-23 RX ORDER — SODIUM CHLORIDE 9 MG/ML
2000 INJECTION, SOLUTION INTRAVENOUS ONCE
Status: COMPLETED | OUTPATIENT
Start: 2023-08-23 | End: 2023-08-23

## 2023-08-23 RX ORDER — HYDROMORPHONE HYDROCHLORIDE 1 MG/ML
0.5 INJECTION, SOLUTION INTRAMUSCULAR; INTRAVENOUS; SUBCUTANEOUS ONCE
Status: COMPLETED | OUTPATIENT
Start: 2023-08-23 | End: 2023-08-23

## 2023-08-23 RX ORDER — METRONIDAZOLE 500 MG/100ML
500 INJECTION, SOLUTION INTRAVENOUS ONCE
Status: COMPLETED | OUTPATIENT
Start: 2023-08-23 | End: 2023-08-24

## 2023-08-23 RX ORDER — LISINOPRIL 5 MG/1
5 TABLET ORAL DAILY
Qty: 30 TABLET | Refills: 2 | Status: SHIPPED | OUTPATIENT
Start: 2023-08-24 | End: 2023-09-14 | Stop reason: SDUPTHER

## 2023-08-23 RX ORDER — METRONIDAZOLE 500 MG/100ML
500 INJECTION, SOLUTION INTRAVENOUS EVERY 12 HOURS
Status: DISCONTINUED | OUTPATIENT
Start: 2023-08-24 | End: 2023-08-29

## 2023-08-23 RX ORDER — TRAMADOL HYDROCHLORIDE 50 MG/1
50 TABLET ORAL EVERY 6 HOURS PRN
Qty: 56 TABLET | Refills: 0 | Status: ON HOLD | OUTPATIENT
Start: 2023-08-23 | End: 2023-08-30

## 2023-08-23 RX ORDER — AMIODARONE HYDROCHLORIDE 400 MG/1
400 TABLET ORAL 2 TIMES DAILY
Qty: 30 TABLET | Refills: 2 | Status: ON HOLD | OUTPATIENT
Start: 2023-08-23 | End: 2023-08-30

## 2023-08-23 RX ORDER — OMEPRAZOLE 20 MG/1
20 CAPSULE, DELAYED RELEASE ORAL DAILY
Qty: 30 CAPSULE | Refills: 2 | Status: SHIPPED | OUTPATIENT
Start: 2023-08-24 | End: 2023-11-21

## 2023-08-23 RX ADMIN — ACETAMINOPHEN 1000 MG: 500 TABLET, FILM COATED ORAL at 01:26

## 2023-08-23 RX ADMIN — FUROSEMIDE 40 MG: 10 INJECTION INTRAMUSCULAR; INTRAVENOUS at 04:42

## 2023-08-23 RX ADMIN — APIXABAN 5 MG: 5 TABLET, FILM COATED ORAL at 04:42

## 2023-08-23 RX ADMIN — OMEPRAZOLE 20 MG: 20 CAPSULE, DELAYED RELEASE ORAL at 04:43

## 2023-08-23 RX ADMIN — METOPROLOL TARTRATE 25 MG: 25 TABLET, FILM COATED ORAL at 04:42

## 2023-08-23 RX ADMIN — CEFTRIAXONE SODIUM 2000 MG: 10 INJECTION, POWDER, FOR SOLUTION INTRAVENOUS at 23:03

## 2023-08-23 RX ADMIN — METRONIDAZOLE 500 MG: 500 INJECTION, SOLUTION INTRAVENOUS at 23:04

## 2023-08-23 RX ADMIN — ASPIRIN 81 MG: 81 TABLET, COATED ORAL at 04:43

## 2023-08-23 RX ADMIN — LISINOPRIL 5 MG: 5 TABLET ORAL at 04:43

## 2023-08-23 RX ADMIN — LEVOTHYROXINE SODIUM 75 MCG: 0.07 TABLET ORAL at 04:43

## 2023-08-23 RX ADMIN — AMIODARONE HYDROCHLORIDE 400 MG: 200 TABLET ORAL at 04:42

## 2023-08-23 RX ADMIN — IOHEXOL 100 ML: 350 INJECTION, SOLUTION INTRAVENOUS at 19:50

## 2023-08-23 RX ADMIN — HYDROMORPHONE HYDROCHLORIDE 0.5 MG: 1 INJECTION, SOLUTION INTRAMUSCULAR; INTRAVENOUS; SUBCUTANEOUS at 21:51

## 2023-08-23 RX ADMIN — Medication 1 APPLICATOR: at 10:09

## 2023-08-23 RX ADMIN — SODIUM CHLORIDE 1000 ML: 9 INJECTION, SOLUTION INTRAVENOUS at 23:02

## 2023-08-23 RX ADMIN — ONDANSETRON 4 MG: 2 INJECTION INTRAMUSCULAR; INTRAVENOUS at 21:50

## 2023-08-23 RX ADMIN — PREGABALIN 50 MG: 25 CAPSULE ORAL at 04:42

## 2023-08-23 RX ADMIN — SODIUM CHLORIDE 2000 ML: 9 INJECTION, SOLUTION INTRAVENOUS at 20:00

## 2023-08-23 RX ADMIN — DULOXETINE HYDROCHLORIDE 40 MG: 20 CAPSULE, DELAYED RELEASE ORAL at 04:43

## 2023-08-23 RX ADMIN — POTASSIUM CHLORIDE 20 MEQ: 1500 TABLET, EXTENDED RELEASE ORAL at 04:43

## 2023-08-23 ASSESSMENT — COGNITIVE AND FUNCTIONAL STATUS - GENERAL
CLIMB 3 TO 5 STEPS WITH RAILING: A LITTLE
SUGGESTED CMS G CODE MODIFIER MOBILITY: CI
MOBILITY SCORE: 23

## 2023-08-23 ASSESSMENT — FIBROSIS 4 INDEX
FIB4 SCORE: 1.03
FIB4 SCORE: 1.03

## 2023-08-23 ASSESSMENT — GAIT ASSESSMENTS
DISTANCE (FEET): 250
ASSISTIVE DEVICE: FRONT WHEEL WALKER
GAIT LEVEL OF ASSIST: STANDBY ASSIST

## 2023-08-23 NOTE — DISCHARGE SUMMARY
DISCHARGE SUMMARY    ADMISSION DATE: 8/16/2023    DISCHARGE DATE: 8/23/23    ADMITTING DIAGNOSES: Severe aortic regurgitation, disease, hypertension, hyperlipidemia, s/p thyroidectomy for Hashimoto's thyroiditis, s/p bilateral total knee replacements    DISCHARGE DIAGNOSES: Severe aortic regurgitation, disease, hypertension, hyperlipidemia, s/p thyroidectomy for Hashimoto's thyroiditis, s/p bilateral total knee replacements    PROCEDURES PERFORMED: 8/16/23 Dr. Lawrence  CORONARY ARTERY BYPASS GRAFTING x1 (left internal mammary artery to the left anterior descending artery), AORTIC VALVE REPLACEMENT (23 mm Inspiris Díaz bovine pericardial valve), aortic root enlargement (2 cm wide bovine pericardial patch) and intraoperative transesophageal echocardiography    HISTORY OF PRESENT ILLNESS:  The patient is a 72 y.o. female with history of hypertension, hyperlipidemia, s/p thyroidectomy for Hashimoto's thyroiditis, s/p bilateral total knee replacements, moderate to severe aortic regurgitation and coronary artery disease.  She complains of having chest pain, frequent dizziness 1 syncopal episode in January. Today she states she can walk 2 blocks before having to stop to rest. Patient denies lower extremity edema and PND.    HOSPITAL COURSE:   POD 1  HDS, Extubated off gtts., 1L NC, neuro intact, wounds Island dressing, abdomen soft n/t hypoactive, Hgb 11.8, Cr 0.68,  fluid balance pos, wt up. Plan:  Keep wires one day, D/C mediastinal tubes and maynard, begin diuresis, Wean Oxygen, Encourage IS/ambulate.      POD 2  HDS, neuro intact, wounds CDI, abdomen soft n/t, Hgb 9.9, Cr 0.70,  fluid balance down, wt down. Plan:  Continue diuresis. Added home ACE last night. D/C wires patient tolerated well. Wean Oxygen, Encourage IS/ambulate. Transfer to tele.     POD 3 HDS, SR, cont diuresis, wean oxygen, amb/enc IS, plan home in 1-2 days     POD 4 HDS, SR- AFib yesterday with RVR- on amio gtt- change to PO, cont diuresis,  replace K+, wean O2, ambulate, plan home when rhythm stable     POD 5  HDS, A fib RVR this morning, neuro intact, wounds intact, abdomen soft +BM, fluid balance negative, wt 2 kg up from admit weight,  1 L NC. K 3.2.  Plan: Amio bolus and resume gtt. Mg pending, K replaced IS/ambulate. 2 view cxr this am. Home o2 test.      POD 6 HDS. SR/afib rate controlled.  Neuro intact, tremor-chronic.  Abdomen soft +BM. RA.  Cr stable Hgb 9.9.  Plan: Start Noac. DC plavix.  DC amio gtt, continue amio PO.  Probable home tomorrow AM.     POD 7  HDS, SR, neuro intact, wounds CDI, abdomen soft n/t, Hgb 9.8, Cr 0.98,  fluid balance neg, wt at baseline. Plan:  Encourage IS/ambulate. D/C home today.      TELEMETRY:  8/17 SR  8/18 SR  8/19 SR, afib RVR  8/20 SR  8/21 a fib rvr  8/22 SR/afib   8/23 SR    RECENT LABS:     Lab Results   Component Value Date/Time    SODIUM 133 (L) 08/23/2023 01:23 AM    POTASSIUM 3.8 08/23/2023 01:23 AM    CHLORIDE 93 (L) 08/23/2023 01:23 AM    CO2 29 08/23/2023 01:23 AM    GLUCOSE 121 (H) 08/23/2023 01:23 AM    BUN 23 (H) 08/23/2023 01:23 AM    CREATININE 0.98 08/23/2023 01:23 AM      Lab Results   Component Value Date/Time    WBC 11.6 (H) 08/23/2023 01:23 AM    RBC 3.36 (L) 08/23/2023 01:23 AM    HEMOGLOBIN 9.8 (L) 08/23/2023 01:23 AM    HEMATOCRIT 30.2 (L) 08/23/2023 01:23 AM    MCV 89.9 08/23/2023 01:23 AM    MCH 29.2 08/23/2023 01:23 AM    MCHC 32.5 08/23/2023 01:23 AM    MPV 9.7 08/23/2023 01:23 AM    NEUTSPOLYS 56.80 08/15/2023 10:49 AM    LYMPHOCYTES 25.90 08/15/2023 10:49 AM    MONOCYTES 9.50 08/15/2023 10:49 AM    EOSINOPHILS 6.10 08/15/2023 10:49 AM    BASOPHILS 1.30 08/15/2023 10:49 AM      Lab Results   Component Value Date/Time    PROTHROMBTM 15.8 (H) 08/16/2023 12:20 PM    INR 1.28 (H) 08/16/2023 12:20 PM        Fluids:    Intake/Output Summary (Last 24 hours) at 8/23/2023 1016  Last data filed at 8/22/2023 2043  Gross per 24 hour   Intake --   Output 1150 ml   Net -1150 ml     Admit weight:  Weight: 77.8 kg (171 lb 8.3 oz)  Current weight: Weight: 77.4 kg (170 lb 10.2 oz) (08/23/23 4252)    ALLERGIES:     Morphine, Nsaids, Pcn [penicillins], and Seasonal    EJECTION FRACTION:  60%    CARDIAC MEDICATIONS:    ASA - Yes    Plavix - Yes    Post-operative Beta Blockers - Yes    Ace/ARB- Yes    Statin - Yes    Aldactone- No; contraindicated because of Normal EF    SGLT2i-  No contraindicated because of No; contraindicated because of Normal EF    DISCHARGE MEDICATIONS:      Medication List        START taking these medications        Instructions   acetaminophen 500 MG Tabs  Commonly known as: Tylenol   Take 2 Tablets by mouth every 6 hours as needed for Moderate Pain.  Dose: 1,000 mg     amiodarone 400 MG tablet  Commonly known as: Pacerone   Doctor's comments: 400 mg po BID x 7 days then 400 mg po daily x 7 days then 200 mg po daily thereafter.  Take 1 Tablet by mouth 2 times a day.  Dose: 400 mg     apixaban 5mg Tabs  Commonly known as: Eliquis   Take 1 Tablet by mouth 2 times a day. Indications: Thromboembolism secondary to Atrial Fibrillation  Dose: 5 mg     aspirin 81 MG EC tablet  Start taking on: August 24, 2023   Take 1 Tablet by mouth every day.  Dose: 81 mg     metoprolol tartrate 25 MG Tabs  Commonly known as: Lopressor   Take 1 Tablet by mouth 2 times a day.  Dose: 25 mg     omeprazole 20 MG delayed-release capsule  Start taking on: August 24, 2023  Commonly known as: PriLOSEC   Take 1 Capsule by mouth every day.  Dose: 20 mg     traMADol 50 MG Tabs  Commonly known as: Ultram   Take 1 Tablet by mouth every 6 hours as needed for Severe Pain for up to 14 days.  Dose: 50 mg            CHANGE how you take these medications        Instructions   lisinopril 5 MG Tabs  Start taking on: August 24, 2023  What changed:   medication strength  how much to take  Commonly known as: Prinivil   Take 1 Tablet by mouth every day.  Dose: 5 mg     potassium chloride 10 MEQ capsule  What changed: when to take  this  Commonly known as: Micro-K   Take 1 Capsule by mouth every day.  Dose: 10 mEq     rosuvastatin 40 MG tablet  What changed: when to take this  Commonly known as: Crestor   Doctor's comments: Requesting 1 year supply  Take 1 Tablet by mouth every day.  Dose: 40 mg            CONTINUE taking these medications        Instructions   busPIRone 10 MG Tabs tablet  Commonly known as: Buspar   Take 10 mg by mouth every evening. Pt takes once a day, not BID  Dose: 10 mg     diazePAM 2 MG Tabs  Commonly known as: Valium   Take 2 mg by mouth every 6 hours as needed for Anxiety.  Dose: 2 mg     DULoxetine HCl 40 MG Cpep   Take 40 mg by mouth 2 times a day.  Dose: 40 mg     furosemide 20 MG Tabs  Commonly known as: Lasix   Take 1 Tablet by mouth every day.  Dose: 20 mg     pregabalin 50 MG capsule  Commonly known as: Lyrica   Take 50 mg by mouth 2 times a day.  Dose: 50 mg     propranolol 40 MG Tabs  Commonly known as: Inderal   Take 1 Tablet by mouth 3 times a day.  Dose: 40 mg     Synthroid 75 MCG Tabs  Generic drug: levothyroxine   Doctor's comments: Requesting 1 year supply  Take 1 Tablet by mouth every morning on an empty stomach.  Dose: 75 mcg     vitamin D2 (Ergocalciferol) 1.25 MG (95522 UT) Caps capsule  Commonly known as: Drisdol   Doctor's comments: Requesting 1 year supply  Take 1 Capsule by mouth every 7 days. On Sat  Dose: 50,000 Units     Voltaren 1 % Gel  Generic drug: diclofenac sodium   Apply 2 g topically 4 times a day as needed (Apply's on both knees).  Dose: 2 g     zolpidem 5 MG Tabs  Commonly known as: Ambien   Take 1 Tablet by mouth at bedtime as needed for Sleep for up to 60 days.  Dose: 5 mg            STOP taking these medications      HYDROcodone-acetaminophen 7.5-325 MG tab  Commonly known as: Norco              NARCOTIC PAIN MEDICATIONS:   In prescribing controlled substances to this patient, I certify that I have obtained and reviewed their medical history. I have also made a good [place] effort  to obtain applicable records from other providers who have treated the patient .    I have conducted a physical exam and documented it. I have reviewed the patient's prescription history as maintained by the [place] Prescription Monitoring Program.     I have assessed the patient’s risk for abuse, dependency, and addiction using the validated Opioid Risk Tool.    Given the above, I believe the benefits of controlled substance therapy outweigh the risks. The reasons for prescribing controlled substances include non-narcotic, oral analgesic alternatives have been inadequate for pain control. Accordingly, I have discussed the risk and benefits, treatment plan, and alternative therapies with the patient.     Pt understands this prescription is a controlled substance which is potentially habit-forming and its use is regulated by the [employer]. It must be submitted to the pharmacy within 5 days of the date written and can not be called in or faxed to the pharmacy. Refills are subject to terms of a medicine agreement. Any refill requires a new prescription that must be obtained from this office during regular office hours Monday through Thursday 7 am to 4 pm. We ask for 72 hours notice to get an appointment for a narcotic pain medication refill. This medicine can cause nausea, significant constipation, sedation, confusion.     DIET:   Cardiac diet    DISCHARGE INSTRUCTIONS DISCUSSED WITH THE PATIENT:      1. NO driving for 4 weeks after surgery. You may ride as a passenger.  2. NO lifting of any item over 10 lbs (e.g. gallon of milk) for 6 weeks after surgery.  3. DO walk as much as possible! Walk a minimum of once a day. Depending on your fatigue and comfort level, you may walk as much as you wish. There is no maximum.  4. Other physical activities (sex, housework, gardening, etc.) are OK after 4 weeks   5. Continue using incentive spirometer for 2 weeks, especially if going home on oxygen.    Incision Care:  1. SHOWER ONLY - no  baths. Clean incision daily with plain Ivory ® soap or any other dye or perfume free soap. Then pat incision dry with clean towel. Avoid creams or lotions on the incision(s).  a. If there is any increase in redness or swelling, or separation of the incision line, or thick drainage* from any of the incisions, call right away  * Clear, thin drainage is not abnormal especially from the leg incision and/or                         chest tube sites.  2. Continue to wear your VIRGIE Stockings for 4 weeks. You may take off the stockings when in bed or when the legs are elevated.    Patient instructed to call [facility] cardiac surgery at [phone]  if any increased shortness of breath, uncontrolled pain, weight gain greater than 3 pounds in 1 day or 5 pounds in 1 week, SBP >140, HR <60 or redness swelling or drainage of incisions.      FOLLOW-UP:   Future Appointments   Date Time Provider Department Center   9/14/2023 12:45 PM BIANCA Muñoz [facility] None   9/18/2023 12:30 PM CT RESOURCE PROVIDER [facility] None   9/18/2023  2:40 PM TIMOTHY LujanSURAJ

## 2023-08-23 NOTE — DISCHARGE INSTRUCTIONS
DIVISION OF CARDIAC SURGERY   DISCHARGE INSTRUCTIONS    Activity:    NO driving for 4 weeks after surgery. You may ride as a passenger.  NO lifting, pushing, or pulling more than 10 pounds for 6 weeks.  For the next 6 weeks, keep your elbows close to your body and move within a pain-free motion when lifting, pushing or pulling.  Do not stretch both arms backwards at the same time.    Walk at least 4 times per day, there is no maximum. The goal is to increase your distance over time.  Continue using incentive spirometer for 2 weeks or until your baseline volume is reached.  If you are going home on oxygen and you were not on oxygen prior to surgery, keep using until you are oxygen free.  Weigh yourself daily.  Call your Cardiologist for a weight gain of 3 or more pounds in 1 day or more than 5 pounds in 7 days.  Take all of your medications as prescribed. Do not use a pill box for the first month at home. If you have questions, please call your nurse navigator at [phone].  Continue to wear the VIRGIE (compression) stockings for 2-4 weeks or until all swelling is gone. You may take them off when you are in bed or when your legs are elevated.    Incision Care:    Make sure to clean your incision(s) TWICE DAILY.  Once by showering AND once using the no rinse Foam cleanser provided in the hospital.  During the shower, cleanse the incision(s) with a perfume and dye free soap (Dial, Dove, [place])  Use gentle pressure and rub up and down over incision with your hands or a washcloth. Rinse off and pat incision(s) dry with clean towel.  Keep the incision open to air. No creams or lotions on your incision(s). No baths.  If there is any increased redness or swelling, separation of the incision line, or thick drainage from any of your incisions, call the Cardiac Surgeons ([phone]).      General Instructions:    You have been referred to Cardiac Rehab.  You can start Cardiac Rehab 30 days after surgery.  If you do  not have an appointment at the time of discharge call [phone] to schedule an appointment.  Your Primary Care Doctor typically handles home oxygen. Oxygen may be stopped when your oxygen level is consistently greater than 90.  Check with your Primary Care Doctor if you are unsure.  Take all of your medications (including pain medications) as prescribed.  Taking medications other than prescribed can result in serious injury.    For Patients Discharged with Narcotic Pain Medication:     If a refill is needed, understand that only 1 refill will be provided and you must come to the Cardiac Surgeons’ office for an appointment (72 hours’ notice is required to schedule and there are no weekend appointments).  If the pain medications you are discharged on are not working, you will need to bring your remaining prescription into the office in order to receive a new prescription.  If you were taking narcotics prior to your heart surgery, the Cardiac Surgeons will provide you with one prescription and additional medications will need to be provided by your pain management doctor.  Do not drink alcohol while taking narcotics.  Lost or stolen medications will not be refilled.  If medications are stolen, report to law enforcement.    Contact Cardiac Surgery at [phone] if you have any questions.

## 2023-08-23 NOTE — DISCHARGE PLANNING
Agency/Facility Name: [facility]  Spoke To: Samuel  Outcome: DPA requested update on referral, HH still looking at benefits/insurance.     RN NICO notified.

## 2023-08-23 NOTE — THERAPY
Physical Therapy   Daily Treatment     Patient Name: Margoth Hopkins  Age:  72 y.o., Sex:  female  Medical Record #: [id number]  Today's Date: 8/23/2023     Precautions  Precautions: (P) Fall Risk;Cardiac Precautions (See Comments);Sternal Precautions (See Comments)    Assessment    Pt tolerated session well and still continues to be limited by generalized fatigue. She performed bed mobility, transfers, and 250' of ambulation with FWW and SBA. She did not have any significant losses of balance. Pt did not have any losses of balance and requested to not attempt stairs due to fatigue. Vitals stable post ambulation. Pt additionally peformed sit<>stand x 5, standing marching x 15 B, standing calf raise x 15, mini squat x 10 with FWW support. She was educated on sternal precautions, cardiac precautions, benefits of walking program and outpatient cardiac rehab and was receptive.     Plan    Treatment Plan Status: (P) Continue Current Treatment Plan  Type of Treatment: Bed Mobility, Debridement, Equipment, Group Therapy, Gait Training, Manual Therapy, Neuro Re-Education / Balance, Orthotics Training , Self Care / Home Evaluation, Stair Training, Therapeutic Activities, Therapeutic Exercise  Treatment Frequency: 3 Times per Week  Treatment Duration: Until Therapy Goals Met    DC Equipment Recommendations: (P) None  Discharge Recommendations: (P) Recommend home health for continued physical therapy services      Subjective    Pt is pleasant and cooperative.      Objective       08/23/23 0944   Precautions   Precautions Fall Risk;Cardiac Precautions (See Comments);Sternal Precautions (See Comments)   Vitals   Patient BP Position Sitting   Blood Pressure  98/68   Pain   Intervention Declines   Balance   Sitting Balance (Static) Fair +   Sitting Balance (Dynamic) Fair   Standing Balance (Static) Fair -   Standing Balance (Dynamic) Fair -   Weight Shift Sitting Good   Weight Shift Standing Fair   Bed Mobility    Supine to Sit  Supervised   Gait Analysis   Gait Level Of Assist Standby Assist   Assistive Device Front Wheel Walker   Distance (Feet) 250   # of Times Distance was Traveled 1   Functional Mobility   Sit to Stand Standby Assist   How much difficulty does the patient currently have...   Turning over in bed (including adjusting bedclothes, sheets and blankets)? 4   Sitting down on and standing up from a chair with arms (e.g., wheelchair, bedside commode, etc.) 4   Moving from lying on back to sitting on the side of the bed? 4   How much help from another person does the patient currently need...   Moving to and from a bed to a chair (including a wheelchair)? 4   Need to walk in a hospital room? 4   Climbing 3-5 steps with a railing? 3   6 clicks Mobility Score 23   Short Term Goals    Short Term Goal # 1 Pt will ambulate 500' with LRAD and supervision.   Goal Outcome # 1 Progressing as expected   Short Term Goal # 2 Pt will perform 4 stairs with supervision and handrails.   Goal Outcome # 2 Progressing slower than expected   Education Group   Sternal Precautions Patient Response Patient;Acceptance;Explanation;Verbal Demonstration   Role of Physical Therapist Patient Response Patient;Acceptance;Explanation;Verbal Demonstration   Physical Therapy Treatment Plan   Physical Therapy Treatment Plan Continue Current Treatment Plan   Anticipated Discharge Equipment and Recommendations   DC Equipment Recommendations None   Discharge Recommendations Recommend home health for continued physical therapy services   Interdisciplinary Plan of Care Collaboration   IDT Collaboration with  Nursing   Patient Position at End of Therapy Seated;Phone within Reach;Tray Table within Reach;Call Light within Reach   Session Information   Date / Session Number  8/23-2 (2/3, 8/24)

## 2023-08-23 NOTE — CARE PLAN
"The patient is Stable - Low risk of patient condition declining or worsening    Shift Goals  Clinical Goals: monitor HR, walk, shower, IS  Patient Goals: comfort, discharge  Family Goals: HELEN    Progress made toward(s) clinical / shift goals:  patient maintaining SR off of amio gtt, plavix discontinued and eliquis initiated for stroke prevention, shower complete with help of spouse, 1500 on IS    Patient is not progressing towards the following goals:      Problem: Pain - Standard  Goal: Alleviation of pain or a reduction in pain to the patient’s comfort goal  Outcome: Progressing  Note: Patient did not require anything for pain today     Problem: Knowledge Deficit - Standard  Goal: Patient and family/care givers will demonstrate understanding of plan of care, disease process/condition, diagnostic tests and medications  8/22/2023 1733 by Elsy Gray R.N.  Outcome: Progressing  8/22/2023 1634 by Elsy Gray R.N.  Outcome: Progressing     Problem: Post Op Day 5 CABG/Heart Valve Replacement  Goal: Optimal care of the Post Op CABG/Heart Valve replacement Post Op Day 5  Outcome: Progressing  Intervention: Ambulate 4 times daily, increasing the distance each time  8/22/2023 1733 by Elsy Gray, R.N.  Note: Patient able to ambulate in smith with FWW and SBA, able to increase distance with third walk  8/22/2023 1634 by Elsy Gray, R.N.  Note: Patient able to ambulate  Intervention: Complete \"Home O2 Assessment' in vitals flowsheets if unable to wean off O2  Note: Patient weaned off O2 and maintaining adequate SPO2 at rest and with ambulation  Intervention: Add special instructions for cardiac surgery discharge instructions to after visit summary and review with patient  Note: Discharge education started with patient,  at bedside and in agreement with plan     Problem: Respiratory  Goal: Patient will achieve/maintain optimum respiratory ventilation and gas exchange  Outcome: Progressing  Flowsheets " (Taken 8/22/2023 1523 by Brenden France)  O2 Delivery Device: None - Room Air     Problem: Fall Risk  Goal: Patient will remain free from falls  Outcome: Progressing  Note: Patient calls appropriately for assistance and remains free from falls

## 2023-08-23 NOTE — DISCHARGE PLANNING
"Discharge Discussion and home care recap prior to discharge:      Discharge review was completed with patient and family.    Patient was reminded that outpatient cardiac rehab beginning 6 weeks post op. They were told it is highly recommended and available to them if desired, but not required. They were told to look at the provided flyer for the contact number and additional information.    Patient was reminded to utilize the vitals log book provided to take his/her weight daily and record.    Patient was told to call me with weight gain > 3 lbs in 1 day or 5 lbs in 1 week  Patient was also told to record heart rate and blood pressure morning and night; and to follow the hold parameters provided in the discharge summary on beta blockers and ACE/ARB (if prescribed on discharge).    Patient was reminded to review the \"recovery after heart surgery\" packet with information on normal expectations such as clicking in the chest, loud/pounding heart/ hot and cold flashes, weight loss, constipation, insomnia, tingling on left side of chest (CABG with LIMA).  I also reviewed the decision tree of whom to call and when with concerns after going home. RN navigator Monday through Friday during business hours for any questions or urgent concerns, and the office for urgent concerns at night or on the weekends.    I briefly recapped the discharge instructions that their primary RN will review in depth prior to discharge   1) appointments   2) medication reconciliation (start, continue, change, stop)   3) care instructions    All additional questions were answered or deferred to the bedside RN.    Event time 30 minutes    "

## 2023-08-23 NOTE — CARE PLAN
Problem: Pain - Standard  Goal: Alleviation of pain or a reduction in pain to the patient’s comfort goal  Outcome: Progressing  Note: Pt medicated per MAR     Problem: Knowledge Deficit - Standard  Goal: Patient and family/care givers will demonstrate understanding of plan of care, disease process/condition, diagnostic tests and medications  Outcome: Progressing     Problem: Post Op Day 5 CABG/Heart Valve Replacement  Goal: Optimal care of the Post Op CABG/Heart Valve replacement Post Op Day 5  Outcome: Progressing  Note: Patient went for 2 walks with day shift patient is well rested. CHG wipes completed. Possible discharge today.     Problem: Fall Risk  Goal: Patient will remain free from falls  Outcome: Progressing   The patient is Stable - Low risk of patient condition declining or worsening    Shift Goals  Clinical Goals: monitor HR, mobility, vitals  Patient Goals: sleep  Family Goals: n/a    Progress made toward(s) clinical / shift goals:      Patient is not progressing towards the following goals:

## 2023-08-23 NOTE — PROGRESS NOTES
Pt dc'd in stable and satisfactory fashion. IV and monitor removed; monitor room notified. Pt left unit via wheelchair with ACT. Personal belongings with pt when leaving unit. Pt given discharge instructions prior to leaving unit including where to  prescriptions and when to follow-up; verbalizes understanding. Copy of discharge instructions with pt and in the chart.

## 2023-08-23 NOTE — PROGRESS NOTES
Assumed care of patient, bedside report received from Elsy day shift RN. Updated on plan of care, call light within reach and fall precautions are in place and bed lock and in lowest position. Patient instructed to call for assistance before getting out of bed. All questions answered at this time. No other needs.

## 2023-08-23 NOTE — CARE PLAN
The patient is Stable - Low risk of patient condition declining or worsening    Shift Goals  Clinical Goals: walk, IS, shower, discharge  Patient Goals: discharge  Family Goals: n/a    Progress made toward(s) clinical / shift goals:  patient stable for discharge, all instructions reviewed with  at bedside, medications delivered to patient    Patient is not progressing towards the following goals:      Problem: Post Op Day 5 CABG/Heart Valve Replacement  Goal: Optimal care of the Post Op CABG/Heart Valve replacement Post Op Day 5  Outcome: Progressing     Problem: Pain - Standard  Goal: Alleviation of pain or a reduction in pain to the patient’s comfort goal  Outcome: Met     Problem: Knowledge Deficit - Standard  Goal: Patient and family/care givers will demonstrate understanding of plan of care, disease process/condition, diagnostic tests and medications  Outcome: Met     Problem: Pre Op  Goal: Optimal preparation for CABG/Heart Valve surgery  Outcome: Met     Problem: Day of surgery post CABG/Heart valve replacement  Goal: Stabilization in immediate post op period  Outcome: Met     Problem: Post Op Day 1 CABG/Heart Valve Replacement  Goal: Optimal care of the post op CABG/heart valve replacement Post Op Day 1  Outcome: Met     Problem: Post op day 2 CABG/Heart Valve Replacement  Goal: Optimal care of the post op CABG/heart valve replacement post op day 2  Outcome: Met     Problem: Post Op Day 3 CABG/Heart Valve replacement  Goal: Optimal care of the post op CABG/Heart Valve replacement post op day 3  Outcome: Met     Problem: Post Op Day 4 CABG/Heart Valve Replacement  Goal: Optimal care of the Post Op CABG/Heart Valve replacement Post Op Day 4  Outcome: Met     Problem: Hemodynamics  Goal: Patient's hemodynamics, fluid balance and neurologic status will be stable or improve  Outcome: Met     Problem: Respiratory  Goal: Patient will achieve/maintain optimum respiratory ventilation and gas exchange  Outcome:  Met     Problem: Risk for Aspiration  Goal: Patient's risk for aspiration will be absent or decrease  Outcome: Met     Problem: Nutrition - Advanced  Goal: Patient will display progressive weight gain toward goal have adequate food and fluid intake  Outcome: Met     Problem: Self Care  Goal: Patient will have the ability to perform ADLs independently or with assistance (bathe, groom, dress, toilet and feed)  Outcome: Met     Problem: Bowel Elimination - Post Surgical  Goal: Patient will resume regular bowel sounds and function with no discomfort or distention  Outcome: Met     Problem: Fall Risk  Goal: Patient will remain free from falls  Outcome: Met     Problem: Skin Integrity  Goal: Skin integrity is maintained or improved  Outcome: Met

## 2023-08-24 ENCOUNTER — APPOINTMENT (OUTPATIENT)
Dept: RADIOLOGY | Facility: MEDICAL CENTER | Age: 73
DRG: 219 | End: 2023-08-24
Attending: INTERNAL MEDICINE
Payer: COMMERCIAL

## 2023-08-24 PROBLEM — E87.6 HYPOKALEMIA: Status: ACTIVE | Noted: 2023-08-24

## 2023-08-24 PROBLEM — Z95.3 H/O AORTIC VALVE REPLACEMENT WITH PORCINE VALVE: Status: ACTIVE | Noted: 2023-08-24

## 2023-08-24 PROBLEM — I48.91 ATRIAL FIBRILLATION (HCC): Status: ACTIVE | Noted: 2023-08-24

## 2023-08-24 PROBLEM — K92.2 GI BLEED: Status: ACTIVE | Noted: 2023-08-24

## 2023-08-24 PROBLEM — N17.9 AKI (ACUTE KIDNEY INJURY) (HCC): Status: ACTIVE | Noted: 2023-08-24

## 2023-08-24 PROBLEM — R74.01 TRANSAMINITIS: Status: ACTIVE | Noted: 2023-08-24

## 2023-08-24 PROBLEM — K81.9 CHOLECYSTITIS: Status: ACTIVE | Noted: 2023-08-24

## 2023-08-24 LAB
ALBUMIN SERPL BCP-MCNC: 2.8 G/DL (ref 3.2–4.9)
ALP SERPL-CCNC: 256 U/L (ref 30–99)
ALT SERPL-CCNC: 71 U/L (ref 2–50)
ANION GAP SERPL CALC-SCNC: 12 MMOL/L (ref 7–16)
ANISOCYTOSIS BLD QL SMEAR: ABNORMAL
APPEARANCE UR: CLEAR
APTT PPP: 28.8 SEC (ref 24.7–36)
AST SERPL-CCNC: 49 U/L (ref 12–45)
BACTERIA #/AREA URNS HPF: NEGATIVE /HPF
BASOPHILS # BLD AUTO: 0 % (ref 0–1.8)
BASOPHILS # BLD: 0 K/UL (ref 0–0.12)
BILIRUB CONJ SERPL-MCNC: <0.2 MG/DL (ref 0.1–0.5)
BILIRUB INDIRECT SERPL-MCNC: ABNORMAL MG/DL (ref 0–1)
BILIRUB SERPL-MCNC: 0.4 MG/DL (ref 0.1–1.5)
BILIRUB UR QL STRIP.AUTO: NEGATIVE
BUN SERPL-MCNC: 22 MG/DL (ref 8–22)
BURR CELLS BLD QL SMEAR: NORMAL
C DIFF DNA SPEC QL NAA+PROBE: NEGATIVE
C DIFF TOX GENS STL QL NAA+PROBE: NEGATIVE
CALCIUM SERPL-MCNC: 7.9 MG/DL (ref 8.5–10.5)
CFT BLD TEG: 5.4 MIN (ref 4.6–9.1)
CFT P HPASE BLD TEG: 4.4 MIN (ref 4.3–8.3)
CHLORIDE SERPL-SCNC: 104 MMOL/L (ref 96–112)
CLOT ANGLE BLD TEG: 79.7 DEGREES (ref 63–78)
CLOT LYSIS 30M P MA LENFR BLD TEG: 0 % (ref 0–2.6)
CO2 SERPL-SCNC: 22 MMOL/L (ref 20–33)
COLOR UR: YELLOW
CORTIS SERPL-MCNC: 44.3 UG/DL (ref 0–23)
CREAT SERPL-MCNC: 0.97 MG/DL (ref 0.5–1.4)
CT.EXTRINSIC BLD ROTEM: 0.8 MIN (ref 0.8–2.1)
EKG IMPRESSION: NORMAL
EOSINOPHIL # BLD AUTO: 0 K/UL (ref 0–0.51)
EOSINOPHIL NFR BLD: 0 % (ref 0–6.9)
EPI CELLS #/AREA URNS HPF: ABNORMAL /HPF
ERYTHROCYTE [DISTWIDTH] IN BLOOD BY AUTOMATED COUNT: 48.9 FL (ref 35.9–50)
GFR SERPLBLD CREATININE-BSD FMLA CKD-EPI: 61 ML/MIN/1.73 M 2
GLUCOSE SERPL-MCNC: 173 MG/DL (ref 65–99)
GLUCOSE UR STRIP.AUTO-MCNC: NEGATIVE MG/DL
HCT VFR BLD AUTO: 30.5 % (ref 37–47)
HGB BLD-MCNC: 10 G/DL (ref 12–16)
HGB BLD-MCNC: 10.1 G/DL (ref 12–16)
HGB BLD-MCNC: 10.2 G/DL (ref 12–16)
HGB BLD-MCNC: 11.1 G/DL (ref 12–16)
HGB BLD-MCNC: 9.4 G/DL (ref 12–16)
HGB BLD-MCNC: 9.7 G/DL (ref 12–16)
HYALINE CASTS #/AREA URNS LPF: ABNORMAL /LPF
INR PPP: 1.25 (ref 0.87–1.13)
KETONES UR STRIP.AUTO-MCNC: NEGATIVE MG/DL
LACTATE SERPL-SCNC: 2 MMOL/L (ref 0.5–2)
LACTATE SERPL-SCNC: 2.1 MMOL/L (ref 0.5–2)
LACTATE SERPL-SCNC: 2.7 MMOL/L (ref 0.5–2)
LEUKOCYTE ESTERASE UR QL STRIP.AUTO: NEGATIVE
LIPASE SERPL-CCNC: 48 U/L (ref 11–82)
LYMPHOCYTES # BLD AUTO: 0.51 K/UL (ref 1–4.8)
LYMPHOCYTES NFR BLD: 2.6 % (ref 22–41)
MACROCYTES BLD QL SMEAR: ABNORMAL
MANUAL DIFF BLD: NORMAL
MCF BLD TEG: 71.2 MM (ref 52–69)
MCF.PLATELET INHIB BLD ROTEM: 38.8 MM (ref 15–32)
MCH RBC QN AUTO: 29.9 PG (ref 27–33)
MCHC RBC AUTO-ENTMCNC: 32.8 G/DL (ref 32.2–35.5)
MCV RBC AUTO: 91 FL (ref 81.4–97.8)
MICRO URNS: ABNORMAL
MONOCYTES # BLD AUTO: 0.36 K/UL (ref 0–0.85)
MONOCYTES NFR BLD AUTO: 1.8 % (ref 0–13.4)
MORPHOLOGY BLD-IMP: NORMAL
MYELOCYTES NFR BLD MANUAL: 0.9 %
NEUTROPHILS # BLD AUTO: 18.75 K/UL (ref 1.82–7.42)
NEUTROPHILS NFR BLD: 92.1 % (ref 44–72)
NEUTS BAND NFR BLD MANUAL: 2.6 % (ref 0–10)
NITRITE UR QL STRIP.AUTO: NEGATIVE
NRBC # BLD AUTO: 0.03 K/UL
NRBC BLD-RTO: 0.2 /100 WBC (ref 0–0.2)
PA AA BLD-ACNC: 57.1 % (ref 0–11)
PA ADP BLD-ACNC: 60 % (ref 0–17)
PH UR STRIP.AUTO: 6.5 [PH] (ref 5–8)
PLATELET # BLD AUTO: 384 K/UL (ref 164–446)
PLATELET BLD QL SMEAR: NORMAL
PMV BLD AUTO: 9.7 FL (ref 9–12.9)
POIKILOCYTOSIS BLD QL SMEAR: NORMAL
POLYCHROMASIA BLD QL SMEAR: NORMAL
POTASSIUM SERPL-SCNC: 4.1 MMOL/L (ref 3.6–5.5)
PROT SERPL-MCNC: 4.6 G/DL (ref 6–8.2)
PROT UR QL STRIP: 30 MG/DL
PROTHROMBIN TIME: 15.9 SEC (ref 12–14.6)
RBC # BLD AUTO: 3.35 M/UL (ref 4.2–5.4)
RBC # URNS HPF: ABNORMAL /HPF
RBC BLD AUTO: PRESENT
RBC UR QL AUTO: ABNORMAL
SODIUM SERPL-SCNC: 138 MMOL/L (ref 135–145)
SP GR UR STRIP.AUTO: 1.04
TEG ALGORITHM TGALG: ABNORMAL
UROBILINOGEN UR STRIP.AUTO-MCNC: 1 MG/DL
WBC # BLD AUTO: 19.8 K/UL (ref 4.8–10.8)
WBC #/AREA URNS HPF: ABNORMAL /HPF
WBC TOXIC VACUOLES BLD QL SMEAR: NORMAL

## 2023-08-24 PROCEDURE — 85018 HEMOGLOBIN: CPT | Mod: 91

## 2023-08-24 PROCEDURE — C9113 INJ PANTOPRAZOLE SODIUM, VIA: HCPCS | Performed by: INTERNAL MEDICINE

## 2023-08-24 PROCEDURE — 770022 HCHG ROOM/CARE - ICU (200)

## 2023-08-24 PROCEDURE — 81001 URINALYSIS AUTO W/SCOPE: CPT

## 2023-08-24 PROCEDURE — 80076 HEPATIC FUNCTION PANEL: CPT

## 2023-08-24 PROCEDURE — 83605 ASSAY OF LACTIC ACID: CPT | Mod: 91

## 2023-08-24 PROCEDURE — 85576 BLOOD PLATELET AGGREGATION: CPT | Mod: 91

## 2023-08-24 PROCEDURE — 80048 BASIC METABOLIC PNL TOTAL CA: CPT

## 2023-08-24 PROCEDURE — 83690 ASSAY OF LIPASE: CPT

## 2023-08-24 PROCEDURE — 85007 BL SMEAR W/DIFF WBC COUNT: CPT

## 2023-08-24 PROCEDURE — 85730 THROMBOPLASTIN TIME PARTIAL: CPT

## 2023-08-24 PROCEDURE — 99221 1ST HOSP IP/OBS SF/LOW 40: CPT | Performed by: SURGERY

## 2023-08-24 PROCEDURE — 700111 HCHG RX REV CODE 636 W/ 250 OVERRIDE (IP): Mod: JZ

## 2023-08-24 PROCEDURE — 85025 COMPLETE CBC W/AUTO DIFF WBC: CPT

## 2023-08-24 PROCEDURE — 87086 URINE CULTURE/COLONY COUNT: CPT

## 2023-08-24 PROCEDURE — 87493 C DIFF AMPLIFIED PROBE: CPT

## 2023-08-24 PROCEDURE — 700111 HCHG RX REV CODE 636 W/ 250 OVERRIDE (IP): Performed by: INTERNAL MEDICINE

## 2023-08-24 PROCEDURE — 85610 PROTHROMBIN TIME: CPT

## 2023-08-24 PROCEDURE — 99291 CRITICAL CARE FIRST HOUR: CPT | Performed by: INTERNAL MEDICINE

## 2023-08-24 PROCEDURE — 85347 COAGULATION TIME ACTIVATED: CPT

## 2023-08-24 PROCEDURE — 700105 HCHG RX REV CODE 258: Mod: JZ | Performed by: INTERNAL MEDICINE

## 2023-08-24 PROCEDURE — 85384 FIBRINOGEN ACTIVITY: CPT

## 2023-08-24 RX ORDER — PANTOPRAZOLE SODIUM 40 MG/10ML
40 INJECTION, POWDER, LYOPHILIZED, FOR SOLUTION INTRAVENOUS 2 TIMES DAILY
Status: DISCONTINUED | OUTPATIENT
Start: 2023-08-24 | End: 2023-08-27

## 2023-08-24 RX ORDER — POTASSIUM CHLORIDE 7.45 MG/ML
10 INJECTION INTRAVENOUS
Status: COMPLETED | OUTPATIENT
Start: 2023-08-24 | End: 2023-08-24

## 2023-08-24 RX ORDER — SODIUM CHLORIDE, SODIUM LACTATE, POTASSIUM CHLORIDE, AND CALCIUM CHLORIDE .6; .31; .03; .02 G/100ML; G/100ML; G/100ML; G/100ML
1000 INJECTION, SOLUTION INTRAVENOUS ONCE
Status: COMPLETED | OUTPATIENT
Start: 2023-08-24 | End: 2023-08-24

## 2023-08-24 RX ADMIN — POTASSIUM CHLORIDE 10 MEQ: 7.46 INJECTION, SOLUTION INTRAVENOUS at 01:28

## 2023-08-24 RX ADMIN — POTASSIUM CHLORIDE 10 MEQ: 7.46 INJECTION, SOLUTION INTRAVENOUS at 05:31

## 2023-08-24 RX ADMIN — METRONIDAZOLE 500 MG: 500 INJECTION, SOLUTION INTRAVENOUS at 17:44

## 2023-08-24 RX ADMIN — PANTOPRAZOLE SODIUM 40 MG: 40 INJECTION, POWDER, FOR SOLUTION INTRAVENOUS at 17:44

## 2023-08-24 RX ADMIN — METRONIDAZOLE 500 MG: 500 INJECTION, SOLUTION INTRAVENOUS at 05:09

## 2023-08-24 RX ADMIN — FENTANYL CITRATE 50 MCG: 50 INJECTION, SOLUTION INTRAMUSCULAR; INTRAVENOUS at 21:09

## 2023-08-24 RX ADMIN — CEFTRIAXONE SODIUM 2000 MG: 10 INJECTION, POWDER, FOR SOLUTION INTRAVENOUS at 04:54

## 2023-08-24 RX ADMIN — SODIUM CHLORIDE, POTASSIUM CHLORIDE, SODIUM LACTATE AND CALCIUM CHLORIDE 1000 ML: 600; 310; 30; 20 INJECTION, SOLUTION INTRAVENOUS at 01:10

## 2023-08-24 RX ADMIN — POTASSIUM CHLORIDE 10 MEQ: 7.46 INJECTION, SOLUTION INTRAVENOUS at 04:25

## 2023-08-24 RX ADMIN — SODIUM CHLORIDE, POTASSIUM CHLORIDE, SODIUM LACTATE AND CALCIUM CHLORIDE: 600; 310; 30; 20 INJECTION, SOLUTION INTRAVENOUS at 00:59

## 2023-08-24 RX ADMIN — PANTOPRAZOLE SODIUM 40 MG: 40 INJECTION, POWDER, FOR SOLUTION INTRAVENOUS at 05:07

## 2023-08-24 RX ADMIN — POTASSIUM CHLORIDE 10 MEQ: 7.46 INJECTION, SOLUTION INTRAVENOUS at 02:56

## 2023-08-24 RX ADMIN — SODIUM CHLORIDE, POTASSIUM CHLORIDE, SODIUM LACTATE AND CALCIUM CHLORIDE: 600; 310; 30; 20 INJECTION, SOLUTION INTRAVENOUS at 02:12

## 2023-08-24 RX ADMIN — FENTANYL CITRATE 50 MCG: 50 INJECTION, SOLUTION INTRAMUSCULAR; INTRAVENOUS at 15:44

## 2023-08-24 ASSESSMENT — LIFESTYLE VARIABLES
EVER HAD A DRINK FIRST THING IN THE MORNING TO STEADY YOUR NERVES TO GET RID OF A HANGOVER: NO
ON A TYPICAL DAY WHEN YOU DRINK ALCOHOL HOW MANY DRINKS DO YOU HAVE: 0
CONSUMPTION TOTAL: NEGATIVE
DOES PATIENT WANT TO STOP DRINKING: NO
AVERAGE NUMBER OF DAYS PER WEEK YOU HAVE A DRINK CONTAINING ALCOHOL: 0
HAVE PEOPLE ANNOYED YOU BY CRITICIZING YOUR DRINKING: NO
HAVE PEOPLE ANNOYED YOU BY CRITICIZING YOUR DRINKING: NO
CONSUMPTION TOTAL: INCOMPLETE
ALCOHOL_USE: NO
EVER FELT BAD OR GUILTY ABOUT YOUR DRINKING: NO
EVER HAD A DRINK FIRST THING IN THE MORNING TO STEADY YOUR NERVES TO GET RID OF A HANGOVER: NO
TOTAL SCORE: 0
DOES PATIENT WANT TO STOP DRINKING: NO
TOTAL SCORE: 0
ALCOHOL_USE: NO
HAVE YOU EVER FELT YOU SHOULD CUT DOWN ON YOUR DRINKING: NO
TOTAL SCORE: 0
HOW MANY TIMES IN THE PAST YEAR HAVE YOU HAD 5 OR MORE DRINKS IN A DAY: 0
HAVE YOU EVER FELT YOU SHOULD CUT DOWN ON YOUR DRINKING: NO
EVER FELT BAD OR GUILTY ABOUT YOUR DRINKING: NO
TOTAL SCORE: 0

## 2023-08-24 ASSESSMENT — ENCOUNTER SYMPTOMS
CONSTITUTIONAL NEGATIVE: 1
ORTHOPNEA: 0
SPUTUM PRODUCTION: 0
HEADACHES: 0
COUGH: 0
WHEEZING: 0
ABDOMINAL PAIN: 1
DOUBLE VISION: 0
BLOOD IN STOOL: 0
BLURRED VISION: 0
CLAUDICATION: 0
BACK PAIN: 0
DIZZINESS: 0
SHORTNESS OF BREATH: 0
HEMOPTYSIS: 0
PALPITATIONS: 0
NEUROLOGICAL NEGATIVE: 1
NERVOUS/ANXIOUS: 0
NAUSEA: 0
VOMITING: 0
FEVER: 0
DIARRHEA: 0
FOCAL WEAKNESS: 0
DIARRHEA: 1
PSYCHIATRIC NEGATIVE: 1

## 2023-08-24 ASSESSMENT — COGNITIVE AND FUNCTIONAL STATUS - GENERAL
CLIMB 3 TO 5 STEPS WITH RAILING: A LITTLE
HELP NEEDED FOR BATHING: A LITTLE
MOVING TO AND FROM BED TO CHAIR: A LITTLE
MOVING FROM LYING ON BACK TO SITTING ON SIDE OF FLAT BED: A LITTLE
TURNING FROM BACK TO SIDE WHILE IN FLAT BAD: A LITTLE
DAILY ACTIVITIY SCORE: 23
SUGGESTED CMS G CODE MODIFIER MOBILITY: CJ
MOBILITY SCORE: 20
SUGGESTED CMS G CODE MODIFIER DAILY ACTIVITY: CI

## 2023-08-24 ASSESSMENT — PAIN DESCRIPTION - PAIN TYPE
TYPE: ACUTE PAIN

## 2023-08-24 ASSESSMENT — FIBROSIS 4 INDEX: FIB4 SCORE: 1.34

## 2023-08-24 NOTE — ASSESSMENT & PLAN NOTE
S/ p AVR and CABG x1 (SHEPARD to LAD) by Dr. Lawrence on 8/16  Intraoperative EF55%  CTS notified of readmission  Will resume lisinopril and metoprolol home dose  Eventually should be able to resume aspirin once hgb stabilizing

## 2023-08-24 NOTE — ED NOTES
Lab called with critical result of Lactic of 9.7 at 2023. Critical lab result read back.   Dr. Vega notified of critical lab result at 2023.  Critical lab result read back by

## 2023-08-24 NOTE — PROGRESS NOTES
"[facility] Progress Note      Admit Date: 8/23/2023    Resident(s): Farhad Kaufman M.D.   Attending:  RHEA SHEARER/ Dr. Sheikh    Patient ID:    Name:  Margoth Hopkins   YOB: 1950  Age:  72 y.o.  female   MRN:  [id number]    Hospital Course (carried forward and updated):  \"Margoth Hopkins is a 72 y.o. female  admitted 8/23/2023 with recent AVR and CABG x1 by Dr. Lawrence on 8/16 who was discharged home recently ,but came back after she had diarrhea and had episode of syncope. Pt was volume resuscitated and also noted maroon stool. In addition, CT A/P and US RUQ concening of acute cholecystitis. \"    08/23 : admitted to ICU    Consultants:  Critical Care  General Surgery     Interval Events:  NAEO  Patient reports ongoing RUQ pain. RUQ US concerning for acute cholecystitis. Cholecystostomy tube to be placed in am. Once patient is medically and hemodynamically stable, then possible laparoscopic cholecystectomy. Gen Sx on board.    Afebrile  Ao x 4  RR: 15-20s  HR tachycardic overnight but during the day 80s-90s  SBP: 120-130s  GI: PO diet. NPO midnight for  Cholecystostomy tube in am  UOP: adequate since admission  VTE px contraindicated d/t concern for GI bleed  On PPI  On C3+ Flagyl      Vitals Range last 24h:  Temp:  [35.4 °C (95.7 °F)-37.1 °C (98.7 °F)] 36 °C (96.8 °F)  Pulse:  [] 99  Resp:  [16-27] 27  BP: ()/(51-83) 131/65  SpO2:  [89 %-100 %] 95 %      Intake/Output Summary (Last 24 hours) at 8/24/2023 1550  Last data filed at 8/24/2023 1400  Gross per 24 hour   Intake 5299.41 ml   Output 200 ml   Net 5099.41 ml        Review of Systems   Constitutional:  Negative for fever and malaise/fatigue.   Eyes:  Negative for blurred vision and double vision.   Respiratory:  Negative for cough, hemoptysis, sputum production, shortness of breath and wheezing.    Cardiovascular:  Negative for chest pain, palpitations, orthopnea and claudication.   Gastrointestinal:  Positive for abdominal pain and " diarrhea.   Genitourinary:  Negative for dysuria, hematuria and urgency.   Neurological:  Negative for dizziness and focal weakness.       PHYSICAL EXAM:  Vitals:    08/24/23 1100 08/24/23 1200 08/24/23 1300 08/24/23 1400   BP: 121/69 119/79 (!) 142/72 131/65   Pulse: 84 99 93 99   Resp: (!) 23 17 20 (!) 27   Temp:  36 °C (96.8 °F)     TempSrc:       SpO2: 95% 95% 98% 95%   Weight:       Height:        Body mass index is 27.52 kg/m².    O2 therapy: Pulse Oximetry: 95 %, O2 (LPM): 2, O2 Delivery Device: Nasal Cannula    Date 08/24/23 0700 - 08/25/23 0659   Shift 5034-7166 3964-7852 1918-9077 24 Hour Total   INTAKE   I.V. 656.8   656.8     Volume (mL) (lactated ringers infusion) 656.8   656.8   Shift Total 656.8   656.8   OUTPUT   Urine         Number of Times Voided 2 x   2 x   Stool         Number of Times Stooled 3 x   3 x   Shift Total       .8   656.8        Physical Exam  Constitutional:       General: She is not in acute distress.     Appearance: She is not ill-appearing.   HENT:      Head: Normocephalic.      Nose: Nose normal.      Mouth/Throat:      Mouth: Mucous membranes are moist.   Eyes:      Extraocular Movements: Extraocular movements intact.      Conjunctiva/sclera: Conjunctivae normal.      Pupils: Pupils are equal, round, and reactive to light.   Cardiovascular:      Rate and Rhythm: Normal rate and regular rhythm.      Pulses: Normal pulses.      Heart sounds: Normal heart sounds. No murmur heard.     No gallop.   Pulmonary:      Effort: Pulmonary effort is normal. No respiratory distress.      Breath sounds: Normal breath sounds. No stridor. No wheezing, rhonchi or rales.   Abdominal:      General: Bowel sounds are normal. There is no distension.      Palpations: Abdomen is soft. There is no mass.      Tenderness: There is abdominal tenderness (RUQ tenderness). There is no guarding.   Musculoskeletal:         General: Normal range of motion.      Cervical back: Normal range of motion. No  rigidity.   Skin:     General: Skin is warm.   Neurological:      General: No focal deficit present.      Mental Status: She is alert and oriented to person, place, and time.   Psychiatric:         Mood and Affect: Mood normal.             Recent Labs     08/23/23 0123 08/23/23 1942 08/24/23  1345   SODIUM 133* 139 138   POTASSIUM 3.8 3.2* 4.1   CHLORIDE 93* 103 104   CO2 29 16* 22   BUN 23* 21 22   CREATININE 0.98 1.30 0.97   CALCIUM 9.2 7.8* 7.9*     Recent Labs     08/23/23 0123 08/23/23 1942 08/24/23  1345   ALTSGPT  --  97*  --    ASTSGOT  --  67*  --    ALKPHOSPHAT  --  352*  --    TBILIRUBIN  --  0.8  --    LIPASE  --  295*  --    GLUCOSE 121* 191* 173*     Recent Labs     08/23/23 0123 08/23/23 1942 08/24/23  0029 08/24/23  0425 08/24/23  0815 08/24/23  1200 08/24/23  1345   RBC 3.36* 3.47*  --   --   --   --  3.35*   HEMOGLOBIN 9.8* 10.3* 11.1*   < > 10.2* 10.1* 10.0*   HEMATOCRIT 30.2* 31.9*  --   --   --   --  30.5*   PLATELETCT 352 377  --   --   --   --  384   PROTHROMBTM  --   --  15.9*  --   --   --   --    APTT  --   --  28.8  --   --   --   --    INR  --   --  1.25*  --   --   --   --     < > = values in this interval not displayed.     Recent Labs     08/23/23 0123 08/23/23 1942 08/24/23  1345   WBC 11.6* 12.9* 19.8*   NEUTSPOLYS  --  68.10 92.10*   LYMPHOCYTES  --  23.30 2.60*   MONOCYTES  --  4.30 1.80   EOSINOPHILS  --  0.80 0.00   BASOPHILS  --  0.00 0.00   ASTSGOT  --  67*  --    ALTSGPT  --  97*  --    ALKPHOSPHAT  --  352*  --    TBILIRUBIN  --  0.8  --        Meds:   pantoprazole  40 mg      fentaNYL  50 mcg      Pharmacy  1 Each      LR   83 mL/hr at 08/24/23 0212    cefTRIAXone (ROCEPHIN) IV  2,000 mg      metroNIDAZOLE (FLAGYL) IV  500 mg Stopped (08/24/23 0609)        Procedures:  none    Imaging:  US-RUQ   Final Result         1.  Cholelithiasis and gallbladder sludge with gallbladder wall thickening, sonographically compatible with cholecystitis.   2.  Common bile duct  dilatation, consider causes of biliary obstruction with additional workup as clinically appropriate.   3.  Right pleural effusion      DX-CHEST-PORTABLE (1 VIEW)   Final Result         1.  Hazy left pulmonary opacities suggests subtle infiltrate, overall decreased since prior study.   2.  Trace left pleural effusion, decreased since prior study.   3.  Cardiomegaly      CT-CHEST,ABDOMEN,PELVIS WITH   Final Result      1.  Postoperative changes status post recent open heart surgery. Patient is status post CABG and there is an aortic valve replacement.   2.  Small pericardial and bilateral pleural effusions.   3.  Cholelithiasis with distended gallbladder and wall thickening raises concern for acute cholecystitis.   4.  Uroepithelial thickening in the left renal pelvis is nonspecific but could be seen with infection. Correlate with urinalysis.      IR-CONSULT AND TREAT    (Results Pending)       ASSESSEMENT and PLAN:    * Shock (HCC)- (present on admission)  Assessment & Plan  Multifactorial ddx includes, Sepsis from cholecystitis, hypovolumia.   Pt had 1 episode of maroon stool while ED, however, she hasn't has any more bloody stools. Some yellow/brown stools.   Pt received >4L fluid boluses and SBP maintains in 120-140s.   On ceftriaxone and flagyl. Follow up cultures  Trend H/H q4 with restrictive transfusion strategies. Transfuse to keep Hgb >7  Trend lactates as marker of end organ perfusion  Hold GI consult, unless pt continues to have bloody stool   Pt to have perchole tube by IR tomorrow.   On LR at 83cc/hr for maintenance fluids.   Appreciate Gen Surg help        H/O aortic valve replacement with porcine valve  Assessment & Plan  S/ p AVR and CAB (SHEPARD to LAD) by Dr. Lawrence on 8/16  Intraoperative EF55%  CTS notified of readmission  Hold Lasix and Lisinopril given GAURI.       Atrial fibrillation (HCC)  Assessment & Plan  AFRVR attributed to hypovolemia and GI bleed  Hold Eliquis and ASA in setting of GI  bleed  Volume resuscitation and/or blood transfusion as clinically indicated.      Transaminitis  Assessment & Plan  Attributed to shock state  Trend AST/ALT and synthetic function.    Hypokalemia  Assessment & Plan  Replace as clinically indicated      GAURI (acute kidney injury) (HCC)  Assessment & Plan  Pre-renal  Monitor volume status, electrolytes, urine output.  Avoid nephrotoxic agents.  Repeat labs  Continue fluids      Cholecystitis  Assessment & Plan  S/p Hemodynamic resuscitation and on ceftriaxone and flagyl   IR for perchole tube is scheduled   General surgery consulting    GI bleed  Assessment & Plan  One episode of marroon stool at admission   On protonix 40 BID.   Suspect mesenteric ischemia or upper GI bleed if bleeding continues.   Serial H/H q4 hours  Pt has large bore PIV #18 and #20  TEG okay. No need for repletion   GI consult if more bleeding.           CODE STATUS: Full Code        Farhad Kaufman M.D.   Internal Medicine Resident

## 2023-08-24 NOTE — CARE PLAN
The patient is Watcher - Medium risk of patient condition declining or worsening    Problem: Pain - Standard  Goal: Alleviation of pain or a reduction in pain to the patient’s comfort goal  Outcome: Progressing  Patient effectively uses 1-10 rating scale to identify and describe pain, patient educated regarding nonpharmacological methods to relieve pain, patient requests pain medication when needed       Problem: Fall Risk  Goal: Patient will remain free from falls  Outcome: Progressing  Patient educated regarding room orientation, patient educated regarding use of call light, bed alarm on, bed locked, non-skid socks in use

## 2023-08-24 NOTE — ASSESSMENT & PLAN NOTE
AFRVR attributed to hypovolemia and GI bleed  Hold Eliquis and ASA in setting of GI bleed  8/24 Pt went into afib with RVR again. S/p metoprolol 5 IV x3  8/25 started on cardizem gtt, off gtt in the evening  Replete K and Mg. Keep K >4 and Mg >2

## 2023-08-24 NOTE — PROGRESS NOTES
4 Eyes Skin Assessment Completed by CYNTHIA Crooks and CYNTHIA Rodriguez.    Head WDL  Ears WDL  Nose WDL  Mouth WDL  Neck WDL  Breast/Chest Healing Sternotomy incision, healing mediastinal chest tube sites   Shoulder Blades WDL  Spine WDL  (R) Arm/Elbow/Hand WDL  (L) Arm/Elbow/Hand WDL  Abdomen Bruising  Groin Redness and Blanching  Scrotum/Coccyx/Buttocks WDL  (R) Leg WDL  (L) Leg WDL  (R) Heel/Foot/Toe WDL  (L) Heel/Foot/Toe WDL          Devices In Places ECG, Blood Pressure Cuff, and Pulse Ox      Interventions In Place Gray Ear Foams, Q2 Turns, Low Air Loss Mattress, and Barrier Cream    Possible Skin Injury No    Pictures Uploaded Into Epic Yes  Wound Consult Placed N/A  RN Wound Prevention Protocol Ordered No

## 2023-08-24 NOTE — PROGRESS NOTES
"  DATE: 8/24/2023    Date of admission hypotension abdominal pain    INTERVAL EVENTS:  Margoth Hopkins is awake alert and appropriate  She reports improvement in abdominal pain  He states now comfortable at rest  No nausea positive diarrhea        PHYSICAL EXAMINATION:  Vital Signs: BP (!) 142/72   Pulse 93   Temp 36 °C (96.8 °F)   Resp 20   Ht 1.702 m (5' 7\")   Wt 79.7 kg (175 lb 11.3 oz)   SpO2 98%   Physical Exam  HENT:      Head: Normocephalic and atraumatic.   Cardiovascular:      Rate and Rhythm: Regular rhythm. Tachycardia present.      Pulses: Normal pulses.   Pulmonary:      Effort: Pulmonary effort is normal.      Breath sounds: Normal breath sounds. No stridor.      Comments: Healing sternal wound  Abdominal:      Palpations: Abdomen is soft.      Comments: Mild right upper quadrant tenderness to deep palpation   Musculoskeletal:         General: Normal range of motion.      Cervical back: Normal range of motion.      Right lower leg: No edema.      Left lower leg: No edema.   Skin:     General: Skin is warm and dry.      Coloration: Skin is pale.   Neurological:      General: No focal deficit present.      Mental Status: She is alert and oriented to person, place, and time.   LABORATORY VALUES:  Recent Labs     08/22/23  0030 08/23/23  0123 08/23/23  1942 08/24/23  0029 08/24/23  0425 08/24/23  0815 08/24/23  1200   WBC 9.9 11.6* 12.9*  --   --   --   --    RBC 3.42* 3.36* 3.47*  --   --   --   --    HEMOGLOBIN 9.9* 9.8* 10.3*   < > 9.4* 10.2* 10.1*   HEMATOCRIT 30.4* 30.2* 31.9*  --   --   --   --    MCV 88.9 89.9 91.9  --   --   --   --    MCH 28.9 29.2 29.7  --   --   --   --    MCHC 32.6 32.5 32.3  --   --   --   --    RDW 46.5 47.4 48.7  --   --   --   --    PLATELETCT 311 352 377  --   --   --   --    MPV 10.2 9.7 9.9  --   --   --   --     < > = values in this interval not displayed.     Recent Labs     08/22/23  0030 08/23/23  0123 08/23/23  1942   SODIUM 134* 133* 139   POTASSIUM 4.2 3.8 " 3.2*   CHLORIDE 94* 93* 103   CO2 29 29 16*   GLUCOSE 116* 121* 191*   BUN 20 23* 21   CREATININE 0.86 0.98 1.30   CALCIUM 8.9 9.2 7.8*     Recent Labs     08/23/23  1942 08/24/23  0029   ASTSGOT 67*  --    ALTSGPT 97*  --    TBILIRUBIN 0.8  --    ALKPHOSPHAT 352*  --    GLOBULIN 2.4  --    INR  --  1.25*     Recent Labs     08/24/23  0029   APTT 28.8   INR 1.25*       IMAGING:  US-RUQ   Final Result         1.  Cholelithiasis and gallbladder sludge with gallbladder wall thickening, sonographically compatible with cholecystitis.   2.  Common bile duct dilatation, consider causes of biliary obstruction with additional workup as clinically appropriate.   3.  Right pleural effusion      DX-CHEST-PORTABLE (1 VIEW)   Final Result         1.  Hazy left pulmonary opacities suggests subtle infiltrate, overall decreased since prior study.   2.  Trace left pleural effusion, decreased since prior study.   3.  Cardiomegaly      CT-CHEST,ABDOMEN,PELVIS WITH   Final Result      1.  Postoperative changes status post recent open heart surgery. Patient is status post CABG and there is an aortic valve replacement.   2.  Small pericardial and bilateral pleural effusions.   3.  Cholelithiasis with distended gallbladder and wall thickening raises concern for acute cholecystitis.   4.  Uroepithelial thickening in the left renal pelvis is nonspecific but could be seen with infection. Correlate with urinalysis.      IR-CONSULT AND TREAT    (Results Pending)       ASSESSMENT AND PLAN:    Ongoing therapy  Monitor response to cholecystostomy drainage  Monitor on exam  Follow-up labs  ACS [facility] Follow           ____________________________________     Ren Jimenez M.D.    DD: 8/24/2023  1:58 PM

## 2023-08-24 NOTE — PROGRESS NOTES
12 hour chart check performed at bedside    Monitor Summary  A-FIB   -120S    *Monitor strip not available

## 2023-08-24 NOTE — CARE PLAN
The patient is Watcher - Medium risk of patient condition declining or worsening    Shift Goals  Clinical Goals: REMAIN PAIN FREE, POSSIBLE SATISH GARCIA  Patient Goals: no pain  Family Goals: HELEN    Progress made toward(s) clinical / shift goals:    Problem: Knowledge Deficit - Standard  Goal: Patient and family/care givers will demonstrate understanding of plan of care, disease process/condition, diagnostic tests and medications  Outcome: Progressing     Problem: Hemodynamics  Goal: Patient's hemodynamics, fluid balance and neurologic status will be stable or improve  Outcome: Progressing     Problem: Fluid Volume  Goal: Fluid volume balance will be maintained  Outcome: Progressing     Problem: Urinary - Renal Perfusion  Goal: Ability to achieve and maintain adequate renal perfusion and functioning will improve  Outcome: Progressing     Problem: Respiratory  Goal: Patient will achieve/maintain optimum respiratory ventilation and gas exchange  Outcome: Progressing     Problem: Mechanical Ventilation  Goal: Safe management of artificial airway and ventilation  Outcome: Progressing  Goal: Successful weaning off mechanical ventilator, spontaneously maintains adequate gas exchange  Outcome: Progressing  Goal: Patient will be able to express needs and understand communication  Outcome: Progressing     Problem: Physical Regulation  Goal: Diagnostic test results will improve  Outcome: Progressing  Goal: Signs and symptoms of infection will decrease  Outcome: Progressing     Problem: Pain - Standard  Goal: Alleviation of pain or a reduction in pain to the patient’s comfort goal  Outcome: Progressing     Problem: Fall Risk  Goal: Patient will remain free from falls  Outcome: Progressing

## 2023-08-24 NOTE — ASSESSMENT & PLAN NOTE
S/p Hemodynamic resuscitation and on ceftriaxone and flagyl   S/p CT-guided perchole tube by IR 8/25  Post op uneventful. No SIRS, no hypotension  General surgery following. Appreciate help

## 2023-08-24 NOTE — ED NOTES
Med rec completed per  at bedside, historically, and per discharge paperwork from today. Patient took no medications after returning home today.

## 2023-08-24 NOTE — CONSULTS
CHIEF COMPLAINT: Hypotension, abdominal pain    Consult requested by Dr. Vega in the emergency department.     HISTORY OF PRESENT ILLNESS: Female with undergone CABG and aortic valve replacement 1 week ago who has been on the cardiac surgical service since then.  She had been having diarrhea the last few days but was deemed stable for discharge today.  Upon returning home she began to have a significant weakness and malaise with urinary incontinence and incontinence of stool.  She had a significant confusion and a near syncopal episode while on the toilet so her  called the ambulance to bring her in for evaluation.  On arrival she was profoundly hypotensive but responded to fluid resuscitation.  She is now more awake and alert with complaints of vague abdominal pain, mild shortness of breath, diarrhea and malaise.    PAST MEDICAL HISTORY:  has a past medical history of Adverse effect of anesthesia (15 yrs ago ?), Anemia, Anesthesia, Anginal syndrome ([facility]), Aortic insufficiency (01/31/2020), Arthritis (06/16/2023), Breath shortness (11/02/2021), Bronchitis (60+ yrs ago), Cataract (06/16/2023), Dental disorder (06/16/2023), Gastric ulcer, Goiter, Heart burn (2022), Heart murmur (1965), High cholesterol (06/16/2023), Hypertension (06/16/2023), Hypothyroidism (06/16/2023), Pain (06/16/2023), PONV (postoperative nausea and vomiting) (6/21/22), Psychiatric problem (06/16/2023), Pulmonary embolism (HCC) (01/31/2020), Reactive arthritis ([facility]), Snoring (06/16/2023), Thyroid disease, Thyroid disease (01/31/2020), and Urinary bladder disorder (15 yrs ago).    She has no past medical history of Acute nasopharyngitis, Arrhythmia, Asthma, Awareness under anesthesia, Bowel habit changes, Cancer ([facility]), Carcinoma in situ of respiratory system, Congestive heart failure ([facility]), Continuous ambulatory peritoneal dialysis status ([facility]), Delayed emergence from general anesthesia, Diabetes ([facility]), Dialysis patient ([facility]),  Emphysema of lung (HCC), Glaucoma, Gynecological disorder, Hard to intubate, Hemorrhagic disorder (HCC), Hepatitis A, Hepatitis B, Hepatitis C, Hiatus hernia syndrome, Indigestion, Infectious disease, Jaundice, Malignant hyperthermia, Myocardial infarct (HCC), Pacemaker, Pneumonia, Pregnant, Pseudocholinesterase deficiency, Renal disorder, Rheumatic fever, Seizure (HCC), Sleep apnea, Spinal headache, Stroke (HCC), Tuberculosis, or Urinary incontinence.    PAST SURGICAL HISTORY:  has a past surgical history that includes knee replacement, total (Right); fusion (06/16/2023); cervical fusion posterior; bladder sling female; cataract extraction with iol (Left); cataract extraction with iol (Bilateral); thyroidectomy total (Bilateral, 02/05/2020); appendectomy; gyn surgery; foot surgery (Left); knee arthroplasty total (Right); knee revision total (Right); mass excision general (Right, 11/05/2021); knee arthroplasty total (Left, 06/2022); other cardiac surgery (06/16/2023); other neurological surg (2016); no pertinent past surgical history (2017); multiple coronary artery bypass endo vein harvest (N/A, 8/16/2023); aortic valve replacement (N/A, 8/16/2023); and echocardiogram, transesophageal, intraoperative (N/A, 8/16/2023).    ALLERGIES:   Allergies   Allergen Reactions    Morphine Vomiting    Nsaids Unspecified     History of gastric ulcers - cannot take NSAIDS    Pcn [Penicillins] Hives     Tolerates ancef    Seasonal Runny Nose and Itching     .       CURRENT MEDICATIONS:   Home Medications       Reviewed by Sophia Waite R.N. (Registered Nurse) on 08/23/23 at 1936  Med List Status: <None>     Medication Last Dose Status   acetaminophen (TYLENOL) 500 MG Tab  Active   amiodarone (PACERONE) 400 MG tablet  Active   apixaban (ELIQUIS) 5mg Tab  Active   aspirin 81 MG EC tablet  Active   busPIRone (BUSPAR) 10 MG Tab tablet  Active   diazePAM (VALIUM) 2 MG Tab  Active   diclofenac sodium (VOLTAREN) 1 % Gel  Active  "  DULoxetine HCl 40 MG Cap DR Particles  Active   furosemide (LASIX) 20 MG Tab  Active   lisinopril (PRINIVIL) 5 MG Tab  Active   metoprolol tartrate (LOPRESSOR) 25 MG Tab  Active   omeprazole (PRILOSEC) 20 MG delayed-release capsule  Active   potassium chloride (MICRO-K) 10 MEQ capsule  Active   pregabalin (LYRICA) 50 MG capsule  Active   propranolol (INDERAL) 40 MG Tab  Active   rosuvastatin (CRESTOR) 40 MG tablet  Active   SYNTHROID 75 MCG Tab  Active   traMADol (ULTRAM) 50 MG Tab  Active   vitamin D2, Ergocalciferol, (DRISDOL) 1.25 MG (68947 UT) Cap capsule  Active   zolpidem (AMBIEN) 5 MG Tab  Active                  FAMILY HISTORY: family history includes Alzheimer's Disease in her mother; Arthritis in her brother, brother, and mother; Asthma in her brother; Cancer in her father and mother; Cancer (age of onset: 66) in her maternal grandmother; Colorectal Cancer in her maternal grandmother; Heart Attack in her father; Heart Disease in her brother; Hypertension in her brother, brother, and brother; Lung Disease in her brother; No Known Problems in her maternal grandfather, paternal grandfather, and paternal grandmother; Other in her brother and brother.    SOCIAL HISTORY:  reports that she has never smoked. She has never used smokeless tobacco. She reports current alcohol use of about 4.2 - 8.4 oz of alcohol per week. She reports that she does not currently use drugs after having used the following drugs: Oral.    REVIEW OF SYSTEMS: Review of systems is remarkable for the following abdominal pain, weakness, confusion, unremittent diarrhea.  Shortness of breath but no chest pain. The remainder of the comprehensive ROS is negative with the exception of the aforementioned HPI, PMH, and PSH bullets in accordance with CMS guideline.    PHYSICAL EXAMINATION:      Vital Signs: /73   Pulse (!) 121   Temp 35.9 °C (96.7 °F) (Temporal)   Resp 20   Ht 1.702 m (5' 7\")   Wt 77.1 kg (170 lb)   SpO2 95%   Physical " Exam  Vitals and nursing note reviewed.   HENT:      Head: Normocephalic and atraumatic.      Mouth/Throat:      Mouth: Mucous membranes are dry.      Pharynx: Oropharynx is clear.   Eyes:      Extraocular Movements: Extraocular movements intact.      Pupils: Pupils are equal, round, and reactive to light.   Cardiovascular:      Rate and Rhythm: Regular rhythm. Tachycardia present.      Pulses: Normal pulses.   Pulmonary:      Effort: Pulmonary effort is normal.      Breath sounds: Normal breath sounds. No stridor.      Comments: Healing sternal wound  Abdominal:      Palpations: Abdomen is soft.      Comments: Mild right upper quadrant tenderness to deep palpation   Musculoskeletal:         General: Normal range of motion.      Cervical back: Normal range of motion.      Right lower leg: No edema.      Left lower leg: No edema.   Skin:     General: Skin is warm and dry.      Coloration: Skin is pale.   Neurological:      General: No focal deficit present.      Mental Status: She is alert and oriented to person, place, and time.         LABORATORY VALUES:   Recent Labs     08/22/23  0030 08/23/23  0123 08/23/23 1942   WBC 9.9 11.6* 12.9*   RBC 3.42* 3.36* 3.47*   HEMOGLOBIN 9.9* 9.8* 10.3*   HEMATOCRIT 30.4* 30.2* 31.9*   MCV 88.9 89.9 91.9   MCH 28.9 29.2 29.7   MCHC 32.6 32.5 32.3   RDW 46.5 47.4 48.7   PLATELETCT 311 352 377   MPV 10.2 9.7 9.9     Recent Labs     08/22/23  0030 08/23/23  0123 08/23/23 1942   SODIUM 134* 133* 139   POTASSIUM 4.2 3.8 3.2*   CHLORIDE 94* 93* 103   CO2 29 29 16*   GLUCOSE 116* 121* 191*   BUN 20 23* 21   CREATININE 0.86 0.98 1.30   CALCIUM 8.9 9.2 7.8*     Recent Labs     08/23/23 1942   ASTSGOT 67*   ALTSGPT 97*   TBILIRUBIN 0.8   ALKPHOSPHAT 352*   GLOBULIN 2.4            IMAGING:   US-RUQ   Final Result         1.  Cholelithiasis and gallbladder sludge with gallbladder wall thickening, sonographically compatible with cholecystitis.   2.  Common bile duct dilatation, consider  causes of biliary obstruction with additional workup as clinically appropriate.   3.  Right pleural effusion      DX-CHEST-PORTABLE (1 VIEW)   Final Result         1.  Hazy left pulmonary opacities suggests subtle infiltrate, overall decreased since prior study.   2.  Trace left pleural effusion, decreased since prior study.   3.  Cardiomegaly      CT-CHEST,ABDOMEN,PELVIS WITH   Final Result      1.  Postoperative changes status post recent open heart surgery. Patient is status post CABG and there is an aortic valve replacement.   2.  Small pericardial and bilateral pleural effusions.   3.  Cholelithiasis with distended gallbladder and wall thickening raises concern for acute cholecystitis.   4.  Uroepithelial thickening in the left renal pelvis is nonspecific but could be seen with infection. Correlate with urinalysis.          ASSESSMENT AND PLAN:     Who is 1 week status post CABG and aortic valve O's had diarrhea for the last 3 to 4 days and now with significant dehydration, hypotension and possible acalculous cholecystitis.  Patient needs to be significantly stabilized before she would be eligible for laparoscopic cholecystectomy.  Recommend cholecystostomy tube if we feel that that is the source however I think the patient may have infectious diarrhea and this should be worked up and treated while she is being resuscitated.  We have requested that she be admitted to the medical ICU service.  Cholecystostomy tube ordered for tomorrow but will reassess as needed in the a.m.         ____________________________________     Tavares Manzanares D.O.    DD: 8/24/2023  12:04 AM

## 2023-08-24 NOTE — CONSULTS
Critical Care Consultation  (This note will serve as the admission H&P)    Date of consult: 8/23/2023    Referring Physician  Maykel Gale M.D.    Reason for Consultation  Sepsis    History of Presenting Illness  72 y.o. female who presented 8/23/2023 after being discharged from the hospital earlier in the day. She had been hospitalized for an AoVr (bovine) and CABGx1 vessel. Her post operative course was only complicated by AFRVR which resolved with Amiodarone to a SR. She was discharged this morning.     When she got home she took a nap and when she woke up she went to the bathroom. Her  found her slumped over and non responsive. EMS was activated. Upon arrival her SBP was 50. Upon arrival in the  ED she remained somnolent c/o of severe abdominal pain with SBP still in the 50's.  On admission she was tachycardic with a tender abdomen. Labs were significant for a lactate of 9.7, WBC 12.9, CO2-16, AST 67, ALT97, AlkPhos 352, Cr 1.31.     CT abdomen shows residual small post op pleural effusion and cholelithiasis with a distended gallbladder with wall thickening c/w cholecystitis.    In the ED she received 2L IVFB and antibiotics. Her lactic acid has improved from 9.7 to 2.6.    General surgery was consulted. Dr. Manzanares recommended medical management with possible percutaneous drainage to be determined after further resuscitation.    Critical care consultation was requested for further evaluation and management.    CT surgery team has been notified of their patients re-admission.    Code Status  Prior    Review of Systems  Review of Systems   Constitutional: Negative.    HENT: Negative.     Respiratory:  Negative for shortness of breath.    Cardiovascular:  Positive for leg swelling. Negative for chest pain.   Gastrointestinal:  Positive for abdominal pain and diarrhea. Negative for blood in stool.        She reports 3 days of non-bloody liquid stools while in the hospital which continued today at home.    Genitourinary: Negative.    Neurological: Negative.    Psychiatric/Behavioral: Negative.         Past Medical History   has a past medical history of Adverse effect of anesthesia (15 yrs ago ?), GAURI (acute kidney injury) ([facility]) (8/24/2023), Anemia, Anesthesia, Anginal syndrome ([facility]), Aortic insufficiency (01/31/2020), Arthritis (06/16/2023), Atrial fibrillation (HCC) (8/24/2023), Breath shortness (11/02/2021), Bronchitis (60+ yrs ago), Cataract (06/16/2023), Dental disorder (06/16/2023), Gastric ulcer, Goiter, Heart burn (2022), Heart murmur (1965), High cholesterol (06/16/2023), Hypertension (06/16/2023), Hypothyroidism (06/16/2023), Pain (06/16/2023), PONV (postoperative nausea and vomiting) (6/21/22), Psychiatric problem (06/16/2023), Pulmonary embolism ([facility]) (01/31/2020), Reactive arthritis ([facility]), Snoring (06/16/2023), Thyroid disease, Thyroid disease (01/31/2020), and Urinary bladder disorder (15 yrs ago).    She has no past medical history of Acute nasopharyngitis, Asthma, Awareness under anesthesia, Bowel habit changes, Cancer ([facility]), Carcinoma in situ of respiratory system, Congestive heart failure ([facility]), Continuous ambulatory peritoneal dialysis status ([facility]), Delayed emergence from general anesthesia, Diabetes ([facility]), Dialysis patient ([facility]), Emphysema of lung ([facility]), Glaucoma, Gynecological disorder, Hard to intubate, Hemorrhagic disorder ([facility]), Hepatitis A, Hepatitis B, Hepatitis C, Hiatus hernia syndrome, Indigestion, Infectious disease, Jaundice, Malignant hyperthermia, Myocardial infarct (HCC), Pacemaker, Pneumonia, Pregnant, Pseudocholinesterase deficiency, Rheumatic fever, Seizure (HCC), Sleep apnea, Spinal headache, Stroke ([facility]), Tuberculosis, or Urinary incontinence.    Surgical History   has a past surgical history that includes knee replacement, total (Right); fusion (06/16/2023); cervical fusion posterior; bladder sling female; cataract extraction with iol (Left); cataract extraction with iol  (Bilateral); thyroidectomy total (Bilateral, 02/05/2020); appendectomy; gyn surgery; foot surgery (Left); knee arthroplasty total (Right); knee revision total (Right); mass excision general (Right, 11/05/2021); knee arthroplasty total (Left, 06/2022); other cardiac surgery (06/16/2023); other neurological surg (2016); no pertinent past surgical history (2017); multiple coronary artery bypass endo vein harvest (N/A, 8/16/2023); aortic valve replacement (N/A, 8/16/2023); and echocardiogram, transesophageal, intraoperative (N/A, 8/16/2023).    Family History  family history includes Alzheimer's Disease in her mother; Arthritis in her brother, brother, and mother; Asthma in her brother; Cancer in her father and mother; Cancer (age of onset: 66) in her maternal grandmother; Colorectal Cancer in her maternal grandmother; Heart Attack in her father; Heart Disease in her brother; Hypertension in her brother, brother, and brother; Lung Disease in her brother; No Known Problems in her maternal grandfather, paternal grandfather, and paternal grandmother; Other in her brother and brother.    Social History   reports that she has never smoked. She has never used smokeless tobacco. She reports current alcohol use of about 4.2 - 8.4 oz of alcohol per week. She reports that she does not currently use drugs after having used the following drugs: Oral.    Medications  Home Medications       Reviewed by Disha Leo (Pharmacy Tech) on 08/24/23 at 0016  Med List Status: Complete     Medication Last Dose Status   acetaminophen (TYLENOL) 500 MG Tab New RX Active   amiodarone (PACERONE) 400 MG tablet New RX Active   apixaban (ELIQUIS) 5mg Tab New RX Active   aspirin 81 MG EC tablet New RX Active   busPIRone (BUSPAR) 10 MG Tab tablet 8/15/2023 Active   diazePAM (VALIUM) 2 MG Tab 8/15/2023 Active   diclofenac sodium (VOLTAREN) 1 % Gel >3 weeks Active   DULoxetine HCl 40 MG Cap DR Particles 8/16/2023 Active   furosemide (LASIX) 20  MG Tab 8/16/2023 Active   lisinopril (PRINIVIL) 5 MG Tab New RX Active   metoprolol tartrate (LOPRESSOR) 25 MG Tab New RX Active   omeprazole (PRILOSEC) 20 MG delayed-release capsule New RX Active   potassium chloride (MICRO-K) 10 MEQ capsule 8/15/2023 Active   pregabalin (LYRICA) 50 MG capsule 8/16/2023 Active   propranolol (INDERAL) 40 MG Tab 8/15/2023 Active   rosuvastatin (CRESTOR) 40 MG tablet 8/15/2023 Active   SYNTHROID 75 MCG Tab 8/16/2023 Active   traMADol (ULTRAM) 50 MG Tab New RX Active   vitamin D2, Ergocalciferol, (DRISDOL) 1.25 MG (51814 UT) Cap capsule 8/12/2023 Active   zolpidem (AMBIEN) 5 MG Tab 8/12/2023 Active                  Current Facility-Administered Medications   Medication Dose Route Frequency Provider Last Rate Last Admin    pantoprazole (Protonix) injection 40 mg  40 mg Intravenous BID Maykel Gale M.D.        potassium chloride (Kcl) ivpb 10 mEq  10 mEq Intravenous Q HOUR Maykel Gale M.D. 100 mL/hr at 08/24/23 0128 10 mEq at 08/24/23 0128    Pharmacy Consult Request  1 Each Other PHARMACY TO DOSE Brian Vega M.D.        lactated ringers infusion   Intravenous Continuous Maykel Gale M.D. 83 mL/hr at 08/24/23 0212 New Bag at 08/24/23 0212    cefTRIAXone (Rocephin) syringe 2,000 mg  2,000 mg Intravenous Q24HRS Maykel Gale M.D.        metroNIDAZOLE (Flagyl) IVPB 500 mg  500 mg Intravenous Q12HRS Maykel Gale M.D.           Allergies  Allergies   Allergen Reactions    Morphine Vomiting    Nsaids Unspecified     History of gastric ulcers - cannot take NSAIDS    Pcn [Penicillins] Hives     Tolerates ancef    Seasonal Runny Nose and Itching     .       Vital Signs last 24 hours  Temp:  [35.4 °C (95.7 °F)-37.1 °C (98.7 °F)] 35.4 °C (95.7 °F)  Pulse:  [] 122  Resp:  [16-20] 19  BP: ()/(51-83) 123/83  SpO2:  [89 %-97 %] 91 %    Physical Exam  Constitutional:       General: She is not in acute distress.     Appearance: She is ill-appearing.   HENT:      Head:  Normocephalic and atraumatic.      Mouth/Throat:      Mouth: Mucous membranes are dry.   Eyes:      Pupils: Pupils are equal, round, and reactive to light.   Cardiovascular:      Rate and Rhythm: Regular rhythm. Tachycardia present.      Heart sounds: No murmur heard.     No friction rub. No gallop.   Pulmonary:      Effort: No respiratory distress.   Abdominal:      Palpations: Abdomen is soft. There is no mass.      Tenderness: There is abdominal tenderness. There is guarding. There is no rebound.      Comments: During my exam she had a large maroon liquid stool.   Musculoskeletal:      Cervical back: No tenderness.      Right lower leg: Edema present.      Left lower leg: Edema present.   Skin:     Comments: Pale, cool, moist   Neurological:      General: No focal deficit present.      Mental Status: She is oriented to person, place, and time.      Cranial Nerves: No cranial nerve deficit.   Psychiatric:         Mood and Affect: Mood normal.         Behavior: Behavior normal.         Fluids    Intake/Output Summary (Last 24 hours) at 8/24/2023 0233  Last data filed at 8/24/2023 0110  Gross per 24 hour   Intake 2935.15 ml   Output 200 ml   Net 2735.15 ml       Laboratory  Recent Results (from the past 48 hour(s))   CBC without Differential Critical Care 0130    Collection Time: 08/23/23  1:23 AM   Result Value Ref Range    WBC 11.6 (H) 4.8 - 10.8 K/uL    RBC 3.36 (L) 4.20 - 5.40 M/uL    Hemoglobin 9.8 (L) 12.0 - 16.0 g/dL    Hematocrit 30.2 (L) 37.0 - 47.0 %    MCV 89.9 81.4 - 97.8 fL    MCH 29.2 27.0 - 33.0 pg    MCHC 32.5 32.2 - 35.5 g/dL    RDW 47.4 35.9 - 50.0 fL    Platelet Count 352 164 - 446 K/uL    MPV 9.7 9.0 - 12.9 fL   Basic Metabolic Panel (BMP) Critical Care 0130    Collection Time: 08/23/23  1:23 AM   Result Value Ref Range    Sodium 133 (L) 135 - 145 mmol/L    Potassium 3.8 3.6 - 5.5 mmol/L    Chloride 93 (L) 96 - 112 mmol/L    Co2 29 20 - 33 mmol/L    Glucose 121 (H) 65 - 99 mg/dL    Bun 23 (H) 8 -  22 mg/dL    Creatinine 0.98 0.50 - 1.40 mg/dL    Calcium 9.2 8.5 - 10.5 mg/dL    Anion Gap 11.0 7.0 - 16.0   ESTIMATED GFR    Collection Time: 23  1:23 AM   Result Value Ref Range    GFR (CKD-EPI) 60 >60 mL/min/1.73 m 2   EKG (NOW)    Collection Time: 23  7:40 PM   Result Value Ref Range    Report       [facility] Emergency Dept[facility]    Test Date:  2023  Pt Name:    IAN JOHNSON                   Department: ER  MRN:        [id number]                      Room:       [facility]  Gender:     Female                       Technician: [id number]  :        [id number]                   Requested By:ANUM DEJESUS  Order #:    [id number]                    Reading MD: NAUM DEJESUS MD    Measurements  Intervals                                Axis  Rate:       88                           P:          0  NJ:         0                            QRS:        -29  QRSD:       112                          T:          50  QT:         403  QTc:        488    Interpretive Statements  ATRIAL FIBRILLATION WITH CONTROLLED VENTRICULAR RESPONSE  Abnormal R-wave progression, late transition  Borderline prolonged QT interval  Baseline wander in lead(s) II,III,aVF,V1,V2,V3,V4,V5  Compared to ECG 2023 10:29:12  AV block, advanced (high-grade) now present  Atrial fibrillation persists  Elect ronically Signed On 2023 00:03:56 PDT by ANUM DEJESUS MD     LACTIC ACID    Collection Time: 23  7:42 PM   Result Value Ref Range    Lactic Acid 9.7 (HH) 0.5 - 2.0 mmol/L   CBC WITH DIFFERENTIAL    Collection Time: 23  7:42 PM   Result Value Ref Range    WBC 12.9 (H) 4.8 - 10.8 K/uL    RBC 3.47 (L) 4.20 - 5.40 M/uL    Hemoglobin 10.3 (L) 12.0 - 16.0 g/dL    Hematocrit 31.9 (L) 37.0 - 47.0 %    MCV 91.9 81.4 - 97.8 fL    MCH 29.7 27.0 - 33.0 pg    MCHC 32.3 32.2 - 35.5 g/dL    RDW 48.7 35.9 - 50.0 fL    Platelet Count 377 164 - 446 K/uL    MPV 9.9 9.0 - 12.9 fL    Neutrophils-Polys 68.10 44.00 - 72.00 %     Lymphocytes 23.30 22.00 - 41.00 %    Monocytes 4.30 0.00 - 13.40 %    Eosinophils 0.80 0.00 - 6.90 %    Basophils 0.00 0.00 - 1.80 %    Nucleated RBC 0.50 (H) 0.00 - 0.20 /100 WBC    Neutrophils (Absolute) 8.78 (H) 1.82 - 7.42 K/uL    Lymphs (Absolute) 3.01 1.00 - 4.80 K/uL    Monos (Absolute) 0.55 0.00 - 0.85 K/uL    Eos (Absolute) 0.10 0.00 - 0.51 K/uL    Baso (Absolute) 0.00 0.00 - 0.12 K/uL    NRBC (Absolute) 0.06 K/uL    Anisocytosis 1+     Microcytosis 1+    COMP METABOLIC PANEL    Collection Time: 08/23/23  7:42 PM   Result Value Ref Range    Sodium 139 135 - 145 mmol/L    Potassium 3.2 (L) 3.6 - 5.5 mmol/L    Chloride 103 96 - 112 mmol/L    Co2 16 (L) 20 - 33 mmol/L    Anion Gap 20.0 (H) 7.0 - 16.0    Glucose 191 (H) 65 - 99 mg/dL    Bun 21 8 - 22 mg/dL    Creatinine 1.30 0.50 - 1.40 mg/dL    Calcium 7.8 (L) 8.5 - 10.5 mg/dL    Correct Calcium 8.8 8.5 - 10.5 mg/dL    AST(SGOT) 67 (H) 12 - 45 U/L    ALT(SGPT) 97 (H) 2 - 50 U/L    Alkaline Phosphatase 352 (H) 30 - 99 U/L    Total Bilirubin 0.8 0.1 - 1.5 mg/dL    Albumin 2.8 (L) 3.2 - 4.9 g/dL    Total Protein 5.2 (L) 6.0 - 8.2 g/dL    Globulin 2.4 1.9 - 3.5 g/dL    A-G Ratio 1.2 g/dL   ESTIMATED GFR    Collection Time: 08/23/23  7:42 PM   Result Value Ref Range    GFR (CKD-EPI) 43 (A) >60 mL/min/1.73 m 2   DIFFERENTIAL MANUAL    Collection Time: 08/23/23  7:42 PM   Result Value Ref Range    Metamyelocytes 0.90 %    Myelocytes 1.70 %    Progranulocytes 0.90 %    Manual Diff Status PERFORMED    PERIPHERAL SMEAR REVIEW    Collection Time: 08/23/23  7:42 PM   Result Value Ref Range    Peripheral Smear Review see below    PLATELET ESTIMATE    Collection Time: 08/23/23  7:42 PM   Result Value Ref Range    Plt Estimation Normal    MORPHOLOGY    Collection Time: 08/23/23  7:42 PM   Result Value Ref Range    RBC Morphology Present     Poikilocytosis 1+     Echinocytes 1+    LIPASE    Collection Time: 08/23/23  7:42 PM   Result Value Ref Range    Lipase 295 (H) 11 -  82 U/L   Cortisol    Collection Time: 08/23/23  7:42 PM   Result Value Ref Range    Cortisol 44.3 (H) 0.0 - 23.0 ug/dL   LACTIC ACID    Collection Time: 08/23/23  9:39 PM   Result Value Ref Range    Lactic Acid 2.6 (H) 0.5 - 2.0 mmol/L   Prothrombin Time    Collection Time: 08/24/23 12:29 AM   Result Value Ref Range    PT 15.9 (H) 12.0 - 14.6 sec    INR 1.25 (H) 0.87 - 1.13   APTT    Collection Time: 08/24/23 12:29 AM   Result Value Ref Range    APTT 28.8 24.7 - 36.0 sec   PLATELET MAPPING WITH BASIC TEG    Collection Time: 08/24/23 12:29 AM   Result Value Ref Range    Reaction Time Initial-R 5.4 4.6 - 9.1 min    React Time Initial Hep 4.4 4.3 - 8.3 min    Clot Kinetics-K 0.8 0.8 - 2.1 min    Clot Angle-Angle 79.7 (H) 63.0 - 78.0 degrees    Maximum Clot Strength-MA 71.2 (H) 52.0 - 69.0 mm    TEG Functional Fibrinogen(MA) 38.8 (H) 15.0 - 32.0 mm    Lysis 30 minutes-LY30 0.0 0.0 - 2.6 %    % Inhibition ADP 60.0 (H) 0.0 - 17.0 %    % Inhibition AA 57.1 (H) 0.0 - 11.0 %    TEG Algorithm Link Algorithm    Lactic Acid -STAT Once    Collection Time: 08/24/23 12:29 AM   Result Value Ref Range    Lactic Acid 2.0 0.5 - 2.0 mmol/L   HGB (Hemoglobin) for 48 hours    Collection Time: 08/24/23 12:29 AM   Result Value Ref Range    Hemoglobin 11.1 (L) 12.0 - 16.0 g/dL   URINALYSIS    Collection Time: 08/24/23  1:05 AM    Specimen: Stool   Result Value Ref Range    Color Yellow     Character Clear     Specific Gravity 1.037 <1.035    Ph 6.5 5.0 - 8.0    Glucose Negative Negative mg/dL    Ketones Negative Negative mg/dL    Protein 30 (A) Negative mg/dL    Bilirubin Negative Negative    Urobilinogen, Urine 1.0 Negative    Nitrite Negative Negative    Leukocyte Esterase Negative Negative    Occult Blood Small (A) Negative    Micro Urine Req Microscopic    URINE MICROSCOPIC (W/UA)    Collection Time: 08/24/23  1:05 AM   Result Value Ref Range    WBC 20-50 (A) /hpf    RBC 2-5 (A) /hpf    Bacteria Negative None /hpf    Epithelial Cells  Rare /hpf    Hyaline Cast 6-10 (A) /lpf       Imaging  [id number]-[id number]   Final Result         1.  Cholelithiasis and gallbladder sludge with gallbladder wall thickening, sonographically compatible with cholecystitis.   2.  Common bile duct dilatation, consider causes of biliary obstruction with additional workup as clinically appropriate.   3.  Right pleural effusion      DX-CHEST-PORTABLE (1 VIEW)   Final Result         1.  Hazy left pulmonary opacities suggests subtle infiltrate, overall decreased since prior study.   2.  Trace left pleural effusion, decreased since prior study.   3.  Cardiomegaly      CT-CHEST,ABDOMEN,PELVIS WITH   Final Result      1.  Postoperative changes status post recent open heart surgery. Patient is status post CABG and there is an aortic valve replacement.   2.  Small pericardial and bilateral pleural effusions.   3.  Cholelithiasis with distended gallbladder and wall thickening raises concern for acute cholecystitis.   4.  Uroepithelial thickening in the left renal pelvis is nonspecific but could be seen with infection. Correlate with urinalysis.      IR-CONSULT AND TREAT    (Results Pending)       Assessment/Plan  * Shock (HCC)- (present on admission)  Assessment & Plan  Multifactorial ddx includes:  Sepsis from cholecystitis.  Hemorrhagic from GI bleed  Hypovolemic from 3 days of diarrhea.  Continue volume resuscitation  Continue broad spectrum antibiotics  Trend H/H q4 with restrictive transfusion strategies  Trend lactates as marker of end organ perfusion  IR consult for percutaneous cholecystostomy tube.  GI consult      Cholecystitis  Assessment & Plan  Hemodynamic resuscitation  Broad spectrum antibiotics  IR consult for percutaneous drain  General surgery consulting    GI bleed  Assessment & Plan  Serial H/H q4 hours  TEG  Protonix bolus and infusion  GI consult    GAURI (acute kidney injury) (HCC)  Assessment & Plan  Pre-renal  Monitor volume status, electrolytes, urine output.  Avoid  nephrotoxic agents.      H/O aortic valve replacement with porcine valve  Assessment & Plan  AoVr on   Intraoperative EF55%  CTS notified of readmission  Hold Lasix and Lisinopril    Atrial fibrillation (HCC)  Assessment & Plan  AFRVR attributed to hypovolemia and GI bleed  Hold Eliquis and ASA in setting of GI bleed  Volume resuscitation and/or blood transfusion as clinically indicated.      Transaminitis  Assessment & Plan  Attributed to shock state  Trend AST/ALT and synthetic function.    Hypokalemia  Assessment & Plan  Replace as clinically indicated          Discussed patient condition and risk of morbidity and/or mortality with Family, RN, Patient, and general surgery.      The patient remains critically ill,  I have assessed and reassessed the blood pressure,  cardiovascular status, labs and response to interventions. This patient remains at high risk for worsening shock and death without the above critical care interventions.    Critical care time = 110 minutes in directly providing and coordinating critical care and extensive data review.  No time overlap and excludes procedures.

## 2023-08-24 NOTE — PROGRESS NOTES
Critical Care Progress Note    Date of admission  8/23/2023    Chief Complaint  72 y.o. female admitted 8/23/2023 with recent AVR and CABG x1 by Dr. Lawrence on 8/16 who was discharged home recently ,but came back after had diarrhea and had episode of syncope. Pt was volume resuscitated and also noted maroon stool. In addition, CT A/P and US RUQ concening of acute cholecystitis.     Hospital Course  8/23 admitted to ICU    Interval Problem Update  Reviewed last 24 hour events:  No acute changes this am.   Pt received total 4.5L fluid boluses.   HR in 80-90s  SBP in 120-140s.   On 2L NC sat >90%  Alert, oriented.   H/H stable in 10s. No further maroon stool this am.   Coags are reasonable.   Pt's last eliquis was yesterday 8/23 which is being held.   Lactate much improved (from 10)  Creatinine 1.3 last night, will repeat today   Good UOP.       Review of Systems  Review of Systems   Constitutional:  Negative for fever and malaise/fatigue.   Respiratory:  Negative for shortness of breath.    Cardiovascular:  Negative for chest pain and leg swelling.   Gastrointestinal:  Positive for abdominal pain. Negative for diarrhea, nausea and vomiting.   Musculoskeletal:  Negative for back pain.   Neurological:  Negative for headaches.   Psychiatric/Behavioral:  The patient is not nervous/anxious.    All other systems reviewed and are negative.       Vital Signs for last 24 hours   Temp:  [35.4 °C (95.7 °F)-37.1 °C (98.7 °F)] 36.1 °C (97 °F)  Pulse:  [] 84  Resp:  [16-25] 23  BP: ()/(51-83) 121/69  SpO2:  [89 %-100 %] 95 %    Hemodynamic parameters for last 24 hours       Respiratory Information for the last 24 hours       Physical Exam   Physical Exam  Vitals and nursing note reviewed.   Constitutional:       General: She is not in acute distress.     Appearance: She is obese. She is ill-appearing and toxic-appearing. She is not diaphoretic.   HENT:      Head: Normocephalic and atraumatic.      Mouth/Throat:       Mouth: Mucous membranes are dry.   Cardiovascular:      Rate and Rhythm: Normal rate and regular rhythm.      Pulses: Normal pulses.      Heart sounds: Normal heart sounds. No murmur heard.  Pulmonary:      Effort: No respiratory distress.      Breath sounds: Normal breath sounds. No wheezing, rhonchi or rales.      Comments: Diminished at bases  Abdominal:      General: There is no distension.      Palpations: Abdomen is soft.      Tenderness: There is abdominal tenderness. There is no guarding or rebound.   Musculoskeletal:      Right lower leg: No edema.      Left lower leg: No edema.   Skin:     Capillary Refill: Capillary refill takes less than 2 seconds.      Coloration: Skin is not jaundiced.      Findings: No bruising or rash.   Neurological:      General: No focal deficit present.      Mental Status: She is alert and oriented to person, place, and time.         Medications  Current Facility-Administered Medications   Medication Dose Route Frequency Provider Last Rate Last Admin    pantoprazole (Protonix) injection 40 mg  40 mg Intravenous BID Maykel Gale M.D.   40 mg at 08/24/23 0507    Pharmacy Consult Request  1 Each Other PHARMACY TO DOSE Brian Vega M.D.        lactated ringers infusion   Intravenous Continuous Maykel Gale M.D. 83 mL/hr at 08/24/23 0212 New Bag at 08/24/23 0212    cefTRIAXone (Rocephin) syringe 2,000 mg  2,000 mg Intravenous Q24HRS Maykel Gale M.D.   2,000 mg at 08/24/23 0454    metroNIDAZOLE (Flagyl) IVPB 500 mg  500 mg Intravenous Q12HRS Maykel Gale M.D.   Stopped at 08/24/23 0609       Fluids    Intake/Output Summary (Last 24 hours) at 8/24/2023 1129  Last data filed at 8/24/2023 1000  Gross per 24 hour   Intake 4971.84 ml   Output 200 ml   Net 4771.84 ml       Laboratory          Recent Labs     08/22/23  0030 08/23/23  0123 08/23/23  1942   SODIUM 134* 133* 139   POTASSIUM 4.2 3.8 3.2*   CHLORIDE 94* 93* 103   CO2 29 29 16*   BUN 20 23* 21   CREATININE 0.86  0.98 1.30   CALCIUM 8.9 9.2 7.8*     Recent Labs     08/22/23  0030 08/23/23  0123 08/23/23 1942   ALTSGPT  --   --  97*   ASTSGOT  --   --  67*   ALKPHOSPHAT  --   --  352*   TBILIRUBIN  --   --  0.8   LIPASE  --   --  295*   GLUCOSE 116* 121* 191*     Recent Labs     08/22/23  0030 08/23/23 0123 08/23/23 1942   WBC 9.9 11.6* 12.9*   NEUTSPOLYS  --   --  68.10   LYMPHOCYTES  --   --  23.30   MONOCYTES  --   --  4.30   EOSINOPHILS  --   --  0.80   BASOPHILS  --   --  0.00   ASTSGOT  --   --  67*   ALTSGPT  --   --  97*   ALKPHOSPHAT  --   --  352*   TBILIRUBIN  --   --  0.8     Recent Labs     08/22/23  0030 08/23/23 0123 08/23/23 1942 08/24/23  0029 08/24/23  0425 08/24/23  0815   RBC 3.42* 3.36* 3.47*  --   --   --    HEMOGLOBIN 9.9* 9.8* 10.3* 11.1* 9.4* 10.2*   HEMATOCRIT 30.4* 30.2* 31.9*  --   --   --    PLATELETCT 311 352 377  --   --   --    PROTHROMBTM  --   --   --  15.9*  --   --    APTT  --   --   --  28.8  --   --    INR  --   --   --  1.25*  --   --        Imaging  X-Ray:  I have personally reviewed the images and compared with prior images.    Assessment/Plan  * Shock (HCC)- (present on admission)  Assessment & Plan  Multifactorial ddx includes, Sepsis from cholecystitis, hypovolumia.   Pt had 1 episode of maroon stool while ED, however, she hasn't has any more bloody stools. Some yellow/brown stools.   Pt received >4L fluid boluses and SBP maintains in 120-140s.   On ceftriaxone and flagyl. Follow up cultures  Trend H/H q4 with restrictive transfusion strategies. Transfuse to keep Hgb >7  Trend lactates as marker of end organ perfusion  Hold GI consult, unless pt continues to have bloody stool   Pt to have perchole tube by IR tomorrow.   On LR at 83cc/hr for maintenance fluids.   Appreciate Gen Surg help        H/O aortic valve replacement with porcine valve  Assessment & Plan  S/ p AVR and CAB (SHEPARD to LAD) by Dr. Lawrence on 8/16  Intraoperative EF55%  CTS notified of readmission  Hold Lasix  and Lisinopril given GAURI.       Atrial fibrillation (HCC)  Assessment & Plan  AFRVR attributed to hypovolemia and GI bleed  Hold Eliquis and ASA in setting of GI bleed  Volume resuscitation and/or blood transfusion as clinically indicated.      Transaminitis  Assessment & Plan  Attributed to shock state  Trend AST/ALT and synthetic function.    Hypokalemia  Assessment & Plan  Replace as clinically indicated      GAURI (acute kidney injury) (HCC)  Assessment & Plan  Pre-renal  Monitor volume status, electrolytes, urine output.  Avoid nephrotoxic agents.  Repeat labs  Continue fluids      Cholecystitis  Assessment & Plan  S/p Hemodynamic resuscitation and on ceftriaxone and flagyl   IR for perchole tube is scheduled   General surgery consulting    GI bleed  Assessment & Plan  One episode of marroon stool at admission   On protonix 40 BID.   Suspect mesenteric ischemia or upper GI bleed if bleeding continues.   Serial H/H q4 hours  Pt has large bore PIV #18 and #20  TEG okay. No need for repletion   GI consult if more bleeding.          VTE:  Contraindicated  Ulcer: PPI  Lines: None    I have performed a physical exam and reviewed and updated ROS and Plan today (8/24/2023). In review of yesterday's note (8/23/2023), there are no changes except as documented above.     Discussed patient condition and risk of morbidity and/or mortality with RN, RT, Pharmacy, Charge nurse / hot rounds, and Patient  The patient remains critically ill.  Critical care time = 50 minutes in directly providing and coordinating critical care and extensive data review.  No time overlap and excludes procedures.

## 2023-08-24 NOTE — ED NOTES
Incontinence care provided to pt, all linens changed. MD at bedside, pt had bloody diarrhea. MD aware.

## 2023-08-24 NOTE — ASSESSMENT & PLAN NOTE
Multifactorial ddx includes, Sepsis from cholecystitis, hypovolumia.   Pt had 1 episode of maroon stool while ED, however, she hasn't has any more bloody stools. Some yellow/brown stools.   Pt received >4L fluid boluses and SBP maintains in 120-140s.   On ceftriaxone and flagyl.  8/25 s/p CT-guided perchole tube.   Tolerating oral intake post IR procedure  Shock has resolved    Plan:   Decrease H/H to Q12H. Transfuse to keep Hgb >8  Treat ceftriaxone and flagyl for 5 days (end date 8/28)   Appreciate Gen Surg help  Hold GI consult, unless pt continues to have bloody stool

## 2023-08-24 NOTE — ED NOTES
Vitals:    08/23/23 1934   BP: 93/55   Pulse: (!) 114   Resp: 20   Temp: 37.1 °C (98.7 °F)   SpO2: 90%     Chief Complaint   Patient presents with    ALOC     Pt just got discharged today from this hospital after an open heart surgery stay. She went home around noon and was doing well but then her  found her about an hour ago altered, incontinent and c/o abdominal pain. EMS was called and on arrival her BP was 55/30, . FSBG 284.     En route pt was given about 650 mL NS with improvement in her BP to 90/50s.

## 2023-08-24 NOTE — ASSESSMENT & PLAN NOTE
One episode of marroon stool at admission   Pt has large bore PIV #18 and #20  No further bleed since admission. Pt had bowel movements, but brown  No hematemesis or hematochezia  Last eliquis was prior to admission.   Coags/TEG unremarkable.   On protonix 40 BID.   Last Hgb 7.9, will transfuse 1U PRBC given pt's underlying cardiac disease.   GI consult if more bleeding.

## 2023-08-25 ENCOUNTER — APPOINTMENT (OUTPATIENT)
Dept: RADIOLOGY | Facility: MEDICAL CENTER | Age: 73
DRG: 219 | End: 2023-08-25
Attending: INTERNAL MEDICINE
Payer: COMMERCIAL

## 2023-08-25 LAB
ABO GROUP BLD: NORMAL
ALBUMIN SERPL BCP-MCNC: 2.4 G/DL (ref 3.2–4.9)
ALBUMIN/GLOB SERPL: 1 G/DL
ALP SERPL-CCNC: 216 U/L (ref 30–99)
ALT SERPL-CCNC: 59 U/L (ref 2–50)
ANION GAP SERPL CALC-SCNC: 9 MMOL/L (ref 7–16)
AST SERPL-CCNC: 39 U/L (ref 12–45)
BARCODED ABORH UBTYP: 9500
BARCODED PRD CODE UBPRD: NORMAL
BARCODED UNIT NUM UBUNT: NORMAL
BASOPHILS # BLD AUTO: 0.2 % (ref 0–1.8)
BASOPHILS # BLD: 0.03 K/UL (ref 0–0.12)
BILIRUB SERPL-MCNC: 0.6 MG/DL (ref 0.1–1.5)
BLD GP AB SCN SERPL QL: NORMAL
BUN SERPL-MCNC: 23 MG/DL (ref 8–22)
CALCIUM ALBUM COR SERPL-MCNC: 9.1 MG/DL (ref 8.5–10.5)
CALCIUM SERPL-MCNC: 7.8 MG/DL (ref 8.5–10.5)
CHLORIDE SERPL-SCNC: 104 MMOL/L (ref 96–112)
CO2 SERPL-SCNC: 22 MMOL/L (ref 20–33)
COMPONENT R 8504R: NORMAL
CREAT SERPL-MCNC: 0.87 MG/DL (ref 0.5–1.4)
EKG IMPRESSION: NORMAL
EOSINOPHIL # BLD AUTO: 0 K/UL (ref 0–0.51)
EOSINOPHIL NFR BLD: 0 % (ref 0–6.9)
ERYTHROCYTE [DISTWIDTH] IN BLOOD BY AUTOMATED COUNT: 48.7 FL (ref 35.9–50)
GFR SERPLBLD CREATININE-BSD FMLA CKD-EPI: 70 ML/MIN/1.73 M 2
GLOBULIN SER CALC-MCNC: 2.4 G/DL (ref 1.9–3.5)
GLUCOSE SERPL-MCNC: 145 MG/DL (ref 65–99)
GRAM STN SPEC: NORMAL
HCT VFR BLD AUTO: 25 % (ref 37–47)
HCT VFR BLD AUTO: 25.5 % (ref 37–47)
HCT VFR BLD AUTO: 26.1 % (ref 37–47)
HGB BLD-MCNC: 8.1 G/DL (ref 12–16)
HGB BLD-MCNC: 8.4 G/DL (ref 12–16)
HGB BLD-MCNC: 8.4 G/DL (ref 12–16)
IMM GRANULOCYTES # BLD AUTO: 0.35 K/UL (ref 0–0.11)
IMM GRANULOCYTES NFR BLD AUTO: 1.9 % (ref 0–0.9)
LYMPHOCYTES # BLD AUTO: 0.94 K/UL (ref 1–4.8)
LYMPHOCYTES NFR BLD: 5.2 % (ref 22–41)
MAGNESIUM SERPL-MCNC: 1.8 MG/DL (ref 1.5–2.5)
MCH RBC QN AUTO: 29.4 PG (ref 27–33)
MCHC RBC AUTO-ENTMCNC: 32.9 G/DL (ref 32.2–35.5)
MCV RBC AUTO: 89.2 FL (ref 81.4–97.8)
MONOCYTES # BLD AUTO: 0.85 K/UL (ref 0–0.85)
MONOCYTES NFR BLD AUTO: 4.7 % (ref 0–13.4)
NEUTROPHILS # BLD AUTO: 15.82 K/UL (ref 1.82–7.42)
NEUTROPHILS NFR BLD: 88 % (ref 44–72)
NRBC # BLD AUTO: 0.02 K/UL
NRBC BLD-RTO: 0.1 /100 WBC (ref 0–0.2)
PHOSPHATE SERPL-MCNC: 2.1 MG/DL (ref 2.5–4.5)
PLATELET # BLD AUTO: 342 K/UL (ref 164–446)
PMV BLD AUTO: 9.6 FL (ref 9–12.9)
POTASSIUM SERPL-SCNC: 4 MMOL/L (ref 3.6–5.5)
PRODUCT TYPE UPROD: NORMAL
PROT SERPL-MCNC: 4.8 G/DL (ref 6–8.2)
RBC # BLD AUTO: 2.86 M/UL (ref 4.2–5.4)
RH BLD: NORMAL
SIGNIFICANT IND 70042: NORMAL
SITE SITE: NORMAL
SODIUM SERPL-SCNC: 135 MMOL/L (ref 135–145)
SOURCE SOURCE: NORMAL
UNIT STATUS USTAT: NORMAL
WBC # BLD AUTO: 18 K/UL (ref 4.8–10.8)

## 2023-08-25 PROCEDURE — 84100 ASSAY OF PHOSPHORUS: CPT

## 2023-08-25 PROCEDURE — 700111 HCHG RX REV CODE 636 W/ 250 OVERRIDE (IP): Mod: JZ

## 2023-08-25 PROCEDURE — 87205 SMEAR GRAM STAIN: CPT

## 2023-08-25 PROCEDURE — 80053 COMPREHEN METABOLIC PANEL: CPT

## 2023-08-25 PROCEDURE — 700111 HCHG RX REV CODE 636 W/ 250 OVERRIDE (IP): Performed by: INTERNAL MEDICINE

## 2023-08-25 PROCEDURE — 0F9430Z DRAINAGE OF GALLBLADDER WITH DRAINAGE DEVICE, PERCUTANEOUS APPROACH: ICD-10-PCS | Performed by: RADIOLOGY

## 2023-08-25 PROCEDURE — 86900 BLOOD TYPING SEROLOGIC ABO: CPT

## 2023-08-25 PROCEDURE — 700105 HCHG RX REV CODE 258

## 2023-08-25 PROCEDURE — 93010 ELECTROCARDIOGRAM REPORT: CPT | Performed by: INTERNAL MEDICINE

## 2023-08-25 PROCEDURE — C9113 INJ PANTOPRAZOLE SODIUM, VIA: HCPCS | Performed by: INTERNAL MEDICINE

## 2023-08-25 PROCEDURE — 99291 CRITICAL CARE FIRST HOUR: CPT | Performed by: INTERNAL MEDICINE

## 2023-08-25 PROCEDURE — 85014 HEMATOCRIT: CPT

## 2023-08-25 PROCEDURE — A9270 NON-COVERED ITEM OR SERVICE: HCPCS | Performed by: RADIOLOGY

## 2023-08-25 PROCEDURE — 700105 HCHG RX REV CODE 258: Performed by: INTERNAL MEDICINE

## 2023-08-25 PROCEDURE — 85018 HEMOGLOBIN: CPT

## 2023-08-25 PROCEDURE — 86901 BLOOD TYPING SEROLOGIC RH(D): CPT

## 2023-08-25 PROCEDURE — 700101 HCHG RX REV CODE 250: Performed by: INTERNAL MEDICINE

## 2023-08-25 PROCEDURE — 93005 ELECTROCARDIOGRAM TRACING: CPT | Performed by: INTERNAL MEDICINE

## 2023-08-25 PROCEDURE — 700101 HCHG RX REV CODE 250

## 2023-08-25 PROCEDURE — 86850 RBC ANTIBODY SCREEN: CPT

## 2023-08-25 PROCEDURE — 99232 SBSQ HOSP IP/OBS MODERATE 35: CPT | Performed by: SURGERY

## 2023-08-25 PROCEDURE — 87070 CULTURE OTHR SPECIMN AEROBIC: CPT

## 2023-08-25 PROCEDURE — 770022 HCHG ROOM/CARE - ICU (200)

## 2023-08-25 PROCEDURE — 700102 HCHG RX REV CODE 250 W/ 637 OVERRIDE(OP): Performed by: RADIOLOGY

## 2023-08-25 PROCEDURE — 83735 ASSAY OF MAGNESIUM: CPT

## 2023-08-25 PROCEDURE — 4410213 CT-DRAIN-CHOLESCYSTOMY

## 2023-08-25 PROCEDURE — 700111 HCHG RX REV CODE 636 W/ 250 OVERRIDE (IP): Mod: JZ | Performed by: INTERNAL MEDICINE

## 2023-08-25 PROCEDURE — 85025 COMPLETE CBC W/AUTO DIFF WBC: CPT

## 2023-08-25 RX ORDER — METOPROLOL TARTRATE 1 MG/ML
5 INJECTION, SOLUTION INTRAVENOUS ONCE
Status: COMPLETED | OUTPATIENT
Start: 2023-08-25 | End: 2023-08-25

## 2023-08-25 RX ORDER — ONDANSETRON 2 MG/ML
4 INJECTION INTRAMUSCULAR; INTRAVENOUS PRN
Status: ACTIVE | OUTPATIENT
Start: 2023-08-25 | End: 2023-08-25

## 2023-08-25 RX ORDER — DILTIAZEM HYDROCHLORIDE 5 MG/ML
10 INJECTION INTRAVENOUS ONCE
Status: COMPLETED | OUTPATIENT
Start: 2023-08-25 | End: 2023-08-25

## 2023-08-25 RX ORDER — MIDAZOLAM HYDROCHLORIDE 1 MG/ML
INJECTION INTRAMUSCULAR; INTRAVENOUS
Status: COMPLETED
Start: 2023-08-25 | End: 2023-08-25

## 2023-08-25 RX ORDER — METOPROLOL TARTRATE 1 MG/ML
5 INJECTION, SOLUTION INTRAVENOUS
Status: COMPLETED | OUTPATIENT
Start: 2023-08-25 | End: 2023-08-25

## 2023-08-25 RX ORDER — OXYCODONE HYDROCHLORIDE 10 MG/1
10 TABLET ORAL
Status: DISPENSED | OUTPATIENT
Start: 2023-08-25 | End: 2023-08-26

## 2023-08-25 RX ORDER — MAGNESIUM SULFATE HEPTAHYDRATE 40 MG/ML
2 INJECTION, SOLUTION INTRAVENOUS ONCE
Status: COMPLETED | OUTPATIENT
Start: 2023-08-25 | End: 2023-08-25

## 2023-08-25 RX ORDER — MIDAZOLAM HYDROCHLORIDE 1 MG/ML
.5-2 INJECTION INTRAMUSCULAR; INTRAVENOUS PRN
Status: ACTIVE | OUTPATIENT
Start: 2023-08-25 | End: 2023-08-25

## 2023-08-25 RX ORDER — SODIUM CHLORIDE 9 MG/ML
500 INJECTION, SOLUTION INTRAVENOUS
Status: ACTIVE | OUTPATIENT
Start: 2023-08-25 | End: 2023-08-25

## 2023-08-25 RX ORDER — METOPROLOL TARTRATE 1 MG/ML
INJECTION, SOLUTION INTRAVENOUS
Status: COMPLETED
Start: 2023-08-25 | End: 2023-08-25

## 2023-08-25 RX ADMIN — MAGNESIUM SULFATE HEPTAHYDRATE 2 G: 2 INJECTION, SOLUTION INTRAVENOUS at 10:49

## 2023-08-25 RX ADMIN — CEFTRIAXONE SODIUM 2000 MG: 10 INJECTION, POWDER, FOR SOLUTION INTRAVENOUS at 04:41

## 2023-08-25 RX ADMIN — MIDAZOLAM HYDROCHLORIDE 1 MG: 1 INJECTION INTRAMUSCULAR; INTRAVENOUS at 14:07

## 2023-08-25 RX ADMIN — PANTOPRAZOLE SODIUM 40 MG: 40 INJECTION, POWDER, FOR SOLUTION INTRAVENOUS at 17:35

## 2023-08-25 RX ADMIN — METOPROLOL TARTRATE 5 MG: 1 INJECTION, SOLUTION INTRAVENOUS at 06:34

## 2023-08-25 RX ADMIN — PANTOPRAZOLE SODIUM 40 MG: 40 INJECTION, POWDER, FOR SOLUTION INTRAVENOUS at 04:39

## 2023-08-25 RX ADMIN — DILTIAZEM HYDROCHLORIDE 10 MG: 5 INJECTION INTRAVENOUS at 12:00

## 2023-08-25 RX ADMIN — SODIUM PHOSPHATE, MONOBASIC, MONOHYDRATE AND SODIUM PHOSPHATE, DIBASIC, ANHYDROUS 15 MMOL: 142; 276 INJECTION, SOLUTION INTRAVENOUS at 10:52

## 2023-08-25 RX ADMIN — SODIUM CHLORIDE, POTASSIUM CHLORIDE, SODIUM LACTATE AND CALCIUM CHLORIDE: 600; 310; 30; 20 INJECTION, SOLUTION INTRAVENOUS at 01:15

## 2023-08-25 RX ADMIN — METOPROLOL TARTRATE 5 MG: 5 INJECTION INTRAVENOUS at 06:34

## 2023-08-25 RX ADMIN — FENTANYL CITRATE 50 MCG: 50 INJECTION, SOLUTION INTRAMUSCULAR; INTRAVENOUS at 09:43

## 2023-08-25 RX ADMIN — METOPROLOL TARTRATE 5 MG: 5 INJECTION INTRAVENOUS at 07:36

## 2023-08-25 RX ADMIN — METRONIDAZOLE 500 MG: 500 INJECTION, SOLUTION INTRAVENOUS at 04:39

## 2023-08-25 RX ADMIN — METOPROLOL TARTRATE 5 MG: 5 INJECTION INTRAVENOUS at 10:47

## 2023-08-25 RX ADMIN — METRONIDAZOLE 500 MG: 500 INJECTION, SOLUTION INTRAVENOUS at 17:31

## 2023-08-25 RX ADMIN — MIDAZOLAM 1 MG: 1 INJECTION, SOLUTION INTRAMUSCULAR; INTRAVENOUS at 14:07

## 2023-08-25 RX ADMIN — FENTANYL CITRATE 50 MCG: 0.05 INJECTION, SOLUTION INTRAMUSCULAR; INTRAVENOUS at 14:07

## 2023-08-25 RX ADMIN — OXYCODONE HYDROCHLORIDE 10 MG: 10 TABLET ORAL at 14:48

## 2023-08-25 RX ADMIN — DILTIAZEM HYDROCHLORIDE 5 MG/HR: 5 INJECTION, SOLUTION INTRAVENOUS at 12:43

## 2023-08-25 ASSESSMENT — ENCOUNTER SYMPTOMS
BACK PAIN: 0
VOMITING: 0
NERVOUS/ANXIOUS: 0
ABDOMINAL PAIN: 1
NAUSEA: 0
CLAUDICATION: 0
DOUBLE VISION: 0
SPUTUM PRODUCTION: 0
DIZZINESS: 0
FOCAL WEAKNESS: 0
DIARRHEA: 1
COUGH: 0
ORTHOPNEA: 0
DIARRHEA: 0
BLURRED VISION: 0
PALPITATIONS: 0
FEVER: 0
HEADACHES: 0
HEMOPTYSIS: 0
SHORTNESS OF BREATH: 0
WHEEZING: 0

## 2023-08-25 ASSESSMENT — PAIN DESCRIPTION - PAIN TYPE
TYPE: ACUTE PAIN
TYPE: SURGICAL PAIN
TYPE: SURGICAL PAIN
TYPE: ACUTE PAIN
TYPE: SURGICAL PAIN
TYPE: ACUTE PAIN

## 2023-08-25 ASSESSMENT — PATIENT HEALTH QUESTIONNAIRE - PHQ9
1. LITTLE INTEREST OR PLEASURE IN DOING THINGS: NOT AT ALL
SUM OF ALL RESPONSES TO PHQ9 QUESTIONS 1 AND 2: 0
2. FEELING DOWN, DEPRESSED, IRRITABLE, OR HOPELESS: NOT AT ALL

## 2023-08-25 NOTE — PROGRESS NOTES
..Pt presents to CT4. Pt was consented by MD at bedside, confirmed by this RN and consent at bedside. Pt transferred to CT table in Supine position. Patient underwent an insertion of a cholectystostomy drain by Dr. Mabry. Procedure site was marked by MD and verified using imaging guidance. Pt placed on monitor, prepped and draped in a sterile fashion. Vitals were taken every 5 minutes and remained stable during procedure (see doc flow sheet for results). CO2 waveform capnography was monitored and remained WNL throughout procedure. Report called to Jairo MARIE. Pt transported by stretcher with RN to T629.     Specimen: 20 ml of fluid from cholectystostomy drain insertion hand delivered to lab.    [employer]   Flexima APDL Locking Pigtail Drainage catheter system 8F x 25cm  REF: [id number]  LOT: [id number]  EXP: 05/01/2026

## 2023-08-25 NOTE — PROGRESS NOTES
Critical Care Progress Note    Date of admission  8/23/2023    Chief Complaint  72 y.o. female admitted 8/23/2023 with recent AVR and CABG x1 by Dr. Lawrence on 8/16 who was discharged home recently ,but came back after had diarrhea and had episode of syncope. Pt was volume resuscitated and also noted maroon stool. In addition, CT A/P and US RUQ concening of acute cholecystitis. Pt's last eliquis was 8/23 which is being held.     Hospital Course  8/23 admitted to ICU    Interval Problem Update  Reviewed last 24 hour events:  No acute changes this am.   No bloody stools. Hgb in 8.4-9-10s  No hypotension.   Alert, oriented.   Pt went into afib with RVR with HR in 140s  No hypotension   WBC 18-19  Spoke with Gen surgery regarding IR vs HIDA. We will proceed with IR today   On 2L NC sat >90%  Alert, oriented.   H/H in 8-9s this am.   Creatinine improving after fluid resuscitation.    Good UOP.       Review of Systems  Review of Systems   Constitutional:  Negative for fever and malaise/fatigue.   Respiratory:  Negative for shortness of breath.    Cardiovascular:  Negative for chest pain and leg swelling.   Gastrointestinal:  Positive for abdominal pain. Negative for diarrhea, nausea and vomiting.        Abd pain improving from yesterday, but    Musculoskeletal:  Negative for back pain.   Neurological:  Negative for headaches.   Psychiatric/Behavioral:  The patient is not nervous/anxious.    All other systems reviewed and are negative.       Vital Signs for last 24 hours   Temp:  [36 °C (96.8 °F)-36.4 °C (97.6 °F)] 36.1 °C (96.9 °F)  Pulse:  [] 112  Resp:  [16-35] 20  BP: (119-175)/(58-93) 145/86  SpO2:  [88 %-99 %] 94 %    Hemodynamic parameters for last 24 hours       Respiratory Information for the last 24 hours       Physical Exam   Physical Exam  Vitals and nursing note reviewed.   Constitutional:       General: She is not in acute distress.     Appearance: She is obese. She is ill-appearing and  toxic-appearing. She is not diaphoretic.   HENT:      Head: Normocephalic and atraumatic.      Mouth/Throat:      Mouth: Mucous membranes are dry.   Cardiovascular:      Rate and Rhythm: Normal rate and regular rhythm.      Pulses: Normal pulses.      Heart sounds: Normal heart sounds. No murmur heard.  Pulmonary:      Effort: No respiratory distress.      Breath sounds: Normal breath sounds. No wheezing, rhonchi or rales.      Comments: Diminished at bases  Abdominal:      General: There is no distension.      Palpations: Abdomen is soft.      Tenderness: There is abdominal tenderness. There is no guarding or rebound.   Musculoskeletal:      Right lower leg: No edema.      Left lower leg: No edema.   Skin:     Capillary Refill: Capillary refill takes less than 2 seconds.      Coloration: Skin is not jaundiced.      Findings: No bruising or rash.   Neurological:      General: No focal deficit present.      Mental Status: She is alert and oriented to person, place, and time.       Medications  Current Facility-Administered Medications   Medication Dose Route Frequency Provider Last Rate Last Admin    Metoprolol Tartrate (Lopressor) injection 5 mg  5 mg Intravenous Q5 MIN PRN Rajeev Sheikh D.O.        pantoprazole (Protonix) injection 40 mg  40 mg Intravenous BID Maykel Gale M.D.   40 mg at 08/25/23 0439    fentaNYL (Sublimaze) injection 50 mcg  50 mcg Intravenous Q2HRS PRN Farhad Kaufman M.D.   50 mcg at 08/24/23 2109    Pharmacy Consult Request  1 Each Other PHARMACY TO DOSE Brian Vega M.D.        lactated ringers infusion   Intravenous Continuous Maykel Gale M.D. 83 mL/hr at 08/25/23 0115 New Bag at 08/25/23 0115    cefTRIAXone (Rocephin) syringe 2,000 mg  2,000 mg Intravenous Q24HRS Maykel Gale M.D.   2,000 mg at 08/25/23 0441    metroNIDAZOLE (Flagyl) IVPB 500 mg  500 mg Intravenous Q12HRS Maykel Gale M.D.   Stopped at 08/25/23 0539       Fluids    Intake/Output Summary (Last 24 hours) at  8/25/2023 0733  Last data filed at 8/25/2023 0400  Gross per 24 hour   Intake 2026.08 ml   Output --   Net 2026.08 ml         Laboratory          Recent Labs     08/23/23 1942 08/24/23 1345 08/25/23  0335   SODIUM 139 138 135   POTASSIUM 3.2* 4.1 4.0   CHLORIDE 103 104 104   CO2 16* 22 22   BUN 21 22 23*   CREATININE 1.30 0.97 0.87   MAGNESIUM  --   --  1.8   PHOSPHORUS  --   --  2.1*   CALCIUM 7.8* 7.9* 7.8*       Recent Labs     08/23/23 1942 08/24/23 1345 08/24/23 1700 08/25/23  0335   ALTSGPT 97*  --  71* 59*   ASTSGOT 67*  --  49* 39   ALKPHOSPHAT 352*  --  256* 216*   TBILIRUBIN 0.8  --  0.4 0.6   DBILIRUBIN  --   --  <0.2  --    LIPASE 295*  --  48  --    GLUCOSE 191* 173*  --  145*       Recent Labs     08/23/23 1942 08/24/23 1345 08/24/23 1700 08/25/23  0335   WBC 12.9* 19.8*  --  18.0*   NEUTSPOLYS 68.10 92.10*  --  88.00*   LYMPHOCYTES 23.30 2.60*  --  5.20*   MONOCYTES 4.30 1.80  --  4.70   EOSINOPHILS 0.80 0.00  --  0.00   BASOPHILS 0.00 0.00  --  0.20   ASTSGOT 67*  --  49* 39   ALTSGPT 97*  --  71* 59*   ALKPHOSPHAT 352*  --  256* 216*   TBILIRUBIN 0.8  --  0.4 0.6       Recent Labs     08/23/23 1942 08/24/23  0029 08/24/23 0425 08/24/23 1345 08/24/23 1700 08/25/23  0335   RBC 3.47*  --   --  3.35*  --  2.86*   HEMOGLOBIN 10.3* 11.1*   < > 10.0* 9.7* 8.4*   HEMATOCRIT 31.9*  --   --  30.5*  --  25.5*   PLATELETCT 377  --   --  384  --  342   PROTHROMBTM  --  15.9*  --   --   --   --    APTT  --  28.8  --   --   --   --    INR  --  1.25*  --   --   --   --     < > = values in this interval not displayed.         Imaging  X-Ray:  I have personally reviewed the images and compared with prior images.    Assessment/Plan  * Shock (HCC)- (present on admission)  Assessment & Plan  Multifactorial ddx includes, Sepsis from cholecystitis, hypovolumia.   Pt had 1 episode of maroon stool while ED, however, she hasn't has any more bloody stools. Some yellow/brown stools.   Pt received >4L fluid boluses  and SBP maintains in 120-140s.   On ceftriaxone and flagyl.    Plan:   Trend H/H q6 with restrictive transfusion strategies. Transfuse to keep Hgb >7  Serial lactates end organ perfusion  Pt to have perchole tube by IR today  On LR at 83cc/hr for maintenance fluids.   Appreciate Gen Surg help  Hold GI consult, unless pt continues to have bloody stool       Cholecystitis  Assessment & Plan  S/p Hemodynamic resuscitation and on ceftriaxone and flagyl   IR for perchole tube is scheduled today   D/w Gen surg regarding IR devi tube or HIDA. We are going to proceed with IR perchole tube.   General surgery following. Appreciate help    GI bleed  Assessment & Plan  One episode of marroon stool at admission   On protonix 40 BID.   Suspect mesenteric ischemia or upper GI bleed if bleeding continues.   Serial H/H q6 hours  Pt has large bore PIV #18 and #20  TEG okay. No need for repletion   GI consult if more bleeding.     H/O aortic valve replacement with porcine valve  Assessment & Plan  S/ p AVR and CAB (SHEPARD to LAD) by Dr. Lawrence on 8/16  Intraoperative EF55%  CTS notified of readmission  Hold Lasix and Lisinopril given GAURI.       Atrial fibrillation (HCC)  Assessment & Plan  AFRVR attributed to hypovolemia and GI bleed  Hold Eliquis and ASA in setting of GI bleed  8/24 Pt went into afib with RVR again. S/p metoprolol 5 IV x3, will start cardizem gtt.   Replete K and Mg. Keep K >4 and Mg >2      Transaminitis  Assessment & Plan  Attributed to shock state  Trend AST/ALT and synthetic function.    Hypokalemia  Assessment & Plan  Replace as clinically indicated      GAURI (acute kidney injury) (HCC)  Assessment & Plan  Pre-renal  Monitor volume status, electrolytes, urine output.  Avoid nephrotoxic agents.  Repeat labs  Continue fluids           VTE:  Contraindicated  Ulcer: PPI  Lines: None    I have performed a physical exam and reviewed and updated ROS and Plan today (8/25/2023). In review of yesterday's note (8/24/2023),  there are no changes except as documented above.     Discussed patient condition and risk of morbidity and/or mortality with RN, RT, Pharmacy, Charge nurse / hot rounds, and Patient  The patient remains critically ill.  Critical care time = 60 minutes in directly providing and coordinating critical care and extensive data review.  No time overlap and excludes procedures.

## 2023-08-25 NOTE — ASSESSMENT & PLAN NOTE
Acute devi   SP CABG 8/16 8/25 IR cholecystostomy  IV abx  8/27 Feeling better but WBC up.  Will follow  Will plan on lap devi in 4-6 weeks due to recent CABG

## 2023-08-25 NOTE — PROGRESS NOTES
Monitors notified this RN patient converted into afib at 0545  0600 BP did not take, this RN to bedside, Pt in afib RVR rates 140-160s sustaining. /88. Dr. Kaufman notified, Dr. Sheikh to bedside. 5 mg metoprolol ordered.

## 2023-08-25 NOTE — CARE PLAN
The patient is Watcher - Medium risk of patient condition declining or worsening    Shift Goals  Clinical Goals: pain control, trend h&h  Patient Goals: sleep  Family Goals: HELEN    Progress made toward(s) clinical / shift goals:    Problem: Knowledge Deficit - Standard  Goal: Patient and family/care givers will demonstrate understanding of plan of care, disease process/condition, diagnostic tests and medications  Description: Target End Date:  1-3 days or as soon as patient condition allows    Document in Patient Education    1.  Patient and family/caregiver oriented to unit, equipment, visitation policy and means for communicating concern  2.  Complete/review Learning Assessment  3.  Assess knowledge level of disease process/condition, treatment plan, diagnostic tests and medications  4.  Explain disease process/condition, treatment plan, diagnostic tests and medications  Outcome: Progressing     Problem: Pain - Standard  Goal: Alleviation of pain or a reduction in pain to the patient’s comfort goal  Description: Target End Date:  Prior to discharge or change in level of care    Document on Vitals flowsheet    1.  Document pain using the appropriate pain scale per order or unit policy  2.  Educate and implement non-pharmacologic comfort measures (i.e. relaxation, distraction, massage, cold/heat therapy, etc.)  3.  Pain management medications as ordered  4.  Reassess pain after pain med administration per policy  5.  If opiods administered assess patient's response to pain medication is appropriate per POSS sedation scale  6.  Follow pain management plan developed in collaboration with patient and interdisciplinary team (including palliative care or pain specialists if applicable)  Outcome: Progressing       Patient is not progressing towards the following goals:

## 2023-08-25 NOTE — OR SURGEON
Immediate Post- Operative Note        Findings: Sepsis and RUQ Pain      Procedure(s): CT guided Percutaneous  Cholecystostomy Drain      Estimated Blood Loss: Less than 5 ml        Complications: None            8/25/2023     2:18 PM     Ki Mabry M.D.

## 2023-08-25 NOTE — PROGRESS NOTES
"[facility] Progress Note      Admit Date: 8/23/2023    Resident(s): Farhad Kaufman M.D.   Attending:  RHEA SHEARER/ Dr. Sheikh    Patient ID:    Name:  Margoth Hopkins   YOB: 1950  Age:  72 y.o.  female   MRN:  [id number]    Hospital Course (carried forward and updated):  \"Margoth Hopkins is a 72 y.o. female  admitted 8/23/2023 with recent AVR and CABG x1 by Dr. Lawrence on 8/16 who was discharged home recently ,but came back after she had diarrhea and had episode of syncope. Pt was volume resuscitated and also noted maroon stool. In addition, CT A/P and US RUQ concening of acute cholecystitis. \"    08/23 : admitted to ICU  08/24 : no acute events, hemodynamic monitoring, on mIVF, image findings concerning for cholecystitis on C3 + flagyl,  08/ 25 : plan IR drain/ cholecystotomy tube    Consultants:  Critical Care  General Surgery     Interval Events:  NAEO  Patient developed paroxysmal a fib, unresponsive to metoprolol pushes, started on dilt drip  Patient reports improving RUQ pain.   RUQ US concerning for acute cholecystitis.   plan IR drain/ cholecystotomy tube today    Afebrile  Ao x 4  RR: 19-20s  HR ST in afib, started on dilt drip  SBP: 130-160  GI: PO diet. NPO midnight for  Cholecystostomy tube in am  UOP: adequate   VTE px contraindicated d/t concern for GI bleed  On PPI  On C3+ Flagyl      Vitals Range last 24h:  Temp:  [36 °C (96.8 °F)-36.4 °C (97.6 °F)] 36.3 °C (97.3 °F)  Pulse:  [] 130  Resp:  [15-35] 23  BP: (119-175)/(63-93) 135/90  SpO2:  [88 %-99 %] 91 %      Intake/Output Summary (Last 24 hours) at 8/25/2023 0900  Last data filed at 8/25/2023 0400  Gross per 24 hour   Intake 1943.09 ml   Output --   Net 1943.09 ml          Review of Systems   Constitutional:  Negative for fever and malaise/fatigue.   Eyes:  Negative for blurred vision and double vision.   Respiratory:  Negative for cough, hemoptysis, sputum production, shortness of breath and wheezing.    Cardiovascular:  " Negative for chest pain, palpitations, orthopnea and claudication.   Gastrointestinal:  Positive for abdominal pain and diarrhea.   Genitourinary:  Negative for dysuria, hematuria and urgency.   Neurological:  Negative for dizziness and focal weakness.       PHYSICAL EXAM:  Vitals:    08/25/23 0730 08/25/23 0736 08/25/23 0745 08/25/23 0800   BP:  136/82  (!) 135/90   Pulse: (!) 139 (!) 136 (!) 121 (!) 130   Resp: (!) 25 19 19 (!) 23   Temp:    36.3 °C (97.3 °F)   TempSrc:    Temporal   SpO2: 93% 94% 93% 91%   Weight:       Height:        Body mass index is 27.52 kg/m².    O2 therapy: Pulse Oximetry: 91 %, O2 (LPM): 0, O2 Delivery Device: None - Room Air           Physical Exam  Constitutional:       General: She is not in acute distress.     Appearance: She is not ill-appearing.   HENT:      Head: Normocephalic.      Nose: Nose normal.      Mouth/Throat:      Mouth: Mucous membranes are moist.   Eyes:      Extraocular Movements: Extraocular movements intact.      Conjunctiva/sclera: Conjunctivae normal.      Pupils: Pupils are equal, round, and reactive to light.   Cardiovascular:      Rate and Rhythm: Normal rate and regular rhythm.      Pulses: Normal pulses.      Heart sounds: Normal heart sounds. No murmur heard.     No gallop.   Pulmonary:      Effort: Pulmonary effort is normal. No respiratory distress.      Breath sounds: Normal breath sounds. No stridor. No wheezing, rhonchi or rales.   Abdominal:      General: Bowel sounds are normal. There is no distension.      Palpations: Abdomen is soft. There is no mass.      Tenderness: There is abdominal tenderness (RUQ tenderness). There is no guarding.   Musculoskeletal:         General: Normal range of motion.      Cervical back: Normal range of motion. No rigidity.   Skin:     General: Skin is warm.   Neurological:      General: No focal deficit present.      Mental Status: She is alert and oriented to person, place, and time.   Psychiatric:         Mood and  Affect: Mood normal.             Recent Labs     08/23/23 1942 08/24/23 1345 08/25/23  0335   SODIUM 139 138 135   POTASSIUM 3.2* 4.1 4.0   CHLORIDE 103 104 104   CO2 16* 22 22   BUN 21 22 23*   CREATININE 1.30 0.97 0.87   MAGNESIUM  --   --  1.8   PHOSPHORUS  --   --  2.1*   CALCIUM 7.8* 7.9* 7.8*       Recent Labs     08/23/23 1942 08/24/23 1345 08/24/23 1700 08/25/23 0335   ALTSGPT 97*  --  71* 59*   ASTSGOT 67*  --  49* 39   ALKPHOSPHAT 352*  --  256* 216*   TBILIRUBIN 0.8  --  0.4 0.6   DBILIRUBIN  --   --  <0.2  --    LIPASE 295*  --  48  --    GLUCOSE 191* 173*  --  145*       Recent Labs     08/23/23 1942 08/24/23 0029 08/24/23 0425 08/24/23 1345 08/24/23 1700 08/25/23 0335   RBC 3.47*  --   --  3.35*  --  2.86*   HEMOGLOBIN 10.3* 11.1*   < > 10.0* 9.7* 8.4*   HEMATOCRIT 31.9*  --   --  30.5*  --  25.5*   PLATELETCT 377  --   --  384  --  342   PROTHROMBTM  --  15.9*  --   --   --   --    APTT  --  28.8  --   --   --   --    INR  --  1.25*  --   --   --   --     < > = values in this interval not displayed.       Recent Labs     08/23/23 1942 08/24/23 1345 08/24/23 1700 08/25/23  0335   WBC 12.9* 19.8*  --  18.0*   NEUTSPOLYS 68.10 92.10*  --  88.00*   LYMPHOCYTES 23.30 2.60*  --  5.20*   MONOCYTES 4.30 1.80  --  4.70   EOSINOPHILS 0.80 0.00  --  0.00   BASOPHILS 0.00 0.00  --  0.20   ASTSGOT 67*  --  49* 39   ALTSGPT 97*  --  71* 59*   ALKPHOSPHAT 352*  --  256* 216*   TBILIRUBIN 0.8  --  0.4 0.6         Meds:   pantoprazole  40 mg      fentaNYL  50 mcg      Pharmacy  1 Each      LR   83 mL/hr at 08/25/23 0115    cefTRIAXone (ROCEPHIN) IV  2,000 mg      metroNIDAZOLE (FLAGYL) IV  500 mg Stopped (08/25/23 0539)        Procedures:  none    Imaging:  US-RUQ   Final Result         1.  Cholelithiasis and gallbladder sludge with gallbladder wall thickening, sonographically compatible with cholecystitis.   2.  Common bile duct dilatation, consider causes of biliary obstruction with additional workup  as clinically appropriate.   3.  Right pleural effusion      DX-CHEST-PORTABLE (1 VIEW)   Final Result         1.  Hazy left pulmonary opacities suggests subtle infiltrate, overall decreased since prior study.   2.  Trace left pleural effusion, decreased since prior study.   3.  Cardiomegaly      CT-CHEST,ABDOMEN,PELVIS WITH   Final Result      1.  Postoperative changes status post recent open heart surgery. Patient is status post CABG and there is an aortic valve replacement.   2.  Small pericardial and bilateral pleural effusions.   3.  Cholelithiasis with distended gallbladder and wall thickening raises concern for acute cholecystitis.   4.  Uroepithelial thickening in the left renal pelvis is nonspecific but could be seen with infection. Correlate with urinalysis.      IR-CONSULT AND TREAT    (Results Pending)       ASSESSEMENT and PLAN:    * Shock (HCC)- (present on admission)  Assessment & Plan  Multifactorial ddx includes, Sepsis from cholecystitis, hypovolumia.   Pt had 1 episode of maroon stool while ED, however, she hasn't has any more bloody stools. Some yellow/brown stools.   Pt received >4L fluid boluses and SBP maintains in 120-140s.   On ceftriaxone and flagyl. Follow up cultures  Trend H/H q4 with restrictive transfusion strategies. Transfuse to keep Hgb >7  Trend lactates as marker of end organ perfusion  Hold GI consult, unless pt continues to have bloody stool   Pt to have perchole tube by IR tomorrow.   On LR at 83cc/hr for maintenance fluids.   Appreciate Gen Surg help        H/O aortic valve replacement with porcine valve  Assessment & Plan  S/ p AVR and CAB (SHEPARD to LAD) by Dr. Lawrence on 8/16  Intraoperative EF55%  CTS notified of readmission  Hold Lasix and Lisinopril given GAURI.       Atrial fibrillation (HCC)  Assessment & Plan  AFRVR attributed to hypovolemia and GI bleed  Hold Eliquis and ASA in setting of GI bleed  Volume resuscitation and/or blood transfusion as clinically  indicated.      Transaminitis  Assessment & Plan  Attributed to shock state  Trend AST/ALT and synthetic function.    Hypokalemia  Assessment & Plan  Replace as clinically indicated      GAURI (acute kidney injury) (HCC)  Assessment & Plan  Pre-renal  Monitor volume status, electrolytes, urine output.  Avoid nephrotoxic agents.  Repeat labs  Continue fluids      Cholecystitis  Assessment & Plan  S/p Hemodynamic resuscitation and on ceftriaxone and flagyl   IR for perchole tube is scheduled   General surgery consulting    GI bleed  Assessment & Plan  One episode of marroon stool at admission   On protonix 40 BID.   Suspect mesenteric ischemia or upper GI bleed if bleeding continues.   Serial H/H q4 hours  Pt has large bore PIV #18 and #20  TEG okay. No need for repletion   GI consult if more bleeding.     Atrial Fibrillation  - S Tachy with HR in 110s-140s  - asymptomatic  - unresponsive to metoprolol pushes, started on dilt drip goal HR <100  - continue to monitor      CODE STATUS: Full Code        Farhad Kaufman M.D.   Internal Medicine Resident

## 2023-08-25 NOTE — PROGRESS NOTES
"  DATE: 8/25/2023    Hospital Day 2  acute devi SP CABG .    INTERVAL EVENTS:  Feeling little better. LFTs down some.     REVIEW OF SYSTEMS:  Comprehensive review of systems is negative with the exception of the aforementioned HPI, PMH, and PSH bullets in accordance with CMS guidelines.    PHYSICAL EXAMINATION:  Vital Signs: BP (!) 135/90   Pulse (!) 130   Temp 36.3 °C (97.3 °F) (Temporal)   Resp (!) 23   Ht 1.702 m (5' 7\")   Wt 79.7 kg (175 lb 11.3 oz)   SpO2 91%   Physical Exam  Cardiovascular:      Rate and Rhythm: Normal rate.   Pulmonary:      Effort: Pulmonary effort is normal.   Abdominal:      Palpations: Abdomen is soft.      Tenderness: There is abdominal tenderness.      Comments: RUQ tender   Musculoskeletal:         General: Normal range of motion.   Skin:     General: Skin is warm.   Neurological:      Mental Status: She is alert.         LABORATORY VALUES:  Recent Labs     08/23/23 1942 08/24/23 0029 08/24/23 1345 08/24/23 1700 08/25/23  0335   WBC 12.9*  --  19.8*  --  18.0*   RBC 3.47*  --  3.35*  --  2.86*   HEMOGLOBIN 10.3*   < > 10.0* 9.7* 8.4*   HEMATOCRIT 31.9*  --  30.5*  --  25.5*   MCV 91.9  --  91.0  --  89.2   MCH 29.7  --  29.9  --  29.4   MCHC 32.3  --  32.8  --  32.9   RDW 48.7  --  48.9  --  48.7   PLATELETCT 377  --  384  --  342   MPV 9.9  --  9.7  --  9.6    < > = values in this interval not displayed.     Recent Labs     08/23/23 1942 08/24/23 1345 08/25/23  0335   SODIUM 139 138 135   POTASSIUM 3.2* 4.1 4.0   CHLORIDE 103 104 104   CO2 16* 22 22   GLUCOSE 191* 173* 145*   BUN 21 22 23*   CREATININE 1.30 0.97 0.87   CALCIUM 7.8* 7.9* 7.8*     Recent Labs     08/23/23 1942 08/24/23 0029 08/24/23 1700 08/25/23  0335   ASTSGOT 67*  --  49* 39   ALTSGPT 97*  --  71* 59*   TBILIRUBIN 0.8  --  0.4 0.6   IBILIRUBIN  --   --  see below  --    DBILIRUBIN  --   --  <0.2  --    ALKPHOSPHAT 352*  --  256* 216*   GLOBULIN 2.4  --   --  2.4   INR  --  1.25*  --   --      Recent " Labs     08/24/23  0029   APTT 28.8   INR 1.25*       IMAGING:  US-RUQ   Final Result         1.  Cholelithiasis and gallbladder sludge with gallbladder wall thickening, sonographically compatible with cholecystitis.   2.  Common bile duct dilatation, consider causes of biliary obstruction with additional workup as clinically appropriate.   3.  Right pleural effusion      DX-CHEST-PORTABLE (1 VIEW)   Final Result         1.  Hazy left pulmonary opacities suggests subtle infiltrate, overall decreased since prior study.   2.  Trace left pleural effusion, decreased since prior study.   3.  Cardiomegaly      CT-CHEST,ABDOMEN,PELVIS WITH   Final Result      1.  Postoperative changes status post recent open heart surgery. Patient is status post CABG and there is an aortic valve replacement.   2.  Small pericardial and bilateral pleural effusions.   3.  Cholelithiasis with distended gallbladder and wall thickening raises concern for acute cholecystitis.   4.  Uroepithelial thickening in the left renal pelvis is nonspecific but could be seen with infection. Correlate with urinalysis.      IR-CONSULT AND TREAT    (Results Pending)       ASSESSMENT AND PLAN:    Cholecystitis  Assessment & Plan  Acute devi   SP CABG  Plan IR drain  IV abx             ____________________________________     Luisa Kidd M.D.    DD: 8/25/2023  9:34 AM

## 2023-08-26 LAB
ALBUMIN SERPL BCP-MCNC: 2.6 G/DL (ref 3.2–4.9)
ALBUMIN/GLOB SERPL: 1.1 G/DL
ALP SERPL-CCNC: 172 U/L (ref 30–99)
ALT SERPL-CCNC: 46 U/L (ref 2–50)
ANION GAP SERPL CALC-SCNC: 11 MMOL/L (ref 7–16)
AST SERPL-CCNC: 32 U/L (ref 12–45)
BACTERIA UR CULT: NORMAL
BASOPHILS # BLD AUTO: 0.1 % (ref 0–1.8)
BASOPHILS # BLD: 0.02 K/UL (ref 0–0.12)
BILIRUB SERPL-MCNC: 0.5 MG/DL (ref 0.1–1.5)
BUN SERPL-MCNC: 17 MG/DL (ref 8–22)
CALCIUM ALBUM COR SERPL-MCNC: 9.3 MG/DL (ref 8.5–10.5)
CALCIUM SERPL-MCNC: 8.2 MG/DL (ref 8.5–10.5)
CHLORIDE SERPL-SCNC: 104 MMOL/L (ref 96–112)
CO2 SERPL-SCNC: 22 MMOL/L (ref 20–33)
CREAT SERPL-MCNC: 0.67 MG/DL (ref 0.5–1.4)
EOSINOPHIL # BLD AUTO: 0.01 K/UL (ref 0–0.51)
EOSINOPHIL NFR BLD: 0.1 % (ref 0–6.9)
ERYTHROCYTE [DISTWIDTH] IN BLOOD BY AUTOMATED COUNT: 49.7 FL (ref 35.9–50)
GFR SERPLBLD CREATININE-BSD FMLA CKD-EPI: 91 ML/MIN/1.73 M 2
GLOBULIN SER CALC-MCNC: 2.4 G/DL (ref 1.9–3.5)
GLUCOSE SERPL-MCNC: 109 MG/DL (ref 65–99)
HCT VFR BLD AUTO: 24.4 % (ref 37–47)
HCT VFR BLD AUTO: 26.1 % (ref 37–47)
HCT VFR BLD AUTO: 30 % (ref 37–47)
HGB BLD-MCNC: 10.1 G/DL (ref 12–16)
HGB BLD-MCNC: 7.9 G/DL (ref 12–16)
HGB BLD-MCNC: 8.6 G/DL (ref 12–16)
IMM GRANULOCYTES # BLD AUTO: 0.27 K/UL (ref 0–0.11)
IMM GRANULOCYTES NFR BLD AUTO: 1.6 % (ref 0–0.9)
LYMPHOCYTES # BLD AUTO: 1.34 K/UL (ref 1–4.8)
LYMPHOCYTES NFR BLD: 7.9 % (ref 22–41)
MCH RBC QN AUTO: 29 PG (ref 27–33)
MCHC RBC AUTO-ENTMCNC: 32.4 G/DL (ref 32.2–35.5)
MCV RBC AUTO: 89.7 FL (ref 81.4–97.8)
MONOCYTES # BLD AUTO: 0.94 K/UL (ref 0–0.85)
MONOCYTES NFR BLD AUTO: 5.5 % (ref 0–13.4)
NEUTROPHILS # BLD AUTO: 14.47 K/UL (ref 1.82–7.42)
NEUTROPHILS NFR BLD: 84.8 % (ref 44–72)
NRBC # BLD AUTO: 0.02 K/UL
NRBC BLD-RTO: 0.1 /100 WBC (ref 0–0.2)
PLATELET # BLD AUTO: 343 K/UL (ref 164–446)
PMV BLD AUTO: 9.3 FL (ref 9–12.9)
POTASSIUM SERPL-SCNC: 3.5 MMOL/L (ref 3.6–5.5)
PROT SERPL-MCNC: 5 G/DL (ref 6–8.2)
RBC # BLD AUTO: 2.72 M/UL (ref 4.2–5.4)
SIGNIFICANT IND 70042: NORMAL
SITE SITE: NORMAL
SODIUM SERPL-SCNC: 137 MMOL/L (ref 135–145)
SOURCE SOURCE: NORMAL
WBC # BLD AUTO: 17.1 K/UL (ref 4.8–10.8)

## 2023-08-26 PROCEDURE — 99024 POSTOP FOLLOW-UP VISIT: CPT | Performed by: NURSE PRACTITIONER

## 2023-08-26 PROCEDURE — 700102 HCHG RX REV CODE 250 W/ 637 OVERRIDE(OP): Performed by: RADIOLOGY

## 2023-08-26 PROCEDURE — 99223 1ST HOSP IP/OBS HIGH 75: CPT | Performed by: HOSPITALIST

## 2023-08-26 PROCEDURE — 700102 HCHG RX REV CODE 250 W/ 637 OVERRIDE(OP): Performed by: NURSE PRACTITIONER

## 2023-08-26 PROCEDURE — 700102 HCHG RX REV CODE 250 W/ 637 OVERRIDE(OP)

## 2023-08-26 PROCEDURE — 85018 HEMOGLOBIN: CPT

## 2023-08-26 PROCEDURE — 99232 SBSQ HOSP IP/OBS MODERATE 35: CPT | Performed by: SURGERY

## 2023-08-26 PROCEDURE — A9270 NON-COVERED ITEM OR SERVICE: HCPCS | Performed by: HOSPITALIST

## 2023-08-26 PROCEDURE — 86923 COMPATIBILITY TEST ELECTRIC: CPT

## 2023-08-26 PROCEDURE — 770020 HCHG ROOM/CARE - TELE (206)

## 2023-08-26 PROCEDURE — 80053 COMPREHEN METABOLIC PANEL: CPT

## 2023-08-26 PROCEDURE — 85014 HEMATOCRIT: CPT

## 2023-08-26 PROCEDURE — A9270 NON-COVERED ITEM OR SERVICE: HCPCS

## 2023-08-26 PROCEDURE — C9113 INJ PANTOPRAZOLE SODIUM, VIA: HCPCS | Performed by: INTERNAL MEDICINE

## 2023-08-26 PROCEDURE — 99233 SBSQ HOSP IP/OBS HIGH 50: CPT | Performed by: INTERNAL MEDICINE

## 2023-08-26 PROCEDURE — A9270 NON-COVERED ITEM OR SERVICE: HCPCS | Performed by: RADIOLOGY

## 2023-08-26 PROCEDURE — 30233N1 TRANSFUSION OF NONAUTOLOGOUS RED BLOOD CELLS INTO PERIPHERAL VEIN, PERCUTANEOUS APPROACH: ICD-10-PCS | Performed by: INTERNAL MEDICINE

## 2023-08-26 PROCEDURE — 36415 COLL VENOUS BLD VENIPUNCTURE: CPT

## 2023-08-26 PROCEDURE — 700102 HCHG RX REV CODE 250 W/ 637 OVERRIDE(OP): Performed by: HOSPITALIST

## 2023-08-26 PROCEDURE — A9270 NON-COVERED ITEM OR SERVICE: HCPCS | Performed by: NURSE PRACTITIONER

## 2023-08-26 PROCEDURE — 85025 COMPLETE CBC W/AUTO DIFF WBC: CPT

## 2023-08-26 PROCEDURE — P9016 RBC LEUKOCYTES REDUCED: HCPCS

## 2023-08-26 PROCEDURE — 36430 TRANSFUSION BLD/BLD COMPNT: CPT

## 2023-08-26 PROCEDURE — 700111 HCHG RX REV CODE 636 W/ 250 OVERRIDE (IP): Performed by: INTERNAL MEDICINE

## 2023-08-26 RX ORDER — CARBOXYMETHYLCELLULOSE SODIUM 5 MG/ML
1 SOLUTION/ DROPS OPHTHALMIC PRN
Status: DISCONTINUED | OUTPATIENT
Start: 2023-08-26 | End: 2023-08-26

## 2023-08-26 RX ORDER — LISINOPRIL 5 MG/1
5 TABLET ORAL DAILY
Status: DISCONTINUED | OUTPATIENT
Start: 2023-08-26 | End: 2023-08-26

## 2023-08-26 RX ORDER — HYDRALAZINE HYDROCHLORIDE 20 MG/ML
20 INJECTION INTRAMUSCULAR; INTRAVENOUS EVERY 6 HOURS PRN
Status: DISCONTINUED | OUTPATIENT
Start: 2023-08-26 | End: 2023-08-29

## 2023-08-26 RX ORDER — CARBOXYMETHYLCELLULOSE SODIUM 5 MG/ML
1 SOLUTION/ DROPS OPHTHALMIC PRN
Status: DISCONTINUED | OUTPATIENT
Start: 2023-08-26 | End: 2023-08-30 | Stop reason: HOSPADM

## 2023-08-26 RX ORDER — OXYCODONE HCL 10 MG/1
10 TABLET, FILM COATED, EXTENDED RELEASE ORAL EVERY 12 HOURS
Status: DISCONTINUED | OUTPATIENT
Start: 2023-08-26 | End: 2023-08-30 | Stop reason: HOSPADM

## 2023-08-26 RX ORDER — LISINOPRIL 20 MG/1
20 TABLET ORAL TWICE DAILY
Status: DISCONTINUED | OUTPATIENT
Start: 2023-08-26 | End: 2023-08-28

## 2023-08-26 RX ADMIN — CARBOXYMETHYLCELLULOSE SODIUM 1 DROP: 5 SOLUTION/ DROPS OPHTHALMIC at 11:13

## 2023-08-26 RX ADMIN — METOPROLOL TARTRATE 25 MG: 25 TABLET, FILM COATED ORAL at 09:37

## 2023-08-26 RX ADMIN — LISINOPRIL 20 MG: 20 TABLET ORAL at 18:39

## 2023-08-26 RX ADMIN — METOPROLOL TARTRATE 25 MG: 25 TABLET, FILM COATED ORAL at 18:39

## 2023-08-26 RX ADMIN — CEFTRIAXONE SODIUM 2000 MG: 10 INJECTION, POWDER, FOR SOLUTION INTRAVENOUS at 06:20

## 2023-08-26 RX ADMIN — PANTOPRAZOLE SODIUM 40 MG: 40 INJECTION, POWDER, FOR SOLUTION INTRAVENOUS at 18:39

## 2023-08-26 RX ADMIN — PANTOPRAZOLE SODIUM 40 MG: 40 INJECTION, POWDER, FOR SOLUTION INTRAVENOUS at 05:19

## 2023-08-26 RX ADMIN — OXYCODONE HYDROCHLORIDE 10 MG: 10 TABLET, FILM COATED, EXTENDED RELEASE ORAL at 22:03

## 2023-08-26 RX ADMIN — LISINOPRIL 5 MG: 5 TABLET ORAL at 09:38

## 2023-08-26 RX ADMIN — METRONIDAZOLE 500 MG: 500 INJECTION, SOLUTION INTRAVENOUS at 18:38

## 2023-08-26 RX ADMIN — OXYCODONE HYDROCHLORIDE 10 MG: 10 TABLET ORAL at 12:59

## 2023-08-26 RX ADMIN — METRONIDAZOLE 500 MG: 500 INJECTION, SOLUTION INTRAVENOUS at 05:19

## 2023-08-26 ASSESSMENT — ENCOUNTER SYMPTOMS
DIZZINESS: 0
NAUSEA: 0
CLAUDICATION: 0
SPEECH CHANGE: 0
DIARRHEA: 0
HEADACHES: 0
SPUTUM PRODUCTION: 0
SHORTNESS OF BREATH: 0
BACK PAIN: 0
VOMITING: 0
COUGH: 0
FOCAL WEAKNESS: 0
PALPITATIONS: 0
DOUBLE VISION: 0
NERVOUS/ANXIOUS: 0
BLURRED VISION: 0
ORTHOPNEA: 0
WHEEZING: 0
HEMOPTYSIS: 0
ABDOMINAL PAIN: 1
FEVER: 0
CHILLS: 1
STRIDOR: 0

## 2023-08-26 ASSESSMENT — PAIN DESCRIPTION - PAIN TYPE
TYPE: ACUTE PAIN
TYPE: ACUTE PAIN;CHRONIC PAIN
TYPE: ACUTE PAIN

## 2023-08-26 ASSESSMENT — FIBROSIS 4 INDEX
FIB4 SCORE: 1.02
FIB4 SCORE: 1.02

## 2023-08-26 NOTE — PROGRESS NOTES
Received report from CYNTHIA Rodriguez. Assumed care of patient; lines and gtts verified. Plan of care discussed with patient.    Dr. Kaufman and Dr. Sheikh at bedside to assess patient. Alberta drain assessed by MDs. Discussed patient's hypertension (SBP up into the 160s and 170s) and that patient's home medications have not been started yet. MDs will decide what medications patient should be on and will order her home antihypertensives. Also discussed drop in hemoglobin but no bloody BMs noted by nursing; plan is to transfuse 1 unit of RBCs. This RN clarified LR order; per MDs, patient does not need to have LR infusing; order discontinued in Epic. Plan is also to transfer patient to the telemetry floor; patient updated.

## 2023-08-26 NOTE — PROGRESS NOTES
"[facility] Progress Note      Admit Date: 8/23/2023    Resident(s): Farhad Kaufman M.D.   Attending:  RHEA SHEARER/ Dr. Sheikh    Patient ID:    Name:  Margoth Hopkins   YOB: 1950  Age:  72 y.o.  female   MRN:  [id number]    Hospital Course (carried forward and updated):  \"Margoth Hopkins is a 72 y.o. female  admitted 8/23/2023 with recent AVR and CABG x1 by Dr. Lawrence on 8/16 who was discharged home recently ,but came back after she had diarrhea and had episode of syncope. Pt was volume resuscitated and also noted maroon stool. In addition, CT A/P and US RUQ concening of acute cholecystitis. \"    08/23 : admitted to ICU  08/24 : no acute events, hemodynamic monitoring, on mIVF, image findings concerning for cholecystitis on C3 + flagyl,  08/25 :cholecystotomy tube, converted to Afib w/ RVR requiring dilt drip, converted  back to SR in evening. Dilt drip turned off  08/26: cholecystostomy tube draining dark brown colored fluid, NSR, Hemodynamically stable, hb 7.9. plan for 1PRBC transfusion in the setting of recent CABG v1 and AoVR    Consultants:  Critical Care  General Surgery     Interval Events:  NAEO  Patient converted back to NSR last evening, Dilt drip turned off.  Cholecystostomy tube draining dark brown colored fluid,  Hemodynamically stable, hb 7.9. plan for 1PRBC transfusion in the setting of recent CABG v1 and AoVR  Patient reports improving RUQ pain and tenderness  BM x1, brown colored    Afebrile  Ao x 4  RR: 15-20s  HR: 80s NSR  SBP: 130-170s, started on home anti hypertensives , lisinopril and metoprolol tart  GI: PO diet.  UOP: adequate   VTE px contraindicated d/t concern for GI bleed  On PPI  On C3+ Flagyl      Vitals Range last 24h:  Temp:  [35.7 °C (96.3 °F)-37 °C (98.6 °F)] 35.9 °C (96.6 °F)  Pulse:  [] 88  Resp:  [13-43] 26  BP: (116-187)/() 172/80  SpO2:  [87 %-99 %] 96 %      Intake/Output Summary (Last 24 hours) at 8/26/2023 1022  Last data filed at 8/26/2023 " 1000  Gross per 24 hour   Intake 2378.81 ml   Output 565 ml   Net 1813.81 ml        Review of Systems   Constitutional:  Negative for fever and malaise/fatigue.   Eyes:  Negative for blurred vision and double vision.   Respiratory:  Negative for cough, hemoptysis, sputum production, shortness of breath and wheezing.    Cardiovascular:  Negative for chest pain, palpitations, orthopnea and claudication.   Gastrointestinal:  Positive for abdominal pain (improved). Negative for diarrhea.   Genitourinary:  Negative for dysuria, hematuria and urgency.   Neurological:  Negative for dizziness and focal weakness.       PHYSICAL EXAM:  Vitals:    08/26/23 0937 08/26/23 0945 08/26/23 1000 08/26/23 1015   BP: (!) 177/84 (!) 187/79  (!) 172/80   Pulse: 91 88 88 88   Resp: 17 18 18 (!) 26   Temp:  35.9 °C (96.6 °F)     TempSrc:  Temporal     SpO2: 96% 97% 99% 96%   Weight:       Height:        Body mass index is 27.52 kg/m².    O2 therapy: Pulse Oximetry: 96 %, O2 (LPM): 1, O2 Delivery Device: None - Room Air    Date 08/26/23 0700 - 08/27/23 0659   Shift 0747-9500 6669-5380 1687-8018 24 Hour Total   INTAKE   P.O. 240   240     P.O. 240   240   IV Piggyback 31.6   31.6     Volume (mL) (metroNIDAZOLE (Flagyl) IVPB 500 mg) 31.6   31.6   Shift Total 271.6   271.6   OUTPUT   Shift Total       .6   271.6        Physical Exam  Constitutional:       General: She is not in acute distress.     Appearance: She is not ill-appearing.   HENT:      Head: Normocephalic.      Nose: Nose normal.      Mouth/Throat:      Mouth: Mucous membranes are moist.   Eyes:      Extraocular Movements: Extraocular movements intact.      Conjunctiva/sclera: Conjunctivae normal.      Pupils: Pupils are equal, round, and reactive to light.   Cardiovascular:      Rate and Rhythm: Normal rate and regular rhythm.      Pulses: Normal pulses.      Heart sounds: Normal heart sounds. No murmur heard.     No gallop.   Pulmonary:      Effort: Pulmonary effort is  normal. No respiratory distress.      Breath sounds: Normal breath sounds. No stridor. No wheezing, rhonchi or rales.   Abdominal:      General: Bowel sounds are normal. There is no distension.      Palpations: Abdomen is soft. There is no mass.      Tenderness: There is abdominal tenderness (RUQ tenderness improving). There is no guarding.   Musculoskeletal:         General: Normal range of motion.      Cervical back: Normal range of motion. No rigidity.   Skin:     General: Skin is warm.   Neurological:      General: No focal deficit present.      Mental Status: She is alert and oriented to person, place, and time.   Psychiatric:         Mood and Affect: Mood normal.             Recent Labs     08/24/23  1345 08/25/23  0335 08/26/23  0505   SODIUM 138 135 137   POTASSIUM 4.1 4.0 3.5*   CHLORIDE 104 104 104   CO2 22 22 22   BUN 22 23* 17   CREATININE 0.97 0.87 0.67   MAGNESIUM  --  1.8  --    PHOSPHORUS  --  2.1*  --    CALCIUM 7.9* 7.8* 8.2*     Recent Labs     08/23/23  1942 08/24/23  1345 08/24/23  1700 08/25/23  0335 08/26/23  0505   ALTSGPT 97*  --  71* 59* 46   ASTSGOT 67*  --  49* 39 32   ALKPHOSPHAT 352*  --  256* 216* 172*   TBILIRUBIN 0.8  --  0.4 0.6 0.5   DBILIRUBIN  --   --  <0.2  --   --    LIPASE 295*  --  48  --   --    GLUCOSE 191* 173*  --  145* 109*     Recent Labs     08/24/23  0029 08/24/23  0425 08/24/23  1345 08/24/23  1700 08/25/23  0335 08/25/23  1100 08/25/23  1815 08/26/23  0020 08/26/23  0515   RBC  --   --  3.35*  --  2.86*  --   --   --  2.72*   HEMOGLOBIN 11.1*   < > 10.0*   < > 8.4*   < > 8.4* 8.6* 7.9*   HEMATOCRIT  --   --  30.5*  --  25.5*   < > 26.1* 26.1* 24.4*   PLATELETCT  --   --  384  --  342  --   --   --  343   PROTHROMBTM 15.9*  --   --   --   --   --   --   --   --    APTT 28.8  --   --   --   --   --   --   --   --    INR 1.25*  --   --   --   --   --   --   --   --     < > = values in this interval not displayed.     Recent Labs     08/24/23  1345 08/24/23  7161  08/25/23  0335 08/26/23  0505 08/26/23  0515   WBC 19.8*  --  18.0*  --  17.1*   NEUTSPOLYS 92.10*  --  88.00*  --  84.80*   LYMPHOCYTES 2.60*  --  5.20*  --  7.90*   MONOCYTES 1.80  --  4.70  --  5.50   EOSINOPHILS 0.00  --  0.00  --  0.10   BASOPHILS 0.00  --  0.20  --  0.10   ASTSGOT  --  49* 39 32  --    ALTSGPT  --  71* 59* 46  --    ALKPHOSPHAT  --  256* 216* 172*  --    TBILIRUBIN  --  0.4 0.6 0.5  --        Meds:   lisinopril  5 mg      metoprolol tartrate  25 mg      carboxymethylcellulose  1 Drop      dilTIAZem (Cardizem) 100 mg in dextrose 5% 100 mL Infusion  0-20 mg/hr Stopped (08/25/23 2149)    oxyCODONE immediate release  10 mg      pantoprazole  40 mg      fentaNYL  50 mcg      cefTRIAXone (ROCEPHIN) IV  2,000 mg      metroNIDAZOLE (FLAGYL) IV  500 mg Stopped (08/26/23 0619)        Procedures:  none    Imaging:  [id number]   Final Result      1. Percutaneous cholecystostomy tube placement with CT guidance.      US-RUQ   Final Result         1.  Cholelithiasis and gallbladder sludge with gallbladder wall thickening, sonographically compatible with cholecystitis.   2.  Common bile duct dilatation, consider causes of biliary obstruction with additional workup as clinically appropriate.   3.  Right pleural effusion      DX-CHEST-PORTABLE (1 VIEW)   Final Result         1.  Hazy left pulmonary opacities suggests subtle infiltrate, overall decreased since prior study.   2.  Trace left pleural effusion, decreased since prior study.   3.  Cardiomegaly      CT-CHEST,ABDOMEN,PELVIS WITH   Final Result      1.  Postoperative changes status post recent open heart surgery. Patient is status post CABG and there is an aortic valve replacement.   2.  Small pericardial and bilateral pleural effusions.   3.  Cholelithiasis with distended gallbladder and wall thickening raises concern for acute cholecystitis.   4.  Uroepithelial thickening in the left renal pelvis is nonspecific but could be seen with  infection. Correlate with urinalysis.          ASSESSEMENT and PLAN:    * Shock (HCC)- (present on admission)  Assessment & Plan  Multifactorial ddx includes, Sepsis from cholecystitis, hypovolumia.   Pt had 1 episode of maroon stool while ED, however, she hasn't has any more bloody stools. Some yellow/brown stools.   Pt received >4L fluid boluses and SBP maintains in 120-140s.   On ceftriaxone and flagyl.    Plan:   Trend H/H q6 with restrictive transfusion strategies. Transfuse to keep Hgb >7  Serial lactates end organ perfusion  Pt to have perchole tube by IR today  On LR at 83cc/hr for maintenance fluids.   Appreciate Gen Surg help  Hold GI consult, unless pt continues to have bloody stool       H/O aortic valve replacement with porcine valve  Assessment & Plan  S/ p AVR and CAB (SHEPARD to LAD) by Dr. Lawrence on 8/16  Intraoperative EF55%  CTS notified of readmission  Hold Lasix and Lisinopril given GAURI.       Atrial fibrillation (HCC)  Assessment & Plan  AFRVR attributed to hypovolemia and GI bleed  Hold Eliquis and ASA in setting of GI bleed  8/24 Pt went into afib with RVR again. S/p metoprolol 5 IV x3, will start cardizem gtt.   Replete K and Mg. Keep K >4 and Mg >2      Transaminitis  Assessment & Plan  Attributed to shock state  Trend AST/ALT and synthetic function.    Hypokalemia  Assessment & Plan  Replace as clinically indicated      GAURI (acute kidney injury) (HCC)  Assessment & Plan  Pre-renal  Monitor volume status, electrolytes, urine output.  Avoid nephrotoxic agents.  Repeat labs  Continue fluids      Cholecystitis  Assessment & Plan  S/p Hemodynamic resuscitation and on ceftriaxone and flagyl   S/p CT-guided perchole tube by IR 8/25  Post op uneventful. No SIRS  General surgery following. Appreciate help    GI bleed  Assessment & Plan  One episode of marroon stool at admission   Pt has large bore PIV #18 and #20  No further bleed since admission. Pt had bowel movements, but brown  No hematemesis or  hematochezia  Last eliquis was prior to admission.   Coags/TEG unremarkable.   On protonix 40 BID.   Last Hgb 7.9, will transfuse 1U PRBC given pt's underlying cardiac disease.   GI consult if more bleeding.     Atrial Fibrillation, paroxysmal   -08/25: S Tachy with HR in 110s-140s  - asymptomatic  - unresponsive to metoprolol pushes, started on dilt drip goal HR <100   - continue to monitor  - 08/26:  converted back to NSR , Dilt drip turned off  -  hemodynamically stable      Hypertension  -08/26 : 130-170s, started on home anti-hypertensives , lisinopril and metoprolol tart    CODE STATUS: Full Code        Farhad Kaufman M.D.   Internal Medicine Resident

## 2023-08-26 NOTE — PROGRESS NOTES
Late entry:   Per ellis RN, Jairo, patient converted into SR around 1830. Diltazem gtt still running. Discussed with resident physician, Dr. Staples, dilt trip titrated off, HR remains SR <100 bpm.

## 2023-08-26 NOTE — PROGRESS NOTES
Alberta drain in place- improved abd pain, leukocytosis- on atbx, HDS, SR, incision c/d/I, enc IS/amb

## 2023-08-26 NOTE — PROGRESS NOTES
Dr. Stringer notified that patient's systolic blood pressure has been between 150s and 180s since receiving her antihypertensive medications. No new orders received.

## 2023-08-26 NOTE — PROGRESS NOTES
Critical Care Progress Note    Date of admission  8/23/2023    Chief Complaint  72 y.o. female admitted 8/23/2023 with recent AVR and CABG x1 by Dr. Lawrence on 8/16 who was discharged home recently ,but came back after had diarrhea and had episode of syncope. Pt was volume resuscitated and also noted maroon stool. In addition, CT A/P and US RUQ concening of acute cholecystitis. Pt's last eliquis was 8/23 which is being held.     Hospital Course  8/23 admitted to ICU  8/25 s/p ct-guided perc devi tube by IR    Interval Problem Update  Reviewed last 24 hour events:  No acute changes this am.   No bloody stools. Hgb in 7.9 today   Will transfuse 1U PRBC due to underlying cardiac disease  Remains no bleeding, no hematemesis/melena.   No hypotension.   Alert, oriented.   Off cardizem gtt, HR in 80s  No hypotension   WBC 18-19  On 1L NC sat >90%  Alert, oriented.   Creatinine back to baseline 0.67.    Good UOP.       Review of Systems  Review of Systems   Constitutional:  Negative for fever and malaise/fatigue.   Respiratory:  Negative for shortness of breath.    Cardiovascular:  Negative for chest pain and leg swelling.   Gastrointestinal:  Positive for abdominal pain. Negative for diarrhea, nausea and vomiting.        Abd pain continues to improve   Musculoskeletal:  Negative for back pain.   Neurological:  Negative for headaches.   Psychiatric/Behavioral:  The patient is not nervous/anxious.    All other systems reviewed and are negative.       Vital Signs for last 24 hours   Temp:  [35.8 °C (96.4 °F)-37 °C (98.6 °F)] 35.8 °C (96.4 °F)  Pulse:  [] 83  Resp:  [15-43] 20  BP: (116-167)/() 167/81  SpO2:  [89 %-99 %] 92 %    Hemodynamic parameters for last 24 hours       Respiratory Information for the last 24 hours       Physical Exam   Physical Exam  Vitals and nursing note reviewed.   Constitutional:       General: She is not in acute distress.     Appearance: She is obese. She is ill-appearing and  toxic-appearing. She is not diaphoretic.   HENT:      Head: Normocephalic and atraumatic.      Mouth/Throat:      Mouth: Mucous membranes are dry.   Cardiovascular:      Rate and Rhythm: Normal rate and regular rhythm.      Pulses: Normal pulses.      Heart sounds: Normal heart sounds. No murmur heard.  Pulmonary:      Effort: No respiratory distress.      Breath sounds: Normal breath sounds. No wheezing, rhonchi or rales.      Comments: Diminished at bases  Abdominal:      General: There is no distension.      Palpations: Abdomen is soft.      Tenderness: There is abdominal tenderness. There is no guarding or rebound.   Musculoskeletal:      Right lower leg: No edema.      Left lower leg: No edema.   Skin:     Capillary Refill: Capillary refill takes less than 2 seconds.      Coloration: Skin is not jaundiced.      Findings: No bruising or rash.   Neurological:      General: No focal deficit present.      Mental Status: She is alert and oriented to person, place, and time.       Medications  Current Facility-Administered Medications   Medication Dose Route Frequency Provider Last Rate Last Admin    dilTIAZem (Cardizem) 100 mg in dextrose 5% 100 mL Infusion  0-20 mg/hr Intravenous Continuous Farhad Kaufman M.D.   Stopped at 08/25/23 2149    oxyCODONE immediate release (Roxicodone) tablet 10 mg  10 mg Oral Q3HRS PRN Ki Mabry M.D.   10 mg at 08/25/23 1448    pantoprazole (Protonix) injection 40 mg  40 mg Intravenous BID Maykel Gale M.D.   40 mg at 08/26/23 0519    fentaNYL (Sublimaze) injection 50 mcg  50 mcg Intravenous Q2HRS PRN Farhad Kaufman M.D.   50 mcg at 08/25/23 0943    lactated ringers infusion   Intravenous Continuous Maykel Gale M.D.   Stopped at 08/25/23 1339    cefTRIAXone (Rocephin) syringe 2,000 mg  2,000 mg Intravenous Q24HRS Maykel Gale M.D.   2,000 mg at 08/26/23 0620    metroNIDAZOLE (Flagyl) IVPB 500 mg  500 mg Intravenous Q12HRS Maykel Gale M.D.   Stopped at 08/26/23 0619        Fluids    Intake/Output Summary (Last 24 hours) at 8/26/2023 0715  Last data filed at 8/26/2023 0600  Gross per 24 hour   Intake 2107.17 ml   Output 565 ml   Net 1542.17 ml         Laboratory          Recent Labs     08/24/23  1345 08/25/23  0335 08/26/23  0505   SODIUM 138 135 137   POTASSIUM 4.1 4.0 3.5*   CHLORIDE 104 104 104   CO2 22 22 22   BUN 22 23* 17   CREATININE 0.97 0.87 0.67   MAGNESIUM  --  1.8  --    PHOSPHORUS  --  2.1*  --    CALCIUM 7.9* 7.8* 8.2*       Recent Labs     08/23/23  1942 08/24/23  1345 08/24/23  1700 08/25/23  0335 08/26/23  0505   ALTSGPT 97*  --  71* 59* 46   ASTSGOT 67*  --  49* 39 32   ALKPHOSPHAT 352*  --  256* 216* 172*   TBILIRUBIN 0.8  --  0.4 0.6 0.5   DBILIRUBIN  --   --  <0.2  --   --    LIPASE 295*  --  48  --   --    GLUCOSE 191* 173*  --  145* 109*       Recent Labs     08/24/23  1345 08/24/23  1700 08/25/23  0335 08/26/23  0505 08/26/23  0515   WBC 19.8*  --  18.0*  --  17.1*   NEUTSPOLYS 92.10*  --  88.00*  --  84.80*   LYMPHOCYTES 2.60*  --  5.20*  --  7.90*   MONOCYTES 1.80  --  4.70  --  5.50   EOSINOPHILS 0.00  --  0.00  --  0.10   BASOPHILS 0.00  --  0.20  --  0.10   ASTSGOT  --  49* 39 32  --    ALTSGPT  --  71* 59* 46  --    ALKPHOSPHAT  --  256* 216* 172*  --    TBILIRUBIN  --  0.4 0.6 0.5  --        Recent Labs     08/24/23  0029 08/24/23  0425 08/24/23  1345 08/24/23  1700 08/25/23  0335 08/25/23  1100 08/25/23  1815 08/26/23  0020 08/26/23  0515   RBC  --   --  3.35*  --  2.86*  --   --   --  2.72*   HEMOGLOBIN 11.1*   < > 10.0*   < > 8.4*   < > 8.4* 8.6* 7.9*   HEMATOCRIT  --   --  30.5*  --  25.5*   < > 26.1* 26.1* 24.4*   PLATELETCT  --   --  384  --  342  --   --   --  343   PROTHROMBTM 15.9*  --   --   --   --   --   --   --   --    APTT 28.8  --   --   --   --   --   --   --   --    INR 1.25*  --   --   --   --   --   --   --   --     < > = values in this interval not displayed.         Imaging  X-Ray:  I have personally reviewed the images and  compared with prior images.    Assessment/Plan  * Shock (HCC)- (present on admission)  Assessment & Plan  Multifactorial ddx includes, Sepsis from cholecystitis, hypovolumia.   Pt had 1 episode of maroon stool while ED, however, she hasn't has any more bloody stools. Some yellow/brown stools.   Pt received >4L fluid boluses and SBP maintains in 120-140s.   On ceftriaxone and flagyl.  8/25 s/p CT-guided perchole tube.   Tolerating oral intake post IR procedure  Shock has resolved    Plan:   Decrease H/H to Q12H. Transfuse to keep Hgb >8  Treat ceftriaxone and flagyl for 5 days (end date 8/28)   Appreciate Gen Surg help  Hold GI consult, unless pt continues to have bloody stool       Cholecystitis  Assessment & Plan  S/p Hemodynamic resuscitation and on ceftriaxone and flagyl   S/p CT-guided perchole tube by IR 8/25  Post op uneventful. No SIRS, no hypotension  General surgery following. Appreciate help    GI bleed  Assessment & Plan  One episode of marroon stool at admission   Pt has large bore PIV #18 and #20  No further bleed since admission. Pt had bowel movements, but brown  No hematemesis or hematochezia  Last eliquis was prior to admission.   Coags/TEG unremarkable.   On protonix 40 BID.   Last Hgb 7.9, will transfuse 1U PRBC given pt's underlying cardiac disease.   GI consult if more bleeding.     H/O aortic valve replacement with porcine valve  Assessment & Plan  S/ p AVR and CABG x1 (SHEPARD to LAD) by Dr. Lawrence on 8/16  Intraoperative EF55%  CTS notified of readmission  Will resume lisinopril and metoprolol home dose  Eventually should be able to resume aspirin once hgb stabilizing      Atrial fibrillation (HCC)  Assessment & Plan  AFRVR attributed to hypovolemia and GI bleed  Hold Eliquis and ASA in setting of GI bleed  8/24 Pt went into afib with RVR again. S/p metoprolol 5 IV x3  8/25 started on cardizem gtt, off gtt in the evening  Replete K and Mg. Keep K >4 and Mg >2      Transaminitis  Assessment &  Plan  Attributed to shock state  Trend AST/ALT and synthetic function.    Hypokalemia  Assessment & Plan  Replace as clinically indicated      GAURI (acute kidney injury) (HCC)  Assessment & Plan  Pre-renal s/p fluid resuscitations. Now resolved   Excellent UOP.    Avoid nephrotoxic agent.            VTE:  Contraindicated  Ulcer: PPI  Lines: None    I have performed a physical exam and reviewed and updated ROS and Plan today (8/26/2023). In review of yesterday's note (8/25/2023), there are no changes except as documented above.     Discussed patient condition and risk of morbidity and/or mortality with RN, RT, Pharmacy, Charge nurse / hot rounds, and Patient    Can be transferred to telemetry  floor  D/w Dr. Stringer, [facility]  Will sign off, please call with questions

## 2023-08-26 NOTE — PROGRESS NOTES
Late entry:    Report given to CYNTHIA Maloney. Patient brought up to [id number] via wheelchair by CNA.

## 2023-08-26 NOTE — CARE PLAN
The patient is Stable - Low risk of patient condition declining or worsening    Shift Goals  Clinical Goals: Manage pain, maintain comfort, facilitate HIDA scan and IR drain placement, ambulate, correct A-fib  Patient Goals: Sleep, eat  Family Goals: HELEN    Progress made toward(s) clinical / shift goals:    Problem: Knowledge Deficit - Standard  Goal: Patient and family/care givers will demonstrate understanding of plan of care, disease process/condition, diagnostic tests and medications  Outcome: Progressing     Problem: Fluid Volume  Goal: Fluid volume balance will be maintained  Outcome: Progressing     Problem: Pain - Standard  Goal: Alleviation of pain or a reduction in pain to the patient’s comfort goal  Outcome: Progressing       Patient is not progressing towards the following goals:

## 2023-08-26 NOTE — PROGRESS NOTES
"  DATE: 8/26/2023    Hospital Day 3  acute devi SP CABG .    INTERVAL EVENTS:  Feeling little better. LFTs down some. Devi tube in place.    REVIEW OF SYSTEMS:  Comprehensive review of systems is negative with the exception of the aforementioned HPI, PMH, and PSH bullets in accordance with CMS guidelines.    PHYSICAL EXAMINATION:  Vital Signs: BP (!) 187/79   Pulse 88   Temp 35.9 °C (96.6 °F) (Temporal)   Resp 18   Ht 1.702 m (5' 7\")   Wt 79.7 kg (175 lb 11.3 oz)   SpO2 97%   Physical Exam  Cardiovascular:      Rate and Rhythm: Normal rate.   Pulmonary:      Effort: Pulmonary effort is normal.   Abdominal:      Palpations: Abdomen is soft.      Tenderness: There is abdominal tenderness.      Comments: RUQ tender but improved  Drain - bilious     Musculoskeletal:         General: Normal range of motion.   Skin:     General: Skin is warm.   Neurological:      Mental Status: She is alert.         LABORATORY VALUES:  Recent Labs     08/24/23  1345 08/24/23  1700 08/25/23  0335 08/25/23  1100 08/25/23  1815 08/26/23  0020 08/26/23  0515   WBC 19.8*  --  18.0*  --   --   --  17.1*   RBC 3.35*  --  2.86*  --   --   --  2.72*   HEMOGLOBIN 10.0*   < > 8.4*   < > 8.4* 8.6* 7.9*   HEMATOCRIT 30.5*  --  25.5*   < > 26.1* 26.1* 24.4*   MCV 91.0  --  89.2  --   --   --  89.7   MCH 29.9  --  29.4  --   --   --  29.0   MCHC 32.8  --  32.9  --   --   --  32.4   RDW 48.9  --  48.7  --   --   --  49.7   PLATELETCT 384  --  342  --   --   --  343   MPV 9.7  --  9.6  --   --   --  9.3    < > = values in this interval not displayed.       Recent Labs     08/24/23  1345 08/25/23  0335 08/26/23  0505   SODIUM 138 135 137   POTASSIUM 4.1 4.0 3.5*   CHLORIDE 104 104 104   CO2 22 22 22   GLUCOSE 173* 145* 109*   BUN 22 23* 17   CREATININE 0.97 0.87 0.67   CALCIUM 7.9* 7.8* 8.2*       Recent Labs     08/23/23  1942 08/24/23  0029 08/24/23  1700 08/25/23  0335 08/26/23  0505   ASTSGOT 67*  --  49* 39 32   ALTSGPT 97*  --  71* 59* 46 "   TBILIRUBIN 0.8  --  0.4 0.6 0.5   IBILIRUBIN  --   --  see below  --   --    DBILIRUBIN  --   --  <0.2  --   --    ALKPHOSPHAT 352*  --  256* 216* 172*   GLOBULIN 2.4  --   --  2.4 2.4   INR  --  1.25*  --   --   --        Recent Labs     08/24/23  0029   APTT 28.8   INR 1.25*         IMAGING:  CT-DRAIN-CHOLECTYSTOSTOMY   Final Result      1. Percutaneous cholecystostomy tube placement with CT guidance.      US-RUQ   Final Result         1.  Cholelithiasis and gallbladder sludge with gallbladder wall thickening, sonographically compatible with cholecystitis.   2.  Common bile duct dilatation, consider causes of biliary obstruction with additional workup as clinically appropriate.   3.  Right pleural effusion      DX-CHEST-PORTABLE (1 VIEW)   Final Result         1.  Hazy left pulmonary opacities suggests subtle infiltrate, overall decreased since prior study.   2.  Trace left pleural effusion, decreased since prior study.   3.  Cardiomegaly      CT-CHEST,ABDOMEN,PELVIS WITH   Final Result      1.  Postoperative changes status post recent open heart surgery. Patient is status post CABG and there is an aortic valve replacement.   2.  Small pericardial and bilateral pleural effusions.   3.  Cholelithiasis with distended gallbladder and wall thickening raises concern for acute cholecystitis.   4.  Uroepithelial thickening in the left renal pelvis is nonspecific but could be seen with infection. Correlate with urinalysis.          ASSESSMENT AND PLAN:    Cholecystitis  Assessment & Plan  Acute devi   SP CABG  8/25 IR cholecystostomy  IV abx             ____________________________________     Luisa Kidd M.D.    DD: 8/25/2023  9:34 AM

## 2023-08-26 NOTE — ASSESSMENT & PLAN NOTE
Percutaneous cholecystostomy tube placement with CT guidance 8/25/2023  IV Rocephin + Flagyl  Follow-up with surgery as outpatient  Drain care

## 2023-08-26 NOTE — CARE PLAN
The patient is Stable - Low risk of patient condition declining or worsening    Shift Goals  Clinical Goals: monitor drain output, monitor HR  Patient Goals: sleep  Family Goals: HELEN    Progress made toward(s) clinical / shift goals:  patient converted back into SR, BP stable    Problem: Hemodynamics  Goal: Patient's hemodynamics, fluid balance and neurologic status will be stable or improve  Description: Target End Date:  Prior to discharge or change in level of care    Document on Assessment and I/O flowsheet templates    1.  Monitor vital signs, pulse oximetry and cardiac monitor per provider order and/or policy  2.  Maintain blood pressure per provider order  3.  Hemodynamic monitoring per provider order  4.  Manage IV fluids and IV infusions  5.  Monitor intake and output  6.  Daily weights per unit policy or provider order  7.  Assess peripheral pulses and capillary refill  8.  Assess color and body temperature  9.  Position patient for maximum circulation/cardiac output  10. Monitor for signs/symptoms of excessive bleeding  11. Assess mental status, restlessness and changes in level of consciousness  12. Monitor temperature and report fever or hypothermia to provider immediately. Consideration of targeted temperature management.  Outcome: Progressing     Problem: Pain - Standard  Goal: Alleviation of pain or a reduction in pain to the patient’s comfort goal  Description: Target End Date:  Prior to discharge or change in level of care    Document on Vitals flowsheet    1.  Document pain using the appropriate pain scale per order or unit policy  2.  Educate and implement non-pharmacologic comfort measures (i.e. relaxation, distraction, massage, cold/heat therapy, etc.)  3.  Pain management medications as ordered  4.  Reassess pain after pain med administration per policy  5.  If opiods administered assess patient's response to pain medication is appropriate per POSS sedation scale  6.  Follow pain management  plan developed in collaboration with patient and interdisciplinary team (including palliative care or pain specialists if applicable)  Outcome: Progressing     Patient is not progressing towards the following goals:

## 2023-08-26 NOTE — CONSULTS
Hospital Medicine Consultation    Date of Service  8/26/2023    Referring Physician  Rajeev Sheikh D.O.    Consulting Physician  Ryan Stringer D.O.    Reason for Consultation  Transfer of care out of ICU for acute cholecystitis and sepsis with shock.    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  Her  had found her slumped over and unresponsive in the bathroom later that day.  Upon arrival in the emergency room her systolic blood pressure was in the 50s.  She had significant abdominal pain.  She was admitted for septic shock and concern of cholecystitis.  Due to instability she was not taken to surgery but had a cholecystostomy tube placed.  Patient was initially on pressors and these have been weaned off.  I was consulted to evaluate patient and take over her care out of the ICU.    8/26: Patient is alert pleasant and speaking in full sentences.  She is oriented x3.  She states some discomfort from the tube in the right upper quadrant.  I have discussed the case with Dr. Kidd who states she will have outpatient general surgery for removal of her gallbladder in the future.  When stable the patient will go home with a cholecystostomy tube.  Patient will need follow-up with general surgery.    Review of Systems  Review of Systems   Constitutional:  Positive for chills and malaise/fatigue. Negative for fever.   Respiratory:  Negative for cough, shortness of breath and stridor.    Cardiovascular:  Negative for chest pain, palpitations and leg swelling.   Gastrointestinal:  Positive for abdominal pain. Negative for diarrhea and nausea.   Genitourinary:  Negative for dysuria and hematuria.   Musculoskeletal:  Negative for  back pain and joint pain.   Skin:  Negative for rash.   Neurological:  Negative for speech change and headaches.   Psychiatric/Behavioral:  The patient is not nervous/anxious.        Past Medical History   has a past medical history of Adverse effect of anesthesia (15 yrs ago ?), GAURI (acute kidney injury) ([facility]) (8/24/2023), Anemia, Anesthesia, Anginal syndrome ([facility]), Aortic insufficiency (01/31/2020), Arthritis (06/16/2023), Atrial fibrillation ([facility]) (8/24/2023), Breath shortness (11/02/2021), Bronchitis (60+ yrs ago), Cataract (06/16/2023), Dental disorder (06/16/2023), Gastric ulcer, Goiter, Heart burn (2022), Heart murmur (1965), High cholesterol (06/16/2023), Hypertension (06/16/2023), Hypothyroidism (06/16/2023), Pain (06/16/2023), PONV (postoperative nausea and vomiting) (6/21/22), Psychiatric problem (06/16/2023), Pulmonary embolism ([facility]) (01/31/2020), Reactive arthritis ([facility]), Snoring (06/16/2023), Thyroid disease, Thyroid disease (01/31/2020), and Urinary bladder disorder (15 yrs ago).    She has no past medical history of Acute nasopharyngitis, Asthma, Awareness under anesthesia, Bowel habit changes, Cancer ([facility]), Carcinoma in situ of respiratory system, Congestive heart failure ([facility]), Continuous ambulatory peritoneal dialysis status ([facility]), Delayed emergence from general anesthesia, Diabetes ([facility]), Dialysis patient ([facility]), Emphysema of lung ([facility]), Glaucoma, Gynecological disorder, Hard to intubate, Hemorrhagic disorder ([facility]), Hepatitis A, Hepatitis B, Hepatitis C, Hiatus hernia syndrome, Indigestion, Infectious disease, Jaundice, Malignant hyperthermia, Myocardial infarct ([facility]), Pacemaker, Pneumonia, Pregnant, Pseudocholinesterase deficiency, Rheumatic fever, Seizure (HCC), Sleep apnea, Spinal headache, Stroke ([facility]), Tuberculosis, or Urinary incontinence.    Surgical History   has a past surgical history that includes knee replacement, total (Right); fusion (06/16/2023); cervical fusion posterior; bladder  sling female; cataract extraction with iol (Left); cataract extraction with iol (Bilateral); thyroidectomy total (Bilateral, 02/05/2020); appendectomy; gyn surgery; foot surgery (Left); knee arthroplasty total (Right); knee revision total (Right); mass excision general (Right, 11/05/2021); knee arthroplasty total (Left, 06/2022); other cardiac surgery (06/16/2023); other neurological surg (2016); no pertinent past surgical history (2017); multiple coronary artery bypass endo vein harvest (N/A, 8/16/2023); aortic valve replacement (N/A, 8/16/2023); and echocardiogram, transesophageal, intraoperative (N/A, 8/16/2023).    Family History  family history includes Alzheimer's Disease in her mother; Arthritis in her brother, brother, and mother; Asthma in her brother; Cancer in her father and mother; Cancer (age of onset: 66) in her maternal grandmother; Colorectal Cancer in her maternal grandmother; Heart Attack in her father; Heart Disease in her brother; Hypertension in her brother, brother, and brother; Lung Disease in her brother; No Known Problems in her maternal grandfather, paternal grandfather, and paternal grandmother; Other in her brother and brother.    Social History   reports that she has never smoked. She has never used smokeless tobacco. She reports current alcohol use of about 4.2 - 8.4 oz of alcohol per week. She reports that she does not currently use drugs after having used the following drugs: Oral.  The patient is retired nurse.  She is .  Has 1 grown son.    Medications  Current Facility-Administered Medications   Medication Dose Route Frequency Provider Last Rate Last Admin    metoprolol tartrate (Lopressor) tablet 25 mg  25 mg Oral BID Farhad Kaufman M.D.   25 mg at 08/26/23 0937    carboxymethylcellulose (Refresh Tears) 0.5 % ophthalmic drops 1 Drop  1 Drop Both Eyes PRN Farhad Kaufman M.D.   1 Drop at 08/26/23 1113    lisinopril (Prinivil) tablet 20 mg  20 mg Oral TWICE DAILY Ryan Stringer,  D.O.        dilTIAZem (Cardizem) 100 mg in dextrose 5% 100 mL Infusion  0-20 mg/hr Intravenous Continuous Farhad Kaufman M.D.   Stopped at 08/25/23 2149    oxyCODONE immediate release (Roxicodone) tablet 10 mg  10 mg Oral Q3HRS PRN Ki Mabry M.D.   10 mg at 08/26/23 1259    pantoprazole (Protonix) injection 40 mg  40 mg Intravenous BID Maykel Gale M.D.   40 mg at 08/26/23 0519    fentaNYL (Sublimaze) injection 50 mcg  50 mcg Intravenous Q2HRS PRN Farhad Kaufman M.D.   50 mcg at 08/25/23 0943    cefTRIAXone (Rocephin) syringe 2,000 mg  2,000 mg Intravenous Q24HRS Maykel Gale M.D.   2,000 mg at 08/26/23 0620    metroNIDAZOLE (Flagyl) IVPB 500 mg  500 mg Intravenous Q12HRS Maykel Gale M.D.   Stopped at 08/26/23 0619       Allergies  Allergies   Allergen Reactions    Morphine Vomiting    Nsaids Unspecified     History of gastric ulcers - cannot take NSAIDS    Pcn [Penicillins] Hives     Tolerates ancef    Seasonal Runny Nose and Itching     .       Physical Exam  Temp:  [35.7 °C (96.3 °F)-36.2 °C (97.1 °F)] 36.1 °C (97 °F)  Pulse:  [] 76  Resp:  [13-43] 24  BP: (116-187)/() 152/82  SpO2:  [87 %-99 %] 94 %    Physical Exam  Vitals reviewed.   Constitutional:       Appearance: Normal appearance. She is not diaphoretic.   HENT:      Head: Normocephalic and atraumatic.      Nose: Nose normal.      Mouth/Throat:      Mouth: Mucous membranes are moist.      Pharynx: No oropharyngeal exudate.   Eyes:      General: No scleral icterus.        Right eye: No discharge.         Left eye: No discharge.      Extraocular Movements: Extraocular movements intact.      Conjunctiva/sclera: Conjunctivae normal.   Cardiovascular:      Rate and Rhythm: Normal rate and regular rhythm.      Pulses:           Radial pulses are 2+ on the right side and 2+ on the left side.        Dorsalis pedis pulses are 2+ on the right side and 2+ on the left side.      Heart sounds: No murmur heard.  Pulmonary:      Effort:  Pulmonary effort is normal. No respiratory distress.      Breath sounds: Normal breath sounds. No wheezing or rales.   Abdominal:      General: Bowel sounds are normal. There is no distension.      Palpations: Abdomen is soft.      Tenderness: There is abdominal tenderness.      Comments: RUQ abdominal drain w/ dark brown bilious looking fluid in JENNIFER drain/bag   Musculoskeletal:         General: No swelling or tenderness.      Cervical back: No tenderness. No muscular tenderness.      Right lower leg: No edema.      Left lower leg: No edema.   Skin:     Coloration: Skin is not jaundiced or pale.      Comments: Sterna surgical site appears with normal healing, good approximation of surgical edges, no discharge.  No erythema   Neurological:      General: No focal deficit present.      Mental Status: She is alert and oriented to person, place, and time. Mental status is at baseline.      Cranial Nerves: No cranial nerve deficit.   Psychiatric:         Mood and Affect: Mood normal.         Behavior: Behavior normal.         Fluids  Date 08/26/23 0700 - 08/27/23 0659   Shift 7882-9320 6648-3231 [id number] 24 Hour Total   INTAKE   P.O. 240   240   IV Piggyback 31.6   31.6   Shift Total 271.6   271.6   OUTPUT   Shift Total       Weight (kg) 79.7 79.7 79.7 79.7       Laboratory  Recent Labs     08/24/23  1345 08/24/23  1700 08/25/23  0335 08/25/23  1100 08/25/23  1815 08/26/23  0020 08/26/23  0515   WBC 19.8*  --  18.0*  --   --   --  17.1*   RBC 3.35*  --  2.86*  --   --   --  2.72*   HEMOGLOBIN 10.0*   < > 8.4*   < > 8.4* 8.6* 7.9*   HEMATOCRIT 30.5*  --  25.5*   < > 26.1* 26.1* 24.4*   MCV 91.0  --  89.2  --   --   --  89.7   MCH 29.9  --  29.4  --   --   --  29.0   MCHC 32.8  --  32.9  --   --   --  32.4   RDW 48.9  --  48.7  --   --   --  49.7   PLATELETCT 384  --  342  --   --   --  343   MPV 9.7  --  9.6  --   --   --  9.3    < > = values in this interval not displayed.     Recent Labs     08/24/23  4697 08/25/23  4940  08/26/23  0505   SODIUM 138 135 137   POTASSIUM 4.1 4.0 3.5*   CHLORIDE 104 104 104   CO2 22 22 22   GLUCOSE 173* 145* 109*   BUN 22 23* 17   CREATININE 0.97 0.87 0.67   CALCIUM 7.9* 7.8* 8.2*     Recent Labs     08/24/23  0029   APTT 28.8   INR 1.25*                 Imaging  CT-DRAIN-CHOLECTYSTOSTOMY   Final Result      1. Percutaneous cholecystostomy tube placement with CT guidance.      US-RUQ   Final Result         1.  Cholelithiasis and gallbladder sludge with gallbladder wall thickening, sonographically compatible with cholecystitis.   2.  Common bile duct dilatation, consider causes of biliary obstruction with additional workup as clinically appropriate.   3.  Right pleural effusion      DX-CHEST-PORTABLE (1 VIEW)   Final Result         1.  Hazy left pulmonary opacities suggests subtle infiltrate, overall decreased since prior study.   2.  Trace left pleural effusion, decreased since prior study.   3.  Cardiomegaly      CT-CHEST,ABDOMEN,PELVIS WITH   Final Result      1.  Postoperative changes status post recent open heart surgery. Patient is status post CABG and there is an aortic valve replacement.   2.  Small pericardial and bilateral pleural effusions.   3.  Cholelithiasis with distended gallbladder and wall thickening raises concern for acute cholecystitis.   4.  Uroepithelial thickening in the left renal pelvis is nonspecific but could be seen with infection. Correlate with urinalysis.          Assessment/Plan  * Shock (HCC)- (present on admission)  Assessment & Plan  Resolved with fluids and pressors.  Monitor I/O's, vitals and labs    H/O aortic valve replacement with porcine valve  Assessment & Plan  POD ~#10  Dr Rodriguez, Cardiothoracic surgeon  Medical management.    Atrial fibrillation (HCC)  Assessment & Plan  Patient was on diltiazem drip  Reinitiating on metoprolol  Monitor vitals and on telemetry    Transaminitis  Assessment & Plan  Due to cholecystitis  S/P perc devi drain  Surgery  consulting and state that it will be a few weeks prior to any surgery  Antibiotics    Hypokalemia  Assessment & Plan  Replace and monitor    GAURI (acute kidney injury) (HCC)  Assessment & Plan  Improved of IV fluids  Avoid nephrotoxins      Cholecystitis  Assessment & Plan  Required percutaneous cholecystomy tube.  Look at 10 days of oral omnicef and flagyl 500mg BID x 10 days  Surgery will follow up in next few weeks and schedule for outpatient surgery    Benign essential HTN- (present on admission)  Assessment & Plan  8/26 reinitiate antihypertensive medications and adjust lisinopril up to 20 mg twice a day with parameters

## 2023-08-26 NOTE — ASSESSMENT & PLAN NOTE
Due to cholecystitis  S/P perc devi drain  Surgery consulting and state that it will be a few weeks prior to any surgery  Antibiotics

## 2023-08-27 LAB
ALBUMIN SERPL BCP-MCNC: 2.6 G/DL (ref 3.2–4.9)
ALBUMIN/GLOB SERPL: 1.1 G/DL
ALP SERPL-CCNC: 162 U/L (ref 30–99)
ALT SERPL-CCNC: 40 U/L (ref 2–50)
ANION GAP SERPL CALC-SCNC: 11 MMOL/L (ref 7–16)
AST SERPL-CCNC: 30 U/L (ref 12–45)
BASOPHILS # BLD AUTO: 0.5 % (ref 0–1.8)
BASOPHILS # BLD: 0.09 K/UL (ref 0–0.12)
BILIRUB SERPL-MCNC: 0.4 MG/DL (ref 0.1–1.5)
BUN SERPL-MCNC: 11 MG/DL (ref 8–22)
CALCIUM ALBUM COR SERPL-MCNC: 8.9 MG/DL (ref 8.5–10.5)
CALCIUM SERPL-MCNC: 7.8 MG/DL (ref 8.5–10.5)
CHLORIDE SERPL-SCNC: 104 MMOL/L (ref 96–112)
CO2 SERPL-SCNC: 20 MMOL/L (ref 20–33)
CREAT SERPL-MCNC: 0.76 MG/DL (ref 0.5–1.4)
EOSINOPHIL # BLD AUTO: 0.1 K/UL (ref 0–0.51)
EOSINOPHIL NFR BLD: 0.5 % (ref 0–6.9)
ERYTHROCYTE [DISTWIDTH] IN BLOOD BY AUTOMATED COUNT: 52.8 FL (ref 35.9–50)
GFR SERPLBLD CREATININE-BSD FMLA CKD-EPI: 82 ML/MIN/1.73 M 2
GLOBULIN SER CALC-MCNC: 2.4 G/DL (ref 1.9–3.5)
GLUCOSE SERPL-MCNC: 96 MG/DL (ref 65–99)
HCT VFR BLD AUTO: 31.2 % (ref 37–47)
HCT VFR BLD AUTO: 31.8 % (ref 37–47)
HCT VFR BLD AUTO: 33.1 % (ref 37–47)
HGB BLD-MCNC: 10.3 G/DL (ref 12–16)
HGB BLD-MCNC: 10.6 G/DL (ref 12–16)
HGB BLD-MCNC: 10.7 G/DL (ref 12–16)
IMM GRANULOCYTES # BLD AUTO: 0.54 K/UL (ref 0–0.11)
IMM GRANULOCYTES NFR BLD AUTO: 2.8 % (ref 0–0.9)
LYMPHOCYTES # BLD AUTO: 1.94 K/UL (ref 1–4.8)
LYMPHOCYTES NFR BLD: 10.1 % (ref 22–41)
MCH RBC QN AUTO: 29 PG (ref 27–33)
MCHC RBC AUTO-ENTMCNC: 32 G/DL (ref 32.2–35.5)
MCV RBC AUTO: 90.7 FL (ref 81.4–97.8)
MONOCYTES # BLD AUTO: 1.21 K/UL (ref 0–0.85)
MONOCYTES NFR BLD AUTO: 6.3 % (ref 0–13.4)
NEUTROPHILS # BLD AUTO: 15.26 K/UL (ref 1.82–7.42)
NEUTROPHILS NFR BLD: 79.8 % (ref 44–72)
NRBC # BLD AUTO: 0.04 K/UL
NRBC BLD-RTO: 0.2 /100 WBC (ref 0–0.2)
PLATELET # BLD AUTO: 387 K/UL (ref 164–446)
PMV BLD AUTO: 9.3 FL (ref 9–12.9)
POTASSIUM SERPL-SCNC: 3.3 MMOL/L (ref 3.6–5.5)
PROT SERPL-MCNC: 5 G/DL (ref 6–8.2)
RBC # BLD AUTO: 3.65 M/UL (ref 4.2–5.4)
SODIUM SERPL-SCNC: 135 MMOL/L (ref 135–145)
WBC # BLD AUTO: 19.1 K/UL (ref 4.8–10.8)

## 2023-08-27 PROCEDURE — A9270 NON-COVERED ITEM OR SERVICE: HCPCS

## 2023-08-27 PROCEDURE — 80053 COMPREHEN METABOLIC PANEL: CPT

## 2023-08-27 PROCEDURE — A9270 NON-COVERED ITEM OR SERVICE: HCPCS | Performed by: HOSPITALIST

## 2023-08-27 PROCEDURE — 700111 HCHG RX REV CODE 636 W/ 250 OVERRIDE (IP): Mod: JZ

## 2023-08-27 PROCEDURE — 99233 SBSQ HOSP IP/OBS HIGH 50: CPT | Performed by: STUDENT IN AN ORGANIZED HEALTH CARE EDUCATION/TRAINING PROGRAM

## 2023-08-27 PROCEDURE — A9270 NON-COVERED ITEM OR SERVICE: HCPCS | Performed by: STUDENT IN AN ORGANIZED HEALTH CARE EDUCATION/TRAINING PROGRAM

## 2023-08-27 PROCEDURE — 85014 HEMATOCRIT: CPT

## 2023-08-27 PROCEDURE — 99232 SBSQ HOSP IP/OBS MODERATE 35: CPT | Performed by: SURGERY

## 2023-08-27 PROCEDURE — 85018 HEMOGLOBIN: CPT | Mod: 91

## 2023-08-27 PROCEDURE — 700111 HCHG RX REV CODE 636 W/ 250 OVERRIDE (IP): Performed by: INTERNAL MEDICINE

## 2023-08-27 PROCEDURE — 85025 COMPLETE CBC W/AUTO DIFF WBC: CPT

## 2023-08-27 PROCEDURE — 36415 COLL VENOUS BLD VENIPUNCTURE: CPT

## 2023-08-27 PROCEDURE — 700102 HCHG RX REV CODE 250 W/ 637 OVERRIDE(OP): Performed by: NURSE PRACTITIONER

## 2023-08-27 PROCEDURE — A9270 NON-COVERED ITEM OR SERVICE: HCPCS | Performed by: NURSE PRACTITIONER

## 2023-08-27 PROCEDURE — C9113 INJ PANTOPRAZOLE SODIUM, VIA: HCPCS | Performed by: INTERNAL MEDICINE

## 2023-08-27 PROCEDURE — 770020 HCHG ROOM/CARE - TELE (206)

## 2023-08-27 PROCEDURE — 700102 HCHG RX REV CODE 250 W/ 637 OVERRIDE(OP)

## 2023-08-27 PROCEDURE — 700102 HCHG RX REV CODE 250 W/ 637 OVERRIDE(OP): Performed by: HOSPITALIST

## 2023-08-27 PROCEDURE — 700102 HCHG RX REV CODE 250 W/ 637 OVERRIDE(OP): Performed by: STUDENT IN AN ORGANIZED HEALTH CARE EDUCATION/TRAINING PROGRAM

## 2023-08-27 RX ORDER — LEVOTHYROXINE SODIUM 0.07 MG/1
75 TABLET ORAL
Status: CANCELLED | OUTPATIENT
Start: 2023-08-27

## 2023-08-27 RX ORDER — LISINOPRIL 20 MG/1
20 TABLET ORAL TWICE DAILY
Status: CANCELLED | OUTPATIENT
Start: 2023-08-27

## 2023-08-27 RX ORDER — OXYMETAZOLINE HYDROCHLORIDE 0.05 G/100ML
2 SPRAY NASAL 2 TIMES DAILY
Status: DISCONTINUED | OUTPATIENT
Start: 2023-08-27 | End: 2023-08-30 | Stop reason: HOSPADM

## 2023-08-27 RX ORDER — LEVOTHYROXINE SODIUM 0.07 MG/1
75 TABLET ORAL
Status: DISCONTINUED | OUTPATIENT
Start: 2023-08-27 | End: 2023-08-30 | Stop reason: HOSPADM

## 2023-08-27 RX ORDER — ACETAMINOPHEN 325 MG/1
650 TABLET ORAL EVERY 4 HOURS PRN
Status: DISCONTINUED | OUTPATIENT
Start: 2023-08-27 | End: 2023-08-29

## 2023-08-27 RX ORDER — ECHINACEA PURPUREA EXTRACT 125 MG
2 TABLET ORAL
Status: DISCONTINUED | OUTPATIENT
Start: 2023-08-27 | End: 2023-08-30 | Stop reason: HOSPADM

## 2023-08-27 RX ORDER — METOPROLOL TARTRATE 50 MG/1
50 TABLET, FILM COATED ORAL 2 TIMES DAILY
Status: DISCONTINUED | OUTPATIENT
Start: 2023-08-27 | End: 2023-08-30 | Stop reason: HOSPADM

## 2023-08-27 RX ORDER — POTASSIUM CHLORIDE 20 MEQ/1
40 TABLET, EXTENDED RELEASE ORAL ONCE
Status: COMPLETED | OUTPATIENT
Start: 2023-08-27 | End: 2023-08-27

## 2023-08-27 RX ORDER — AMIODARONE HYDROCHLORIDE 200 MG/1
400 TABLET ORAL TWICE DAILY
Status: COMPLETED | OUTPATIENT
Start: 2023-08-27 | End: 2023-08-30

## 2023-08-27 RX ORDER — OMEPRAZOLE 20 MG/1
20 CAPSULE, DELAYED RELEASE ORAL 2 TIMES DAILY
Status: DISCONTINUED | OUTPATIENT
Start: 2023-08-27 | End: 2023-08-30 | Stop reason: HOSPADM

## 2023-08-27 RX ORDER — ASPIRIN 81 MG/1
81 TABLET ORAL DAILY
Status: DISCONTINUED | OUTPATIENT
Start: 2023-08-27 | End: 2023-08-30 | Stop reason: HOSPADM

## 2023-08-27 RX ORDER — ROSUVASTATIN CALCIUM 20 MG/1
40 TABLET, COATED ORAL EVERY EVENING
Status: DISCONTINUED | OUTPATIENT
Start: 2023-08-27 | End: 2023-08-30 | Stop reason: HOSPADM

## 2023-08-27 RX ORDER — AMIODARONE HYDROCHLORIDE 200 MG/1
400 TABLET ORAL
Status: DISCONTINUED | OUTPATIENT
Start: 2023-08-31 | End: 2023-08-30 | Stop reason: HOSPADM

## 2023-08-27 RX ORDER — AMIODARONE HYDROCHLORIDE 200 MG/1
200 TABLET ORAL
Status: DISCONTINUED | OUTPATIENT
Start: 2023-09-07 | End: 2023-08-30 | Stop reason: HOSPADM

## 2023-08-27 RX ADMIN — POTASSIUM CHLORIDE 40 MEQ: 1500 TABLET, EXTENDED RELEASE ORAL at 14:39

## 2023-08-27 RX ADMIN — CEFTRIAXONE SODIUM 2000 MG: 10 INJECTION, POWDER, FOR SOLUTION INTRAVENOUS at 04:59

## 2023-08-27 RX ADMIN — ASPIRIN 81 MG: 81 TABLET, COATED ORAL at 18:39

## 2023-08-27 RX ADMIN — LISINOPRIL 20 MG: 20 TABLET ORAL at 04:58

## 2023-08-27 RX ADMIN — AMIODARONE HYDROCHLORIDE 400 MG: 200 TABLET ORAL at 18:45

## 2023-08-27 RX ADMIN — METRONIDAZOLE 500 MG: 500 INJECTION, SOLUTION INTRAVENOUS at 05:09

## 2023-08-27 RX ADMIN — OMEPRAZOLE 20 MG: 20 CAPSULE, DELAYED RELEASE ORAL at 18:40

## 2023-08-27 RX ADMIN — LISINOPRIL 20 MG: 20 TABLET ORAL at 18:40

## 2023-08-27 RX ADMIN — OXYCODONE HYDROCHLORIDE 10 MG: 10 TABLET, FILM COATED, EXTENDED RELEASE ORAL at 18:40

## 2023-08-27 RX ADMIN — OXYMETAZOLINE HCL 2 SPRAY: 0.05 SPRAY NASAL at 18:55

## 2023-08-27 RX ADMIN — METRONIDAZOLE 500 MG: 500 INJECTION, SOLUTION INTRAVENOUS at 18:54

## 2023-08-27 RX ADMIN — APIXABAN 5 MG: 5 TABLET, FILM COATED ORAL at 18:40

## 2023-08-27 RX ADMIN — FENTANYL CITRATE 50 MCG: 50 INJECTION, SOLUTION INTRAMUSCULAR; INTRAVENOUS at 21:09

## 2023-08-27 RX ADMIN — FENTANYL CITRATE 50 MCG: 50 INJECTION, SOLUTION INTRAMUSCULAR; INTRAVENOUS at 15:06

## 2023-08-27 RX ADMIN — OXYCODONE HYDROCHLORIDE 10 MG: 10 TABLET, FILM COATED, EXTENDED RELEASE ORAL at 04:58

## 2023-08-27 RX ADMIN — PANTOPRAZOLE SODIUM 40 MG: 40 INJECTION, POWDER, FOR SOLUTION INTRAVENOUS at 04:59

## 2023-08-27 RX ADMIN — METOPROLOL TARTRATE 25 MG: 25 TABLET, FILM COATED ORAL at 04:58

## 2023-08-27 RX ADMIN — LEVOTHYROXINE SODIUM 75 MCG: 0.07 TABLET ORAL at 05:44

## 2023-08-27 RX ADMIN — ROSUVASTATIN CALCIUM 40 MG: 20 TABLET, FILM COATED ORAL at 18:40

## 2023-08-27 RX ADMIN — METOPROLOL TARTRATE 50 MG: 50 TABLET, FILM COATED ORAL at 18:39

## 2023-08-27 ASSESSMENT — CHA2DS2 SCORE
VASCULAR DISEASE: YES
PRIOR STROKE OR TIA OR THROMBOEMBOLISM: NO
DIABETES: NO
AGE 65 TO 74: YES
AGE 75 OR GREATER: NO
CHF OR LEFT VENTRICULAR DYSFUNCTION: NO
SEX: FEMALE
CHA2DS2 VASC SCORE: 4
HYPERTENSION: YES

## 2023-08-27 ASSESSMENT — FIBROSIS 4 INDEX: FIB4 SCORE: 0.91

## 2023-08-27 ASSESSMENT — PAIN DESCRIPTION - PAIN TYPE
TYPE: ACUTE PAIN
TYPE: ACUTE PAIN
TYPE: ACUTE PAIN;CHRONIC PAIN

## 2023-08-27 NOTE — PROGRESS NOTES
"  DATE: 8/27/2023    Hospital Day 4  acute devi SP CABG .    INTERVAL EVENTS:  Feeling better. Tolerating diet. WBC up some but afebrile.  Devi tube in place.    REVIEW OF SYSTEMS:  Comprehensive review of systems is negative with the exception of the aforementioned HPI, PMH, and PSH bullets in accordance with CMS guidelines.    PHYSICAL EXAMINATION:  Vital Signs: BP (!) 164/92   Pulse 80   Temp 36.7 °C (98.1 °F) (Temporal)   Resp 16   Ht 1.702 m (5' 7\")   Wt 85.6 kg (188 lb 11.4 oz)   SpO2 95%   Physical Exam  Cardiovascular:      Rate and Rhythm: Normal rate.   Pulmonary:      Effort: Pulmonary effort is normal.   Abdominal:      Palpations: Abdomen is soft.      Tenderness: There is abdominal tenderness.      Comments: RUQ tender but improved  Drain - bilious     Musculoskeletal:         General: Normal range of motion.   Skin:     General: Skin is warm.   Neurological:      Mental Status: She is alert.         LABORATORY VALUES:  Recent Labs     08/25/23  0335 08/25/23  1100 08/26/23  0515 08/26/23  2108 08/27/23  0138   WBC 18.0*  --  17.1*  --  19.1*   RBC 2.86*  --  2.72*  --  3.65*   HEMOGLOBIN 8.4*   < > 7.9* 10.1* 10.6*   HEMATOCRIT 25.5*   < > 24.4* 30.0* 33.1*   MCV 89.2  --  89.7  --  90.7   MCH 29.4  --  29.0  --  29.0   MCHC 32.9  --  32.4  --  32.0*   RDW 48.7  --  49.7  --  52.8*   PLATELETCT 342  --  343  --  387   MPV 9.6  --  9.3  --  9.3    < > = values in this interval not displayed.       Recent Labs     08/24/23  1345 08/25/23  0335 08/26/23  0505   SODIUM 138 135 137   POTASSIUM 4.1 4.0 3.5*   CHLORIDE 104 104 104   CO2 22 22 22   GLUCOSE 173* 145* 109*   BUN 22 23* 17   CREATININE 0.97 0.87 0.67   CALCIUM 7.9* 7.8* 8.2*       Recent Labs     08/24/23  1700 08/25/23  0335 08/26/23  0505   ASTSGOT 49* 39 32   ALTSGPT 71* 59* 46   TBILIRUBIN 0.4 0.6 0.5   IBILIRUBIN see below  --   --    DBILIRUBIN <0.2  --   --    ALKPHOSPHAT 256* 216* 172*   GLOBULIN  --  2.4 2.4         "       IMAGING:  CT-DRAIN-CHOLECTYSTOSTOMY   Final Result      1. Percutaneous cholecystostomy tube placement with CT guidance.      US-RUQ   Final Result         1.  Cholelithiasis and gallbladder sludge with gallbladder wall thickening, sonographically compatible with cholecystitis.   2.  Common bile duct dilatation, consider causes of biliary obstruction with additional workup as clinically appropriate.   3.  Right pleural effusion      DX-CHEST-PORTABLE (1 VIEW)   Final Result         1.  Hazy left pulmonary opacities suggests subtle infiltrate, overall decreased since prior study.   2.  Trace left pleural effusion, decreased since prior study.   3.  Cardiomegaly      CT-CHEST,ABDOMEN,PELVIS WITH   Final Result      1.  Postoperative changes status post recent open heart surgery. Patient is status post CABG and there is an aortic valve replacement.   2.  Small pericardial and bilateral pleural effusions.   3.  Cholelithiasis with distended gallbladder and wall thickening raises concern for acute cholecystitis.   4.  Uroepithelial thickening in the left renal pelvis is nonspecific but could be seen with infection. Correlate with urinalysis.          ASSESSMENT AND PLAN:    Cholecystitis  Assessment & Plan  Acute devi   SP CABG 8/16 8/25 IR cholecystostomy  IV abx  8/27 Feeling better but WBC up.  Will follow  Will plan on lap devi in 4-6 weeks due to recent CABG             ____________________________________     Luisa Kidd M.D.    DD: 8/25/2023  9:34 AM

## 2023-08-27 NOTE — PROGRESS NOTES
Bedside report received from NOC RN. Assumed care of pt. Pt awake, laying in bed. A/Ox4, VSS. No concerns, complaints or distress. Pt educated to call before getting out of bed. POC reviewed and white board updated. Tele box on. SR 90 on the monitor. Call light in reach. Bed locked in lowest position with 2 upper bed rails up. Bed alarm on.

## 2023-08-27 NOTE — PROGRESS NOTES
Monitor Summary:     Rhythm: SB, SR  Rate: 58-88  Ectopy: (f) PVC  Measurements: 0.34/0.13/0.38           12 Hour Chart Check

## 2023-08-27 NOTE — CARE PLAN
The patient is Stable - Low risk of patient condition declining or worsening    Shift Goals  Clinical Goals: monitor labs, vitals  Patient Goals: rest, comfort  Family Goals: HELEN    Progress made toward(s) clinical / shift goals:    Problem: Hemodynamics  Goal: Patient's hemodynamics, fluid balance and neurologic status will be stable or improve  Outcome: Progressing     Problem: Fluid Volume  Goal: Fluid volume balance will be maintained  Outcome: Progressing     Problem: Pain - Standard  Goal: Alleviation of pain or a reduction in pain to the patient’s comfort goal  Outcome: Progressing       Patient is not progressing towards the following goals:

## 2023-08-28 LAB
ALBUMIN SERPL BCP-MCNC: 2.7 G/DL (ref 3.2–4.9)
ALBUMIN/GLOB SERPL: 1.2 G/DL
ALP SERPL-CCNC: 144 U/L (ref 30–99)
ALT SERPL-CCNC: 35 U/L (ref 2–50)
ANION GAP SERPL CALC-SCNC: 9 MMOL/L (ref 7–16)
AST SERPL-CCNC: 29 U/L (ref 12–45)
BACTERIA BLD CULT: NORMAL
BACTERIA WND AEROBE CULT: NORMAL
BASOPHILS # BLD AUTO: 0.6 % (ref 0–1.8)
BASOPHILS # BLD: 0.07 K/UL (ref 0–0.12)
BILIRUB SERPL-MCNC: 0.5 MG/DL (ref 0.1–1.5)
BUN SERPL-MCNC: 11 MG/DL (ref 8–22)
CALCIUM ALBUM COR SERPL-MCNC: 9.1 MG/DL (ref 8.5–10.5)
CALCIUM SERPL-MCNC: 8.1 MG/DL (ref 8.5–10.5)
CHLORIDE SERPL-SCNC: 105 MMOL/L (ref 96–112)
CO2 SERPL-SCNC: 23 MMOL/L (ref 20–33)
CREAT SERPL-MCNC: 0.74 MG/DL (ref 0.5–1.4)
EOSINOPHIL # BLD AUTO: 0.33 K/UL (ref 0–0.51)
EOSINOPHIL NFR BLD: 2.6 % (ref 0–6.9)
ERYTHROCYTE [DISTWIDTH] IN BLOOD BY AUTOMATED COUNT: 53.1 FL (ref 35.9–50)
GFR SERPLBLD CREATININE-BSD FMLA CKD-EPI: 84 ML/MIN/1.73 M 2
GLOBULIN SER CALC-MCNC: 2.3 G/DL (ref 1.9–3.5)
GLUCOSE SERPL-MCNC: 101 MG/DL (ref 65–99)
GRAM STN SPEC: NORMAL
HCT VFR BLD AUTO: 32.1 % (ref 37–47)
HGB BLD-MCNC: 10.4 G/DL (ref 12–16)
IMM GRANULOCYTES # BLD AUTO: 0.51 K/UL (ref 0–0.11)
IMM GRANULOCYTES NFR BLD AUTO: 4 % (ref 0–0.9)
LYMPHOCYTES # BLD AUTO: 1.62 K/UL (ref 1–4.8)
LYMPHOCYTES NFR BLD: 12.7 % (ref 22–41)
MCH RBC QN AUTO: 29.3 PG (ref 27–33)
MCHC RBC AUTO-ENTMCNC: 32.4 G/DL (ref 32.2–35.5)
MCV RBC AUTO: 90.4 FL (ref 81.4–97.8)
MONOCYTES # BLD AUTO: 1.09 K/UL (ref 0–0.85)
MONOCYTES NFR BLD AUTO: 8.6 % (ref 0–13.4)
NEUTROPHILS # BLD AUTO: 9.1 K/UL (ref 1.82–7.42)
NEUTROPHILS NFR BLD: 71.5 % (ref 44–72)
NRBC # BLD AUTO: 0.03 K/UL
NRBC BLD-RTO: 0.2 /100 WBC (ref 0–0.2)
PLATELET # BLD AUTO: 388 K/UL (ref 164–446)
PMV BLD AUTO: 9.1 FL (ref 9–12.9)
POTASSIUM SERPL-SCNC: 4 MMOL/L (ref 3.6–5.5)
PROT SERPL-MCNC: 5 G/DL (ref 6–8.2)
RBC # BLD AUTO: 3.55 M/UL (ref 4.2–5.4)
SIGNIFICANT IND 70042: NORMAL
SIGNIFICANT IND 70042: NORMAL
SITE SITE: NORMAL
SITE SITE: NORMAL
SODIUM SERPL-SCNC: 137 MMOL/L (ref 135–145)
SOURCE SOURCE: NORMAL
SOURCE SOURCE: NORMAL
WBC # BLD AUTO: 12.7 K/UL (ref 4.8–10.8)

## 2023-08-28 PROCEDURE — 700102 HCHG RX REV CODE 250 W/ 637 OVERRIDE(OP): Performed by: NURSE PRACTITIONER

## 2023-08-28 PROCEDURE — 700111 HCHG RX REV CODE 636 W/ 250 OVERRIDE (IP): Performed by: INTERNAL MEDICINE

## 2023-08-28 PROCEDURE — 700102 HCHG RX REV CODE 250 W/ 637 OVERRIDE(OP): Performed by: HOSPITALIST

## 2023-08-28 PROCEDURE — A9270 NON-COVERED ITEM OR SERVICE: HCPCS

## 2023-08-28 PROCEDURE — 99231 SBSQ HOSP IP/OBS SF/LOW 25: CPT | Performed by: SURGERY

## 2023-08-28 PROCEDURE — A9270 NON-COVERED ITEM OR SERVICE: HCPCS | Performed by: STUDENT IN AN ORGANIZED HEALTH CARE EDUCATION/TRAINING PROGRAM

## 2023-08-28 PROCEDURE — A9270 NON-COVERED ITEM OR SERVICE: HCPCS | Performed by: HOSPITALIST

## 2023-08-28 PROCEDURE — 99232 SBSQ HOSP IP/OBS MODERATE 35: CPT | Performed by: STUDENT IN AN ORGANIZED HEALTH CARE EDUCATION/TRAINING PROGRAM

## 2023-08-28 PROCEDURE — 700102 HCHG RX REV CODE 250 W/ 637 OVERRIDE(OP)

## 2023-08-28 PROCEDURE — A9270 NON-COVERED ITEM OR SERVICE: HCPCS | Performed by: NURSE PRACTITIONER

## 2023-08-28 PROCEDURE — 770020 HCHG ROOM/CARE - TELE (206)

## 2023-08-28 PROCEDURE — 80053 COMPREHEN METABOLIC PANEL: CPT

## 2023-08-28 PROCEDURE — 700111 HCHG RX REV CODE 636 W/ 250 OVERRIDE (IP): Performed by: STUDENT IN AN ORGANIZED HEALTH CARE EDUCATION/TRAINING PROGRAM

## 2023-08-28 PROCEDURE — 85025 COMPLETE CBC W/AUTO DIFF WBC: CPT

## 2023-08-28 PROCEDURE — 700102 HCHG RX REV CODE 250 W/ 637 OVERRIDE(OP): Performed by: STUDENT IN AN ORGANIZED HEALTH CARE EDUCATION/TRAINING PROGRAM

## 2023-08-28 PROCEDURE — 36415 COLL VENOUS BLD VENIPUNCTURE: CPT

## 2023-08-28 RX ORDER — LISINOPRIL 10 MG/1
10 TABLET ORAL
Status: DISCONTINUED | OUTPATIENT
Start: 2023-08-29 | End: 2023-08-30 | Stop reason: HOSPADM

## 2023-08-28 RX ADMIN — LISINOPRIL 20 MG: 20 TABLET ORAL at 06:35

## 2023-08-28 RX ADMIN — ROSUVASTATIN CALCIUM 40 MG: 20 TABLET, FILM COATED ORAL at 17:52

## 2023-08-28 RX ADMIN — LEVOTHYROXINE SODIUM 75 MCG: 0.07 TABLET ORAL at 06:34

## 2023-08-28 RX ADMIN — OMEPRAZOLE 20 MG: 20 CAPSULE, DELAYED RELEASE ORAL at 17:52

## 2023-08-28 RX ADMIN — AMIODARONE HYDROCHLORIDE 400 MG: 200 TABLET ORAL at 17:52

## 2023-08-28 RX ADMIN — METRONIDAZOLE 500 MG: 500 INJECTION, SOLUTION INTRAVENOUS at 18:05

## 2023-08-28 RX ADMIN — OMEPRAZOLE 20 MG: 20 CAPSULE, DELAYED RELEASE ORAL at 06:36

## 2023-08-28 RX ADMIN — METRONIDAZOLE 500 MG: 500 INJECTION, SOLUTION INTRAVENOUS at 06:46

## 2023-08-28 RX ADMIN — METOPROLOL TARTRATE 50 MG: 50 TABLET, FILM COATED ORAL at 17:52

## 2023-08-28 RX ADMIN — AMIODARONE HYDROCHLORIDE 400 MG: 200 TABLET ORAL at 06:35

## 2023-08-28 RX ADMIN — OXYMETAZOLINE HCL 2 SPRAY: 0.05 SPRAY NASAL at 06:00

## 2023-08-28 RX ADMIN — OXYCODONE HYDROCHLORIDE 10 MG: 10 TABLET, FILM COATED, EXTENDED RELEASE ORAL at 06:58

## 2023-08-28 RX ADMIN — ASPIRIN 81 MG: 81 TABLET, COATED ORAL at 06:36

## 2023-08-28 RX ADMIN — APIXABAN 5 MG: 5 TABLET, FILM COATED ORAL at 06:34

## 2023-08-28 RX ADMIN — METOPROLOL TARTRATE 50 MG: 50 TABLET, FILM COATED ORAL at 06:36

## 2023-08-28 RX ADMIN — OXYCODONE HYDROCHLORIDE 10 MG: 10 TABLET, FILM COATED, EXTENDED RELEASE ORAL at 17:52

## 2023-08-28 RX ADMIN — CEFTRIAXONE SODIUM 2000 MG: 10 INJECTION, POWDER, FOR SOLUTION INTRAVENOUS at 06:34

## 2023-08-28 RX ADMIN — APIXABAN 5 MG: 5 TABLET, FILM COATED ORAL at 17:53

## 2023-08-28 ASSESSMENT — ENCOUNTER SYMPTOMS
CHILLS: 0
WEAKNESS: 0
HEADACHES: 0
ABDOMINAL PAIN: 0
VOMITING: 0
PALPITATIONS: 0
NAUSEA: 0
FEVER: 0
SHORTNESS OF BREATH: 0

## 2023-08-28 ASSESSMENT — COGNITIVE AND FUNCTIONAL STATUS - GENERAL
MOVING TO AND FROM BED TO CHAIR: A LITTLE
HELP NEEDED FOR BATHING: A LITTLE
SUGGESTED CMS G CODE MODIFIER MOBILITY: CJ
MOBILITY SCORE: 21
SUGGESTED CMS G CODE MODIFIER DAILY ACTIVITY: CI
CLIMB 3 TO 5 STEPS WITH RAILING: A LITTLE
MOVING FROM LYING ON BACK TO SITTING ON SIDE OF FLAT BED: A LITTLE
DAILY ACTIVITIY SCORE: 23

## 2023-08-28 ASSESSMENT — PAIN DESCRIPTION - PAIN TYPE: TYPE: ACUTE PAIN;SURGICAL PAIN

## 2023-08-28 NOTE — PROGRESS NOTES
Education given to pt regarding the draining and care of percutaneous cholecystostomy drain. All questions answered. Pt appears physically relieved at being given the education and is thankful for it.

## 2023-08-28 NOTE — DISCHARGE PLANNING
Care Transition Team Assessment    RN NICO spoke with patient at bedside. Role of CM explained. Demographic info verified. Lives with  in [place]. Patient recently discharged home from hospital with home health services. Spoke with patient at bedside and she would like to resume home health, with [facility], upon discharge. to take home upon discharge.     Information Source  Orientation Level: Oriented X4  Information Given By: Patient  Who is responsible for making decisions for patient? : Patient    Elopement Risk  Legal Hold: No  Ambulatory or Self Mobile in Wheelchair: Yes  Disoriented: No  Psychiatric Symptoms: None  History of Wandering: No  Elopement this Admit: No  Vocalizing Wanting to Leave: No  Displays Behaviors, Body Language Wanting to Leave: No-Not at Risk for Elopement  Elopement Risk: Not at Risk for Elopement    Interdisciplinary Discharge Planning  Lives with - Patient's Self Care Capacity: Spouse  Patient or legal guardian wants to designate a caregiver: No  Support Systems: Friends / Neighbors, Spouse / Significant Other  Housing / Facility: 1 Story House  Able to Return to Previous ADL's: Yes  Mobility Issues: No  Prior Services: Skilled Home Health Services ([facility])  Durable Medical Equipment: Not Applicable    Discharge Preparedness  What is your plan after discharge?: Home health care  Difficulity with ADLs: None  Difficulity with IADLs: None    Vision / Hearing Impairment  Vision Impairment : No  Right Eye Vision: Impaired, Wears Glasses  Left Eye Vision: Impaired, Wears Glasses  Hearing Impairment : No    Domestic Abuse  Have you ever been the victim of abuse or violence?: No  Physical Abuse or Sexual Abuse: No  Verbal Abuse or Emotional Abuse: No  Possible Abuse/Neglect Reported to:: Not Applicable    Anticipated Discharge Information  Discharge Disposition: D/T to home under A care in anticipation of covered skilled care (06)

## 2023-08-28 NOTE — PROGRESS NOTES
Bedside report received from NOC RN. Assumed care of pt. Pt awake, laying in bed. A/Ox4, VSS. No concerns, complaints or distress. Pt educated to call before getting out of bed. POC reviewed and white board updated. Tele box on.  on the monitor. Call light in reach. Bed locked in lowest position with 2 upper bed rails up. Bed alarm on.

## 2023-08-28 NOTE — CARE PLAN
The patient is Stable - Low risk of patient condition declining or worsening    Shift Goals  Clinical Goals: Monitor labs, VS  Patient Goals: comfort, rest  Family Goals: HELEN    Progress made toward(s) clinical / shift goals:    Problem: Hemodynamics  Goal: Patient's hemodynamics, fluid balance and neurologic status will be stable or improve  Outcome: Progressing     Problem: Respiratory  Goal: Patient will achieve/maintain optimum respiratory ventilation and gas exchange  Outcome: Progressing     Problem: Psychosocial  Goal: Patient's level of anxiety will decrease  Outcome: Progressing       Patient is not progressing towards the following goals:

## 2023-08-28 NOTE — HOSPITAL COURSE
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  Her  had found her slumped over and unresponsive in the bathroom later that day.  Upon arrival in the emergency room her systolic blood pressure was in the 50s.  She had significant abdominal pain.  She was admitted for septic shock and concern of cholecystitis.  Due to instability she was not taken to surgery but had a cholecystostomy tube placed.  Patient was initially on pressors and these have been weaned off.  I was consulted to evaluate patient and take over her care out of the ICU.     8/26: Patient is alert pleasant and speaking in full sentences.  She is oriented x3.  She states some discomfort from the tube in the right upper quadrant.  I have discussed the case with Dr. Kidd who states she will have outpatient general surgery for removal of her gallbladder in the future.  When stable the patient will go home with a cholecystostomy tube.  Patient will need follow-up with general surgery.

## 2023-08-28 NOTE — PROGRESS NOTES
"  DATE: 8/28/2023    HD 5 Devi Tube    INTERVAL EVENTS:  Doing well.  WBC down.  Tolerating a regular diet    PHYSICAL EXAMINATION:  Vital Signs: BP (!) 157/91   Pulse 100   Temp 36.1 °C (97 °F) (Temporal)   Resp 16   Ht 1.702 m (5' 7\")   Wt 86.3 kg (190 lb 4.1 oz)   SpO2 95%   GENERAL:  No acute distress  HEENT: Pupils equal, round and reactive to light bilaterally.  Sclera are clear bilaterally.  No otorrhea.  No rhinorrhea.  Mucus membranes are moist.  CHEST:  Lungs are clear to auscultation bilaterally  CARDIOVASCULAR:  Regular rate and rhythm  ABDOMEN: Soft, tender over the RUQ, non-distended, devi tube with thick bilious drainage.  GENITOURINARY:  No maynard in place, voiding without issues  EXTREMITIES:  Good range of motion through out  NEUROLOGIC:  Alert and oriented.  Cranial Nerves II through XII are grossly intact, no focal deficits appreciated  PSYCHIATRIC:  Normal affect and mood appropriate for age and condition  SKIN:  Warm and well perfused, no rashes    LABORATORY VALUES:  Recent Labs     08/26/23  0515 08/26/23  2108 08/27/23  0138 08/27/23  1019 08/27/23  2109 08/28/23  0333   WBC 17.1*  --  19.1*  --   --  12.7*   RBC 2.72*  --  3.65*  --   --  3.55*   HEMOGLOBIN 7.9*   < > 10.6* 10.3* 10.7* 10.4*   HEMATOCRIT 24.4*   < > 33.1* 31.2* 31.8* 32.1*   MCV 89.7  --  90.7  --   --  90.4   MCH 29.0  --  29.0  --   --  29.3   MCHC 32.4  --  32.0*  --   --  32.4   RDW 49.7  --  52.8*  --   --  53.1*   PLATELETCT 343  --  387  --   --  388   MPV 9.3  --  9.3  --   --  9.1    < > = values in this interval not displayed.     Recent Labs     08/26/23  0505 08/27/23  1019   SODIUM 137 135   POTASSIUM 3.5* 3.3*   CHLORIDE 104 104   CO2 22 20   GLUCOSE 109* 96   BUN 17 11   CREATININE 0.67 0.76   CALCIUM 8.2* 7.8*     Recent Labs     08/26/23  0505 08/27/23  1019   ASTSGOT 32 30   ALTSGPT 46 40   TBILIRUBIN 0.5 0.4   ALKPHOSPHAT 172* 162*   GLOBULIN 2.4 2.4           IMAGING:  CT-DRAIN-CHOLECTYSTOSTOMY "   Final Result      1. Percutaneous cholecystostomy tube placement with CT guidance.      US-RUQ   Final Result         1.  Cholelithiasis and gallbladder sludge with gallbladder wall thickening, sonographically compatible with cholecystitis.   2.  Common bile duct dilatation, consider causes of biliary obstruction with additional workup as clinically appropriate.   3.  Right pleural effusion      DX-CHEST-PORTABLE (1 VIEW)   Final Result         1.  Hazy left pulmonary opacities suggests subtle infiltrate, overall decreased since prior study.   2.  Trace left pleural effusion, decreased since prior study.   3.  Cardiomegaly      CT-CHEST,ABDOMEN,PELVIS WITH   Final Result      1.  Postoperative changes status post recent open heart surgery. Patient is status post CABG and there is an aortic valve replacement.   2.  Small pericardial and bilateral pleural effusions.   3.  Cholelithiasis with distended gallbladder and wall thickening raises concern for acute cholecystitis.   4.  Uroepithelial thickening in the left renal pelvis is nonspecific but could be seen with infection. Correlate with urinalysis.          ASSESSMENT AND PLAN:    Cholecystitis  Assessment & Plan  Acute devi   SP CABG 8/16 8/25 IR cholecystostomy  IV abx  8/27 Feeling better but WBC up.  Will follow  Will plan on lap devi in 4-6 weeks due to recent CABG      OK for discharge from a surgical perspective with devi tube  Replace devi tube if falls out  Rec 10-14 day duration of IV/PO antibiotics  [place] [facility] [place]  Discussed drain teaching with RN     ____________________________________     Wilfrido Yan M.D.

## 2023-08-28 NOTE — PROGRESS NOTES
Hospital Medicine Daily Progress Note    Date of Service  8/27/2023    Chief Complaint  Margoth Hopkins is a 72 y.o. female admitted 8/23/2023 with ALOC.    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  Her  had found her slumped over and unresponsive in the bathroom later that day.  Upon arrival in the emergency room her systolic blood pressure was in the 50s.  She had significant abdominal pain.  She was admitted for septic shock and concern of cholecystitis.  Due to instability she was not taken to surgery but had a cholecystostomy tube placed.  Patient was initially on pressors and these have been weaned off.  I was consulted to evaluate patient and take over her care out of the ICU.     8/26: Patient is alert pleasant and speaking in full sentences.  She is oriented x3.  She states some discomfort from the tube in the right upper quadrant.  I have discussed the case with Dr. Kidd who states she will have outpatient general surgery for removal of her gallbladder in the future.  When stable the patient will go home with a cholecystostomy tube.  Patient will need follow-up with general surgery.    Interval Problem Update  8/27  Exam  Patient room, afebrile, pulse 80s to 90s respiratory rate 16-18 systolic blood pressure 140s to 170s pulse ox 93 to 97% on 1-3 liters nasal cannula.  Labs show leukocytosis, stable anemia, hypokalemia low albumin and total protein.  Potassium replaced.  Metoprolol increased.  Restarted Crestor, amiodarone taper recommended by cardiology, Eliquis, aspirin.  Feeling improved, mild abdominal discomfort at site of her drains, controlled with current pain medication regimen.   Hypertensive today, increase  metoprolol.  Encourage out of bed to chair, mobilization.    I have discussed this patient's plan of care and discharge plan at IDT rounds today with Case Management, Nursing, Nursing leadership, and other members of the IDT team.    Consultants/Specialty  critical care and general surgery    Code Status  Prior    Disposition  The patient is not medically cleared for discharge to home or a post-acute facility.      I have placed the appropriate orders for post-discharge needs.    Review of Systems  ROS   Review of Systems   Constitutional:  Positive for chills and malaise/fatigue. Negative for fever.   Respiratory:  Negative for cough, shortness of breath and stridor.    Cardiovascular:  Negative for chest pain, palpitations and leg swelling.   Gastrointestinal:  Positive for abdominal pain. Negative for diarrhea and nausea.   Genitourinary:  Negative for dysuria and hematuria.   Musculoskeletal:  Negative for back pain and joint pain.   Skin:  Negative for rash.   Neurological:  Negative for speech change and headaches.   Psychiatric/Behavioral:  The patient is not nervous/anxious    Physical Exam  Temp:  [36 °C (96.8 °F)-37.3 °C (99.1 °F)] 37 °C (98.6 °F)  Pulse:  [80-99] 86  Resp:  [16-18] 17  BP: (141-177)/(81-98) 141/88  SpO2:  [93 %-97 %] 95 %    Physical Exam  Vitals reviewed.   Constitutional:       Appearance: Normal appearance. She is not diaphoretic.  Pleasant mood,  at bedside, no distress, nontoxic  HENT:      Head: Normocephalic and atraumatic.      Nose: Nose normal.      Mouth: Mucous membranes are moist.      Pharynx: No oropharyngeal exudate.   Eyes:      General: No scleral icterus.         Extraocular Movements: Extraocular movements intact.      Conjunctiva/sclera: Conjunctivae normal.   Cardiovascular:      Rate and Rhythm: Normal rate and regular rhythm.      Pulses:           Radial pulses are 2+ on the right side and 2+ on the left side.        Dorsalis pedis pulses are 2+ on the  right side and 2+ on the left side.      Heart sounds: No murmur heard.  Pulmonary:      Effort: Pulmonary effort is normal. No respiratory distress.      Breath sounds: Normal breath sounds. No wheezing or rales.   Abdominal:      General: Bowel sounds are normal. There is no distension.      Palpations: Abdomen is soft.      Tenderness: There is mild right upper quadrant abdominal tenderness, improved.      Comments: RUQ abdominal drain w/ dark bilious looking fluid in JENNIFER drain/bag   Musculoskeletal:         General: No swelling or tenderness.      Cervical back: No tenderness. No muscular tenderness.      Right lower leg: No edema.      Left lower leg: No edema.   Skin:     Coloration: Skin is not jaundiced or pale.      Comments: Sterna surgical site appears with normal healing, good approximation of surgical edges, no discharge.  No erythema   Neurological:      General: No focal deficit present.      Mental Status: She is alert and oriented to person, place, and time. Mental status is at baseline.      Cranial Nerves: No cranial nerve deficit.   Psychiatric:         Mood and Affect: Mood normal.         Behavior: Behavior normal.     Fluids    Intake/Output Summary (Last 24 hours) at 8/28/2023 0142  Last data filed at 8/27/2023 2300  Gross per 24 hour   Intake 420 ml   Output 550 ml   Net -130 ml       Laboratory  Recent Labs     08/25/23  0335 08/25/23  1100 08/26/23  0515 08/26/23  2108 08/27/23  0138 08/27/23  1019 08/27/23  2109   WBC 18.0*  --  17.1*  --  19.1*  --   --    RBC 2.86*  --  2.72*  --  3.65*  --   --    HEMOGLOBIN 8.4*   < > 7.9*   < > 10.6* 10.3* 10.7*   HEMATOCRIT 25.5*   < > 24.4*   < > 33.1* 31.2* 31.8*   MCV 89.2  --  89.7  --  90.7  --   --    MCH 29.4  --  29.0  --  29.0  --   --    MCHC 32.9  --  32.4  --  32.0*  --   --    RDW 48.7  --  49.7  --  52.8*  --   --    PLATELETCT 342  --  343  --  387  --   --    MPV 9.6  --  9.3  --  9.3  --   --     < > = values in this interval not  displayed.     Recent Labs     08/25/23  0335 08/26/23  0505 08/27/23  1019   SODIUM 135 137 135   POTASSIUM 4.0 3.5* 3.3*   CHLORIDE 104 104 104   CO2 22 22 20   GLUCOSE 145* 109* 96   BUN 23* 17 11   CREATININE 0.87 0.67 0.76   CALCIUM 7.8* 8.2* 7.8*                   Imaging  CT-DRAIN-CHOLECTYSTOSTOMY   Final Result      1. Percutaneous cholecystostomy tube placement with CT guidance.      US-RUQ   Final Result         1.  Cholelithiasis and gallbladder sludge with gallbladder wall thickening, sonographically compatible with cholecystitis.   2.  Common bile duct dilatation, consider causes of biliary obstruction with additional workup as clinically appropriate.   3.  Right pleural effusion      DX-CHEST-PORTABLE (1 VIEW)   Final Result         1.  Hazy left pulmonary opacities suggests subtle infiltrate, overall decreased since prior study.   2.  Trace left pleural effusion, decreased since prior study.   3.  Cardiomegaly      CT-CHEST,ABDOMEN,PELVIS WITH   Final Result      1.  Postoperative changes status post recent open heart surgery. Patient is status post CABG and there is an aortic valve replacement.   2.  Small pericardial and bilateral pleural effusions.   3.  Cholelithiasis with distended gallbladder and wall thickening raises concern for acute cholecystitis.   4.  Uroepithelial thickening in the left renal pelvis is nonspecific but could be seen with infection. Correlate with urinalysis.           Assessment/Plan  * Shock (HCC)- (present on admission)  Assessment & Plan  Resolved with fluids and pressors.  Monitor I/O's, vitals and labs    H/O aortic valve replacement with porcine valve  Assessment & Plan  POD ~#10  Dr Rodriguez, Cardiothoracic surgeon  Medical management.    Atrial fibrillation (HCC)  Assessment & Plan  Patient was on diltiazem drip  Reinitiating on metoprolol  Monitor vitals and on telemetry    Transaminitis  Assessment & Plan  Due to cholecystitis  S/P perc devi drain  Surgery  consulting and state that it will be a few weeks prior to any surgery  Antibiotics    Hypokalemia  Assessment & Plan  Replace and monitor    GAURI (acute kidney injury) (HCC)  Assessment & Plan  Improved of IV fluids  Avoid nephrotoxins      Cholecystitis  Assessment & Plan  Required percutaneous cholecystomy tube.  Look at 10 days of oral omnicef and flagyl 500mg BID x 10 days  Surgery will follow up in next few weeks and schedule for outpatient surgery    Benign essential HTN- (present on admission)  Assessment & Plan  8/26 reinitiate antihypertensive medications and adjust lisinopril up to 20 mg twice a day with parameters         VTE prophylaxis:   SCDs/TEDs   therapeutic anticoagulation with eliquis 5 mg BID      I have performed a physical exam and reviewed and updated ROS and Plan today (8/27/2023). In review of yesterday's note (8/26/2023), there are no changes except as documented above.

## 2023-08-28 NOTE — CARE PLAN
The patient is Stable - Low risk of patient condition declining or worsening    Shift Goals  Clinical Goals: Monitor Labs & BP  Patient Goals: Comfort, Rest  Family Goals: HELEN    Progress made toward(s) clinical / shift goals:      Patient's pain managed with PRN Pain medications per the MAR, repositioning, and extra pillows. Patient progressing well with ambulation, using IS. 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       Problem: Hemodynamics  Goal: Patient's hemodynamics, fluid balance and neurologic status will be stable or improve  Outcome: Progressing     Problem: Pain - Standard  Goal: Alleviation of pain or a reduction in pain to the patient’s comfort goal  Outcome: Progressing       Patient is not progressing towards the following goals: NA

## 2023-08-28 NOTE — DOCUMENTATION QUERY
[facility]                                                                       Query Response Note      PATIENT:               IAN JOHNSON  ACCT #:                  [id number]  MRN:                     [id number]  :                      1950  ADMIT DATE:       2023 7:26 PM  DISCH DATE:          RESPONDING  PROVIDER #:        [id number]           QUERY TEXT:    Shock is documented with multifactorial ddx including sepsis from cholecystitis, hemorrhagic from GI bleed, and hypovolemic from 3 days of diarrhea  Based on the clinical indicators documented and after further study, can the type of shock be further specified?     The patient's Clinical Indicators include:  Hgb since arrival: 10.3 -> 11.1 -> 9.4 -> 8.4 -> 8.1    ED note -  Disposition - Sepsis with encephalopathy and septic shock   CC consult - Multifactorial ddx includes: Sepsis from cholecystitis. Hemorrhagic from GI bleed. Hypovolemic from 3 days of diarrhea   PN - No bloody stools. Hgb in 8.4-9-10s...1 episode of maroon stool, hasn't had any more bloody stools    Treatment - IV Ceftriaxone, Flagyl; IV NS bolus x2; planned cholecystostomy tube; repeat labs and monitoring    Risk factors - Sepsis, pleural effusion, GAURI, cholecystitis, emphysema, recent CABG x1 and aortic valve replacement    Thank you,  Kadi Haines BSN  Clinical   Connect via [employer]  Options provided:   -- Septic shock   -- Hypovolemic shock   -- Hemorrhagic  shock   -- Shock due to sepsis and hypovolemia   -- Shock due to sepsis, hemorrhage, and hypovolemia   -- Other explanation, (please specify the other explanation)      Query created by: Kadi Haines on 2023 12:57 PM    RESPONSE TEXT:    Septic shock       QUERY TEXT:    Patient presents for hypotension after just being discharged after having CABG x1 and aortic valve replacement  and found to  have shock due to sepsis, hypovolemic w/ GI bleed and diarrhea x 3 days.   Admit vitals were , RR 20, SpO2 90% on RA and later placed on 5L O2 NC with P/F ratio 80/0.40 = 200.  ED note and H&P found no respiratory distress.  Based on the clinical indicators documented, can a diagnosis be made?      The patient's clinical indicators include:  Admit vitals: @ 1934 , RR 20, SpO2 90% on RA ->   @ 2056 RR 17, Spo2 89% RA ->   @2226 SpO2 95% on 5L NC (P/F ratio 80/0.40 = 200)    ED note -  Breath sounds normal...monitor for hypoxia associated with medication administration/side effect  8/23 CC consult - no respiratory distress    Treatment - 5L O2 NC; IV Ceftriaxone, Flagyl; IV NS bolus x2    Risk factors - Sepsis, shock, pleural effusion, GAURI, cholecystitis, Emphysema, recent CABG x1 and aortic valve replacement    Thank you,  Kadi Haines BSN  Clinical   Connect via [employer]  Options provided:   -- Acute respiratory failure w/ hypoxia   -- Findings of no clinical significance   -- Other explanation, (please specify the other explanation)   -- Unable to determine      Query created by: Kadi Haines on 8/25/2023 12:57 PM    RESPONSE TEXT:    Findings of no clinical significance          Electronically signed by:  EMELY LEWIS DO 8/28/2023 11:54 AM

## 2023-08-28 NOTE — PROGRESS NOTES
Radiology Progress Note   Author: BIANCA Navas Date & Time created: 8/28/2023  9:31 AM   Date of admission  8/23/2023  Note to reader: this note follows the APSO format rather than the historical SOAP format. Assessment and plan located at the top of the note for ease of use.    Chief Complaint  72 y.o. female admitted 8/23/2023 with   Chief Complaint   Patient presents with    ALOC         HPI  72-year-old female with past medical history of hypothyroid, dyslipidemia, hypertension, gastric ulcer, and very recent aortic valve replacement and CABG x 1 admitted for septic shock.  CT showed cholelithiasis with distended gallbladder and wall thickening concerning for acute cholecystitis.  Follow-up ultrasound also consistent with cholecystitis.  Due to patient's recent heart surgery, patient was not deemed a suitable surgical candidate and subsequently underwent a CT-guided percutaneous cholecystostomy drain by IR Dr. Mabry on 08/25/2023.     Interval history:  08/28/2023 - Devi drain with 150 mL biliary output in the last 24 hours.  WBC 12.7 down from 19.1 yesterday.  T. bili 0.4 and alk phos 162 yesterday. Drain flushed with 10 mL NS.    Assessment/Plan     Principal Problem:    Shock (HCC)  Active Problems:    Benign essential HTN    GI bleed    Cholecystitis    GAURI (acute kidney injury) (HCC)    Hypokalemia    Transaminitis    Atrial fibrillation (HCC)    H/O aortic valve replacement with porcine valve      Plan IR  - Plan for lap devi in 4 to 6 weeks as outpatient per surgical note.  - Irrigate Devi drain with 10 ml of sterile saline q shift.  Do not aspirate back.   - Patient will need to be discharged with normal saline flushes for daily Devi drain care.      -  -Thank you for allowing Interventional Radiology team to participate in the patients care, if any additional care or requests are needed in the future please do not hesitate call or place IR order   [phone]           Review of  Systems  Physical Exam   Review of Systems   Constitutional:  Negative for chills and fever.   Respiratory:  Negative for shortness of breath.    Cardiovascular:  Negative for chest pain and palpitations.   Gastrointestinal:  Negative for abdominal pain, nausea and vomiting.   Neurological:  Negative for weakness and headaches.      Vitals:    08/28/23 0715   BP: (!) 157/91   Pulse: 100   Resp: 16   Temp: 36.1 °C (97 °F)   SpO2: 95%        Physical Exam  Constitutional:       Appearance: Normal appearance.   Cardiovascular:      Rate and Rhythm: Normal rate and regular rhythm.   Abdominal:      Palpations: Abdomen is soft.      Tenderness: There is abdominal tenderness.      Comments: [place] drain in place   Skin:     General: Skin is warm and dry.   Neurological:      General: No focal deficit present.      Mental Status: She is alert and oriented to person, place, and time.   Psychiatric:         Mood and Affect: Mood normal.         Behavior: Behavior normal.             Labs    Recent Labs     08/26/23  0515 08/26/23 2108 08/27/23  0138 08/27/23  1019 08/27/23 2109 08/28/23  0333   WBC 17.1*  --  19.1*  --   --  12.7*   RBC 2.72*  --  3.65*  --   --  3.55*   HEMOGLOBIN 7.9*   < > 10.6* 10.3* 10.7* 10.4*   HEMATOCRIT 24.4*   < > 33.1* 31.2* 31.8* 32.1*   MCV 89.7  --  90.7  --   --  90.4   MCH 29.0  --  29.0  --   --  29.3   MCHC 32.4  --  32.0*  --   --  32.4   RDW 49.7  --  52.8*  --   --  53.1*   PLATELETCT 343  --  387  --   --  388   MPV 9.3  --  9.3  --   --  9.1    < > = values in this interval not displayed.     Recent Labs     08/26/23  0505 08/27/23  1019   SODIUM 137 135   POTASSIUM 3.5* 3.3*   CHLORIDE 104 104   CO2 22 20   GLUCOSE 109* 96   BUN 17 11   CREATININE 0.67 0.76   CALCIUM 8.2* 7.8*     Recent Labs     08/26/23  0505 08/27/23  1019   ALBUMIN 2.6* 2.6*   TBILIRUBIN 0.5 0.4   ALKPHOSPHAT 172* 162*   TOTPROTEIN 5.0* 5.0*   ALTSGPT 46 40   ASTSGOT 32 30   CREATININE 0.67 0.76  "    CT-DRAIN-CHOLECTYSTOSTOMY   Final Result      1. Percutaneous cholecystostomy tube placement with CT guidance.      US-RUQ   Final Result         1.  Cholelithiasis and gallbladder sludge with gallbladder wall thickening, sonographically compatible with cholecystitis.   2.  Common bile duct dilatation, consider causes of biliary obstruction with additional workup as clinically appropriate.   3.  Right pleural effusion      DX-CHEST-PORTABLE (1 VIEW)   Final Result         1.  Hazy left pulmonary opacities suggests subtle infiltrate, overall decreased since prior study.   2.  Trace left pleural effusion, decreased since prior study.   3.  Cardiomegaly      CT-CHEST,ABDOMEN,PELVIS WITH   Final Result      1.  Postoperative changes status post recent open heart surgery. Patient is status post CABG and there is an aortic valve replacement.   2.  Small pericardial and bilateral pleural effusions.   3.  Cholelithiasis with distended gallbladder and wall thickening raises concern for acute cholecystitis.   4.  Uroepithelial thickening in the left renal pelvis is nonspecific but could be seen with infection. Correlate with urinalysis.          INR   Date Value Ref Range Status   08/24/2023 1.25 (H) 0.87 - 1.13 Final     Comment:     INR - Non-therapeutic Reference Range: 0.87-1.13  INR - Therapeutic Reference Range: 2.0-4.0       No results found for: \"POCINR\"     Intake/Output Summary (Last 24 hours) at 8/28/2023 0931  Last data filed at 8/27/2023 2300  Gross per 24 hour   Intake 420 ml   Output 350 ml   Net 70 ml      Labs not explicitly included in this progress note were reviewed by the author. Radiology/imaging not explicitly included in this progress note was reviewed by the author.     I have performed a physical exam and reviewed and updated ROS and Plan today (8/28/2023).     45 minutes in directly providing and coordinating care and extensive data review.  No time overlap and excludes procedures.   "

## 2023-08-29 PROBLEM — I48.0 PAROXYSMAL ATRIAL FIBRILLATION (HCC): Status: ACTIVE | Noted: 2023-08-24

## 2023-08-29 PROBLEM — R73.03 PREDIABETES: Status: ACTIVE | Noted: 2023-08-29

## 2023-08-29 PROBLEM — K80.10 CHOLELITHIASIS WITH CHOLECYSTITIS: Status: ACTIVE | Noted: 2023-08-24

## 2023-08-29 PROBLEM — E83.42 HYPOMAGNESEMIA: Status: ACTIVE | Noted: 2023-08-29

## 2023-08-29 PROBLEM — D64.9 ANEMIA: Status: ACTIVE | Noted: 2023-08-29

## 2023-08-29 LAB
ALBUMIN SERPL BCP-MCNC: 2.7 G/DL (ref 3.2–4.9)
ALBUMIN/GLOB SERPL: 1.2 G/DL
ALP SERPL-CCNC: 124 U/L (ref 30–99)
ALT SERPL-CCNC: 28 U/L (ref 2–50)
ANION GAP SERPL CALC-SCNC: 8 MMOL/L (ref 7–16)
AST SERPL-CCNC: 21 U/L (ref 12–45)
BACTERIA BLD CULT: NORMAL
BILIRUB SERPL-MCNC: 0.6 MG/DL (ref 0.1–1.5)
BUN SERPL-MCNC: 11 MG/DL (ref 8–22)
CALCIUM ALBUM COR SERPL-MCNC: 9.1 MG/DL (ref 8.5–10.5)
CALCIUM SERPL-MCNC: 8.1 MG/DL (ref 8.5–10.5)
CHLORIDE SERPL-SCNC: 105 MMOL/L (ref 96–112)
CO2 SERPL-SCNC: 25 MMOL/L (ref 20–33)
CREAT SERPL-MCNC: 0.81 MG/DL (ref 0.5–1.4)
ERYTHROCYTE [DISTWIDTH] IN BLOOD BY AUTOMATED COUNT: 51.1 FL (ref 35.9–50)
GFR SERPLBLD CREATININE-BSD FMLA CKD-EPI: 76 ML/MIN/1.73 M 2
GLOBULIN SER CALC-MCNC: 2.2 G/DL (ref 1.9–3.5)
GLUCOSE SERPL-MCNC: 117 MG/DL (ref 65–99)
HCT VFR BLD AUTO: 32.5 % (ref 37–47)
HGB BLD-MCNC: 10.8 G/DL (ref 12–16)
MAGNESIUM SERPL-MCNC: 1.6 MG/DL (ref 1.5–2.5)
MCH RBC QN AUTO: 30 PG (ref 27–33)
MCHC RBC AUTO-ENTMCNC: 33.2 G/DL (ref 32.2–35.5)
MCV RBC AUTO: 90.3 FL (ref 81.4–97.8)
NT-PROBNP SERPL IA-MCNC: 784 PG/ML (ref 0–125)
PHOSPHATE SERPL-MCNC: 3 MG/DL (ref 2.5–4.5)
PLATELET # BLD AUTO: 438 K/UL (ref 164–446)
PMV BLD AUTO: 9.1 FL (ref 9–12.9)
POTASSIUM SERPL-SCNC: 4.5 MMOL/L (ref 3.6–5.5)
PROT SERPL-MCNC: 4.9 G/DL (ref 6–8.2)
RBC # BLD AUTO: 3.6 M/UL (ref 4.2–5.4)
SIGNIFICANT IND 70042: NORMAL
SITE SITE: NORMAL
SODIUM SERPL-SCNC: 138 MMOL/L (ref 135–145)
SOURCE SOURCE: NORMAL
WBC # BLD AUTO: 14.5 K/UL (ref 4.8–10.8)

## 2023-08-29 PROCEDURE — 83880 ASSAY OF NATRIURETIC PEPTIDE: CPT

## 2023-08-29 PROCEDURE — 770020 HCHG ROOM/CARE - TELE (206)

## 2023-08-29 PROCEDURE — 700111 HCHG RX REV CODE 636 W/ 250 OVERRIDE (IP): Mod: JZ | Performed by: FAMILY MEDICINE

## 2023-08-29 PROCEDURE — 700102 HCHG RX REV CODE 250 W/ 637 OVERRIDE(OP): Performed by: NURSE PRACTITIONER

## 2023-08-29 PROCEDURE — 700111 HCHG RX REV CODE 636 W/ 250 OVERRIDE (IP): Performed by: STUDENT IN AN ORGANIZED HEALTH CARE EDUCATION/TRAINING PROGRAM

## 2023-08-29 PROCEDURE — A9270 NON-COVERED ITEM OR SERVICE: HCPCS

## 2023-08-29 PROCEDURE — A9270 NON-COVERED ITEM OR SERVICE: HCPCS | Performed by: FAMILY MEDICINE

## 2023-08-29 PROCEDURE — A9270 NON-COVERED ITEM OR SERVICE: HCPCS | Performed by: STUDENT IN AN ORGANIZED HEALTH CARE EDUCATION/TRAINING PROGRAM

## 2023-08-29 PROCEDURE — 83735 ASSAY OF MAGNESIUM: CPT

## 2023-08-29 PROCEDURE — 700102 HCHG RX REV CODE 250 W/ 637 OVERRIDE(OP): Performed by: STUDENT IN AN ORGANIZED HEALTH CARE EDUCATION/TRAINING PROGRAM

## 2023-08-29 PROCEDURE — 84100 ASSAY OF PHOSPHORUS: CPT

## 2023-08-29 PROCEDURE — 700102 HCHG RX REV CODE 250 W/ 637 OVERRIDE(OP)

## 2023-08-29 PROCEDURE — 99232 SBSQ HOSP IP/OBS MODERATE 35: CPT | Performed by: FAMILY MEDICINE

## 2023-08-29 PROCEDURE — 700102 HCHG RX REV CODE 250 W/ 637 OVERRIDE(OP): Performed by: FAMILY MEDICINE

## 2023-08-29 PROCEDURE — 85027 COMPLETE CBC AUTOMATED: CPT

## 2023-08-29 PROCEDURE — 80053 COMPREHEN METABOLIC PANEL: CPT

## 2023-08-29 PROCEDURE — 700111 HCHG RX REV CODE 636 W/ 250 OVERRIDE (IP): Mod: JZ

## 2023-08-29 PROCEDURE — A9270 NON-COVERED ITEM OR SERVICE: HCPCS | Performed by: NURSE PRACTITIONER

## 2023-08-29 RX ORDER — ACETAMINOPHEN 500 MG
500 TABLET ORAL EVERY 4 HOURS PRN
Status: DISCONTINUED | OUTPATIENT
Start: 2023-08-29 | End: 2023-08-30 | Stop reason: HOSPADM

## 2023-08-29 RX ORDER — HYDRALAZINE HYDROCHLORIDE 20 MG/ML
10 INJECTION INTRAMUSCULAR; INTRAVENOUS EVERY 6 HOURS PRN
Status: DISCONTINUED | OUTPATIENT
Start: 2023-08-29 | End: 2023-08-30 | Stop reason: HOSPADM

## 2023-08-29 RX ORDER — MAGNESIUM SULFATE HEPTAHYDRATE 40 MG/ML
2 INJECTION, SOLUTION INTRAVENOUS ONCE
Status: COMPLETED | OUTPATIENT
Start: 2023-08-29 | End: 2023-08-29

## 2023-08-29 RX ORDER — MAGNESIUM SULFATE HEPTAHYDRATE 40 MG/ML
2 INJECTION, SOLUTION INTRAVENOUS ONCE
Status: DISCONTINUED | OUTPATIENT
Start: 2023-08-29 | End: 2023-08-29

## 2023-08-29 RX ORDER — OXYCODONE HYDROCHLORIDE 5 MG/1
5-10 TABLET ORAL EVERY 4 HOURS PRN
Status: DISCONTINUED | OUTPATIENT
Start: 2023-08-29 | End: 2023-08-30 | Stop reason: HOSPADM

## 2023-08-29 RX ORDER — METRONIDAZOLE 500 MG/1
500 TABLET ORAL EVERY 12 HOURS
Status: DISCONTINUED | OUTPATIENT
Start: 2023-08-29 | End: 2023-08-30 | Stop reason: HOSPADM

## 2023-08-29 RX ADMIN — LISINOPRIL 10 MG: 10 TABLET ORAL at 05:10

## 2023-08-29 RX ADMIN — ROSUVASTATIN CALCIUM 40 MG: 20 TABLET, FILM COATED ORAL at 17:55

## 2023-08-29 RX ADMIN — FENTANYL CITRATE 50 MCG: 50 INJECTION, SOLUTION INTRAMUSCULAR; INTRAVENOUS at 01:08

## 2023-08-29 RX ADMIN — AMIODARONE HYDROCHLORIDE 400 MG: 200 TABLET ORAL at 17:56

## 2023-08-29 RX ADMIN — OMEPRAZOLE 20 MG: 20 CAPSULE, DELAYED RELEASE ORAL at 17:56

## 2023-08-29 RX ADMIN — OMEPRAZOLE 20 MG: 20 CAPSULE, DELAYED RELEASE ORAL at 05:09

## 2023-08-29 RX ADMIN — APIXABAN 5 MG: 5 TABLET, FILM COATED ORAL at 05:09

## 2023-08-29 RX ADMIN — SALINE NASAL SPRAY 2 SPRAY: 1.5 SOLUTION NASAL at 17:57

## 2023-08-29 RX ADMIN — METRONIDAZOLE 500 MG: 500 TABLET ORAL at 17:55

## 2023-08-29 RX ADMIN — METOPROLOL TARTRATE 50 MG: 50 TABLET, FILM COATED ORAL at 05:10

## 2023-08-29 RX ADMIN — ASPIRIN 81 MG: 81 TABLET, COATED ORAL at 05:10

## 2023-08-29 RX ADMIN — CEFTRIAXONE SODIUM 2000 MG: 10 INJECTION, POWDER, FOR SOLUTION INTRAVENOUS at 08:14

## 2023-08-29 RX ADMIN — AMIODARONE HYDROCHLORIDE 400 MG: 200 TABLET ORAL at 05:09

## 2023-08-29 RX ADMIN — OXYCODONE HYDROCHLORIDE 10 MG: 10 TABLET, FILM COATED, EXTENDED RELEASE ORAL at 17:55

## 2023-08-29 RX ADMIN — MAGNESIUM SULFATE HEPTAHYDRATE 2 G: 2 INJECTION, SOLUTION INTRAVENOUS at 13:22

## 2023-08-29 RX ADMIN — METRONIDAZOLE 500 MG: 500 INJECTION, SOLUTION INTRAVENOUS at 05:15

## 2023-08-29 RX ADMIN — OXYCODONE HYDROCHLORIDE 10 MG: 10 TABLET, FILM COATED, EXTENDED RELEASE ORAL at 05:10

## 2023-08-29 RX ADMIN — APIXABAN 5 MG: 5 TABLET, FILM COATED ORAL at 17:56

## 2023-08-29 RX ADMIN — OXYCODONE HYDROCHLORIDE 10 MG: 5 TABLET ORAL at 21:29

## 2023-08-29 RX ADMIN — METOPROLOL TARTRATE 50 MG: 50 TABLET, FILM COATED ORAL at 17:56

## 2023-08-29 RX ADMIN — LEVOTHYROXINE SODIUM 75 MCG: 0.07 TABLET ORAL at 05:09

## 2023-08-29 ASSESSMENT — ENCOUNTER SYMPTOMS
ABDOMINAL PAIN: 1
BLURRED VISION: 0
FEVER: 0
ABDOMINAL PAIN: 0
HEARTBURN: 0
PALPITATIONS: 0
SHORTNESS OF BREATH: 0
COUGH: 0
BACK PAIN: 0
DIARRHEA: 0
FOCAL WEAKNESS: 0
DIZZINESS: 0
WEAKNESS: 1
HEADACHES: 0
VOMITING: 0
FLANK PAIN: 0
NERVOUS/ANXIOUS: 0
WEAKNESS: 0
NECK PAIN: 0
NAUSEA: 0
MYALGIAS: 0
SPEECH CHANGE: 0
DIAPHORESIS: 0
SORE THROAT: 0
SENSORY CHANGE: 0
WHEEZING: 0
CHILLS: 0

## 2023-08-29 ASSESSMENT — FIBROSIS 4 INDEX: FIB4 SCORE: 0.67

## 2023-08-29 ASSESSMENT — PAIN DESCRIPTION - PAIN TYPE: TYPE: ACUTE PAIN;SURGICAL PAIN

## 2023-08-29 NOTE — PROGRESS NOTES
Hospital Medicine Daily Progress Note    Date of Service  8/29/2023    Chief Complaint  Margoth Hopkins is a 72 y.o. female admitted 8/23/2023 with cholecystitis    Hospital Course  Admitted with shock secondary to cholelithiasis with cholecystitis.  She initially required admission to the ICU by critical care.  She was started on empiric coverage with IV Rocephin and Flagyl, pressor support, IV fluid hydration for her acute kidney injury.  Surgery was consulted on the case.  Interventional radiology was consulted, and a CT-guided cholecystostomy tube drain was placed on 8/25/2023.  Patient also recently had CABG x1, and aortic valve replacement on 8/16/2023.  She was also noted to have atrial fibrillation with Opticlear rate, she had to be placed on a diltiazem drip.  She was able to wean off pressors and hospital medicine was consulted on the case.    Interval Problem Update  Cholecystitis -tolerating regular diet, pain controlled, discussed case with interventional radiology  A-fib - currently sinus  Hypertension - sbp 123-164  GAURI - crea 0.8  Low magnesium    I have discussed this patient's plan of care and discharge plan at IDT rounds today with Case Management, Nursing, Nursing leadership, and other members of the IDT team.    Consultants/Specialty  critical care, general surgery, and interventional radiology    Code Status  Prior    Disposition  The patient is not medically cleared for discharge to home or a post-acute facility.  Anticipate discharge to: home with organized home healthcare and close outpatient follow-up    I have placed the appropriate orders for post-discharge needs.    Review of Systems  Review of Systems   Constitutional:  Positive for malaise/fatigue. Negative for chills, diaphoresis and fever.   HENT:  Negative for congestion, hearing loss and sore throat.    Eyes:  Negative for blurred vision.   Respiratory:  Negative for cough, shortness of breath and wheezing.    Cardiovascular:   Positive for leg swelling. Negative for chest pain and palpitations.   Gastrointestinal:  Positive for abdominal pain. Negative for diarrhea, heartburn, nausea and vomiting.   Genitourinary:  Negative for dysuria, flank pain and hematuria.   Musculoskeletal:  Negative for back pain, joint pain, myalgias and neck pain.   Skin:  Negative for rash.   Neurological:  Positive for weakness. Negative for dizziness, sensory change, speech change, focal weakness and headaches.   Psychiatric/Behavioral:  The patient is not nervous/anxious.         Physical Exam  Temp:  [36.4 °C (97.5 °F)-36.5 °C (97.7 °F)] 36.5 °C (97.7 °F)  Pulse:  [] 70  Resp:  [16-18] 18  BP: (123-164)/(74-94) 128/74  SpO2:  [95 %-96 %] 96 %    Physical Exam  Vitals and nursing note reviewed.   HENT:      Head: Normocephalic and atraumatic.      Nose: No congestion.      Mouth/Throat:      Mouth: Mucous membranes are moist.   Eyes:      Extraocular Movements: Extraocular movements intact.      Conjunctiva/sclera: Conjunctivae normal.   Cardiovascular:      Rate and Rhythm: Normal rate and regular rhythm.      Heart sounds: Murmur heard.   Pulmonary:      Effort: Pulmonary effort is normal.      Breath sounds: Normal breath sounds.   Abdominal:      General: There is no distension.      Tenderness: There is abdominal tenderness. There is no guarding or rebound.      Comments: Drain at RUQ   Musculoskeletal:      Cervical back: No tenderness.      Right lower leg: Edema present.      Left lower leg: Edema present.   Skin:     General: Skin is warm and dry.   Neurological:      General: No focal deficit present.      Mental Status: She is alert and oriented to person, place, and time.      Cranial Nerves: No cranial nerve deficit.         Fluids    Intake/Output Summary (Last 24 hours) at 8/29/2023 1149  Last data filed at 8/29/2023 0642  Gross per 24 hour   Intake 240 ml   Output 55 ml   Net 185 ml       Laboratory  Recent Labs     08/27/23  8187  08/27/23  1019 08/27/23  2109 08/28/23  0333 08/29/23  0812   WBC 19.1*  --   --  12.7* 14.5*   RBC 3.65*  --   --  3.55* 3.60*   HEMOGLOBIN 10.6*   < > 10.7* 10.4* 10.8*   HEMATOCRIT 33.1*   < > 31.8* 32.1* 32.5*   MCV 90.7  --   --  90.4 90.3   MCH 29.0  --   --  29.3 30.0   MCHC 32.0*  --   --  32.4 33.2   RDW 52.8*  --   --  53.1* 51.1*   PLATELETCT 387  --   --  388 438   MPV 9.3  --   --  9.1 9.1    < > = values in this interval not displayed.     Recent Labs     08/27/23  1019 08/28/23  0333 08/29/23  0812   SODIUM 135 137 138   POTASSIUM 3.3* 4.0 4.5   CHLORIDE 104 105 105   CO2 20 23 25   GLUCOSE 96 101* 117*   BUN 11 11 11   CREATININE 0.76 0.74 0.81   CALCIUM 7.8* 8.1* 8.1*                   Imaging  CT-DRAIN-CHOLECTYSTOSTOMY   Final Result      1. Percutaneous cholecystostomy tube placement with CT guidance.      US-RUQ   Final Result         1.  Cholelithiasis and gallbladder sludge with gallbladder wall thickening, sonographically compatible with cholecystitis.   2.  Common bile duct dilatation, consider causes of biliary obstruction with additional workup as clinically appropriate.   3.  Right pleural effusion      DX-CHEST-PORTABLE (1 VIEW)   Final Result         1.  Hazy left pulmonary opacities suggests subtle infiltrate, overall decreased since prior study.   2.  Trace left pleural effusion, decreased since prior study.   3.  Cardiomegaly      CT-CHEST,ABDOMEN,PELVIS WITH   Final Result      1.  Postoperative changes status post recent open heart surgery. Patient is status post CABG and there is an aortic valve replacement.   2.  Small pericardial and bilateral pleural effusions.   3.  Cholelithiasis with distended gallbladder and wall thickening raises concern for acute cholecystitis.   4.  Uroepithelial thickening in the left renal pelvis is nonspecific but could be seen with infection. Correlate with urinalysis.           Assessment/Plan  * Cholelithiasis with cholecystitis- (present on  admission)  Assessment & Plan  Percutaneous cholecystostomy tube placement with CT guidance 8/25/2023  IV Rocephin + Flagyl  Follow-up with surgery as outpatient  Drain care    Shock (HCC)- (present on admission)  Assessment & Plan  Resolved     Anemia- (present on admission)  Assessment & Plan  Follow cbc    Hypomagnesemia- (present on admission)  Assessment & Plan  IV Mg 2 g  Follow level    Prediabetes- (present on admission)  Assessment & Plan  monitor    Paroxysmal atrial fibrillation (HCC)- (present on admission)  Assessment & Plan  off diltiazem drip  Amiodarone, Metoprolol, Eliquis    Hypokalemia- (present on admission)  Assessment & Plan  Follow cmp    GAURI (acute kidney injury) (HCC)- (present on admission)  Assessment & Plan  Follow cmp, check PTH      S/P AVR- (present on admission)  Assessment & Plan  Monitor volume status closely    S/P CABG x 1- (present on admission)  Assessment & Plan  ASA, Crestor, Metoprolol    Benign essential HTN- (present on admission)  Assessment & Plan  Metoprolol  Hold Lisinopril due to GAURI  IV hydralazine as needed with parameters           VTE prophylaxis:    therapeutic anticoagulation with eliquis 5 mg BID      I have performed a physical exam and reviewed and updated ROS and Plan today (8/29/2023). In review of yesterday's note (8/28/2023), there are no changes except as documented above.

## 2023-08-29 NOTE — DISCHARGE PLANNING
0834  Agency/Facility Name: [facility]   Spoke To: Valerie   Outcome: DPA called to regarding referral, per Valerie Pt is accepted back to service.

## 2023-08-29 NOTE — PROGRESS NOTES
Hospital Medicine Daily Progress Note    Date of Service  8/28/2023    Chief Complaint  Margoth Hopkins is a 72 y.o. female admitted 8/23/2023 with ALOC.    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  Her  had found her slumped over and unresponsive in the bathroom later that day.  Upon arrival in the emergency room her systolic blood pressure was in the 50s.  She had significant abdominal pain.  She was admitted for septic shock and concern of cholecystitis.  Due to instability she was not taken to surgery but had a cholecystostomy tube placed.  Patient was initially on pressors and these have been weaned off.  I was consulted to evaluate patient and take over her care out of the ICU.     8/26: Patient is alert pleasant and speaking in full sentences.  She is oriented x3.  She states some discomfort from the tube in the right upper quadrant.  I have discussed the case with Dr. Kidd who states she will have outpatient general surgery for removal of her gallbladder in the future.  When stable the patient will go home with a cholecystostomy tube.  Patient will need follow-up with general surgery.    Interval Problem Update  8/27  Exam  Patient room, afebrile, pulse 80s to 90s respiratory rate 16-18 systolic blood pressure 140s to 170s pulse ox 93 to 97% on 1-3 liters nasal cannula.  Labs show leukocytosis, stable anemia, hypokalemia low albumin and total protein.  Potassium replaced.  Metoprolol increased.  Restarted Crestor, amiodarone taper recommended by cardiology, Eliquis, aspirin.  Feeling improved, mild abdominal discomfort at site of her drains, controlled with current pain medication regimen.   Hypertensive today, increase  metoprolol.  Encourage out of bed to chair, mobilization.    8/28  Looking much better today, examined in her inpatient room.  Afebrile, pulse from  respiratory rate 15-16 systolic blood pressure 123-164 pulse ox 95 to 96% weaned down to room air.  Leukocytosis much improved down to 12.7, stable anemia, normal electrolytes renal function liver function.  Feeling improved today, out of bed to chair, working on mobilization.  Suspect able to discharge in the morning if she continues current clinical course.    I have discussed this patient's plan of care and discharge plan at IDT rounds today with Case Management, Nursing, Nursing leadership, and other members of the IDT team.    Consultants/Specialty  critical care and general surgery    Code Status  Prior    Disposition  The patient is not medically cleared for discharge to home or a post-acute facility.      I have placed the appropriate orders for post-discharge needs.    Review of Systems  ROS   Review of Systems   Constitutional:  Positive for chills and malaise/fatigue. Negative for fever.   Respiratory:  Negative for cough, shortness of breath and stridor.    Cardiovascular:  Negative for chest pain, palpitations and leg swelling.   Gastrointestinal:  Positive for abdominal pain. Negative for diarrhea and nausea.   Genitourinary:  Negative for dysuria and hematuria.   Musculoskeletal:  Negative for back pain and joint pain.   Skin:  Negative for rash.   Neurological:  Negative for speech change and headaches.   Psychiatric/Behavioral:  The patient is not nervous/anxious    Physical Exam  Temp:  [36.1 °C (97 °F)-36.5 °C (97.7 °F)] 36.4 °C (97.5 °F)  Pulse:  [] 86  Resp:  [15-18] 16  BP: (123-164)/(80-94) 145/87  SpO2:  [95 %-96 %] 95 %    Physical Exam  Vitals reviewed.   Constitutional:       Appearance: Normal appearance. She is not diaphoretic.  Pleasant mood, she is OOBTC, no distress, nontoxic  HENT:      Head: Normocephalic and atraumatic.       Nose: Nose normal.      Mouth: Mucous membranes are moist.      Pharynx: No oropharyngeal exudate.   Eyes:      General: No scleral icterus.         Extraocular Movements: Extraocular movements intact.      Conjunctiva/sclera: Conjunctivae normal.   Cardiovascular:      Rate and Rhythm: Normal rate and regular rhythm.      Pulses:           Radial pulses are 2+ on the right side and 2+ on the left side.        Dorsalis pedis pulses are 2+ on the right side and 2+ on the left side.      Heart sounds: No murmur heard.  Pulmonary:      Effort: Pulmonary effort is normal. No respiratory distress.      Breath sounds: Normal breath sounds. No wheezing or rales.   Abdominal:      General: Bowel sounds are normal. There is no distension.      Palpations: Abdomen is soft.      Tenderness: There is mild right upper quadrant abdominal tenderness, improved.      Comments: RUQ abdominal drain w/ dark bilious looking fluid in JENNIFER drain/bag   Musculoskeletal:         General: No swelling or tenderness.      Cervical back: No tenderness. No muscular tenderness.      Right lower leg: No edema.      Left lower leg: No edema.   Skin:     Coloration: Skin is not jaundiced or pale.      Comments: Sterna surgical site appears with normal healing, good approximation of surgical edges, no discharge.  No erythema, non tender   Neurological:      General: No focal deficit present.      Mental Status: She is alert and oriented to person, place, and time. Mental status is at baseline.      Cranial Nerves: No cranial nerve deficit.   Psychiatric:         Mood and Affect: Mood normal.         Behavior: Behavior normal.     Fluids    Intake/Output Summary (Last 24 hours) at 8/29/2023 0524  Last data filed at 8/28/2023 1400  Gross per 24 hour   Intake 480 ml   Output 200 ml   Net 280 ml         Laboratory  Recent Labs     08/27/23  0138 08/27/23  1019 08/27/23  2109 08/28/23  0333   WBC 19.1*  --   --  12.7*   RBC 3.65*  --   --  3.55*    HEMOGLOBIN 10.6* 10.3* 10.7* 10.4*   HEMATOCRIT 33.1* 31.2* 31.8* 32.1*   MCV 90.7  --   --  90.4   MCH 29.0  --   --  29.3   MCHC 32.0*  --   --  32.4   RDW 52.8*  --   --  53.1*   PLATELETCT 387  --   --  388   MPV 9.3  --   --  9.1       Recent Labs     08/27/23  1019 08/28/23  0333   SODIUM 135 137   POTASSIUM 3.3* 4.0   CHLORIDE 104 105   CO2 20 23   GLUCOSE 96 101*   BUN 11 11   CREATININE 0.76 0.74   CALCIUM 7.8* 8.1*                     Imaging  CT-DRAIN-CHOLECTYSTOSTOMY   Final Result      1. Percutaneous cholecystostomy tube placement with CT guidance.      US-RUQ   Final Result         1.  Cholelithiasis and gallbladder sludge with gallbladder wall thickening, sonographically compatible with cholecystitis.   2.  Common bile duct dilatation, consider causes of biliary obstruction with additional workup as clinically appropriate.   3.  Right pleural effusion      DX-CHEST-PORTABLE (1 VIEW)   Final Result         1.  Hazy left pulmonary opacities suggests subtle infiltrate, overall decreased since prior study.   2.  Trace left pleural effusion, decreased since prior study.   3.  Cardiomegaly      CT-CHEST,ABDOMEN,PELVIS WITH   Final Result      1.  Postoperative changes status post recent open heart surgery. Patient is status post CABG and there is an aortic valve replacement.   2.  Small pericardial and bilateral pleural effusions.   3.  Cholelithiasis with distended gallbladder and wall thickening raises concern for acute cholecystitis.   4.  Uroepithelial thickening in the left renal pelvis is nonspecific but could be seen with infection. Correlate with urinalysis.             Assessment/Plan  * Shock (HCC)- (present on admission)  Assessment & Plan  Resolved with fluids and pressors.  Monitor I/O's, vitals and labs    H/O aortic valve replacement with porcine valve  Assessment & Plan  POD ~#10  Dr Rodriguez, Cardiothoracic surgeon  Medical management.    Atrial fibrillation (HCC)  Assessment &  Plan  Patient was on diltiazem drip  Reinitiating on metoprolol  Monitor vitals and on telemetry    Transaminitis  Assessment & Plan  Due to cholecystitis  S/P perc devi drain  Surgery consulting and state that it will be a few weeks prior to any surgery  Antibiotics    Hypokalemia  Assessment & Plan  Replace and monitor    GAURI (acute kidney injury) (HCC)  Assessment & Plan  Improved of IV fluids  Avoid nephrotoxins      Cholecystitis  Assessment & Plan  Required percutaneous cholecystomy tube.  Look at 10 days of oral omnicef and flagyl 500mg BID x 10 days  Surgery will follow up in next few weeks and schedule for outpatient surgery    Benign essential HTN- (present on admission)  Assessment & Plan  8/26 reinitiate antihypertensive medications and adjust lisinopril up to 20 mg twice a day with parameters         VTE prophylaxis:   SCDs/TEDs   therapeutic anticoagulation with eliquis 5 mg BID      I have performed a physical exam and reviewed and updated ROS and Plan today (8/28/2023). In review of yesterday's note (8/27/2023), there are no changes except as documented above.    Total time spent 39 minutes. I spent greater than 50% of the time for patient care, counseling, and coordination on this date, including unit/floor time, and face-to-face time with the patient as per interval events, my own review of patient's imaging and lab analysis and developing my assessment and plan above.

## 2023-08-29 NOTE — PROGRESS NOTES
Monitor summary:        Rhythm: SR/ST  Rate:   Ectopy: (R)PVC  Measurements: 19./.09/.36        12hr chart check

## 2023-08-29 NOTE — PROGRESS NOTES
Radiology Progress Note   Author: BINACA Navas Date & Time created: 8/29/2023  9:58 AM   Date of admission  8/23/2023  Note to reader: this note follows the APSO format rather than the historical SOAP format. Assessment and plan located at the top of the note for ease of use.    Chief Complaint  72 y.o. female admitted 8/23/2023 with   Chief Complaint   Patient presents with    ALOC         HPI  72-year-old female with past medical history of hypothyroid, dyslipidemia, hypertension, gastric ulcer, and very recent aortic valve replacement and CABG x 1 admitted for septic shock.  CT showed cholelithiasis with distended gallbladder and wall thickening concerning for acute cholecystitis.  Follow-up ultrasound also consistent with cholecystitis.  Due to patient's recent heart surgery, patient was not deemed a suitable surgical candidate and subsequently underwent a CT-guided percutaneous cholecystostomy drain by IR Dr. Mabry on 08/25/2023.     Interval history:  08/28/2023 - Devi drain with 150 mL biliary output in the last 24 hours.  WBC 12.7 down from 19.1 yesterday.  T. bili 0.4 and alk phos 162 yesterday. Drain flushed with 10 mL NS.    08/29/23 - Devi drain with 255 mL biliary output in the last 24 hours.  WBC 14.5 up from 12.7 yesterday.  T. bili WNL and alk phos trending down. Drain flushed with 10 mL NS. Pt discussed with Dr Ram.     Assessment/Plan     Principal Problem:    Shock (HCC)  Active Problems:    Benign essential HTN    GI bleed    Cholecystitis    GAURI (acute kidney injury) (HCC)    Hypokalemia    Transaminitis    Atrial fibrillation (HCC)    H/O aortic valve replacement with porcine valve      Plan IR  - Plan for lap devi in 4 to 6 weeks as outpatient per surgical note.  - Irrigate Devi drain with 10 ml of sterile saline q shift.  Do not aspirate back.  - Patient will need to be discharged with normal saline flushes for daily Devi drain care.      -  -Thank you for allowing  Interventional Radiology team to participate in the patients care, if any additional care or requests are needed in the future please do not hesitate call or place IR order   [phone]           Review of Systems  Physical Exam   Review of Systems   Constitutional:  Negative for chills and fever.   Respiratory:  Negative for shortness of breath.    Cardiovascular:  Negative for chest pain and palpitations.   Gastrointestinal:  Negative for abdominal pain, nausea and vomiting.   Neurological:  Negative for weakness and headaches.      Vitals:    08/29/23 0741   BP: 128/74   Pulse: 70   Resp: 18   Temp: 36.5 °C (97.7 °F)   SpO2: 96%        Physical Exam  Constitutional:       Appearance: Normal appearance.   Cardiovascular:      Rate and Rhythm: Normal rate and regular rhythm.   Abdominal:      Palpations: Abdomen is soft.      Tenderness: There is abdominal tenderness.      Comments: [place] drain in place   Skin:     General: Skin is warm and dry.   Neurological:      General: No focal deficit present.      Mental Status: She is alert and oriented to person, place, and time.   Psychiatric:         Mood and Affect: Mood normal.         Behavior: Behavior normal.             Labs    Recent Labs     08/27/23  0138 08/27/23  1019 08/27/23 2109 08/28/23 0333 08/29/23 0812   WBC 19.1*  --   --  12.7* 14.5*   RBC 3.65*  --   --  3.55* 3.60*   HEMOGLOBIN 10.6*   < > 10.7* 10.4* 10.8*   HEMATOCRIT 33.1*   < > 31.8* 32.1* 32.5*   MCV 90.7  --   --  90.4 90.3   MCH 29.0  --   --  29.3 30.0   MCHC 32.0*  --   --  32.4 33.2   RDW 52.8*  --   --  53.1* 51.1*   PLATELETCT 387  --   --  388 438   MPV 9.3  --   --  9.1 9.1    < > = values in this interval not displayed.       Recent Labs     08/27/23  1019 08/28/23  0333 08/29/23  0812   SODIUM 135 137 138   POTASSIUM 3.3* 4.0 4.5   CHLORIDE 104 105 105   CO2 20 23 25   GLUCOSE 96 101* 117*   BUN 11 11 11   CREATININE 0.76 0.74 0.81   CALCIUM 7.8* 8.1* 8.1*       Recent Labs      "08/27/23  1019 08/28/23  0333 08/29/23  0812   ALBUMIN 2.6* 2.7* 2.7*   TBILIRUBIN 0.4 0.5 0.6   ALKPHOSPHAT 162* 144* 124*   TOTPROTEIN 5.0* 5.0* 4.9*   ALTSGPT 40 35 28   ASTSGOT 30 29 21   CREATININE 0.76 0.74 0.81       CT-DRAIN-CHOLECTYSTOSTOMY   Final Result      1. Percutaneous cholecystostomy tube placement with CT guidance.      US-RUQ   Final Result         1.  Cholelithiasis and gallbladder sludge with gallbladder wall thickening, sonographically compatible with cholecystitis.   2.  Common bile duct dilatation, consider causes of biliary obstruction with additional workup as clinically appropriate.   3.  Right pleural effusion      DX-CHEST-PORTABLE (1 VIEW)   Final Result         1.  Hazy left pulmonary opacities suggests subtle infiltrate, overall decreased since prior study.   2.  Trace left pleural effusion, decreased since prior study.   3.  Cardiomegaly      CT-CHEST,ABDOMEN,PELVIS WITH   Final Result      1.  Postoperative changes status post recent open heart surgery. Patient is status post CABG and there is an aortic valve replacement.   2.  Small pericardial and bilateral pleural effusions.   3.  Cholelithiasis with distended gallbladder and wall thickening raises concern for acute cholecystitis.   4.  Uroepithelial thickening in the left renal pelvis is nonspecific but could be seen with infection. Correlate with urinalysis.            INR   Date Value Ref Range Status   08/24/2023 1.25 (H) 0.87 - 1.13 Final     Comment:     INR - Non-therapeutic Reference Range: 0.87-1.13  INR - Therapeutic Reference Range: 2.0-4.0       No results found for: \"POCINR\"     Intake/Output Summary (Last 24 hours) at 8/28/2023 0931  Last data filed at 8/27/2023 2300  Gross per 24 hour   Intake 420 ml   Output 350 ml   Net 70 ml      Labs not explicitly included in this progress note were reviewed by the author. Radiology/imaging not explicitly included in this progress note was reviewed by the author.     I have " performed a physical exam and reviewed and updated ROS and Plan today (8/29/2023).     35 minutes in directly providing and coordinating care and extensive data review.  No time overlap and excludes procedures.

## 2023-08-29 NOTE — CARE PLAN
The patient is Stable - Low risk of patient condition declining or worsening    Shift Goals  Clinical Goals: hemodynamic stability, drain care, pain management, rest  Patient Goals: rest, comfort  Family Goals: maryanne    Progress made toward(s) clinical / shift goals:      Problem: Knowledge Deficit - Standard  Goal: Patient and family/care givers will demonstrate understanding of plan of care, disease process/condition, diagnostic tests and medications  Outcome: Progressing     Problem: Hemodynamics  Goal: Patient's hemodynamics, fluid balance and neurologic status will be stable or improve  Outcome: Progressing     Problem: Fluid Volume  Goal: Fluid volume balance will be maintained  Outcome: Progressing     Problem: Urinary - Renal Perfusion  Goal: Ability to achieve and maintain adequate renal perfusion and functioning will improve  Outcome: Progressing     Problem: Physical Regulation  Goal: Diagnostic test results will improve  Outcome: Progressing  Goal: Signs and symptoms of infection will decrease  Outcome: Progressing     Problem: Pain - Standard  Goal: Alleviation of pain or a reduction in pain to the patient’s comfort goal  Outcome: Progressing     Problem: Psychosocial  Goal: Patient's level of anxiety will decrease  Outcome: Progressing  Goal: Patient's ability to verbalize feelings about condition will improve  Outcome: Progressing     Problem: Communication  Goal: The ability to communicate needs accurately and effectively will improve  Outcome: Progressing       Patient is not progressing towards the following goals:

## 2023-08-30 ENCOUNTER — PHARMACY VISIT (OUTPATIENT)
Dept: PHARMACY | Facility: MEDICAL CENTER | Age: 73
End: 2023-08-30
Payer: MEDICARE

## 2023-08-30 VITALS
HEIGHT: 67 IN | BODY MASS INDEX: 29.76 KG/M2 | HEART RATE: 67 BPM | SYSTOLIC BLOOD PRESSURE: 127 MMHG | WEIGHT: 189.6 LBS | OXYGEN SATURATION: 93 % | RESPIRATION RATE: 17 BRPM | TEMPERATURE: 97 F | DIASTOLIC BLOOD PRESSURE: 71 MMHG

## 2023-08-30 LAB
ALBUMIN SERPL BCP-MCNC: 3 G/DL (ref 3.2–4.9)
ALBUMIN/GLOB SERPL: 1.3 G/DL
ALP SERPL-CCNC: 110 U/L (ref 30–99)
ALT SERPL-CCNC: 25 U/L (ref 2–50)
ANION GAP SERPL CALC-SCNC: 12 MMOL/L (ref 7–16)
AST SERPL-CCNC: 20 U/L (ref 12–45)
BILIRUB SERPL-MCNC: 0.5 MG/DL (ref 0.1–1.5)
BUN SERPL-MCNC: 10 MG/DL (ref 8–22)
CALCIUM ALBUM COR SERPL-MCNC: 9.3 MG/DL (ref 8.5–10.5)
CALCIUM SERPL-MCNC: 8.5 MG/DL (ref 8.5–10.5)
CHLORIDE SERPL-SCNC: 104 MMOL/L (ref 96–112)
CO2 SERPL-SCNC: 23 MMOL/L (ref 20–33)
CREAT SERPL-MCNC: 0.73 MG/DL (ref 0.5–1.4)
ERYTHROCYTE [DISTWIDTH] IN BLOOD BY AUTOMATED COUNT: 51.8 FL (ref 35.9–50)
GFR SERPLBLD CREATININE-BSD FMLA CKD-EPI: 86 ML/MIN/1.73 M 2
GLOBULIN SER CALC-MCNC: 2.4 G/DL (ref 1.9–3.5)
GLUCOSE SERPL-MCNC: 97 MG/DL (ref 65–99)
HCT VFR BLD AUTO: 34.5 % (ref 37–47)
HGB BLD-MCNC: 11.1 G/DL (ref 12–16)
MAGNESIUM SERPL-MCNC: 2.1 MG/DL (ref 1.5–2.5)
MCH RBC QN AUTO: 29.5 PG (ref 27–33)
MCHC RBC AUTO-ENTMCNC: 32.2 G/DL (ref 32.2–35.5)
MCV RBC AUTO: 91.8 FL (ref 81.4–97.8)
NT-PROBNP SERPL IA-MCNC: 724 PG/ML (ref 0–125)
PLATELET # BLD AUTO: 433 K/UL (ref 164–446)
PMV BLD AUTO: 9.4 FL (ref 9–12.9)
POTASSIUM SERPL-SCNC: 3.8 MMOL/L (ref 3.6–5.5)
PROT SERPL-MCNC: 5.4 G/DL (ref 6–8.2)
RBC # BLD AUTO: 3.76 M/UL (ref 4.2–5.4)
SODIUM SERPL-SCNC: 139 MMOL/L (ref 135–145)
WBC # BLD AUTO: 12.8 K/UL (ref 4.8–10.8)

## 2023-08-30 PROCEDURE — 700102 HCHG RX REV CODE 250 W/ 637 OVERRIDE(OP): Performed by: NURSE PRACTITIONER

## 2023-08-30 PROCEDURE — 700102 HCHG RX REV CODE 250 W/ 637 OVERRIDE(OP)

## 2023-08-30 PROCEDURE — 700102 HCHG RX REV CODE 250 W/ 637 OVERRIDE(OP): Performed by: STUDENT IN AN ORGANIZED HEALTH CARE EDUCATION/TRAINING PROGRAM

## 2023-08-30 PROCEDURE — 83735 ASSAY OF MAGNESIUM: CPT

## 2023-08-30 PROCEDURE — 700102 HCHG RX REV CODE 250 W/ 637 OVERRIDE(OP): Performed by: FAMILY MEDICINE

## 2023-08-30 PROCEDURE — A9270 NON-COVERED ITEM OR SERVICE: HCPCS | Performed by: FAMILY MEDICINE

## 2023-08-30 PROCEDURE — RXMED WILLOW AMBULATORY MEDICATION CHARGE: Performed by: FAMILY MEDICINE

## 2023-08-30 PROCEDURE — 700111 HCHG RX REV CODE 636 W/ 250 OVERRIDE (IP): Performed by: FAMILY MEDICINE

## 2023-08-30 PROCEDURE — A9270 NON-COVERED ITEM OR SERVICE: HCPCS

## 2023-08-30 PROCEDURE — A9270 NON-COVERED ITEM OR SERVICE: HCPCS | Performed by: NURSE PRACTITIONER

## 2023-08-30 PROCEDURE — 80053 COMPREHEN METABOLIC PANEL: CPT

## 2023-08-30 PROCEDURE — 99239 HOSP IP/OBS DSCHRG MGMT >30: CPT | Performed by: FAMILY MEDICINE

## 2023-08-30 PROCEDURE — 83880 ASSAY OF NATRIURETIC PEPTIDE: CPT

## 2023-08-30 PROCEDURE — A9270 NON-COVERED ITEM OR SERVICE: HCPCS | Performed by: STUDENT IN AN ORGANIZED HEALTH CARE EDUCATION/TRAINING PROGRAM

## 2023-08-30 PROCEDURE — 85027 COMPLETE CBC AUTOMATED: CPT

## 2023-08-30 RX ORDER — CEFDINIR 300 MG/1
300 CAPSULE ORAL 2 TIMES DAILY
Qty: 6 CAPSULE | Refills: 0 | Status: ACTIVE | OUTPATIENT
Start: 2023-08-31 | End: 2023-09-03

## 2023-08-30 RX ORDER — METRONIDAZOLE 500 MG/1
500 TABLET ORAL EVERY 12 HOURS
Qty: 6 TABLET | Refills: 0 | Status: ACTIVE | OUTPATIENT
Start: 2023-08-31 | End: 2023-09-03

## 2023-08-30 RX ORDER — AMIODARONE HYDROCHLORIDE 200 MG/1
200 TABLET ORAL DAILY
Qty: 30 TABLET | Refills: 2 | Status: SHIPPED | OUTPATIENT
Start: 2023-09-07 | End: 2023-09-14

## 2023-08-30 RX ORDER — OXYCODONE HYDROCHLORIDE 10 MG/1
5-10 TABLET ORAL EVERY 6 HOURS PRN
Qty: 20 TABLET | Refills: 0 | Status: SHIPPED | OUTPATIENT
Start: 2023-08-30 | End: 2023-09-04

## 2023-08-30 RX ORDER — AMIODARONE HYDROCHLORIDE 200 MG/1
400 TABLET ORAL DAILY
Qty: 42 TABLET | Refills: 0 | Status: SHIPPED | OUTPATIENT
Start: 2023-08-31 | End: 2023-09-14

## 2023-08-30 RX ADMIN — OXYCODONE HYDROCHLORIDE 10 MG: 10 TABLET, FILM COATED, EXTENDED RELEASE ORAL at 05:15

## 2023-08-30 RX ADMIN — CEFTRIAXONE SODIUM 2000 MG: 10 INJECTION, POWDER, FOR SOLUTION INTRAVENOUS at 05:14

## 2023-08-30 RX ADMIN — APIXABAN 5 MG: 5 TABLET, FILM COATED ORAL at 05:14

## 2023-08-30 RX ADMIN — ASPIRIN 81 MG: 81 TABLET, COATED ORAL at 05:14

## 2023-08-30 RX ADMIN — METOPROLOL TARTRATE 50 MG: 50 TABLET, FILM COATED ORAL at 05:14

## 2023-08-30 RX ADMIN — OXYCODONE HYDROCHLORIDE 10 MG: 5 TABLET ORAL at 02:00

## 2023-08-30 RX ADMIN — METRONIDAZOLE 500 MG: 500 TABLET ORAL at 05:15

## 2023-08-30 RX ADMIN — OMEPRAZOLE 20 MG: 20 CAPSULE, DELAYED RELEASE ORAL at 05:15

## 2023-08-30 RX ADMIN — AMIODARONE HYDROCHLORIDE 400 MG: 200 TABLET ORAL at 05:14

## 2023-08-30 RX ADMIN — LEVOTHYROXINE SODIUM 75 MCG: 0.07 TABLET ORAL at 05:14

## 2023-08-30 ASSESSMENT — PAIN DESCRIPTION - PAIN TYPE: TYPE: ACUTE PAIN

## 2023-08-30 NOTE — PROGRESS NOTES
DC instructions reviewed w/ pt , verbalized understanding. PIV dc'd, armband removed. Meds to bed delivered.

## 2023-08-30 NOTE — DISCHARGE INSTRUCTIONS
Surgical Drain Home Care  Surgical drains are used to remove extra fluid that normally builds up in a surgical wound after surgery. A surgical drain helps to heal a surgical wound. Different kinds of surgical drains include:  Active drains. These drains use suction to pull drainage away from the surgical wound. Drainage flows through a tube to a container outside of the body. With these drains, you need to keep the bulb or the drainage container flat (compressed) at all times, except while you empty it. Flattening the bulb or container creates suction.  Passive drains. These drains allow fluid to drain naturally, by gravity. Drainage flows through a tube to a bandage (dressing) or a container outside of the body. Passive drains do not need to be emptied.  A drain is placed during surgery. Right after surgery, drainage is usually bright red and a little thicker than water. The drainage may gradually turn yellow or pink and become thinner. It is likely that your health care provider will remove the drain when the drainage stops or when the amount decreases to 1-2 Tbsp (15-30 mL) during a 24-hour period.  Supplies needed:  Tape.  Germ-free cleaning solution (sterile saline).  Cotton swabs.  Split gauze drain sponge: 4 x 4 inches (10 x 10 cm).  Gauze square: 4 x 4 inches (10 x 10 cm).  How to care for your surgical drain  Care for your drain as told by your health care provider. This is important to help prevent infection. If your drain is placed at your back, or any other hard-to-reach area, ask another person to assist you in performing the following tasks:  General care  Keep the skin around the drain dry and covered with a dressing at all times.  Check your drain area every day for signs of infection. Check for:  Redness, swelling, or pain.  Pus or a bad smell.  Cloudy drainage.  Tenderness or pressure at the drain exit site.  Changing the dressing  Follow instructions from your health care provider about how to  change your dressing. Change your dressing at least once a day. Change it more often if needed to keep the dressing dry. Make sure you:  Gather your supplies.  Wash your hands with soap and water before you change your dressing. If soap and water are not available, use hand .  Remove the old dressing. Avoid using scissors to do that.  Wash your hands with soap and water again after removing the old dressing.  Use sterile saline to clean your skin around the drain. You may need to use a cotton swab to clean the skin.  Place the tube through the slit in a drain sponge. Place the drain sponge so that it covers your wound.  Place the gauze square or another drain sponge on top of the drain sponge that is on the wound. Make sure the tube is between those layers.  Tape the dressing to your skin.  Tape the drainage tube to your skin 1-2 inches (2.5-5 cm) below the place where the tube enters your body. Taping keeps the tube from pulling on any stitches (sutures) that you have.  Wash your hands with soap and water.  Write down the color of your drainage and how often you change your dressing.  How to empty your active drain    Make sure that you have a measuring cup that you can empty your drainage into.  Wash your hands with soap and water. If soap and water are not available, use hand .  Loosen any pins or clips that hold the tube in place.  If your health care provider tells you to strip the tube to prevent clots and tube blockages:  Hold the tube at the skin with one hand. Use your other hand to pinch the tubing with your thumb and first finger.  Gently move your fingers down the tube while squeezing very lightly. This clears any drainage, clots, or tissue from the tube.  You may need to do this several times each day to keep the tube clear. Do not pull on the tube.  Open the bulb cap or the drain plug. Do not touch the inside of the cap or the bottom of the plug.  Turn the device upside down and  gently squeeze.  Empty all of the drainage into the measuring cup.  Compress the bulb or the container and replace the cap or the plug. To compress the bulb or the container, squeeze it firmly in the middle while you close the cap or plug the container.  Write down the amount of drainage that you have in each 24-hour period. If you have less than 2 Tbsp (30 mL) of drainage during 24 hours, contact your health care provider.  Flush the drainage down the toilet.  Wash your hands with soap and water.  Contact a health care provider if:  You have redness, swelling, or pain around your drain area.  You have pus or a bad smell coming from your drain area.  You have a fever or chills.  The skin around your drain is warm to the touch.  The amount of drainage that you have is increasing instead of decreasing.  You have drainage that is cloudy.  There is a sudden stop or a sudden decrease in the amount of drainage that you have.  Your drain tube falls out.  Your active drain does not stay compressed after you empty it.  Summary  Surgical drains are used to remove extra fluid that normally builds up in a surgical wound after surgery.  Different kinds of surgical drains include active drains and passive drains. Active drains use suction to pull drainage away from the surgical wound, and passive drains allow fluid to drain naturally.  It is important to care for your drain to prevent infection. If your drain is placed at your back, or any other hard-to-reach area, ask another person to assist you.  Contact your health care provider if you have redness, swelling, or pain around your drain area.  This information is not intended to replace advice given to you by your health care provider. Make sure you discuss any questions you have with your health care provider.  Document Revised: 02/22/2023 Document Reviewed: 01/22/2020  Elsevier Patient Education © 2023 [employer]

## 2023-08-30 NOTE — CARE PLAN
The patient is Stable - Low risk of patient condition declining or worsening    Shift Goals  Clinical Goals: drain care, abx, pain management  Patient Goals: rest  Family Goals: maryanne    Progress made toward(s) clinical / shift goals:      Problem: Knowledge Deficit - Standard  Goal: Patient and family/care givers will demonstrate understanding of plan of care, disease process/condition, diagnostic tests and medications  Outcome: Progressing     Problem: Hemodynamics  Goal: Patient's hemodynamics, fluid balance and neurologic status will be stable or improve  Outcome: Progressing     Problem: Fluid Volume  Goal: Fluid volume balance will be maintained  Outcome: Progressing     Problem: Urinary - Renal Perfusion  Goal: Ability to achieve and maintain adequate renal perfusion and functioning will improve  Outcome: Progressing     Problem: Respiratory  Goal: Patient will achieve/maintain optimum respiratory ventilation and gas exchange  Outcome: Progressing     Problem: Physical Regulation  Goal: Diagnostic test results will improve  Outcome: Progressing  Goal: Signs and symptoms of infection will decrease  Outcome: Progressing     Problem: Pain - Standard  Goal: Alleviation of pain or a reduction in pain to the patient’s comfort goal  Outcome: Progressing     Problem: Fall Risk  Goal: Patient will remain free from falls  Outcome: Progressing     Problem: Psychosocial  Goal: Patient's level of anxiety will decrease  Outcome: Progressing  Goal: Patient's ability to verbalize feelings about condition will improve  Outcome: Progressing     Problem: Communication  Goal: The ability to communicate needs accurately and effectively will improve  Outcome: Progressing     Problem: Mobility  Goal: Patient's capacity to carry out activities will improve  Outcome: Progressing     Problem: Self Care  Goal: Patient will have the ability to perform ADLs independently or with assistance (bathe, groom, dress, toilet and feed)  Outcome:  Progressing       Patient is not progressing towards the following goals:

## 2023-08-30 NOTE — DISCHARGE SUMMARY
Discharge Summary    CHIEF COMPLAINT ON ADMISSION  Chief Complaint   Patient presents with    ALOC       Reason for Admission  Hypotensive     Admission Date  8/23/2023    CODE STATUS  Prior    HPI & HOSPITAL COURSE  This is a 72 y.o. female here with Shock secondary to cholelithiasis with cholecystitis.  She initially required admission to the ICU by critical care.  She was started on empiric coverage with IV Rocephin and Flagyl, pressor support, IV fluid hydration for her acute kidney injury.  Surgery was consulted on the case. Interventional radiology was consulted, and a CT-guided cholecystostomy tube drain was placed on 8/25/2023.  Patient also recently had CABG x1, and aortic valve replacement on 8/16/2023.  She was also noted to have atrial fibrillation with rapid ventricular rate, she had to be placed on a diltiazem drip.  Was later transitioned back to her amiodarone.  She was able to wean off pressors and hospital medicine was consulted on the case.  She has responded to antibiotic management and medical treatment.  Surgery has cleared her for discharge, with close follow-up for planning for cholecystectomy.    Therefore, she is discharged in good and stable condition to home with organized home healthcare and close outpatient follow-up.    The patient met 2-midnight criteria for an inpatient stay at the time of discharge.    Discharge Date  8/30/2023    FOLLOW UP ITEMS POST DISCHARGE  Follow up as below    DISCHARGE DIAGNOSES  Principal Problem:    Cholelithiasis with cholecystitis (POA: Yes)  Active Problems:    Shock (HCC) (POA: Yes)    Benign essential HTN (POA: Yes)    S/P CABG x 1 (POA: Yes)    S/P AVR (POA: Yes)    GAURI (acute kidney injury) (HCC) (POA: Yes)    Hypokalemia (POA: Yes)    Paroxysmal atrial fibrillation (HCC) (POA: Yes)    Prediabetes (POA: Yes)    Hypomagnesemia (POA: Yes)    Anemia (POA: Yes)    GI bleed (POA: Yes)  Resolved Problems:    * No resolved hospital problems. *      FOLLOW  UP  Future Appointments   Date Time Provider Department Center   9/14/2023 12:45 PM BIANCA Muñoz [facility] None   9/18/2023 12:30 PM CT RESOURCE PROVIDER [facility] None   9/18/2023  2:40 PM Charlee Walker M.D. [facility] PETAR Valencia M.D.  [address]  [address]  [place] [address] [phone]    Follow up  Call to make a follow up appointment in 3-4 weeks.    [facility]  [address]  [facility] [address]  [phone]        Tammy Lawrence M.D.  [address]  [address]  [place] [id number] [phone]    Follow up      Charlee Walker M.D.  [address]  [address]  [place] [id number] [phone]    Follow up        MEDICATIONS ON DISCHARGE     Medication List        START taking these medications        Instructions   cefdinir 300 MG Caps  Start taking on: August 31, 2023  Commonly known as: Omnicef   Take 1 Capsule by mouth 2 times a day for 3 days.  Dose: 300 mg     metroNIDAZOLE 500 MG Tabs  Start taking on: August 31, 2023  Commonly known as: Flagyl   Take 1 Tablet by mouth every 12 hours for 3 days.  Dose: 500 mg     oxyCODONE immediate release 10 MG immediate release tablet  Commonly known as: Roxicodone   Take 0.5-1 Tablets by mouth every 6 hours as needed for Severe Pain for up to 5 days.  Dose: 5-10 mg            CHANGE how you take these medications        Instructions   * amiodarone 200 MG Tabs  Start taking on: August 31, 2023  What changed:   medication strength  when to take this  Commonly known as: Cordarone   Take 2 Tablets by mouth every day for 6 days, THEN take 1 tablet daily for 30 days.  Dose: 400 mg     * amiodarone 200 MG Tabs  Start taking on: September 7, 2023  What changed: You were already taking a medication with the same name, and this prescription was added. Make sure you understand how and when to take each.  Commonly known as: Cordarone   Take 1 Tablet by mouth every day.  Dose: 200 mg     rosuvastatin 40 MG tablet  What changed: when  to take this  Commonly known as: Crestor   Doctor's comments: Requesting 1 year supply  Take 1 Tablet by mouth every day.  Dose: 40 mg           * This list has 2 medication(s) that are the same as other medications prescribed for you. Read the directions carefully, and ask your doctor or other care provider to review them with you.                CONTINUE taking these medications        Instructions   Aspirin Low Dose 81 MG EC tablet  Generic drug: aspirin   Take 1 Tablet by mouth every day.  Dose: 81 mg     busPIRone 10 MG Tabs tablet  Commonly known as: Buspar   Take 10 mg by mouth every evening. Pt takes once a day, not BID  Dose: 10 mg     diazePAM 2 MG Tabs  Commonly known as: Valium   Take 2 mg by mouth every 6 hours as needed for Anxiety.  Dose: 2 mg     DULoxetine HCl 40 MG Cpep   Take 40 mg by mouth 2 times a day.  Dose: 40 mg     Eliquis 5mg Tabs  Generic drug: apixaban   Take 1 Tablet by mouth 2 times a day. Indications: Thromboembolism secondary to Atrial Fibrillation  Dose: 5 mg     furosemide 20 MG Tabs  Commonly known as: Lasix   Take 1 Tablet by mouth every day.  Dose: 20 mg     lisinopril 5 MG Tabs  Commonly known as: Prinivil   Take 1 Tablet by mouth every day.  Dose: 5 mg     metoprolol tartrate 25 MG Tabs  Commonly known as: Lopressor   Take 1 Tablet by mouth 2 times a day.  Dose: 25 mg     omeprazole 20 MG delayed-release capsule  Commonly known as: PriLOSEC   Take 1 Capsule by mouth every day.  Dose: 20 mg     potassium chloride 10 MEQ capsule  Commonly known as: Micro-K   Take 1 Capsule by mouth every day.  Dose: 10 mEq     pregabalin 50 MG capsule  Commonly known as: Lyrica   Take 50 mg by mouth 2 times a day.  Dose: 50 mg     propranolol 40 MG Tabs  Commonly known as: Inderal   Take 1 Tablet by mouth 3 times a day.  Dose: 40 mg     Synthroid 75 MCG Tabs  Generic drug: levothyroxine   Doctor's comments: Requesting 1 year supply  Take 1 Tablet by mouth every morning on an empty  stomach.  Dose: 75 mcg     vitamin D2 (Ergocalciferol) 1.25 MG (88416 UT) Caps capsule  Commonly known as: Drisdol   Doctor's comments: Requesting 1 year supply  Take 1 Capsule by mouth every 7 days. On Sat  Dose: 50,000 Units     Voltaren 1 % Gel  Generic drug: diclofenac sodium   Apply 2 g topically 4 times a day as needed (Apply's on both knees).  Dose: 2 g     zolpidem 5 MG Tabs  Commonly known as: Ambien   Take 1 Tablet by mouth at bedtime as needed for Sleep for up to 60 days.  Dose: 5 mg            STOP taking these medications      acetaminophen 500 MG Tabs  Commonly known as: Tylenol     traMADol 50 MG Tabs  Commonly known as: Ultram              Allergies  Allergies   Allergen Reactions    Morphine Vomiting    Nsaids Unspecified     History of gastric ulcers - cannot take NSAIDS    Pcn [Penicillins] Hives     Tolerates ancef    Seasonal Runny Nose and Itching     .       DIET  No orders of the defined types were placed in this encounter.      ACTIVITY  As tolerated.  Weight bearing as tolerated    CONSULTATIONS  Critical care  Surgery - [facility]    PROCEDURES  Percutaneous cholecystostomy tube placement with CT guidance 8/25/2023    LABORATORY  Lab Results   Component Value Date    SODIUM 139 08/30/2023    POTASSIUM 3.8 08/30/2023    CHLORIDE 104 08/30/2023    CO2 23 08/30/2023    GLUCOSE 97 08/30/2023    BUN 10 08/30/2023    CREATININE 0.73 08/30/2023        Lab Results   Component Value Date    WBC 12.8 (H) 08/30/2023    HEMOGLOBIN 11.1 (L) 08/30/2023    HEMATOCRIT 34.5 (L) 08/30/2023    PLATELETCT 433 08/30/2023        Total time of the discharge process exceeds 35 minutes.

## 2023-09-01 ENCOUNTER — TELEPHONE (OUTPATIENT)
Dept: CARDIOLOGY | Facility: MEDICAL CENTER | Age: 73
End: 2023-09-01

## 2023-09-01 NOTE — TELEPHONE ENCOUNTER
Called pt in regards to a pending MRI order but pt did not answer, LVM providing call back number.

## 2023-09-01 NOTE — TELEPHONE ENCOUNTER
[facility]    Caller: Margoth Hopkins     Topic/issue: Patient called regarding a couple concerns. First thing was that she has drainage tubes attached to her. She is confused on whom should be removing these for her. The second she has is regarding her medication she takes in the morning. She stated it is causing her to have nausea when she takes them and would like to discuss this symptom. Please advise.    Callback Number:  [phone] is her best contact number    Thank you,    Kayla TAYLOR

## 2023-09-01 NOTE — TELEPHONE ENCOUNTER
Returned patient call. Patient will call and follow up with CT surgery regarding drain removal and medication intolerance. Patient has post hosp [facility] with [facility] on 9/14/23 also. Patient appreciative of call.

## 2023-09-06 NOTE — PROGRESS NOTES
"CHIEF COMPLAINT: Post-op visit     PROCEDURE: 8/16/23 Dr. Lawrence  CORONARY ARTERY BYPASS GRAFTING x1 (left internal mammary artery to the left anterior descending artery), AORTIC VALVE REPLACEMENT (23 mm Inspiris Díaz bovine pericardial valve), aortic root enlargement (2 cm wide bovine pericardial patch) and intraoperative transesophageal echocardiography    HPI: Doing well. Had readmission for acute cholecystitis and had drained placed- still present. No more abdominal pain or diarrhea. She is walking around yard and block. Still very fatigued. Seeing Dr. Valencia on Wednesday.     /82 (BP Location: Left arm, Patient Position: Sitting, BP Cuff Size: Adult)   Pulse 67   Temp 36.2 °C (97.2 °F) (Temporal)   Ht 1.702 m (5' 7\")   Wt 75.5 kg (166 lb 8 oz)   SpO2 94%     PHYSICAL EXAM:  Physical Exam  HENT:      Head: Normocephalic.   Cardiovascular:      Rate and Rhythm: Normal rate and regular rhythm.   Pulmonary:      Effort: Pulmonary effort is normal. No respiratory distress.      Breath sounds: Normal breath sounds.   Abdominal:      Palpations: Abdomen is soft.   Skin:     General: Skin is warm and dry.      Comments: Sternum Stable  Sternal Incision- C/D/I   Neurological:      Mental Status: She is alert and oriented to person, place, and time.   Psychiatric:         Mood and Affect: Mood and affect normal.         Cognition and Memory: Memory normal.         Judgment: Judgment normal.        PLAN:   Planning cholecystectomy soon. Will need to stop blood thinners. Would recommend a cardiac anaesthesiologist.     Continue NOAC per your Cardiologist's recommendations.  We reviewed post operative sternal precautions, weight limits and driving precautions moving forward.  We reviewed SBE prophylaxis.      Overall, we are very pleased with the patient’s progress and we have instructed the patient to follow-up with us in the future should they have any concerns related to their surgery. Otherwise, we will " see the patient on a PRN basis. The patient will continue to follow-up with their Cardiologist and PCP.  The patient has been informed that any further prescription refills should be done through their primary care physician and/or cardiologist.  They acknowledged understanding.  Thank you for allowing us to participate in the care of this very pleasant patient and please let us know if there is any way we may be of further assistance.

## 2023-09-13 DIAGNOSIS — G89.29 LUMBOSACRAL PAIN, CHRONIC: ICD-10-CM

## 2023-09-13 DIAGNOSIS — M54.50 LUMBOSACRAL PAIN, CHRONIC: ICD-10-CM

## 2023-09-14 ENCOUNTER — OFFICE VISIT (OUTPATIENT)
Dept: CARDIOLOGY | Facility: MEDICAL CENTER | Age: 73
End: 2023-09-14
Attending: NURSE PRACTITIONER
Payer: COMMERCIAL

## 2023-09-14 VITALS
WEIGHT: 167 LBS | HEIGHT: 67 IN | DIASTOLIC BLOOD PRESSURE: 78 MMHG | HEART RATE: 65 BPM | BODY MASS INDEX: 26.21 KG/M2 | SYSTOLIC BLOOD PRESSURE: 118 MMHG | RESPIRATION RATE: 16 BRPM | OXYGEN SATURATION: 96 %

## 2023-09-14 DIAGNOSIS — I10 BENIGN ESSENTIAL HTN: ICD-10-CM

## 2023-09-14 DIAGNOSIS — I25.10 CORONARY ARTERY DISEASE INVOLVING NATIVE HEART WITHOUT ANGINA PECTORIS, UNSPECIFIED VESSEL OR LESION TYPE: ICD-10-CM

## 2023-09-14 DIAGNOSIS — I25.10 CORONARY ARTERY DISEASE INVOLVING NATIVE CORONARY ARTERY OF NATIVE HEART WITHOUT ANGINA PECTORIS: ICD-10-CM

## 2023-09-14 DIAGNOSIS — R73.03 PREDIABETES: ICD-10-CM

## 2023-09-14 DIAGNOSIS — E78.00 PURE HYPERCHOLESTEROLEMIA: ICD-10-CM

## 2023-09-14 DIAGNOSIS — Z95.2 S/P AVR: ICD-10-CM

## 2023-09-14 DIAGNOSIS — I48.0 PAROXYSMAL ATRIAL FIBRILLATION (HCC): ICD-10-CM

## 2023-09-14 DIAGNOSIS — I45.9 HEART BLOCK: ICD-10-CM

## 2023-09-14 DIAGNOSIS — Z95.2 S/P AORTIC VALVE REPLACEMENT: ICD-10-CM

## 2023-09-14 DIAGNOSIS — Z95.1 S/P CABG X 1: ICD-10-CM

## 2023-09-14 PROBLEM — E83.42 HYPOMAGNESEMIA: Status: RESOLVED | Noted: 2023-08-29 | Resolved: 2023-09-14

## 2023-09-14 PROBLEM — R57.9 SHOCK (HCC): Status: RESOLVED | Noted: 2023-08-23 | Resolved: 2023-09-14

## 2023-09-14 PROBLEM — I35.1 NONRHEUMATIC AORTIC VALVE INSUFFICIENCY: Status: RESOLVED | Noted: 2023-03-17 | Resolved: 2023-09-14

## 2023-09-14 PROBLEM — K92.2 GI BLEED: Status: RESOLVED | Noted: 2023-08-24 | Resolved: 2023-09-14

## 2023-09-14 PROBLEM — E87.6 HYPOKALEMIA: Status: RESOLVED | Noted: 2023-08-24 | Resolved: 2023-09-14

## 2023-09-14 PROBLEM — N17.9 AKI (ACUTE KIDNEY INJURY) (HCC): Status: RESOLVED | Noted: 2023-08-24 | Resolved: 2023-09-14

## 2023-09-14 PROCEDURE — 99214 OFFICE O/P EST MOD 30 MIN: CPT | Performed by: NURSE PRACTITIONER

## 2023-09-14 PROCEDURE — 99213 OFFICE O/P EST LOW 20 MIN: CPT | Performed by: NURSE PRACTITIONER

## 2023-09-14 PROCEDURE — 3074F SYST BP LT 130 MM HG: CPT | Performed by: NURSE PRACTITIONER

## 2023-09-14 PROCEDURE — 93005 ELECTROCARDIOGRAM TRACING: CPT | Performed by: NURSE PRACTITIONER

## 2023-09-14 PROCEDURE — 3078F DIAST BP <80 MM HG: CPT | Performed by: NURSE PRACTITIONER

## 2023-09-14 RX ORDER — AMIODARONE HYDROCHLORIDE 200 MG/1
200 TABLET ORAL DAILY
Qty: 30 TABLET | Refills: 6 | Status: SHIPPED | OUTPATIENT
Start: 2023-09-14 | End: 2023-12-14

## 2023-09-14 RX ORDER — LISINOPRIL 5 MG/1
5 TABLET ORAL DAILY
Qty: 100 TABLET | Refills: 3 | Status: SHIPPED | OUTPATIENT
Start: 2023-09-14 | End: 2023-12-14 | Stop reason: SDUPTHER

## 2023-09-14 RX ORDER — PREGABALIN 50 MG/1
50 CAPSULE ORAL 2 TIMES DAILY
Qty: 60 CAPSULE | Refills: 0 | Status: SHIPPED | OUTPATIENT
Start: 2023-09-14 | End: 2023-10-14

## 2023-09-14 ASSESSMENT — ENCOUNTER SYMPTOMS
SHORTNESS OF BREATH: 0
MYALGIAS: 0
DIZZINESS: 0
ABDOMINAL PAIN: 0
COUGH: 0
FEVER: 0
PND: 0
PALPITATIONS: 0
ORTHOPNEA: 0
CLAUDICATION: 0

## 2023-09-14 ASSESSMENT — FIBROSIS 4 INDEX: FIB4 SCORE: 0.68

## 2023-09-14 NOTE — PATIENT INSTRUCTIONS
STOP aspirin.    Reduce amiodarone to 100 mg once day (cut tablet in half).    I have refilled your metoprolol, lisinopril, amiodarone, and eliquis.    Do labs and echocardiogram in 3 months with follow up.    Keep using IS until 2 weeks after gallbladder surgery.

## 2023-09-14 NOTE — PROGRESS NOTES
Chief Complaint   Patient presents with    Hypertension     F/V Dx: Benign essential HTN    Syncope     F/V Dx: Syncope, unspecified syncope type    Coronary Artery Disease     F/V Dx: Coronary artery disease involving native coronary artery of native heart without angina pectoris     Subjective     Margoth Hopkins is a 72 y.o. female who presents today for hospital follow up S/P SAVR and CABG.    Hx of mod-severe AI now S/P SAVR with aortic root enlargement, CAD with CABG X1, HLD, HTN, post-operative afib RVR on eliquis, and now cholecystis with bile drains in place.    She presents today with her spouse. She is overall doing well post-op from her heart surgery but unfortunately re-admitted for acute cholecystitis the same day as discharge.    She has bile drains in place and a surgeon's appt next week to discuss cholecystectomy. She has abdominal bloating and some poor appetite but overall feels good but does note more fatigue than usual.    She has no chest pain, shortness of breath, edema, dizziness/lightheadedness, or palpitations.    Past Medical History:   Diagnosis Date    Adverse effect of anesthesia 15 yrs ago ?    Felt pain from endoscopy    GAURI (acute kidney injury) (HCC) 8/24/2023    Anemia     H/O    Anesthesia     No    Anginal syndrome (HCC)     Aortic insufficiency 01/31/2020    Pt. states this is mild and does not have any signs or symptoms.    Arthritis 06/16/2023    general body    Atrial fibrillation (HCC) 8/24/2023    Breath shortness 11/02/2021    with exertion or anxiety    Bronchitis 60+ yrs ago    Cataract 06/16/2023    IOL OU 2018    Dental disorder 06/16/2023    upper/lower front 2 teeth crowned    Gastric ulcer     Goiter     Heart burn 2022    Occasionally after acidic meals.    Heart murmur 1965    High cholesterol 06/16/2023    medicated    Hypertension 06/16/2023    well controlled on meds    Hypothyroidism 06/16/2023    medicated    Pain 06/16/2023    bilat knees, back    PONV  (postoperative nausea and vomiting) 6/21/22    Psychiatric problem 06/16/2023    anxiety, medicated    Pulmonary embolism (HCC) 01/31/2020    Pt. states after joint replacement in 7-2018.    Reactive arthritis (HCC)     Snoring 06/16/2023    Thyroid disease     hasimotos    Thyroid disease 01/31/2020    Thyroid Nodules    Urinary bladder disorder 15 yrs ago    Sling     Past Surgical History:   Procedure Laterality Date    MULTIPLE CORONARY ARTERY BYPASS ENDO VEIN HARVEST N/A 8/16/2023    Procedure: CORONARY ARTERY BYPASS GRAFT X1;  Surgeon: Tammy Lawrence M.D.;  Location: SURGERY [facility];  Service: Cardiothoracic    AORTIC VALVE REPLACEMENT N/A 8/16/2023    Procedure: REPLACEMENT, AORTIC VALVE;  Surgeon: Tammy Lawrence M.D.;  Location: SURGERY [facility];  Service: Cardiothoracic    ECHOCARDIOGRAM, TRANSESOPHAGEAL, INTRAOPERATIVE N/A 8/16/2023    Procedure: ECHOCARDIOGRAM, TRANSESOPHAGEAL, INTRAOPERATIVE;  Surgeon: Tammy Lawrence M.D.;  Location: SURGERY [facility];  Service: Cardiothoracic    FUSION  06/16/2023    neck, 2013    OTHER CARDIAC SURGERY  06/16/2023    heart cath 2023    KNEE ARTHROPLASTY TOTAL Left 06/2022    MASS EXCISION GENERAL Right 11/05/2021    Procedure: EXCISION, MASS - LATERAL THIGH;  Surgeon: Cydney Franklin M.D.;  Location: SURGERY SAME DAY [facility];  Service: General    THYROIDECTOMY TOTAL Bilateral 02/05/2020    Procedure: THYROIDECTOMY, TOTAL- COMPLETE;  Surgeon: Cydney Franklin M.D.;  Location: SURGERY SAME DAY [facility];  Service: General    APPENDECTOMY      BLADDER SLING FEMALE      CATARACT EXTRACTION WITH IOL Left     CATARACT EXTRACTION WITH IOL Bilateral     CERVICAL FUSION POSTERIOR      FOOT SURGERY Left     metatarsal fusion, hammer toes correction    GYN SURGERY      Hysterectomy    KNEE ARTHROPLASTY TOTAL Right     x3    KNEE REPLACEMENT, TOTAL Right     KNEE REVISION TOTAL Right     NO PERTINENT PAST SURGICAL HISTORY  2017    Left foot surgery    OTHER  NEUROLOGICAL SURG  2016    Cervical spine     Family History   Problem Relation Age of Onset    Arthritis Mother     Alzheimer's Disease Mother     Cancer Mother         Cervical Cancer    Heart Attack Father     Cancer Father         Hypertrophy of the heart, CAD    Other Brother         brain anurism    Hypertension Brother     Cancer Maternal Grandmother 66        colono cancer    Colorectal Cancer Maternal Grandmother         Colon Cancer    Heart Disease Brother         Quadruple bypass    Hypertension Brother     Arthritis Brother     Other Brother         Celiac Disease    No Known Problems Maternal Grandfather     No Known Problems Paternal Grandmother     No Known Problems Paternal Grandfather     Hypertension Brother         Spinal stenosis    Arthritis Brother     Lung Disease Brother         Asthma, emphysema    Asthma Brother      Social History     Socioeconomic History    Marital status:      Spouse name: Not on file    Number of children: Not on file    Years of education: Not on file    Highest education level: Bachelor's degree (e.g., BA, AB, BS)   Occupational History    Not on file   Tobacco Use    Smoking status: Never    Smokeless tobacco: Never   Vaping Use    Vaping Use: Never used   Substance and Sexual Activity    Alcohol use: Yes     Alcohol/week: 4.2 - 8.4 oz     Types: 7 - 14 Glasses of wine per week    Drug use: Not Currently     Types: Oral    Sexual activity: Yes     Partners: Male   Other Topics Concern    Not on file   Social History Narrative    Not on file     Social Determinants of Health     Financial Resource Strain: Low Risk  (6/7/2022)    Overall Financial Resource Strain (CARDIA)     Difficulty of Paying Living Expenses: Not hard at all   Food Insecurity: No Food Insecurity (6/7/2022)    Hunger Vital Sign     Worried About Running Out of Food in the Last Year: Never true     Ran Out of Food in the Last Year: Never true   Transportation Needs: No Transportation Needs  (6/7/2022)    PRAPARE - Transportation     Lack of Transportation (Medical): No     Lack of Transportation (Non-Medical): No   Physical Activity: Insufficiently Active (6/7/2022)    Exercise Vital Sign     Days of Exercise per Week: 1 day     Minutes of Exercise per Session: 10 min   Stress: Stress Concern Present (6/7/2022)    [employer] [place] [employer] - Occupational Stress Questionnaire     Feeling of Stress : Very much   Social Connections: Socially Integrated (6/7/2022)    Social Connection and Isolation Panel [NHANES]     Frequency of Communication with Friends and Family: Three times a week     Frequency of Social Gatherings with Friends and Family: Twice a week     Attends [employer] Services: More than 4 times per year     Active Member of Clubs or Organizations: Yes     Attends Club or Organization Meetings: More than 4 times per year     Marital Status:    Intimate Partner Violence: Not on file   Housing Stability: Low Risk  (6/7/2022)    Housing Stability Vital Sign     Unable to Pay for Housing in the Last Year: No     Number of Places Lived in the Last Year: 1     Unstable Housing in the Last Year: No     Allergies   Allergen Reactions    Morphine Vomiting    Nsaids Unspecified     History of gastric ulcers - cannot take NSAIDS    Pcn [Penicillins] Hives     Tolerates ancef    Seasonal Runny Nose and Itching     .     Outpatient Encounter Medications as of 9/14/2023   Medication Sig Dispense Refill    pregabalin (LYRICA) 50 MG capsule Take 1 Capsule by mouth 2 times a day for 30 days. 60 Capsule 0    amiodarone (CORDARONE) 200 MG Tab Take 1 Tablet by mouth every day. 30 Tablet 6    apixaban (ELIQUIS) 5mg Tab Take 1 Tablet by mouth 2 times a day. Indications: Thromboembolism secondary to Atrial Fibrillation 60 Tablet 6    metoprolol tartrate (LOPRESSOR) 25 MG Tab Take 1 Tablet by mouth 2 times a day. 200 Tablet 3    lisinopril (PRINIVIL) 5 MG Tab Take 1 Tablet by mouth every day. 100  Tablet 3    omeprazole (PRILOSEC) 20 MG delayed-release capsule Take 1 Capsule by mouth every day. 30 Capsule 2    diclofenac sodium (VOLTAREN) 1 % Gel Apply 2 g topically 4 times a day as needed (Apply's on both knees).      potassium chloride (MICRO-K) 10 MEQ capsule Take 1 Capsule by mouth every day. 100 Capsule 3    vitamin D2, Ergocalciferol, (DRISDOL) 1.25 MG (61898 UT) Cap capsule Take 1 Capsule by mouth every 7 days. On Sat 12 Capsule 3    SYNTHROID 75 MCG Tab Take 1 Tablet by mouth every morning on an empty stomach. 90 Tablet 3    busPIRone (BUSPAR) 10 MG Tab tablet Take 10 mg by mouth every evening. Pt takes once a day, not BID      furosemide (LASIX) 20 MG Tab Take 1 Tablet by mouth every day. 90 Tablet 3    propranolol (INDERAL) 40 MG Tab Take 1 Tablet by mouth 3 times a day. 270 Tablet 3    DULoxetine HCl 40 MG Cap DR Particles Take 40 mg by mouth 2 times a day. 180 Capsule 3    rosuvastatin (CRESTOR) 40 MG tablet Take 1 Tablet by mouth every day. (Patient taking differently: Take 40 mg by mouth every evening.) 90 Tablet 3    [DISCONTINUED] amiodarone (CORDARONE) 200 MG Tab Take 2 Tablets by mouth every day for 6 days, THEN take 1 tablet daily for 30 days. 42 Tablet 0    [DISCONTINUED] amiodarone (CORDARONE) 200 MG Tab Take 1 Tablet by mouth every day. 30 Tablet 2    [DISCONTINUED] lisinopril (PRINIVIL) 5 MG Tab Take 1 Tablet by mouth every day. 30 Tablet 2    [DISCONTINUED] apixaban (ELIQUIS) 5mg Tab Take 1 Tablet by mouth 2 times a day. Indications: Thromboembolism secondary to Atrial Fibrillation 60 Tablet 2    [DISCONTINUED] aspirin 81 MG EC tablet Take 1 Tablet by mouth every day. 100 Tablet 2    [DISCONTINUED] metoprolol tartrate (LOPRESSOR) 25 MG Tab Take 1 Tablet by mouth 2 times a day. 60 Tablet 2    [DISCONTINUED] diazePAM (VALIUM) 2 MG Tab Take 2 mg by mouth every 6 hours as needed for Anxiety. (Patient not taking: Reported on 9/14/2023)       No facility-administered encounter medications on  "file as of 9/14/2023.     Review of Systems   Constitutional:  Positive for malaise/fatigue. Negative for fever.        Very fatigued   Respiratory:  Negative for cough and shortness of breath.    Cardiovascular:  Negative for chest pain, palpitations, orthopnea, claudication, leg swelling and PND.   Gastrointestinal:  Negative for abdominal pain.        Bile drains in place with bloating   Musculoskeletal:  Negative for myalgias.   Neurological:  Negative for dizziness.              Objective     /78 (BP Location: Left arm, Patient Position: Sitting, BP Cuff Size: Adult)   Pulse 65   Resp 16   Ht 1.702 m (5' 7\")   Wt 75.8 kg (167 lb)   SpO2 96%   BMI 26.16 kg/m²     Physical Exam  Vitals and nursing note reviewed.   Constitutional:       Appearance: Normal appearance. She is well-developed and normal weight.   HENT:      Head: Normocephalic and atraumatic.   Neck:      Vascular: No JVD.   Cardiovascular:      Rate and Rhythm: Normal rate and regular rhythm.      Pulses: Normal pulses.      Heart sounds: Normal heart sounds.   Pulmonary:      Effort: Pulmonary effort is normal.      Breath sounds: Normal breath sounds.   Abdominal:      Comments: Bile drains in place   Musculoskeletal:         General: Normal range of motion.   Skin:     General: Skin is warm and dry.      Capillary Refill: Capillary refill takes less than 2 seconds.   Neurological:      General: No focal deficit present.      Mental Status: She is alert and oriented to person, place, and time. Mental status is at baseline.   Psychiatric:         Mood and Affect: Mood normal.         Behavior: Behavior normal.         Thought Content: Thought content normal.         Judgment: Judgment normal.                Assessment & Plan     1. Coronary artery disease involving native coronary artery of native heart without angina pectoris  EKG    EKG      2. Benign essential HTN        3. Heart block  EKG    EKG      4. Pure hypercholesterolemia   "      5. Paroxysmal atrial fibrillation (HCC)  amiodarone (CORDARONE) 200 MG Tab      6. Prediabetes        7. S/P AVR  apixaban (ELIQUIS) 5mg Tab    metoprolol tartrate (LOPRESSOR) 25 MG Tab    lisinopril (PRINIVIL) 5 MG Tab    EC-ECHOCARDIOGRAM COMPLETE W/O CONT    Basic Metabolic Panel    CBC WITHOUT DIFFERENTIAL    Lipid Profile      8. S/P CABG x 1  apixaban (ELIQUIS) 5mg Tab    metoprolol tartrate (LOPRESSOR) 25 MG Tab    lisinopril (PRINIVIL) 5 MG Tab    EC-ECHOCARDIOGRAM COMPLETE W/O CONT    Basic Metabolic Panel    CBC WITHOUT DIFFERENTIAL    Lipid Profile      9. S/P aortic valve replacement  apixaban (ELIQUIS) 5mg Tab    metoprolol tartrate (LOPRESSOR) 25 MG Tab    lisinopril (PRINIVIL) 5 MG Tab      10. Coronary artery disease involving native heart without angina pectoris, unspecified vessel or lesion type  Lipid Profile        Medical Decision Making: Today's Assessment/Status/Plan:       1. HTN  -good control on lisinopril 5 mg QD, furosemide 20 mg QD, and metoprolol 25 mg BID  -propanolol for tremor per PCP  -BP goal <130/80    2. S/P SAVR with aortic root enlargement  -doing well post-op but did have post-operative afib and unexpected re-admission for cholecystitis with bile drains in place  -SBE prophylaxis noted  -cont furosemide and potassium  -eliquis for 3 months unless afib returns  -resume aspirin 81 mg once off eliquis  -check labs and ehco in 3 months  -HH with PT/RN   -cardiac rehab once cleared by surgeon for gallbladder    3. HLD with CAD with CABG X1 (LIMA to LAD)   -cont rosuvastatin 40 mg QPM, OK to stop aspirin on eliquis, order cbc  -follow labs annually  -LDL goal <70 with CAD, repeat lipid panel in 3 months     Patient is to follow up with Mary RIDDLE in 3 months with echo and labs.

## 2023-09-15 LAB — EKG IMPRESSION: NORMAL

## 2023-09-15 PROCEDURE — 93010 ELECTROCARDIOGRAM REPORT: CPT | Performed by: INTERNAL MEDICINE

## 2023-09-18 ENCOUNTER — OFFICE VISIT (OUTPATIENT)
Dept: CARDIOTHORACIC SURGERY | Facility: MEDICAL CENTER | Age: 73
End: 2023-09-18
Payer: COMMERCIAL

## 2023-09-18 ENCOUNTER — APPOINTMENT (OUTPATIENT)
Dept: MEDICAL GROUP | Facility: MEDICAL CENTER | Age: 73
End: 2023-09-18
Payer: COMMERCIAL

## 2023-09-18 VITALS
HEIGHT: 67 IN | WEIGHT: 166.5 LBS | SYSTOLIC BLOOD PRESSURE: 128 MMHG | HEART RATE: 67 BPM | DIASTOLIC BLOOD PRESSURE: 82 MMHG | BODY MASS INDEX: 26.13 KG/M2 | OXYGEN SATURATION: 94 % | TEMPERATURE: 97.2 F

## 2023-09-18 DIAGNOSIS — Z09 POSTOP CHECK: ICD-10-CM

## 2023-09-18 PROCEDURE — 99024 POSTOP FOLLOW-UP VISIT: CPT | Performed by: NURSE PRACTITIONER

## 2023-09-18 PROCEDURE — 3074F SYST BP LT 130 MM HG: CPT | Performed by: NURSE PRACTITIONER

## 2023-09-18 PROCEDURE — 3079F DIAST BP 80-89 MM HG: CPT | Performed by: NURSE PRACTITIONER

## 2023-09-18 ASSESSMENT — FIBROSIS 4 INDEX: FIB4 SCORE: 0.68

## 2023-09-22 ENCOUNTER — APPOINTMENT (OUTPATIENT)
Dept: ADMISSIONS | Facility: MEDICAL CENTER | Age: 73
End: 2023-09-22
Attending: SURGERY
Payer: COMMERCIAL

## 2023-09-22 VITALS — BODY MASS INDEX: 26.08 KG/M2 | HEIGHT: 67 IN

## 2023-09-22 RX ORDER — ZOLPIDEM TARTRATE 5 MG/1
TABLET ORAL
COMMUNITY
End: 2023-11-21

## 2023-09-22 NOTE — PREPROCEDURE INSTRUCTIONS
Pre-admit telephone appointment completed. Reviewed the Preparing for your procedure handout with patient over the phone.  Patient instructed per pharmacy guidelines regarding taking or holding regularly prescribed medications before surgery. Instructed to take the following medications the day of surgery with a sip of water per pharmacy medication guidelines: cordarone, duloxetine, lopressor, prilosec, lyrica, inderal, crestor, synthroid.  Pt confirms the cardiologist has her on lopressor & propranolol.     Pt will hold her Eliquis on Saturday and Sunday per her doctor's instructions.    [facility] [place] called to confirm cardiac anesthesiologist needed for procedure.     Pt has bile duct bag.

## 2023-09-24 ENCOUNTER — ANESTHESIA EVENT (OUTPATIENT)
Dept: SURGERY | Facility: MEDICAL CENTER | Age: 73
End: 2023-09-24
Payer: COMMERCIAL

## 2023-09-25 ENCOUNTER — ANESTHESIA (OUTPATIENT)
Dept: SURGERY | Facility: MEDICAL CENTER | Age: 73
End: 2023-09-25
Payer: COMMERCIAL

## 2023-09-25 ENCOUNTER — HOSPITAL ENCOUNTER (OUTPATIENT)
Facility: MEDICAL CENTER | Age: 73
End: 2023-09-26
Attending: SURGERY | Admitting: SURGERY
Payer: COMMERCIAL

## 2023-09-25 DIAGNOSIS — G89.18 POSTOPERATIVE PAIN: ICD-10-CM

## 2023-09-25 PROCEDURE — 160048 HCHG OR STATISTICAL LEVEL 1-5: Performed by: SURGERY

## 2023-09-25 PROCEDURE — 160036 HCHG PACU - EA ADDL 30 MINS PHASE I: Performed by: SURGERY

## 2023-09-25 PROCEDURE — 160002 HCHG RECOVERY MINUTES (STAT): Performed by: SURGERY

## 2023-09-25 PROCEDURE — A9270 NON-COVERED ITEM OR SERVICE: HCPCS | Mod: JZ | Performed by: SURGERY

## 2023-09-25 PROCEDURE — 700102 HCHG RX REV CODE 250 W/ 637 OVERRIDE(OP): Performed by: ANESTHESIOLOGY

## 2023-09-25 PROCEDURE — 94760 N-INVAS EAR/PLS OXIMETRY 1: CPT

## 2023-09-25 PROCEDURE — 160041 HCHG SURGERY MINUTES - EA ADDL 1 MIN LEVEL 4: Performed by: SURGERY

## 2023-09-25 PROCEDURE — 700101 HCHG RX REV CODE 250: Performed by: SURGERY

## 2023-09-25 PROCEDURE — 700111 HCHG RX REV CODE 636 W/ 250 OVERRIDE (IP): Mod: JZ | Performed by: ANESTHESIOLOGY

## 2023-09-25 PROCEDURE — 160035 HCHG PACU - 1ST 60 MINS PHASE I: Performed by: SURGERY

## 2023-09-25 PROCEDURE — 700102 HCHG RX REV CODE 250 W/ 637 OVERRIDE(OP): Mod: JZ | Performed by: SURGERY

## 2023-09-25 PROCEDURE — 700101 HCHG RX REV CODE 250: Performed by: ANESTHESIOLOGY

## 2023-09-25 PROCEDURE — 700111 HCHG RX REV CODE 636 W/ 250 OVERRIDE (IP): Performed by: ANESTHESIOLOGY

## 2023-09-25 PROCEDURE — 160009 HCHG ANES TIME/MIN: Performed by: SURGERY

## 2023-09-25 PROCEDURE — A9270 NON-COVERED ITEM OR SERVICE: HCPCS | Performed by: ANESTHESIOLOGY

## 2023-09-25 PROCEDURE — 160029 HCHG SURGERY MINUTES - 1ST 30 MINS LEVEL 4: Performed by: SURGERY

## 2023-09-25 PROCEDURE — 700105 HCHG RX REV CODE 258: Performed by: SURGERY

## 2023-09-25 PROCEDURE — G0378 HOSPITAL OBSERVATION PER HR: HCPCS

## 2023-09-25 PROCEDURE — 88304 TISSUE EXAM BY PATHOLOGIST: CPT

## 2023-09-25 RX ORDER — ENOXAPARIN SODIUM 100 MG/ML
40 INJECTION SUBCUTANEOUS DAILY
Status: DISCONTINUED | OUTPATIENT
Start: 2023-09-25 | End: 2023-09-25

## 2023-09-25 RX ORDER — HYDROMORPHONE HYDROCHLORIDE 1 MG/ML
0.4 INJECTION, SOLUTION INTRAMUSCULAR; INTRAVENOUS; SUBCUTANEOUS
Status: DISCONTINUED | OUTPATIENT
Start: 2023-09-25 | End: 2023-09-25 | Stop reason: HOSPADM

## 2023-09-25 RX ORDER — DULOXETIN HYDROCHLORIDE 20 MG/1
40 CAPSULE, DELAYED RELEASE ORAL 2 TIMES DAILY
Status: DISCONTINUED | OUTPATIENT
Start: 2023-09-25 | End: 2023-09-26 | Stop reason: HOSPADM

## 2023-09-25 RX ORDER — CALCIUM CARBONATE 500 MG/1
500 TABLET, CHEWABLE ORAL
Status: DISCONTINUED | OUTPATIENT
Start: 2023-09-25 | End: 2023-09-26 | Stop reason: HOSPADM

## 2023-09-25 RX ORDER — DEXTROSE MONOHYDRATE, SODIUM CHLORIDE, AND POTASSIUM CHLORIDE 50; 1.49; 4.5 G/1000ML; G/1000ML; G/1000ML
INJECTION, SOLUTION INTRAVENOUS CONTINUOUS
Status: DISPENSED | OUTPATIENT
Start: 2023-09-25 | End: 2023-09-25

## 2023-09-25 RX ORDER — ONDANSETRON 2 MG/ML
4 INJECTION INTRAMUSCULAR; INTRAVENOUS
Status: COMPLETED | OUTPATIENT
Start: 2023-09-25 | End: 2023-09-25

## 2023-09-25 RX ORDER — DIPHENHYDRAMINE HCL 25 MG
25 TABLET ORAL EVERY 6 HOURS PRN
Status: DISCONTINUED | OUTPATIENT
Start: 2023-09-25 | End: 2023-09-26 | Stop reason: HOSPADM

## 2023-09-25 RX ORDER — MEPERIDINE HYDROCHLORIDE 25 MG/ML
12.5 INJECTION INTRAMUSCULAR; INTRAVENOUS; SUBCUTANEOUS
Status: DISCONTINUED | OUTPATIENT
Start: 2023-09-25 | End: 2023-09-25 | Stop reason: HOSPADM

## 2023-09-25 RX ORDER — METOPROLOL TARTRATE 1 MG/ML
1 INJECTION, SOLUTION INTRAVENOUS
Status: DISCONTINUED | OUTPATIENT
Start: 2023-09-25 | End: 2023-09-25 | Stop reason: HOSPADM

## 2023-09-25 RX ORDER — SODIUM CHLORIDE, SODIUM LACTATE, POTASSIUM CHLORIDE, AND CALCIUM CHLORIDE .6; .31; .03; .02 G/100ML; G/100ML; G/100ML; G/100ML
500 INJECTION, SOLUTION INTRAVENOUS
Status: DISCONTINUED | OUTPATIENT
Start: 2023-09-25 | End: 2023-09-26 | Stop reason: HOSPADM

## 2023-09-25 RX ORDER — CEFAZOLIN SODIUM 1 G/3ML
INJECTION, POWDER, FOR SOLUTION INTRAMUSCULAR; INTRAVENOUS PRN
Status: DISCONTINUED | OUTPATIENT
Start: 2023-09-25 | End: 2023-09-25 | Stop reason: SURG

## 2023-09-25 RX ORDER — BUPIVACAINE HYDROCHLORIDE AND EPINEPHRINE 5; 5 MG/ML; UG/ML
INJECTION, SOLUTION EPIDURAL; INTRACAUDAL; PERINEURAL
Status: DISCONTINUED | OUTPATIENT
Start: 2023-09-25 | End: 2023-09-25 | Stop reason: HOSPADM

## 2023-09-25 RX ORDER — HYDROMORPHONE HYDROCHLORIDE 1 MG/ML
0.1 INJECTION, SOLUTION INTRAMUSCULAR; INTRAVENOUS; SUBCUTANEOUS
Status: DISCONTINUED | OUTPATIENT
Start: 2023-09-25 | End: 2023-09-25 | Stop reason: HOSPADM

## 2023-09-25 RX ORDER — POLYETHYLENE GLYCOL 3350 17 G/17G
17 POWDER, FOR SOLUTION ORAL DAILY
Qty: 20 EACH | Refills: 0 | Status: SHIPPED | OUTPATIENT
Start: 2023-09-25 | End: 2023-11-21

## 2023-09-25 RX ORDER — PHENYLEPHRINE HCL IN 0.9% NACL 0.5 MG/5ML
SYRINGE (ML) INTRAVENOUS PRN
Status: DISCONTINUED | OUTPATIENT
Start: 2023-09-25 | End: 2023-09-25 | Stop reason: SURG

## 2023-09-25 RX ORDER — SODIUM CHLORIDE, SODIUM LACTATE, POTASSIUM CHLORIDE, CALCIUM CHLORIDE 600; 310; 30; 20 MG/100ML; MG/100ML; MG/100ML; MG/100ML
INJECTION, SOLUTION INTRAVENOUS CONTINUOUS
Status: ACTIVE | OUTPATIENT
Start: 2023-09-25 | End: 2023-09-25

## 2023-09-25 RX ORDER — HYDROMORPHONE HYDROCHLORIDE 1 MG/ML
0.5 INJECTION, SOLUTION INTRAMUSCULAR; INTRAVENOUS; SUBCUTANEOUS
Status: DISCONTINUED | OUTPATIENT
Start: 2023-09-25 | End: 2023-09-26 | Stop reason: HOSPADM

## 2023-09-25 RX ORDER — OXYCODONE HCL 5 MG/5 ML
10 SOLUTION, ORAL ORAL
Status: COMPLETED | OUTPATIENT
Start: 2023-09-25 | End: 2023-09-25

## 2023-09-25 RX ORDER — HYDROMORPHONE HYDROCHLORIDE 1 MG/ML
0.2 INJECTION, SOLUTION INTRAMUSCULAR; INTRAVENOUS; SUBCUTANEOUS
Status: DISCONTINUED | OUTPATIENT
Start: 2023-09-25 | End: 2023-09-25 | Stop reason: HOSPADM

## 2023-09-25 RX ORDER — PREGABALIN 25 MG/1
50 CAPSULE ORAL 2 TIMES DAILY
Status: DISCONTINUED | OUTPATIENT
Start: 2023-09-25 | End: 2023-09-26 | Stop reason: HOSPADM

## 2023-09-25 RX ORDER — OXYCODONE HYDROCHLORIDE 5 MG/1
5 TABLET ORAL
Status: DISCONTINUED | OUTPATIENT
Start: 2023-09-25 | End: 2023-09-26 | Stop reason: HOSPADM

## 2023-09-25 RX ORDER — OXYCODONE HCL 5 MG/5 ML
5 SOLUTION, ORAL ORAL
Status: COMPLETED | OUTPATIENT
Start: 2023-09-25 | End: 2023-09-25

## 2023-09-25 RX ORDER — LABETALOL HYDROCHLORIDE 5 MG/ML
5 INJECTION, SOLUTION INTRAVENOUS
Status: DISCONTINUED | OUTPATIENT
Start: 2023-09-25 | End: 2023-09-25 | Stop reason: HOSPADM

## 2023-09-25 RX ORDER — MIDAZOLAM HYDROCHLORIDE 1 MG/ML
INJECTION INTRAMUSCULAR; INTRAVENOUS PRN
Status: DISCONTINUED | OUTPATIENT
Start: 2023-09-25 | End: 2023-09-25 | Stop reason: SURG

## 2023-09-25 RX ORDER — AMIODARONE HYDROCHLORIDE 200 MG/1
100 TABLET ORAL DAILY
Status: DISCONTINUED | OUTPATIENT
Start: 2023-09-26 | End: 2023-09-26 | Stop reason: HOSPADM

## 2023-09-25 RX ORDER — DIPHENHYDRAMINE HYDROCHLORIDE 50 MG/ML
12.5 INJECTION INTRAMUSCULAR; INTRAVENOUS
Status: DISCONTINUED | OUTPATIENT
Start: 2023-09-25 | End: 2023-09-25 | Stop reason: HOSPADM

## 2023-09-25 RX ORDER — ROSUVASTATIN CALCIUM 10 MG/1
40 TABLET, COATED ORAL DAILY
Status: DISCONTINUED | OUTPATIENT
Start: 2023-09-26 | End: 2023-09-26 | Stop reason: HOSPADM

## 2023-09-25 RX ORDER — OXYCODONE HYDROCHLORIDE 10 MG/1
10 TABLET ORAL
Status: DISCONTINUED | OUTPATIENT
Start: 2023-09-25 | End: 2023-09-26 | Stop reason: HOSPADM

## 2023-09-25 RX ORDER — HALOPERIDOL 5 MG/ML
1 INJECTION INTRAMUSCULAR
Status: DISCONTINUED | OUTPATIENT
Start: 2023-09-25 | End: 2023-09-25 | Stop reason: HOSPADM

## 2023-09-25 RX ORDER — ACETAMINOPHEN 500 MG
1000 TABLET ORAL EVERY 6 HOURS
Status: DISCONTINUED | OUTPATIENT
Start: 2023-09-25 | End: 2023-09-26 | Stop reason: HOSPADM

## 2023-09-25 RX ORDER — ROCURONIUM BROMIDE 10 MG/ML
INJECTION, SOLUTION INTRAVENOUS PRN
Status: DISCONTINUED | OUTPATIENT
Start: 2023-09-25 | End: 2023-09-25 | Stop reason: SURG

## 2023-09-25 RX ORDER — DIPHENHYDRAMINE HYDROCHLORIDE 50 MG/ML
25 INJECTION INTRAMUSCULAR; INTRAVENOUS EVERY 6 HOURS PRN
Status: DISCONTINUED | OUTPATIENT
Start: 2023-09-25 | End: 2023-09-26 | Stop reason: HOSPADM

## 2023-09-25 RX ORDER — BUSPIRONE HYDROCHLORIDE 5 MG/1
10 TABLET ORAL EVERY EVENING
Status: DISCONTINUED | OUTPATIENT
Start: 2023-09-25 | End: 2023-09-26 | Stop reason: HOSPADM

## 2023-09-25 RX ORDER — SODIUM CHLORIDE, SODIUM LACTATE, POTASSIUM CHLORIDE, CALCIUM CHLORIDE 600; 310; 30; 20 MG/100ML; MG/100ML; MG/100ML; MG/100ML
INJECTION, SOLUTION INTRAVENOUS CONTINUOUS
Status: DISCONTINUED | OUTPATIENT
Start: 2023-09-25 | End: 2023-09-25 | Stop reason: HOSPADM

## 2023-09-25 RX ORDER — HALOPERIDOL 5 MG/ML
1 INJECTION INTRAMUSCULAR EVERY 6 HOURS PRN
Status: DISCONTINUED | OUTPATIENT
Start: 2023-09-25 | End: 2023-09-26 | Stop reason: HOSPADM

## 2023-09-25 RX ORDER — ACETAMINOPHEN 325 MG/1
650 TABLET ORAL EVERY 6 HOURS
Qty: 60 TABLET | Refills: 0 | Status: SHIPPED | OUTPATIENT
Start: 2023-09-25 | End: 2023-11-21

## 2023-09-25 RX ORDER — ACETAMINOPHEN 500 MG
1000 TABLET ORAL EVERY 4 HOURS PRN
Status: DISCONTINUED | OUTPATIENT
Start: 2023-09-25 | End: 2023-09-25 | Stop reason: HOSPADM

## 2023-09-25 RX ORDER — ACETAMINOPHEN 500 MG
1000 TABLET ORAL EVERY 6 HOURS PRN
Status: DISCONTINUED | OUTPATIENT
Start: 2023-09-30 | End: 2023-09-26 | Stop reason: HOSPADM

## 2023-09-25 RX ORDER — ONDANSETRON 2 MG/ML
INJECTION INTRAMUSCULAR; INTRAVENOUS PRN
Status: DISCONTINUED | OUTPATIENT
Start: 2023-09-25 | End: 2023-09-25 | Stop reason: SURG

## 2023-09-25 RX ORDER — ONDANSETRON 2 MG/ML
4 INJECTION INTRAMUSCULAR; INTRAVENOUS EVERY 4 HOURS PRN
Status: DISCONTINUED | OUTPATIENT
Start: 2023-09-25 | End: 2023-09-26 | Stop reason: HOSPADM

## 2023-09-25 RX ORDER — PROPRANOLOL HYDROCHLORIDE 20 MG/1
40 TABLET ORAL 3 TIMES DAILY
Status: DISCONTINUED | OUTPATIENT
Start: 2023-09-25 | End: 2023-09-26 | Stop reason: HOSPADM

## 2023-09-25 RX ORDER — LISINOPRIL 5 MG/1
5 TABLET ORAL DAILY
Status: DISCONTINUED | OUTPATIENT
Start: 2023-09-25 | End: 2023-09-26 | Stop reason: HOSPADM

## 2023-09-25 RX ORDER — ZOLPIDEM TARTRATE 5 MG/1
5 TABLET ORAL NIGHTLY PRN
Status: DISCONTINUED | OUTPATIENT
Start: 2023-09-25 | End: 2023-09-26 | Stop reason: HOSPADM

## 2023-09-25 RX ORDER — DEXAMETHASONE SODIUM PHOSPHATE 4 MG/ML
INJECTION, SOLUTION INTRA-ARTICULAR; INTRALESIONAL; INTRAMUSCULAR; INTRAVENOUS; SOFT TISSUE PRN
Status: DISCONTINUED | OUTPATIENT
Start: 2023-09-25 | End: 2023-09-25 | Stop reason: SURG

## 2023-09-25 RX ORDER — OMEPRAZOLE 20 MG/1
20 CAPSULE, DELAYED RELEASE ORAL DAILY
Status: DISCONTINUED | OUTPATIENT
Start: 2023-09-26 | End: 2023-09-26 | Stop reason: HOSPADM

## 2023-09-25 RX ORDER — OXYCODONE HYDROCHLORIDE 5 MG/1
5 TABLET ORAL EVERY 4 HOURS PRN
Qty: 12 TABLET | Refills: 0 | Status: SHIPPED | OUTPATIENT
Start: 2023-09-25 | End: 2023-09-28

## 2023-09-25 RX ORDER — POTASSIUM CHLORIDE 20 MEQ/1
10 TABLET, EXTENDED RELEASE ORAL DAILY
Status: DISCONTINUED | OUTPATIENT
Start: 2023-09-25 | End: 2023-09-26 | Stop reason: HOSPADM

## 2023-09-25 RX ORDER — DEXAMETHASONE SODIUM PHOSPHATE 4 MG/ML
4 INJECTION, SOLUTION INTRA-ARTICULAR; INTRALESIONAL; INTRAMUSCULAR; INTRAVENOUS; SOFT TISSUE
Status: DISCONTINUED | OUTPATIENT
Start: 2023-09-25 | End: 2023-09-26 | Stop reason: HOSPADM

## 2023-09-25 RX ORDER — LEVOTHYROXINE SODIUM 0.07 MG/1
75 TABLET ORAL
Status: DISCONTINUED | OUTPATIENT
Start: 2023-09-26 | End: 2023-09-26 | Stop reason: HOSPADM

## 2023-09-25 RX ORDER — EPHEDRINE SULFATE 50 MG/ML
5 INJECTION, SOLUTION INTRAVENOUS
Status: DISCONTINUED | OUTPATIENT
Start: 2023-09-25 | End: 2023-09-25 | Stop reason: HOSPADM

## 2023-09-25 RX ORDER — FUROSEMIDE 20 MG/1
20 TABLET ORAL DAILY
Status: DISCONTINUED | OUTPATIENT
Start: 2023-09-25 | End: 2023-09-26 | Stop reason: HOSPADM

## 2023-09-25 RX ORDER — SCOLOPAMINE TRANSDERMAL SYSTEM 1 MG/1
1 PATCH, EXTENDED RELEASE TRANSDERMAL
Status: DISCONTINUED | OUTPATIENT
Start: 2023-09-25 | End: 2023-09-26 | Stop reason: HOSPADM

## 2023-09-25 RX ADMIN — FENTANYL CITRATE 200 MCG: 50 INJECTION, SOLUTION INTRAMUSCULAR; INTRAVENOUS at 07:32

## 2023-09-25 RX ADMIN — MIDAZOLAM 2 MG: 1 INJECTION, SOLUTION INTRAMUSCULAR; INTRAVENOUS at 07:27

## 2023-09-25 RX ADMIN — PROPOFOL 120 MG: 10 INJECTION, EMULSION INTRAVENOUS at 07:32

## 2023-09-25 RX ADMIN — DIPHENHYDRAMINE HYDROCHLORIDE 12.5 MG: 50 INJECTION INTRAMUSCULAR; INTRAVENOUS at 12:06

## 2023-09-25 RX ADMIN — BUSPIRONE HYDROCHLORIDE 10 MG: 5 TABLET ORAL at 17:28

## 2023-09-25 RX ADMIN — OXYCODONE HYDROCHLORIDE 5 MG: 5 TABLET ORAL at 21:06

## 2023-09-25 RX ADMIN — ACETAMINOPHEN 1000 MG: 500 TABLET, FILM COATED ORAL at 18:33

## 2023-09-25 RX ADMIN — ROCURONIUM BROMIDE 30 MG: 50 INJECTION, SOLUTION INTRAVENOUS at 07:32

## 2023-09-25 RX ADMIN — ONDANSETRON 4 MG: 2 INJECTION INTRAMUSCULAR; INTRAVENOUS at 07:54

## 2023-09-25 RX ADMIN — Medication 200 MCG: at 07:57

## 2023-09-25 RX ADMIN — ONDANSETRON 4 MG: 2 INJECTION INTRAMUSCULAR; INTRAVENOUS at 09:02

## 2023-09-25 RX ADMIN — SODIUM CHLORIDE, POTASSIUM CHLORIDE, SODIUM LACTATE AND CALCIUM CHLORIDE: 600; 310; 30; 20 INJECTION, SOLUTION INTRAVENOUS at 07:25

## 2023-09-25 RX ADMIN — DULOXETINE HYDROCHLORIDE 40 MG: 20 CAPSULE, DELAYED RELEASE ORAL at 17:27

## 2023-09-25 RX ADMIN — POTASSIUM CHLORIDE, DEXTROSE MONOHYDRATE AND SODIUM CHLORIDE: 150; 5; 450 INJECTION, SOLUTION INTRAVENOUS at 13:49

## 2023-09-25 RX ADMIN — ACETAMINOPHEN 1000 MG: 500 TABLET, FILM COATED ORAL at 23:46

## 2023-09-25 RX ADMIN — Medication 200 MCG: at 07:39

## 2023-09-25 RX ADMIN — LISINOPRIL 5 MG: 5 TABLET ORAL at 13:46

## 2023-09-25 RX ADMIN — METOPROLOL TARTRATE 25 MG: 25 TABLET, FILM COATED ORAL at 17:28

## 2023-09-25 RX ADMIN — SUGAMMADEX 200 MG: 100 INJECTION, SOLUTION INTRAVENOUS at 08:08

## 2023-09-25 RX ADMIN — CEFAZOLIN 2 G: 1 INJECTION, POWDER, FOR SOLUTION INTRAMUSCULAR; INTRAVENOUS at 07:39

## 2023-09-25 RX ADMIN — ACETAMINOPHEN 1000 MG: 500 TABLET, FILM COATED ORAL at 13:27

## 2023-09-25 RX ADMIN — PROPRANOLOL HYDROCHLORIDE 40 MG: 20 TABLET ORAL at 15:13

## 2023-09-25 RX ADMIN — OXYCODONE HYDROCHLORIDE 5 MG: 5 SOLUTION ORAL at 09:04

## 2023-09-25 RX ADMIN — PREGABALIN 50 MG: 25 CAPSULE ORAL at 17:28

## 2023-09-25 RX ADMIN — APIXABAN 5 MG: 5 TABLET, FILM COATED ORAL at 21:07

## 2023-09-25 RX ADMIN — PROPRANOLOL HYDROCHLORIDE 40 MG: 20 TABLET ORAL at 21:07

## 2023-09-25 RX ADMIN — DEXAMETHASONE SODIUM PHOSPHATE 4 MG: 4 INJECTION INTRA-ARTICULAR; INTRALESIONAL; INTRAMUSCULAR; INTRAVENOUS; SOFT TISSUE at 07:54

## 2023-09-25 ASSESSMENT — LIFESTYLE VARIABLES
TOTAL SCORE: 0
HAVE YOU EVER FELT YOU SHOULD CUT DOWN ON YOUR DRINKING: NO
HOW MANY TIMES IN THE PAST YEAR HAVE YOU HAD 5 OR MORE DRINKS IN A DAY: 0
TOTAL SCORE: 0
CONSUMPTION TOTAL: NEGATIVE
ON A TYPICAL DAY WHEN YOU DRINK ALCOHOL HOW MANY DRINKS DO YOU HAVE: 0
EVER HAD A DRINK FIRST THING IN THE MORNING TO STEADY YOUR NERVES TO GET RID OF A HANGOVER: NO
TOTAL SCORE: 0
AVERAGE NUMBER OF DAYS PER WEEK YOU HAVE A DRINK CONTAINING ALCOHOL: 0
ALCOHOL_USE: NO
HAVE PEOPLE ANNOYED YOU BY CRITICIZING YOUR DRINKING: NO
EVER FELT BAD OR GUILTY ABOUT YOUR DRINKING: NO

## 2023-09-25 ASSESSMENT — COGNITIVE AND FUNCTIONAL STATUS - GENERAL
CLIMB 3 TO 5 STEPS WITH RAILING: A LITTLE
DAILY ACTIVITIY SCORE: 22
SUGGESTED CMS G CODE MODIFIER DAILY ACTIVITY: CJ
STANDING UP FROM CHAIR USING ARMS: A LITTLE
WALKING IN HOSPITAL ROOM: A LITTLE
HELP NEEDED FOR BATHING: A LITTLE
SUGGESTED CMS G CODE MODIFIER MOBILITY: CK
DRESSING REGULAR LOWER BODY CLOTHING: A LITTLE
MOVING FROM LYING ON BACK TO SITTING ON SIDE OF FLAT BED: A LITTLE
MOBILITY SCORE: 18
TURNING FROM BACK TO SIDE WHILE IN FLAT BAD: A LITTLE
MOVING TO AND FROM BED TO CHAIR: A LITTLE

## 2023-09-25 ASSESSMENT — CHA2DS2 SCORE
HYPERTENSION: YES
AGE 75 OR GREATER: NO
CHA2DS2 VASC SCORE: 3
AGE 65 TO 74: YES
CHF OR LEFT VENTRICULAR DYSFUNCTION: NO
PRIOR STROKE OR TIA OR THROMBOEMBOLISM: NO
DIABETES: NO
VASCULAR DISEASE: YES
SEX: MALE

## 2023-09-25 ASSESSMENT — PAIN DESCRIPTION - PAIN TYPE
TYPE: SURGICAL PAIN
TYPE: ACUTE PAIN
TYPE: SURGICAL PAIN

## 2023-09-25 ASSESSMENT — FIBROSIS 4 INDEX
FIB4 SCORE: 0.68
FIB4 SCORE: 0.68

## 2023-09-25 ASSESSMENT — PAIN SCALES - GENERAL: PAIN_LEVEL: 0

## 2023-09-25 NOTE — OR NURSING
0822:  To PACU from OR via gurney,  sleeping, respirations spontaneous and non-labored.  Abd incisions with dermabond,CD&I.  VSS      0900:  Pt c/o nausea and minor pain.  Med per MAR.    0910:  Tolerates sips of water.    0935:  Received report from Mi MARIE.  Pt resting on gurney with eyes closed.  VSS.    1000:  Pt states she feels better.  Meets criteria to transfer to floor.  Waiting for admit order.    1015:  Waiting for bed.       1205:  Pt c/o nausea.  Med per MAR    1230:  Pt states she feels better.  Report given to Xiomara MARIE.

## 2023-09-25 NOTE — PROGRESS NOTES
4 Eyes Skin Assessment Completed by CYNTHIA Cho and CYNTHIA Dale.    Head WDL  Ears WDL  Nose WDL  Mouth WDL  Neck WDL  Breast/Chest Scar  Shoulder Blades WDL  Spine WDL  (R) Arm/Elbow/Hand Discoloration  (L) Arm/Elbow/Hand Discoloration  Abdomen Scar and Incision abdominal lap sites dermabond  Groin WDL  Scrotum/Coccyx/Buttocks Redness and Blanching  (R) Leg Scar and Swelling  (L) Leg Scar and Swelling  (R) Heel/Foot/Toe WDL  (L) Heel/Foot/Toe WDL          Devices In Places Blood Pressure Cuff, Pulse Ox, and SCD's      Interventions In Place Pillows    Possible Skin Injury No    Pictures Uploaded Into Epic N/A  Wound Consult Placed N/A  RN Wound Prevention Protocol Ordered No

## 2023-09-25 NOTE — CARE PLAN
The patient is Stable - Low risk of patient condition declining or worsening    Shift Goals  Clinical Goals: Pt will ambulate 50 feet and void 6 hours after suregry  Patient Goals: pain control, rest    Progress made toward(s) clinical / shift goals:  Pt able to ambulate to bathroom with assist. Pain has been controlled throughout the shift.    Patient is not progressing towards the following goals:      Problem: Fall Risk  Goal: Patient will remain free from falls  Outcome: Progressing     Problem: Risk for Post Op Fluid Imbalance  Goal: Promotion of fluid balance  Outcome: Progressing     Problem: Respiratory/Oxygenation Function Post-Surgical  Goal: Patient will achieve/maintain normal respiratory rate/effort  Outcome: Progressing     Problem: Early Mobilization - Post Surgery  Goal: Early mobilization post surgery  Outcome: Progressing     Problem: Pain - Post Surgery  Goal: Alleviation or reduction of pain post surgery  Outcome: Progressing     Problem: Wound/ / Incision Healing  Goal: Patient's wound/surgical incision will decrease in size and heals properly  Outcome: Progressing     Problem: Respiratory  Goal: Patient will achieve/maintain optimum respiratory ventilation and gas exchange  Outcome: Progressing

## 2023-09-25 NOTE — ANESTHESIA PROCEDURE NOTES
Airway    Date/Time: 9/25/2023 7:35 AM    Performed by: Ishmael Chiang M.D.  Authorized by: Ishmael Chiang M.D.    Location:  OR  Urgency:  Elective  Indications for Airway Management:  Anesthesia      Spontaneous Ventilation: absent    Sedation Level:  Deep  Preoxygenated: Yes    Patient Position:  Sniffing  Mask Difficulty Assessment:  1 - vent by mask  Final Airway Type:  Endotracheal airway  Final Endotracheal Airway:  ETT  Cuffed: Yes    Technique Used for Successful ETT Placement:  Direct laryngoscopy    Insertion Site:  Oral  Blade Type:  Rohit  Laryngoscope Blade/Videolaryngoscope Blade Size:  3  ETT Size (mm):  6.5  Measured from:  Teeth  ETT to Teeth (cm):  5  Placement Verified by: auscultation and capnometry    Cormack-Lehane Classification:  Grade I - full view of glottis  Number of Attempts at Approach:  1

## 2023-09-25 NOTE — PROGRESS NOTES
1230-Received report from PACU RN.   1255- Pt arrived to floor via gurney then ambulated to hospital bed.  Assumed care of pt.   Assessment and chart check complete.   Pt is AAOX4, no signs of distress, denies nausea/vomiting.   Updated for the POC of the day.  Abdominal lap sites dermabond CDI.  Fall precautions in place, treaded socks on pt, bed in low position.   Call light within reach.   Educated pt to call if needing anything.   Hourly rounding.

## 2023-09-25 NOTE — ANESTHESIA PREPROCEDURE EVALUATION
Case: [id number] Date/Time: 09/25/23 0715    Procedure: LAPAROSCOPIC CHOLECYSTECTOMY.    Pre-op diagnosis: ACUTE CHOLECYSTITIS    Location:  OR  / SURGERY [facility]    Surgeons: Mike Valencia M.D.          Relevant Problems   NEURO   (positive) Chronic headache      CARDIAC   (positive) Benign essential HTN   (positive) Coronary artery disease involving native coronary artery of native heart without angina pectoris   (positive) Heart block   (positive) Paroxysmal atrial fibrillation (HCC)      ENDO   (positive) Postoperative hypothyroidism_s/p thyroidectomy_Dr. Mullen       Physical Exam    Airway   Mallampati: II  TM distance: >3 FB  Neck ROM: full       Cardiovascular - normal exam  Rhythm: regular  Rate: normal  (-) murmur     Dental - normal exam           Pulmonary - normal exam  Breath sounds clear to auscultation     Abdominal    Neurological - normal exam                 Anesthesia Plan    ASA 3   ASA physical status 3 criteria: CAD/stents (> 3 months)    Plan - general       Airway plan will be ETT                    Informed Consent:

## 2023-09-25 NOTE — ANESTHESIA POSTPROCEDURE EVALUATION
Patient: Margoth Hopkins    Procedure Summary     Date: 09/25/23 Room / Location:  OR  / SURGERY [facility]    Anesthesia Start: 0725 Anesthesia Stop: 0825    Procedure: LAPAROSCOPIC CHOLECYSTECTOMY. (Abdomen) Diagnosis: (ACUTE CHOLECYSTITIS)    Surgeons: Mike Valencia M.D. Responsible Provider: Ishmael Chiang M.D.    Anesthesia Type: general ASA Status: 3          Final Anesthesia Type: general  Last vitals  BP   Blood Pressure : (!) 151/89    Temp   36.1 °C (97 °F)    Pulse   74   Resp   16    SpO2   96 %      Anesthesia Post Evaluation    Patient location during evaluation: PACU  Patient participation: complete - patient participated  Level of consciousness: awake and alert  Pain score: 0    Airway patency: patent  Anesthetic complications: no  Cardiovascular status: hemodynamically stable  Respiratory status: acceptable  Hydration status: euvolemic    PONV: none          No notable events documented.     Nurse Pain Score: 5 (NPRS)

## 2023-09-25 NOTE — OR NURSING
0920-patient reports pain level 2/10 and tolerable     0925-called and updated patients  Zak at this time.     0930-placed on NC at 2L O2.     0935-no changes. Report back to Ac MARIE

## 2023-09-25 NOTE — ANESTHESIA TIME REPORT
Anesthesia Start and Stop Event Times     Date Time Event    9/25/2023 0725 Anesthesia Start     0728 Ready for Procedure     0825 Anesthesia Stop        Responsible Staff  09/25/23    Name Role Begin End    Ishmael Chiang M.D. Anesth 0725 0825        Overtime Reason:  no overtime (within assigned shift)    Comments:

## 2023-09-25 NOTE — OP REPORT
Operative Report    Date: 9/25/2023    Surgeon: Mike Valencia M.D.     Assistant: None    Pre-operative Diagnosis: Acute Cholecystitis    Post-operative Diagnosis: same    ASA Classification: III.    Procedure: Laparoscopic cholecystectomy    Indications: This is a 72 y.o. female who presented with symptoms of acute cholecystitis with cholecystostomy tube in place.      The indications for a surgical assistant in this surgery were indicated due to complexity of the procedure. Their role included aiding in incision, retraction, holding devices including camera for laparoscopic procedure, and closure of the wound.      Wound Classification: Class II, II, Clean Contaminated..    Findings: critical view of safety obtained    Procedure in detail: The patient was seen and examined in the preoperative holding area.  The risks benefits and alternatives of the procedure were discussed with the patient who wished to proceed with the procedure as described.  The patient was transferred to the operating room placed in supine position and all pressure points were properly padded.  General endotracheal anesthesia was induced and preoperative antibiotics were given per SCIP protocol.  Patient's abdomen was prepped with ChloraPrep and draped in the normal sterile fashion.  A timeout was performed confirming correct patient, correct procedure, and that all necessary equipment was in the room.      After infiltration of local anesthetic, an incision was made in the supraumbilical position.  A combination of blunt and electrocautery dissection was used down to the fascia.  The umbilical stalk was grasped elevated and the fascia was sharply incised.  A figure-of-eight 0 vicryl suture was placed across the fascial opening.  The Cruz port was introduced and pneumoperitoneum was achieved and maintained at 15 mmHg carbon dioxide throughout the entirety of the case.   The 5 mm camera was introduced and the abdomen was inspected and  no underlying trauma was identified from abdominal entry. After infusing local anesthetic under direct visualization two 5 mm right upper quadrant and one 5 mm epigastric port were placed.    The gallbladder was identified in the right upper quadrant.  A combination of blunt and electrocautery dissection were used to dissect the overlaying tissue free from around the cystic duct and cystic artery.  Dissection was continued until the cystic duct and cystic artery free and there is nothing but liver parenchyma behind.  This confirmed the critical view of safety. The cystic duct and cystic artery were double clipped on the patient's side single clipped on the specimen side and sharply transected. The gallbladder was removed from the cystic plate using electrocautery.  I inspected the cystic plate and hemostasis was obtained.  We then clipped the cholecystostomy tube well away from the skin and remove this under direct visualization.  We covered this wound with a bandage.  The gallbladder was placed in a 10 mm Endo Catch bag through the supraumbilical port and removed.    All ports were removed with video assist, and were hemostatic. The previously placed vicryl suture were tied. All skin sites were closed with subcuticular Monocryl and Dermabond was placed over the wounds.    The patient was awakened from general anesthetic, and was taken to the recovery room in stable condition.    Sponge and needle counts were correct at the end of the case.     Specimen: gallbladder and content for permanent pathology    EBL: 10mL    Dispo: stable, extubated, to PACU    Mike Valencia M.D.  [facility]  [phone]

## 2023-09-25 NOTE — DISCHARGE INSTRUCTIONS
If any questions arise, call your provider.  If your provider is not available, please feel free to call the Surgical Center at [phone].    MEDICATIONS: Resume taking daily medication.  Take prescribed pain medication with food.  If no medication is prescribed, you may take non-aspirin pain medication if needed.  PAIN MEDICATION CAN BE VERY CONSTIPATING.  Take a stool softener or laxative such as senokot, pericolace, or milk of magnesia if needed.    Last pain medication given at     What to Expect Post Anesthesia    Rest and take it easy for the first 24 hours.  A responsible adult is recommended to remain with you during that time.  It is normal to feel sleepy.  We encourage you to not do anything that requires balance, judgment or coordination.    FOR 24 HOURS DO NOT:  Drive, operate machinery or run household appliances.  Drink beer or alcoholic beverages.  Make important decisions or sign legal documents.    To avoid nausea, slowly advance diet as tolerated, avoiding spicy or greasy foods for the first day.  Add more substantial food to your diet according to your provider's instructions.  Babies can be fed formula or breast milk as soon as they are hungry.  INCREASE FLUIDS AND FIBER TO AVOID CONSTIPATION.    MILD FLU-LIKE SYMPTOMS ARE NORMAL.  YOU MAY EXPERIENCE GENERALIZED MUSCLE ACHES, THROAT IRRITATION, HEADACHE AND/OR SOME NAUSEA.                Laparoscopic Cholecystectomy D/C instructions:    DIET: Upon discharge from the hospital you may resume your normal preoperative diet. Depending on how you are feeling and whether you have nausea or not, you may wish to stay with a bland diet for the first few days. However, you can advance this as quickly as you feel ready.    ACTIVITIES: After discharge from the hospital, you may resume full routine activities. However, there should be no heavy lifting (greater than 15 pounds) and no strenuous activities until after your follow-up visit. Otherwise, routine  activities of daily living are acceptable.    DRIVING: You may drive whenever you are off pain medications and are able to perform the activities needed to drive, i.e. turning, bending, twisting, etc.    BATHING: You may get the wound wet at any time after leaving the hospital. You may shower, but do not submerge in a bath for at least a week. Dressings may come off after 48 hours.    BOWEL FUNCTION: A few patients, after this operation, will develop either frequent or loose stools after meals. This usually corrects itself after a few days, to a few weeks. If this occurs, do not worry; it is not unusual and will resolve. Much more common than loose stools, is constipation. The combination of pain medication and decreased activity level can cause constipation in otherwise normal patients. If you feel this is occurring, take a laxative (Milk of Magnesia, Ex-Lax, Senokot, etc.) until the problem has resolved.    PAIN MEDICATION: You will be given a prescription for pain medication at discharge. Please take these as directed. It is important to remember not to take medications on an empty stomach as this may cause nausea.    CALL IF YOU HAVE: (1) Fevers to more than 1010 F, (2) Unusual chest or leg pain, (3) Drainage or fluid from incision that may be foul smelling, increased tenderness or soreness at the wound or the wound edges are no longer together, redness or swelling at the incision site. Please do not hesitate to call with any other questions.     APPOINTMENT: Contact our office at [phone] for a follow-up appointment in 1 to 2 weeks following your procedure.    If you have any additional questions, please do not hesitate to call the office.    Office address:   [address], [address] [place] [address]

## 2023-09-26 ENCOUNTER — PATIENT OUTREACH (OUTPATIENT)
Dept: SCHEDULING | Facility: IMAGING CENTER | Age: 73
End: 2023-09-26

## 2023-09-26 VITALS
HEIGHT: 67 IN | SYSTOLIC BLOOD PRESSURE: 130 MMHG | DIASTOLIC BLOOD PRESSURE: 64 MMHG | WEIGHT: 165.57 LBS | OXYGEN SATURATION: 95 % | RESPIRATION RATE: 17 BRPM | HEART RATE: 66 BPM | BODY MASS INDEX: 25.99 KG/M2 | TEMPERATURE: 96.9 F

## 2023-09-26 PROCEDURE — 94760 N-INVAS EAR/PLS OXIMETRY 1: CPT

## 2023-09-26 PROCEDURE — G0378 HOSPITAL OBSERVATION PER HR: HCPCS

## 2023-09-26 PROCEDURE — A9270 NON-COVERED ITEM OR SERVICE: HCPCS | Performed by: SURGERY

## 2023-09-26 PROCEDURE — 700102 HCHG RX REV CODE 250 W/ 637 OVERRIDE(OP): Performed by: SURGERY

## 2023-09-26 RX ADMIN — PREGABALIN 50 MG: 25 CAPSULE ORAL at 05:30

## 2023-09-26 RX ADMIN — OMEPRAZOLE 20 MG: 20 CAPSULE, DELAYED RELEASE ORAL at 05:29

## 2023-09-26 RX ADMIN — FUROSEMIDE 20 MG: 20 TABLET ORAL at 05:33

## 2023-09-26 RX ADMIN — PROPRANOLOL HYDROCHLORIDE 40 MG: 20 TABLET ORAL at 09:03

## 2023-09-26 RX ADMIN — ROSUVASTATIN 40 MG: 10 TABLET, FILM COATED ORAL at 05:28

## 2023-09-26 RX ADMIN — METOPROLOL TARTRATE 25 MG: 25 TABLET, FILM COATED ORAL at 05:32

## 2023-09-26 RX ADMIN — APIXABAN 5 MG: 5 TABLET, FILM COATED ORAL at 05:31

## 2023-09-26 RX ADMIN — DULOXETINE HYDROCHLORIDE 40 MG: 20 CAPSULE, DELAYED RELEASE ORAL at 05:29

## 2023-09-26 RX ADMIN — AMIODARONE HYDROCHLORIDE 100 MG: 200 TABLET ORAL at 05:32

## 2023-09-26 RX ADMIN — LEVOTHYROXINE SODIUM 75 MCG: 0.07 TABLET ORAL at 05:29

## 2023-09-26 RX ADMIN — POTASSIUM CHLORIDE 10 MEQ: 1500 TABLET, EXTENDED RELEASE ORAL at 05:31

## 2023-09-26 RX ADMIN — LISINOPRIL 5 MG: 5 TABLET ORAL at 09:05

## 2023-09-26 ASSESSMENT — PATIENT HEALTH QUESTIONNAIRE - PHQ9
SUM OF ALL RESPONSES TO PHQ9 QUESTIONS 1 AND 2: 0
1. LITTLE INTEREST OR PLEASURE IN DOING THINGS: NOT AT ALL
2. FEELING DOWN, DEPRESSED, IRRITABLE, OR HOPELESS: NOT AT ALL

## 2023-09-26 ASSESSMENT — PAIN DESCRIPTION - PAIN TYPE: TYPE: ACUTE PAIN

## 2023-09-26 NOTE — PROGRESS NOTES
Bedside report received from night RN. Assumed care of patient. Alert and oriented X4, resting comfortably in bed. On RA, no SOB/respiratory distress noted. Daily plan of care discussed. Pt denies intolerable pain, nausea and vomiting at this time. Call light and personal belongings within reach. Hourly rounding in place.

## 2023-09-26 NOTE — PROGRESS NOTES
Received report from day shift nurse, Tammie MARIE. Assumed pt care at 1915.   Pt is A&Ox4, resting comfortably on bed. Pt received with oxygen at 2 Lpm via nasal cannula; no signs of SOB/respiratory distress. VSS. Pt denies pain at this moment; declines need of prn pain medication. Needs attended well. Fall precautions in place. Bed at lowest position. Call light and personal belongings within reach. Encouraged to call for assistance. Plan of care on going, no further concerns as of present.    Hourly rounding in progress.

## 2023-09-26 NOTE — CARE PLAN
The patient is Stable - Low risk of patient condition declining or worsening    Shift Goals  Clinical Goals: Pt will be able to void and ambulate at least 50 ft within the shift. Pt will be able to control pain and report pain less than 5/10 post interventions. Monitor post-op vital signs.  Patient Goals: sleep and rest  Family Goals: n/a    Progress made toward(s) clinical / shift goals:  Pt able to void and ambulate more than 50 ft within the shift. Pt required 1 prn pain medication within the shift. Pt states relief and seen sleeping comfortably in between rounds. Post-op vitals monitored every 4 hours within the shift. Hourly rounding in progress.     Patient is not progressing towards the following goals: n/a

## 2023-09-27 LAB — PATHOLOGY CONSULT NOTE: NORMAL

## 2023-09-29 PROCEDURE — RXMED WILLOW AMBULATORY MEDICATION CHARGE

## 2023-09-30 ENCOUNTER — PHARMACY VISIT (OUTPATIENT)
Dept: PHARMACY | Facility: MEDICAL CENTER | Age: 73
End: 2023-09-30
Payer: MEDICARE

## 2023-11-14 ENCOUNTER — HOSPITAL ENCOUNTER (OUTPATIENT)
Dept: CARDIOLOGY | Facility: MEDICAL CENTER | Age: 73
End: 2023-11-14
Attending: NURSE PRACTITIONER
Payer: COMMERCIAL

## 2023-11-14 DIAGNOSIS — Z95.2 S/P AVR: ICD-10-CM

## 2023-11-14 DIAGNOSIS — Z95.1 S/P CABG X 1: ICD-10-CM

## 2023-11-14 LAB
LV EJECT FRACT  99904: 60
LV EJECT FRACT MOD 2C 99903: 73.3
LV EJECT FRACT MOD 4C 99902: 65.23
LV EJECT FRACT MOD BP 99901: 68.78

## 2023-11-14 PROCEDURE — 93306 TTE W/DOPPLER COMPLETE: CPT | Mod: 26 | Performed by: INTERNAL MEDICINE

## 2023-11-14 PROCEDURE — 93306 TTE W/DOPPLER COMPLETE: CPT

## 2023-11-17 ENCOUNTER — HOSPITAL ENCOUNTER (OUTPATIENT)
Dept: LAB | Facility: MEDICAL CENTER | Age: 73
End: 2023-11-17
Attending: NURSE PRACTITIONER
Payer: COMMERCIAL

## 2023-11-17 DIAGNOSIS — I25.10 CORONARY ARTERY DISEASE INVOLVING NATIVE HEART WITHOUT ANGINA PECTORIS, UNSPECIFIED VESSEL OR LESION TYPE: ICD-10-CM

## 2023-11-17 DIAGNOSIS — Z95.1 S/P CABG X 1: ICD-10-CM

## 2023-11-17 DIAGNOSIS — Z95.2 S/P AVR: ICD-10-CM

## 2023-11-17 PROCEDURE — 80061 LIPID PANEL: CPT

## 2023-11-17 PROCEDURE — 80048 BASIC METABOLIC PNL TOTAL CA: CPT

## 2023-11-17 PROCEDURE — 85027 COMPLETE CBC AUTOMATED: CPT

## 2023-11-17 PROCEDURE — 36415 COLL VENOUS BLD VENIPUNCTURE: CPT

## 2023-11-18 LAB
ANION GAP SERPL CALC-SCNC: 10 MMOL/L (ref 7–16)
BUN SERPL-MCNC: 23 MG/DL (ref 8–22)
CALCIUM SERPL-MCNC: 9.4 MG/DL (ref 8.5–10.5)
CHLORIDE SERPL-SCNC: 105 MMOL/L (ref 96–112)
CHOLEST SERPL-MCNC: 200 MG/DL (ref 100–199)
CO2 SERPL-SCNC: 24 MMOL/L (ref 20–33)
CREAT SERPL-MCNC: 1 MG/DL (ref 0.5–1.4)
ERYTHROCYTE [DISTWIDTH] IN BLOOD BY AUTOMATED COUNT: 50.2 FL (ref 35.9–50)
FASTING STATUS PATIENT QL REPORTED: NORMAL
GFR SERPLBLD CREATININE-BSD FMLA CKD-EPI: 59 ML/MIN/1.73 M 2
GLUCOSE SERPL-MCNC: 91 MG/DL (ref 65–99)
HCT VFR BLD AUTO: 43.8 % (ref 37–47)
HDLC SERPL-MCNC: 73 MG/DL
HGB BLD-MCNC: 13.2 G/DL (ref 12–16)
LDLC SERPL CALC-MCNC: 104 MG/DL
MCH RBC QN AUTO: 27.4 PG (ref 27–33)
MCHC RBC AUTO-ENTMCNC: 30.1 G/DL (ref 32.2–35.5)
MCV RBC AUTO: 90.9 FL (ref 81.4–97.8)
PLATELET # BLD AUTO: 277 K/UL (ref 164–446)
PMV BLD AUTO: 10.8 FL (ref 9–12.9)
POTASSIUM SERPL-SCNC: 4.9 MMOL/L (ref 3.6–5.5)
RBC # BLD AUTO: 4.82 M/UL (ref 4.2–5.4)
SODIUM SERPL-SCNC: 139 MMOL/L (ref 135–145)
TRIGL SERPL-MCNC: 113 MG/DL (ref 0–149)
WBC # BLD AUTO: 6.5 K/UL (ref 4.8–10.8)

## 2023-11-21 ENCOUNTER — OFFICE VISIT (OUTPATIENT)
Dept: MEDICAL GROUP | Facility: MEDICAL CENTER | Age: 73
End: 2023-11-21
Payer: COMMERCIAL

## 2023-11-21 VITALS
HEART RATE: 67 BPM | OXYGEN SATURATION: 97 % | TEMPERATURE: 96.5 F | WEIGHT: 161.6 LBS | DIASTOLIC BLOOD PRESSURE: 76 MMHG | BODY MASS INDEX: 25.36 KG/M2 | HEIGHT: 67 IN | SYSTOLIC BLOOD PRESSURE: 116 MMHG

## 2023-11-21 DIAGNOSIS — E89.0 POSTOPERATIVE HYPOTHYROIDISM: ICD-10-CM

## 2023-11-21 DIAGNOSIS — R63.0 ANOREXIA: ICD-10-CM

## 2023-11-21 DIAGNOSIS — F51.01 PRIMARY INSOMNIA: ICD-10-CM

## 2023-11-21 DIAGNOSIS — M54.50 LUMBOSACRAL PAIN, CHRONIC: ICD-10-CM

## 2023-11-21 DIAGNOSIS — I48.0 PAROXYSMAL ATRIAL FIBRILLATION (HCC): ICD-10-CM

## 2023-11-21 DIAGNOSIS — Z95.1 S/P CABG X 1: ICD-10-CM

## 2023-11-21 DIAGNOSIS — G25.0 ESSENTIAL TREMOR: ICD-10-CM

## 2023-11-21 DIAGNOSIS — G89.29 LUMBOSACRAL PAIN, CHRONIC: ICD-10-CM

## 2023-11-21 DIAGNOSIS — R94.4 DECREASED GFR: ICD-10-CM

## 2023-11-21 DIAGNOSIS — Z95.2 S/P AVR: ICD-10-CM

## 2023-11-21 DIAGNOSIS — Z79.891 CHRONIC USE OF OPIATE DRUG FOR THERAPEUTIC PURPOSE: ICD-10-CM

## 2023-11-21 DIAGNOSIS — R63.4 WEIGHT LOSS: ICD-10-CM

## 2023-11-21 DIAGNOSIS — E78.00 PURE HYPERCHOLESTEROLEMIA: ICD-10-CM

## 2023-11-21 DIAGNOSIS — I25.10 CORONARY ARTERY DISEASE INVOLVING NATIVE CORONARY ARTERY OF NATIVE HEART WITHOUT ANGINA PECTORIS: ICD-10-CM

## 2023-11-21 PROBLEM — Z90.49 S/P CHOLECYSTECTOMY: Status: ACTIVE | Noted: 2023-11-21

## 2023-11-21 PROBLEM — D64.9 ANEMIA: Status: RESOLVED | Noted: 2023-08-29 | Resolved: 2023-11-21

## 2023-11-21 PROBLEM — G89.18 POSTOPERATIVE PAIN: Status: RESOLVED | Noted: 2023-09-25 | Resolved: 2023-11-21

## 2023-11-21 PROBLEM — K80.21 CALCULUS OF GALLBLADDER WITH BILIARY OBSTRUCTION BUT WITHOUT CHOLECYSTITIS: Status: RESOLVED | Noted: 2018-10-14 | Resolved: 2023-11-21

## 2023-11-21 PROBLEM — I45.9 HEART BLOCK: Status: RESOLVED | Noted: 2023-04-17 | Resolved: 2023-11-21

## 2023-11-21 PROBLEM — K59.03 DRUG INDUCED CONSTIPATION: Status: RESOLVED | Noted: 2020-06-12 | Resolved: 2023-11-21

## 2023-11-21 PROBLEM — K80.10 CHOLELITHIASIS WITH CHOLECYSTITIS: Status: RESOLVED | Noted: 2023-08-24 | Resolved: 2023-11-21

## 2023-11-21 PROCEDURE — 3074F SYST BP LT 130 MM HG: CPT | Performed by: FAMILY MEDICINE

## 2023-11-21 PROCEDURE — 99215 OFFICE O/P EST HI 40 MIN: CPT | Performed by: FAMILY MEDICINE

## 2023-11-21 PROCEDURE — 3078F DIAST BP <80 MM HG: CPT | Performed by: FAMILY MEDICINE

## 2023-11-21 RX ORDER — TOPIRAMATE SPINKLE 25 MG/1
25 CAPSULE ORAL 2 TIMES DAILY
Qty: 60 CAPSULE | Refills: 3 | Status: SHIPPED | OUTPATIENT
Start: 2023-11-21 | End: 2024-03-01 | Stop reason: SDUPTHER

## 2023-11-21 RX ORDER — PREGABALIN 50 MG/1
50 CAPSULE ORAL 3 TIMES DAILY
Qty: 90 CAPSULE | Refills: 0 | Status: SHIPPED | OUTPATIENT
Start: 2023-11-21 | End: 2023-12-21

## 2023-11-21 RX ORDER — PREGABALIN 50 MG/1
50 CAPSULE ORAL 3 TIMES DAILY
Qty: 90 CAPSULE | Refills: 0 | Status: SHIPPED | OUTPATIENT
Start: 2023-12-21 | End: 2024-01-20

## 2023-11-21 RX ORDER — PREGABALIN 50 MG/1
50 CAPSULE ORAL 3 TIMES DAILY
Qty: 90 CAPSULE | Refills: 0 | Status: SHIPPED | OUTPATIENT
Start: 2024-01-20 | End: 2024-02-20 | Stop reason: SDUPTHER

## 2023-11-21 RX ORDER — ZOLPIDEM TARTRATE 5 MG/1
5 TABLET ORAL NIGHTLY PRN
Qty: 60 TABLET | Refills: 0 | Status: SHIPPED | OUTPATIENT
Start: 2023-11-21 | End: 2024-01-20

## 2023-11-21 ASSESSMENT — FIBROSIS 4 INDEX: FIB4 SCORE: 1.07

## 2023-11-21 NOTE — PROGRESS NOTES
Subjective:   CC: Chronic pain management, hypertension    HPI:     Margoth Hopkins is a 72 y.o. female, established patient of the clinic.     Patient had chronic CAD, status post CABG x 1 and aortic valve replacement in 8/2023.  Patient was also found to have paroxysmal A-fib.  She is taking amiodarone, metoprolol, Eliquis and has regular follow-up with cardiology.  Negative recurrent bleeding or side effect reported with Eliquis.  She will start cardiac rehab on November 27, 2023.  Patient is currently taking propranolol 40 mg 3 times daily for essential tremor.  She complains of worsening essential tremor over the past few months.  Metoprolol 25 mg twice daily was recently added by cardiology for A-fib.  She complains of recent development of poor appetite and 14 lbs weight loss.  Patient is currently taking Synthroid 75 mcg daily for hypothyroidism.  She had normal thyroid function test in May 2023.  No recent changes in diet or physical activity.  She had cardiac surgery in August 2023 as above.  She had a few new medication including metoprolol, amiodarone.  Patient suffers intermittent insomnia.  She is taking Ambien 5 mg nightly as needed for sleep.  She required Ambien couple times per week, but not nightly.  No side effect reported with Ambien.  Patient suffer chronic low back pain.  She is currently taking Lyrica 50 mg 3 times daily for pain.  She was previously receiving this medication from [facility].  However, she wishes to get Lyrica from my office to avoid frequent doctor visits.  Negative history of illicit drugs, alcohol, tobacco abuse.      Current medicines (including changes today)  Current Outpatient Medications   Medication Sig Dispense Refill    zolpidem (AMBIEN) 5 MG Tab Take 1 Tablet by mouth at bedtime as needed for Sleep for up to 60 days. 60 Tablet 0    pregabalin (LYRICA) 50 MG capsule Take 1 Capsule by mouth 3 times a day for 30 days. 90 Capsule 0    [START ON 12/21/2023]  pregabalin (LYRICA) 50 MG capsule Take 1 Capsule by mouth 3 times a day for 30 days. 90 Capsule 0    [START ON 1/20/2024] pregabalin (LYRICA) 50 MG capsule Take 1 Capsule by mouth 3 times a day for 30 days. 90 Capsule 0    topiramate (TOPAMAX) 25 MG capsule Take 1 Capsule by mouth 2 times a day. 60 Capsule 3    amiodarone (CORDARONE) 200 MG Tab Take 1 Tablet by mouth every day. (Patient taking differently: Take 100 mg by mouth every day.) 30 Tablet 6    apixaban (ELIQUIS) 5mg Tab Take 1 Tablet by mouth 2 times a day. Indications: Thromboembolism secondary to Atrial Fibrillation 60 Tablet 6    metoprolol tartrate (LOPRESSOR) 25 MG Tab Take 1 Tablet by mouth 2 times a day. 200 Tablet 3    lisinopril (PRINIVIL) 5 MG Tab Take 1 Tablet by mouth every day. 100 Tablet 3    diclofenac sodium (VOLTAREN) 1 % Gel Apply 2 g topically 4 times a day as needed (Apply's on both knees).      potassium chloride (MICRO-K) 10 MEQ capsule Take 1 Capsule by mouth every day. 100 Capsule 3    vitamin D2, Ergocalciferol, (DRISDOL) 1.25 MG (50668 UT) Cap capsule Take 1 Capsule by mouth every 7 days. On Sat 12 Capsule 3    SYNTHROID 75 MCG Tab Take 1 Tablet by mouth every morning on an empty stomach. 90 Tablet 3    busPIRone (BUSPAR) 10 MG Tab tablet Take 10 mg by mouth every evening. Pt takes once a day, not BID      furosemide (LASIX) 20 MG Tab Take 1 Tablet by mouth every day. 90 Tablet 3    DULoxetine HCl 40 MG Cap DR Particles Take 40 mg by mouth 2 times a day. 180 Capsule 3    rosuvastatin (CRESTOR) 40 MG tablet Take 1 Tablet by mouth every day. 90 Tablet 3     No current facility-administered medications for this visit.     She  has a past medical history of Adverse effect of anesthesia (15 yrs ago ?), GAURI (acute kidney injury) (HCC) (08/24/2023), Anemia, Anesthesia, Anginal syndrome (HCC), Aortic insufficiency (01/31/2020), Aortic valve insufficiency (08/16/2023), Arthritis (06/16/2023), Atrial fibrillation (HCC) (08/24/2023), Breath  "shortness (11/02/2021), Bronchitis (60+ yrs ago), Cataract (06/16/2023), Dental disorder (06/16/2023), Gastric ulcer, Goiter, Heart burn (2022), Heart murmur (1965), High cholesterol (06/16/2023), Hypertension (06/16/2023), Hypothyroidism (06/16/2023), Pain (06/16/2023), Pain (09/22/2023), PONV (postoperative nausea and vomiting) (6/21/22), Psychiatric problem (06/16/2023), Pulmonary embolism (HCC) (01/31/2020), Reactive arthritis (HCC), Snoring (06/16/2023), Thyroid disease, Thyroid disease (01/31/2020), and Urinary bladder disorder (15 yrs ago).    She has no past medical history of Acute nasopharyngitis, Asthma, Awareness under anesthesia, Bowel habit changes, Cancer (HCC), Carcinoma in situ of respiratory system, Congestive heart failure (HCC), Continuous ambulatory peritoneal dialysis status (HCC), Delayed emergence from general anesthesia, Diabetes (HCC), Dialysis patient (HCC), Emphysema of lung (HCC), Glaucoma, Gynecological disorder, Hard to intubate, Hemorrhagic disorder (HCC), Hepatitis A, Hepatitis B, Hepatitis C, Hiatus hernia syndrome, Indigestion, Infectious disease, Jaundice, Malignant hyperthermia, Myocardial infarct (HCC), Pacemaker, Pneumonia, Pregnant, Pseudocholinesterase deficiency, Rheumatic fever, Seizure (HCC), Sleep apnea, Spinal headache, Stroke (HCC), Tuberculosis, or Urinary incontinence.    I reviewed patient's problem list, allergies, medications, family hx, social hx with patient and update EPIC.        Objective:     /76 (BP Location: Right arm, Patient Position: Sitting, BP Cuff Size: Adult)   Pulse 67   Temp 35.8 °C (96.5 °F) (Temporal)   Ht 1.702 m (5' 7\")   Wt 73.3 kg (161 lb 9.6 oz)   SpO2 97%  Body mass index is 25.31 kg/m².    Physical Exam:  Constitutional: awake, alert, in no distress.  Skin: Warm, dry, good turgor, no rashes, bruises, ulcers in visible areas.  Eye: conjunctiva clear, lids neg for edema or lesions.  Neck: Trachea midline, no masses, no " thyromegaly. No cervical or supraclavicular lymphadenopathy  Respiratory: Unlabored respiratory effort, lungs clear to auscultation, no wheezes, no rales.  Cardiovascular: Normal S1, S2, no murmur, no pedal edema.  Psych: Oriented x3, affect and mood wnl, intact judgement and insight.       Assessment and Plan:   The following treatment plan was discussed    1. Coronary artery disease involving native coronary artery of native heart without angina pectoris  2. S/P CABG x 1_8/2023  3. S/P AVR_8/2023  4. Paroxysmal atrial fibrillation (HCC)  Doing well postop, has regular follow-up with cardiology.  -Continue Eliquis, amiodarone, metoprolol, Crestor as directed.  -Follow-up with cardiology as directed    5. Pure hypercholesterolemia  Chronic, controlled with Crestor 40 mg daily, no s/e reported, will continue.      6. Postoperative hypothyroidism_s/p thyroidectomy_Dr. Mullen  Chronic, controlled with Synthroid 75 mcg daily, no s/e reported, will continue.    - TSH; Future  - FREE THYROXINE; Future  - T3 FREE; Future    7. Essential tremor  Chronic, currently taking propranolol 40 mg 3 times daily.  Patient was recently prescribed metoprolol 25 mg twice daily for paroxysmal A-fib by cardiology.  Patient complains of worsening essential tremor despite taking propranolol.  -Discontinue propranolol  -Continue metoprolol as directed by cardiology  -Trial of topiramate (TOPAMAX) 25 MG capsule; Take 1 Capsule by mouth 2 times a day.  Dispense: 60 Capsule; Refill: 3    8. Anorexia  9. Weight loss  Patient complains of anorexia and 14 pound weight loss despite no recent change in diet or physical activity.  Patient recently had cardiac surgery as above and was started on a few new medication including amiodarone, Eliquis, and metoprolol.  Amiodarone is known to cause reduced appetite and sometimes lead to weight loss.  Patient will discuss medication option with her cardiologist at upcoming visit in a few weeks.    10.  Primary insomnia  Chronic, controlled with Ambien 5 mg nightly as needed, no s/e reported, will continue.    - zolpidem (AMBIEN) 5 MG Tab; Take 1 Tablet by mouth at bedtime as needed for Sleep for up to 60 days.  Dispense: 60 Tablet; Refill: 0  - Risks, benefits, side effects, as well as potential health complications associated with Ambien were previously discussed with patient. Appropriate counseling provided.      11. Decreased GFR  Recent lab was notable for decreased GFR.   - Comp Metabolic Panel; Future  - CBC WITH DIFFERENTIAL; Future  - hydration discussed.     12. Lumbosacral pain, chronic, with right sciatica_[facility]  13. Chronic use of opiate drug for therapeutic purpose  -Chronic pain source: Lyrica 50 mg TID PRN    -Functional goals: able to perform ADLs   -Opioid Medications (med, does, /mo):  Lyrica 50 mg TID PRN    -Non opiates: Tylenol PRN   -Other modalities: exercises   -Drug or alcohol abuse history: negative   - Opioid Risk Score: 0  -Date of Last therapy agreement/ update: 11/2023  -Date of last UDS: collected today    -Discrepancies:No  -date of last NARxCHECK: 11/21/2023.   No concern for drug or alcohol use at this time. Functional goals being met by current dose of Lyrica. Will continue. Plan:   - pregabalin (LYRICA) 50 MG capsule; Take 1 Capsule by mouth 3 times a day for 30 days.  Dispense: 90 Capsule; Refill: 0  - pregabalin (LYRICA) 50 MG capsule; Take 1 Capsule by mouth 3 times a day for 30 days.  Dispense: 90 Capsule; Refill: 0  - pregabalin (LYRICA) 50 MG capsule; Take 1 Capsule by mouth 3 times a day for 30 days.  Dispense: 90 Capsule; Refill: 0  - Controlled Substance Treatment Agreement  - PAIN MANAGEMENT PANEL, SCRN W/ RFLX TO QNT; Future    Total time spent reviewing pt's chart, labs, notes, imaging, and counseling/prescribing medications to patient before, during, and after the visit: 41 minutes.      Charlee Walker M.D.      Followup: Return in about 3 months  (around 2/21/2024) for controlled substance refill.    Please note that this dictation was created using voice recognition software. I have made every reasonable attempt to correct obvious errors, but I expect that there are errors of grammar and possibly content that I did not discover before finalizing the note.

## 2023-11-27 ENCOUNTER — NON-PROVIDER VISIT (OUTPATIENT)
Dept: CARDIOLOGY | Facility: MEDICAL CENTER | Age: 73
End: 2023-11-27

## 2023-11-27 ENCOUNTER — PATIENT MESSAGE (OUTPATIENT)
Dept: CARDIOLOGY | Facility: MEDICAL CENTER | Age: 73
End: 2023-11-27

## 2023-11-27 DIAGNOSIS — Z95.2 STATUS POST AORTIC VALVE REPLACEMENT: Primary | ICD-10-CM

## 2023-11-28 ENCOUNTER — TELEPHONE (OUTPATIENT)
Dept: CARDIOLOGY | Facility: MEDICAL CENTER | Age: 73
End: 2023-11-28

## 2023-11-28 ENCOUNTER — DOCUMENTATION (OUTPATIENT)
Dept: CARDIOLOGY | Facility: MEDICAL CENTER | Age: 73
End: 2023-11-28

## 2023-11-28 NOTE — PROGRESS NOTES
Patient arrived for initial 1:1 Intensive Cardiac Rehabilitation Consultation and Education session with the Registered Nurse.  A total of 60 minutes was spent with the patient during which time a focused cardiovascular assessment was completed and musculoskeletal limitations were addressed in preparation to safely start the exercise portion of the program.  Education regarding the program was explained including exercise goals, precautions, and target heart rate during exercise.  Nutrition goals were reviewed and patient was introduced to the [facility].  Stress management goals were dicussed and patient concerns and questions were answered at this time. Patient arrived for education at 1130 and visit was concluded at 1230. Exercise time was from 1230 to 1330.       Update 11/28/23  We will be taking the charges out for Margoth's orientation day and re-authorizing to start Margoth in January once she is cleared to start exercise by her cardiologist.

## 2023-11-28 NOTE — PATIENT COMMUNICATION
ROSELINE Muñoz.  You20 minutes ago (4:15 PM)     Can you call her and get her vitals and symptoms in more detail please?    Get in sooner, you can use LH valve post-op next week if needed. SC     Phone Number Called: [phone]    Call outcome: Spoke to patient regarding message below.    Message: Spoke to patient about chest pain symptoms. Patient reports that for last few months she is having intermittent chest pressure. Patient denies shortness of breathe associated with it unless she is performing activity. Patient unable to describe where chest pressure is exactly but reports that it feels like it is all over chest. Last /76. Patient reports that during exercise at [facility] she only had chest pressure one time. Patient reports that when episode comes on if she takes deep breathes and places pillow to chest it feels better. Patient denies pain radiating at this time or any nausea/ diaphoresis with pain. ER precautions discussed. All questions answered at this time. Advised to call back with any further questions or concerns.     To SC: patient has f/u 12/14 with you, thank you!

## 2023-12-14 ENCOUNTER — PATIENT MESSAGE (OUTPATIENT)
Dept: CARDIOLOGY | Facility: MEDICAL CENTER | Age: 73
End: 2023-12-14

## 2023-12-14 ENCOUNTER — TELEMEDICINE (OUTPATIENT)
Dept: CARDIOLOGY | Facility: MEDICAL CENTER | Age: 73
End: 2023-12-14
Attending: NURSE PRACTITIONER
Payer: COMMERCIAL

## 2023-12-14 VITALS
OXYGEN SATURATION: 93 % | DIASTOLIC BLOOD PRESSURE: 87 MMHG | HEART RATE: 72 BPM | SYSTOLIC BLOOD PRESSURE: 141 MMHG | WEIGHT: 161 LBS | BODY MASS INDEX: 25.27 KG/M2 | HEIGHT: 67 IN

## 2023-12-14 DIAGNOSIS — Z95.2 S/P AVR: ICD-10-CM

## 2023-12-14 DIAGNOSIS — R73.03 PREDIABETES: ICD-10-CM

## 2023-12-14 DIAGNOSIS — Z95.2 S/P AORTIC VALVE REPLACEMENT: ICD-10-CM

## 2023-12-14 DIAGNOSIS — I48.0 PAROXYSMAL ATRIAL FIBRILLATION (HCC): ICD-10-CM

## 2023-12-14 DIAGNOSIS — E78.00 PURE HYPERCHOLESTEROLEMIA: ICD-10-CM

## 2023-12-14 DIAGNOSIS — Z95.1 S/P CABG X 1: ICD-10-CM

## 2023-12-14 DIAGNOSIS — R07.1 CHEST PAIN ON BREATHING: ICD-10-CM

## 2023-12-14 DIAGNOSIS — I25.10 CORONARY ARTERY DISEASE INVOLVING NATIVE CORONARY ARTERY OF NATIVE HEART WITHOUT ANGINA PECTORIS: ICD-10-CM

## 2023-12-14 DIAGNOSIS — I10 ESSENTIAL HYPERTENSION: ICD-10-CM

## 2023-12-14 DIAGNOSIS — I10 BENIGN ESSENTIAL HTN: ICD-10-CM

## 2023-12-14 PROCEDURE — 99214 OFFICE O/P EST MOD 30 MIN: CPT | Mod: 95 | Performed by: NURSE PRACTITIONER

## 2023-12-14 RX ORDER — LISINOPRIL 20 MG/1
20 TABLET ORAL DAILY
Qty: 100 TABLET | Refills: 3 | Status: SHIPPED | OUTPATIENT
Start: 2023-12-14 | End: 2023-12-28 | Stop reason: SDUPTHER

## 2023-12-14 RX ORDER — ONDANSETRON 4 MG/1
TABLET, FILM COATED ORAL
COMMUNITY
Start: 2023-11-06 | End: 2024-01-22

## 2023-12-14 RX ORDER — ASPIRIN 81 MG/1
81 TABLET ORAL DAILY
Qty: 100 TABLET | Refills: 3 | Status: SHIPPED | OUTPATIENT
Start: 2023-12-14 | End: 2024-02-14

## 2023-12-14 RX ORDER — EZETIMIBE 10 MG/1
10 TABLET ORAL EVERY EVENING
Qty: 100 TABLET | Refills: 3 | Status: SHIPPED | OUTPATIENT
Start: 2023-12-14 | End: 2024-03-08 | Stop reason: SDUPTHER

## 2023-12-14 ASSESSMENT — ENCOUNTER SYMPTOMS
FEVER: 0
COUGH: 0
SHORTNESS OF BREATH: 0
PND: 0
ORTHOPNEA: 0
CLAUDICATION: 0
MYALGIAS: 0
ABDOMINAL PAIN: 0
PALPITATIONS: 0
DIZZINESS: 0

## 2023-12-14 ASSESSMENT — FIBROSIS 4 INDEX: FIB4 SCORE: 1.07

## 2023-12-14 NOTE — PROGRESS NOTES
No chief complaint on file.    This evaluation was conducted via Zoom using secure and encrypted videoconferencing technology. The patient was in their home in the [facility].    The patient's identity was confirmed and verbal consent was obtained for this virtual visit.     Subjective     Margoth Hopkins is a 72 y.o. female who presents today for 3 month follow up S/P SAVR and CABG.    Hx of mod-severe AI now S/P SAVR with aortic root enlargement, CAD with CABG X1, HLD, HTN, post-operative afib RVR on eliquis, and now cholecystis with bile drains in place.    She presents today virtually alone. She had her gallbladder removed and is doing well from this.    She has had no afib episodes but has a heaviness in her chest at times at rest or with exertion. Worse with inspiration. Can lasts for a few hours. Started a couple months ago. No radiation of symptoms.    She has no chest pain, shortness of breath, edema, dizziness/lightheadedness, or palpitations.    Past Medical History:   Diagnosis Date    Adverse effect of anesthesia 15 yrs ago ?    Felt pain from endoscopy    GAURI (acute kidney injury) (HCC) 08/24/2023    Anemia     H/O    Anesthesia     No    Anginal syndrome (HCC)     Aortic insufficiency 01/31/2020    Pt. states this is mild and does not have any signs or symptoms.    Aortic valve insufficiency 08/16/2023    aortic valve replacement with Bovine. Follows with [facility] cardiology    Arthritis 06/16/2023    general body    Atrial fibrillation (HCC) 08/24/2023    Breath shortness 11/02/2021    with exertion or anxiety. 9/22/23-denies at present.    Bronchitis 60+ yrs ago    Cataract 06/16/2023    IOL OU 2018    Dental disorder 06/16/2023    upper/lower front 2 teeth crowned    Gastric ulcer     Goiter     Heart burn 2022    Occasionally after acidic meals.    Heart murmur 1965    High cholesterol 06/16/2023    medicated    Hypertension 06/16/2023    well controlled on meds    Hypothyroidism 06/16/2023     medicated    Pain 06/16/2023    bilat knees, back    Pain 09/22/2023    to bile duct bag    PONV (postoperative nausea and vomiting) 6/21/22    Psychiatric problem 06/16/2023    anxiety, medicated    Pulmonary embolism (HCC) 01/31/2020    Pt. states after joint replacement in 7-2018.    Reactive arthritis (HCC)     Snoring 06/16/2023    Thyroid disease     hasimotos    Thyroid disease 01/31/2020    Thyroid Nodules    Urinary bladder disorder 15 yrs ago    Sling     Past Surgical History:   Procedure Laterality Date    BHAVANA BY LAPAROSCOPY N/A 9/25/2023    Procedure: LAPAROSCOPIC CHOLECYSTECTOMY.;  Surgeon: Mike Valencia M.D.;  Location: SURGERY [facility];  Service: General    MULTIPLE CORONARY ARTERY BYPASS ENDO VEIN HARVEST N/A 08/16/2023    Procedure: CORONARY ARTERY BYPASS GRAFT X1;  Surgeon: Tammy Lawrence M.D.;  Location: SURGERY [facility];  Service: Cardiothoracic    AORTIC VALVE REPLACEMENT N/A 08/16/2023    Procedure: REPLACEMENT, AORTIC VALVE;  Surgeon: Tammy Lawrence M.D.;  Location: SURGERY [facility];  Service: Cardiothoracic    ECHOCARDIOGRAM, TRANSESOPHAGEAL, INTRAOPERATIVE N/A 08/16/2023    Procedure: ECHOCARDIOGRAM, TRANSESOPHAGEAL, INTRAOPERATIVE;  Surgeon: Tammy Lawrence M.D.;  Location: SURGERY [facility];  Service: Cardiothoracic    FUSION  06/16/2023    neck, 2013    OTHER CARDIAC SURGERY  06/16/2023    heart cath 2023    KNEE ARTHROPLASTY TOTAL Left 06/2022    MASS EXCISION GENERAL Right 11/05/2021    Procedure: EXCISION, MASS - LATERAL THIGH;  Surgeon: Cydney Franklin M.D.;  Location: SURGERY SAME DAY [facility];  Service: General    THYROIDECTOMY TOTAL Bilateral 02/05/2020    Procedure: THYROIDECTOMY, TOTAL- COMPLETE;  Surgeon: Cydney Franklin M.D.;  Location: SURGERY SAME DAY [facility];  Service: General    HAMMERTOE CORRECTION Left 2017    Left foot surgery and cleaned up arthritis in foot-metatarsal fusion    CERVICAL DISK AND FUSION ANTERIOR  2016    Cervical spine     APPENDECTOMY      BLADDER SLING FEMALE      CATARACT EXTRACTION WITH IOL Bilateral     CERVICAL FUSION POSTERIOR      COLONOSCOPY      GYN SURGERY      Hysterectomy    KNEE ARTHROPLASTY TOTAL Right     x3    KNEE REPLACEMENT, TOTAL Right     KNEE REVISION TOTAL Right      Family History   Problem Relation Age of Onset    Arthritis Mother     Alzheimer's Disease Mother     Cancer Mother         Cervical Cancer    Heart Attack Father     Cancer Father         Hypertrophy of the heart, CAD    Other Brother         brain anurism    Hypertension Brother     Cancer Maternal Grandmother 66        colono cancer    Colorectal Cancer Maternal Grandmother         Colon Cancer    Heart Disease Brother         Quadruple bypass    Hypertension Brother     Arthritis Brother     Other Brother         Celiac Disease    No Known Problems Maternal Grandfather     No Known Problems Paternal Grandmother     No Known Problems Paternal Grandfather     Hypertension Brother         Spinal stenosis    Arthritis Brother     Lung Disease Brother         Asthma, emphysema    Asthma Brother      Social History     Socioeconomic History    Marital status:      Spouse name: Not on file    Number of children: Not on file    Years of education: Not on file    Highest education level: Bachelor's degree (e.g., BA, AB, BS)   Occupational History    Not on file   Tobacco Use    Smoking status: Never    Smokeless tobacco: Never   Vaping Use    Vaping Use: Never used   Substance and Sexual Activity    Alcohol use: Not Currently     Alcohol/week: 4.2 - 8.4 oz     Types: 7 - 14 Glasses of wine per week     Comment: none since 8/1/23    Drug use: Not Currently     Types: Oral    Sexual activity: Yes     Partners: Male   Other Topics Concern    Not on file   Social History Narrative    Not on file     Social Determinants of Health     Financial Resource Strain: Low Risk  (6/7/2022)    Overall Financial Resource Strain (CARDIA)     Difficulty of Paying  Living Expenses: Not hard at all   Food Insecurity: No Food Insecurity (6/7/2022)    Hunger Vital Sign     Worried About Running Out of Food in the Last Year: Never true     Ran Out of Food in the Last Year: Never true   Transportation Needs: No Transportation Needs (6/7/2022)    PRAPARE - Transportation     Lack of Transportation (Medical): No     Lack of Transportation (Non-Medical): No   Physical Activity: Insufficiently Active (6/7/2022)    Exercise Vital Sign     Days of Exercise per Week: 1 day     Minutes of Exercise per Session: 10 min   Stress: Stress Concern Present (6/7/2022)    [employer] - Occupational Stress Questionnaire     Feeling of Stress : Very much   Social Connections: Socially Integrated (6/7/2022)    Social Connection and Isolation Panel [NHANES]     Frequency of Communication with Friends and Family: Three times a week     Frequency of Social Gatherings with Friends and Family: Twice a week     Attends [employer] Services: More than 4 times per year     Active Member of Clubs or Organizations: Yes     Attends Club or Organization Meetings: More than 4 times per year     Marital Status:    Intimate Partner Violence: Not on file   Housing Stability: Low Risk  (6/7/2022)    Housing Stability Vital Sign     Unable to Pay for Housing in the Last Year: No     Number of Places Lived in the Last Year: 1     Unstable Housing in the Last Year: No     Allergies   Allergen Reactions    Morphine Vomiting and Nausea    Nsaids Unspecified     History of gastric ulcers - cannot take NSAIDS    Pcn [Penicillins] Hives     Tolerates ancef    Seasonal Runny Nose and Itching     .     Outpatient Encounter Medications as of 12/14/2023   Medication Sig Dispense Refill    ondansetron (ZOFRAN) 4 MG Tab tablet Take 2 tablets by mouth twice a day by oral route as needed for 5 days.      zolpidem (AMBIEN) 5 MG Tab Take 1 Tablet by mouth at bedtime as needed for Sleep for up to 60  days. 60 Tablet 0    topiramate (TOPAMAX) 25 MG capsule Take 1 Capsule by mouth 2 times a day. 60 Capsule 3    amiodarone (CORDARONE) 200 MG Tab Take 1 Tablet by mouth every day. (Patient taking differently: Take 100 mg by mouth every day.) 30 Tablet 6    apixaban (ELIQUIS) 5mg Tab Take 1 Tablet by mouth 2 times a day. Indications: Thromboembolism secondary to Atrial Fibrillation 60 Tablet 6    metoprolol tartrate (LOPRESSOR) 25 MG Tab Take 1 Tablet by mouth 2 times a day. 200 Tablet 3    lisinopril (PRINIVIL) 5 MG Tab Take 1 Tablet by mouth every day. 100 Tablet 3    diclofenac sodium (VOLTAREN) 1 % Gel Apply 2 g topically 4 times a day as needed (Apply's on both knees).      potassium chloride (MICRO-K) 10 MEQ capsule Take 1 Capsule by mouth every day. 100 Capsule 3    vitamin D2, Ergocalciferol, (DRISDOL) 1.25 MG (14651 UT) Cap capsule Take 1 Capsule by mouth every 7 days. On Sat 12 Capsule 3    SYNTHROID 75 MCG Tab Take 1 Tablet by mouth every morning on an empty stomach. 90 Tablet 3    busPIRone (BUSPAR) 10 MG Tab tablet Take 10 mg by mouth every evening. Pt takes once a day, not BID      furosemide (LASIX) 20 MG Tab Take 1 Tablet by mouth every day. 90 Tablet 3    DULoxetine HCl 40 MG Cap DR Particles Take 40 mg by mouth 2 times a day. 180 Capsule 3    rosuvastatin (CRESTOR) 40 MG tablet Take 1 Tablet by mouth every day. 90 Tablet 3    pregabalin (LYRICA) 50 MG capsule Take 1 Capsule by mouth 3 times a day for 30 days. 90 Capsule 0    [START ON 12/21/2023] pregabalin (LYRICA) 50 MG capsule Take 1 Capsule by mouth 3 times a day for 30 days. (Patient not taking: Reported on 12/14/2023) 90 Capsule 0    [START ON 1/20/2024] pregabalin (LYRICA) 50 MG capsule Take 1 Capsule by mouth 3 times a day for 30 days. (Patient not taking: Reported on 12/14/2023) 90 Capsule 0     No facility-administered encounter medications on file as of 12/14/2023.     Review of Systems   Constitutional:  Positive for malaise/fatigue.  "Negative for fever.        Very fatigued   Respiratory:  Negative for cough and shortness of breath.    Cardiovascular:  Negative for chest pain, palpitations, orthopnea, claudication, leg swelling and PND.   Gastrointestinal:  Negative for abdominal pain.        Bile drains in place with bloating   Musculoskeletal:  Negative for myalgias.   Neurological:  Negative for dizziness.              Objective     BP (!) 141/87 (BP Location: Left arm, Patient Position: Sitting, BP Cuff Size: Adult)   Pulse 72   Ht 1.702 m (5' 7\")   Wt 73 kg (161 lb)   SpO2 93%   BMI 25.22 kg/m²     Physical Exam  Vitals and nursing note reviewed.   Constitutional:       Appearance: Normal appearance. She is well-developed and normal weight.   HENT:      Head: Normocephalic and atraumatic.   Neck:      Vascular: No JVD.   Cardiovascular:      Rate and Rhythm: Normal rate and regular rhythm.      Pulses: Normal pulses.      Heart sounds: Normal heart sounds.   Pulmonary:      Effort: Pulmonary effort is normal.      Breath sounds: Normal breath sounds.   Abdominal:      Comments: Bile drains in place   Musculoskeletal:         General: Normal range of motion.   Skin:     General: Skin is warm and dry.      Capillary Refill: Capillary refill takes less than 2 seconds.   Neurological:      General: No focal deficit present.      Mental Status: She is alert and oriented to person, place, and time. Mental status is at baseline.   Psychiatric:         Mood and Affect: Mood normal.         Behavior: Behavior normal.         Thought Content: Thought content normal.         Judgment: Judgment normal.                Assessment & Plan     1. Paroxysmal atrial fibrillation (HCC)        2. Prediabetes        3. Pure hypercholesterolemia        4. S/P AVR_8/2023        5. S/P CABG x 1_8/2023        6. Benign essential HTN        7. Coronary artery disease involving native coronary artery of native heart without angina pectoris          Medical " Decision Making: Today's Assessment/Status/Plan:       1. HTN  -poor control with at rest/exertional chest heaviness  -INCREASE lisinopril to 20 mg once a day  -cont metoprolol 25 mg twice a day  -STOP furosemide, potassium  -BP goal <130/80, will check back in 2 weeks on BP by my chart    2. S/P SAVR with aortic root enlargement  -doing well post-op but did have post-operative afib and unexpected re-admission for cholecystitis with bile drains in place  -SBE prophylaxis noted  -STOP furosemide and potassium  -STOP eliquis and amiodarone, RE-START aspirin 81 mg  -labs and echo look good  -cardiac rehab to start one BP improved    3. HLD with CAD with CABG X1 (LIMA to LAD)   -cont rosuvastatin 40 mg QPM, LDL not quite at goal  -ADD zetia 10 mg QPM  -LDL goal <70 with CAD, repeat lipid panel in 3 months     Patient is to follow up with Mary RIDDLE in 3 months with labs.    Ok to start educational portion of cardiac rehab. Check back in 2 weeks on BP readings to clear patient for exercise at cardiac rehab.

## 2023-12-14 NOTE — PATIENT INSTRUCTIONS
STOP furosemide, potassium, amiodarone, and eliquis.    START aspirin 81 mg once a day.  START ezetemide (zetia) 10 mg once in the evening.    INCREASE lisinopril to 20 mg once a day to help lower BP readings.    BP goal 110-130/60-80.    Take BP daily with these changes. I will reach out to you in 2 weeks on how your BP is doing and your symptoms of chest tightness.

## 2023-12-19 ENCOUNTER — APPOINTMENT (OUTPATIENT)
Dept: CARDIOLOGY | Facility: MEDICAL CENTER | Age: 73
End: 2023-12-19

## 2023-12-28 RX ORDER — LISINOPRIL 30 MG/1
30 TABLET ORAL DAILY
Qty: 90 TABLET | Refills: 3 | Status: SHIPPED | OUTPATIENT
Start: 2023-12-28 | End: 2024-03-08 | Stop reason: SDUPTHER

## 2023-12-28 NOTE — PATIENT COMMUNICATION
ROSELINE Muñoz.  You2 hours ago (1:36 PM)     Order CXR, BNP, BMP for review with symptoms. BP look much improved. Are those BP readings 2 hours after AM medications? Let's increase her lisinopril to 30 mg daily. Follow up with BP readings next week with this change. OK for cardiac rehab now. SC     [employer] message sent to patient, awaiting patient response and will follow up as needed.

## 2024-01-10 DIAGNOSIS — R94.39 ABNORMAL CARDIOVASCULAR STRESS TEST: ICD-10-CM

## 2024-01-10 DIAGNOSIS — I35.1 NONRHEUMATIC AORTIC VALVE INSUFFICIENCY: ICD-10-CM

## 2024-01-10 DIAGNOSIS — I44.1 MOBITZ (TYPE) I (WENCKEBACH'S) ATRIOVENTRICULAR BLOCK: ICD-10-CM

## 2024-01-10 DIAGNOSIS — Z95.2 S/P AVR: ICD-10-CM

## 2024-01-10 DIAGNOSIS — I35.1 AORTIC VALVE INSUFFICIENCY, ETIOLOGY OF CARDIAC VALVE DISEASE UNSPECIFIED: ICD-10-CM

## 2024-01-10 DIAGNOSIS — Z95.2 S/P AORTIC VALVE REPLACEMENT: ICD-10-CM

## 2024-01-10 DIAGNOSIS — Z95.2 STATUS POST AORTIC VALVE REPLACEMENT: ICD-10-CM

## 2024-01-10 DIAGNOSIS — Z95.1 S/P CABG X 1: ICD-10-CM

## 2024-01-10 DIAGNOSIS — I45.9 HEART BLOCK: ICD-10-CM

## 2024-01-12 ENCOUNTER — HOSPITAL ENCOUNTER (OUTPATIENT)
Dept: RADIOLOGY | Facility: MEDICAL CENTER | Age: 74
End: 2024-01-12
Attending: NURSE PRACTITIONER
Payer: COMMERCIAL

## 2024-01-12 DIAGNOSIS — R07.1 CHEST PAIN ON BREATHING: ICD-10-CM

## 2024-01-12 PROCEDURE — 71046 X-RAY EXAM CHEST 2 VIEWS: CPT

## 2024-01-22 ENCOUNTER — HOSPITAL ENCOUNTER (OUTPATIENT)
Facility: MEDICAL CENTER | Age: 74
End: 2024-01-23
Attending: EMERGENCY MEDICINE | Admitting: HOSPITALIST
Payer: MEDICARE

## 2024-01-22 ENCOUNTER — APPOINTMENT (OUTPATIENT)
Dept: RADIOLOGY | Facility: MEDICAL CENTER | Age: 74
End: 2024-01-22
Attending: EMERGENCY MEDICINE
Payer: MEDICARE

## 2024-01-22 DIAGNOSIS — R07.9 CHEST PAIN, UNSPECIFIED TYPE: ICD-10-CM

## 2024-01-22 DIAGNOSIS — I25.118 CORONARY ARTERY DISEASE OF NATIVE ARTERY OF NATIVE HEART WITH STABLE ANGINA PECTORIS (HCC): ICD-10-CM

## 2024-01-22 DIAGNOSIS — I25.118 CORONARY ARTERY DISEASE WITH EXERTIONAL ANGINA (HCC): ICD-10-CM

## 2024-01-22 PROBLEM — E03.9 ACQUIRED HYPOTHYROIDISM: Status: ACTIVE | Noted: 2024-01-22

## 2024-01-22 LAB
ALBUMIN SERPL BCP-MCNC: 4 G/DL (ref 3.2–4.9)
ALBUMIN/GLOB SERPL: 1.6 G/DL
ALP SERPL-CCNC: 166 U/L (ref 30–99)
ALT SERPL-CCNC: 84 U/L (ref 2–50)
ANION GAP SERPL CALC-SCNC: 10 MMOL/L (ref 7–16)
AST SERPL-CCNC: 57 U/L (ref 12–45)
BASOPHILS # BLD AUTO: 0.8 % (ref 0–1.8)
BASOPHILS # BLD: 0.06 K/UL (ref 0–0.12)
BILIRUB SERPL-MCNC: 0.5 MG/DL (ref 0.1–1.5)
BUN SERPL-MCNC: 26 MG/DL (ref 8–22)
CALCIUM ALBUM COR SERPL-MCNC: 9.4 MG/DL (ref 8.5–10.5)
CALCIUM SERPL-MCNC: 9.4 MG/DL (ref 8.4–10.2)
CHLORIDE SERPL-SCNC: 106 MMOL/L (ref 96–112)
CO2 SERPL-SCNC: 24 MMOL/L (ref 20–33)
CREAT SERPL-MCNC: 0.67 MG/DL (ref 0.5–1.4)
EKG IMPRESSION: NORMAL
EOSINOPHIL # BLD AUTO: 0.39 K/UL (ref 0–0.51)
EOSINOPHIL NFR BLD: 5.3 % (ref 0–6.9)
ERYTHROCYTE [DISTWIDTH] IN BLOOD BY AUTOMATED COUNT: 50 FL (ref 35.9–50)
GFR SERPLBLD CREATININE-BSD FMLA CKD-EPI: 91 ML/MIN/1.73 M 2
GLOBULIN SER CALC-MCNC: 2.5 G/DL (ref 1.9–3.5)
GLUCOSE SERPL-MCNC: 88 MG/DL (ref 65–99)
HCT VFR BLD AUTO: 40 % (ref 37–47)
HGB BLD-MCNC: 13.2 G/DL (ref 12–16)
IMM GRANULOCYTES # BLD AUTO: 0.03 K/UL (ref 0–0.11)
IMM GRANULOCYTES NFR BLD AUTO: 0.4 % (ref 0–0.9)
LYMPHOCYTES # BLD AUTO: 2.09 K/UL (ref 1–4.8)
LYMPHOCYTES NFR BLD: 28.4 % (ref 22–41)
MCH RBC QN AUTO: 28.1 PG (ref 27–33)
MCHC RBC AUTO-ENTMCNC: 33 G/DL (ref 32.2–35.5)
MCV RBC AUTO: 85.3 FL (ref 81.4–97.8)
MONOCYTES # BLD AUTO: 0.63 K/UL (ref 0–0.85)
MONOCYTES NFR BLD AUTO: 8.5 % (ref 0–13.4)
NEUTROPHILS # BLD AUTO: 4.17 K/UL (ref 1.82–7.42)
NEUTROPHILS NFR BLD: 56.6 % (ref 44–72)
NRBC # BLD AUTO: 0 K/UL
NRBC BLD-RTO: 0 /100 WBC (ref 0–0.2)
PLATELET # BLD AUTO: 257 K/UL (ref 164–446)
PMV BLD AUTO: 10.3 FL (ref 9–12.9)
POTASSIUM SERPL-SCNC: 4.3 MMOL/L (ref 3.6–5.5)
PROT SERPL-MCNC: 6.5 G/DL (ref 6–8.2)
RBC # BLD AUTO: 4.69 M/UL (ref 4.2–5.4)
SODIUM SERPL-SCNC: 140 MMOL/L (ref 135–145)
TROPONIN T SERPL-MCNC: 15 NG/L (ref 6–19)
TROPONIN T SERPL-MCNC: 15 NG/L (ref 6–19)
WBC # BLD AUTO: 7.4 K/UL (ref 4.8–10.8)

## 2024-01-22 PROCEDURE — 80053 COMPREHEN METABOLIC PANEL: CPT

## 2024-01-22 PROCEDURE — A9270 NON-COVERED ITEM OR SERVICE: HCPCS | Performed by: HOSPITALIST

## 2024-01-22 PROCEDURE — 36415 COLL VENOUS BLD VENIPUNCTURE: CPT

## 2024-01-22 PROCEDURE — 99223 1ST HOSP IP/OBS HIGH 75: CPT | Performed by: HOSPITALIST

## 2024-01-22 PROCEDURE — A9270 NON-COVERED ITEM OR SERVICE: HCPCS | Performed by: EMERGENCY MEDICINE

## 2024-01-22 PROCEDURE — G0378 HOSPITAL OBSERVATION PER HR: HCPCS

## 2024-01-22 PROCEDURE — 99285 EMERGENCY DEPT VISIT HI MDM: CPT

## 2024-01-22 PROCEDURE — 93005 ELECTROCARDIOGRAM TRACING: CPT

## 2024-01-22 PROCEDURE — 71045 X-RAY EXAM CHEST 1 VIEW: CPT

## 2024-01-22 PROCEDURE — 84484 ASSAY OF TROPONIN QUANT: CPT

## 2024-01-22 PROCEDURE — 93005 ELECTROCARDIOGRAM TRACING: CPT | Performed by: EMERGENCY MEDICINE

## 2024-01-22 PROCEDURE — 94760 N-INVAS EAR/PLS OXIMETRY 1: CPT

## 2024-01-22 PROCEDURE — 700102 HCHG RX REV CODE 250 W/ 637 OVERRIDE(OP): Performed by: HOSPITALIST

## 2024-01-22 PROCEDURE — 85025 COMPLETE CBC W/AUTO DIFF WBC: CPT

## 2024-01-22 PROCEDURE — 700102 HCHG RX REV CODE 250 W/ 637 OVERRIDE(OP): Performed by: EMERGENCY MEDICINE

## 2024-01-22 RX ORDER — EZETIMIBE 10 MG/1
10 TABLET ORAL EVERY EVENING
Status: DISCONTINUED | OUTPATIENT
Start: 2024-01-22 | End: 2024-01-23 | Stop reason: HOSPADM

## 2024-01-22 RX ORDER — POLYETHYLENE GLYCOL 3350 17 G/17G
1 POWDER, FOR SOLUTION ORAL
Status: DISCONTINUED | OUTPATIENT
Start: 2024-01-22 | End: 2024-01-23 | Stop reason: HOSPADM

## 2024-01-22 RX ORDER — BISACODYL 10 MG
10 SUPPOSITORY, RECTAL RECTAL
Status: DISCONTINUED | OUTPATIENT
Start: 2024-01-22 | End: 2024-01-23 | Stop reason: HOSPADM

## 2024-01-22 RX ORDER — ZOLPIDEM TARTRATE 5 MG/1
5 TABLET ORAL NIGHTLY PRN
COMMUNITY
End: 2024-03-01 | Stop reason: SDUPTHER

## 2024-01-22 RX ORDER — TOPIRAMATE 25 MG/1
25 TABLET ORAL 2 TIMES DAILY
Status: DISCONTINUED | OUTPATIENT
Start: 2024-01-22 | End: 2024-01-23 | Stop reason: HOSPADM

## 2024-01-22 RX ORDER — ZOLPIDEM TARTRATE 5 MG/1
5 TABLET ORAL NIGHTLY PRN
Status: DISCONTINUED | OUTPATIENT
Start: 2024-01-22 | End: 2024-01-23 | Stop reason: HOSPADM

## 2024-01-22 RX ORDER — PREGABALIN 25 MG/1
50 CAPSULE ORAL 3 TIMES DAILY
Status: DISCONTINUED | OUTPATIENT
Start: 2024-01-22 | End: 2024-01-23 | Stop reason: HOSPADM

## 2024-01-22 RX ORDER — BUSPIRONE HYDROCHLORIDE 5 MG/1
10 TABLET ORAL EVERY EVENING
Status: DISCONTINUED | OUTPATIENT
Start: 2024-01-22 | End: 2024-01-23 | Stop reason: HOSPADM

## 2024-01-22 RX ORDER — ACETAMINOPHEN 325 MG/1
650 TABLET ORAL EVERY 6 HOURS PRN
Status: DISCONTINUED | OUTPATIENT
Start: 2024-01-22 | End: 2024-01-23 | Stop reason: HOSPADM

## 2024-01-22 RX ORDER — LISINOPRIL 20 MG/1
30 TABLET ORAL DAILY
Status: DISCONTINUED | OUTPATIENT
Start: 2024-01-23 | End: 2024-01-23 | Stop reason: HOSPADM

## 2024-01-22 RX ORDER — LEVOTHYROXINE SODIUM 0.07 MG/1
75 TABLET ORAL
Status: DISCONTINUED | OUTPATIENT
Start: 2024-01-23 | End: 2024-01-23 | Stop reason: HOSPADM

## 2024-01-22 RX ORDER — ASPIRIN 81 MG/1
81 TABLET ORAL EVERY EVENING
Status: DISCONTINUED | OUTPATIENT
Start: 2024-01-22 | End: 2024-01-23 | Stop reason: HOSPADM

## 2024-01-22 RX ORDER — OXYCODONE HYDROCHLORIDE 5 MG/1
2.5 TABLET ORAL
Status: DISCONTINUED | OUTPATIENT
Start: 2024-01-22 | End: 2024-01-23 | Stop reason: HOSPADM

## 2024-01-22 RX ORDER — DULOXETIN HYDROCHLORIDE 20 MG/1
40 CAPSULE, DELAYED RELEASE ORAL 2 TIMES DAILY
Status: DISCONTINUED | OUTPATIENT
Start: 2024-01-22 | End: 2024-01-23 | Stop reason: HOSPADM

## 2024-01-22 RX ORDER — NAPROXEN SODIUM 220 MG
220 TABLET ORAL 2 TIMES DAILY PRN
COMMUNITY
End: 2024-01-25

## 2024-01-22 RX ORDER — ENOXAPARIN SODIUM 100 MG/ML
40 INJECTION SUBCUTANEOUS DAILY
Status: DISCONTINUED | OUTPATIENT
Start: 2024-01-23 | End: 2024-01-23 | Stop reason: HOSPADM

## 2024-01-22 RX ORDER — HYDROMORPHONE HYDROCHLORIDE 1 MG/ML
0.25 INJECTION, SOLUTION INTRAMUSCULAR; INTRAVENOUS; SUBCUTANEOUS
Status: DISCONTINUED | OUTPATIENT
Start: 2024-01-22 | End: 2024-01-23 | Stop reason: HOSPADM

## 2024-01-22 RX ORDER — ROSUVASTATIN CALCIUM 10 MG/1
40 TABLET, COATED ORAL DAILY
Status: DISCONTINUED | OUTPATIENT
Start: 2024-01-23 | End: 2024-01-23 | Stop reason: HOSPADM

## 2024-01-22 RX ORDER — AMOXICILLIN 250 MG
2 CAPSULE ORAL 2 TIMES DAILY
Status: DISCONTINUED | OUTPATIENT
Start: 2024-01-22 | End: 2024-01-23 | Stop reason: HOSPADM

## 2024-01-22 RX ORDER — NITROGLYCERIN 0.4 MG/1
0.4 TABLET SUBLINGUAL
Status: DISCONTINUED | OUTPATIENT
Start: 2024-01-22 | End: 2024-01-23 | Stop reason: HOSPADM

## 2024-01-22 RX ORDER — OXYCODONE HYDROCHLORIDE 5 MG/1
5 TABLET ORAL
Status: DISCONTINUED | OUTPATIENT
Start: 2024-01-22 | End: 2024-01-23 | Stop reason: HOSPADM

## 2024-01-22 RX ADMIN — BUSPIRONE HYDROCHLORIDE 10 MG: 5 TABLET ORAL at 18:39

## 2024-01-22 RX ADMIN — ASPIRIN 81 MG: 81 TABLET, COATED ORAL at 18:38

## 2024-01-22 RX ADMIN — NITROGLYCERIN 1 INCH: 20 OINTMENT TOPICAL at 15:04

## 2024-01-22 RX ADMIN — DULOXETINE HYDROCHLORIDE 40 MG: 20 CAPSULE, DELAYED RELEASE ORAL at 18:38

## 2024-01-22 RX ADMIN — ZOLPIDEM TARTRATE 5 MG: 5 TABLET ORAL at 21:12

## 2024-01-22 RX ADMIN — PREGABALIN 50 MG: 25 CAPSULE ORAL at 18:39

## 2024-01-22 RX ADMIN — EZETIMIBE 10 MG: 10 TABLET ORAL at 18:37

## 2024-01-22 ASSESSMENT — COGNITIVE AND FUNCTIONAL STATUS - GENERAL
WALKING IN HOSPITAL ROOM: A LITTLE
TOILETING: A LITTLE
TURNING FROM BACK TO SIDE WHILE IN FLAT BAD: A LITTLE
MOBILITY SCORE: 18
CLIMB 3 TO 5 STEPS WITH RAILING: A LITTLE
EATING MEALS: A LITTLE
MOVING FROM LYING ON BACK TO SITTING ON SIDE OF FLAT BED: A LITTLE
PERSONAL GROOMING: A LITTLE
DAILY ACTIVITIY SCORE: 20
SUGGESTED CMS G CODE MODIFIER MOBILITY: CK
DRESSING REGULAR UPPER BODY CLOTHING: A LITTLE
SUGGESTED CMS G CODE MODIFIER DAILY ACTIVITY: CJ
STANDING UP FROM CHAIR USING ARMS: A LITTLE
MOVING TO AND FROM BED TO CHAIR: A LITTLE

## 2024-01-22 ASSESSMENT — PATIENT HEALTH QUESTIONNAIRE - PHQ9
2. FEELING DOWN, DEPRESSED, IRRITABLE, OR HOPELESS: NOT AT ALL
1. LITTLE INTEREST OR PLEASURE IN DOING THINGS: NOT AT ALL
SUM OF ALL RESPONSES TO PHQ9 QUESTIONS 1 AND 2: 0

## 2024-01-22 ASSESSMENT — ENCOUNTER SYMPTOMS
EYE DISCHARGE: 0
MYALGIAS: 0
COUGH: 0
BRUISES/BLEEDS EASILY: 0
NERVOUS/ANXIOUS: 0
FOCAL WEAKNESS: 0
EYE REDNESS: 0
STRIDOR: 0
CHILLS: 0
ABDOMINAL PAIN: 0
SHORTNESS OF BREATH: 1
FLANK PAIN: 0
VOMITING: 0
FEVER: 0

## 2024-01-22 ASSESSMENT — FIBROSIS 4 INDEX
FIB4 SCORE: 1.81
FIB4 SCORE: 1.08

## 2024-01-22 ASSESSMENT — LIFESTYLE VARIABLES
ON A TYPICAL DAY WHEN YOU DRINK ALCOHOL HOW MANY DRINKS DO YOU HAVE: 1
EVER FELT BAD OR GUILTY ABOUT YOUR DRINKING: NO
ALCOHOL_USE: YES
EVER HAD A DRINK FIRST THING IN THE MORNING TO STEADY YOUR NERVES TO GET RID OF A HANGOVER: NO
HOW MANY TIMES IN THE PAST YEAR HAVE YOU HAD 5 OR MORE DRINKS IN A DAY: 0
TOTAL SCORE: 0
TOTAL SCORE: 0
AVERAGE NUMBER OF DAYS PER WEEK YOU HAVE A DRINK CONTAINING ALCOHOL: 7
HAVE YOU EVER FELT YOU SHOULD CUT DOWN ON YOUR DRINKING: NO
CONSUMPTION TOTAL: NEGATIVE
HAVE PEOPLE ANNOYED YOU BY CRITICIZING YOUR DRINKING: NO
TOTAL SCORE: 0

## 2024-01-22 ASSESSMENT — PAIN DESCRIPTION - PAIN TYPE: TYPE: ACUTE PAIN

## 2024-01-22 NOTE — ED PROVIDER NOTES
ED Provider Note    CHIEF COMPLAINT  Chief Complaint   Patient presents with    Chest Pain     Pt was at [facility], c/o CP while walking  Denies N/V, denies feeling short of breath       EXTERNAL RECORDS REVIEWED  Outpatient Notes reviewed outpatient cardiology note today by Dr. Pj LOMAX/BLAZE  LIMITATION TO HISTORY   Select: : None  OUTSIDE HISTORIAN(S):  Family  at the bedside given additional history and stating his concerns about his wife's chest discomfort with minimal exertion over the last few weeks    Margoth Hopkins is a 73 y.o. female with a known history of chronic CAD, status post CABG x 1 and aortic valve replacement in 8/2023. Patient was also found to have paroxysmal A-fib. She is taking amiodarone, metoprolol, Eliquis and comes today with a report that she was at cardiac rehab and had some chest pain developed while she was there walking.    PAST MEDICAL HISTORY   has a past medical history of Adverse effect of anesthesia (15 yrs ago ?), GAURI (acute kidney injury) ([facility]) (08/24/2023), Anemia, Anesthesia, Anginal syndrome ([facility]), Aortic insufficiency (01/31/2020), Aortic valve insufficiency (08/16/2023), Arthritis (06/16/2023), Atrial fibrillation (HCC) (08/24/2023), Breath shortness (11/02/2021), Bronchitis (60+ yrs ago), Cataract (06/16/2023), Dental disorder (06/16/2023), Gastric ulcer, Goiter, Heart burn (2022), Heart murmur (1965), High cholesterol (06/16/2023), Hypertension (06/16/2023), Hypothyroidism (06/16/2023), Pain (06/16/2023), Pain (09/22/2023), PONV (postoperative nausea and vomiting) (6/21/22), Psychiatric problem (06/16/2023), Pulmonary embolism (HCC) (01/31/2020), Reactive arthritis ([facility]), Snoring (06/16/2023), Thyroid disease, Thyroid disease (01/31/2020), and Urinary bladder disorder (15 yrs ago).    SURGICAL HISTORY   has a past surgical history that includes knee replacement, total (Right); fusion (06/16/2023); cervical fusion posterior; bladder sling female; cataract  extraction with iol (Bilateral); thyroidectomy total (Bilateral, 02/05/2020); appendectomy; gyn surgery; knee arthroplasty total (Right); knee revision total (Right); mass excision general (Right, 11/05/2021); knee arthroplasty total (Left, 06/2022); other cardiac surgery (06/16/2023); cervical disk and fusion anterior (2016); hammertoe correction (Left, 2017); multiple coronary artery bypass endo vein harvest (N/A, 08/16/2023); aortic valve replacement (N/A, 08/16/2023); echocardiogram, transesophageal, intraoperative (N/A, 08/16/2023); colonoscopy; and devi by laparoscopy (N/A, 9/25/2023).    FAMILY HISTORY  Family History   Problem Relation Age of Onset    Arthritis Mother     Alzheimer's Disease Mother     Cancer Mother         Cervical Cancer    Heart Attack Father     Cancer Father         Hypertrophy of the heart, CAD    Other Brother         brain anurism    Hypertension Brother     Cancer Maternal Grandmother 66        colono cancer    Colorectal Cancer Maternal Grandmother         Colon Cancer    Heart Disease Brother         Quadruple bypass    Hypertension Brother     Arthritis Brother     Other Brother         Celiac Disease    No Known Problems Maternal Grandfather     No Known Problems Paternal Grandmother     No Known Problems Paternal Grandfather     Hypertension Brother         Spinal stenosis    Arthritis Brother     Lung Disease Brother         Asthma, emphysema    Asthma Brother        SOCIAL HISTORY  Social History     Tobacco Use    Smoking status: Never    Smokeless tobacco: Never   Vaping Use    Vaping Use: Never used   Substance and Sexual Activity    Alcohol use: Not Currently     Alcohol/week: 4.2 - 8.4 oz     Types: 7 - 14 Glasses of wine per week    Drug use: Not Currently     Types: Oral     Comment: denies    Sexual activity: Yes     Partners: Male       CURRENT MEDICATIONS  Home Medications    **Home medications have not yet been reviewed for this encounter**    "      ALLERGIES  Allergies   Allergen Reactions    Morphine Vomiting and Nausea    Nsaids Unspecified     History of gastric ulcers - cannot take NSAIDS    Pcn [Penicillins] Hives     Tolerates ancef    Seasonal Runny Nose and Itching     .       PHYSICAL EXAM  VITAL SIGNS: BP (!) 142/78   Pulse 67   Temp 37.2 °C (98.9 °F) (Temporal)   Resp 15   Ht 1.702 m (5' 7\")   Wt 75.5 kg (166 lb 7.2 oz)   SpO2 99%   BMI 26.07 kg/m²    Constitutional: Well developed, Well nourished, No acute distress, Non-toxic appearance.   HENT: Normocephalic, Atraumatic, Bilateral external ears normal, Oropharynx is clear mucous membranes are moist. No oral exudates or nasal discharge.   Eyes: Pupils are equal round and reactive, EOMI, Conjunctiva normal, No discharge.   Neck: Normal range of motion, No tenderness, Supple, No stridor. No meningismus.  Lymphatic: No lymphadenopathy noted.   Cardiovascular: Regular rate and rhythm without murmur rub or gallop.  Thorax & Lungs: Clear breath sounds bilaterally without wheezes, rhonchi or rales. There is no chest wall tenderness.   Abdomen: Soft non-tender non-distended. There is no rebound or guarding. No organomegaly is appreciated. Bowel sounds are normal.  Skin: Normal without rash.   Back: No CVA or spinal tenderness.   Extremities: Intact distal pulses, No edema, No tenderness, No cyanosis, No clubbing. Capillary refill is less than 2 seconds.  Musculoskeletal: Good range of motion in all major joints. No tenderness to palpation or major deformities noted.   Neurologic: Alert & oriented x 3, Normal motor function, Normal sensory function, No focal deficits noted. Reflexes are normal.  Psychiatric: Affect normal, Judgment normal, Mood normal. There is no suicidal ideation or patient reported hallucinations.       DIAGNOSTIC STUDIES / PROCEDURES  EKG  I have independently interpreted this EKG  Results for orders placed or performed during the hospital encounter of 01/22/24   EKG "   Result Value Ref Range    Report       [facility]    Test Date:  2024  Pt Name:    IAN JOHNSON                   Department: [facility]  MRN:        [id number]                      Room:  Gender:     Female                       Technician: [id number]  :        [id number]                   Requested By:ER TRIAGE PROTOCOL  Order #:    [id number]                    Reading MD: REGINO PARRA MD    Measurements  Intervals                                Axis  Rate:       65                           P:          85  GA:         227                          QRS:        -38  QRSD:       103                          T:          43  QT:         407  QTc:        424    Interpretive Statements  Sinus rhythm  Prolonged GA interval  Left axis deviation  Abnormal R-wave progression, late transition  Compared to ECG 2023 13:19:32  Left-axis deviation now present  Left anterior fascicular block no longer present  ST (T wave) deviation no longer present  Electronically Signed On 2024 15:51:15 PST  by REGINO PARRA MD           LABS  Laboratory evaluation reveals no leukocytosis, shift, anemia, electrolyte derangements or acidosis and troponin is normal.    RADIOLOGY  I have independently interpreted the diagnostic imaging associated with this visit and am waiting the final reading from the radiologist.   My preliminary interpretation is as follows: No evidence of widened mediastinum    Radiologist interpretation:   DX-CHEST-PORTABLE (1 VIEW)   Final Result      No evidence of acute cardiopulmonary process.            COURSE & MEDICAL DECISION MAKING    ED Observation Status? No; Patient does not meet criteria for ED Observation.     INITIAL ASSESSMENT, COURSE AND PLAN  Care Narrative: Patient presents from cardiac rehab and with a very concerning history that is most consistent with stable angina in the presence of somebody that has had bypass surgery back in 2023    Laboratory  evaluation is unremarkable showing no elevation of troponin and no other significant abnormalities.  Chest x-ray is also normal    I reviewed her last echo back in November 2023 that showed an ejection fraction of 60% and no significant valvular normalities.    I placed Nitropaste to her chest 1 inch and then she had substantial improvement in vital signs improved as well.  I think she needs a stress test as she has substantial but stable angina and a provocative test would help us understand whether she needs another cardiac catheterization and the patient is amenable to this plan of care and will thus be brought in patient on telemetry monitor    DISPOSITION AND DISCUSSIONS  I spoke with Dr. Jose Rubio regarding admission and he is amenable to this plan and agreeable to inpatient stress testing    Decision tools and prescription drugs considered including, but not limited to: HEART Score is 7 .    FINAL DIAGNOSIS  1. Chest pain, unspecified type    2. Coronary artery disease of native artery of native heart with stable angina pectoris (HCC)           Electronically signed by: Neil Pulido M.D., 1/22/2024 2:10 PM

## 2024-01-22 NOTE — ED NOTES
PIV established.  Lab drawn.  Pt denies pain, until she got up and walked to the bathroom & now she reports a pain of 2/10.  Nitro Paste given as ordered.  BP prior to Nitro Paste 142/82.

## 2024-01-22 NOTE — ED TRIAGE NOTES
"Chief Complaint   Patient presents with    Chest Pain     Pt was at [facility], c/o CP while walking  Denies N/V, denies feeling short of breath     BP (!) 142/78   Pulse 67   Temp 37.2 °C (98.9 °F) (Temporal)   Resp 15   Ht 1.702 m (5' 7\")   Wt 75.5 kg (166 lb 7.2 oz)   SpO2 99%   BMI 26.07 kg/m²     Pt BIB hospital staff for c/o central CP while walking at [facility] today.  Pt states intermittent CP over the last couple of days, s/p AVR in 08/2023.  Pt currently denies c/o N/V or feeling short of breath.  EKG completed.    "

## 2024-01-23 ENCOUNTER — APPOINTMENT (OUTPATIENT)
Dept: RADIOLOGY | Facility: MEDICAL CENTER | Age: 74
End: 2024-01-23
Attending: HOSPITALIST
Payer: MEDICARE

## 2024-01-23 VITALS
BODY MASS INDEX: 24.91 KG/M2 | HEIGHT: 67 IN | TEMPERATURE: 98 F | OXYGEN SATURATION: 97 % | DIASTOLIC BLOOD PRESSURE: 74 MMHG | RESPIRATION RATE: 18 BRPM | WEIGHT: 158.73 LBS | SYSTOLIC BLOOD PRESSURE: 129 MMHG | HEART RATE: 78 BPM

## 2024-01-23 LAB
ALBUMIN SERPL BCP-MCNC: 3.5 G/DL (ref 3.2–4.9)
ALBUMIN/GLOB SERPL: 1.5 G/DL
ALP SERPL-CCNC: 135 U/L (ref 30–99)
ALT SERPL-CCNC: 63 U/L (ref 2–50)
ANION GAP SERPL CALC-SCNC: 11 MMOL/L (ref 7–16)
AST SERPL-CCNC: 41 U/L (ref 12–45)
BILIRUB SERPL-MCNC: 0.6 MG/DL (ref 0.1–1.5)
BUN SERPL-MCNC: 22 MG/DL (ref 8–22)
CALCIUM ALBUM COR SERPL-MCNC: 9.5 MG/DL (ref 8.5–10.5)
CALCIUM SERPL-MCNC: 9.1 MG/DL (ref 8.4–10.2)
CHLORIDE SERPL-SCNC: 110 MMOL/L (ref 96–112)
CHOLEST SERPL-MCNC: 130 MG/DL (ref 100–199)
CO2 SERPL-SCNC: 21 MMOL/L (ref 20–33)
CREAT SERPL-MCNC: 0.69 MG/DL (ref 0.5–1.4)
ERYTHROCYTE [DISTWIDTH] IN BLOOD BY AUTOMATED COUNT: 50 FL (ref 35.9–50)
GFR SERPLBLD CREATININE-BSD FMLA CKD-EPI: 90 ML/MIN/1.73 M 2
GLOBULIN SER CALC-MCNC: 2.4 G/DL (ref 1.9–3.5)
GLUCOSE SERPL-MCNC: 97 MG/DL (ref 65–99)
HCT VFR BLD AUTO: 37.4 % (ref 37–47)
HDLC SERPL-MCNC: 66 MG/DL
HGB BLD-MCNC: 12 G/DL (ref 12–16)
LDLC SERPL CALC-MCNC: 40 MG/DL
MAGNESIUM SERPL-MCNC: 2.2 MG/DL (ref 1.5–2.5)
MCH RBC QN AUTO: 27.5 PG (ref 27–33)
MCHC RBC AUTO-ENTMCNC: 32.1 G/DL (ref 32.2–35.5)
MCV RBC AUTO: 85.6 FL (ref 81.4–97.8)
PLATELET # BLD AUTO: 239 K/UL (ref 164–446)
PMV BLD AUTO: 10 FL (ref 9–12.9)
POTASSIUM SERPL-SCNC: 3.8 MMOL/L (ref 3.6–5.5)
PROT SERPL-MCNC: 5.9 G/DL (ref 6–8.2)
RBC # BLD AUTO: 4.37 M/UL (ref 4.2–5.4)
SODIUM SERPL-SCNC: 142 MMOL/L (ref 135–145)
TRIGL SERPL-MCNC: 119 MG/DL (ref 0–149)
TROPONIN T SERPL-MCNC: 16 NG/L (ref 6–19)
WBC # BLD AUTO: 6.3 K/UL (ref 4.8–10.8)

## 2024-01-23 PROCEDURE — A9270 NON-COVERED ITEM OR SERVICE: HCPCS | Performed by: INTERNAL MEDICINE

## 2024-01-23 PROCEDURE — 78452 HT MUSCLE IMAGE SPECT MULT: CPT | Mod: 26 | Performed by: INTERNAL MEDICINE

## 2024-01-23 PROCEDURE — 99239 HOSP IP/OBS DSCHRG MGMT >30: CPT | Performed by: INTERNAL MEDICINE

## 2024-01-23 PROCEDURE — G0378 HOSPITAL OBSERVATION PER HR: HCPCS

## 2024-01-23 PROCEDURE — 83735 ASSAY OF MAGNESIUM: CPT

## 2024-01-23 PROCEDURE — A9270 NON-COVERED ITEM OR SERVICE: HCPCS | Performed by: HOSPITALIST

## 2024-01-23 PROCEDURE — 700102 HCHG RX REV CODE 250 W/ 637 OVERRIDE(OP): Performed by: HOSPITALIST

## 2024-01-23 PROCEDURE — 700111 HCHG RX REV CODE 636 W/ 250 OVERRIDE (IP)

## 2024-01-23 PROCEDURE — 84484 ASSAY OF TROPONIN QUANT: CPT

## 2024-01-23 PROCEDURE — 80053 COMPREHEN METABOLIC PANEL: CPT

## 2024-01-23 PROCEDURE — 700102 HCHG RX REV CODE 250 W/ 637 OVERRIDE(OP): Performed by: INTERNAL MEDICINE

## 2024-01-23 PROCEDURE — 36415 COLL VENOUS BLD VENIPUNCTURE: CPT

## 2024-01-23 PROCEDURE — A9502 TC99M TETROFOSMIN: HCPCS

## 2024-01-23 PROCEDURE — 80061 LIPID PANEL: CPT

## 2024-01-23 PROCEDURE — 93018 CV STRESS TEST I&R ONLY: CPT | Performed by: INTERNAL MEDICINE

## 2024-01-23 PROCEDURE — 85027 COMPLETE CBC AUTOMATED: CPT

## 2024-01-23 PROCEDURE — 94760 N-INVAS EAR/PLS OXIMETRY 1: CPT

## 2024-01-23 RX ORDER — ISOSORBIDE MONONITRATE 30 MG/1
30 TABLET, EXTENDED RELEASE ORAL DAILY
Qty: 30 TABLET | Refills: 2 | Status: SHIPPED | OUTPATIENT
Start: 2024-01-23 | End: 2024-01-25 | Stop reason: SDUPTHER

## 2024-01-23 RX ORDER — REGADENOSON 0.08 MG/ML
INJECTION, SOLUTION INTRAVENOUS
Status: COMPLETED
Start: 2024-01-23 | End: 2024-01-23

## 2024-01-23 RX ORDER — NITROGLYCERIN 0.4 MG/1
0.4 TABLET SUBLINGUAL PRN
Qty: 25 TABLET | Refills: 0 | Status: SHIPPED | OUTPATIENT
Start: 2024-01-23 | End: 2024-01-25 | Stop reason: SDUPTHER

## 2024-01-23 RX ORDER — ISOSORBIDE MONONITRATE 30 MG/1
30 TABLET, EXTENDED RELEASE ORAL
Status: DISCONTINUED | OUTPATIENT
Start: 2024-01-23 | End: 2024-01-23 | Stop reason: HOSPADM

## 2024-01-23 RX ADMIN — ISOSORBIDE MONONITRATE 30 MG: 30 TABLET, EXTENDED RELEASE ORAL at 14:14

## 2024-01-23 RX ADMIN — PREGABALIN 50 MG: 25 CAPSULE ORAL at 11:10

## 2024-01-23 RX ADMIN — LEVOTHYROXINE SODIUM 75 MCG: 0.07 TABLET ORAL at 05:05

## 2024-01-23 RX ADMIN — LISINOPRIL 30 MG: 20 TABLET ORAL at 06:07

## 2024-01-23 RX ADMIN — TOPIRAMATE 25 MG: 25 TABLET, FILM COATED ORAL at 06:07

## 2024-01-23 RX ADMIN — TOPIRAMATE 25 MG: 25 TABLET, FILM COATED ORAL at 15:48

## 2024-01-23 RX ADMIN — REGADENOSON 0.4 MG: 0.08 INJECTION, SOLUTION INTRAVENOUS at 09:48

## 2024-01-23 RX ADMIN — OXYCODONE 5 MG: 5 TABLET ORAL at 14:14

## 2024-01-23 RX ADMIN — PREGABALIN 50 MG: 25 CAPSULE ORAL at 15:46

## 2024-01-23 RX ADMIN — OXYCODONE 2.5 MG: 5 TABLET ORAL at 11:09

## 2024-01-23 RX ADMIN — DULOXETINE HYDROCHLORIDE 40 MG: 20 CAPSULE, DELAYED RELEASE ORAL at 06:07

## 2024-01-23 RX ADMIN — ROSUVASTATIN 40 MG: 10 TABLET, FILM COATED ORAL at 06:07

## 2024-01-23 ASSESSMENT — FIBROSIS 4 INDEX: FIB4 SCORE: 1.62

## 2024-01-23 NOTE — PROGRESS NOTES
Telemetry Shift Summary     Rhythm: SR/SB  Rate: 50s-80s  Measurements: 0.20/0.10/0.42  Ectopy (reported by Monitor Tech): rare PAC     Normal Values  Rhythm: Sinus  HR:   Measurements: 0.12-0.20/0.06-0.10/0.30-0.52

## 2024-01-23 NOTE — ASSESSMENT & PLAN NOTE
The 10-year ASCVD risk score (Denia MARIE, et al., 2019) is: 18%    Values used to calculate the score:      Age: 73 years      Sex: Female      Is Non- : No      Diabetic: No      Tobacco smoker: No      Systolic Blood Pressure: 117 mmHg      Is BP treated: Yes      HDL Cholesterol: 73 mg/dL      Total Cholesterol: 200 mg/dL  EKG shows, sinus rhythm with a rate of 65, there is no ST elevation, there is flattening of T wave in lead III and aVF.  QTc is 424.  Initial troponin is within normal limits,I will trend  I ordered Stat EKG and troponin for recurrence of chest pain.   I will place on continuous cardiac monitoring.  Plan for stress test in the morning [ordered] as long as there are no significant EKG changes or significant troponin elevation

## 2024-01-23 NOTE — DISCHARGE SUMMARY
Discharge Summary    CHIEF COMPLAINT ON ADMISSION  Chief Complaint   Patient presents with    Chest Pain     Pt was at [facility], c/o CP while walking  Denies N/V, denies feeling short of breath       Reason for Admission  Chest Pain     Admission Date  1/22/2024    CODE STATUS  Full Code    HPI & HOSPITAL COURSE  Patient is a 73-year-old female with history of coronary artery disease status post bypass LIMA to LAD and aortic valve replacement done in August 2023 presented with exertional chest pain.  She has been having episodes of retrosternal chest pain mostly with exertion sometimes at rest at least on the last 6 weeks if not longer.  She has been following with cardiology and was seen by Dr. Worrell and then transition to nurse practitioner Lul.  Patient underwent nuclear stress test today which showed small sized mild severity equivocal for reversible defect in the apical lateral wall and mid inferior lateral wall, equivocal to mild ischemia.  Given the patient's presentation and CABG within the last 12 months this was discussed with cardiology on-call who agreed to a trial of isosorbide for angina prophylaxis and sublingual nitro as needed for chest pain.  I have placed an urgent referral for cardiology follow-up so the patient can reestablish with a physician at [facility] and vascular.  This was explained to the patient she is in agreement with current plan.    Therefore, she is discharged in good and stable condition to home with close outpatient follow-up.      Discharge Date  1/23/24    FOLLOW UP ITEMS POST DISCHARGE  PCP and cardiology    DISCHARGE DIAGNOSES  Principal Problem:    Chest pain (POA: Yes)  Active Problems:    Primary hypertension (POA: Yes)    MOHAMUD (generalized anxiety disorder) (POA: Yes)      Overview: IMO load March 2020    Coronary artery disease of native artery of native heart with angina pectoris (HCC) (POA: Yes)    Paroxysmal atrial fibrillation (HCC) (POA: Yes)     Prediabetes (POA: Yes)    Acquired hypothyroidism (POA: Yes)  Resolved Problems:    * No resolved hospital problems. *      FOLLOW UP  Future Appointments   Date Time Provider Department Center   2/20/2024  3:20 PM Charlee Walker M.D. [facility]   3/5/2024  2:00 PM ICR RD DARC[facility] YVONNE Garcia   3/14/2024  3:15 PM BIANCA Muñoz None     Charlee Walker M.D.  [address] [place] [id number] [phone]    Follow up      [facility]  [address]  [place] [address]  [phone]  Follow up  Referral placed for cardiology physician follow up.      MEDICATIONS ON DISCHARGE     Medication List        START taking these medications        Instructions   isosorbide mononitrate SR 30 MG Tb24  Commonly known as: Imdur   Take 1 Tablet by mouth every day.  Dose: 30 mg     nitroglycerin 0.4 MG Subl  Commonly known as: Nitrostat   Place 1 Tablet under the tongue as needed for Chest Pain.  Dose: 0.4 mg            CHANGE how you take these medications        Instructions   aspirin 81 MG EC tablet  What changed: when to take this   Take 1 Tablet by mouth every day.  Dose: 81 mg            CONTINUE taking these medications        Instructions   Aleve 220 MG tablet  Generic drug: naproxen   Take 220 mg by mouth 2 times a day as needed. Indications: Pain  Dose: 220 mg     busPIRone 10 MG Tabs tablet  Commonly known as: Buspar   Take 10 mg by mouth every evening. Pt takes once a day, not BID  Dose: 10 mg     DULoxetine HCl 40 MG Cpep   Take 40 mg by mouth 2 times a day.  Dose: 40 mg     ezetimibe 10 MG Tabs  Commonly known as: Zetia   Take 1 Tablet by mouth every evening.  Dose: 10 mg     lisinopril 30 MG tablet  Commonly known as: Prinivil   Take 1 Tablet by mouth every day.  Dose: 30 mg     metoprolol tartrate 25 MG Tabs  Commonly known as: Lopressor   Take 1 Tablet by mouth 2 times a day.  Dose: 25 mg     pregabalin 50 MG capsule  Commonly known as: Lyrica   Take 1  Capsule by mouth 3 times a day for 30 days.  Dose: 50 mg     rosuvastatin 40 MG tablet  Commonly known as: Crestor   Doctor's comments: Requesting 1 year supply  Take 1 Tablet by mouth every day.  Dose: 40 mg     Synthroid 75 MCG Tabs  Generic drug: levothyroxine   Doctor's comments: Requesting 1 year supply  Take 1 Tablet by mouth every morning on an empty stomach.  Dose: 75 mcg     topiramate 25 MG capsule  Commonly known as: Topamax   Take 1 Capsule by mouth 2 times a day.  Dose: 25 mg     vitamin D2 (Ergocalciferol) 1.25 MG (57842 UT) Caps capsule  Commonly known as: Drisdol   Doctor's comments: Requesting 1 year supply  Take 1 Capsule by mouth every 7 days. On Sat  Dose: 50,000 Units     Voltaren 1 % Gel  Generic drug: diclofenac sodium   Apply 2 g topically 4 times a day as needed (Apply's on both knees).  Dose: 2 g     zolpidem 5 MG Tabs  Commonly known as: Ambien   Take 5 mg by mouth at bedtime as needed for Sleep.  Dose: 5 mg              Allergies  Allergies   Allergen Reactions    Morphine Vomiting and Nausea    Nsaids Unspecified     History of gastric ulcers - cannot take NSAIDS    Pcn [Penicillins] Hives     Tolerates ancef    Seasonal Runny Nose and Itching     .       DIET  Orders Placed This Encounter   Procedures    Diet Order Diet: Regular     Standing Status:   Standing     Number of Occurrences:   1     Order Specific Question:   Diet:     Answer:   Regular [1]       ACTIVITY  As tolerated.  Weight bearing as tolerated    CONSULTATIONS  none    PROCEDURES  none    LABORATORY  Lab Results   Component Value Date    SODIUM 142 01/23/2024    POTASSIUM 3.8 01/23/2024    CHLORIDE 110 01/23/2024    CO2 21 01/23/2024    GLUCOSE 97 01/23/2024    BUN 22 01/23/2024    CREATININE 0.69 01/23/2024        Lab Results   Component Value Date    WBC 6.3 01/23/2024    HEMOGLOBIN 12.0 01/23/2024    HEMATOCRIT 37.4 01/23/2024    PLATELETCT 239 01/23/2024        Total time of the discharge process exceeds 36  minutes.

## 2024-01-23 NOTE — ED NOTES
Medication history reviewed with pt. Med rec is complete.  Allergies reviewed, per pt  Interviewed pt with  at bedside with permission from pt.    Pt reports that she has been off her ELIQUIS 5MG, AMIODARONE 200MG, POTASSIUM 20MEQ, and PROPRANOLOL 40MG for 2 months or longer.    Patient has not had any outpatient antibiotics in the last 30 days.    Pt is not on any anticoagulants

## 2024-01-23 NOTE — DISCHARGE PLANNING
Case Management Discharge Planning    Admission Date: 1/22/2024  GMLOS:    ALOS: 0    6-Clicks ADL Score: 20  6-Clicks Mobility Score: 18      Anticipated Discharge Dispo: Discharge Disposition: Discharged to home/self care (01)    DME Needed: No    Action(s) Taken:     RNCM attended IDT rounds and reviewed chart. No CM needs noted at this time.     Escalations Completed: None    Medically Clear: No    Next Steps:  f/u with medical team to discuss DC needs and barriers    Barriers to Discharge: Medical clearance

## 2024-01-23 NOTE — ASSESSMENT & PLAN NOTE
Resume aspirin, rosuvastatin, Zetia  The patient does have exertional shortness of breath and chest pain.  EKG shows, sinus rhythm with a rate of 65, there is no ST elevation, there is flattening of T wave in lead III and aVF.  QTc is 424.  Initial troponin is within normal limits,I will trend  I ordered Stat EKG and troponin for recurrence of chest pain.   I will place on continuous cardiac monitoring.  Plan for stress test in the morning [ordered] as long as there are no significant EKG changes or significant troponin elevation

## 2024-01-23 NOTE — H&P
Hospital Medicine History & Physical Note    Date of Service  1/22/2024    Primary Care Physician  Charlee Walker M.D.    Consultants  None     Code Status  Full Code    Chief Complaint  Chief Complaint   Patient presents with    Chest Pain     Pt was at [facility], c/o CP while walking  Denies N/V, denies feeling short of breath     History of Presenting Illness  Margoth Hopkins is a 73 y.o. female with a past medical history of coronary artery disease, s/p coronary artery bypass and aortic valve replacement last August 2023, generalized anxiety, prediabetes, BMI of 26 who presented 1/22/2024 with exertional chest pain.  Patient reports that she has been having multiple episodes of retrosternal chest pain that is mostly with exertion sometimes occurs at rest.  Symptoms have been increasing over the past 6 weeks..    I discussed the plan of care with emergency department physician, the patient and patient family present at bedside in the emergency room.    Review of Systems  Review of Systems   Constitutional:  Negative for chills and fever.   Eyes:  Negative for discharge and redness.   Respiratory:  Positive for shortness of breath. Negative for cough and stridor.    Cardiovascular:  Positive for chest pain. Negative for leg swelling.   Gastrointestinal:  Negative for abdominal pain and vomiting.   Genitourinary:  Negative for flank pain.   Musculoskeletal:  Negative for myalgias.   Skin: Negative.    Neurological:  Negative for focal weakness.   Endo/Heme/Allergies:  Does not bruise/bleed easily.   Psychiatric/Behavioral:  The patient is not nervous/anxious.      Past Medical History   has a past medical history of Adverse effect of anesthesia (15 yrs ago ?), GAURI (acute kidney injury) (Former[facility]) (08/24/2023), Anemia, Anesthesia, Anginal syndrome ([facility]), Aortic insufficiency (01/31/2020), Aortic valve insufficiency (08/16/2023), Arthritis (06/16/2023), Atrial fibrillation (HCC) (08/24/2023), Breath shortness  (11/02/2021), Bronchitis (60+ yrs ago), Cataract (06/16/2023), Dental disorder (06/16/2023), Gastric ulcer, Goiter, Heart burn (2022), Heart murmur (1965), High cholesterol (06/16/2023), Hypertension (06/16/2023), Hypothyroidism (06/16/2023), Pain (06/16/2023), Pain (09/22/2023), PONV (postoperative nausea and vomiting) (6/21/22), Psychiatric problem (06/16/2023), Pulmonary embolism (HCC) (01/31/2020), Reactive arthritis (HCC), Snoring (06/16/2023), Thyroid disease, Thyroid disease (01/31/2020), and Urinary bladder disorder (15 yrs ago).    Surgical History   has a past surgical history that includes knee replacement, total (Right); fusion (06/16/2023); cervical fusion posterior; bladder sling female; cataract extraction with iol (Bilateral); thyroidectomy total (Bilateral, 02/05/2020); appendectomy; gyn surgery; knee arthroplasty total (Right); knee revision total (Right); mass excision general (Right, 11/05/2021); knee arthroplasty total (Left, 06/2022); other cardiac surgery (06/16/2023); cervical disk and fusion anterior (2016); hammertoe correction (Left, 2017); multiple coronary artery bypass endo vein harvest (N/A, 08/16/2023); aortic valve replacement (N/A, 08/16/2023); echocardiogram, transesophageal, intraoperative (N/A, 08/16/2023); colonoscopy; and devi by laparoscopy (N/A, 9/25/2023).     Family History  family history includes Alzheimer's Disease in her mother; Arthritis in her brother, brother, and mother; Asthma in her brother; Cancer in her father and mother; Cancer (age of onset: 66) in her maternal grandmother; Colorectal Cancer in her maternal grandmother; Heart Attack in her father; Heart Disease in her brother; Hypertension in her brother, brother, and brother; Lung Disease in her brother; No Known Problems in her maternal grandfather, paternal grandfather, and paternal grandmother; Other in her brother and brother.      Social History   reports that she has never smoked. She has never used  smokeless tobacco. She reports that she does not currently use alcohol after a past usage of about 4.2 - 8.4 oz of alcohol per week. She reports that she does not currently use drugs after having used the following drugs: Oral.    Allergies  Allergies   Allergen Reactions    Morphine Vomiting and Nausea    Nsaids Unspecified     History of gastric ulcers - cannot take NSAIDS    Pcn [Penicillins] Hives     Tolerates ancef    Seasonal Runny Nose and Itching     .     Medications  Prior to Admission Medications   Prescriptions Last Dose Informant Patient Reported? Taking?   DULoxetine HCl 40 MG Cap DR Particles  Historical No No   Sig: Take 40 mg by mouth 2 times a day.   SYNTHROID 75 MCG Tab  Historical No No   Sig: Take 1 Tablet by mouth every morning on an empty stomach.   aspirin 81 MG EC tablet   No No   Sig: Take 1 Tablet by mouth every day.   busPIRone (BUSPAR) 10 MG Tab tablet  Historical Yes No   Sig: Take 10 mg by mouth every evening. Pt takes once a day, not BID   diclofenac sodium (VOLTAREN) 1 % Gel  Historical Yes No   Sig: Apply 2 g topically 4 times a day as needed (Apply's on both knees).   ezetimibe (ZETIA) 10 MG Tab   No No   Sig: Take 1 Tablet by mouth every evening.   lisinopril (PRINIVIL) 30 MG tablet   No No   Sig: Take 1 Tablet by mouth every day.   metoprolol tartrate (LOPRESSOR) 25 MG Tab   No No   Sig: Take 1 Tablet by mouth 2 times a day.   ondansetron (ZOFRAN) 4 MG Tab tablet   Yes No   Sig: Take 2 tablets by mouth twice a day by oral route as needed for 5 days.   pregabalin (LYRICA) 50 MG capsule   No No   Sig: Take 1 Capsule by mouth 3 times a day for 30 days.   Patient not taking: Reported on 12/14/2023   rosuvastatin (CRESTOR) 40 MG tablet  Historical No No   Sig: Take 1 Tablet by mouth every day.   topiramate (TOPAMAX) 25 MG capsule   No No   Sig: Take 1 Capsule by mouth 2 times a day.   vitamin D2, Ergocalciferol, (DRISDOL) 1.25 MG (40863 UT) Cap capsule  Historical No No   Sig: Take 1  Capsule by mouth every 7 days. On Sat      Facility-Administered Medications: None     Physical Exam  Temp:  [36.4 °C (97.5 °F)-37.2 °C (98.9 °F)] 36.5 °C (97.7 °F)  Pulse:  [55-67] 63  Resp:  [10-35] 17  BP: (117-169)/(58-85) 117/68  SpO2:  [97 %-100 %] 100 %  Blood Pressure : (!) 151/85   Temperature: 36.4 °C (97.5 °F)   Pulse: 62   Respiration: 18   Pulse Oximetry: 98 %     Physical Exam  Constitutional:       General: She is not in acute distress.  HENT:      Head: Normocephalic and atraumatic.      Right Ear: External ear normal.      Left Ear: External ear normal.      Nose: No congestion or rhinorrhea.      Mouth/Throat:      Mouth: Mucous membranes are moist.      Pharynx: No oropharyngeal exudate or posterior oropharyngeal erythema.   Eyes:      General: No scleral icterus.        Right eye: No discharge.         Left eye: No discharge.      Conjunctiva/sclera: Conjunctivae normal.      Pupils: Pupils are equal, round, and reactive to light.   Cardiovascular:      Rate and Rhythm: Regular rhythm. Bradycardia present.      Heart sounds:      No friction rub. No gallop.   Pulmonary:      Effort: Pulmonary effort is normal.   Abdominal:      General: Abdomen is flat. There is no distension.      Tenderness: There is no guarding.   Musculoskeletal:         General: No swelling.      Cervical back: Neck supple. No rigidity. No muscular tenderness.      Right lower leg: No edema.      Left lower leg: No edema.   Skin:     Capillary Refill: Capillary refill takes 2 to 3 seconds.      Coloration: Skin is not jaundiced or pale.      Findings: No bruising or erythema.   Neurological:      Mental Status: She is alert and oriented to person, place, and time.   Psychiatric:         Mood and Affect: Mood normal.         Judgment: Judgment normal.       Laboratory:  Recent Labs     01/22/24  1455   WBC 7.4   RBC 4.69   HEMOGLOBIN 13.2   HEMATOCRIT 40.0   MCV 85.3   MCH 28.1   MCHC 33.0   RDW 50.0   PLATELETCT 257   MPV  "10.3     Recent Labs     01/22/24  1455   SODIUM 140   POTASSIUM 4.3   CHLORIDE 106   CO2 24   GLUCOSE 88   BUN 26*   CREATININE 0.67   CALCIUM 9.4     Recent Labs     01/22/24  1455   ALTSGPT 84*   ASTSGOT 57*   ALKPHOSPHAT 166*   TBILIRUBIN 0.5   GLUCOSE 88         No results for input(s): \"NTPROBNP\" in the last 72 hours.      Recent Labs     01/22/24  1455   TROPONINT 15     Imaging:  DX-CHEST-PORTABLE (1 VIEW)   Final Result      No evidence of acute cardiopulmonary process.        I patient reviewed patient EKG shows sinus rhythm with a rate of 65, there is no ST elevation, there is flattening of T wave in lead III and aVF.  QTc is 424.     Assessment/Plan:  Justification for Admission Status  I anticipate this patient is appropriate for observation status at this time because likely discharge after 1 midnight    Patient will need a Telemetry bed on CARDIOLOGY service.  The patient has chest pain.    * Chest pain- (present on admission)  Assessment & Plan  The 10-year ASCVD risk score (Denia MARIE, et al., 2019) is: 18%    Values used to calculate the score:      Age: 73 years      Sex: Female      Is Non- : No      Diabetic: No      Tobacco smoker: No      Systolic Blood Pressure: 117 mmHg      Is BP treated: Yes      HDL Cholesterol: 73 mg/dL      Total Cholesterol: 200 mg/dL  EKG shows, sinus rhythm with a rate of 65, there is no ST elevation, there is flattening of T wave in lead III and aVF.  QTc is 424.  Initial troponin is within normal limits,I will trend  I ordered Stat EKG and troponin for recurrence of chest pain.   I will place on continuous cardiac monitoring.  Plan for stress test in the morning [ordered] as long as there are no significant EKG changes or significant troponin elevation     Prediabetes- (present on admission)  Assessment & Plan  Without significant hyperglycemia.    Monitor blood sugars with daily labs.  I will order a follow-up glucose level.  Consider " sliding scale insulin according to blood sugar trend.     Paroxysmal atrial fibrillation (HCC)- (present on admission)  Assessment & Plan  Resume metoprolol with hold parameters.  Resume aspirin.  Not currently on anticoagulation.    Coronary artery disease of native artery of native heart with angina pectoris (HCC)- (present on admission)  Assessment & Plan  Resume aspirin, rosuvastatin, Zetia  The patient does have exertional shortness of breath and chest pain.  EKG shows, sinus rhythm with a rate of 65, there is no ST elevation, there is flattening of T wave in lead III and aVF.  QTc is 424.  Initial troponin is within normal limits,I will trend  I ordered Stat EKG and troponin for recurrence of chest pain.   I will place on continuous cardiac monitoring.  Plan for stress test in the morning [ordered] as long as there are no significant EKG changes or significant troponin elevation     Primary hypertension- (present on admission)  Assessment & Plan  Resume lisinopril and metoprolol with hold parameters    Acquired hypothyroidism- (present on admission)  Assessment & Plan  Resume levothyroxine    MOHAMUD (generalized anxiety disorder)- (present on admission)  Assessment & Plan  Resume duloxetine and buspirone      VTE prophylaxis: SCDs/TEDs and enoxaparin ppx

## 2024-01-23 NOTE — CARE PLAN
The patient is Stable - Low risk of patient condition declining or worsening    Shift Goals  Clinical Goals: Stress Test in AM  Patient Goals: Comfort    Progress made toward(s) clinical / shift goals:    Problem: Pain - Standard  Goal: Alleviation of pain or a reduction in pain to the patient’s comfort goal  Outcome: Progressing     Problem: Knowledge Deficit - Standard  Goal: Patient and family/care givers will demonstrate understanding of plan of care, disease process/condition, diagnostic tests and medications  Outcome: Progressing       Patient is not progressing towards the following goals:

## 2024-01-25 ENCOUNTER — TELEPHONE (OUTPATIENT)
Dept: CARDIOLOGY | Facility: MEDICAL CENTER | Age: 74
End: 2024-01-25
Payer: COMMERCIAL

## 2024-01-25 ENCOUNTER — OFFICE VISIT (OUTPATIENT)
Dept: CARDIOLOGY | Facility: MEDICAL CENTER | Age: 74
End: 2024-01-25
Attending: INTERNAL MEDICINE
Payer: COMMERCIAL

## 2024-01-25 ENCOUNTER — TELEPHONE (OUTPATIENT)
Dept: HEALTH INFORMATION MANAGEMENT | Facility: OTHER | Age: 74
End: 2024-01-25

## 2024-01-25 VITALS
OXYGEN SATURATION: 97 % | HEIGHT: 67 IN | SYSTOLIC BLOOD PRESSURE: 126 MMHG | DIASTOLIC BLOOD PRESSURE: 62 MMHG | RESPIRATION RATE: 16 BRPM | BODY MASS INDEX: 25.9 KG/M2 | HEART RATE: 71 BPM | WEIGHT: 165 LBS

## 2024-01-25 DIAGNOSIS — I25.118 CORONARY ARTERY DISEASE WITH EXERTIONAL ANGINA (HCC): ICD-10-CM

## 2024-01-25 DIAGNOSIS — Z95.2 S/P AVR: ICD-10-CM

## 2024-01-25 DIAGNOSIS — Z95.1 S/P CABG X 1: ICD-10-CM

## 2024-01-25 DIAGNOSIS — I48.0 PAROXYSMAL ATRIAL FIBRILLATION (HCC): ICD-10-CM

## 2024-01-25 DIAGNOSIS — I20.0 UNSTABLE ANGINA (HCC): ICD-10-CM

## 2024-01-25 DIAGNOSIS — R07.9 CHEST PAIN, UNSPECIFIED TYPE: ICD-10-CM

## 2024-01-25 PROBLEM — I25.10 CORONARY ARTERY DISEASE DUE TO LIPID RICH PLAQUE: Chronic | Status: ACTIVE | Noted: 2023-06-15

## 2024-01-25 PROBLEM — I25.83 CORONARY ARTERY DISEASE DUE TO LIPID RICH PLAQUE: Chronic | Status: ACTIVE | Noted: 2023-06-15

## 2024-01-25 PROCEDURE — 3078F DIAST BP <80 MM HG: CPT | Performed by: INTERNAL MEDICINE

## 2024-01-25 PROCEDURE — 3074F SYST BP LT 130 MM HG: CPT | Performed by: INTERNAL MEDICINE

## 2024-01-25 PROCEDURE — 99215 OFFICE O/P EST HI 40 MIN: CPT | Performed by: INTERNAL MEDICINE

## 2024-01-25 PROCEDURE — 99213 OFFICE O/P EST LOW 20 MIN: CPT | Performed by: INTERNAL MEDICINE

## 2024-01-25 RX ORDER — ISOSORBIDE MONONITRATE 30 MG/1
30 TABLET, EXTENDED RELEASE ORAL DAILY
Qty: 90 TABLET | Refills: 3 | Status: SHIPPED | OUTPATIENT
Start: 2024-01-25 | End: 2024-03-01 | Stop reason: SDUPTHER

## 2024-01-25 RX ORDER — CLOPIDOGREL BISULFATE 75 MG/1
75 TABLET ORAL DAILY
Qty: 90 TABLET | Refills: 3 | Status: SHIPPED | OUTPATIENT
Start: 2024-01-25 | End: 2024-03-04 | Stop reason: SDUPTHER

## 2024-01-25 RX ORDER — NITROGLYCERIN 0.4 MG/1
0.4 TABLET SUBLINGUAL PRN
Qty: 25 TABLET | Refills: 11 | Status: SHIPPED | OUTPATIENT
Start: 2024-01-25 | End: 2024-03-04 | Stop reason: SDUPTHER

## 2024-01-25 ASSESSMENT — FIBROSIS 4 INDEX: FIB4 SCORE: 1.62

## 2024-01-25 NOTE — PROGRESS NOTES
Chief Complaint   Patient presents with    Coronary Artery Disease     F/v Dx:Coronary artery disease involving native coronary artery of native heart without angina pectoris    Atrial Fibrillation     F/v Dx:Paroxysmal atrial fibrillation (HCC)    Hypertension     F/v Dx:Primary hypertension       Subjective     Margoth Hopkins is a 73 y.o. female who presents today with AVR and CABG LIMA to LAD    Went to ER for CP in I[place] started on imdur she is still having chest pains at rest at times and also with activity, unfortunately she has had mild headache with the isosorbide    Past Medical History:   Diagnosis Date    Adverse effect of anesthesia 15 yrs ago ?    Felt pain from endoscopy    GAURI (acute kidney injury) (HCC) 08/24/2023    Anemia     H/O    Anesthesia     No    Anginal syndrome (HCC)     Aortic insufficiency 01/31/2020    Pt. states this is mild and does not have any signs or symptoms.    Aortic valve insufficiency 08/16/2023    aortic valve replacement with Bovine. Follows with [facility]ology    Arthritis 06/16/2023    general body    Atrial fibrillation (HCC) 08/24/2023    Breath shortness 11/02/2021    with exertion or anxiety. 9/22/23-denies at present.    Bronchitis 60+ yrs ago    Cataract 06/16/2023    IOL OU 2018    Dental disorder 06/16/2023    upper/lower front 2 teeth crowned    Gastric ulcer     Goiter     Heart burn 2022    Occasionally after acidic meals.    Heart murmur 1965    High cholesterol 06/16/2023    medicated    Hypertension 06/16/2023    well controlled on meds    Hypothyroidism 06/16/2023    medicated    Pain 06/16/2023    bilat knees, back    Pain 09/22/2023    to bile duct bag    PONV (postoperative nausea and vomiting) 6/21/22    Psychiatric problem 06/16/2023    anxiety, medicated    Pulmonary embolism (HCC) 01/31/2020    Pt. states after joint replacement in 7-2018.    Reactive arthritis (HCC)     Snoring 06/16/2023    Thyroid disease     hasimotos    Thyroid disease  01/31/2020    Thyroid Nodules    Urinary bladder disorder 15 yrs ago    Sling     Past Surgical History:   Procedure Laterality Date    BHAVANA BY LAPAROSCOPY N/A 9/25/2023    Procedure: LAPAROSCOPIC CHOLECYSTECTOMY.;  Surgeon: Mike Valencia M.D.;  Location: SURGERY [facility];  Service: General    MULTIPLE CORONARY ARTERY BYPASS ENDO VEIN HARVEST N/A 08/16/2023    Procedure: CORONARY ARTERY BYPASS GRAFT X1;  Surgeon: Tammy Lawrence M.D.;  Location: SURGERY [facility];  Service: Cardiothoracic    AORTIC VALVE REPLACEMENT N/A 08/16/2023    Procedure: REPLACEMENT, AORTIC VALVE;  Surgeon: Tammy Lawrence M.D.;  Location: SURGERY [facility];  Service: Cardiothoracic    ECHOCARDIOGRAM, TRANSESOPHAGEAL, INTRAOPERATIVE N/A 08/16/2023    Procedure: ECHOCARDIOGRAM, TRANSESOPHAGEAL, INTRAOPERATIVE;  Surgeon: Tammy Lawrence M.D.;  Location: SURGERY [facility];  Service: Cardiothoracic    FUSION  06/16/2023    neck, 2013    OTHER CARDIAC SURGERY  06/16/2023    heart cath 2023    KNEE ARTHROPLASTY TOTAL Left 06/2022    MASS EXCISION GENERAL Right 11/05/2021    Procedure: EXCISION, MASS - LATERAL THIGH;  Surgeon: Cydney Franklin M.D.;  Location: SURGERY SAME DAY [facility];  Service: General    THYROIDECTOMY TOTAL Bilateral 02/05/2020    Procedure: THYROIDECTOMY, TOTAL- COMPLETE;  Surgeon: Cydney Franklin M.D.;  Location: SURGERY SAME DAY [facility];  Service: General    HAMMERTOE CORRECTION Left 2017    Left foot surgery and cleaned up arthritis in foot-metatarsal fusion    CERVICAL DISK AND FUSION ANTERIOR  2016    Cervical spine    APPENDECTOMY      BLADDER SLING FEMALE      CATARACT EXTRACTION WITH IOL Bilateral     CERVICAL FUSION POSTERIOR      COLONOSCOPY      GYN SURGERY      Hysterectomy    KNEE ARTHROPLASTY TOTAL Right     x3    KNEE REPLACEMENT, TOTAL Right     KNEE REVISION TOTAL Right      Family History   Problem Relation Age of Onset    Arthritis Mother     Alzheimer's Disease Mother     Cancer Mother          Cervical Cancer    Heart Attack Father     Cancer Father         Hypertrophy of the heart, CAD    Other Brother         brain anurism    Hypertension Brother     Cancer Maternal Grandmother 66        colono cancer    Colorectal Cancer Maternal Grandmother         Colon Cancer    Heart Disease Brother         Quadruple bypass    Hypertension Brother     Arthritis Brother     Other Brother         Celiac Disease    No Known Problems Maternal Grandfather     No Known Problems Paternal Grandmother     No Known Problems Paternal Grandfather     Hypertension Brother         Spinal stenosis    Arthritis Brother     Lung Disease Brother         Asthma, emphysema    Asthma Brother      Social History     Socioeconomic History    Marital status:      Spouse name: Not on file    Number of children: Not on file    Years of education: Not on file    Highest education level: Bachelor's degree (e.g., BA, AB, BS)   Occupational History    Not on file   Tobacco Use    Smoking status: Never    Smokeless tobacco: Never   Vaping Use    Vaping Use: Never used   Substance and Sexual Activity    Alcohol use: Not Currently     Alcohol/week: 4.2 - 8.4 oz     Types: 7 - 14 Glasses of wine per week    Drug use: Not Currently     Types: Oral     Comment: denies    Sexual activity: Yes     Partners: Male   Other Topics Concern    Not on file   Social History Narrative    Not on file     Social Determinants of Health     Financial Resource Strain: Low Risk  (6/7/2022)    Overall Financial Resource Strain (CARDIA)     Difficulty of Paying Living Expenses: Not hard at all   Food Insecurity: No Food Insecurity (6/7/2022)    Hunger Vital Sign     Worried About Running Out of Food in the Last Year: Never true     Ran Out of Food in the Last Year: Never true   Transportation Needs: No Transportation Needs (6/7/2022)    PRAPARE - Transportation     Lack of Transportation (Medical): No     Lack of Transportation (Non-Medical): No   Physical  Activity: Insufficiently Active (6/7/2022)    Exercise Vital Sign     Days of Exercise per Week: 1 day     Minutes of Exercise per Session: 10 min   Stress: Stress Concern Present (6/7/2022)    [employer] [place] [employer] - Occupational Stress Questionnaire     Feeling of Stress : Very much   Social Connections: Socially Integrated (6/7/2022)    Social Connection and Isolation Panel [NHANES]     Frequency of Communication with Friends and Family: Three times a week     Frequency of Social Gatherings with Friends and Family: Twice a week     Attends Taoist Services: More than 4 times per year     Active Member of Clubs or Organizations: Yes     Attends Club or Organization Meetings: More than 4 times per year     Marital Status:    Intimate Partner Violence: Not on file   Housing Stability: Low Risk  (6/7/2022)    Housing Stability Vital Sign     Unable to Pay for Housing in the Last Year: No     Number of Places Lived in the Last Year: 1     Unstable Housing in the Last Year: No     Allergies   Allergen Reactions    Morphine Vomiting and Nausea    Nsaids Unspecified     History of gastric ulcers - cannot take NSAIDS    Pcn [Penicillins] Hives     Tolerates ancef    Seasonal Runny Nose and Itching     .     Outpatient Encounter Medications as of 1/25/2024   Medication Sig Dispense Refill    isosorbide mononitrate SR (IMDUR) 30 MG TABLET SR 24 HR Take 1 Tablet by mouth every day. 30 Tablet 2    nitroglycerin (NITROSTAT) 0.4 MG SL Tab Place 1 Tablet under the tongue as needed for Chest Pain. 25 Tablet 0    zolpidem (AMBIEN) 5 MG Tab Take 5 mg by mouth at bedtime as needed for Sleep.      lisinopril (PRINIVIL) 30 MG tablet Take 1 Tablet by mouth every day. 90 Tablet 3    aspirin 81 MG EC tablet Take 1 Tablet by mouth every day. (Patient taking differently: Take 81 mg by mouth every evening.) 100 Tablet 3    ezetimibe (ZETIA) 10 MG Tab Take 1 Tablet by mouth every evening. 100 Tablet 3     "pregabalin (LYRICA) 50 MG capsule Take 1 Capsule by mouth 3 times a day for 30 days. 90 Capsule 0    topiramate (TOPAMAX) 25 MG capsule Take 1 Capsule by mouth 2 times a day. 60 Capsule 3    metoprolol tartrate (LOPRESSOR) 25 MG Tab Take 1 Tablet by mouth 2 times a day. 200 Tablet 3    diclofenac sodium (VOLTAREN) 1 % Gel Apply 2 g topically 4 times a day as needed (Apply's on both knees).      vitamin D2, Ergocalciferol, (DRISDOL) 1.25 MG (96770 UT) Cap capsule Take 1 Capsule by mouth every 7 days. On Sat 12 Capsule 3    SYNTHROID 75 MCG Tab Take 1 Tablet by mouth every morning on an empty stomach. 90 Tablet 3    busPIRone (BUSPAR) 10 MG Tab tablet Take 10 mg by mouth every evening. Pt takes once a day, not BID      DULoxetine HCl 40 MG Cap DR Particles Take 40 mg by mouth 2 times a day. 180 Capsule 3    rosuvastatin (CRESTOR) 40 MG tablet Take 1 Tablet by mouth every day. 90 Tablet 3    [DISCONTINUED] naproxen (ALEVE) 220 MG tablet Take 220 mg by mouth 2 times a day as needed. Indications: Pain       No facility-administered encounter medications on file as of 1/25/2024.     ROS           Objective     /62 (BP Location: Left arm, Patient Position: Sitting, BP Cuff Size: Adult)   Pulse 71   Resp 16   Ht 1.702 m (5' 7\")   Wt 74.8 kg (165 lb)   SpO2 97%   BMI 25.84 kg/m²     Physical Exam  Constitutional:       General: She is not in acute distress.     Appearance: She is not diaphoretic.   Eyes:      General: No scleral icterus.  Neck:      Vascular: No JVD.   Cardiovascular:      Rate and Rhythm: Normal rate.      Heart sounds: Normal heart sounds. No murmur heard.     No friction rub. No gallop.   Pulmonary:      Effort: No respiratory distress.      Breath sounds: No wheezing or rales.   Abdominal:      General: Bowel sounds are normal.      Palpations: Abdomen is soft.   Musculoskeletal:      Right lower leg: No edema.      Left lower leg: No edema.   Skin:     Findings: No rash.   Neurological:      " Mental Status: She is alert. Mental status is at baseline.   Psychiatric:         Mood and Affect: Mood normal.       We reviewed the images of her cardiac catheterization she has moderate stenosis of the LAD slow apical perfusion.         Assessment & Plan     1. S/P CABG x 1_8/2023  clopidogrel (PLAVIX) 75 MG Tab    CL-LEFT HEART CATHETERIZATION WITH POSSIBLE INTERVENTION      2. S/P AVR_8/2023        3. Paroxysmal atrial fibrillation (HCC)        4. Unstable angina (HCC)  clopidogrel (PLAVIX) 75 MG Tab    CL-LEFT HEART CATHETERIZATION WITH POSSIBLE INTERVENTION      5. Chest pain, unspecified type  nitroglycerin (NITROSTAT) 0.4 MG SL Tab      6. Coronary artery disease with exertional angina (HCC)  isosorbide mononitrate SR (IMDUR) 30 MG TABLET SR 24 HR          Medical Decision Making: Today's Assessment/Status/Plan:        It was my pleasure to meet with Ms. Hopkins.    We addressed the management of hypertension at today's visit. Blood pressure is well controlled.  We specifically assessed the labs on hypertension treatment    We addressed the management of dyslipidemia and atherosclerosis at today's visit. She is on appropriate statin.    Unfortunately her coronary artery disease is worsening by symptoms your stress test was abnormal suggestive of LAD perfusion although on her angiogram she has more small vessel disease but perhaps usually get good revascularization with CABG, we discussed the available treatment options given she is on maximal medical therapy and has side effects recommend angiogram with possible stenting    We addressed the management of atherosclerotic artery disease.  She is on proper antiplatelet, cholesterol management and beta-blockers as appropriate.  We addressed the potential side effects and laboratory follow-up for these medications.  Added Plavix therapy if she does not require stenting would do Plavix monotherapy      We will hold off on cardiac rehab until we get the results of  her angiogram    I will see Ms. Hopkins back in 1 month time and encouraged her to follow up with us over the phone or electronically using my [employer] as issues arise.    It is my pleasure to participate in the care of Ms. Hopkins.  Please do not hesitate to contact me with questions or concerns.    Pedro Birmingham MD PhD [facility]  Cardiologist [facility]    Please note that this dictation was created using voice recognition software. There may be errors I did not discover before finalizing the note.     Ms. Hopkins's care is highly complex due to high risk diagnosis with either severe exacerbation, progression, or side effects of treatment. We specifically discussed the need for high risk medication requiring at least quarterly testing and/or made a decision on elective/emergent major surgery with identified patient or procedure risk factors specific to Ms. Hopkins. I have personally spent extra time in discussion about these facts with Ms. Hopkins and reviewed and or ordered at least 3 tests, documents or other physician/YUDELKA reports available including labs, imaging and EKGs as appropriate separate from today's encounter.  I have reviewed images with Ms. Hopkins and personally interpreted on this encounter day the referenced EKG, echocardiogram, stress tests, CT scan, cardiac catheterization or other cardiac imaging and my personal interpretation is what is specifically stated in this note.

## 2024-01-25 NOTE — PATIENT INSTRUCTIONS
A - Antiplatelet - Clopidogrel (PLAVIX) reduces your risk of cardiac event by 27% compared to Aspirin 81 mg daily (HOST EXAM study 2021), prasrugrel (EFFIENT), ticagrelor (BRILINTA)) may be used for the first year.  Aspirin 81 mg daily is associated with a 20% less use of heart event and is used the first year after a cardiac event, stent or CABG.  B - Blood Pressure Control - reduces your risk or heart attack and stroke, the goal is <130/80.  C - Cholesterol Management - statins dramatically reduce your risk; for those that are intolerant to statins, there are alternatives.  D - Diet - MEDITERRANEAN DIET or Cardiac rehab diets, Cardiosmart.org.  E - Exercise - at least 2.5 hours of moderate exercise weekly  (typical brisk walking or similar activity).  F - Fats - VASCEPA, or EPA Fish oil (if Vascepa too expensive) for elevated triglycerides (REDUCE IT trial showed reduction from 22% 5 year MACE to 17%).  G - Good Vibes - meditation, exercise, yoga, Pilates, mindfulness, Golden-Chi, stress reduction.  H - Heart Failure - betablockers, sucubatril (ENTRESTO) 16% less risk of dying over 3 years, spironolactone, empagliflozin (JARDIANCE) 17% less risk of dying over 2 years, CRT +/- ICD.  I - Inflammation - Colchicine in the LoDoCo2 study in 2020 reduced the risk of heart attack by 30% in 2.5 year follow up.  R - Rehab - Cardiac Rehab reduces risk of dying by 13-24% and need to go to the hospital by 30% within the first year. Compared to regular Cardiac Rehab, Intensive Cardiac Rehab ([place] [facility]) was shown to reduce the risk of major events 17 to 11% and hospitalization for CHF from 8% to 2%. (Nutrients [id number](65):6118)  S - Smoking - never smoke, if you do smoke ask for help to quit for good. Patients who quit smoking after heart attack have 36% less likely risk of dying.  Resources are 1-800-QUIT-NOW Lateral SV in addition to Chantix, bupropion (Zyban) or nicotine replacement  T - Type II Diabetes  - pills empagliflozin (JARDIANCE) 38% less risk of dying over 4 years, and/or weekly injections: tirzepatide (Mounjaro), semaglutide (Ozempic), liraglutide (Victoza), dulaglutide (Trulicity) ~26% less risk of MACE in 2 years.  V - Vaccines - Annual flu shot and COVID vaccine reduces the risk of serious cardiovascular complications from these deadly infections.  W - Weight - maintain a healthy weight. Semaglutide (WEGOVY) weekly injection was shown to reduce weight by 10% and heart events by 20% for patients with CAD and BMI > 27 in the SELECT trial (6.5% vs 8% in 48 month follow up [facility] 12/2023).      Work on at least 2.5 - 5 hours a week of moderate exercise    Please look into the following diets and incorporate them into your diet  LOW SALT DIET   KEEP YOUR SODIUM EQUAL TO CALORIES AND NO MORE THAN DOUBLE THE CALORIES FOR A LOW SALT DIET    Cardiosmart.org - great resource for American College of Cardiology on heart disease prevention and treatment    FOR TREATMENT OR PREVENTION OF CORONARY ARTERY DISEASE  These three programs are approved by Medicare/Insurers for those with heart disease  [place] - [facility]  Dr. Brandon's Program for Reversing Heart Disease - Ang Powell's Cardiologist vegetarian-based  [facility] Program - [place]-based mind-body Program    Mediterranean Diet has been shown to be a hearty healthy diet.    This is a commonly referenced Program  Dr Hart - Lyn[place] over [place] (book and documentary) - vegetarian-based    FOR TREATMENT OF BLOOD PRESSURE  DASH DIET - American Heart Association for treatment of HYPERTENSION    FOR TREATMENT OF BAD CHOLESTEROL/FATS  REDUCE PROCESSED SUGAR AS MUCH AS POSSIBLE  INCREASE WHOLE GRAINS/VEGETABLES  INCREASE FIBER    Lowering total cholesterol and LDL (bad) cholesterol:  - Eat leaner cuts of meat, or eliminate altogether if possible red meat, and frequently substitute fish or chicken.  - Limit saturated  fat to no more than 7-10% of total calories no more than 10 g per day is recommended. Some sources of saturated fat include butter, animal fats, hydrogenated vegetable fats and oils, many desserts, whole milk dairy products.  - Replaced saturated fats with polyunsaturated fats and monounsaturated fats. Foods high in monounsaturated fat include nuts, canola oil, avocados, and olives.  - Limit trans fat (processed foods) and replaced with fresh fruits and vegetables  - Recommend nonfat dairy products  - Increase substantially the amount of soluble fiber intake (legumes such as beans, fruit, whole grains).  - Consider nutritional supplements: plant sterile spreads such as Benecol, fish oil,  flaxseed oil, omega-3 acids EPA capsules 2000 mg twice a day, or viscous fiber such as Metamucil  - Attain ideal weight and regular exercise (at least 30 minutes per day of moderate exercise)  ASK ABOUT STATIN OR NON STATIN MEDICATION TO REDUCE YOUR LDL AND HEART RISK    Lowering triglycerides:  - Reduce intake of simple sugar: Desserts, candy, pastries, honey, sodas, sugared cereals, yogurt, Gatorade, sports bars, canned fruit, smoothies, fruit juice, coffee drinks  - Reduced intake of refined starches: Refined Pasta, most bread  - Reduce or abstain from alcohol  - Increase omega-3 fatty acids: [place], Trout, Mackerel, [place], [place] tuna and supplements  - Attain ideal weight and regular exercise (at least 30 minutes per day of moderate exercise)  ASK ABOUT PURIFIED OMEGA 3 EPA or FISH OIL TO REDUCE YOUR TG AND HEART RISK    Elevating HDL (good) cholesterol:  - Increase physical activity  - Increase omega-3 fatty acids and supplements as listed above  - Incorporating appropriate amounts of monounsaturated fats such as nuts, olive oil, canola oil, avocados, olives  - Stop smoking  - Attain ideal weight and regular exercise (at least 30 minutes per day of moderate exercise)

## 2024-01-25 NOTE — TELEPHONE ENCOUNTER
Patient is scheduled for a C w/poss on 02- with Dr. Graves. Patient has been instructed to check in at 7:30 am for 9:30 procedure. No meds to hold. Message sent to authorizations. Sent [employer] message to pt with instructions. FYI sent to Justin.

## 2024-01-29 ENCOUNTER — APPOINTMENT (OUTPATIENT)
Dept: ADMISSIONS | Facility: MEDICAL CENTER | Age: 74
End: 2024-01-29
Attending: INTERNAL MEDICINE
Payer: COMMERCIAL

## 2024-02-06 ENCOUNTER — PRE-ADMISSION TESTING (OUTPATIENT)
Dept: ADMISSIONS | Facility: MEDICAL CENTER | Age: 74
End: 2024-02-06
Attending: INTERNAL MEDICINE
Payer: COMMERCIAL

## 2024-02-09 ENCOUNTER — HOSPITAL ENCOUNTER (OUTPATIENT)
Dept: LAB | Facility: MEDICAL CENTER | Age: 74
End: 2024-02-09
Attending: FAMILY MEDICINE
Payer: COMMERCIAL

## 2024-02-09 DIAGNOSIS — G89.29 LUMBOSACRAL PAIN, CHRONIC: ICD-10-CM

## 2024-02-09 DIAGNOSIS — R94.4 DECREASED GFR: ICD-10-CM

## 2024-02-09 DIAGNOSIS — M54.50 LUMBOSACRAL PAIN, CHRONIC: ICD-10-CM

## 2024-02-09 DIAGNOSIS — Z79.891 CHRONIC USE OF OPIATE DRUG FOR THERAPEUTIC PURPOSE: ICD-10-CM

## 2024-02-09 DIAGNOSIS — E89.0 POSTOPERATIVE HYPOTHYROIDISM: ICD-10-CM

## 2024-02-09 LAB
ALBUMIN SERPL BCP-MCNC: 3.9 G/DL (ref 3.2–4.9)
ALBUMIN/GLOB SERPL: 1.4 G/DL
ALP SERPL-CCNC: 179 U/L (ref 30–99)
ALT SERPL-CCNC: 102 U/L (ref 2–50)
ANION GAP SERPL CALC-SCNC: 12 MMOL/L (ref 7–16)
AST SERPL-CCNC: 67 U/L (ref 12–45)
BASOPHILS # BLD AUTO: 1.4 % (ref 0–1.8)
BASOPHILS # BLD: 0.08 K/UL (ref 0–0.12)
BILIRUB SERPL-MCNC: 0.7 MG/DL (ref 0.1–1.5)
BUN SERPL-MCNC: 20 MG/DL (ref 8–22)
CALCIUM ALBUM COR SERPL-MCNC: 9.8 MG/DL (ref 8.5–10.5)
CALCIUM SERPL-MCNC: 9.7 MG/DL (ref 8.5–10.5)
CHLORIDE SERPL-SCNC: 108 MMOL/L (ref 96–112)
CO2 SERPL-SCNC: 21 MMOL/L (ref 20–33)
CREAT SERPL-MCNC: 0.75 MG/DL (ref 0.5–1.4)
EOSINOPHIL # BLD AUTO: 0.47 K/UL (ref 0–0.51)
EOSINOPHIL NFR BLD: 8.3 % (ref 0–6.9)
ERYTHROCYTE [DISTWIDTH] IN BLOOD BY AUTOMATED COUNT: 51.5 FL (ref 35.9–50)
GFR SERPLBLD CREATININE-BSD FMLA CKD-EPI: 83 ML/MIN/1.73 M 2
GLOBULIN SER CALC-MCNC: 2.8 G/DL (ref 1.9–3.5)
GLUCOSE SERPL-MCNC: 85 MG/DL (ref 65–99)
HCT VFR BLD AUTO: 41 % (ref 37–47)
HGB BLD-MCNC: 13.4 G/DL (ref 12–16)
IMM GRANULOCYTES # BLD AUTO: 0.01 K/UL (ref 0–0.11)
IMM GRANULOCYTES NFR BLD AUTO: 0.2 % (ref 0–0.9)
LYMPHOCYTES # BLD AUTO: 1.92 K/UL (ref 1–4.8)
LYMPHOCYTES NFR BLD: 34 % (ref 22–41)
MCH RBC QN AUTO: 28.3 PG (ref 27–33)
MCHC RBC AUTO-ENTMCNC: 32.7 G/DL (ref 32.2–35.5)
MCV RBC AUTO: 86.7 FL (ref 81.4–97.8)
MONOCYTES # BLD AUTO: 0.55 K/UL (ref 0–0.85)
MONOCYTES NFR BLD AUTO: 9.8 % (ref 0–13.4)
NEUTROPHILS # BLD AUTO: 2.61 K/UL (ref 1.82–7.42)
NEUTROPHILS NFR BLD: 46.3 % (ref 44–72)
NRBC # BLD AUTO: 0 K/UL
NRBC BLD-RTO: 0 /100 WBC (ref 0–0.2)
PLATELET # BLD AUTO: 245 K/UL (ref 164–446)
PMV BLD AUTO: 10.7 FL (ref 9–12.9)
POTASSIUM SERPL-SCNC: 4.7 MMOL/L (ref 3.6–5.5)
PROT SERPL-MCNC: 6.7 G/DL (ref 6–8.2)
RBC # BLD AUTO: 4.73 M/UL (ref 4.2–5.4)
SODIUM SERPL-SCNC: 141 MMOL/L (ref 135–145)
T3FREE SERPL-MCNC: 2.57 PG/ML (ref 2–4.4)
T4 FREE SERPL-MCNC: 1.41 NG/DL (ref 0.93–1.7)
TSH SERPL DL<=0.005 MIU/L-ACNC: 0.41 UIU/ML (ref 0.38–5.33)
WBC # BLD AUTO: 5.6 K/UL (ref 4.8–10.8)

## 2024-02-09 PROCEDURE — 84443 ASSAY THYROID STIM HORMONE: CPT

## 2024-02-09 PROCEDURE — 80053 COMPREHEN METABOLIC PANEL: CPT

## 2024-02-09 PROCEDURE — 36415 COLL VENOUS BLD VENIPUNCTURE: CPT

## 2024-02-09 PROCEDURE — 84481 FREE ASSAY (FT-3): CPT

## 2024-02-09 PROCEDURE — 85025 COMPLETE CBC W/AUTO DIFF WBC: CPT

## 2024-02-09 PROCEDURE — G0480 DRUG TEST DEF 1-7 CLASSES: HCPCS

## 2024-02-09 PROCEDURE — 80307 DRUG TEST PRSMV CHEM ANLYZR: CPT

## 2024-02-09 PROCEDURE — 84439 ASSAY OF FREE THYROXINE: CPT

## 2024-02-11 LAB
AMPHET CTO UR CFM-MCNC: NEGATIVE NG/ML
BARBITURATES CTO UR CFM-MCNC: NEGATIVE NG/ML
BENZODIAZ CTO UR CFM-MCNC: NEGATIVE NG/ML
BUPRENORPHINE UR-MCNC: NEGATIVE NG/ML
CANNABINOIDS CTO UR CFM-MCNC: NEGATIVE NG/ML
CARISOPRODOL UR-MCNC: NEGATIVE NG/ML
COCAINE CTO UR CFM-MCNC: NEGATIVE NG/ML
CREAT UR-MCNC: 88.4 MG/DL (ref 20–400)
DRUG SCREEN COMMENT UR-IMP: NORMAL
ETHYL GLUCURONIDE UR QL SCN: NORMAL NG/ML
FENTANYL UR-MCNC: NEGATIVE NG/ML
MEPERIDINE CTO UR SCN-MCNC: NEGATIVE NG/ML
METHADONE CTO UR CFM-MCNC: NEGATIVE NG/ML
OPIATES UR QL SCN: NEGATIVE NG/ML
OXYCDOXYM URSCRN 2005102: NEGATIVE NG/ML
PCP CTO UR CFM-MCNC: NEGATIVE NG/ML
PROPOXYPH CTO UR CFM-MCNC: NEGATIVE NG/ML
TAPENTADOL UR-MCNC: NEGATIVE NG/ML
TRAMADOL CTO UR SCN-MCNC: NEGATIVE NG/ML
ZOLPIDEM UR-MCNC: NEGATIVE NG/ML

## 2024-02-12 ENCOUNTER — PRE-ADMISSION TESTING (OUTPATIENT)
Dept: ADMISSIONS | Facility: MEDICAL CENTER | Age: 74
End: 2024-02-12
Attending: INTERNAL MEDICINE
Payer: COMMERCIAL

## 2024-02-12 DIAGNOSIS — Z01.810 PRE-OPERATIVE CARDIOVASCULAR EXAMINATION: ICD-10-CM

## 2024-02-12 DIAGNOSIS — Z01.812 PRE-OPERATIVE LABORATORY EXAMINATION: ICD-10-CM

## 2024-02-12 LAB
ALBUMIN SERPL BCP-MCNC: 3.8 G/DL (ref 3.2–4.9)
ALBUMIN/GLOB SERPL: 1.5 G/DL
ALP SERPL-CCNC: 139 U/L (ref 30–99)
ALT SERPL-CCNC: 48 U/L (ref 2–50)
ANION GAP SERPL CALC-SCNC: 11 MMOL/L (ref 7–16)
APTT PPP: 27.8 SEC (ref 24.7–36)
AST SERPL-CCNC: 29 U/L (ref 12–45)
BILIRUB SERPL-MCNC: 0.7 MG/DL (ref 0.1–1.5)
BUN SERPL-MCNC: 20 MG/DL (ref 8–22)
CALCIUM ALBUM COR SERPL-MCNC: 9.2 MG/DL (ref 8.5–10.5)
CALCIUM SERPL-MCNC: 9 MG/DL (ref 8.5–10.5)
CHLORIDE SERPL-SCNC: 110 MMOL/L (ref 96–112)
CO2 SERPL-SCNC: 23 MMOL/L (ref 20–33)
CREAT SERPL-MCNC: 0.71 MG/DL (ref 0.5–1.4)
EKG IMPRESSION: NORMAL
ERYTHROCYTE [DISTWIDTH] IN BLOOD BY AUTOMATED COUNT: 52.9 FL (ref 35.9–50)
GFR SERPLBLD CREATININE-BSD FMLA CKD-EPI: 89 ML/MIN/1.73 M 2
GLOBULIN SER CALC-MCNC: 2.5 G/DL (ref 1.9–3.5)
GLUCOSE SERPL-MCNC: 89 MG/DL (ref 65–99)
HCT VFR BLD AUTO: 41.2 % (ref 37–47)
HGB BLD-MCNC: 13.3 G/DL (ref 12–16)
INR PPP: 1 (ref 0.87–1.13)
MCH RBC QN AUTO: 28.6 PG (ref 27–33)
MCHC RBC AUTO-ENTMCNC: 32.3 G/DL (ref 32.2–35.5)
MCV RBC AUTO: 88.6 FL (ref 81.4–97.8)
PLATELET # BLD AUTO: 231 K/UL (ref 164–446)
PMV BLD AUTO: 10.1 FL (ref 9–12.9)
POTASSIUM SERPL-SCNC: 4.7 MMOL/L (ref 3.6–5.5)
PROT SERPL-MCNC: 6.3 G/DL (ref 6–8.2)
PROTHROMBIN TIME: 13.3 SEC (ref 12–14.6)
RBC # BLD AUTO: 4.65 M/UL (ref 4.2–5.4)
SODIUM SERPL-SCNC: 144 MMOL/L (ref 135–145)
WBC # BLD AUTO: 6.5 K/UL (ref 4.8–10.8)

## 2024-02-12 PROCEDURE — 85730 THROMBOPLASTIN TIME PARTIAL: CPT

## 2024-02-12 PROCEDURE — 80053 COMPREHEN METABOLIC PANEL: CPT

## 2024-02-12 PROCEDURE — 85610 PROTHROMBIN TIME: CPT

## 2024-02-12 PROCEDURE — 93010 ELECTROCARDIOGRAM REPORT: CPT | Performed by: INTERNAL MEDICINE

## 2024-02-12 PROCEDURE — 36415 COLL VENOUS BLD VENIPUNCTURE: CPT

## 2024-02-12 PROCEDURE — 93005 ELECTROCARDIOGRAM TRACING: CPT

## 2024-02-12 PROCEDURE — 85027 COMPLETE CBC AUTOMATED: CPT

## 2024-02-13 ENCOUNTER — HOSPITAL ENCOUNTER (OUTPATIENT)
Facility: MEDICAL CENTER | Age: 74
End: 2024-02-13
Attending: INTERNAL MEDICINE | Admitting: INTERNAL MEDICINE
Payer: COMMERCIAL

## 2024-02-13 ENCOUNTER — APPOINTMENT (OUTPATIENT)
Dept: CARDIOLOGY | Facility: MEDICAL CENTER | Age: 74
End: 2024-02-13
Attending: INTERNAL MEDICINE
Payer: COMMERCIAL

## 2024-02-13 VITALS
HEART RATE: 61 BPM | WEIGHT: 166.89 LBS | OXYGEN SATURATION: 97 % | DIASTOLIC BLOOD PRESSURE: 59 MMHG | HEIGHT: 67 IN | BODY MASS INDEX: 26.19 KG/M2 | TEMPERATURE: 98.5 F | RESPIRATION RATE: 18 BRPM | SYSTOLIC BLOOD PRESSURE: 109 MMHG

## 2024-02-13 DIAGNOSIS — R07.9 CHEST PAIN, UNSPECIFIED TYPE: ICD-10-CM

## 2024-02-13 DIAGNOSIS — I20.0 UNSTABLE ANGINA (HCC): ICD-10-CM

## 2024-02-13 DIAGNOSIS — Z95.1 S/P CABG X 1: ICD-10-CM

## 2024-02-13 PROCEDURE — 76000 FLUOROSCOPY <1 HR PHYS/QHP: CPT | Mod: 26,59 | Performed by: INTERNAL MEDICINE

## 2024-02-13 PROCEDURE — 160035 HCHG PACU - 1ST 60 MINS PHASE I

## 2024-02-13 PROCEDURE — 700117 HCHG RX CONTRAST REV CODE 255: Performed by: INTERNAL MEDICINE

## 2024-02-13 PROCEDURE — 160002 HCHG RECOVERY MINUTES (STAT)

## 2024-02-13 PROCEDURE — 99153 MOD SED SAME PHYS/QHP EA: CPT

## 2024-02-13 PROCEDURE — 700101 HCHG RX REV CODE 250

## 2024-02-13 PROCEDURE — 99152 MOD SED SAME PHYS/QHP 5/>YRS: CPT | Performed by: INTERNAL MEDICINE

## 2024-02-13 PROCEDURE — 93459 L HRT ART/GRFT ANGIO: CPT | Mod: 26 | Performed by: INTERNAL MEDICINE

## 2024-02-13 PROCEDURE — 700111 HCHG RX REV CODE 636 W/ 250 OVERRIDE (IP)

## 2024-02-13 PROCEDURE — 160036 HCHG PACU - EA ADDL 30 MINS PHASE I

## 2024-02-13 PROCEDURE — 93567 NJX CAR CTH SPRVLV AORTGRPHY: CPT | Performed by: INTERNAL MEDICINE

## 2024-02-13 RX ORDER — VERAPAMIL HYDROCHLORIDE 2.5 MG/ML
INJECTION, SOLUTION INTRAVENOUS
Status: COMPLETED
Start: 2024-02-13 | End: 2024-02-13

## 2024-02-13 RX ORDER — MIDAZOLAM HYDROCHLORIDE 1 MG/ML
INJECTION INTRAMUSCULAR; INTRAVENOUS
Status: COMPLETED
Start: 2024-02-13 | End: 2024-02-13

## 2024-02-13 RX ORDER — HEPARIN SODIUM 1000 [USP'U]/ML
INJECTION, SOLUTION INTRAVENOUS; SUBCUTANEOUS
Status: COMPLETED
Start: 2024-02-13 | End: 2024-02-13

## 2024-02-13 RX ORDER — SODIUM CHLORIDE 9 MG/ML
1.5 INJECTION, SOLUTION INTRAVENOUS CONTINUOUS
Status: DISCONTINUED | OUTPATIENT
Start: 2024-02-13 | End: 2024-02-13 | Stop reason: HOSPADM

## 2024-02-13 RX ORDER — HEPARIN SODIUM 200 [USP'U]/100ML
INJECTION, SOLUTION INTRAVENOUS
Status: COMPLETED
Start: 2024-02-13 | End: 2024-02-13

## 2024-02-13 RX ORDER — LIDOCAINE HYDROCHLORIDE 20 MG/ML
INJECTION, SOLUTION INFILTRATION; PERINEURAL
Status: COMPLETED
Start: 2024-02-13 | End: 2024-02-13

## 2024-02-13 RX ADMIN — NITROGLYCERIN 10 ML: 20 INJECTION INTRAVENOUS at 11:17

## 2024-02-13 RX ADMIN — VERAPAMIL HYDROCHLORIDE 2.5 MG: 2.5 INJECTION, SOLUTION INTRAVENOUS at 11:17

## 2024-02-13 RX ADMIN — HEPARIN SODIUM: 1000 INJECTION, SOLUTION INTRAVENOUS; SUBCUTANEOUS at 11:17

## 2024-02-13 RX ADMIN — LIDOCAINE HYDROCHLORIDE: 20 INJECTION, SOLUTION INFILTRATION; PERINEURAL at 11:16

## 2024-02-13 RX ADMIN — IOHEXOL 100 ML: 350 INJECTION, SOLUTION INTRAVENOUS at 11:45

## 2024-02-13 RX ADMIN — HEPARIN SODIUM 2000 UNITS: 200 INJECTION, SOLUTION INTRAVENOUS at 11:17

## 2024-02-13 RX ADMIN — MIDAZOLAM HYDROCHLORIDE 2 MG: 1 INJECTION, SOLUTION INTRAMUSCULAR; INTRAVENOUS at 11:34

## 2024-02-13 RX ADMIN — FENTANYL CITRATE 100 MCG: 50 INJECTION, SOLUTION INTRAMUSCULAR; INTRAVENOUS at 11:34

## 2024-02-13 ASSESSMENT — PAIN DESCRIPTION - PAIN TYPE: TYPE: CHRONIC PAIN

## 2024-02-13 ASSESSMENT — FIBROSIS 4 INDEX: FIB4 SCORE: 1.36

## 2024-02-13 NOTE — PROCEDURES
"CARDIAC CATHETERIZATION REPORT    REFERRING: Pedro Birmingham M.D.    PROCEDURE PHYSICIAN: Drew Graves MD, [facility], [facility]  ASSISTANT: None    IMPRESSIONS:  1. Patent native coronary arteries, atretic LIMA due to robust native circulation  2. Normally functioning bioprosthetic AVR  3.  Mild elevation of EDP at 22 mmHg  4.  Normal LV systolic function  5.  No evidence of diaphragmatic paralysis on sniff test    Recommendations:  Usual post cath care    Pre-procedure diagnosis: Chest Pain, suspicion for ischemic heart disease  Post-procedure diagnosis: Non-coronary chest symptoms    Procedure performed  Selective coronary angiography  Left heart catheterization  Bypass graft angiography  Supravalvular aortography  Sniff test    Conscious sedation was supervised by myself and administered by trained personnel using fentanyl and versed between 1116 and 1149. The patient tolerated sedation without complication.     Procedure Description  1. Access: 5/6 Moroccan left distal radial artery Micropuncture technique was utilized following local anesthesia with lidocaine.  A radial cocktail containing verapamil and saline was administered in the radial artery sheath Dynamic ultrasound was utilized to gain access    2. Diagnostic description: The catheter was passed to the central circulation with the aide of J tipped 0.35\" wire. A 6F JR4, 6F JL4, 6F Pigtail, 5.2F Bartorelli, and 6F Ikari 1.5 Right were used to inject the coronary circulation, bypass grafts  and inject the left ventricle during invasive hemodynamic monitoring  and inject the ascending aorta.  As the patient had highly suspicious symptoms I did have her complete a sniff test with forceful negative inspiration while fluoroscopically monitoring.  The diaphragms descended symmetrically during sniff.    3. Closing: At completion of the procedure the relevant equipment was removed from the body and hemostasis achieved by Radial band    Findings   Hemodynamics: "   Aorta: 140/61 mmHg  LVEDP: 23 mmHg  No significant pullback gradient across the aortic valve    Coronary Anatomy   Left Main: Normal   LAD:  30% stenosis in the mid segment   LCx: Co-dominant, Minimal luminal irregularities   RCA: Co-dominant, Minimal luminal irregularities     Left Ventriculography:   LVEF= 60%. Normal wall motion No mitral regurgitation    Supravalvular aortogram:  Normal caliber root. No AI      Technical Factors  1. Complications: None  2. Estimated Blood Loss: <50 cc  3. Specimens: None  4. Contrast Volume: 100 ml  5. Medications: Radial cocktail (Verapamil 2.5 mg, Nitroglycerin 100 mcg) Heparin 5000 Units  6. Radiation (air kerma): 142 mGy

## 2024-02-13 NOTE — DISCHARGE INSTRUCTIONS
Radial Catherization Discharge Instructions  POST ANGIOGRAM  General Care Instructions  Maintain a bandage over the incision site for 24 hours.  It's normal to find a small bruise or dime-sized lump at the insertion site. This should disappear within a few weeks.  Do not apply lotions or powders to the site.  Do not immerse the catheter insertion site in water (bathtub/swimming) for five days. It is ok to shower 24 hours after the procedure.  You may resume your normal diet immediately; on the day of your procedure, drink 6-10 glasses of water to help flush the contrast liquid out of your system.  If the doctor inserted the catheter in through your groin:  Walking short distances on a flat surface is OK. Limit going up/down stairs for the first 2 days.  DO NOT do yard work, drive, squat, lift heavy objects, or play sports for 2 days; or until your health care provider tells you it is OK.  If the doctor inserted the catheter in your arm:  For 3 days, DO NOT lift anything heavier than 10 pounds (approximately a gallon of milk). DO NOT do any heavy pushing, pulling, or twisting.    Medications  If your current medications need to be changed, you will be provided with an updated list of your medications prior to discharge.  If you take warfarin (Coumadin), resume taking your usual dose the evening after the procedure.  DO NOT STOP taking prescribed blood thinning (anti-platelet) medications unless instructed by your cardiologist.  These medications include:  Aspirin, Clopidogrel (Plavix), Ticagrelor (Brilinta), or Prasugrel (Effient)   If you take one of the following anticoagulants, RESUME 24 HOURS after your procedure:  Apixiban (Eliquis), Rivaroxaban (Xarelto), Dabigatran (Pradaxa), Edoxaban (Savaysa)  If you take metformin (Glucophage), RESUME 48 HOURS after your procedure.    When to call your healthcare provider  Call your cardiologist right away at [phone] if you have any of the following:    Problems/Concerns taking any of your prescribed heart medicines.   The insertion site has increasing pain, swelling, redness, bleeding, or drainage.   Your arm or leg below where the insertion site changes color, is cool, or is numb.   You have chest pain or shortness of breath that does not go away with rest.   Your pulse feels irregular -- very slow (less than 60 beats/minute) or very fast (over 100 beats/minute).   You have dizziness, fainting, or you are very tired.   You are coughing up blood or yellow or green mucus.   You have chills or a fever over 101°F (38.3°C).    If there is bleeding at the catheter insertion site, apply pressure for 10 minutes.  If bleeding persists, call 911, and continue to hold pressure until advanced medical support arrives.        Exercising Safely After Percutaneous Coronary Intervention (PCI)  After percutaneous coronary intervention (PCI), which involves angioplasty and often stenting, it's important to focus on your heart health. Exercise can help strengthen your heart. It can also help you feel good and improve your overall health. Talk with your health care provider or cardiac rehab team member about good options for you.  Start slowly. Work up to more vigorous exercise as you get stronger. Aim for at least 150 minutes of exercise each week.  Include aerobic activities. These make the heart beat faster. They work the heart and lungs, and improve the body's ability to use oxygen. Good choices include walking, swimming, and biking .  Always follow your doctor's recommendation for exercise.   You have been referred to cardiac rehabilitation, which is important for your recovery.  You may contact [facility] at [phone] to learn more and schedule a visit.        Lifestyle Management After Percutaneous Coronary Intervention (PCI)  Percutaneous coronary intervention (PCI)  involves angioplasty and often stenting. This procedure can open arteries and  relieve symptoms. But, it doesn't cure coronary artery disease. New blockages can still form. You need to take steps to prevent this by managing risk factors. Doing so will help make your heart and arteries healthier. Your doctor may prescribe cardiac rehabilitation to help with this lifelong process.  Understanding risk factors  Some risk factors for coronary artery disease can be controlled. These include smoking, high blood pressure, cholesterol, diabetes, and obesity. They can be managed with medication, diet, and exercise. Support and counseling can also play a role. The effort will pay off! Managing risk factors can help you be more active, feel better, and reduce the risk of heart attack.    If you smoke, quit!  If your doctor has been urging you to quit smoking, it's for good reasons. Smoking damages your heart, blood vessels, and lungs. The good news is that quitting can halt or even reverse the damage of smoking. To quit now:  Get medical help. Ask your doctor for advice on stop-smoking programs. Also ask about medications or nicotine replacement therapy products that may help you quit smoking.  Get support. Join a support group. Ask for help from your family and friends.  Don't give up. It often takes several tries to succeed in quitting smoking.  Avoid secondhand smoke. Ask family and friends not to smoke around you.

## 2024-02-14 ENCOUNTER — TELEPHONE (OUTPATIENT)
Dept: CARDIOLOGY | Facility: MEDICAL CENTER | Age: 74
End: 2024-02-14
Payer: COMMERCIAL

## 2024-02-14 DIAGNOSIS — Z95.1 S/P CABG X 1: ICD-10-CM

## 2024-02-14 RX ORDER — RANOLAZINE 500 MG/1
500 TABLET, EXTENDED RELEASE ORAL 2 TIMES DAILY
Qty: 60 TABLET | Refills: 11 | Status: SHIPPED | OUTPATIENT
Start: 2024-02-14 | End: 2024-03-04 | Stop reason: SDUPTHER

## 2024-02-14 NOTE — OR NURSING
VENKATESH Vega bedside, radial cath site assessed, TR band removed, site soft, gauze and tegaderm dressing CDI. Patient ok to discharge. Wrist precautions reviewed.    1708: Discharge education reviewed with patient and , opportunity for questions provided, physical copy given. PIV discontinued per policy. Patient dressed self with minimal assistance, states all personal belongings have been returned.     1720: Patient escorted to car via wheelchair, discharged to responsible adult.

## 2024-02-15 ENCOUNTER — PATIENT MESSAGE (OUTPATIENT)
Dept: CARDIOLOGY | Facility: MEDICAL CENTER | Age: 74
End: 2024-02-15
Payer: COMMERCIAL

## 2024-02-15 NOTE — TELEPHONE ENCOUNTER
Let her know I reviewed her pictures and she has good circulation of the heart.  If she is still having chest pains she may want to try ranolazine which helps with the smaller blood vessels but overall I am pleased with the results of the angiogram.  As we discussed I would certainly take Plavix (clopidogrel) instead of aspirin.

## 2024-02-15 NOTE — TELEPHONE ENCOUNTER
Called pt, [phone] , to review CW recommendations.  unable to reach.  Left voicemail to return this call at their earliest convenience.    Upon chart review, pt is active on [employer].  Last login 2/14/2024.  [employer] message sent CW, awaiting pt response.

## 2024-02-17 LAB
ETHYL GLUCURONIDE UR CFM-MCNC: 5211 NG/ML
ETHYL SULFATE UR CFM-MCNC: 1146 NG/ML

## 2024-02-20 ENCOUNTER — OFFICE VISIT (OUTPATIENT)
Dept: MEDICAL GROUP | Facility: MEDICAL CENTER | Age: 74
End: 2024-02-20
Payer: COMMERCIAL

## 2024-02-20 VITALS
HEART RATE: 71 BPM | DIASTOLIC BLOOD PRESSURE: 54 MMHG | SYSTOLIC BLOOD PRESSURE: 106 MMHG | TEMPERATURE: 97.9 F | WEIGHT: 165.79 LBS | HEIGHT: 67 IN | BODY MASS INDEX: 26.02 KG/M2 | OXYGEN SATURATION: 98 %

## 2024-02-20 DIAGNOSIS — I25.10 CORONARY ARTERY DISEASE DUE TO LIPID RICH PLAQUE: Chronic | ICD-10-CM

## 2024-02-20 DIAGNOSIS — E89.0 POSTOPERATIVE HYPOTHYROIDISM: ICD-10-CM

## 2024-02-20 DIAGNOSIS — I25.83 CORONARY ARTERY DISEASE DUE TO LIPID RICH PLAQUE: Chronic | ICD-10-CM

## 2024-02-20 DIAGNOSIS — G89.29 LUMBOSACRAL PAIN, CHRONIC: ICD-10-CM

## 2024-02-20 DIAGNOSIS — R73.03 PREDIABETES: ICD-10-CM

## 2024-02-20 DIAGNOSIS — Z79.891 CHRONIC USE OF OPIATE DRUG FOR THERAPEUTIC PURPOSE: ICD-10-CM

## 2024-02-20 DIAGNOSIS — I10 BENIGN ESSENTIAL HTN: ICD-10-CM

## 2024-02-20 DIAGNOSIS — M54.50 LUMBOSACRAL PAIN, CHRONIC: ICD-10-CM

## 2024-02-20 DIAGNOSIS — K22.2 SCHATZKI'S RING: ICD-10-CM

## 2024-02-20 PROBLEM — E03.9 ACQUIRED HYPOTHYROIDISM: Status: RESOLVED | Noted: 2024-01-22 | Resolved: 2024-02-20

## 2024-02-20 PROBLEM — R07.9 CHEST PAIN: Status: RESOLVED | Noted: 2024-01-22 | Resolved: 2024-02-20

## 2024-02-20 PROBLEM — E06.3 HASHIMOTO'S THYROIDITIS: Status: RESOLVED | Noted: 2020-02-20 | Resolved: 2024-02-20

## 2024-02-20 PROCEDURE — 99214 OFFICE O/P EST MOD 30 MIN: CPT | Performed by: FAMILY MEDICINE

## 2024-02-20 PROCEDURE — 3074F SYST BP LT 130 MM HG: CPT | Performed by: FAMILY MEDICINE

## 2024-02-20 PROCEDURE — 3078F DIAST BP <80 MM HG: CPT | Performed by: FAMILY MEDICINE

## 2024-02-20 RX ORDER — PREGABALIN 50 MG/1
50 CAPSULE ORAL 3 TIMES DAILY
Qty: 90 CAPSULE | Refills: 2 | Status: SHIPPED | OUTPATIENT
Start: 2024-02-20 | End: 2024-03-01 | Stop reason: SDUPTHER

## 2024-02-20 RX ORDER — OMEPRAZOLE 20 MG/1
20 CAPSULE, DELAYED RELEASE ORAL EVERY MORNING
Qty: 90 CAPSULE | Refills: 0 | Status: SHIPPED | OUTPATIENT
Start: 2024-02-20 | End: 2024-03-01 | Stop reason: SDUPTHER

## 2024-02-20 ASSESSMENT — FIBROSIS 4 INDEX: FIB4 SCORE: 1.36

## 2024-02-21 DIAGNOSIS — I25.84 CORONARY ARTERY CALCIFICATION: ICD-10-CM

## 2024-02-21 DIAGNOSIS — I25.10 CORONARY ARTERY CALCIFICATION: ICD-10-CM

## 2024-02-21 DIAGNOSIS — E78.5 DYSLIPIDEMIA: ICD-10-CM

## 2024-02-21 NOTE — PROGRESS NOTES
Subjective:   CC: chronic lower back pain follow up     HPI:     Margoth Hopkins is a 73 y.o. female, established patient of the clinic.     Patient has been suffering chronic low back pain with right-sided sciatica.  Her symptoms are controlled with duloxetine 40 mg twice daily, Lyrica 50 mg 3 times daily.  Patient is on controlled substance contract with the clinic.  She has been following up with me every 3 months for medication refills.  She tolerates Lyrica well, no side effect reported.  Patient has chronic postoperative hypothyroidism, currently controlled with levothyroxine 75 mcg daily.  Patient also has chronic prediabetes, hypertension.  Both conditions are controlled with pharmacotherapy as well.  Patient has chronic CAD, status post CABG x 1 in 2023.  She also has AVR in 8/2023  She complains of recent development of intermittent chest pain.  She underwent cardiac cath on 2/13/2024 which was negative for coronary chest symptoms.  Patient is currently taking Ranexa 500 mg twice daily, Imdur 30 mg daily.  She also takes Nitrostat sublingual as needed for acute chest pain.  Unfortunately, her symptoms fail to improve despite treatment.  She continues to take Zetia, metoprolol, Crestor, Plavix as directed.   Per chart review, patient has chronic Schatzki's ring.  She had esophageal dilation x 6 in the past.  Last episode was in 1/2020.  Patient discontinued PPI a few months ago due to improvement of symptoms.      2/13/2024:     CARDIAC CATHETERIZATION REPORT     REFERRING: Pedro Birmingham M.D.     PROCEDURE PHYSICIAN: Drew Graves MD, [facility]  ASSISTANT: None     IMPRESSIONS:  1. Patent native coronary arteries, atretic LIMA due to robust native circulation  2. Normally functioning bioprosthetic AVR  3.  Mild elevation of EDP at 22 mmHg  4.  Normal LV systolic function  5.  No evidence of diaphragmatic paralysis on sniff test            Current medicines (including changes today)  Current  Outpatient Medications   Medication Sig Dispense Refill    omeprazole (PRILOSEC) 20 MG delayed-release capsule Take 1 Capsule by mouth every morning. 90 Capsule 0    pregabalin (LYRICA) 50 MG capsule Take 1 Capsule by mouth 3 times a day for 90 days. 90 Capsule 2    ranolazine (RANEXA) 500 MG TABLET SR 12 HR Take 1 Tablet by mouth 2 times a day. 60 Tablet 11    clopidogrel (PLAVIX) 75 MG Tab Take 1 Tablet by mouth every day. 90 Tablet 3    nitroglycerin (NITROSTAT) 0.4 MG SL Tab Place 1 Tablet under the tongue as needed for Chest Pain. 25 Tablet 11    isosorbide mononitrate SR (IMDUR) 30 MG TABLET SR 24 HR Take 1 Tablet by mouth every day. 90 Tablet 3    zolpidem (AMBIEN) 5 MG Tab Take 5 mg by mouth at bedtime as needed for Sleep.      lisinopril (PRINIVIL) 30 MG tablet Take 1 Tablet by mouth every day. 90 Tablet 3    ezetimibe (ZETIA) 10 MG Tab Take 1 Tablet by mouth every evening. 100 Tablet 3    topiramate (TOPAMAX) 25 MG capsule Take 1 Capsule by mouth 2 times a day. 60 Capsule 3    metoprolol tartrate (LOPRESSOR) 25 MG Tab Take 1 Tablet by mouth 2 times a day. 200 Tablet 3    diclofenac sodium (VOLTAREN) 1 % Gel Apply 2 g topically 4 times a day as needed (Apply's on both knees).      vitamin D2, Ergocalciferol, (DRISDOL) 1.25 MG (29946 UT) Cap capsule Take 1 Capsule by mouth every 7 days. On Sat 12 Capsule 3    SYNTHROID 75 MCG Tab Take 1 Tablet by mouth every morning on an empty stomach. 90 Tablet 3    busPIRone (BUSPAR) 10 MG Tab tablet Take 10 mg by mouth every evening. Pt takes once a day, not BID      DULoxetine HCl 40 MG Cap DR Particles Take 40 mg by mouth 2 times a day. 180 Capsule 3    rosuvastatin (CRESTOR) 40 MG tablet Take 1 Tablet by mouth every day. 90 Tablet 3     No current facility-administered medications for this visit.     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), Aortic insufficiency (01/31/2020), Aortic valve insufficiency (08/16/2023), Arthritis (02/06/2024), Atrial fibrillation (HCC) (02/06/2024), Breath shortness (02/06/2024), Bronchitis (60+ yrs ago), Cataract (06/16/2023), Dental disorder (02/06/2024), Gastric ulcer, Goiter, Heart burn (2022), Heart murmur (1965), High cholesterol (02/06/2024), Hypertension (02/06/2024), Hypothyroidism (02/06/2024), Pain (06/16/2023), PONV (postoperative nausea and vomiting) (02/06/2024), Psychiatric problem (02/06/2024), Pulmonary embolism (HCC) (01/31/2020), Reactive arthritis (HCC), Snoring (02/06/2024), Thyroid disease, Thyroid disease (01/31/2020), and Urinary bladder disorder (15 yrs ago).    She has no past medical history of Acute nasopharyngitis, Asthma, Awareness under anesthesia, Bowel habit changes, Cancer (HCC), Carcinoma in situ of respiratory system, Congestive heart failure (HCC), Continuous ambulatory peritoneal dialysis status (HCC), Delayed emergence from general anesthesia, Diabetes (HCC), Dialysis patient (HCC), Emphysema of lung (HCC), Glaucoma, Gynecological disorder, Hard to intubate, Hemorrhagic disorder (HCC), Hepatitis A, Hepatitis B, Hepatitis C, Hiatus hernia syndrome, Indigestion, Infectious disease, Jaundice, Malignant hyperthermia, Myocardial infarct (HCC), Pacemaker, Pneumonia, Pregnant, Pseudocholinesterase deficiency, Rheumatic fever, Seizure (HCC), Sleep apnea, Spinal headache, Stroke (HCC), Tuberculosis, or Urinary incontinence.    I reviewed patient's problem list, allergies, medications, family hx, social hx with patient and update EPIC.        Objective:     /54 (BP Location: Right arm, Patient Position: Sitting, BP Cuff Size: Adult)   Pulse 71   Temp 36.6 °C (97.9 °F) (Temporal)   Ht 1.702 m (5' 7\")   Wt 75.2 kg (165 lb 12.6 oz)   SpO2 98%  Body mass index is 25.97 kg/m².    Physical Exam:  Constitutional: awake, alert, in no distress.  Skin: Warm, dry, " good turgor, no rashes, bruises, ulcers in visible areas.  Eye: conjunctiva clear, lids neg for edema or lesions.  Neck: Trachea midline, no masses, no thyromegaly. No cervical or supraclavicular lymphadenopathy  Respiratory: Unlabored respiratory effort, lungs clear to auscultation, no wheezes, no rales.  Cardiovascular: Normal S1, S2, no murmur, no pedal edema.  Psych: Oriented x3, affect and mood wnl, intact judgement and insight.       Assessment and Plan:   The following treatment plan was discussed    1. Chronic use of opiate drug for therapeutic purpose  2. Lumbosacral pain, chronic, with right sciatica_[facility]  Chronic, stable, controlled with duloxetine 40 mg twice daily and Lyrica 50 mg 3 times daily  - pregabalin (LYRICA) 50 MG capsule; Take 1 Capsule by mouth 3 times a day for 90 days.  Dispense: 90 Capsule; Refill: 2  -Continue duloxetine 40 mg twice daily  - Risks, benefits, side effects, as well as potential health complications associated with Lyrica were previously discussed with patient. Appropriate counseling provided.    -Follow-up in 3 months for medication refill    3. Postoperative hypothyroidism_s/p thyroidectomy_Dr. Mullen  Chronic, controlled with levothyroxine 75 mcg daily, no s/e reported, will continue.      4. Primary hypertension  Chronic, controlled with metoprolol 25 mg twice daily, lisinopril 30 mg daily, no s/e reported, will continue.      5. Prediabetes  - Dietary/lifestyle modification and weight loss      6. Coronary artery disease due to lipid rich plaque  7. Schatzki's ring_DHA  Chronic, s/p AVR and CABG x1 in 8/2023.   She recently underwent repeat cardiac cath due to acute chest pain which was negative. She is on Imdur 30 mg daily, metoprolol 25 mg twice daily, Ranexa 500 mg twice daily, Nitrostat 0.4 mg daily without relief of chest pain.  Per chart review, patient does have history of Schatzki's ring status post dilation x6.  Her last dilation was in 2020.   Persistent chest pain might have GI etiology.  Patient is not on PPI currently.  - restart omeprazole (PRILOSEC) 20 MG delayed-release capsule; Take 1 Capsule by mouth every morning.  Dispense: 90 Capsule; Refill: 0   -Schedule appointment to follow-up with GI for reassessment of Schatzki's ring.   - continue Imdur, Ranexa and follow-up with cardiology as directed.  - follow up in 3 months.   - ER precautions discussed.     Charlee Walker M.D.      Followup: Return in about 3 months (around 5/20/2024) for controlled substance refill.    Please note that this dictation was created using voice recognition software. I have made every reasonable attempt to correct obvious errors, but I expect that there are errors of grammar and possibly content that I did not discover before finalizing the note.

## 2024-02-22 ENCOUNTER — OFFICE VISIT (OUTPATIENT)
Dept: CARDIOLOGY | Facility: MEDICAL CENTER | Age: 74
End: 2024-02-22
Attending: NURSE PRACTITIONER
Payer: COMMERCIAL

## 2024-02-22 VITALS
WEIGHT: 166 LBS | RESPIRATION RATE: 16 BRPM | DIASTOLIC BLOOD PRESSURE: 60 MMHG | SYSTOLIC BLOOD PRESSURE: 112 MMHG | HEIGHT: 67 IN | HEART RATE: 72 BPM | OXYGEN SATURATION: 99 % | BODY MASS INDEX: 26.06 KG/M2

## 2024-02-22 DIAGNOSIS — I10 BENIGN ESSENTIAL HTN: ICD-10-CM

## 2024-02-22 DIAGNOSIS — Z95.1 S/P CABG X 1: ICD-10-CM

## 2024-02-22 DIAGNOSIS — Z95.2 S/P AVR: ICD-10-CM

## 2024-02-22 DIAGNOSIS — R73.03 PREDIABETES: ICD-10-CM

## 2024-02-22 DIAGNOSIS — I25.83 CORONARY ARTERY DISEASE DUE TO LIPID RICH PLAQUE: Chronic | ICD-10-CM

## 2024-02-22 DIAGNOSIS — I25.10 CORONARY ARTERY DISEASE DUE TO LIPID RICH PLAQUE: Chronic | ICD-10-CM

## 2024-02-22 DIAGNOSIS — I48.0 PAROXYSMAL ATRIAL FIBRILLATION (HCC): ICD-10-CM

## 2024-02-22 DIAGNOSIS — E78.00 PURE HYPERCHOLESTEROLEMIA: ICD-10-CM

## 2024-02-22 PROCEDURE — 99213 OFFICE O/P EST LOW 20 MIN: CPT | Performed by: NURSE PRACTITIONER

## 2024-02-22 PROCEDURE — 3078F DIAST BP <80 MM HG: CPT | Performed by: NURSE PRACTITIONER

## 2024-02-22 PROCEDURE — 3074F SYST BP LT 130 MM HG: CPT | Performed by: NURSE PRACTITIONER

## 2024-02-22 PROCEDURE — 99214 OFFICE O/P EST MOD 30 MIN: CPT | Performed by: NURSE PRACTITIONER

## 2024-02-22 RX ORDER — PROPRANOLOL HYDROCHLORIDE 40 MG/1
TABLET ORAL
COMMUNITY
Start: 2024-01-14

## 2024-02-22 ASSESSMENT — ENCOUNTER SYMPTOMS
ABDOMINAL PAIN: 0
PND: 0
FEVER: 0
CLAUDICATION: 0
SHORTNESS OF BREATH: 0
DIZZINESS: 0
PALPITATIONS: 0
COUGH: 0
MYALGIAS: 0
ORTHOPNEA: 0

## 2024-02-22 ASSESSMENT — FIBROSIS 4 INDEX: FIB4 SCORE: 1.36

## 2024-02-22 NOTE — PROGRESS NOTES
Chief Complaint   Patient presents with    Coronary Artery Disease     F/V Dx: Coronary artery disease involving native coronary artery of native heart without angina pectoris    Atrial Fibrillation     F/V Dx: Paroxysmal atrial fibrillation (HCC)    Hypertension     Subjective     Margoth Hopkins is a 73 y.o. female who presents today for hospital follow up S/P repeat angiogram.    Hx of mod-severe AI now S/P SAVR with aortic root enlargement, CAD with CABG X1, HLD, HTN, post-operative afib RVR on eliquis, and now cholecystis with bile drains in place.    She presents today with her spouse.    She has had no afib episodes but has a heaviness in her chest at times at rest or with exertion. Worse with inspiration. Can lasts for a few hours. Started a couple months ago. No radiation of symptoms. She is pending GI work up for her Shatski's ring, started on PPI with no great improvement in symptoms.    Repeat cath showed patent graft and native coronaries. Anti-anginal's not quite improving symptoms.    She has no shortness of breath, edema, dizziness/lightheadedness, or palpitations.    Past Medical History:   Diagnosis Date    Adverse effect of anesthesia 15 yrs ago ?    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 states this is mild and does not have any signs or symptoms.    Aortic valve insufficiency 08/16/2023    aortic valve replacement with Bovine. Follows with [facility] cardiology    Arthritis 02/06/2024    general body    Atrial fibrillation (HCC) 02/06/2024    medicated    Breath shortness 02/06/2024    with exertion or anxiety. 9/22/23-denies at present.    Bronchitis 60+ yrs ago    Cataract 06/16/2023    IOL OU 2018    Dental disorder 02/06/2024    upper/lower front 2 teeth crowned    Gastric ulcer     Goiter      Heart burn 2022    Occasionally after acidic meals.    Heart murmur 1965    High cholesterol 02/06/2024    medicated    Hypertension 02/06/2024    medicated    Hypothyroidism 02/06/2024    medicated    Pain 06/16/2023    bilat knees, back    PONV (postoperative nausea and vomiting) 02/06/2024    history of motion sickness    Psychiatric problem 02/06/2024    anxiety, medicated    Pulmonary embolism (HCC) 01/31/2020    Pt. states after joint replacement in 7-2018.    Reactive arthritis (HCC)     Snoring 02/06/2024    not an issues at present    Thyroid disease     hasimotos    Thyroid disease 01/31/2020    Thyroid Nodules    Urinary bladder disorder 15 yrs ago    Sling     Past Surgical History:   Procedure Laterality Date    BHAVANA BY LAPAROSCOPY N/A 9/25/2023    Procedure: LAPAROSCOPIC CHOLECYSTECTOMY.;  Surgeon: Mike Valencia M.D.;  Location: SURGERY [facility];  Service: General    MULTIPLE CORONARY ARTERY BYPASS ENDO VEIN HARVEST N/A 08/16/2023    Procedure: CORONARY ARTERY BYPASS GRAFT X1;  Surgeon: Tammy Lawrence M.D.;  Location: SURGERY [facility];  Service: Cardiothoracic    AORTIC VALVE REPLACEMENT N/A 08/16/2023    Procedure: REPLACEMENT, AORTIC VALVE;  Surgeon: Tammy Lawrence M.D.;  Location: SURGERY [facility];  Service: Cardiothoracic    ECHOCARDIOGRAM, TRANSESOPHAGEAL, INTRAOPERATIVE N/A 08/16/2023    Procedure: ECHOCARDIOGRAM, TRANSESOPHAGEAL, INTRAOPERATIVE;  Surgeon: Tammy Lawrence M.D.;  Location: SURGERY [facility];  Service: Cardiothoracic    FUSION  06/16/2023    neck, 2013    OTHER CARDIAC SURGERY  06/16/2023    heart cath 2023    KNEE ARTHROPLASTY TOTAL Left 06/2022    MASS EXCISION GENERAL Right 11/05/2021    Procedure: EXCISION, MASS - LATERAL THIGH;  Surgeon: Cydney Franklin M.D.;  Location: SURGERY SAME DAY [facility];  Service: General    THYROIDECTOMY TOTAL Bilateral 02/05/2020    Procedure: THYROIDECTOMY, TOTAL- COMPLETE;  Surgeon: Cydney Franklin M.D.;  Location: SURGERY SAME  DAY [facility];  Service: General    HAMMERTOE CORRECTION Left 2017    Left foot surgery and cleaned up arthritis in foot-metatarsal fusion    CERVICAL DISK AND FUSION ANTERIOR  2016    Cervical spine    APPENDECTOMY      BLADDER SLING FEMALE      CATARACT EXTRACTION WITH IOL Bilateral     CERVICAL FUSION POSTERIOR      COLONOSCOPY      GYN SURGERY      Hysterectomy    KNEE ARTHROPLASTY TOTAL Right     x3    KNEE REPLACEMENT, TOTAL Right     KNEE REVISION TOTAL Right      Family History   Problem Relation Age of Onset    Arthritis Mother     Alzheimer's Disease Mother     Cancer Mother         Cervical Cancer    Heart Attack Father     Cancer Father         Hypertrophy of the heart, CAD    Other Brother         brain anurism    Hypertension Brother     Cancer Maternal Grandmother 66        colono cancer    Colorectal Cancer Maternal Grandmother         Colon Cancer    Heart Disease Brother         Quadruple bypass    Hypertension Brother     Arthritis Brother     Other Brother         Celiac Disease    No Known Problems Maternal Grandfather     No Known Problems Paternal Grandmother     No Known Problems Paternal Grandfather     Hypertension Brother         Spinal stenosis    Arthritis Brother     Lung Disease Brother         Asthma, emphysema    Asthma Brother      Social History     Socioeconomic History    Marital status:      Spouse name: Not on file    Number of children: Not on file    Years of education: Not on file    Highest education level: Bachelor's degree (e.g., BA, AB, BS)   Occupational History    Not on file   Tobacco Use    Smoking status: Never    Smokeless tobacco: Never   Vaping Use    Vaping Use: Never used   Substance and Sexual Activity    Alcohol use: Not Currently     Alcohol/week: 2.4 oz     Types: 4 Glasses of wine per week     Comment: 4 times a week    Drug use: Not Currently    Sexual activity: Yes     Partners: Male   Other Topics Concern    Not on file   Social History  Narrative    Not on file     Social Determinants of Health     Financial Resource Strain: Low Risk  (6/7/2022)    Overall Financial Resource Strain (CARDIA)     Difficulty of Paying Living Expenses: Not hard at all   Food Insecurity: No Food Insecurity (6/7/2022)    Hunger Vital Sign     Worried About Running Out of Food in the Last Year: Never true     Ran Out of Food in the Last Year: Never true   Transportation Needs: No Transportation Needs (6/7/2022)    PRAPARE - Transportation     Lack of Transportation (Medical): No     Lack of Transportation (Non-Medical): No   Physical Activity: Insufficiently Active (6/7/2022)    Exercise Vital Sign     Days of Exercise per Week: 1 day     Minutes of Exercise per Session: 10 min   Stress: Stress Concern Present (6/7/2022)    [employer] - Occupational Stress Questionnaire     Feeling of Stress : Very much   Social Connections: Socially Integrated (6/7/2022)    Social Connection and Isolation Panel [NHANES]     Frequency of Communication with Friends and Family: Three times a week     Frequency of Social Gatherings with Friends and Family: Twice a week     Attends Shinto Services: More than 4 times per year     Active Member of Clubs or Organizations: Yes     Attends Club or Organization Meetings: More than 4 times per year     Marital Status:    Intimate Partner Violence: Not on file   Housing Stability: Low Risk  (6/7/2022)    Housing Stability Vital Sign     Unable to Pay for Housing in the Last Year: No     Number of Places Lived in the Last Year: 1     Unstable Housing in the Last Year: No     Allergies   Allergen Reactions    Morphine Vomiting and Nausea    Nsaids Unspecified     History of gastric ulcers - cannot take NSAIDS    Pcn [Penicillins] Hives     Tolerates ancef    Seasonal Runny Nose and Itching     .     Outpatient Encounter Medications as of 2/22/2024   Medication Sig Dispense Refill    propranolol (INDERAL) 40 MG  Tab       omeprazole (PRILOSEC) 20 MG delayed-release capsule Take 1 Capsule by mouth every morning. 90 Capsule 0    pregabalin (LYRICA) 50 MG capsule Take 1 Capsule by mouth 3 times a day for 90 days. 90 Capsule 2    ranolazine (RANEXA) 500 MG TABLET SR 12 HR Take 1 Tablet by mouth 2 times a day. 60 Tablet 11    clopidogrel (PLAVIX) 75 MG Tab Take 1 Tablet by mouth every day. 90 Tablet 3    nitroglycerin (NITROSTAT) 0.4 MG SL Tab Place 1 Tablet under the tongue as needed for Chest Pain. 25 Tablet 11    isosorbide mononitrate SR (IMDUR) 30 MG TABLET SR 24 HR Take 1 Tablet by mouth every day. 90 Tablet 3    zolpidem (AMBIEN) 5 MG Tab Take 5 mg by mouth at bedtime as needed for Sleep.      lisinopril (PRINIVIL) 30 MG tablet Take 1 Tablet by mouth every day. 90 Tablet 3    ezetimibe (ZETIA) 10 MG Tab Take 1 Tablet by mouth every evening. 100 Tablet 3    topiramate (TOPAMAX) 25 MG capsule Take 1 Capsule by mouth 2 times a day. 60 Capsule 3    metoprolol tartrate (LOPRESSOR) 25 MG Tab Take 1 Tablet by mouth 2 times a day. 200 Tablet 3    diclofenac sodium (VOLTAREN) 1 % Gel Apply 2 g topically 4 times a day as needed (Apply's on both knees).      vitamin D2, Ergocalciferol, (DRISDOL) 1.25 MG (26441 UT) Cap capsule Take 1 Capsule by mouth every 7 days. On Sat 12 Capsule 3    SYNTHROID 75 MCG Tab Take 1 Tablet by mouth every morning on an empty stomach. 90 Tablet 3    busPIRone (BUSPAR) 10 MG Tab tablet Take 10 mg by mouth every evening. Pt takes once a day, not BID      DULoxetine HCl 40 MG Cap DR Particles Take 40 mg by mouth 2 times a day. 180 Capsule 3    rosuvastatin (CRESTOR) 40 MG tablet Take 1 Tablet by mouth every day. 90 Tablet 3     No facility-administered encounter medications on file as of 2/22/2024.     Review of Systems   Constitutional:  Negative for fever and malaise/fatigue.        Very fatigued   Respiratory:  Negative for cough and shortness of breath.    Cardiovascular:  Positive for chest pain.  "Negative for palpitations, orthopnea, claudication, leg swelling and PND.        Intermittent chest pain at rest and exertion   Gastrointestinal:  Negative for abdominal pain.        Bile drains in place with bloating   Musculoskeletal:  Negative for myalgias.   Neurological:  Negative for dizziness.              Objective     /60 (BP Location: Left arm, Patient Position: Sitting, BP Cuff Size: Adult)   Pulse 72   Resp 16   Ht 1.702 m (5' 7\")   Wt 75.3 kg (166 lb)   SpO2 99%   BMI 26.00 kg/m²     Physical Exam  Vitals and nursing note reviewed.   Constitutional:       Appearance: Normal appearance. She is well-developed and normal weight.   HENT:      Head: Normocephalic and atraumatic.   Neck:      Vascular: No JVD.   Cardiovascular:      Rate and Rhythm: Normal rate and regular rhythm.      Pulses: Normal pulses.      Heart sounds: Normal heart sounds.   Pulmonary:      Effort: Pulmonary effort is normal.      Breath sounds: Normal breath sounds.   Abdominal:      Comments: Bile drains in place   Musculoskeletal:         General: Normal range of motion.   Skin:     General: Skin is warm and dry.      Capillary Refill: Capillary refill takes less than 2 seconds.   Neurological:      General: No focal deficit present.      Mental Status: She is alert and oriented to person, place, and time. Mental status is at baseline.   Psychiatric:         Mood and Affect: Mood normal.         Behavior: Behavior normal.         Thought Content: Thought content normal.         Judgment: Judgment normal.                Assessment & Plan     1. S/P AVR_8/2023        2. S/P CABG x 1_8/2023        3. Pure hypercholesterolemia        4. Primary hypertension        5. Prediabetes        6. Paroxysmal atrial fibrillation (HCC)        7. Coronary artery disease due to lipid rich plaque          Medical Decision Making: Today's Assessment/Status/Plan:       1. HTN  -good control  -cont lisinopril 30 mg QD, metoprolol 25 mg " twice a day, imdur 30 (OK to increase to 60 mg), and ranexa  -BP goal <130/80    2. S/P SAVR with aortic root enlargement  -doing well post-op   -SBE prophylaxis noted  -cont aspirin 81 mg  -labs and echo look good  -cardiac rehab OK once GI related chest pain resolved from GI perspective, OK for education classes now    3. HLD with CAD with CABG X1 (LIMA to LAD)   -cont rosuvastatin 40 mg QPM and zetia 10 mg QPM  -LDL goal <70 with CAD, repeat lipid panel annually  -follow  -chest pain most likely non-cardiac with atypical symptoms, follow up with GI for esophageal spasm/narrowing with prior Schatzki's ring     Patient is to follow up with Mary RIDDLE in 6 months with review of GI follow up. Trial increase in imdur to 60 mg to see if symptoms improve, if not-go back to 30 mg daily dosing.

## 2024-02-22 NOTE — PATIENT INSTRUCTIONS
TRIAL increase in isosorbide to 60 mg (2-30 mg tablets) for 3 days to see if your symptoms improve. This could help with GI chest pain as well.    Follow up with GI as planned.    No other imaging and testing at this time.

## 2024-02-25 RX ORDER — ROSUVASTATIN CALCIUM 40 MG/1
40 TABLET, COATED ORAL DAILY
Qty: 90 TABLET | Refills: 3 | Status: SHIPPED | OUTPATIENT
Start: 2024-02-25

## 2024-03-01 DIAGNOSIS — M54.50 LUMBOSACRAL PAIN, CHRONIC: ICD-10-CM

## 2024-03-01 DIAGNOSIS — G89.29 LUMBOSACRAL PAIN, CHRONIC: ICD-10-CM

## 2024-03-01 DIAGNOSIS — F41.1 GENERALIZED ANXIETY DISORDER: ICD-10-CM

## 2024-03-01 DIAGNOSIS — F51.01 PRIMARY INSOMNIA: ICD-10-CM

## 2024-03-01 DIAGNOSIS — K22.2 SCHATZKI'S RING: ICD-10-CM

## 2024-03-01 DIAGNOSIS — G25.0 ESSENTIAL TREMOR: ICD-10-CM

## 2024-03-01 DIAGNOSIS — I25.118 CORONARY ARTERY DISEASE WITH EXERTIONAL ANGINA (HCC): ICD-10-CM

## 2024-03-01 RX ORDER — TOPIRAMATE SPINKLE 25 MG/1
25 CAPSULE ORAL 2 TIMES DAILY
Qty: 180 CAPSULE | Refills: 3 | Status: SHIPPED | OUTPATIENT
Start: 2024-03-01 | End: 2024-03-29

## 2024-03-01 RX ORDER — ISOSORBIDE MONONITRATE 60 MG/1
60 TABLET, EXTENDED RELEASE ORAL DAILY
Qty: 100 TABLET | Refills: 3 | Status: ON HOLD | OUTPATIENT
Start: 2024-03-01

## 2024-03-01 RX ORDER — OMEPRAZOLE 20 MG/1
20 CAPSULE, DELAYED RELEASE ORAL EVERY MORNING
Qty: 90 CAPSULE | Refills: 3 | Status: ON HOLD | OUTPATIENT
Start: 2024-03-01

## 2024-03-01 RX ORDER — BUSPIRONE HYDROCHLORIDE 10 MG/1
10 TABLET ORAL
Qty: 90 TABLET | Refills: 3 | Status: ON HOLD | OUTPATIENT
Start: 2024-03-01

## 2024-03-01 RX ORDER — ZOLPIDEM TARTRATE 5 MG/1
5 TABLET ORAL NIGHTLY PRN
Qty: 30 TABLET | Refills: 0 | Status: ON HOLD | OUTPATIENT
Start: 2024-03-01 | End: 2024-03-31

## 2024-03-01 RX ORDER — PREGABALIN 50 MG/1
50 CAPSULE ORAL 3 TIMES DAILY
Qty: 90 CAPSULE | Refills: 2 | Status: ON HOLD | OUTPATIENT
Start: 2024-03-20 | End: 2024-06-18

## 2024-03-04 DIAGNOSIS — I20.0 UNSTABLE ANGINA (HCC): ICD-10-CM

## 2024-03-04 DIAGNOSIS — R07.9 CHEST PAIN, UNSPECIFIED TYPE: ICD-10-CM

## 2024-03-04 DIAGNOSIS — Z95.1 S/P CABG X 1: ICD-10-CM

## 2024-03-04 NOTE — TELEPHONE ENCOUNTER
Is the patient due for a refill? Yes    Was the patient seen the past year? Yes    Date of last office visit: 02/22/2024    Does the patient have an upcoming appointment?  Yes   If yes, When? 3/14/2024    Provider to refill:SC    Does the patients insurance require a 100 day supply?  No

## 2024-03-05 RX ORDER — RANOLAZINE 500 MG/1
500 TABLET, EXTENDED RELEASE ORAL 2 TIMES DAILY
Qty: 180 TABLET | Refills: 3 | Status: ON HOLD | OUTPATIENT
Start: 2024-03-05

## 2024-03-05 RX ORDER — CLOPIDOGREL BISULFATE 75 MG/1
75 TABLET ORAL DAILY
Qty: 90 TABLET | Refills: 3 | Status: ON HOLD | OUTPATIENT
Start: 2024-03-05

## 2024-03-05 RX ORDER — NITROGLYCERIN 0.4 MG/1
0.4 TABLET SUBLINGUAL PRN
Qty: 25 TABLET | Refills: 2 | Status: ON HOLD | OUTPATIENT
Start: 2024-03-05

## 2024-03-06 ENCOUNTER — TELEPHONE (OUTPATIENT)
Dept: CARDIOLOGY | Facility: MEDICAL CENTER | Age: 74
End: 2024-03-06
Payer: COMMERCIAL

## 2024-03-06 NOTE — TELEPHONE ENCOUNTER
Last OV: 02.22.2024  Proposed Surgery: EGD w/ Dil  Surgery Date: TBD  Requesting Office Name: [facility]  Fax Number: [phone]  Preference of Location (default is surgery center unless specified by Cardiologist or [facility])  Prior Clearance Addressed: No      Anticoags/Antiplatelets: Clopidogrel   Anticoags/Antiplatelet managed by Cardiology? YES    Outstanding Cardiac Imaging : No  Stent, Cardiac Devices, or Catheterization: Yes  Within the last 6 months. Forward to provider to review.  Ablation, TAVR/Valve (including open heart), Cardioversion: No  Recent Cardiac Hospitalization: Yes  Date:  01.22.2024            When: Hospitalized in the last 3 months. Forward to provider to review.   History (cardiac history):   Past Medical History:   Diagnosis Date    Adverse effect of anesthesia 15 yrs ago ?    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 states this is mild and does not have any signs or symptoms.    Aortic valve insufficiency 08/16/2023    aortic valve replacement with Bovine. Follows with [facility] cardiology    Arthritis 02/06/2024    general body    Atrial fibrillation (HCC) 02/06/2024    medicated    Breath shortness 02/06/2024    with exertion or anxiety. 9/22/23-denies at present.    Bronchitis 60+ yrs ago    Cataract 06/16/2023    IOL OU 2018    Dental disorder 02/06/2024    upper/lower front 2 teeth crowned    Gastric ulcer     Goiter     Heart burn 2022    Occasionally after acidic meals.    Heart murmur 1965    High cholesterol 02/06/2024    medicated    Hypertension 02/06/2024    medicated    Hypothyroidism 02/06/2024    medicated    Pain 06/16/2023    bilat knees, back    PONV (postoperative nausea and vomiting) 02/06/2024    history of motion sickness    Psychiatric problem 02/06/2024    anxiety, medicated     Pulmonary embolism (HCC) 01/31/2020    Pt. states after joint replacement in 7-2018.    Reactive arthritis (HCC)     Snoring 02/06/2024    not an issues at present    Thyroid disease     hasimotos    Thyroid disease 01/31/2020    Thyroid Nodules    Urinary bladder disorder 15 yrs ago    Sling             Surgical Clearance Letter Sent: No Provider to advise.   **Scan clearance request letter into [facility].**

## 2024-03-06 NOTE — LETTER
PROCEDURE/SURGERY CLEARANCE FORM      Encounter Date: 3/6/2024    Patient: Margoth Hopkins  YOB: 1950    CARDIOLOGIST:  BIANCA Muñoz    REFERRING DOCTOR:  No ref. provider found    The above patient is cleared to have the following procedure/surgery: EGD                                           Additional comments:   Okay to proceed and hold plavix for 5 days if needed. INA TAM Signature   ROSELINE Muñoz.

## 2024-03-08 DIAGNOSIS — Z95.1 S/P CABG X 1: ICD-10-CM

## 2024-03-08 DIAGNOSIS — Z95.2 S/P AORTIC VALVE REPLACEMENT: ICD-10-CM

## 2024-03-08 DIAGNOSIS — Z95.2 S/P AVR: ICD-10-CM

## 2024-03-08 DIAGNOSIS — E78.00 PURE HYPERCHOLESTEROLEMIA: ICD-10-CM

## 2024-03-08 NOTE — TELEPHONE ENCOUNTER
Is the patient due for a refill? Yes    Was the patient seen the past year? Yes    Date of last office visit: 2/22/24    Does the patient have an upcoming appointment?  Yes   If yes, When? 3/14/24    Provider to refill:SC    Does the patients insurance require a 100 day supply?  No    Pharmacy Change

## 2024-03-08 NOTE — TELEPHONE ENCOUNTER
Caller: Rossana at [facility]    Topic/issue: Calling to check status of surgical clearance. Advised still pending.     Callback Number: [phone]    Thank you,  Cassie STARKS

## 2024-03-11 RX ORDER — LISINOPRIL 30 MG/1
30 TABLET ORAL DAILY
Qty: 90 TABLET | Refills: 3 | Status: ON HOLD | OUTPATIENT
Start: 2024-03-11

## 2024-03-11 RX ORDER — EZETIMIBE 10 MG/1
10 TABLET ORAL EVERY EVENING
Qty: 90 TABLET | Refills: 3 | Status: ON HOLD | OUTPATIENT
Start: 2024-03-11

## 2024-03-20 DIAGNOSIS — F41.1 GENERALIZED ANXIETY DISORDER: ICD-10-CM

## 2024-03-20 RX ORDER — DULOXETINE 40 MG/1
40 CAPSULE, DELAYED RELEASE ORAL 2 TIMES DAILY
Qty: 180 CAPSULE | Refills: 3 | Status: ON HOLD | OUTPATIENT
Start: 2024-03-20

## 2024-03-20 NOTE — TELEPHONE ENCOUNTER
Future Appointments         Provider Department Center    5/28/2024 4:00 PM (Arrive by 3:45 PM) Charlee Walker M.D. [facility]    8/22/2024 11:15 AM ROSELINE Muñoz. [facility]  Arrive at: [facility] - Arrival

## 2024-03-25 DIAGNOSIS — G25.0 ESSENTIAL TREMOR: ICD-10-CM

## 2024-03-29 RX ORDER — TOPIRAMATE SPINKLE 25 MG/1
25 CAPSULE ORAL 2 TIMES DAILY
Qty: 180 CAPSULE | Refills: 0 | Status: ON HOLD | OUTPATIENT
Start: 2024-03-29

## 2024-03-31 ENCOUNTER — APPOINTMENT (OUTPATIENT)
Dept: RADIOLOGY | Facility: MEDICAL CENTER | Age: 74
End: 2024-03-31
Attending: EMERGENCY MEDICINE
Payer: COMMERCIAL

## 2024-03-31 ENCOUNTER — HOSPITAL ENCOUNTER (OUTPATIENT)
Facility: MEDICAL CENTER | Age: 74
End: 2024-03-31
Attending: EMERGENCY MEDICINE | Admitting: HOSPITALIST
Payer: COMMERCIAL

## 2024-03-31 VITALS
WEIGHT: 165.34 LBS | RESPIRATION RATE: 20 BRPM | HEIGHT: 67 IN | SYSTOLIC BLOOD PRESSURE: 99 MMHG | OXYGEN SATURATION: 98 % | DIASTOLIC BLOOD PRESSURE: 50 MMHG | TEMPERATURE: 97.3 F | HEART RATE: 73 BPM | BODY MASS INDEX: 25.95 KG/M2

## 2024-03-31 DIAGNOSIS — R79.89 PRERENAL AZOTEMIA: ICD-10-CM

## 2024-03-31 DIAGNOSIS — R55 NEAR SYNCOPE: ICD-10-CM

## 2024-03-31 DIAGNOSIS — R79.89 ELEVATED TROPONIN: ICD-10-CM

## 2024-03-31 PROBLEM — E86.0 DEHYDRATION: Status: ACTIVE | Noted: 2024-03-31

## 2024-03-31 PROBLEM — R13.10 DYSPHAGIA: Status: ACTIVE | Noted: 2024-03-31

## 2024-03-31 LAB
ALBUMIN SERPL BCP-MCNC: 4.1 G/DL (ref 3.2–4.9)
ALBUMIN/GLOB SERPL: 1.5 G/DL
ALP SERPL-CCNC: 105 U/L (ref 30–99)
ALT SERPL-CCNC: 39 U/L (ref 2–50)
ANION GAP SERPL CALC-SCNC: 12 MMOL/L (ref 7–16)
APPEARANCE UR: CLEAR
AST SERPL-CCNC: 30 U/L (ref 12–45)
BACTERIA #/AREA URNS HPF: ABNORMAL /HPF
BASOPHILS # BLD AUTO: 0.9 % (ref 0–1.8)
BASOPHILS # BLD: 0.08 K/UL (ref 0–0.12)
BILIRUB SERPL-MCNC: 0.6 MG/DL (ref 0.1–1.5)
BILIRUB UR QL STRIP.AUTO: NEGATIVE
BUN SERPL-MCNC: 29 MG/DL (ref 8–22)
CALCIUM ALBUM COR SERPL-MCNC: 9.4 MG/DL (ref 8.5–10.5)
CALCIUM SERPL-MCNC: 9.5 MG/DL (ref 8.4–10.2)
CHLORIDE SERPL-SCNC: 105 MMOL/L (ref 96–112)
CO2 SERPL-SCNC: 23 MMOL/L (ref 20–33)
COLOR UR: YELLOW
CREAT SERPL-MCNC: 1.1 MG/DL (ref 0.5–1.4)
EKG IMPRESSION: NORMAL
EOSINOPHIL # BLD AUTO: 0.28 K/UL (ref 0–0.51)
EOSINOPHIL NFR BLD: 3 % (ref 0–6.9)
EPI CELLS #/AREA URNS HPF: ABNORMAL /HPF
ERYTHROCYTE [DISTWIDTH] IN BLOOD BY AUTOMATED COUNT: 48.7 FL (ref 35.9–50)
GFR SERPLBLD CREATININE-BSD FMLA CKD-EPI: 52 ML/MIN/1.73 M 2
GLOBULIN SER CALC-MCNC: 2.7 G/DL (ref 1.9–3.5)
GLUCOSE SERPL-MCNC: 76 MG/DL (ref 65–99)
GLUCOSE UR STRIP.AUTO-MCNC: NEGATIVE MG/DL
HCT VFR BLD AUTO: 43.2 % (ref 37–47)
HGB BLD-MCNC: 13.9 G/DL (ref 12–16)
IMM GRANULOCYTES # BLD AUTO: 0.04 K/UL (ref 0–0.11)
IMM GRANULOCYTES NFR BLD AUTO: 0.4 % (ref 0–0.9)
KETONES UR STRIP.AUTO-MCNC: NEGATIVE MG/DL
LEUKOCYTE ESTERASE UR QL STRIP.AUTO: ABNORMAL
LIPASE SERPL-CCNC: 39 U/L (ref 11–82)
LYMPHOCYTES # BLD AUTO: 1.84 K/UL (ref 1–4.8)
LYMPHOCYTES NFR BLD: 20 % (ref 22–41)
MCH RBC QN AUTO: 29.4 PG (ref 27–33)
MCHC RBC AUTO-ENTMCNC: 32.2 G/DL (ref 32.2–35.5)
MCV RBC AUTO: 91.5 FL (ref 81.4–97.8)
MICRO URNS: ABNORMAL
MONOCYTES # BLD AUTO: 0.77 K/UL (ref 0–0.85)
MONOCYTES NFR BLD AUTO: 8.4 % (ref 0–13.4)
NEUTROPHILS # BLD AUTO: 6.2 K/UL (ref 1.82–7.42)
NEUTROPHILS NFR BLD: 67.3 % (ref 44–72)
NITRITE UR QL STRIP.AUTO: NEGATIVE
NRBC # BLD AUTO: 0 K/UL
NRBC BLD-RTO: 0 /100 WBC (ref 0–0.2)
PH UR STRIP.AUTO: 7 [PH] (ref 5–8)
PLATELET # BLD AUTO: 253 K/UL (ref 164–446)
PMV BLD AUTO: 9.9 FL (ref 9–12.9)
POTASSIUM SERPL-SCNC: 3.9 MMOL/L (ref 3.6–5.5)
PROT SERPL-MCNC: 6.8 G/DL (ref 6–8.2)
PROT UR QL STRIP: NEGATIVE MG/DL
RBC # BLD AUTO: 4.72 M/UL (ref 4.2–5.4)
RBC # URNS HPF: ABNORMAL /HPF
RBC UR QL AUTO: NEGATIVE
SODIUM SERPL-SCNC: 140 MMOL/L (ref 135–145)
SP GR UR STRIP.AUTO: <=1.005
TROPONIN T SERPL-MCNC: 15 NG/L (ref 6–19)
TROPONIN T SERPL-MCNC: 27 NG/L (ref 6–19)
WBC # BLD AUTO: 9.2 K/UL (ref 4.8–10.8)
WBC #/AREA URNS HPF: ABNORMAL /HPF

## 2024-03-31 PROCEDURE — 80053 COMPREHEN METABOLIC PANEL: CPT

## 2024-03-31 PROCEDURE — G0378 HOSPITAL OBSERVATION PER HR: HCPCS

## 2024-03-31 PROCEDURE — 94760 N-INVAS EAR/PLS OXIMETRY 1: CPT

## 2024-03-31 PROCEDURE — 71045 X-RAY EXAM CHEST 1 VIEW: CPT

## 2024-03-31 PROCEDURE — 36415 COLL VENOUS BLD VENIPUNCTURE: CPT

## 2024-03-31 PROCEDURE — 99285 EMERGENCY DEPT VISIT HI MDM: CPT

## 2024-03-31 PROCEDURE — 700105 HCHG RX REV CODE 258: Performed by: HOSPITALIST

## 2024-03-31 PROCEDURE — 93005 ELECTROCARDIOGRAM TRACING: CPT | Performed by: EMERGENCY MEDICINE

## 2024-03-31 PROCEDURE — 84484 ASSAY OF TROPONIN QUANT: CPT | Mod: 91

## 2024-03-31 PROCEDURE — 83690 ASSAY OF LIPASE: CPT

## 2024-03-31 PROCEDURE — A9270 NON-COVERED ITEM OR SERVICE: HCPCS | Performed by: HOSPITALIST

## 2024-03-31 PROCEDURE — 85025 COMPLETE CBC W/AUTO DIFF WBC: CPT

## 2024-03-31 PROCEDURE — 81001 URINALYSIS AUTO W/SCOPE: CPT

## 2024-03-31 PROCEDURE — 700102 HCHG RX REV CODE 250 W/ 637 OVERRIDE(OP): Performed by: HOSPITALIST

## 2024-03-31 PROCEDURE — 99223 1ST HOSP IP/OBS HIGH 75: CPT | Performed by: HOSPITALIST

## 2024-03-31 RX ORDER — LISINOPRIL 20 MG/1
30 TABLET ORAL DAILY
Status: DISCONTINUED | OUTPATIENT
Start: 2024-04-01 | End: 2024-04-01 | Stop reason: HOSPADM

## 2024-03-31 RX ORDER — PREGABALIN 25 MG/1
50 CAPSULE ORAL 3 TIMES DAILY
Status: DISCONTINUED | OUTPATIENT
Start: 2024-03-31 | End: 2024-04-01 | Stop reason: HOSPADM

## 2024-03-31 RX ORDER — TOPIRAMATE 25 MG/1
25 TABLET ORAL 2 TIMES DAILY
Status: DISCONTINUED | OUTPATIENT
Start: 2024-03-31 | End: 2024-04-01 | Stop reason: HOSPADM

## 2024-03-31 RX ORDER — EZETIMIBE 10 MG/1
10 TABLET ORAL
Status: DISCONTINUED | OUTPATIENT
Start: 2024-03-31 | End: 2024-04-01 | Stop reason: HOSPADM

## 2024-03-31 RX ORDER — BUSPIRONE HYDROCHLORIDE 5 MG/1
10 TABLET ORAL
Status: DISCONTINUED | OUTPATIENT
Start: 2024-03-31 | End: 2024-04-01 | Stop reason: HOSPADM

## 2024-03-31 RX ORDER — LEVOTHYROXINE SODIUM 0.07 MG/1
75 TABLET ORAL
Status: DISCONTINUED | OUTPATIENT
Start: 2024-04-01 | End: 2024-04-01 | Stop reason: HOSPADM

## 2024-03-31 RX ORDER — ACETAMINOPHEN 500 MG
500 TABLET ORAL EVERY 6 HOURS PRN
COMMUNITY

## 2024-03-31 RX ORDER — RANOLAZINE 500 MG/1
500 TABLET, EXTENDED RELEASE ORAL 2 TIMES DAILY
Status: DISCONTINUED | OUTPATIENT
Start: 2024-03-31 | End: 2024-04-01 | Stop reason: HOSPADM

## 2024-03-31 RX ORDER — ISOSORBIDE MONONITRATE 30 MG/1
60 TABLET, EXTENDED RELEASE ORAL EVERY EVENING
Status: DISCONTINUED | OUTPATIENT
Start: 2024-03-31 | End: 2024-04-01 | Stop reason: HOSPADM

## 2024-03-31 RX ORDER — OMEPRAZOLE 20 MG/1
20 CAPSULE, DELAYED RELEASE ORAL EVERY MORNING
Status: DISCONTINUED | OUTPATIENT
Start: 2024-04-01 | End: 2024-04-01 | Stop reason: HOSPADM

## 2024-03-31 RX ORDER — POLYETHYLENE GLYCOL 3350 17 G/17G
1 POWDER, FOR SOLUTION ORAL
Status: DISCONTINUED | OUTPATIENT
Start: 2024-03-31 | End: 2024-04-01 | Stop reason: HOSPADM

## 2024-03-31 RX ORDER — ROSUVASTATIN CALCIUM 10 MG/1
40 TABLET, COATED ORAL EVERY EVENING
Status: DISCONTINUED | OUTPATIENT
Start: 2024-03-31 | End: 2024-04-01 | Stop reason: HOSPADM

## 2024-03-31 RX ORDER — ZOLPIDEM TARTRATE 5 MG/1
5 TABLET ORAL NIGHTLY PRN
Status: DISCONTINUED | OUTPATIENT
Start: 2024-03-31 | End: 2024-04-01 | Stop reason: HOSPADM

## 2024-03-31 RX ORDER — AMOXICILLIN 250 MG
2 CAPSULE ORAL 2 TIMES DAILY
Status: DISCONTINUED | OUTPATIENT
Start: 2024-04-01 | End: 2024-04-01 | Stop reason: HOSPADM

## 2024-03-31 RX ORDER — SODIUM CHLORIDE 9 MG/ML
INJECTION, SOLUTION INTRAVENOUS CONTINUOUS
Status: ACTIVE | OUTPATIENT
Start: 2024-03-31 | End: 2024-04-01

## 2024-03-31 RX ORDER — ACETAMINOPHEN 325 MG/1
650 TABLET ORAL EVERY 6 HOURS PRN
Status: DISCONTINUED | OUTPATIENT
Start: 2024-03-31 | End: 2024-04-01 | Stop reason: HOSPADM

## 2024-03-31 RX ORDER — DULOXETIN HYDROCHLORIDE 20 MG/1
40 CAPSULE, DELAYED RELEASE ORAL 2 TIMES DAILY
Status: DISCONTINUED | OUTPATIENT
Start: 2024-03-31 | End: 2024-04-01 | Stop reason: HOSPADM

## 2024-03-31 RX ORDER — CLOPIDOGREL BISULFATE 75 MG/1
75 TABLET ORAL DAILY
Status: DISCONTINUED | OUTPATIENT
Start: 2024-03-31 | End: 2024-04-01 | Stop reason: HOSPADM

## 2024-03-31 RX ORDER — ENOXAPARIN SODIUM 100 MG/ML
40 INJECTION SUBCUTANEOUS DAILY
Status: DISCONTINUED | OUTPATIENT
Start: 2024-04-01 | End: 2024-04-01 | Stop reason: HOSPADM

## 2024-03-31 RX ADMIN — BUSPIRONE HYDROCHLORIDE 10 MG: 5 TABLET ORAL at 21:30

## 2024-03-31 RX ADMIN — ROSUVASTATIN 40 MG: 10 TABLET, FILM COATED ORAL at 18:00

## 2024-03-31 RX ADMIN — METOPROLOL TARTRATE 25 MG: 25 TABLET, FILM COATED ORAL at 18:00

## 2024-03-31 RX ADMIN — PREGABALIN 50 MG: 25 CAPSULE ORAL at 21:30

## 2024-03-31 RX ADMIN — DULOXETINE HYDROCHLORIDE 40 MG: 20 CAPSULE, DELAYED RELEASE ORAL at 18:00

## 2024-03-31 RX ADMIN — ZOLPIDEM TARTRATE 5 MG: 5 TABLET ORAL at 23:07

## 2024-03-31 RX ADMIN — TOPIRAMATE 25 MG: 25 TABLET, FILM COATED ORAL at 18:00

## 2024-03-31 RX ADMIN — EZETIMIBE 10 MG: 10 TABLET ORAL at 21:30

## 2024-03-31 RX ADMIN — PREGABALIN 50 MG: 25 CAPSULE ORAL at 14:57

## 2024-03-31 RX ADMIN — SODIUM CHLORIDE: 9 INJECTION, SOLUTION INTRAVENOUS at 14:24

## 2024-03-31 ASSESSMENT — ENCOUNTER SYMPTOMS
VOMITING: 0
BRUISES/BLEEDS EASILY: 0
STRIDOR: 0
CHILLS: 0
COUGH: 0
DIZZINESS: 1
FLANK PAIN: 0
SHORTNESS OF BREATH: 0
NERVOUS/ANXIOUS: 0
ABDOMINAL PAIN: 0
FOCAL WEAKNESS: 0
MYALGIAS: 0
FEVER: 0
EYE DISCHARGE: 0
EYE REDNESS: 0

## 2024-03-31 ASSESSMENT — COGNITIVE AND FUNCTIONAL STATUS - GENERAL
DAILY ACTIVITIY SCORE: 23
HELP NEEDED FOR BATHING: A LITTLE
CLIMB 3 TO 5 STEPS WITH RAILING: A LITTLE
SUGGESTED CMS G CODE MODIFIER MOBILITY: CI
MOBILITY SCORE: 23
SUGGESTED CMS G CODE MODIFIER DAILY ACTIVITY: CI

## 2024-03-31 ASSESSMENT — PAIN DESCRIPTION - PAIN TYPE
TYPE: ACUTE PAIN
TYPE: ACUTE PAIN

## 2024-03-31 ASSESSMENT — LIFESTYLE VARIABLES
ALCOHOL_USE: YES
EVER FELT BAD OR GUILTY ABOUT YOUR DRINKING: NO
AVERAGE NUMBER OF DAYS PER WEEK YOU HAVE A DRINK CONTAINING ALCOHOL: 2
TOTAL SCORE: 0
CONSUMPTION TOTAL: NEGATIVE
HOW MANY TIMES IN THE PAST YEAR HAVE YOU HAD 5 OR MORE DRINKS IN A DAY: 0
EVER HAD A DRINK FIRST THING IN THE MORNING TO STEADY YOUR NERVES TO GET RID OF A HANGOVER: NO
ON A TYPICAL DAY WHEN YOU DRINK ALCOHOL HOW MANY DRINKS DO YOU HAVE: 1
TOTAL SCORE: 0
HAVE YOU EVER FELT YOU SHOULD CUT DOWN ON YOUR DRINKING: NO
HAVE PEOPLE ANNOYED YOU BY CRITICIZING YOUR DRINKING: NO
TOTAL SCORE: 0

## 2024-03-31 ASSESSMENT — FIBROSIS 4 INDEX
FIB4 SCORE: 1.42
FIB4 SCORE: 1.36

## 2024-03-31 NOTE — CARE PLAN
The patient is Watcher - Medium risk of patient condition declining or worsening    Shift Goals  Clinical Goals: IVF, monitor labs, safety  Patient Goals: feel better, go home    Progress made toward(s) clinical / shift goals:    Problem: Hemodynamics  Goal: Patient's hemodynamics, fluid balance and neurologic status will be stable or improve  Outcome: Progressing-  BP/VS remain stable, IVF initiated     Problem: Fluid Volume  Goal: Fluid volume balance will be maintained  Outcome: Progressing- IVF infusing

## 2024-03-31 NOTE — ASSESSMENT & PLAN NOTE
In the indeterminate range  Denies chest pain.  EKG shows sinus rhythm with a rate of 69, there is no ST elevation.  There is flattening of the T wave in lead I and aVL.  QTc is 452.   Troponin elevation is likely secondary to reduced renal clearance given her dehydration  I will place on continuous cardiac monitoring    I will trend troponin levels  Stat EKG, troponin for chest pain or worsening shortness of breath

## 2024-03-31 NOTE — ED PROVIDER NOTES
ED Provider Note    CHIEF COMPLAINT  Chief Complaint   Patient presents with    Dizziness     Started this am       EXTERNAL RECORDS REVIEWED  Outpatient Notes cardiology office note reviewed from 2/22/2024.  Patient status post aortic valve replacement with known CAD and history of paroxysmal A-fib as well as hypertension.     Myocardial Perfusion on 1/23/2024   Report   NUCLEAR IMAGING INTERPRETATION   * Small sized, mild severity, equivocal, fully reversible defect in the    apical lateral wall and mid inferolateral wall. SSS 2. SDS 2.    * Equivocal to mild ischemia   * Normal left ventricular size, ejection fraction, and wall motion.   * There is transient ischemic dilitation which can be seen with balanced    triple vessel disease, hypertrophy, hypertensive heart disease, or    microvascular disease.   ECG INTERPRETATION   Negative stress ECG for ischemia.    HPI/ROS  LIMITATION TO HISTORY   Select: : None  OUTSIDE HISTORIAN(S):  EMS for bedside resort    Margoth Hopkins is a 73 y.o. female who presents to the Emergency Department with lightheaded dizziness.  Past medical history as document below.  She had bypass and valve repair in August 2023.  More recently this week she had upper endoscopy for dilation of known Schatzki's rings.  States that she has been on a broth diet since.  Over the last 3 days she has been having escalating lightheaded dizziness with sitting or standing.    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/2024), Aortic insufficiency (01/31/2020), Aortic valve insufficiency (08/16/2023), Arthritis (02/06/2024), Atrial fibrillation (HCC) (02/06/2024), Breath shortness (02/06/2024), Bronchitis (60+ yrs ago), Cataract (06/16/2023), Dental disorder (02/06/2024), Gastric ulcer, Goiter, Heart burn (2022), Heart murmur (1965), High  cholesterol (02/06/2024), Hypertension (02/06/2024), Hypothyroidism (02/06/2024), Pain (06/16/2023), PONV (postoperative nausea and vomiting) (02/06/2024), Psychiatric problem (02/06/2024), Pulmonary embolism (HCC) (01/31/2020), Reactive arthritis (HCC), Snoring (02/06/2024), Thyroid disease, Thyroid disease (01/31/2020), and Urinary bladder disorder (15 yrs ago).    SURGICAL HISTORY   has a past surgical history that includes knee replacement, total (Right); fusion (06/16/2023); cervical fusion posterior; bladder sling female; cataract extraction with iol (Bilateral); thyroidectomy total (Bilateral, 02/05/2020); appendectomy; gyn surgery; knee arthroplasty total (Right); knee revision total (Right); mass excision general (Right, 11/05/2021); knee arthroplasty total (Left, 06/2022); other cardiac surgery (06/16/2023); cervical disk and fusion anterior (2016); hammertoe correction (Left, 2017); multiple coronary artery bypass endo vein harvest (N/A, 08/16/2023); aortic valve replacement (N/A, 08/16/2023); echocardiogram, transesophageal, intraoperative (N/A, 08/16/2023); colonoscopy; and devi by laparoscopy (N/A, 9/25/2023).    FAMILY HISTORY  Family History   Problem Relation Age of Onset    Arthritis Mother     Alzheimer's Disease Mother     Cancer Mother         Cervical Cancer    Heart Attack Father     Cancer Father         Hypertrophy of the heart, CAD    Other Brother         brain anurism    Hypertension Brother     Cancer Maternal Grandmother 66        colono cancer    Colorectal Cancer Maternal Grandmother         Colon Cancer    Heart Disease Brother         Quadruple bypass    Hypertension Brother     Arthritis Brother     Other Brother         Celiac Disease    No Known Problems Maternal Grandfather     No Known Problems Paternal Grandmother     No Known Problems Paternal Grandfather     Hypertension Brother         Spinal stenosis    Arthritis Brother     Lung Disease Brother         Asthma, emphysema     Asthma Brother        SOCIAL HISTORY  Social History     Tobacco Use    Smoking status: Never    Smokeless tobacco: Never   Vaping Use    Vaping Use: Never used   Substance and Sexual Activity    Alcohol use: Yes     Alcohol/week: 2.4 oz     Types: 4 Glasses of wine per week    Drug use: Not Currently    Sexual activity: Yes     Partners: Male       CURRENT MEDICATIONS  Home Medications       Reviewed by Disha Jain (Pharmacy Tech) on 03/31/24 at 1054  Med List Status: Complete     Medication Last Dose Status   acetaminophen (TYLENOL) 500 MG Tab > 2 days Active   busPIRone (BUSPAR) 10 MG Tab tablet 3/30/2024 Active   clopidogrel (PLAVIX) 75 MG Tab > 10 days Active   diclofenac sodium (VOLTAREN) 1 % Gel > 10 days Active   DULoxetine HCl 40 MG Cap DR Particles > 1 month Active   ezetimibe (ZETIA) 10 MG Tab 3/30/2024 Active   isosorbide mononitrate SR (IMDUR) 60 MG TABLET SR 24 HR 3/30/2024 Active   lisinopril (PRINIVIL) 30 MG tablet 3/31/2024 Active   Menthol-Methyl Salicylate (SALONPAS PAIN RELIEF PATCH EX) 3/30/2024 Active   metoprolol tartrate (LOPRESSOR) 25 MG Tab 3/31/2024 Active   nitroglycerin (NITROSTAT) 0.4 MG SL Tab >1 week Active   omeprazole (PRILOSEC) 20 MG delayed-release capsule 3/31/2024 Active   pregabalin (LYRICA) 50 MG capsule 3/31/2024 Active   ranolazine (RANEXA) 500 MG TABLET SR 12 HR 3/31/2024 Active   rosuvastatin (CRESTOR) 40 MG tablet 3/30/2024 Active   SYNTHROID 75 MCG Tab 3/31/2024 Active   topiramate (TOPAMAX) 25 MG capsule 3/31/2024 Active   vitamin D2, Ergocalciferol, (DRISDOL) 1.25 MG (80468 UT) Cap capsule 3/30/2024 Active   zolpidem (AMBIEN) 5 MG Tab > 1 week Active                    ALLERGIES  Allergies   Allergen Reactions    Morphine Vomiting and Nausea    Nsaids Unspecified     History of gastric ulcers - cannot take NSAIDS    Pcn [Penicillins] Hives     Tolerates ancef    Seasonal Runny Nose and Itching     .       PHYSICAL EXAM  VITAL SIGNS: /75   Pulse 76   " Temp 36.6 °C (97.8 °F) (Oral)   Resp 20   Ht 1.702 m (5' 7\")   Wt 75.3 kg (166 lb 0.1 oz)   SpO2 98%   BMI 26.00 kg/m²      Pulse ox interpretation: I interpret this pulse ox as normal.  Constitutional: Alert in no apparent distress.  HENT: No signs of trauma, Bilateral external ears normal, Nose normal.   Cardiovascular: Regular rate and rhythm  Thorax & Lungs: Normal breath sounds, No respiratory distress, No wheezing, No chest tenderness. Well healed sternotomy scar  Abdomen: Bowel sounds normal, Soft, No tenderness  Skin: Warm, Dry, No erythema, No rash.   Extremities: Intact distal pulses, No edema, No tenderness, No cyanosis,  Negative Martha's sign.   Musculoskeletal: Good range of motion in all major joints. No tenderness to palpation or major deformities noted.   Neurologic: Alert , Normal motor function, Normal sensory function, No focal deficits noted.   Psychiatric: Affect normal, Judgment normal, Mood normal.         EKG/LABS  Results for orders placed or performed during the hospital encounter of 03/31/24   COMP METABOLIC PANEL   Result Value Ref Range    Sodium 140 135 - 145 mmol/L    Potassium 3.9 3.6 - 5.5 mmol/L    Chloride 105 96 - 112 mmol/L    Co2 23 20 - 33 mmol/L    Anion Gap 12.0 7.0 - 16.0    Glucose 76 65 - 99 mg/dL    Bun 29 (H) 8 - 22 mg/dL    Creatinine 1.10 0.50 - 1.40 mg/dL    Calcium 9.5 8.4 - 10.2 mg/dL    Correct Calcium 9.4 8.5 - 10.5 mg/dL    AST(SGOT) 30 12 - 45 U/L    ALT(SGPT) 39 2 - 50 U/L    Alkaline Phosphatase 105 (H) 30 - 99 U/L    Total Bilirubin 0.6 0.1 - 1.5 mg/dL    Albumin 4.1 3.2 - 4.9 g/dL    Total Protein 6.8 6.0 - 8.2 g/dL    Globulin 2.7 1.9 - 3.5 g/dL    A-G Ratio 1.5 g/dL   LIPASE   Result Value Ref Range    Lipase 39 11 - 82 U/L   URINALYSIS,CULTURE IF INDICATED    Specimen: Urine, Clean Catch   Result Value Ref Range    Color Yellow     Character Clear     Specific Gravity <=1.005 <1.035    Ph 7.0 5.0 - 8.0    Glucose Negative Negative mg/dL    Ketones " Negative Negative mg/dL    Protein Negative Negative mg/dL    Bilirubin Negative Negative    Nitrite Negative Negative    Leukocyte Esterase Trace (A) Negative    Occult Blood Negative Negative    Micro Urine Req Microscopic    TROPONIN   Result Value Ref Range    Troponin T 27 (H) 6 - 19 ng/L   CBC WITH DIFFERENTIAL   Result Value Ref Range    WBC 9.2 4.8 - 10.8 K/uL    RBC 4.72 4.20 - 5.40 M/uL    Hemoglobin 13.9 12.0 - 16.0 g/dL    Hematocrit 43.2 37.0 - 47.0 %    MCV 91.5 81.4 - 97.8 fL    MCH 29.4 27.0 - 33.0 pg    MCHC 32.2 32.2 - 35.5 g/dL    RDW 48.7 35.9 - 50.0 fL    Platelet Count 253 164 - 446 K/uL    MPV 9.9 9.0 - 12.9 fL    Neutrophils-Polys 67.30 44.00 - 72.00 %    Lymphocytes 20.00 (L) 22.00 - 41.00 %    Monocytes 8.40 0.00 - 13.40 %    Eosinophils 3.00 0.00 - 6.90 %    Basophils 0.90 0.00 - 1.80 %    Immature Granulocytes 0.40 0.00 - 0.90 %    Nucleated RBC 0.00 0.00 - 0.20 /100 WBC    Neutrophils (Absolute) 6.20 1.82 - 7.42 K/uL    Lymphs (Absolute) 1.84 1.00 - 4.80 K/uL    Monos (Absolute) 0.77 0.00 - 0.85 K/uL    Eos (Absolute) 0.28 0.00 - 0.51 K/uL    Baso (Absolute) 0.08 0.00 - 0.12 K/uL    Immature Granulocytes (abs) 0.04 0.00 - 0.11 K/uL    NRBC (Absolute) 0.00 K/uL   ESTIMATED GFR   Result Value Ref Range    GFR (CKD-EPI) 52 (A) >60 mL/min/1.73 m 2   URINE MICROSCOPIC (W/UA)   Result Value Ref Range    WBC 2-5 /hpf    RBC 0-2 /hpf    Bacteria Few (A) None /hpf    Epithelial Cells Few Few /hpf   EKG (NOW)   Result Value Ref Range    Report       [facility] of [facility] Emergency Dept.    Test Date:  2024  Pt Name:    IAN JOHNSON                   Department: [facility]  MRN:        [id number]                      Room:       3312  Gender:     Female                       Technician: [id number]  :        [id number]                   Requested By:ISHMAEL VÁZQUEZ  Order #:    [id number]                    Reading MD: Ishmael Váqzuez    Measurements  Intervals                                 Axis  Rate:       69                           P:          60  ME:         214                          QRS:        -55  QRSD:       99                           T:          51  QT:         422  QTc:        452    Interpretive Statements  Sinus rhythm  Borderline prolonged ME interval  Left anterior fascicular block  Abnormal R-wave progression, late transition  Compared to ECG 02/12/2024 11:21:14  Left anterior fascicular block now present  Left-axis deviation no longer present  Electronically Signed On 03- 16:49:36 PDT by Ishmael Smith        I have independently interpreted this EKG    RADIOLOGY  I have independently interpreted the diagnostic imaging associated with this visit and am waiting the final reading from the radiologist.   My preliminary interpretation is as follows: No consolidative process or pneumothorax    Radiologist interpretation:  DX-CHEST-PORTABLE (1 VIEW)   Final Result      No evidence of acute cardiopulmonary process.          COURSE & MEDICAL DECISION MAKING    ASSESSMENT, COURSE AND PLAN  Care Narrative: 73-year-old female presenting to the emergency department with lightheaded dizziness and report of hypotension at home.  Will complete near syncopal workup    DISPOSITION AND DISCUSSIONS  I have discussed management of the patient with the following physicians and YUDELKA's: Hospitalist    Discussion of management with other Q or appropriate source(s): None     73-year-old female presenting to the emerged part with above presentation.  Given her recent endoscopy I do have suspicion for dehydration.  Workup as above.  Slight prerenal azotemia patient it remains somewhat subjectively orthostatic.  Again I do believe that this is due to ongoing dehydration likely postprocedural state.  Additionally the patient does have a slightly elevated troponin.  This is most likely type II with demand secondary to her hypertension is experienced earlier.  Her EKG is as above as was reviewed myself.   Patient does not have any other subjective complaints to have increased concern for even an ACS standpoint.  At this point her heart score is 3.  Will have the patient brought in under the hospital service for ongoing inpatient care and workup.    FINAL DIAGNOSIS  1. Near syncope    2. Elevated troponin    3. Prerenal azotemia           Electronically signed by: Ishmael Smith M.D., 3/31/2024 10:37 AM

## 2024-03-31 NOTE — ED TRIAGE NOTES
"Chief Complaint   Patient presents with    Dizziness     Started this am     /87   Pulse 67   Temp 36.4 °C (97.6 °F) (Temporal)   Resp 14   Ht 1.702 m (5' 7\")   Wt 75.3 kg (166 lb 0.1 oz)   SpO2 100%   BMI 26.00 kg/m²     Pt BIB [facility] from home for c/o increasing dizziness this am, states checked BP at home and was getting low readings.  Pt denies c/o CP, abd pain or feeling short of breath.   ERP at bedside.    "

## 2024-03-31 NOTE — H&P
Hospital Medicine History & Physical Note    Date of Service  3/31/2024    Primary Care Physician  Charlee Walker M.D.    Consultants  None     Code Status  Full Code    Chief Complaint  Chief Complaint   Patient presents with    Dizziness     Started this am     History of Presenting Illness  Margoth Hopkins is a 73 y.o. female with a past medical history of primary hypertension, coronary artery disease aortic valve replacement August 2023, prediabetes who who recently had upper endoscopy and dilation of her esophagus last Wednesday presented 3/31/2024 with generalized weakness easy fatigability lightheadedness.  Symptoms have been worsening over the past 3 days.  She denies having fevers or chills.  She denies noticing any lower extremity pain redness or swelling.    I discussed the plan of care with emergency physician, and the patient    Review of Systems  Review of Systems   Constitutional:  Positive for malaise/fatigue. Negative for chills and fever.   Eyes:  Negative for discharge and redness.   Respiratory:  Negative for cough, shortness of breath and stridor.    Cardiovascular:  Negative for chest pain and leg swelling.   Gastrointestinal:  Negative for abdominal pain and vomiting.   Genitourinary:  Negative for flank pain.   Musculoskeletal:  Negative for myalgias.   Skin: Negative.    Neurological:  Positive for dizziness. Negative for focal weakness.        Lightheadedness   Endo/Heme/Allergies:  Does not bruise/bleed easily.   Psychiatric/Behavioral:  The patient is not nervous/anxious.      Past Medical History   has a past medical history of Adverse effect of anesthesia (15 yrs ago ?), GAURI (acute kidney injury) ([facility]) (08/24/2023), Anemia, Anesthesia (02/06/2024), Anginal syndrome ([facility]) (02/06/2024), Aortic insufficiency (01/31/2020), Aortic valve insufficiency (08/16/2023), Arthritis (02/06/2024), Atrial fibrillation ([facility]) (02/06/2024), Breath shortness (02/06/2024), Bronchitis (60+ yrs ago), Cataract  (06/16/2023), Dental disorder (02/06/2024), Gastric ulcer, Goiter, Heart burn (2022), Heart murmur (1965), High cholesterol (02/06/2024), Hypertension (02/06/2024), Hypothyroidism (02/06/2024), Pain (06/16/2023), PONV (postoperative nausea and vomiting) (02/06/2024), Psychiatric problem (02/06/2024), Pulmonary embolism (HCC) (01/31/2020), Reactive arthritis (HCC), Snoring (02/06/2024), Thyroid disease, Thyroid disease (01/31/2020), and Urinary bladder disorder (15 yrs ago).    Surgical History   has a past surgical history that includes knee replacement, total (Right); fusion (06/16/2023); cervical fusion posterior; bladder sling female; cataract extraction with iol (Bilateral); thyroidectomy total (Bilateral, 02/05/2020); appendectomy; gyn surgery; knee arthroplasty total (Right); knee revision total (Right); mass excision general (Right, 11/05/2021); knee arthroplasty total (Left, 06/2022); other cardiac surgery (06/16/2023); cervical disk and fusion anterior (2016); hammertoe correction (Left, 2017); multiple coronary artery bypass endo vein harvest (N/A, 08/16/2023); aortic valve replacement (N/A, 08/16/2023); echocardiogram, transesophageal, intraoperative (N/A, 08/16/2023); colonoscopy; and devi by laparoscopy (N/A, 9/25/2023).     Family History  family history includes Alzheimer's Disease in her mother; Arthritis in her brother, brother, and mother; Asthma in her brother; Cancer in her father and mother; Cancer (age of onset: 66) in her maternal grandmother; Colorectal Cancer in her maternal grandmother; Heart Attack in her father; Heart Disease in her brother; Hypertension in her brother, brother, and brother; Lung Disease in her brother; No Known Problems in her maternal grandfather, paternal grandfather, and paternal grandmother; Other in her brother and brother.      Social History   reports that she has never smoked. She has never used smokeless tobacco. She reports current alcohol use of about 2.4 oz  of alcohol per week. She reports that she does not currently use drugs.    Allergies  Allergies   Allergen Reactions    Morphine Vomiting and Nausea    Nsaids Unspecified     History of gastric ulcers - cannot take NSAIDS    Pcn [Penicillins] Hives     Tolerates ancef    Seasonal Runny Nose and Itching     .     Medications  Prior to Admission Medications   Prescriptions Last Dose Informant Patient Reported? Taking?   DULoxetine HCl 40 MG Cap DR Particles > 1 month at Unknown Patient No No   Sig: Take 40 mg by mouth 2 times a day.   Menthol-Methyl Salicylate (SALONPAS PAIN RELIEF PATCH EX) 3/30/2024 at 1930 Patient Yes Yes   Sig: Apply 1 Patch topically as needed (Apply's on back for pain).   SYNTHROID 75 MCG Tab 3/31/2024 at 0600 Patient No Yes   Sig: Take 1 Tablet by mouth every morning on an empty stomach.   acetaminophen (TYLENOL) 500 MG Tab > 2 days at Unknown Patient Yes Yes   Sig: Take 500 mg by mouth every 6 hours as needed. Indications: Pain   busPIRone (BUSPAR) 10 MG Tab tablet 3/30/2024 at 2200 Patient No Yes   Sig: Take 1 Tablet by mouth at bedtime. Pt takes once a day, not BID   clopidogrel (PLAVIX) 75 MG Tab > 10 days at HOLD Patient No No   Sig: Take 1 Tablet by mouth every day.   diclofenac sodium (VOLTAREN) 1 % Gel > 10 days at Unknown Patient Yes No   Sig: Apply 2 g topically 4 times a day as needed (Apply's on both knees).   ezetimibe (ZETIA) 10 MG Tab 3/30/2024 at 2200 Patient No Yes   Sig: Take 1 Tablet by mouth every evening.   isosorbide mononitrate SR (IMDUR) 60 MG TABLET SR 24 HR 3/30/2024 at 2200 Patient No Yes   Sig: Take 1 Tablet by mouth every day.   Patient taking differently: Take 60 mg by mouth every evening.   lisinopril (PRINIVIL) 30 MG tablet 3/31/2024 at 0700 Patient No Yes   Sig: Take 1 Tablet by mouth every day.   metoprolol tartrate (LOPRESSOR) 25 MG Tab 3/31/2024 at 0700 Patient No Yes   Sig: Take 1 Tablet by mouth 2 times a day.   nitroglycerin (NITROSTAT) 0.4 MG SL Tab >1  week at Unknown Patient No No   Sig: Place 1 Tablet under the tongue as needed for Chest Pain.   omeprazole (PRILOSEC) 20 MG delayed-release capsule 3/31/2024 at 0700 Patient No Yes   Sig: Take 1 Capsule by mouth every morning.   pregabalin (LYRICA) 50 MG capsule 3/31/2024 at 0700 Patient No Yes   Sig: Take 1 Capsule by mouth 3 times a day for 90 days.   ranolazine (RANEXA) 500 MG TABLET SR 12 HR 3/31/2024 at 0700 Patient No Yes   Sig: Take 1 Tablet by mouth 2 times a day.   rosuvastatin (CRESTOR) 40 MG tablet 3/30/2024 at 2200 Patient No Yes   Sig: TAKE 1 TABLET DAILY   Patient taking differently: Take 40 mg by mouth every evening.   topiramate (TOPAMAX) 25 MG capsule 3/31/2024 at 0700 Patient No Yes   Sig: TAKE 1 CAPSULE BY MOUTH TWICE A DAY   vitamin D2, Ergocalciferol, (DRISDOL) 1.25 MG (63912 UT) Cap capsule 3/30/2024 at 1800 Patient No Yes   Sig: Take 1 Capsule by mouth every 7 days. On Sat   zolpidem (AMBIEN) 5 MG Tab > 1 week at PM Patient No No   Sig: Take 1 Tablet by mouth at bedtime as needed for Sleep for up to 30 days.      Facility-Administered Medications: None     Physical Exam  Temp:  [36.4 °C (97.6 °F)] 36.4 °C (97.6 °F)  Pulse:  [61-84] 63  Resp:  [14-17] 14  BP: (106-129)/(63-87) 106/63  SpO2:  [94 %-100 %] 100 %  Blood Pressure : 129/87   Temperature: 36.4 °C (97.6 °F)   Pulse: 61   Respiration: 17   Pulse Oximetry: 94 %     Physical Exam  Constitutional:       General: She is not in acute distress.  HENT:      Head: Normocephalic and atraumatic.      Right Ear: External ear normal.      Left Ear: External ear normal.      Nose: No congestion or rhinorrhea.      Mouth/Throat:      Mouth: Mucous membranes are dry.      Pharynx: No oropharyngeal exudate or posterior oropharyngeal erythema.   Eyes:      General: No scleral icterus.        Right eye: No discharge.         Left eye: No discharge.      Conjunctiva/sclera: Conjunctivae normal.      Pupils: Pupils are equal, round, and reactive to  "light.   Cardiovascular:      Rate and Rhythm: Normal rate and regular rhythm.      Heart sounds:      No friction rub. No gallop.   Pulmonary:      Effort: Pulmonary effort is normal.   Abdominal:      General: Abdomen is flat. There is no distension.      Tenderness: There is no guarding.   Musculoskeletal:         General: No swelling.      Cervical back: Neck supple. No rigidity. No muscular tenderness.      Right lower leg: No edema.      Left lower leg: No edema.   Skin:     General: Skin is dry.      Capillary Refill: Capillary refill takes 2 to 3 seconds.      Coloration: Skin is not jaundiced or pale.      Findings: No bruising or erythema.   Neurological:      Mental Status: She is alert and oriented to person, place, and time.   Psychiatric:         Mood and Affect: Mood normal.         Judgment: Judgment normal.       Laboratory:  Recent Labs     03/31/24  1159   WBC 9.2   RBC 4.72   HEMOGLOBIN 13.9   HEMATOCRIT 43.2   MCV 91.5   MCH 29.4   MCHC 32.2   RDW 48.7   PLATELETCT 253   MPV 9.9     Recent Labs     03/31/24  1040   SODIUM 140   POTASSIUM 3.9   CHLORIDE 105   CO2 23   GLUCOSE 76   BUN 29*   CREATININE 1.10   CALCIUM 9.5     Recent Labs     03/31/24  1040   ALTSGPT 39   ASTSGOT 30   ALKPHOSPHAT 105*   TBILIRUBIN 0.6   LIPASE 39   GLUCOSE 76         No results for input(s): \"NTPROBNP\" in the last 72 hours.      Recent Labs     03/31/24  1040   TROPONINT 27*     Imaging:  DX-CHEST-PORTABLE (1 VIEW)   Final Result      No evidence of acute cardiopulmonary process.        I personally reviewed patient EKG shows sinus rhythm with a rate of 69, there is no ST elevation.  There is flattening of the T wave in lead I and aVL.  QTc is 452.    Assessment/Plan:  Justification for Admission Status  I anticipate this patient is appropriate for observation status at this time because patient has pre-syncope.    Patient will need a Telemetry bed on MEDICAL service the patient has pre-syncope and troponin " elevation.    * Near syncope- (present on admission)  Assessment & Plan  EKG shows sinus rhythm with a rate of 69, there is no ST elevation.  There is flattening of the T wave in lead I and aVL.  QTc is 452.  Likely secondary to dehydration and polypharmacy  I will place on continuous cardiac monitoring  I will check orthostatic vital    I will check random cortisol       Elevated troponin- (present on admission)  Assessment & Plan  In the indeterminate range  Denies chest pain.  EKG shows sinus rhythm with a rate of 69, there is no ST elevation.  There is flattening of the T wave in lead I and aVL.  QTc is 452.   Troponin elevation is likely secondary to reduced renal clearance given her dehydration  I will place on continuous cardiac monitoring    I will trend troponin levels  Stat EKG, troponin for chest pain or worsening shortness of breath     Dysphagia- (present on admission)  Assessment & Plan  Status post esophageal dilation last Wednesday.  I will order speech evaluation    Prediabetes- (present on admission)  Assessment & Plan  Without significant hyperglycemia.    Monitor blood sugars with daily labs.  I will order a follow-up glucose level.  Consider sliding scale insulin according to blood sugar trend.     Prerenal azotemia- (present on admission)  Assessment & Plan  Likely secondary to reduced oral intake secondary to her dysphagia.  I will start her on intravenous fluids.  I will order swallow/speech evaluation  Encourage oral intake as tolerated, antiemetics as needed.  Intravenous hydration until adequate oral intake is achieved.      Coronary artery disease due to lipid rich plaque- (present on admission)  Assessment & Plan  I will start nitrate, lisinopril and metoprolol and ranolazine with hold parameters  I will resume rosuvastatin and Plavix    Primary hypertension- (present on admission)  Assessment & Plan  I will start lisinopril, metoprolol and nitrates with hold parameters.    Dehydration-  (present on admission)  Assessment & Plan  Likely secondary to reduced oral intake given her dysphagia  Encourage oral intake as tolerated, antiemetics as needed.  Intravenous hydration until adequate oral intake is achieved.     Primary insomnia- (present on admission)  Assessment & Plan  Resume zolpidem as needed    Postoperative hypothyroidism_s/p thyroidectomy_Dr. Mullen- (present on admission)  Assessment & Plan  I will start levothyroxine    MOHAMUD (generalized anxiety disorder)- (present on admission)  Assessment & Plan  I will start duloxetine and buspirone    Pure hypercholesterolemia- (present on admission)  Assessment & Plan  Cardiac diet.  I will start on Zetia      VTE prophylaxis: SCDs/TEDs and enoxaparin ppx

## 2024-03-31 NOTE — ASSESSMENT & PLAN NOTE
Likely secondary to reduced oral intake secondary to her dysphagia.  I will start her on intravenous fluids.  I will order swallow/speech evaluation  Encourage oral intake as tolerated, antiemetics as needed.  Intravenous hydration until adequate oral intake is achieved.

## 2024-03-31 NOTE — ASSESSMENT & PLAN NOTE
Likely secondary to reduced oral intake given her dysphagia  Encourage oral intake as tolerated, antiemetics as needed.  Intravenous hydration until adequate oral intake is achieved.

## 2024-03-31 NOTE — ASSESSMENT & PLAN NOTE
I will start nitrate, lisinopril and metoprolol and ranolazine with hold parameters  I will resume rosuvastatin and Plavix

## 2024-03-31 NOTE — ED NOTES
Medication history reviewed with pt. Med rec is complete.  Allergies reviewed, per pt  Interviewed pt with  at bedside with permission from pt.    Pt reports that she had a mix up with the pharmacy and has not receive her DULOXETINE 40MG in about a month.  Pt reports that she had a procedure done and had to hold her CLOPIDOGREL 75MG for 10 days.  Pt reports that she stopped taking her PROPRANOLOL 40MG about 6 months ago.    Patient has not had any outpatient antibiotics in the last 30 days.    Pt is not on any anticoagulants

## 2024-03-31 NOTE — ASSESSMENT & PLAN NOTE
EKG shows sinus rhythm with a rate of 69, there is no ST elevation.  There is flattening of the T wave in lead I and aVL.  QTc is 452.  Likely secondary to dehydration and polypharmacy  I will place on continuous cardiac monitoring  I will check orthostatic vital    I will check random cortisol

## 2024-04-01 VITALS
OXYGEN SATURATION: 98 % | WEIGHT: 164.02 LBS | HEART RATE: 81 BPM | BODY MASS INDEX: 25.74 KG/M2 | TEMPERATURE: 98.1 F | RESPIRATION RATE: 18 BRPM | DIASTOLIC BLOOD PRESSURE: 56 MMHG | SYSTOLIC BLOOD PRESSURE: 115 MMHG | HEIGHT: 67 IN

## 2024-04-01 LAB
ALBUMIN SERPL BCP-MCNC: 3.5 G/DL (ref 3.2–4.9)
ALBUMIN/GLOB SERPL: 1.8 G/DL
ALP SERPL-CCNC: 83 U/L (ref 30–99)
ALT SERPL-CCNC: 28 U/L (ref 2–50)
ANION GAP SERPL CALC-SCNC: 10 MMOL/L (ref 7–16)
AST SERPL-CCNC: 23 U/L (ref 12–45)
BILIRUB SERPL-MCNC: 0.5 MG/DL (ref 0.1–1.5)
BUN SERPL-MCNC: 23 MG/DL (ref 8–22)
CALCIUM ALBUM COR SERPL-MCNC: 8.8 MG/DL (ref 8.5–10.5)
CALCIUM SERPL-MCNC: 8.4 MG/DL (ref 8.4–10.2)
CHLORIDE SERPL-SCNC: 113 MMOL/L (ref 96–112)
CO2 SERPL-SCNC: 20 MMOL/L (ref 20–33)
CORTIS SERPL-MCNC: 1.8 UG/DL (ref 0–23)
CREAT SERPL-MCNC: 0.84 MG/DL (ref 0.5–1.4)
ERYTHROCYTE [DISTWIDTH] IN BLOOD BY AUTOMATED COUNT: 47.7 FL (ref 35.9–50)
GFR SERPLBLD CREATININE-BSD FMLA CKD-EPI: 72 ML/MIN/1.73 M 2
GLOBULIN SER CALC-MCNC: 2 G/DL (ref 1.9–3.5)
GLUCOSE SERPL-MCNC: 96 MG/DL (ref 65–99)
HCT VFR BLD AUTO: 34.8 % (ref 37–47)
HGB BLD-MCNC: 11.4 G/DL (ref 12–16)
MAGNESIUM SERPL-MCNC: 2 MG/DL (ref 1.5–2.5)
MCH RBC QN AUTO: 29.5 PG (ref 27–33)
MCHC RBC AUTO-ENTMCNC: 32.8 G/DL (ref 32.2–35.5)
MCV RBC AUTO: 89.9 FL (ref 81.4–97.8)
PLATELET # BLD AUTO: 225 K/UL (ref 164–446)
PMV BLD AUTO: 10.7 FL (ref 9–12.9)
POTASSIUM SERPL-SCNC: 4 MMOL/L (ref 3.6–5.5)
PROT SERPL-MCNC: 5.5 G/DL (ref 6–8.2)
RBC # BLD AUTO: 3.87 M/UL (ref 4.2–5.4)
SODIUM SERPL-SCNC: 143 MMOL/L (ref 135–145)
WBC # BLD AUTO: 5.4 K/UL (ref 4.8–10.8)

## 2024-04-01 PROCEDURE — 85027 COMPLETE CBC AUTOMATED: CPT

## 2024-04-01 PROCEDURE — 99239 HOSP IP/OBS DSCHRG MGMT >30: CPT | Performed by: INTERNAL MEDICINE

## 2024-04-01 PROCEDURE — 82533 TOTAL CORTISOL: CPT

## 2024-04-01 PROCEDURE — 700105 HCHG RX REV CODE 258: Performed by: HOSPITALIST

## 2024-04-01 PROCEDURE — A9270 NON-COVERED ITEM OR SERVICE: HCPCS | Performed by: HOSPITALIST

## 2024-04-01 PROCEDURE — 83735 ASSAY OF MAGNESIUM: CPT

## 2024-04-01 PROCEDURE — 700102 HCHG RX REV CODE 250 W/ 637 OVERRIDE(OP): Performed by: HOSPITALIST

## 2024-04-01 PROCEDURE — 80053 COMPREHEN METABOLIC PANEL: CPT

## 2024-04-01 PROCEDURE — 94760 N-INVAS EAR/PLS OXIMETRY 1: CPT

## 2024-04-01 PROCEDURE — G0378 HOSPITAL OBSERVATION PER HR: HCPCS

## 2024-04-01 PROCEDURE — 36415 COLL VENOUS BLD VENIPUNCTURE: CPT

## 2024-04-01 RX ADMIN — SODIUM CHLORIDE: 9 INJECTION, SOLUTION INTRAVENOUS at 03:04

## 2024-04-01 RX ADMIN — TOPIRAMATE 25 MG: 25 TABLET, FILM COATED ORAL at 06:32

## 2024-04-01 RX ADMIN — LEVOTHYROXINE SODIUM 75 MCG: 0.07 TABLET ORAL at 06:32

## 2024-04-01 RX ADMIN — OMEPRAZOLE 20 MG: 20 CAPSULE, DELAYED RELEASE ORAL at 06:32

## 2024-04-01 RX ADMIN — DULOXETINE HYDROCHLORIDE 40 MG: 20 CAPSULE, DELAYED RELEASE ORAL at 06:32

## 2024-04-01 RX ADMIN — PREGABALIN 50 MG: 25 CAPSULE ORAL at 06:31

## 2024-04-01 RX ADMIN — PREGABALIN 50 MG: 25 CAPSULE ORAL at 13:55

## 2024-04-01 ASSESSMENT — PAIN DESCRIPTION - PAIN TYPE: TYPE: ACUTE PAIN

## 2024-04-01 ASSESSMENT — FIBROSIS 4 INDEX: FIB4 SCORE: 1.45

## 2024-04-01 NOTE — PROGRESS NOTES
Discharge instructions given and discussed, signed copy in chart. Pt verbalized understanding and all questions answered. Yes. prescriptions no new. Pt discharged home in stable condition on no O2 via WC escorted by transport. Personal belongings sent with patient. IV removed and tolerated well. Tele box removed, monitor tech notified.

## 2024-04-01 NOTE — PROGRESS NOTES
Telemetry Shift Summary     Rhythm SR  HR Range   Ectopy   Measurements  0.20/.08/.38     Normal Values  Rhythm SR  HR Range    Measurements 0.12-0.20 / 0.06-0.10  / 0.30-0.52

## 2024-04-01 NOTE — THERAPY
Speech Language Therapy Contact Note    Patient Name: Margoth Hopkins  Age:  73 y.o., Sex:  female  Medical Record #: [id number]  Today's Date: 4/1/2024    Discussed missed therapy with RN       04/01/24 0905   Initial Contact Note    Initial Contact Note  Order Received and Verified. Speech Therapy Evaluation NOT Completed Because Patient Does Not Require Acute Speech Therapy at this Time.   Interdisciplinary Plan of Care Collaboration   IDT Collaboration with  Nursing   Collaboration Comments Orders received for CSE. Per RN, EC7/TN0 diet is baseline d/t hx of recent esophageal dilation. No s/sx of aspiration noted. SLP will cancel orders, as CSE is not warranted at this time. Please re-consult w/ any difficulty. Thank you.

## 2024-04-01 NOTE — DISCHARGE INSTRUCTIONS
Please follow up with your primary care physician within 3-5 days    Please measure you blood pressure before taking your blood pressure medications. Please hold the dose if your systolic blood pressure is less than 100.     Please measure and record your blood pressure 3 times a day and provide the readings to your primary care physician so any required adjustments can be made

## 2024-04-01 NOTE — PROGRESS NOTES
Received report from Nathaly at change of shift. Pt reported no pain or discomfort, POC discussed, patient agreeable. Call light and belongings within reach. Educated patient to utilize call light button to request bathroom and ambulation needs. Patient verbalized understanding. Bed locked and in low position. Fall precautions in place.

## 2024-04-01 NOTE — PROGRESS NOTES
Telemetry Shift Summary     Rhythm SR  HR Range 62-89  Ectopy rPVC  Measurements  0.24/0.08/0.44     Normal Values  Rhythm SR  HR Range    Measurements 0.12-0.20 / 0.06-0.10  / 0.30-0.52

## 2024-04-01 NOTE — CARE PLAN
The patient is Watcher - Medium risk of patient condition declining or worsening    Shift Goals  Clinical Goals: monitor labs, encourage PO intake  Patient Goals: feel better, go home    Progress made toward(s) clinical / shift goals:    Problem: Hemodynamics  Goal: Patient's hemodynamics, fluid balance and neurologic status will be stable or improve  Outcome: Progressing- VS remain stable     Problem: Fluid Volume  Goal: Fluid volume balance will be maintained  Outcome: Progressing- IVF dced, adequate po intake

## 2024-04-01 NOTE — CARE PLAN
The patient is Stable - Low risk of patient condition declining or worsening    Shift Goals  Clinical Goals: IV Fluids, Monitor labs  Patient Goals: Rest    Progress made toward(s) clinical / shift goals:    Problem: Hemodynamics  Goal: Patient's hemodynamics, fluid balance and neurologic status will be stable or improve  Outcome: Progressing  Note: Vital signs monitored. Managed IV fluids and IV infusions.      Problem: Fluid Volume  Goal: Fluid volume balance will be maintained  Outcome: Progressing       Patient is not progressing towards the following goals:

## 2024-04-01 NOTE — DISCHARGE SUMMARY
Discharge Summary    CHIEF COMPLAINT ON ADMISSION  Chief Complaint   Patient presents with    Dizziness     Started this am       Reason for Admission  EMS     Admission Date  3/31/2024    CODE STATUS  Full Code    HPI & HOSPITAL COURSE  Margoth Hopkins is a 73 y.o. female admitted 3/31/2024 with generalized weakness, lightheadedness and fatigue.  She history of hypertension, coronary artery disease (AVR in May 2023), esophageal stricture (underwent dilation on 3/31/2024).     On admission, EKG showed sinus rhythm with no ST elevation.  She was placed on continuous cardiac monitoring, no events were reported overnight. CXR from admission was unremarkable.     Patient states that her blood pressure is very low at the time of presyncopal episodes. Patient has been advised to measure her blood pressure before taking her blood pressure medication, and to hold the dose if her SBP is less than 100. She has been advised to record her blood pressure 3 times a day and provide the readings to her PCP so any required adjustments can be made.        Therefore, she is discharged in fair and stable condition to home with close outpatient follow-up.      Discharge Date  4/1/2024    FOLLOW UP ITEMS POST DISCHARGE  PCP in 3-5 days    DISCHARGE DIAGNOSES  Principal Problem:    Near syncope (POA: Yes)  Active Problems:    Primary hypertension (POA: Yes)    Pure hypercholesterolemia (POA: Yes)    MOHAMUD (generalized anxiety disorder) (POA: Yes)      Overview: IMO load March 2020    Postoperative hypothyroidism_s/p thyroidectomy_Dr. Mullen (POA: Yes)    Primary insomnia (POA: Yes)      Overview: Takes Ambien PRN for sleep     Coronary artery disease due to lipid rich plaque (Chronic) (POA: Yes)      Overview: Cardiac cath 1/2024:       1. Patent native coronary arteries, atretic LIMA due to robust native       circulation      2. Normally functioning bioprosthetic AVR      3.  Mild elevation of EDP at 22 mmHg      4.  Normal LV systolic  function      5.  No evidence of diaphragmatic paralysis on sniff test    Prerenal azotemia (POA: Yes)    Prediabetes (POA: Yes)    Dehydration (POA: Yes)    Dysphagia (POA: Yes)    Elevated troponin (POA: Yes)  Resolved Problems:    * No resolved hospital problems. *      FOLLOW UP  Future Appointments   Date Time Provider Department Center   5/28/2024  4:00 PM Charlee Walker M.D. [facility]   8/22/2024 11:15 AM BIANCA Muñoz None     No follow-up provider specified.    MEDICATIONS ON DISCHARGE     Medication List        CHANGE how you take these medications        Instructions   isosorbide mononitrate SR 60 MG Tb24  What changed: when to take this  Commonly known as: Imdur   Take 1 Tablet by mouth every day.  Dose: 60 mg     rosuvastatin 40 MG tablet  What changed: when to take this  Commonly known as: Crestor   TAKE 1 TABLET DAILY  Dose: 40 mg            CONTINUE taking these medications        Instructions   acetaminophen 500 MG Tabs  Commonly known as: Tylenol   Take 500 mg by mouth every 6 hours as needed. Indications: Pain  Dose: 500 mg     busPIRone 10 MG Tabs tablet  Commonly known as: Buspar   Take 1 Tablet by mouth at bedtime. Pt takes once a day, not BID  Dose: 10 mg     clopidogrel 75 MG Tabs  Commonly known as: Plavix   Take 1 Tablet by mouth every day.  Dose: 75 mg     DULoxetine HCl 40 MG Cpep   Take 40 mg by mouth 2 times a day.  Dose: 40 mg     ezetimibe 10 MG Tabs  Commonly known as: Zetia   Take 1 Tablet by mouth every evening.  Dose: 10 mg     lisinopril 30 MG tablet  Commonly known as: Prinivil   Take 1 Tablet by mouth every day.  Dose: 30 mg     metoprolol tartrate 25 MG Tabs  Commonly known as: Lopressor   Take 1 Tablet by mouth 2 times a day.  Dose: 25 mg     nitroglycerin 0.4 MG Subl  Commonly known as: Nitrostat   Place 1 Tablet under the tongue as needed for Chest Pain.  Dose: 0.4 mg     omeprazole 20 MG delayed-release capsule  Commonly known as: PriLOSEC   Take 1  Capsule by mouth every morning.  Dose: 20 mg     pregabalin 50 MG capsule  Commonly known as: Lyrica   Take 1 Capsule by mouth 3 times a day for 90 days.  Dose: 50 mg     ranolazine 500 MG Tb12  Commonly known as: Ranexa   Take 1 Tablet by mouth 2 times a day.  Dose: 500 mg     SALONPAS PAIN RELIEF PATCH EX   Apply 1 Patch topically as needed (Apply's on back for pain).  Dose: 1 Patch     Synthroid 75 MCG Tabs  Generic drug: levothyroxine   Doctor's comments: Requesting 1 year supply  Take 1 Tablet by mouth every morning on an empty stomach.  Dose: 75 mcg     topiramate 25 MG capsule  Commonly known as: Topamax   TAKE 1 CAPSULE BY MOUTH TWICE A DAY  Dose: 25 mg     vitamin D2 (Ergocalciferol) 1.25 MG (12237 UT) Caps capsule  Commonly known as: Drisdol   Doctor's comments: Requesting 1 year supply  Take 1 Capsule by mouth every 7 days. On Sat  Dose: 50,000 Units     Voltaren 1 % Gel  Generic drug: diclofenac sodium   Apply 2 g topically 4 times a day as needed (Apply's on both knees).  Dose: 2 g            STOP taking these medications      zolpidem 5 MG Tabs  Commonly known as: Ambien              Allergies  Allergies   Allergen Reactions    Morphine Vomiting and Nausea    Nsaids Unspecified     History of gastric ulcers - cannot take NSAIDS    Pcn [Penicillins] Hives     Tolerates ancef    Seasonal Runny Nose and Itching     .       DIET  Orders Placed This Encounter   Procedures    Diet Order Diet: Level 7 - Easy to Chew; Liquid level: Level 0 - Thin     Standing Status:   Standing     Number of Occurrences:   1     Order Specific Question:   Diet:     Answer:   Level 7 - Easy to Chew [22]     Order Specific Question:   Liquid level     Answer:   Level 0 - Thin       ACTIVITY  As tolerated.      CONSULTATIONS  None    PROCEDURES  None    LABORATORY  Lab Results   Component Value Date    SODIUM 143 04/01/2024    POTASSIUM 4.0 04/01/2024    CHLORIDE 113 (H) 04/01/2024    CO2 20 04/01/2024    GLUCOSE 96 04/01/2024     BUN 23 (H) 04/01/2024    CREATININE 0.84 04/01/2024        Lab Results   Component Value Date    WBC 5.4 04/01/2024    HEMOGLOBIN 11.4 (L) 04/01/2024    HEMATOCRIT 34.8 (L) 04/01/2024    PLATELETCT 225 04/01/2024        Total time of the discharge process exceeds 35 minutes.

## 2024-04-02 ENCOUNTER — PATIENT OUTREACH (OUTPATIENT)
Dept: SCHEDULING | Facility: IMAGING CENTER | Age: 74
End: 2024-04-02
Payer: COMMERCIAL

## 2024-04-08 ENCOUNTER — OFFICE VISIT (OUTPATIENT)
Dept: MEDICAL GROUP | Facility: MEDICAL CENTER | Age: 74
End: 2024-04-08
Payer: COMMERCIAL

## 2024-04-08 VITALS
BODY MASS INDEX: 26.99 KG/M2 | OXYGEN SATURATION: 98 % | HEIGHT: 67 IN | WEIGHT: 171.96 LBS | HEART RATE: 54 BPM | DIASTOLIC BLOOD PRESSURE: 62 MMHG | TEMPERATURE: 97.6 F | SYSTOLIC BLOOD PRESSURE: 116 MMHG

## 2024-04-08 DIAGNOSIS — Z95.1 S/P CABG X 1: ICD-10-CM

## 2024-04-08 DIAGNOSIS — Z09 HOSPITAL DISCHARGE FOLLOW-UP: Primary | ICD-10-CM

## 2024-04-08 DIAGNOSIS — I10 BENIGN ESSENTIAL HTN: ICD-10-CM

## 2024-04-08 DIAGNOSIS — E78.00 PURE HYPERCHOLESTEROLEMIA: ICD-10-CM

## 2024-04-08 DIAGNOSIS — R49.0 HOARSENESS OF VOICE: ICD-10-CM

## 2024-04-08 DIAGNOSIS — J30.9 ALLERGIC RHINITIS, UNSPECIFIED SEASONALITY, UNSPECIFIED TRIGGER: ICD-10-CM

## 2024-04-08 DIAGNOSIS — H69.93 DYSFUNCTION OF BOTH EUSTACHIAN TUBES: ICD-10-CM

## 2024-04-08 DIAGNOSIS — R42 DIZZINESS: ICD-10-CM

## 2024-04-08 DIAGNOSIS — R13.12 OROPHARYNGEAL DYSPHAGIA: ICD-10-CM

## 2024-04-08 DIAGNOSIS — K22.2 SCHATZKI'S RING: ICD-10-CM

## 2024-04-08 PROBLEM — R79.89 ELEVATED TROPONIN: Status: RESOLVED | Noted: 2024-03-31 | Resolved: 2024-04-08

## 2024-04-08 PROBLEM — R79.89 PRERENAL AZOTEMIA: Status: RESOLVED | Noted: 2023-08-24 | Resolved: 2024-04-08

## 2024-04-08 PROBLEM — E86.0 DEHYDRATION: Status: RESOLVED | Noted: 2024-03-31 | Resolved: 2024-04-08

## 2024-04-08 PROBLEM — R55 SYNCOPE AND COLLAPSE: Status: RESOLVED | Noted: 2024-03-31 | Resolved: 2024-04-08

## 2024-04-08 PROBLEM — Z96.652 S/P TOTAL KNEE ARTHROPLASTY, LEFT: Status: ACTIVE | Noted: 2023-04-17

## 2024-04-08 RX ORDER — CETIRIZINE HYDROCHLORIDE 10 MG/1
10 TABLET ORAL DAILY
Qty: 90 TABLET | Refills: 3 | Status: SHIPPED | OUTPATIENT
Start: 2024-04-08

## 2024-04-08 RX ORDER — TRIAMCINOLONE ACETONIDE 40 MG/ML
40 INJECTION, SUSPENSION INTRA-ARTICULAR; INTRAMUSCULAR ONCE
Status: COMPLETED | OUTPATIENT
Start: 2024-04-08 | End: 2024-04-08

## 2024-04-08 RX ORDER — LISINOPRIL 20 MG/1
20 TABLET ORAL DAILY
Qty: 90 TABLET | Refills: 3 | Status: SHIPPED | OUTPATIENT
Start: 2024-04-08

## 2024-04-08 RX ADMIN — TRIAMCINOLONE ACETONIDE 40 MG: 40 INJECTION, SUSPENSION INTRA-ARTICULAR; INTRAMUSCULAR at 15:36

## 2024-04-08 ASSESSMENT — ENCOUNTER SYMPTOMS
FEVER: 0
DIARRHEA: 0
BLOOD IN STOOL: 0
CARDIOVASCULAR NEGATIVE: 1
VOMITING: 0
NAUSEA: 0
DIAPHORESIS: 0
HEARTBURN: 0
WEIGHT LOSS: 0
ABDOMINAL PAIN: 0
CONSTIPATION: 0
CHILLS: 0
RESPIRATORY NEGATIVE: 1

## 2024-04-08 ASSESSMENT — FIBROSIS 4 INDEX: FIB4 SCORE: 1.45

## 2024-04-08 NOTE — PROGRESS NOTES
Subjective:     Margoth Hopkins is a 73 y.o. female who presents for Hospital Follow-up.    Transitional Care Management    Patient has CAD s/p CABG x1, s/p aortic valve replacement, hypertension, hyperlipidemia, paroxysmal A-fib, hypothyroidism, prediabetes, Schatzki's rings status post dilation x7 times.  Patient was admitted to the hospital on 3/21/2024 for intermittent, transient dizziness without vertigo.  Patient reports that she experienced dizziness with and without standing.  Workups done during hospital admission including chest x-ray, EKG, labs, urinalysis were unremarkable.  Her admission was uneventful.  There was no event reported on telemetry.  She was discharged in stable condition.  Her symptoms was attributable to dehydration and low blood pressure.  Patient was advised to follow-up with me to discuss management of her blood pressure.  Patient has been monitoring her blood pressure at home daily as directed.  Her systolic blood pressure runs in the 110s and diastolic blood pressure runs in the 70s-80s.     As mentioned above, patient was previously diagnosed with Schatzki's ring status post multiple dilations.  Her last EGD and distal esophageal dilation was on 3/27/2024 due to persistent substernal discomfort.  After the procedure, patient reports improvement of substernal discomfort and esophageal dysphagia.  However, she complains of acute oropharyngeal soreness/dysphagia following the procedure.  Today, oropharyngeal soreness is slightly improved.    Patient also complains of constant rhinorrhea, bilateral sinus congestion, postnasal drips.  Patient was previously diagnosed with allergic rhinitis.  She currently does not take any medication for this condition.    Denies fever, chills, history of head trauma, hearing loss, tinnitus, neurological symptoms.      Current medicines (including reconciliation performed today)  Current Outpatient Medications   Medication Sig Dispense Refill    Alum &  Mag Hydroxide-Simeth (MAALOX PLUS-BENADRYL-XYLOCAINE) Take 5 mL by mouth every 6 hours as needed (throat soreness following EGD). 200 mL 1    lisinopril (PRINIVIL) 20 MG Tab Take 1 Tablet by mouth every day. 90 Tablet 3    metoprolol tartrate (LOPRESSOR) 25 MG Tab Take 0.5 Tablets by mouth 2 times a day. 90 Tablet 3    cetirizine (ZYRTEC) 10 MG Tab Take 1 Tablet by mouth every day. 90 Tablet 3    Menthol-Methyl Salicylate (SALONPAS PAIN RELIEF PATCH EX) Apply 1 Patch topically as needed (Apply's on back for pain).      acetaminophen (TYLENOL) 500 MG Tab Take 500 mg by mouth every 6 hours as needed. Indications: Pain      topiramate (TOPAMAX) 25 MG capsule TAKE 1 CAPSULE BY MOUTH TWICE A  Capsule 0    DULoxetine HCl 40 MG Cap DR Particles Take 40 mg by mouth 2 times a day. 180 Capsule 3    ezetimibe (ZETIA) 10 MG Tab Take 1 Tablet by mouth every evening. 90 Tablet 3    clopidogrel (PLAVIX) 75 MG Tab Take 1 Tablet by mouth every day. 90 Tablet 3    nitroglycerin (NITROSTAT) 0.4 MG SL Tab Place 1 Tablet under the tongue as needed for Chest Pain. 25 Tablet 2    ranolazine (RANEXA) 500 MG TABLET SR 12 HR Take 1 Tablet by mouth 2 times a day. 180 Tablet 3    busPIRone (BUSPAR) 10 MG Tab tablet Take 1 Tablet by mouth at bedtime. Pt takes once a day, not BID 90 Tablet 3    omeprazole (PRILOSEC) 20 MG delayed-release capsule Take 1 Capsule by mouth every morning. 90 Capsule 3    pregabalin (LYRICA) 50 MG capsule Take 1 Capsule by mouth 3 times a day for 90 days. 90 Capsule 2    isosorbide mononitrate SR (IMDUR) 60 MG TABLET SR 24 HR Take 1 Tablet by mouth every day. (Patient taking differently: Take 60 mg by mouth every evening.) 100 Tablet 3    rosuvastatin (CRESTOR) 40 MG tablet TAKE 1 TABLET DAILY (Patient taking differently: Take 40 mg by mouth every evening.) 90 Tablet 3    diclofenac sodium (VOLTAREN) 1 % Gel Apply 2 g topically 4 times a day as needed (Apply's on both knees).      vitamin D2, Ergocalciferol,  "(DRISDOL) 1.25 MG (20762 UT) Cap capsule Take 1 Capsule by mouth every 7 days. On Sat 12 Capsule 3    SYNTHROID 75 MCG Tab Take 1 Tablet by mouth every morning on an empty stomach. 90 Tablet 3     No current facility-administered medications for this visit.       Allergies:   Morphine, Nsaids, Pcn [penicillins], and Seasonal    Social History     Tobacco Use    Smoking status: Never    Smokeless tobacco: Never   Vaping Use    Vaping Use: Never used   Substance Use Topics    Alcohol use: Yes     Alcohol/week: 2.4 oz     Types: 4 Glasses of wine per week    Drug use: Not Currently       ROS:  Review of Systems   Constitutional:  Negative for chills, diaphoresis, fever, malaise/fatigue and weight loss.   Respiratory: Negative.     Cardiovascular: Negative.    Gastrointestinal:  Negative for abdominal pain, blood in stool, constipation, diarrhea, heartburn, melena, nausea and vomiting.   Skin:  Negative for rash.         Objective:     Vitals:    04/08/24 1419   BP: 116/62   BP Location: Left arm   Patient Position: Sitting   BP Cuff Size: Adult long   Pulse: (!) 54   Temp: 36.4 °C (97.6 °F)   TempSrc: Temporal   SpO2: 98%   Weight: 78 kg (171 lb 15.3 oz)   Height: 1.702 m (5' 7\")     Body mass index is 26.93 kg/m².    Physical Exam:  Physical Exam  Constitutional:       Appearance: Normal appearance.   HENT:      Head:      Comments: Bulging TMs bilaterally with mild to moderate effusion.  Pale and congested nasal mucosa.  Narrowed posterior oropharynx with mild soft tissue edema/erythema.     Nose: Congestion and rhinorrhea present.   Cardiovascular:      Rate and Rhythm: Normal rate and regular rhythm.      Pulses: Normal pulses.      Heart sounds: Normal heart sounds. No murmur heard.     No friction rub. No gallop.   Pulmonary:      Effort: No respiratory distress.      Breath sounds: No stridor. No wheezing, rhonchi or rales.   Chest:      Chest wall: No tenderness.   Musculoskeletal:      Cervical back: Neck " supple. No rigidity or tenderness.   Lymphadenopathy:      Cervical: No cervical adenopathy.   Neurological:      General: No focal deficit present.      Mental Status: She is alert and oriented to person, place, and time.   Psychiatric:         Mood and Affect: Mood normal.         Behavior: Behavior normal.         Thought Content: Thought content normal.         Judgment: Judgment normal.         Assessment and Plan:     1. Hospital discharge follow-up  2. Dizziness  3. Dysfunction of both eustachian tubes  4. Allergic rhinitis, unspecified seasonality, unspecified trigger  73-year-old female with CAD s/p aortic valve replacement and CABG x1, hypertension, fibromyalgia, chronic pain syndrome, GERD, Schatzki's ring s/p multiple dilation, hypothyroidism, prediabetes who has been experiencing intermittent dizziness.  She was admitted to the hospital and monitor on telemetry from 3/31/2024 to 4/1/2024 without any cardiac event.  Workups including laboratory studies chest x-ray, EKG were unremarkable.  Patient was found to have dehydration, low low blood pressure during hospital admission which might contribute to her symptoms.  ENT exam was notable for uncontrolled allergic rhinitis, bilateral eustachian tube dysfunction which might contribute to her symptoms as well.  Imaging of her brain was not done during hospital admission.  No other neurological symptoms reported.  - BP addressed blow.   - cetirizine (ZYRTEC) 10 MG Tab; Take 1 Tablet by mouth every day.  Dispense: 90 Tablet; Refill: 3  - triamcinolone acetonide (Kenalog-40) injection 40 mg  - Flonase: 2 spays per nostril once daily.   - Fall prevention discussed.  - Keep appointment to follow-up with me on 5/28/2024.  Neuroimaging may be indicated if symptoms fail to improve.      5.  Oropharyngeal dysphagia  6. Hoarseness of voice  Patient complains of acute severe oropharyngeal dysphagia following recent endoscopy done on 3/27/2024.  throat exam was notable  for narrowed oropharynx with mild soft tissue edema and erythema.   - Alum & Mag Hydroxide-Simeth (MAALOX PLUS-BENADRYL-XYLOCAINE); Take 5 mL by mouth every 6 hours as needed (throat soreness following EGD).  Dispense: 200 mL; Refill: 1  - hydration   - Dietary modification  -Tylenol as needed for pain  -Follow-up in 4 to 6 weeks.    7. Primary hypertension  Chronic, currently taking lisinopril 30 mg daily and metoprolol 25 mg twice daily.  Patient has been experiencing intermittent dizziness, low blood pressure, low heart rate.  -Reduce lisinopril to 20 mg daily: Lisinopril (PRINIVIL) 20 MG Tab; Take 1 Tablet by mouth every day.  Dispense: 90 Tablet; Refill: 3  -Reduce metoprolol to 12.5 mg twice daily  -Hydration discussed  -Continue to monitor blood pressure at home and keep log.  -Follow-up in 4 to 6 weeks    8. Pure hypercholesterolemia  Chronic, currently taking Zetia 10 mg daily and Crestor 40 mg.  She tolerates both medication well, no side effect reported.  Will continue to monitor.    9. Schatzki's ring_DHA  S/p multiple dilations, most recent 1 was on 3/27/2024.  Distal esophageal dysphagia has improved following dilation.  Patient was advised to continue PPI and follow-up with GI as directed.      - Chart and discharge summary were reviewed.   - Hospitalization and results reviewed with patient.   - Medications reviewed including instructions regarding high risk medications, dosing and side effects.  - Recommended Services: No services needed at this time  - Advance directive/POLST on file?  Yes    Follow-up:Return in about 4 weeks (around 5/6/2024) for Multiple issues.

## 2024-05-06 DIAGNOSIS — E55.9 VITAMIN D DEFICIENCY: ICD-10-CM

## 2024-05-06 RX ORDER — ERGOCALCIFEROL 1.25 MG/1
CAPSULE ORAL
Qty: 12 CAPSULE | Refills: 0 | Status: SHIPPED | OUTPATIENT
Start: 2024-05-06

## 2024-05-16 DIAGNOSIS — K22.2 SCHATZKI'S RING: ICD-10-CM

## 2024-05-17 RX ORDER — OMEPRAZOLE 20 MG/1
20 CAPSULE, DELAYED RELEASE ORAL EVERY MORNING
Qty: 90 CAPSULE | Refills: 3 | Status: SHIPPED | OUTPATIENT
Start: 2024-05-17

## 2024-05-31 PROBLEM — Z81.8 FHX: DEMENTIA: Status: ACTIVE | Noted: 2024-05-31

## 2024-06-27 ENCOUNTER — OFFICE VISIT (OUTPATIENT)
Dept: MEDICAL GROUP | Facility: MEDICAL CENTER | Age: 74
End: 2024-06-27
Payer: COMMERCIAL

## 2024-06-27 ENCOUNTER — APPOINTMENT (OUTPATIENT)
Dept: MEDICAL GROUP | Facility: MEDICAL CENTER | Age: 74
End: 2024-06-27
Payer: COMMERCIAL

## 2024-06-27 VITALS
HEIGHT: 67 IN | TEMPERATURE: 97.9 F | WEIGHT: 165.34 LBS | BODY MASS INDEX: 25.95 KG/M2 | SYSTOLIC BLOOD PRESSURE: 112 MMHG | OXYGEN SATURATION: 99 % | HEART RATE: 81 BPM | DIASTOLIC BLOOD PRESSURE: 68 MMHG

## 2024-06-27 DIAGNOSIS — I10 BENIGN ESSENTIAL HTN: ICD-10-CM
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
## 2024-06-27 DIAGNOSIS — E78.5 DYSLIPIDEMIA: ICD-10-CM

## 2024-06-27 DIAGNOSIS — Z81.8 FHX: DEMENTIA: ICD-10-CM

## 2024-06-27 DIAGNOSIS — R06.02 SOB (SHORTNESS OF BREATH) ON EXERTION: ICD-10-CM

## 2024-06-27 DIAGNOSIS — R42 DIZZINESS: ICD-10-CM

## 2024-06-27 DIAGNOSIS — J30.9 ALLERGIC RHINITIS, UNSPECIFIED SEASONALITY, UNSPECIFIED TRIGGER: ICD-10-CM

## 2024-06-27 DIAGNOSIS — R41.3 MEMORY PROBLEM: ICD-10-CM

## 2024-06-27 DIAGNOSIS — G47.19 DAYTIME HYPERSOMNOLENCE: ICD-10-CM

## 2024-06-27 DIAGNOSIS — R55 SYNCOPE, UNSPECIFIED SYNCOPE TYPE: ICD-10-CM

## 2024-06-27 DIAGNOSIS — S05.91XA RIGHT EYE INJURY, INITIAL ENCOUNTER: ICD-10-CM

## 2024-06-27 DIAGNOSIS — I95.1 ORTHOSTATIC HYPOTENSION: ICD-10-CM

## 2024-06-27 RX ORDER — LISINOPRIL 10 MG/1
10 TABLET ORAL DAILY
Qty: 90 TABLET | Refills: 3 | Status: SHIPPED | OUTPATIENT
Start: 2024-06-27

## 2024-06-27 RX ORDER — TRIAMCINOLONE ACETONIDE 40 MG/ML
40 INJECTION, SUSPENSION INTRA-ARTICULAR; INTRAMUSCULAR ONCE
Status: COMPLETED | OUTPATIENT
Start: 2024-06-27 | End: 2024-06-27

## 2024-06-27 RX ADMIN — TRIAMCINOLONE ACETONIDE 40 MG: 40 INJECTION, SUSPENSION INTRA-ARTICULAR; INTRAMUSCULAR at 10:47

## 2024-06-27 ASSESSMENT — FIBROSIS 4 INDEX: FIB4 SCORE: 1.45

## 2024-06-28 DIAGNOSIS — E89.0 POSTOPERATIVE HYPOTHYROIDISM: ICD-10-CM

## 2024-06-28 PROBLEM — Z79.891 CHRONIC USE OF OPIATE DRUG FOR THERAPEUTIC PURPOSE: Status: RESOLVED | Noted: 2018-07-25 | Resolved: 2024-06-28

## 2024-06-28 RX ORDER — LEVOTHYROXINE SODIUM 75 MCG
75 TABLET ORAL
Qty: 90 TABLET | Refills: 3 | Status: SHIPPED | OUTPATIENT
Start: 2024-06-28

## 2024-06-29 NOTE — PROGRESS NOTES
Verbal consent was acquired by the patient to use [employer] ambient listening note generation during this visit: YES    CC: facial injury, fall at the ground level    Assessment & Plan:     1. Ground-level fall  2. Right eye injury, initial encounter  3. Syncope, unspecified syncope type  4. Dizziness  5. Orthostatic hypotension  6. Primary hypertension  Acute GLF during recent trip with her  leading to right eye injury and brief syncope. She also complains of intermittent dizziness, sometimes with standing. No acute changes of vision reported before and after the fall. Exam was notable for resolving hematoma at the corner of the right eye. Neurological exam is at baseline. BP was 112/78.   - Referral to Ophthalmology  - [facility] ZIO PATCH MONITOR; Future  - CBC WITH DIFFERENTIAL; Future  - Comp Metabolic Panel; Future  - TSH WITH REFLEX TO FT4; Future  - MR-BRAIN-W/O; Future  - Referral to Audiology: hearing-balance tests @ [facility]  - reduce Lisinopril from 20 mg to 10 mg qd: lisinopril (PRINIVIL) 10 MG Tab; Take 1 Tablet by mouth every day.  Dispense: 90 Tablet; Refill: 3  - follow up in 6 wks     7. Memory problem  8. FHx: dementia  - MR-BRAIN-W/O; Future  - Referral to Neurology  - TSH WITH REFLEX TO FT4; Future    9. Dyslipidemia  Chronic, controlled with Crestor 40 mg qd, no side effects reported, will continue.     10. SOB (shortness of breath) on exertion  Patient complains of persistent IVAN. She also has chest pain on exertion intermittently as well.   She had aortic valve replacement and CABG x1 in 8/2023.   Cardiac cath done in 1/2024 was negative for acute concerns.   She has history of Chatzki's ring and had dilation x6, most recent dilation was in 3/2024.   Negative history of chronic lung disease, tobacco abuse, history of secondhand smoke exposure. CXR done in 3/2024 was negative.   - PULMONARY FUNCTION TESTS -Test requested: Complete Pulmonary Function Test; Future  - follow up with cardiology as  "directed.     11. Daytime hypersomnolence  History is concerned for MARGOT.   STOPBANG score: 3 (2/13/2024  8:21 AM)  - Overnight Home Sleep Study; Future    12. Allergic rhinitis, unspecified seasonality, unspecified trigger  - triamcinolone acetonide (Kenalog-40) injection 40 mg   - Nasal saline irrigation 2-3 times per day to reduce allergen load  - Over-the-counter nasal steroid (Flonase, NasoNex, or Nasacort)   - Over-the-counter oral anti-histamine PRN (Claritin, Allegra, or Zyrtec)      Subjective:       HPI:   History of Present Illness  The patient presents for evaluation of multiple medical concerns.    The patient has been unwell for the past 3 weeks, characterized by a cold. She recently returned from a vacation to [place], during which she experienced a fall while on a boat. The incident occurred approximately 2 weeks ago, during which she experienced dizziness while standing in front of a mirror in the cabin. She fell forward, striking her eye on the railing, and subsequently lost consciousness for a few minutes. The incident resulted in a \"black eye\" on the right, which is now resolving. Prior to this incident, she was somewhat incoherent and disoriented. She did not seek medical attention. Her dizziness is exacerbated by standing sometimes, but not always.     Denies vertigo, new hearing problems, tinnitus, congestion, cough, fever, chills.     The patient also reports short-term memory issues, which have been progressively worsening over the past 6 to 8 months. She has difficulty recalling information. There is significant delay in speech and information processing. She takes a long time to think about the answers whenever her  asks her a question regarding anything.  She was prescribed a combination of rosuvastatin 40 mg and Zetia for hyperlipidemia. She is concerned of polypharmacy. She denies any history of stroke.     She has been taking Lyrica 50 mg TID for chronic pain. She is not sure that " it is still helping with symptoms.     During her vacation in [place], she experienced severe dyspnea while walking, needing frequent rests. She is not able to walk to her mailbox without dyspnea. Her cardiac cath done in 2024 was unremarkable. She had aortic valve replacement in 2023. She also has CABG x1 in 2023. She underwent multiple esophageal dilations for Schatzki's ring, most recent one was on 3/27/2024. Denies any lung problems. She has never smoked and denies history of secondhand smoke exposure.     Following her cardiac surgery in 2023, her cardiac rehabilitation was discontinued due to acute chest pain during cardiac rehab.  Her last cardiac monitoring was prior to her surgery in 2023.    Her  also reports that she sleeps all the time during the day. She has no energy. Her memory and concentration are poor. She snores sometimes at night. He is concerned for sleep apnea.     She complains of worsening allergic rhinitis with persistent rhinorrhea and congestion. Symptoms did not improve with over-the-counter treatment.      Her mother had late-onset dementia when she was in her 90s. Her brother  of brain problem in his 70s. She takes Ambien sometimes for sleep but not regularly.       Current Outpatient Medications:     lisinopril (PRINIVIL) 10 MG Tab, Take 1 Tablet by mouth every day., Disp: 90 Tablet, Rfl: 3    omeprazole (PRILOSEC) 20 MG delayed-release capsule, Take 1 Capsule by mouth every morning., Disp: 90 Capsule, Rfl: 3    vitamin D2, Ergocalciferol, (DRISDOL) 1.25 MG (23366 UT) Cap capsule, Take 1 Capsule by mouth every 7 days, on , Disp: 12 Capsule, Rfl: 0    metoprolol tartrate (LOPRESSOR) 25 MG Tab, Take 0.5 Tablets by mouth 2 times a day., Disp: 90 Tablet, Rfl: 3    cetirizine (ZYRTEC) 10 MG Tab, Take 1 Tablet by mouth every day., Disp: 90 Tablet, Rfl: 3    Menthol-Methyl Salicylate (SALONPAS PAIN RELIEF PATCH EX), Apply 1 Patch topically as needed (Apply's on  "back for pain)., Disp: , Rfl:     acetaminophen (TYLENOL) 500 MG Tab, Take 500 mg by mouth every 6 hours as needed. Indications: Pain, Disp: , Rfl:     topiramate (TOPAMAX) 25 MG capsule, TAKE 1 CAPSULE BY MOUTH TWICE A DAY, Disp: 180 Capsule, Rfl: 0    DULoxetine HCl 40 MG Cap DR Particles, Take 40 mg by mouth 2 times a day., Disp: 180 Capsule, Rfl: 3    clopidogrel (PLAVIX) 75 MG Tab, Take 1 Tablet by mouth every day., Disp: 90 Tablet, Rfl: 3    nitroglycerin (NITROSTAT) 0.4 MG SL Tab, Place 1 Tablet under the tongue as needed for Chest Pain., Disp: 25 Tablet, Rfl: 2    ranolazine (RANEXA) 500 MG TABLET SR 12 HR, Take 1 Tablet by mouth 2 times a day., Disp: 180 Tablet, Rfl: 3    busPIRone (BUSPAR) 10 MG Tab tablet, Take 1 Tablet by mouth at bedtime. Pt takes once a day, not BID, Disp: 90 Tablet, Rfl: 3    isosorbide mononitrate SR (IMDUR) 60 MG TABLET SR 24 HR, Take 1 Tablet by mouth every day. (Patient taking differently: Take 60 mg by mouth every evening.), Disp: 100 Tablet, Rfl: 3    rosuvastatin (CRESTOR) 40 MG tablet, TAKE 1 TABLET DAILY (Patient taking differently: Take 40 mg by mouth every evening.), Disp: 90 Tablet, Rfl: 3    diclofenac sodium (VOLTAREN) 1 % Gel, Apply 2 g topically 4 times a day as needed (Apply's on both knees)., Disp: , Rfl:     SYNTHROID 75 MCG Tab, Take 1 Tablet by mouth every morning on an empty stomach., Disp: 90 Tablet, Rfl: 3    Alum & Mag Hydroxide-Simeth (MAALOX PLUS-BENADRYL-XYLOCAINE), Take 5 mL by mouth every 6 hours as needed (throat soreness following EGD)., Disp: 200 mL, Rfl: 1     Objective:     Exam:  /68 (BP Location: Right arm, Patient Position: Sitting, BP Cuff Size: Large adult)   Pulse 81   Temp 36.6 °C (97.9 °F) (Temporal)   Ht 1.702 m (5' 7\")   Wt 75 kg (165 lb 5.5 oz)   SpO2 99%   BMI 25.90 kg/m²  Body mass index is 25.9 kg/m².    Constitutional: awake, alert, in no distress.  Skin: Warm, dry, good turgor, no rashes, bruises, ulcers in visible " areas.  Eye: conjunctiva clear, intact EOM and visual field tests.   - resolving hematoma noted at the medial corner of the right eye.   - facial bones are not tender to palpation.   ENMT: TM and auditory canals wnl. Pale and congested nasal mucosa. Lips without lesions, good dentition, oropharynx clear.  Neck: Trachea midline, no masses, no thyromegaly. No cervical or supraclavicular lymphadenopathy  Respiratory: Unlabored respiratory effort, lungs clear to auscultation, no wheezes, no rales.  Cardiovascular: Normal S1, S2, no murmur, no pedal edema.   Neuro: CN2-12 grossly intact. Strength 5/5, reflexes 2/4 in all extremities, no sensory deficits.   Psych: Oriented x3, affect and mood wnl, intact judgement and insight.         Return in about 6 weeks (around 8/8/2024) for Multiple issues.          Please note that this dictation was created using voice recognition software. I have made every reasonable attempt to correct obvious errors, but I expect that there are errors of grammar and possibly content that I did not discover before finalizing the note.    Total time spent reviewing pt's chart, labs, notes, imaging, and counseling/prescribing medications to patient before, during, and after the visit: 40 minutes.

## 2024-07-01 ENCOUNTER — HOSPITAL ENCOUNTER (OUTPATIENT)
Dept: LAB | Facility: MEDICAL CENTER | Age: 74
End: 2024-07-01
Attending: FAMILY MEDICINE
Payer: COMMERCIAL

## 2024-07-01 ENCOUNTER — HOSPITAL ENCOUNTER (OUTPATIENT)
Dept: LAB | Facility: MEDICAL CENTER | Age: 74
End: 2024-07-01
Attending: NURSE PRACTITIONER
Payer: COMMERCIAL

## 2024-07-01 DIAGNOSIS — I10 ESSENTIAL HYPERTENSION: ICD-10-CM

## 2024-07-01 DIAGNOSIS — R07.1 CHEST PAIN ON BREATHING: ICD-10-CM

## 2024-07-01 DIAGNOSIS — E78.00 PURE HYPERCHOLESTEROLEMIA: ICD-10-CM

## 2024-07-01 DIAGNOSIS — R55 SYNCOPE, UNSPECIFIED SYNCOPE TYPE: ICD-10-CM

## 2024-07-01 LAB
ALBUMIN SERPL BCP-MCNC: 3.9 G/DL (ref 3.2–4.9)
ALBUMIN/GLOB SERPL: 1.8 G/DL
ALP SERPL-CCNC: 179 U/L (ref 30–99)
ALT SERPL-CCNC: 115 U/L (ref 2–50)
ANION GAP SERPL CALC-SCNC: 12 MMOL/L (ref 7–16)
AST SERPL-CCNC: 142 U/L (ref 12–45)
BASOPHILS # BLD AUTO: 1 % (ref 0–1.8)
BASOPHILS # BLD: 0.06 K/UL (ref 0–0.12)
BILIRUB SERPL-MCNC: 0.8 MG/DL (ref 0.1–1.5)
BUN SERPL-MCNC: 17 MG/DL (ref 8–22)
CALCIUM ALBUM COR SERPL-MCNC: 9.3 MG/DL (ref 8.5–10.5)
CALCIUM SERPL-MCNC: 9.2 MG/DL (ref 8.5–10.5)
CHLORIDE SERPL-SCNC: 108 MMOL/L (ref 96–112)
CHOLEST SERPL-MCNC: 170 MG/DL (ref 100–199)
CO2 SERPL-SCNC: 20 MMOL/L (ref 20–33)
CREAT SERPL-MCNC: 0.86 MG/DL (ref 0.5–1.4)
EOSINOPHIL # BLD AUTO: 0.18 K/UL (ref 0–0.51)
EOSINOPHIL NFR BLD: 3 % (ref 0–6.9)
ERYTHROCYTE [DISTWIDTH] IN BLOOD BY AUTOMATED COUNT: 50.8 FL (ref 35.9–50)
FASTING STATUS PATIENT QL REPORTED: NORMAL
FASTING STATUS PATIENT QL REPORTED: NORMAL
GFR SERPLBLD CREATININE-BSD FMLA CKD-EPI: 70 ML/MIN/1.73 M 2
GLOBULIN SER CALC-MCNC: 2.2 G/DL (ref 1.9–3.5)
GLUCOSE SERPL-MCNC: 88 MG/DL (ref 65–99)
HCT VFR BLD AUTO: 38.6 % (ref 37–47)
HDLC SERPL-MCNC: 69 MG/DL
HGB BLD-MCNC: 12.6 G/DL (ref 12–16)
IMM GRANULOCYTES # BLD AUTO: 0.04 K/UL (ref 0–0.11)
IMM GRANULOCYTES NFR BLD AUTO: 0.7 % (ref 0–0.9)
LDLC SERPL CALC-MCNC: 74 MG/DL
LYMPHOCYTES # BLD AUTO: 1.4 K/UL (ref 1–4.8)
LYMPHOCYTES NFR BLD: 23 % (ref 22–41)
MCH RBC QN AUTO: 30.8 PG (ref 27–33)
MCHC RBC AUTO-ENTMCNC: 32.6 G/DL (ref 32.2–35.5)
MCV RBC AUTO: 94.4 FL (ref 81.4–97.8)
MONOCYTES # BLD AUTO: 0.6 K/UL (ref 0–0.85)
MONOCYTES NFR BLD AUTO: 9.8 % (ref 0–13.4)
NEUTROPHILS # BLD AUTO: 3.82 K/UL (ref 1.82–7.42)
NEUTROPHILS NFR BLD: 62.5 % (ref 44–72)
NRBC # BLD AUTO: 0 K/UL
NRBC BLD-RTO: 0 /100 WBC (ref 0–0.2)
NT-PROBNP SERPL IA-MCNC: 450 PG/ML (ref 0–125)
PLATELET # BLD AUTO: 312 K/UL (ref 164–446)
PMV BLD AUTO: 10.5 FL (ref 9–12.9)
POTASSIUM SERPL-SCNC: 4 MMOL/L (ref 3.6–5.5)
PROT SERPL-MCNC: 6.1 G/DL (ref 6–8.2)
RBC # BLD AUTO: 4.09 M/UL (ref 4.2–5.4)
SODIUM SERPL-SCNC: 140 MMOL/L (ref 135–145)
TRIGL SERPL-MCNC: 137 MG/DL (ref 0–149)
TSH SERPL DL<=0.005 MIU/L-ACNC: 1.36 UIU/ML (ref 0.38–5.33)
WBC # BLD AUTO: 6.1 K/UL (ref 4.8–10.8)

## 2024-07-01 PROCEDURE — 36415 COLL VENOUS BLD VENIPUNCTURE: CPT

## 2024-07-01 PROCEDURE — 84443 ASSAY THYROID STIM HORMONE: CPT

## 2024-07-01 PROCEDURE — 80061 LIPID PANEL: CPT

## 2024-07-01 PROCEDURE — 85025 COMPLETE CBC W/AUTO DIFF WBC: CPT

## 2024-07-01 PROCEDURE — 80053 COMPREHEN METABOLIC PANEL: CPT

## 2024-07-01 PROCEDURE — 83880 ASSAY OF NATRIURETIC PEPTIDE: CPT

## 2024-07-04 DIAGNOSIS — R42 DIZZINESS: ICD-10-CM

## 2024-07-04 DIAGNOSIS — H69.93 DYSFUNCTION OF BOTH EUSTACHIAN TUBES: ICD-10-CM

## 2024-07-04 DIAGNOSIS — J30.9 ALLERGIC RHINITIS, UNSPECIFIED SEASONALITY, UNSPECIFIED TRIGGER: ICD-10-CM

## 2024-07-05 ENCOUNTER — APPOINTMENT (OUTPATIENT)
Dept: MEDICAL GROUP | Facility: MEDICAL CENTER | Age: 74
End: 2024-07-05
Payer: COMMERCIAL

## 2024-07-07 RX ORDER — CETIRIZINE HYDROCHLORIDE 10 MG/1
10 TABLET ORAL DAILY
Qty: 90 TABLET | Refills: 3 | Status: SHIPPED | OUTPATIENT
Start: 2024-07-07 | End: 2024-07-22 | Stop reason: SDUPTHER

## 2024-07-09 DIAGNOSIS — B17.9 HEPATITIS, ACUTE: ICD-10-CM

## 2024-07-09 DIAGNOSIS — Z11.59 NEED FOR HEPATITIS C SCREENING TEST: ICD-10-CM

## 2024-07-09 DIAGNOSIS — Z11.59 NEED FOR HEPATITIS B SCREENING TEST: ICD-10-CM

## 2024-07-09 DIAGNOSIS — B17.9 ACUTE HEPATITIS: ICD-10-CM

## 2024-07-10 DIAGNOSIS — R55 SYNCOPE, UNSPECIFIED SYNCOPE TYPE: ICD-10-CM

## 2024-07-12 DIAGNOSIS — R06.02 SOB (SHORTNESS OF BREATH): ICD-10-CM

## 2024-07-19 ENCOUNTER — HOSPITAL ENCOUNTER (EMERGENCY)
Facility: MEDICAL CENTER | Age: 74
End: 2024-07-19
Attending: EMERGENCY MEDICINE
Payer: COMMERCIAL

## 2024-07-19 ENCOUNTER — APPOINTMENT (OUTPATIENT)
Dept: RADIOLOGY | Facility: MEDICAL CENTER | Age: 74
End: 2024-07-19
Attending: EMERGENCY MEDICINE
Payer: COMMERCIAL

## 2024-07-19 VITALS
HEART RATE: 61 BPM | SYSTOLIC BLOOD PRESSURE: 118 MMHG | TEMPERATURE: 96.8 F | HEIGHT: 67 IN | OXYGEN SATURATION: 99 % | WEIGHT: 165.79 LBS | DIASTOLIC BLOOD PRESSURE: 74 MMHG | BODY MASS INDEX: 26.02 KG/M2 | RESPIRATION RATE: 16 BRPM

## 2024-07-19 DIAGNOSIS — R10.13 EPIGASTRIC ABDOMINAL PAIN: ICD-10-CM

## 2024-07-19 DIAGNOSIS — R07.9 CHEST PAIN, UNSPECIFIED TYPE: ICD-10-CM

## 2024-07-19 LAB
ALBUMIN SERPL BCP-MCNC: 4.3 G/DL (ref 3.2–4.9)
ALBUMIN/GLOB SERPL: 1.5 G/DL
ALP SERPL-CCNC: 114 U/L (ref 30–99)
ALT SERPL-CCNC: 41 U/L (ref 2–50)
ANION GAP SERPL CALC-SCNC: 11 MMOL/L (ref 7–16)
APTT PPP: 24.1 SEC (ref 24.7–36)
AST SERPL-CCNC: 37 U/L (ref 12–45)
BASOPHILS # BLD AUTO: 0.8 % (ref 0–1.8)
BASOPHILS # BLD: 0.05 K/UL (ref 0–0.12)
BILIRUB SERPL-MCNC: 0.9 MG/DL (ref 0.1–1.5)
BUN SERPL-MCNC: 24 MG/DL (ref 8–22)
CALCIUM ALBUM COR SERPL-MCNC: 9.2 MG/DL (ref 8.5–10.5)
CALCIUM SERPL-MCNC: 9.4 MG/DL (ref 8.4–10.2)
CHLORIDE SERPL-SCNC: 106 MMOL/L (ref 96–112)
CO2 SERPL-SCNC: 24 MMOL/L (ref 20–33)
CREAT SERPL-MCNC: 0.98 MG/DL (ref 0.5–1.4)
D DIMER PPP IA.FEU-MCNC: 1 UG/ML (FEU) (ref 0–0.5)
EKG IMPRESSION: NORMAL
EOSINOPHIL # BLD AUTO: 0.18 K/UL (ref 0–0.51)
EOSINOPHIL NFR BLD: 2.9 % (ref 0–6.9)
ERYTHROCYTE [DISTWIDTH] IN BLOOD BY AUTOMATED COUNT: 55.3 FL (ref 35.9–50)
GFR SERPLBLD CREATININE-BSD FMLA CKD-EPI: 60 ML/MIN/1.73 M 2
GLOBULIN SER CALC-MCNC: 2.8 G/DL (ref 1.9–3.5)
GLUCOSE SERPL-MCNC: 70 MG/DL (ref 65–99)
HCT VFR BLD AUTO: 42.5 % (ref 37–47)
HGB BLD-MCNC: 13.7 G/DL (ref 12–16)
IMM GRANULOCYTES # BLD AUTO: 0.03 K/UL (ref 0–0.11)
IMM GRANULOCYTES NFR BLD AUTO: 0.5 % (ref 0–0.9)
INR PPP: 0.86 (ref 0.87–1.13)
LIPASE SERPL-CCNC: 36 U/L (ref 11–82)
LYMPHOCYTES # BLD AUTO: 1.82 K/UL (ref 1–4.8)
LYMPHOCYTES NFR BLD: 29.5 % (ref 22–41)
MCH RBC QN AUTO: 30.4 PG (ref 27–33)
MCHC RBC AUTO-ENTMCNC: 32.2 G/DL (ref 32.2–35.5)
MCV RBC AUTO: 94.2 FL (ref 81.4–97.8)
MONOCYTES # BLD AUTO: 0.6 K/UL (ref 0–0.85)
MONOCYTES NFR BLD AUTO: 9.7 % (ref 0–13.4)
NEUTROPHILS # BLD AUTO: 3.49 K/UL (ref 1.82–7.42)
NEUTROPHILS NFR BLD: 56.6 % (ref 44–72)
NRBC # BLD AUTO: 0 K/UL
NRBC BLD-RTO: 0 /100 WBC (ref 0–0.2)
NT-PROBNP SERPL IA-MCNC: 138 PG/ML (ref 0–125)
PLATELET # BLD AUTO: 277 K/UL (ref 164–446)
PMV BLD AUTO: 9.9 FL (ref 9–12.9)
POTASSIUM SERPL-SCNC: 4 MMOL/L (ref 3.6–5.5)
PROT SERPL-MCNC: 7.1 G/DL (ref 6–8.2)
PROTHROMBIN TIME: 12.1 SEC (ref 12–14.6)
RBC # BLD AUTO: 4.51 M/UL (ref 4.2–5.4)
SODIUM SERPL-SCNC: 141 MMOL/L (ref 135–145)
TROPONIN T SERPL-MCNC: 15 NG/L (ref 6–19)
TROPONIN T SERPL-MCNC: 18 NG/L (ref 6–19)
WBC # BLD AUTO: 6.2 K/UL (ref 4.8–10.8)

## 2024-07-19 PROCEDURE — 99285 EMERGENCY DEPT VISIT HI MDM: CPT

## 2024-07-19 PROCEDURE — 83880 ASSAY OF NATRIURETIC PEPTIDE: CPT

## 2024-07-19 PROCEDURE — 85730 THROMBOPLASTIN TIME PARTIAL: CPT

## 2024-07-19 PROCEDURE — 71275 CT ANGIOGRAPHY CHEST: CPT

## 2024-07-19 PROCEDURE — 85379 FIBRIN DEGRADATION QUANT: CPT

## 2024-07-19 PROCEDURE — 85025 COMPLETE CBC W/AUTO DIFF WBC: CPT

## 2024-07-19 PROCEDURE — 80053 COMPREHEN METABOLIC PANEL: CPT

## 2024-07-19 PROCEDURE — 93005 ELECTROCARDIOGRAM TRACING: CPT | Performed by: EMERGENCY MEDICINE

## 2024-07-19 PROCEDURE — 85610 PROTHROMBIN TIME: CPT

## 2024-07-19 PROCEDURE — 700117 HCHG RX CONTRAST REV CODE 255: Performed by: EMERGENCY MEDICINE

## 2024-07-19 PROCEDURE — 84484 ASSAY OF TROPONIN QUANT: CPT

## 2024-07-19 PROCEDURE — A9270 NON-COVERED ITEM OR SERVICE: HCPCS | Performed by: EMERGENCY MEDICINE

## 2024-07-19 PROCEDURE — 83690 ASSAY OF LIPASE: CPT

## 2024-07-19 PROCEDURE — 36415 COLL VENOUS BLD VENIPUNCTURE: CPT

## 2024-07-19 PROCEDURE — 71045 X-RAY EXAM CHEST 1 VIEW: CPT

## 2024-07-19 PROCEDURE — 700102 HCHG RX REV CODE 250 W/ 637 OVERRIDE(OP): Performed by: EMERGENCY MEDICINE

## 2024-07-19 PROCEDURE — 93005 ELECTROCARDIOGRAM TRACING: CPT

## 2024-07-19 RX ORDER — OMEPRAZOLE 40 MG/1
40 CAPSULE, DELAYED RELEASE ORAL DAILY
Qty: 30 CAPSULE | Refills: 0 | Status: SHIPPED | OUTPATIENT
Start: 2024-07-19

## 2024-07-19 RX ADMIN — LIDOCAINE HYDROCHLORIDE 30 ML: 20 SOLUTION ORAL; TOPICAL at 15:19

## 2024-07-19 RX ADMIN — IOHEXOL 75 ML: 350 INJECTION, SOLUTION INTRAVENOUS at 16:56

## 2024-07-19 ASSESSMENT — PAIN DESCRIPTION - PAIN TYPE: TYPE: ACUTE PAIN

## 2024-07-19 ASSESSMENT — FIBROSIS 4 INDEX: FIB4 SCORE: 3.18

## 2024-07-22 DIAGNOSIS — R42 DIZZINESS: ICD-10-CM

## 2024-07-22 DIAGNOSIS — J30.9 ALLERGIC RHINITIS, UNSPECIFIED SEASONALITY, UNSPECIFIED TRIGGER: ICD-10-CM

## 2024-07-22 DIAGNOSIS — H69.93 DYSFUNCTION OF BOTH EUSTACHIAN TUBES: ICD-10-CM

## 2024-07-22 RX ORDER — CETIRIZINE HYDROCHLORIDE 10 MG/1
10 TABLET ORAL DAILY
Qty: 90 TABLET | Refills: 3 | Status: SHIPPED | OUTPATIENT
Start: 2024-07-22

## 2024-07-26 DIAGNOSIS — E55.9 VITAMIN D DEFICIENCY: ICD-10-CM

## 2024-07-29 RX ORDER — ERGOCALCIFEROL 1.25 MG/1
CAPSULE ORAL
Qty: 12 CAPSULE | Refills: 0 | Status: SHIPPED | OUTPATIENT
Start: 2024-07-29

## 2024-08-02 ENCOUNTER — APPOINTMENT (OUTPATIENT)
Dept: RADIOLOGY | Facility: MEDICAL CENTER | Age: 74
End: 2024-08-02
Attending: FAMILY MEDICINE
Payer: COMMERCIAL

## 2024-08-02 DIAGNOSIS — B17.9 ACUTE HEPATITIS: ICD-10-CM

## 2024-08-02 PROCEDURE — 76700 US EXAM ABDOM COMPLETE: CPT

## 2024-08-04 ENCOUNTER — OFFICE VISIT (OUTPATIENT)
Dept: URGENT CARE | Facility: CLINIC | Age: 74
End: 2024-08-04
Payer: COMMERCIAL

## 2024-08-04 VITALS
HEART RATE: 74 BPM | RESPIRATION RATE: 20 BRPM | OXYGEN SATURATION: 96 % | TEMPERATURE: 98 F | HEIGHT: 67 IN | SYSTOLIC BLOOD PRESSURE: 118 MMHG | WEIGHT: 165 LBS | DIASTOLIC BLOOD PRESSURE: 70 MMHG | BODY MASS INDEX: 25.9 KG/M2

## 2024-08-04 DIAGNOSIS — R30.0 DYSURIA: ICD-10-CM

## 2024-08-04 DIAGNOSIS — N30.90 CYSTITIS: ICD-10-CM

## 2024-08-04 LAB
APPEARANCE UR: NORMAL
BILIRUB UR STRIP-MCNC: NORMAL MG/DL
COLOR UR AUTO: NORMAL
GLUCOSE UR STRIP.AUTO-MCNC: NORMAL MG/DL
KETONES UR STRIP.AUTO-MCNC: NORMAL MG/DL
LEUKOCYTE ESTERASE UR QL STRIP.AUTO: NORMAL
NITRITE UR QL STRIP.AUTO: POSITIVE
PH UR STRIP.AUTO: 5.5 [PH] (ref 5–8)
PROT UR QL STRIP: >300 MG/DL
RBC UR QL AUTO: NORMAL
SP GR UR STRIP.AUTO: 1.03
UROBILINOGEN UR STRIP-MCNC: 1 MG/DL

## 2024-08-04 PROCEDURE — 99213 OFFICE O/P EST LOW 20 MIN: CPT

## 2024-08-04 PROCEDURE — 3074F SYST BP LT 130 MM HG: CPT

## 2024-08-04 PROCEDURE — 3078F DIAST BP <80 MM HG: CPT

## 2024-08-04 PROCEDURE — 81002 URINALYSIS NONAUTO W/O SCOPE: CPT

## 2024-08-04 RX ORDER — PHENAZOPYRIDINE HYDROCHLORIDE 200 MG/1
200 TABLET, FILM COATED ORAL 3 TIMES DAILY PRN
Qty: 6 TABLET | Refills: 0 | Status: SHIPPED | OUTPATIENT
Start: 2024-08-04

## 2024-08-04 RX ORDER — SULFAMETHOXAZOLE/TRIMETHOPRIM 800-160 MG
1 TABLET ORAL 2 TIMES DAILY
Qty: 6 TABLET | Refills: 0 | Status: SHIPPED | OUTPATIENT
Start: 2024-08-04 | End: 2024-08-07

## 2024-08-04 ASSESSMENT — ENCOUNTER SYMPTOMS
BACK PAIN: 0
NAUSEA: 0
FEVER: 0
ABDOMINAL PAIN: 0
CHILLS: 0
VOMITING: 0

## 2024-08-04 ASSESSMENT — FIBROSIS 4 INDEX: FIB4 SCORE: 1.56

## 2024-08-04 NOTE — PROGRESS NOTES
"Chief Complaint   Patient presents with    Urinary Frequency     X1 week, \"Cloudy urine, difficulty urine, bad odor to urine, pain with urination.\"        HISTORY OF PRESENT ILLNESS: Patient is a pleasant 73 y.o. female who presents to urgent care today for frequency, urgency and pain with urination for the last week.  Patient denies any fevers, no nausea or vomiting.  No low back pain.  Patient has no previous history of a UTI.    Patient Active Problem List    Diagnosis Date Noted    FHx: dementia 05/31/2024    Oropharyngeal dysphagia 03/31/2024    S/P cholecystectomy 11/21/2023    Prediabetes 08/29/2023    Paroxysmal atrial fibrillation (HCC) 08/24/2023    S/P CABG x 1_8/2023 08/16/2023    S/P aortic valve replacement 8/2023 08/16/2023    Coronary artery disease due to lipid rich plaque 06/15/2023    S/P total knee arthroplasty, left, 6/2022 04/17/2023    Primary insomnia 12/15/2022    Pain of right sacroiliac joint 12/15/2022    Osteopenia of multiple sites 03/31/2021    Postoperative hypothyroidism_s/p thyroidectomy_Dr. Mullen 06/12/2020    Generalized osteoarthritis 06/12/2020    Essential tremor 10/31/2019    Chronic headache 03/20/2018    Vitiligo 06/26/2017    FHx: colon cancer 02/23/2017    Schatzki's ring_DHA 02/23/2017    Primary hypertension 02/23/2017    Pure hypercholesterolemia 02/23/2017    MOHAMUD (generalized anxiety disorder) 02/23/2017    HLA B27 (HLA B27 positive) 02/23/2017    Primary osteoarthritis of right knee, S/P TKR + 2 revision surgeries_Dr. Lomeli 02/23/2017    Lumbosacral pain, chronic, with right sciatica_[facility] 02/23/2017       Allergies:Morphine, Nsaids, Pcn [penicillins], and Seasonal    Current Outpatient Medications Ordered in Epic   Medication Sig Dispense Refill    sulfamethoxazole-trimethoprim (BACTRIM DS) 800-160 MG tablet Take 1 Tablet by mouth 2 times a day for 3 days. 6 Tablet 0    phenazopyridine (PYRIDIUM) 200 MG Tab Take 1 Tablet by mouth 3 times a day as " needed for Moderate Pain. For relief of urinary pain. 6 Tablet 0    vitamin D2, Ergocalciferol, (DRISDOL) 1.25 MG (47983 UT) Cap capsule Take 1 Capsule by mouth every 7 days on saturdays 12 Capsule 0    cetirizine (ZYRTEC) 10 MG Tab Take 1 Tablet by mouth every day. 90 Tablet 3    omeprazole (PRILOSEC) 40 MG delayed-release capsule Take 1 Capsule by mouth every day. 30 Capsule 0    SYNTHROID 75 MCG Tab Take 1 Tablet by mouth every morning on an empty stomach. 90 Tablet 3    lisinopril (PRINIVIL) 10 MG Tab Take 1 Tablet by mouth every day. 90 Tablet 3    omeprazole (PRILOSEC) 20 MG delayed-release capsule Take 1 Capsule by mouth every morning. 90 Capsule 3    metoprolol tartrate (LOPRESSOR) 25 MG Tab Take 0.5 Tablets by mouth 2 times a day. 90 Tablet 3    Menthol-Methyl Salicylate (SALONPAS PAIN RELIEF PATCH EX) Apply 1 Patch topically as needed (Apply's on back for pain).      topiramate (TOPAMAX) 25 MG capsule TAKE 1 CAPSULE BY MOUTH TWICE A  Capsule 0    DULoxetine HCl 40 MG Cap DR Particles Take 40 mg by mouth 2 times a day. 180 Capsule 3    clopidogrel (PLAVIX) 75 MG Tab Take 1 Tablet by mouth every day. 90 Tablet 3    ranolazine (RANEXA) 500 MG TABLET SR 12 HR Take 1 Tablet by mouth 2 times a day. 180 Tablet 3    busPIRone (BUSPAR) 10 MG Tab tablet Take 1 Tablet by mouth at bedtime. Pt takes once a day, not BID (Patient taking differently: Take 10 mg by mouth at bedtime.  ) 90 Tablet 3    isosorbide mononitrate SR (IMDUR) 60 MG TABLET SR 24 HR Take 1 Tablet by mouth every day. (Patient taking differently: Take 60 mg by mouth every evening.) 100 Tablet 3    diclofenac sodium (VOLTAREN) 1 % Gel Apply 2 g topically 4 times a day as needed (Apply's on both knees).       No current [facility]-ordered facility-administered medications on file.       Past Medical History:   Diagnosis Date    Adverse effect of anesthesia 15 yrs ago ?    Felt pain from endoscopy    GAURI (acute kidney injury) (HCC) 08/24/2023    Anemia      H/O    Anesthesia 2024    history of motion sickness    Anginal syndrome (HCC) 2024    medicated prn    Aortic insufficiency 2020    Pt. states this is mild and does not have any signs or symptoms.    Aortic valve insufficiency 2023    aortic valve replacement with Bovine. Follows with [facility]    Arthritis 2024    general body    Atrial fibrillation (HCC) 2024    medicated    Breath shortness 2024    with exertion or anxiety. 23-denies at present.    Bronchitis 60+ yrs ago    Cataract 2023    IOL OU 2018    Dental disorder 2024    upper/lower front 2 teeth crowned    Gastric ulcer     Goiter     Heart burn     Occasionally after acidic meals.    Heart murmur 1965    High cholesterol 2024    medicated    Hypertension 2024    medicated    Hypothyroidism 2024    medicated    Pain 2023    bilat knees, back    PONV (postoperative nausea and vomiting) 2024    history of motion sickness    Psychiatric problem 2024    anxiety, medicated    Pulmonary embolism (HCC) 2020    Pt. states after joint replacement in .    Reactive arthritis (HCC)     Snoring 2024    not an issues at present    Thyroid disease     hasimotos    Thyroid disease 2020    Thyroid Nodules    Urinary bladder disorder 15 yrs ago    Sling       Social History     Tobacco Use    Smoking status: Never    Smokeless tobacco: Never   Vaping Use    Vaping status: Never Used   Substance Use Topics    Alcohol use: Yes     Alcohol/week: 2.4 oz     Types: 4 Glasses of wine per week    Drug use: Not Currently       Family Status   Relation Name Status    Jhonny Padilla  at age 92    Kelly Ki  at age 64    Akshat Grier     MGMo Vanessa  at age 70    Akshat Davis Alive    MGFa      PGMo      PGFa      Akshat Hough     Akshat Kamara Alive     Family History   Problem Relation Age of Onset     "Arthritis Mother     Alzheimer's Disease Mother     Cancer Mother         Cervical Cancer    Heart Attack Father     Cancer Father         Hypertrophy of the heart, CAD    Other Brother         brain anurism    Hypertension Brother     Cancer Maternal Grandmother 66        colono cancer    Colorectal Cancer Maternal Grandmother         Colon Cancer    Heart Disease Brother         Quadruple bypass    Hypertension Brother     Arthritis Brother     Other Brother         Celiac Disease    No Known Problems Maternal Grandfather     No Known Problems Paternal Grandmother     No Known Problems Paternal Grandfather     Hypertension Brother         Spinal stenosis    Arthritis Brother     Lung Disease Brother         Asthma, emphysema    Asthma Brother        Review of Systems   Constitutional:  Negative for chills, fever and malaise/fatigue.   Gastrointestinal:  Negative for abdominal pain, nausea and vomiting.   Genitourinary:  Positive for dysuria, frequency and urgency.   Musculoskeletal:  Negative for back pain.       Exam:  /70 (BP Location: Left arm, Patient Position: Sitting, BP Cuff Size: Adult long)   Pulse 74   Temp 36.7 °C (98 °F) (Temporal)   Resp 20   Ht 1.702 m (5' 7\")   Wt 74.8 kg (165 lb)   SpO2 96%   Physical Exam  Vitals reviewed.   Constitutional:       Appearance: Normal appearance. She is normal weight. She is not toxic-appearing.   HENT:      Head: Normocephalic.      Mouth/Throat:      Mouth: Mucous membranes are moist.      Pharynx: Oropharynx is clear.   Eyes:      Extraocular Movements: Extraocular movements intact.      Conjunctiva/sclera: Conjunctivae normal.      Pupils: Pupils are equal, round, and reactive to light.   Cardiovascular:      Rate and Rhythm: Normal rate and regular rhythm.      Pulses: Normal pulses.      Heart sounds: Normal heart sounds. No murmur heard.  Pulmonary:      Effort: Pulmonary effort is normal. No respiratory distress.      Breath sounds: Normal breath " sounds.   Abdominal:      General: Abdomen is flat. Bowel sounds are normal.      Palpations: Abdomen is soft. There is no mass.      Tenderness: There is abdominal tenderness in the right lower quadrant and left lower quadrant. There is no right CVA tenderness, left CVA tenderness, guarding or rebound.   Musculoskeletal:         General: No swelling, tenderness, deformity or signs of injury.   Skin:     General: Skin is warm and dry.      Capillary Refill: Capillary refill takes less than 2 seconds.      Findings: No bruising, erythema, lesion or rash.   Neurological:      General: No focal deficit present.      Mental Status: She is alert.      Motor: No weakness.   Psychiatric:         Mood and Affect: Mood normal.         Behavior: Behavior normal.         Thought Content: Thought content normal.         Judgment: Judgment normal.         Assessment/Plan:  1. Dysuria  - POCT Urinalysis  - phenazopyridine (PYRIDIUM) 200 MG Tab; Take 1 Tablet by mouth 3 times a day as needed for Moderate Pain. For relief of urinary pain.  Dispense: 6 Tablet; Refill: 0    2. Cystitis  - sulfamethoxazole-trimethoprim (BACTRIM DS) 800-160 MG tablet; Take 1 Tablet by mouth 2 times a day for 3 days.  Dispense: 6 Tablet; Refill: 0   Ongoing frequency, urgency and pain with urination for the last several days.  Patient denies any fevers, no no nausea or vomiting, no low back pain.  On physical exam stomach is soft and tender bilateral lower quadrants, negative CVA tenderness.  POCT urinalysis shows leukocytes, nitrates and blood.  I did go ahead and place patient on Bactrim.  Advised patient to drink plenty of fluids, take Motrin and Tylenol as needed.  Patient is aware of the plan of care and agreeable at this time, encouraged them to follow-up if they continue to get worse or do not improve.     Supportive care, differential diagnoses, and indications for immediate follow-up discussed with patient.   Pathogenesis of diagnosis discussed  including typical length and natural progression.   Instructed to return to clinic or nearest emergency department for any change in condition, further concerns, or worsening of symptoms.  Patient states understanding of the plan of care and discharge instructions.  Instructed to make an appointment, for follow up, with  primary care provider.    Please note that this dictation was created using voice recognition software. I have made every reasonable attempt to correct obvious errors, but I expect that there are errors of grammar and possibly content that I did not discover before finalizing the note.      Reny Jimenez APRN

## 2024-08-05 ENCOUNTER — HOSPITAL ENCOUNTER (OUTPATIENT)
Dept: RADIOLOGY | Facility: MEDICAL CENTER | Age: 74
End: 2024-08-05
Attending: FAMILY MEDICINE
Payer: COMMERCIAL

## 2024-08-05 ENCOUNTER — HOSPITAL ENCOUNTER (OUTPATIENT)
Dept: LAB | Facility: MEDICAL CENTER | Age: 74
End: 2024-08-05
Attending: FAMILY MEDICINE
Payer: COMMERCIAL

## 2024-08-05 DIAGNOSIS — Z11.59 NEED FOR HEPATITIS C SCREENING TEST: ICD-10-CM

## 2024-08-05 DIAGNOSIS — Z12.31 VISIT FOR SCREENING MAMMOGRAM: ICD-10-CM

## 2024-08-05 DIAGNOSIS — Z11.59 NEED FOR HEPATITIS B SCREENING TEST: ICD-10-CM

## 2024-08-05 DIAGNOSIS — B17.9 ACUTE HEPATITIS: ICD-10-CM

## 2024-08-05 DIAGNOSIS — B17.9 HEPATITIS, ACUTE: ICD-10-CM

## 2024-08-05 LAB
HBV CORE AB SERPL QL IA: NONREACTIVE
HBV SURFACE AB SERPL IA-ACNC: <3.5 MIU/ML (ref 0–10)
HBV SURFACE AG SER QL: NORMAL
HCV AB SER QL: NORMAL

## 2024-08-05 PROCEDURE — 86704 HEP B CORE ANTIBODY TOTAL: CPT

## 2024-08-05 PROCEDURE — 36415 COLL VENOUS BLD VENIPUNCTURE: CPT

## 2024-08-05 PROCEDURE — 77067 SCR MAMMO BI INCL CAD: CPT

## 2024-08-05 PROCEDURE — 86803 HEPATITIS C AB TEST: CPT

## 2024-08-05 PROCEDURE — 87340 HEPATITIS B SURFACE AG IA: CPT

## 2024-08-05 PROCEDURE — 86708 HEPATITIS A ANTIBODY: CPT

## 2024-08-05 PROCEDURE — 86706 HEP B SURFACE ANTIBODY: CPT

## 2024-08-05 PROCEDURE — 86709 HEPATITIS A IGM ANTIBODY: CPT

## 2024-08-07 LAB
HAV AB SER QL IA: POSITIVE
HAV IGM SERPL QL IA: NEGATIVE

## 2024-08-09 ENCOUNTER — OFFICE VISIT (OUTPATIENT)
Dept: MEDICAL GROUP | Facility: MEDICAL CENTER | Age: 74
End: 2024-08-09
Payer: COMMERCIAL

## 2024-08-09 VITALS
HEART RATE: 72 BPM | DIASTOLIC BLOOD PRESSURE: 64 MMHG | HEIGHT: 67 IN | WEIGHT: 166.45 LBS | OXYGEN SATURATION: 96 % | TEMPERATURE: 97.1 F | SYSTOLIC BLOOD PRESSURE: 114 MMHG | BODY MASS INDEX: 26.12 KG/M2

## 2024-08-09 DIAGNOSIS — K21.9 GASTROESOPHAGEAL REFLUX DISEASE WITHOUT ESOPHAGITIS: ICD-10-CM

## 2024-08-09 DIAGNOSIS — I10 BENIGN ESSENTIAL HTN: ICD-10-CM

## 2024-08-09 DIAGNOSIS — F41.8 SITUATIONAL ANXIETY: ICD-10-CM

## 2024-08-09 DIAGNOSIS — B17.9 ACUTE HEPATITIS: ICD-10-CM

## 2024-08-09 DIAGNOSIS — R13.12 OROPHARYNGEAL DYSPHAGIA: ICD-10-CM

## 2024-08-09 DIAGNOSIS — K22.2 SCHATZKI'S RING: ICD-10-CM

## 2024-08-09 DIAGNOSIS — E78.00 PURE HYPERCHOLESTEROLEMIA: ICD-10-CM

## 2024-08-09 DIAGNOSIS — R55 SYNCOPE, UNSPECIFIED SYNCOPE TYPE: ICD-10-CM

## 2024-08-09 DIAGNOSIS — K21.9 HIATAL HERNIA WITH GERD: ICD-10-CM

## 2024-08-09 DIAGNOSIS — K44.9 HIATAL HERNIA WITH GERD: ICD-10-CM

## 2024-08-09 DIAGNOSIS — R41.3 MEMORY DIFFICULTY: ICD-10-CM

## 2024-08-09 DIAGNOSIS — Z95.1 S/P CABG X 1: ICD-10-CM

## 2024-08-09 PROCEDURE — 99215 OFFICE O/P EST HI 40 MIN: CPT | Performed by: FAMILY MEDICINE

## 2024-08-09 PROCEDURE — 3078F DIAST BP <80 MM HG: CPT | Performed by: FAMILY MEDICINE

## 2024-08-09 PROCEDURE — 3074F SYST BP LT 130 MM HG: CPT | Performed by: FAMILY MEDICINE

## 2024-08-09 RX ORDER — ROSUVASTATIN CALCIUM 20 MG/1
20 TABLET, COATED ORAL EVERY EVENING
Qty: 90 TABLET | Refills: 3 | Status: SHIPPED | OUTPATIENT
Start: 2024-08-09

## 2024-08-09 RX ORDER — CARVEDILOL 12.5 MG/1
12.5 TABLET ORAL 2 TIMES DAILY WITH MEALS
Qty: 180 TABLET | Refills: 1 | Status: SHIPPED | OUTPATIENT
Start: 2024-08-09

## 2024-08-09 RX ORDER — ESOMEPRAZOLE MAGNESIUM 40 MG/1
40 CAPSULE, DELAYED RELEASE ORAL
Qty: 90 CAPSULE | Refills: 1 | Status: SHIPPED | OUTPATIENT
Start: 2024-08-09

## 2024-08-09 RX ORDER — DIAZEPAM 10 MG
10 TABLET ORAL ONCE
Qty: 1 TABLET | Refills: 0 | Status: SHIPPED | OUTPATIENT
Start: 2024-08-09 | End: 2024-08-09

## 2024-08-09 ASSESSMENT — FIBROSIS 4 INDEX: FIB4 SCORE: 1.56

## 2024-08-10 PROBLEM — K21.9 GASTROESOPHAGEAL REFLUX DISEASE WITHOUT ESOPHAGITIS: Status: ACTIVE | Noted: 2024-08-10

## 2024-08-10 PROBLEM — K21.9 HIATAL HERNIA WITH GERD: Status: ACTIVE | Noted: 2024-08-10

## 2024-08-10 PROBLEM — K21.9 GASTROESOPHAGEAL REFLUX DISEASE WITHOUT ESOPHAGITIS: Status: RESOLVED | Noted: 2024-08-10 | Resolved: 2024-08-10

## 2024-08-10 PROBLEM — K44.9 HIATAL HERNIA WITH GERD: Status: ACTIVE | Noted: 2024-08-10

## 2024-08-11 NOTE — PROGRESS NOTES
Verbal consent was acquired by the patient to use [employer] ambient listening note generation during this visit: YES    CC: Labs view     Assessment & Plan:     1. Acute hepatitis  Patient was found to have acute asymptomatic hepatitis per labs done in 7/2024.  Additional workups were negative for acute viral hepatitis B or C.  Liver ultrasound was unremarkable.  Patient denies history of chronic alcoholism or personal/familial history of hepatobiliary diseases.  Patient was asked to discontinue Crestor 40 mg and Zetia 10 mg daily.  Repeat labs done 3 weeks later showed resolution of acute hepatitis.  Will continue to monitor.  - Comp Metabolic Panel; Future    2. Primary hypertension  5. Syncope, unspecified syncope type  Chronic, currently taking lisinopril 10 mg daily, metoprolol 12.5 mg twice daily, Imdur 60 mg daily.  The dosage of lisinopril was recently reduced from 20 mg to 10 mg daily due to low blood pressure and concerns of orthostatic hypotension leading to presyncope and syncope.  Patient reports that she is doing much better with low-dose of lisinopril.  She did not have any episode of syncope or presyncope since last office visit.  It is difficult for her to cut metoprolol pills in halves.  Will switch patient to carvedilol.  - discontinue Metoprolol  - start carvedilol (COREG) 12.5 MG Tab; Take 1 Tablet by mouth 2 times a day with meals.  Dispense: 180 Tablet; Refill: 1  - continue Lisinopril 10 mg qd  - continue Imdur 60 mg daily.   - follow up in 3 months     3. Pure hypercholesterolemia  4. S/P CABG x 1_8/2023  Chronic, previously taking Crestor 40 mg and Zetia 10 mg daily.  Medication was recently discontinued due to acute hepatitis as above.  Recent labs showed normalization of ALT/AST.  Workups for acute hepatitis were negative.  Patient does have history of CVD, will restart rosuvastatin at 20 mg daily.  Patient will have repeat labs in 3 months for reassessment. Body mass index is 26.07  kg/m².   - rosuvastatin (CRESTOR) 20 MG Tab; Take 1 Tablet by mouth every evening.  Dispense: 90 Tablet; Refill: 3  - Lipid Profile; Future, CMP  - dietary modification, regular exercise  - weight loss   - avoid alcohol, illicit drugs, tobacco products     6. Oropharyngeal dysphagia  7. Schatzki's ring_DHA  8. Moderate-size hiatal hernia with GERD.   Patient was found to have Schatzki's ring leading to intermittent dysphagia.  Patient had multiple EGD and dilation in the past.  Her last dilation was in 3/2024.  Unfortunately, patient continues to have persistent dysphagia which appears to be oropharyngeal per her description. She has episodic choking coughing symptoms at times, but not regularly.  Patient continues follow-up with GI for consultation.  She had esophageal manometry study done 3 weeks ago, but records are not available for review. She is currently on Omeprazole 40 mgg w/o improvement.   - discontinue Omeprazole 40 mg qd  - trial esomeprazole (NEXIUM) 40 MG delayed-release capsule; Take 1 Capsule by mouth every morning before breakfast.  Dispense: 90 Capsule; Refill: 1  - Referral to Speech Therapy  - follow up with GI for esophageal manometry study results.   - follow up in 3 months.     9. Memory difficulty  Patient is working with neurology. She is scheduled for brain MRI and EEG in near future. Since last office visit, her memory and cognition appear to improve.   - keep appointment for brain MRI and EEG  - follow up with neurology as directed  - regular exercises as tolerated   - avoid illicit drugs, alcohol  - sleep 6-8 hours every night    10. Situational anxiety  Patient has claustrophobia and needs Valium for upcoming brain MRI.   - diazepam (VALIUM) 10 MG tablet; Take 1 Tablet by mouth one time for 1 dose.  Dispense: 1 Tablet; Refill: 0  - arrange transportation to and from the facility.   - avoid driving after taking Valium.       Subjective:       HPI:   History of Present Illness  The  patient presents for evaluation of multiple medical concerns. She is accompanied by an adult male.    Patient was seen a few weeks ago for syncope. She has not had Zio patch study done. Brain MRI and EEG are scheduled in near future. She has established care with neurology for evaluation of memory/cognitive decline.  She needs Valium for brain MRI due to claustrophobia. She was asked to discontinue Crestor 40 mg and Zetia 10 mg due to acute hepatitis. Her BP medications were adjusted due to concerns of low BP, orthostatic hypotension, and syncope.     Since last office visit, she did not experience syncope or presyncope. Memory and cognitive symptoms appear to improve. She has slightly better energy.     She has chronic moderate hiatal hernia, GERD, Schatzki's ring s/p dilation x6. Last dilation was in 3/2024. She continues to experience oropharyngeal dysphagia with frequent burping, coughing after eating. She is taking increasing dose of Omeprazole without improvement. She had esophageal manometry 3 weeks ago with GI.  However, patient has not been contacted for results.  Patient had appointment to discuss the finding with a GI doctor in a few weeks.  Denies history of regular NSAID or alcohol abuse.    She has not yet completed the sleep test that was ordered. She is scheduled for a lung function test next week.          Current Outpatient Medications:     rosuvastatin (CRESTOR) 20 MG Tab, Take 1 Tablet by mouth every evening., Disp: 90 Tablet, Rfl: 3    carvedilol (COREG) 12.5 MG Tab, Take 1 Tablet by mouth 2 times a day with meals., Disp: 180 Tablet, Rfl: 1    esomeprazole (NEXIUM) 40 MG delayed-release capsule, Take 1 Capsule by mouth every morning before breakfast., Disp: 90 Capsule, Rfl: 1    phenazopyridine (PYRIDIUM) 200 MG Tab, Take 1 Tablet by mouth 3 times a day as needed for Moderate Pain. For relief of urinary pain., Disp: 6 Tablet, Rfl: 0    vitamin D2, Ergocalciferol, (DRISDOL) 1.25 MG (72728 UT) Cap  "capsule, Take 1 Capsule by mouth every 7 days on saturdays, Disp: 12 Capsule, Rfl: 0    cetirizine (ZYRTEC) 10 MG Tab, Take 1 Tablet by mouth every day., Disp: 90 Tablet, Rfl: 3    SYNTHROID 75 MCG Tab, Take 1 Tablet by mouth every morning on an empty stomach., Disp: 90 Tablet, Rfl: 3    lisinopril (PRINIVIL) 10 MG Tab, Take 1 Tablet by mouth every day., Disp: 90 Tablet, Rfl: 3    Menthol-Methyl Salicylate (SALONPAS PAIN RELIEF PATCH EX), Apply 1 Patch topically as needed (Apply's on back for pain)., Disp: , Rfl:     topiramate (TOPAMAX) 25 MG capsule, TAKE 1 CAPSULE BY MOUTH TWICE A DAY, Disp: 180 Capsule, Rfl: 0    DULoxetine HCl 40 MG Cap DR Particles, Take 40 mg by mouth 2 times a day., Disp: 180 Capsule, Rfl: 3    clopidogrel (PLAVIX) 75 MG Tab, Take 1 Tablet by mouth every day., Disp: 90 Tablet, Rfl: 3    ranolazine (RANEXA) 500 MG TABLET SR 12 HR, Take 1 Tablet by mouth 2 times a day., Disp: 180 Tablet, Rfl: 3    busPIRone (BUSPAR) 10 MG Tab tablet, Take 1 Tablet by mouth at bedtime. Pt takes once a day, not BID (Patient taking differently: Take 10 mg by mouth at bedtime.  ), Disp: 90 Tablet, Rfl: 3    isosorbide mononitrate SR (IMDUR) 60 MG TABLET SR 24 HR, Take 1 Tablet by mouth every day. (Patient taking differently: Take 60 mg by mouth every evening.), Disp: 100 Tablet, Rfl: 3    diclofenac sodium (VOLTAREN) 1 % Gel, Apply 2 g topically 4 times a day as needed (Apply's on both knees)., Disp: , Rfl:      Objective:     Exam:  /64 (BP Location: Left arm, Patient Position: Sitting, BP Cuff Size: Adult)   Pulse 72   Temp 36.2 °C (97.1 °F) (Temporal)   Ht 1.702 m (5' 7\")   Wt 75.5 kg (166 lb 7.2 oz)   SpO2 96%   BMI 26.07 kg/m²  Body mass index is 26.07 kg/m².    Constitutional: awake, alert, in no distress.  Skin: Warm, dry, good turgor, no rashes, bruises, ulcers in visible areas.  Eye: conjunctiva clear, lids neg for edema or lesions.  ENMT: TM and auditory canals wnl. Oral and nasal mucosa wnl. " Lips without lesions, good dentition, oropharynx clear.  Neck: Trachea midline, no masses, no thyromegaly. No cervical or supraclavicular lymphadenopathy  Respiratory: Unlabored respiratory effort, lungs clear to auscultation, no wheezes, no rales.  Cardiovascular: Normal S1, S2, no murmur, no pedal edema.  Abdomen: Soft, non-tender to palpation, active BS, no hernia, no hepatosplenomegaly, negative rebound or guarding.   Psych: Oriented x3, affect and mood wnl, intact judgement and insight.     Return in about 3 months (around 11/9/2024) for Multiple issues.    Total time spent reviewing pt's chart, labs, notes, imaging, and counseling/prescribing medications to patient before, during, and after the visit: 40 minutes.        Please note that this dictation was created using voice recognition software. I have made every reasonable attempt to correct obvious errors, but I expect that there are errors of grammar and possibly content that I did not discover before finalizing the note.

## 2024-08-15 ENCOUNTER — HOSPITAL ENCOUNTER (OUTPATIENT)
Dept: PULMONOLOGY | Facility: MEDICAL CENTER | Age: 74
End: 2024-08-15
Attending: FAMILY MEDICINE
Payer: COMMERCIAL

## 2024-08-15 DIAGNOSIS — R06.02 SOB (SHORTNESS OF BREATH): ICD-10-CM

## 2024-08-15 PROCEDURE — 94726 PLETHYSMOGRAPHY LUNG VOLUMES: CPT

## 2024-08-15 PROCEDURE — 94726 PLETHYSMOGRAPHY LUNG VOLUMES: CPT | Mod: 26 | Performed by: INTERNAL MEDICINE

## 2024-08-15 PROCEDURE — 94060 EVALUATION OF WHEEZING: CPT

## 2024-08-15 PROCEDURE — 94729 DIFFUSING CAPACITY: CPT | Mod: 26 | Performed by: INTERNAL MEDICINE

## 2024-08-15 PROCEDURE — 94060 EVALUATION OF WHEEZING: CPT | Mod: 26 | Performed by: INTERNAL MEDICINE

## 2024-08-15 PROCEDURE — 94729 DIFFUSING CAPACITY: CPT

## 2024-08-15 RX ORDER — ALBUTEROL SULFATE 5 MG/ML
2.5 SOLUTION RESPIRATORY (INHALATION)
Status: DISCONTINUED | OUTPATIENT
Start: 2024-08-15 | End: 2024-08-16 | Stop reason: HOSPADM

## 2024-08-15 RX ADMIN — ALBUTEROL SULFATE 2.5 MG: 5 SOLUTION RESPIRATORY (INHALATION) at 09:55

## 2024-08-18 NOTE — PROCEDURES
DATE OF SERVICE:  08/15/2024     PULMONARY FUNCTION TEST INTERPRETATION     IMPRESSION:  1.  There is no obstruction.  2.  There is no significant bronchodilator response.  3.  Lung volumes are normal.  4.  DLCO is normal.  5.  Flow volume loop is normal.     Normal PFTs.        ______________________________  DO FARTUN Douglas/JUAN DIEGO    DD:  08/17/2024 16:35  DT:  08/17/2024 18:09    Job#:  [id number]

## 2024-08-22 ENCOUNTER — HOSPITAL ENCOUNTER (OUTPATIENT)
Dept: RADIOLOGY | Facility: MEDICAL CENTER | Age: 74
End: 2024-08-22
Attending: FAMILY MEDICINE
Payer: COMMERCIAL

## 2024-08-22 DIAGNOSIS — R41.3 MEMORY PROBLEM: ICD-10-CM

## 2024-08-22 DIAGNOSIS — Z81.8 FHX: DEMENTIA: ICD-10-CM

## 2024-08-22 PROCEDURE — 70551 MRI BRAIN STEM W/O DYE: CPT

## 2024-08-25 PROBLEM — R41.3 MEMORY PROBLEM: Status: ACTIVE | Noted: 2024-08-25

## 2024-09-05 DIAGNOSIS — I10 BENIGN ESSENTIAL HTN: ICD-10-CM

## 2024-09-05 DIAGNOSIS — Z95.1 S/P CABG X 1: ICD-10-CM

## 2024-09-09 RX ORDER — CARVEDILOL 12.5 MG/1
12.5 TABLET ORAL 2 TIMES DAILY WITH MEALS
Qty: 180 TABLET | Refills: 1 | Status: SHIPPED | OUTPATIENT
Start: 2024-09-09

## 2024-09-12 DIAGNOSIS — G89.29 LUMBOSACRAL PAIN, CHRONIC: ICD-10-CM

## 2024-09-12 DIAGNOSIS — M54.50 LUMBOSACRAL PAIN, CHRONIC: ICD-10-CM

## 2024-09-15 RX ORDER — PREGABALIN 50 MG/1
50 CAPSULE ORAL 3 TIMES DAILY
Qty: 90 CAPSULE | Refills: 2 | Status: SHIPPED | OUTPATIENT
Start: 2024-09-15 | End: 2024-12-14

## 2024-11-06 ENCOUNTER — HOSPITAL ENCOUNTER (OUTPATIENT)
Dept: LAB | Facility: MEDICAL CENTER | Age: 74
End: 2024-11-06
Payer: COMMERCIAL

## 2024-11-06 ENCOUNTER — HOSPITAL ENCOUNTER (OUTPATIENT)
Dept: LAB | Facility: MEDICAL CENTER | Age: 74
End: 2024-11-06
Attending: FAMILY MEDICINE
Payer: COMMERCIAL

## 2024-11-06 DIAGNOSIS — B17.9 ACUTE HEPATITIS: ICD-10-CM

## 2024-11-06 DIAGNOSIS — E78.00 PURE HYPERCHOLESTEROLEMIA: ICD-10-CM

## 2024-11-06 LAB
ALBUMIN SERPL BCP-MCNC: 4 G/DL (ref 3.2–4.9)
ALBUMIN/GLOB SERPL: 1.6 G/DL
ALP SERPL-CCNC: 82 U/L (ref 30–99)
ALT SERPL-CCNC: 26 U/L (ref 2–50)
ANION GAP SERPL CALC-SCNC: 13 MMOL/L (ref 7–16)
AST SERPL-CCNC: 26 U/L (ref 12–45)
BASOPHILS # BLD AUTO: 0.8 % (ref 0–1.8)
BASOPHILS # BLD: 0.05 K/UL (ref 0–0.12)
BILIRUB SERPL-MCNC: 0.7 MG/DL (ref 0.1–1.5)
BUN SERPL-MCNC: 23 MG/DL (ref 8–22)
CALCIUM ALBUM COR SERPL-MCNC: 9.6 MG/DL (ref 8.5–10.5)
CALCIUM SERPL-MCNC: 9.6 MG/DL (ref 8.5–10.5)
CHLORIDE SERPL-SCNC: 107 MMOL/L (ref 96–112)
CHOLEST SERPL-MCNC: 202 MG/DL (ref 100–199)
CO2 SERPL-SCNC: 23 MMOL/L (ref 20–33)
CREAT SERPL-MCNC: 0.73 MG/DL (ref 0.5–1.4)
CRP SERPL HS-MCNC: <0.3 MG/DL (ref 0–0.75)
EOSINOPHIL # BLD AUTO: 0.4 K/UL (ref 0–0.51)
EOSINOPHIL NFR BLD: 6.3 % (ref 0–6.9)
ERYTHROCYTE [DISTWIDTH] IN BLOOD BY AUTOMATED COUNT: 48.7 FL (ref 35.9–50)
ERYTHROCYTE [SEDIMENTATION RATE] IN BLOOD BY WESTERGREN METHOD: 7 MM/HOUR (ref 0–25)
FASTING STATUS PATIENT QL REPORTED: NORMAL
GFR SERPLBLD CREATININE-BSD FMLA CKD-EPI: 85 ML/MIN/1.73 M 2
GLOBULIN SER CALC-MCNC: 2.5 G/DL (ref 1.9–3.5)
GLUCOSE SERPL-MCNC: 75 MG/DL (ref 65–99)
HCT VFR BLD AUTO: 39.5 % (ref 37–47)
HDLC SERPL-MCNC: 96 MG/DL
HGB BLD-MCNC: 12.8 G/DL (ref 12–16)
IMM GRANULOCYTES # BLD AUTO: 0.05 K/UL (ref 0–0.11)
IMM GRANULOCYTES NFR BLD AUTO: 0.8 % (ref 0–0.9)
LDLC SERPL CALC-MCNC: 87 MG/DL
LYMPHOCYTES # BLD AUTO: 1.47 K/UL (ref 1–4.8)
LYMPHOCYTES NFR BLD: 23.3 % (ref 22–41)
MCH RBC QN AUTO: 30.6 PG (ref 27–33)
MCHC RBC AUTO-ENTMCNC: 32.4 G/DL (ref 32.2–35.5)
MCV RBC AUTO: 94.5 FL (ref 81.4–97.8)
MONOCYTES # BLD AUTO: 0.54 K/UL (ref 0–0.85)
MONOCYTES NFR BLD AUTO: 8.5 % (ref 0–13.4)
NEUTROPHILS # BLD AUTO: 3.81 K/UL (ref 1.82–7.42)
NEUTROPHILS NFR BLD: 60.3 % (ref 44–72)
NRBC # BLD AUTO: 0 K/UL
NRBC BLD-RTO: 0 /100 WBC (ref 0–0.2)
PLATELET # BLD AUTO: 232 K/UL (ref 164–446)
PMV BLD AUTO: 10.3 FL (ref 9–12.9)
POTASSIUM SERPL-SCNC: 4.2 MMOL/L (ref 3.6–5.5)
PROT SERPL-MCNC: 6.5 G/DL (ref 6–8.2)
RBC # BLD AUTO: 4.18 M/UL (ref 4.2–5.4)
SODIUM SERPL-SCNC: 143 MMOL/L (ref 135–145)
TRIGL SERPL-MCNC: 97 MG/DL (ref 0–149)
WBC # BLD AUTO: 6.3 K/UL (ref 4.8–10.8)

## 2024-11-06 PROCEDURE — 36415 COLL VENOUS BLD VENIPUNCTURE: CPT

## 2024-11-06 PROCEDURE — 85652 RBC SED RATE AUTOMATED: CPT

## 2024-11-06 PROCEDURE — 80053 COMPREHEN METABOLIC PANEL: CPT

## 2024-11-06 PROCEDURE — 85025 COMPLETE CBC W/AUTO DIFF WBC: CPT

## 2024-11-06 PROCEDURE — 86140 C-REACTIVE PROTEIN: CPT

## 2024-11-06 PROCEDURE — 80061 LIPID PANEL: CPT

## 2024-11-12 ENCOUNTER — OFFICE VISIT (OUTPATIENT)
Dept: MEDICAL GROUP | Facility: MEDICAL CENTER | Age: 74
End: 2024-11-12
Payer: COMMERCIAL

## 2024-11-12 ENCOUNTER — PATIENT MESSAGE (OUTPATIENT)
Dept: MEDICAL GROUP | Facility: MEDICAL CENTER | Age: 74
End: 2024-11-12

## 2024-11-12 VITALS
HEIGHT: 67 IN | WEIGHT: 166 LBS | OXYGEN SATURATION: 98 % | HEART RATE: 72 BPM | BODY MASS INDEX: 26.06 KG/M2 | TEMPERATURE: 96.9 F | DIASTOLIC BLOOD PRESSURE: 64 MMHG | SYSTOLIC BLOOD PRESSURE: 114 MMHG

## 2024-11-12 DIAGNOSIS — F51.01 PRIMARY INSOMNIA: ICD-10-CM

## 2024-11-12 DIAGNOSIS — M54.50 LUMBOSACRAL PAIN, CHRONIC: ICD-10-CM

## 2024-11-12 DIAGNOSIS — I25.83 CORONARY ARTERY DISEASE DUE TO LIPID RICH PLAQUE: Chronic | ICD-10-CM

## 2024-11-12 DIAGNOSIS — J45.990 EXERCISE-INDUCED ASTHMA: ICD-10-CM

## 2024-11-12 DIAGNOSIS — I25.10 CORONARY ARTERY DISEASE DUE TO LIPID RICH PLAQUE: Chronic | ICD-10-CM

## 2024-11-12 DIAGNOSIS — R41.3 MEMORY PROBLEM: ICD-10-CM

## 2024-11-12 DIAGNOSIS — G43.009 MIGRAINE WITHOUT AURA AND WITHOUT STATUS MIGRAINOSUS, NOT INTRACTABLE: ICD-10-CM

## 2024-11-12 DIAGNOSIS — Z95.1 S/P CABG X 1: ICD-10-CM

## 2024-11-12 DIAGNOSIS — E78.00 PURE HYPERCHOLESTEROLEMIA: ICD-10-CM

## 2024-11-12 DIAGNOSIS — E89.0 POSTOPERATIVE HYPOTHYROIDISM: ICD-10-CM

## 2024-11-12 DIAGNOSIS — I10 BENIGN ESSENTIAL HTN: ICD-10-CM

## 2024-11-12 DIAGNOSIS — G89.29 LUMBOSACRAL PAIN, CHRONIC: ICD-10-CM

## 2024-11-12 PROBLEM — R51.9 CHRONIC HEADACHE: Status: RESOLVED | Noted: 2018-03-20 | Resolved: 2024-11-12

## 2024-11-12 PROBLEM — M53.3 PAIN OF RIGHT SACROILIAC JOINT: Status: RESOLVED | Noted: 2022-12-15 | Resolved: 2024-11-12

## 2024-11-12 PROBLEM — R13.12 OROPHARYNGEAL DYSPHAGIA: Status: RESOLVED | Noted: 2024-03-31 | Resolved: 2024-11-12

## 2024-11-12 PROCEDURE — 3078F DIAST BP <80 MM HG: CPT | Performed by: FAMILY MEDICINE

## 2024-11-12 PROCEDURE — 3074F SYST BP LT 130 MM HG: CPT | Performed by: FAMILY MEDICINE

## 2024-11-12 PROCEDURE — 99215 OFFICE O/P EST HI 40 MIN: CPT | Performed by: FAMILY MEDICINE

## 2024-11-12 RX ORDER — EVOLOCUMAB 140 MG/ML
140 INJECTION, SOLUTION SUBCUTANEOUS
Qty: 6 EACH | Refills: 1 | Status: SHIPPED | OUTPATIENT
Start: 2024-11-12

## 2024-11-12 RX ORDER — ALBUTEROL SULFATE 90 UG/1
2 INHALANT RESPIRATORY (INHALATION) EVERY 6 HOURS PRN
Qty: 8.5 G | Refills: 5 | Status: SHIPPED | OUTPATIENT
Start: 2024-11-12

## 2024-11-12 RX ORDER — PREGABALIN 50 MG/1
50 CAPSULE ORAL 3 TIMES DAILY
Qty: 90 CAPSULE | Refills: 2 | Status: SHIPPED | OUTPATIENT
Start: 2024-11-12 | End: 2025-02-10

## 2024-11-12 RX ORDER — ZOLPIDEM TARTRATE 5 MG/1
5 TABLET ORAL NIGHTLY PRN
Qty: 30 TABLET | Refills: 0 | Status: SHIPPED | OUTPATIENT
Start: 2024-11-12 | End: 2024-12-12

## 2024-11-12 ASSESSMENT — FIBROSIS 4 INDEX: FIB4 SCORE: 1.65

## 2024-11-12 NOTE — LETTER
[employer]  Charlee Walker M.D.  [address]  [place] [address]  Fax: [phone]   Authorization for Release/Disclosure of   Protected Health Information   Name: MARGOTH HOPKINS : 1950 SSN: [id number]   Address: [address] [place] [address]  [place] [address] Phone:    [phone] (home)    I authorize the entity listed below to release/disclose the PHI below to:   [employer]/Charlee Walker M.D. and Charlee Walker M.D.   Provider or Entity Name: [facility]      Address   City, State, Zip   Phone:      Fax:     Reason for request: continuity of care   Information to be released:    [  ] LAST COLONOSCOPY,  including any PATH REPORT and follow-up  [  ] LAST FIT/COLOGUARD RESULT [  ] LAST DEXA  [  ] LAST MAMMOGRAM  [  ] LAST PAP  [  ] LAST LABS [  ] RETINA EXAM REPORT  [  ] IMMUNIZATION RECORDS  [  ] Release all info      [  ] Check here and initial the line next to each item to release ALL health information INCLUDING  _____ Care and treatment for drug and / or alcohol abuse  _____ HIV testing, infection status, or AIDS  _____ Genetic Testing    DATES OF SERVICE OR TIME PERIOD TO BE DISCLOSED: _____________  I understand and acknowledge that:  * This Authorization may be revoked at any time by you in writing, except if your health information has already been used or disclosed.  * Your health information that will be used or disclosed as a result of you signing this authorization could be re-disclosed by the recipient. If this occurs, your re-disclosed health information may no longer be protected by State or Federal laws.  * You may refuse to sign this Authorization. Your refusal will not affect your ability to obtain treatment.  * This Authorization becomes effective upon signing and will  on (date) __________.      If no date is indicated, this Authorization will  one (1) year from the signature date.    Name: Margoth Hopkins  Signature: Date:   2024     PLEASE FAX REQUESTED RECORDS  BACK TO: [phone]

## 2024-11-13 RX ORDER — ZONISAMIDE 100 MG/1
100 CAPSULE ORAL DAILY
COMMUNITY
Start: 2024-10-16

## 2024-11-13 NOTE — PROGRESS NOTES
Verbal consent was acquired by the patient to use [employer] ambient listening note generation during this visit: YES    CC: chronic pain     Assessment & Plan:     1. Memory problem  Chronic, working with local neurology.  Records are being requested.  Recent brain MRI show old microhemorrhage at the deep white matter, otherwise unremarkable for age.  Patient is doing better.  Denies any new neurological symptoms.  -Follow-up with neurology as directed    2. Postoperative hypothyroidism_s/p thyroidectomy_Dr. Mullen  Chronic, controlled with levothyroxine 25 mcg daily managed by endocrinology  -Follow-up with Endo as directed    3. Primary hypertension  Chronic, controlled with Coreg 12.5 mg twice daily lisinopril 10 mg daily, no s/e reported, will continue.      4. Lumbosacral pain, chronic, with right sciatica_[facility]  Patient suffers chronic low back pain, symptoms are controlled with Lyrica 50 mg 3 times daily.  She tolerated Lyrica well, no side effect reported.  Negative history of illicit drugs, alcohol, tobacco abuse.  - pregabalin (LYRICA) 50 MG capsule; Take 1 Capsule by mouth 3 times a day for 90 days.  Dispense: 90 Capsule; Refill: 2    5. Pure hypercholesterolemia  6. Coronary artery disease due to lipid rich plaque  7. S/P CABG x 1_8/2023  Patient has a history of CAD s/p CABG x1 in 8/2023.   She did not tolerate Crestor 40 mg plus Zetia 10 mg daily due to cognitive changes and markedly elevated ALT/AST.  She appears to tolerate Crestor 20 mg daily.  Unfortunately, her lipid profile is not at goal.  - Evolocumab (REPATHA SURECLICK) 140 MG/ML Solution Auto-injector SubQ injection pen; Inject 1 mL under the skin every 14 days.  Dispense: 6 Each; Refill: 1  - Lipid Profile; Future  - Comp Metabolic Panel; Future  - dietary modification, regular exercise  - weight loss   - avoid alcohol, illicit drugs, tobacco products     8. Migraine without aura and without status migrainosus, not  intractable  - Ubrogepant 100 MG Tab; Take 1 tablet for acute migraine, ok to repeat after 2 hours for persistent symptoms. Max 2 tablets in 24 hours.  Dispense: 16 Tablet; Refill: 1    9. Exercise-induced asthma  - albuterol 108 (90 Base) MCG/ACT Aero Soln inhalation aerosol; Inhale 2 Puffs every 6 hours as needed for Shortness of Breath.  Dispense: 8.5 g; Refill: 5    10. Primary insomnia  Patient takes Ambien 5 mg nightly as needed for sleep.  Negative history of illicit drugs, alcohol abuse.  - zolpidem (AMBIEN) 5 MG Tab; Take 1 Tablet by mouth at bedtime as needed for Sleep for up to 30 days.  Dispense: 30 Tablet; Refill: 0   - Risks, benefits, side effects, as well as potential health complications associated with Ambien were previously discussed with patient. Appropriate counseling provided.        Subjective:       HPI:   History of Present Illness  The patient presents for evaluation of multiple medical concerns.    She has been under the care of a neurologist, who conducted an EEG and MRI. The MRI revealed an old brain bleed, but no signs of Alzheimer's or dementia.  Records have been requested.  She is doing much better over the last few months.  She will continue to follow-up with her neurologist.    She is currently on Crestor 20 mg for cholesterol management. She has a history of valve replacement and bypass surgery. She is also taking Plavix but wishes to discontinue it. She does not take aspirin due to a bleeding ulcer. She was previously on Eliquis, but it was discontinued. She reports no chest pain, shortness of breath, dyspnea on exertion.      She is taking pregabalin for pain management and requires a refill.     She recently had a pulmonary function test, which showed normal results. However, she does have exercise-induced asthma.  Symptoms respond well to albuterol.  She wishes to have refill.      She has been using Ambien occasionally for sleep.    She recently visited an orthopedic doctor  for knee pain. X-rays showed that the metal implants are in good condition, but she continues to experience significant pain in her knees.      Current Outpatient Medications:     pregabalin (LYRICA) 50 MG capsule, Take 1 Capsule by mouth 3 times a day for 90 days., Disp: 90 Capsule, Rfl: 2    Evolocumab (REPATHA SURECLICK) 140 MG/ML Solution Auto-injector SubQ injection pen, Inject 1 mL under the skin every 14 days., Disp: 6 Each, Rfl: 1    albuterol 108 (90 Base) MCG/ACT Aero Soln inhalation aerosol, Inhale 2 Puffs every 6 hours as needed for Shortness of Breath., Disp: 8.5 g, Rfl: 5    Ubrogepant 100 MG Tab, Take 1 tablet for acute migraine, ok to repeat after 2 hours for persistent symptoms. Max 2 tablets in 24 hours., Disp: 16 Tablet, Rfl: 1    zolpidem (AMBIEN) 5 MG Tab, Take 1 Tablet by mouth at bedtime as needed for Sleep for up to 30 days., Disp: 30 Tablet, Rfl: 0    carvedilol (COREG) 12.5 MG Tab, Take 1 Tablet by mouth 2 times a day with meals., Disp: 180 Tablet, Rfl: 1    rosuvastatin (CRESTOR) 20 MG Tab, Take 1 Tablet by mouth every evening., Disp: 90 Tablet, Rfl: 3    esomeprazole (NEXIUM) 40 MG delayed-release capsule, Take 1 Capsule by mouth every morning before breakfast., Disp: 90 Capsule, Rfl: 1    vitamin D2, Ergocalciferol, (DRISDOL) 1.25 MG (92926 UT) Cap capsule, Take 1 Capsule by mouth every 7 days on saturdays, Disp: 12 Capsule, Rfl: 0    cetirizine (ZYRTEC) 10 MG Tab, Take 1 Tablet by mouth every day., Disp: 90 Tablet, Rfl: 3    SYNTHROID 75 MCG Tab, Take 1 Tablet by mouth every morning on an empty stomach., Disp: 90 Tablet, Rfl: 3    lisinopril (PRINIVIL) 10 MG Tab, Take 1 Tablet by mouth every day., Disp: 90 Tablet, Rfl: 3    DULoxetine HCl 40 MG Cap DR Particles, Take 40 mg by mouth 2 times a day., Disp: 180 Capsule, Rfl: 3    clopidogrel (PLAVIX) 75 MG Tab, Take 1 Tablet by mouth every day., Disp: 90 Tablet, Rfl: 3    ranolazine (RANEXA) 500 MG TABLET SR 12 HR, Take 1 Tablet by mouth 2  "times a day., Disp: 180 Tablet, Rfl: 3    busPIRone (BUSPAR) 10 MG Tab tablet, Take 1 Tablet by mouth at bedtime. Pt takes once a day, not BID (Patient taking differently: Take 10 mg by mouth at bedtime.  ), Disp: 90 Tablet, Rfl: 3    isosorbide mononitrate SR (IMDUR) 60 MG TABLET SR 24 HR, Take 1 Tablet by mouth every day. (Patient taking differently: Take 60 mg by mouth every evening.), Disp: 100 Tablet, Rfl: 3    diclofenac sodium (VOLTAREN) 1 % Gel, Apply 2 g topically 4 times a day as needed (Apply's on both knees)., Disp: , Rfl:      Objective:     Exam:  /64 (BP Location: Left arm, Patient Position: Sitting, BP Cuff Size: Adult long)   Pulse 72   Temp 36.1 °C (96.9 °F) (Temporal)   Ht 1.702 m (5' 7\")   Wt 75.3 kg (166 lb)   SpO2 98%   BMI 26.00 kg/m²  Body mass index is 26 kg/m².    Constitutional: awake, alert, in no distress.  Skin: Warm, dry, good turgor, no rashes, bruises, ulcers in visible areas.  Eye: conjunctiva clear, lids neg for edema or lesions.  ENMT: TM and auditory canals wnl. Oral and nasal mucosa wnl. Lips without lesions, good dentition, oropharynx clear.  Neck: Trachea midline, no masses, no thyromegaly. No cervical or supraclavicular lymphadenopathy  Respiratory: Unlabored respiratory effort, lungs clear to auscultation, no wheezes, no rales.  Cardiovascular: Normal S1, S2, no murmur, no pedal edema.  Psych: Oriented x3, affect and mood wnl, intact judgement and insight.         Return in about 3 months (around 2/12/2025) for Lyrica refills .    Total time spent reviewing pt's chart, labs, notes, imaging, and counseling/prescribing medications to patient before, during, and after the visit: 43 minutes.        Please note that this dictation was created using voice recognition software. I have made every reasonable attempt to correct obvious errors, but I expect that there are errors of grammar and possibly content that I did not discover before finalizing the note.        "

## 2024-11-14 ENCOUNTER — TELEPHONE (OUTPATIENT)
Dept: MEDICAL GROUP | Facility: MEDICAL CENTER | Age: 74
End: 2024-11-14
Payer: COMMERCIAL

## 2024-11-14 NOTE — TELEPHONE ENCOUNTER
DOCUMENTATION OF PAR STATUS:    1. Name of Medication & Dose: Ubrevly 100 mg     2. Name of Prescription Coverage Company & phone #: [employer]    3. Date Prior Auth Submitted: 11/14/24    4. What information was given to obtain insurance decision? Dx code    5. Prior Auth Status? Pending    6. Patient Notified: no

## 2024-12-02 DIAGNOSIS — E55.9 VITAMIN D DEFICIENCY: ICD-10-CM

## 2024-12-03 RX ORDER — ERGOCALCIFEROL 1.25 MG/1
50000 CAPSULE, LIQUID FILLED ORAL
Qty: 12 CAPSULE | Refills: 3 | OUTPATIENT
Start: 2024-12-03

## 2024-12-10 ENCOUNTER — OFFICE VISIT (OUTPATIENT)
Dept: CARDIOLOGY | Facility: MEDICAL CENTER | Age: 74
End: 2024-12-10
Attending: INTERNAL MEDICINE
Payer: COMMERCIAL

## 2024-12-10 VITALS
DIASTOLIC BLOOD PRESSURE: 76 MMHG | BODY MASS INDEX: 28.09 KG/M2 | HEIGHT: 67 IN | SYSTOLIC BLOOD PRESSURE: 110 MMHG | OXYGEN SATURATION: 98 % | RESPIRATION RATE: 12 BRPM | HEART RATE: 75 BPM | WEIGHT: 179 LBS

## 2024-12-10 DIAGNOSIS — E78.00 PURE HYPERCHOLESTEROLEMIA: ICD-10-CM

## 2024-12-10 DIAGNOSIS — I10 BENIGN ESSENTIAL HTN: ICD-10-CM

## 2024-12-10 DIAGNOSIS — I48.0 PAROXYSMAL ATRIAL FIBRILLATION (HCC): ICD-10-CM

## 2024-12-10 DIAGNOSIS — Z95.1 S/P CABG (CORONARY ARTERY BYPASS GRAFT): ICD-10-CM

## 2024-12-10 DIAGNOSIS — Z95.2 S/P AORTIC VALVE REPLACEMENT: ICD-10-CM

## 2024-12-10 PROBLEM — I25.83 CORONARY ARTERY DISEASE DUE TO LIPID RICH PLAQUE: Status: ACTIVE | Noted: 2023-06-15

## 2024-12-10 PROCEDURE — 3074F SYST BP LT 130 MM HG: CPT | Performed by: INTERNAL MEDICINE

## 2024-12-10 PROCEDURE — 99213 OFFICE O/P EST LOW 20 MIN: CPT | Performed by: INTERNAL MEDICINE

## 2024-12-10 PROCEDURE — G2211 COMPLEX E/M VISIT ADD ON: HCPCS | Performed by: INTERNAL MEDICINE

## 2024-12-10 PROCEDURE — 99214 OFFICE O/P EST MOD 30 MIN: CPT | Performed by: INTERNAL MEDICINE

## 2024-12-10 PROCEDURE — 3078F DIAST BP <80 MM HG: CPT | Performed by: INTERNAL MEDICINE

## 2024-12-10 ASSESSMENT — FIBROSIS 4 INDEX: FIB4 SCORE: 1.65

## 2024-12-11 NOTE — PATIENT INSTRUCTIONS
A - Antiplatelet - Clopidogrel (PLAVIX) reduces your risk of cardiac event by 27% compared to Aspirin 81 mg daily (HOST EXAM study 2021), prasrugrel (EFFIENT), ticagrelor (BRILINTA)) may be used for the first year.  Aspirin 81 mg daily is associated with a 20% less use of heart event and is used the first year after a cardiac event, stent or CABG.  B - Blood Pressure Control - reduces your risk or heart attack and stroke, the goal is <130/80.  C - Cholesterol Management - statins dramatically reduce your risk; for those that are intolerant to statins, there are alternatives.  D - Diet - MEDITERRANEAN DIET or Cardiac rehab diets, [email or web address].  E - Exercise - at least 2.5 hours of moderate exercise weekly  (typical brisk walking or similar activity).  F - Fats - VASCEPA, or EPA Fish oil (if Vascepa too expensive) for elevated triglycerides (REDUCE IT trial showed reduction from 22% 5 year MACE to 17%).  G - Good Vibes - meditation, exercise, yoga, Pilates, mindfulness, Golden-Chi, stress reduction.  H - Heart Failure - betablockers, sucubatril (ENTRESTO) 16% less risk of dying over 3 years, spironolactone, empagliflozin (JARDIANCE) 17% less risk of dying over 2 years, CRT +/- ICD.  I - Inflammation - Colchicine in the LoDoCo2 study in 2020 reduced the risk of heart attack by 30% in 2.5 year follow up.  R - Rehab - Cardiac Rehab reduces risk of dying by 13-24% and need to go to the hospital by 30% within the first year. Compared to regular Cardiac Rehab, Intensive Cardiac Rehab ([place] [facility]) was shown to reduce the risk of major events 17% to 11% and hospitalization for CHF from 8% to 2%. (Nutrients [id number](11:9688)  S - Smoking - never smoke, if you do smoke ask for help to quit for good. Patients who quit smoking after heart attack have 36% less likely risk of dying.  Resources are 1-800-QUIT-NOW [employer] in addition to Chantix, bupropion (Zyban) or nicotine replacement  T - Type II Diabetes  - pills empagliflozin (JARDIANCE) 38% less risk of dying over 4 years, and/or weekly injections: tirzepatide (Mounjaro), semaglutide (Ozempic), liraglutide (Victoza), dulaglutide (Trulicity) ~26% less risk of MACE in 2 years.  V - Vaccines - Annual flu shot and COVID vaccine reduces the risk of serious cardiovascular complications from these deadly infections.  W - Weight - maintain a healthy weight. Semaglutide (WEGOVY) weekly injection was shown to reduce weight by 10% and heart events by 20% for patients with CAD and BMI > 27 in the SELECT trial (6.5% vs 8% in 48 month follow up [facility] 12/2023).      Work on at least 2.5 - 5 hours a week of moderate exercise    Please look into the following diets and incorporate them into your diet  LOW SALT DIET   KEEP YOUR SODIUM EQUAL TO CALORIES AND NO MORE THAN DOUBLE THE CALORIES FOR A LOW SALT DIET    Cardiosmart.org - great resource for American College of Cardiology on heart disease prevention and treatment    FOR TREATMENT OR PREVENTION OF CORONARY ARTERY DISEASE  These three programs are approved by Medicare/Insurers for those with heart disease  [place] [facility]  Dr. Brandon's Program for Reversing Heart Disease - Ang Powell's Cardiologist vegetarian-based  [facility] - [place]-based mind-body Program    Mediterranean Diet has been shown to be a hearty healthy diet.    This is a commonly referenced Program  Dr Hart - [place] [facility] [place] (book and documentary) - vegetarian-based    FOR TREATMENT OF BLOOD PRESSURE  DASH DIET - American Heart Association for treatment of HYPERTENSION    FOR TREATMENT OF BAD CHOLESTEROL/FATS  REDUCE PROCESSED SUGAR AS MUCH AS POSSIBLE  INCREASE WHOLE GRAINS/VEGETABLES  INCREASE FIBER    Lowering total cholesterol and LDL (bad) cholesterol:  - Eat leaner cuts of meat, or eliminate altogether if possible red meat, and frequently substitute fish or chicken.  - Limit saturated  fat to no more than 7-10% of total calories no more than 10 g per day is recommended. Some sources of saturated fat include butter, animal fats, hydrogenated vegetable fats and oils, many desserts, whole milk dairy products.  - Replaced saturated fats with polyunsaturated fats and monounsaturated fats. Foods high in monounsaturated fat include nuts, canola oil, avocados, and olives.  - Limit trans fat (processed foods) and replaced with fresh fruits and vegetables  - Recommend nonfat dairy products  - Increase substantially the amount of soluble fiber intake (legumes such as beans, fruit, whole grains).  - Consider nutritional supplements: plant sterile spreads such as Benecol, fish oil,  flaxseed oil, omega-3 acids EPA capsules 2000 mg twice a day, or viscous fiber such as Metamucil  - Attain ideal weight and regular exercise (at least 30 minutes per day of moderate exercise)  ASK ABOUT STATIN OR NON STATIN MEDICATION TO REDUCE YOUR LDL AND HEART RISK    Lowering triglycerides:  - Reduce intake of simple sugar: Desserts, candy, pastries, honey, sodas, sugared cereals, yogurt, Gatorade, sports bars, canned fruit, smoothies, fruit juice, coffee drinks  - Reduced intake of refined starches: Refined Pasta, most bread  - Reduce or abstain from alcohol  - Increase omega-3 fatty acids: [place], Trout, Mackerel, [place], [place] tuna and supplements  - Attain ideal weight and regular exercise (at least 30 minutes per day of moderate exercise)  ASK ABOUT PURIFIED OMEGA 3 EPA or FISH OIL TO REDUCE YOUR TG AND HEART RISK    Elevating HDL (good) cholesterol:  - Increase physical activity  - Increase omega-3 fatty acids and supplements as listed above  - Incorporating appropriate amounts of monounsaturated fats such as nuts, olive oil, canola oil, avocados, olives  - Stop smoking  - Attain ideal weight and regular exercise (at least 30 minutes per day of moderate exercise)

## 2024-12-11 NOTE — PROGRESS NOTES
Chief Complaint   Patient presents with    Hypertension     F/V DX: Primary hypertension    Hyperlipidemia     F/V DX: Pure hypercholesterolemia    Atrial Fibrillation     F/V DX: Paroxysmal atrial fibrillation (HCC)          Subjective     Margoth Hopkins is a 73 y.o. female who presents today with AVR and CABG LIMA to LAD, only mild CAD on cath 1/2024 with atretic LIMA    Still has some dyspnea improved with the inhaler    Had increased LFTs so appropriately advised to switch to [id number]    Past Medical History:   Diagnosis Date    Adverse effect of anesthesia 15 yrs ago ?    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 states this is mild and does not have any signs or symptoms.    Aortic valve insufficiency 08/16/2023    aortic valve replacement with Bovine. Follows with [facility]ology    Arthritis 02/06/2024    general body    Atrial fibrillation (HCC) 02/06/2024    medicated    Breath shortness 02/06/2024    with exertion or anxiety. 9/22/23-denies at present.    Bronchitis 60+ yrs ago    Cataract 06/16/2023    IOL OU 2018    Dental disorder 02/06/2024    upper/lower front 2 teeth crowned    Exercise-induced asthma 11/12/2024    Gastric ulcer     Goiter     Heart burn 2022    Occasionally after acidic meals.    Heart murmur 1965    Hiatal hernia with GERD 8/10/2024    High cholesterol 02/06/2024    medicated    Hypertension 02/06/2024    medicated    Hypothyroidism 02/06/2024    medicated    Pain 06/16/2023    bilat knees, back    PONV (postoperative nausea and vomiting) 02/06/2024    history of motion sickness    Psychiatric problem 02/06/2024    anxiety, medicated    Pulmonary embolism (HCC) 01/31/2020    Pt. states after joint replacement in 7-2018.    Reactive arthritis (HCC)     Snoring 02/06/2024    not an  issues at present    Thyroid disease     hasimotos    Thyroid disease 01/31/2020    Thyroid Nodules    Urinary bladder disorder 15 yrs ago    Sling     Past Surgical History:   Procedure Laterality Date    BHAVANA BY LAPAROSCOPY N/A 9/25/2023    Procedure: LAPAROSCOPIC CHOLECYSTECTOMY.;  Surgeon: Mike Valencia M.D.;  Location: SURGERY [facility];  Service: General    MULTIPLE CORONARY ARTERY BYPASS ENDO VEIN HARVEST N/A 08/16/2023    Procedure: CORONARY ARTERY BYPASS GRAFT X1;  Surgeon: Tammy Lawrence M.D.;  Location: SURGERY [facility];  Service: Cardiothoracic    AORTIC VALVE REPLACEMENT N/A 08/16/2023    Procedure: REPLACEMENT, AORTIC VALVE;  Surgeon: Tammy Lawrence M.D.;  Location: SURGERY [facility];  Service: Cardiothoracic    ECHOCARDIOGRAM, TRANSESOPHAGEAL, INTRAOPERATIVE N/A 08/16/2023    Procedure: ECHOCARDIOGRAM, TRANSESOPHAGEAL, INTRAOPERATIVE;  Surgeon: Tammy Lawrence M.D.;  Location: SURGERY [facility];  Service: Cardiothoracic    FUSION  06/16/2023    neck, 2013    OTHER CARDIAC SURGERY  06/16/2023    heart cath 2023    KNEE ARTHROPLASTY TOTAL Left 06/2022    MASS EXCISION GENERAL Right 11/05/2021    Procedure: EXCISION, MASS - LATERAL THIGH;  Surgeon: Cydney Franklin M.D.;  Location: SURGERY SAME DAY [facility];  Service: General    THYROIDECTOMY TOTAL Bilateral 02/05/2020    Procedure: THYROIDECTOMY, TOTAL- COMPLETE;  Surgeon: Cydney Franklin M.D.;  Location: SURGERY SAME DAY [facility];  Service: General    HAMMERTOE CORRECTION Left 2017    Left foot surgery and cleaned up arthritis in foot-metatarsal fusion    CERVICAL DISK AND FUSION ANTERIOR  2016    Cervical spine    APPENDECTOMY      BLADDER SLING FEMALE      CATARACT EXTRACTION WITH IOL Bilateral     CERVICAL FUSION POSTERIOR      COLONOSCOPY      GYN SURGERY      Hysterectomy    KNEE ARTHROPLASTY TOTAL Right     x3    KNEE REPLACEMENT, TOTAL Right     KNEE REVISION TOTAL Right      Family History   Problem Relation Age of Onset     Arthritis Mother     Alzheimer's Disease Mother     Cancer Mother         Cervical Cancer    Heart Attack Father     Cancer Father         Hypertrophy of the heart, CAD    Other Brother         brain anurism    Hypertension Brother     Cancer Maternal Grandmother 66        colono cancer    Colorectal Cancer Maternal Grandmother         Colon Cancer    Heart Disease Brother         Quadruple bypass    Hypertension Brother     Arthritis Brother     Other Brother         Celiac Disease    No Known Problems Maternal Grandfather     No Known Problems Paternal Grandmother     No Known Problems Paternal Grandfather     Hypertension Brother         Spinal stenosis    Arthritis Brother     Lung Disease Brother         Asthma, emphysema    Asthma Brother      Social History     Socioeconomic History    Marital status:      Spouse name: Not on file    Number of children: Not on file    Years of education: Not on file    Highest education level: Bachelor's degree (e.g., BA, AB, BS)   Occupational History    Not on file   Tobacco Use    Smoking status: Never    Smokeless tobacco: Never   Vaping Use    Vaping status: Never Used   Substance and Sexual Activity    Alcohol use: Yes     Alcohol/week: 2.4 oz     Types: 4 Glasses of wine per week    Drug use: Not Currently    Sexual activity: Yes     Partners: Male   Other Topics Concern    Not on file   Social History Narrative    Not on file     Social Drivers of Health     Financial Resource Strain: Low Risk  (6/7/2022)    Overall Financial Resource Strain (CARDIA)     Difficulty of Paying Living Expenses: Not hard at all   Food Insecurity: No Food Insecurity (6/7/2022)    Hunger Vital Sign     Worried About Running Out of Food in the Last Year: Never true     Ran Out of Food in the Last Year: Never true   Transportation Needs: No Transportation Needs (6/7/2022)    PRAPARE - Transportation     Lack of Transportation (Medical): No     Lack of Transportation (Non-Medical): No    Physical Activity: Insufficiently Active (6/7/2022)    Exercise Vital Sign     Days of Exercise per Week: 1 day     Minutes of Exercise per Session: 10 min   Stress: Stress Concern Present (6/7/2022)    [employer] [employer] - Occupational Stress Questionnaire     Feeling of Stress : Very much   Social Connections: Socially Integrated (6/7/2022)    Social Connection and Isolation Panel [NHANES]     Frequency of Communication with Friends and Family: Three times a week     Frequency of Social Gatherings with Friends and Family: Twice a week     Attends Voodoo Services: More than 4 times per year     Active Member of Clubs or Organizations: Yes     Attends Club or Organization Meetings: More than 4 times per year     Marital Status:    Intimate Partner Violence: Not on file   Housing Stability: Low Risk  (6/7/2022)    Housing Stability Vital Sign     Unable to Pay for Housing in the Last Year: No     Number of Places Lived in the Last Year: 1     Unstable Housing in the Last Year: No     Allergies   Allergen Reactions    Morphine Vomiting and Nausea    Nsaids Unspecified     History of gastric ulcers - cannot take NSAIDS    Pcn [Penicillins] Hives     Tolerates ancef    Seasonal Runny Nose and Itching     .     Outpatient Encounter Medications as of 12/10/2024   Medication Sig Dispense Refill    zonisamide (ZONEGRAN) 100 MG Cap Take 100 mg by mouth every day.      pregabalin (LYRICA) 50 MG capsule Take 1 Capsule by mouth 3 times a day for 90 days. 90 Capsule 2    Evolocumab (REPATHA SURECLICK) 140 MG/ML Solution Auto-injector SubQ injection pen Inject 1 mL under the skin every 14 days. 6 Each 1    albuterol 108 (90 Base) MCG/ACT Aero Soln inhalation aerosol Inhale 2 Puffs every 6 hours as needed for Shortness of Breath. 8.5 g 5    zolpidem (AMBIEN) 5 MG Tab Take 1 Tablet by mouth at bedtime as needed for Sleep for up to 30 days. 30 Tablet 0    carvedilol (COREG) 12.5 MG Tab Take 1 Tablet  "by mouth 2 times a day with meals. 180 Tablet 1    rosuvastatin (CRESTOR) 20 MG Tab Take 1 Tablet by mouth every evening. 90 Tablet 3    vitamin D2, Ergocalciferol, (DRISDOL) 1.25 MG (20761 UT) Cap capsule Take 1 Capsule by mouth every 7 days on saturdays (Patient taking differently: every day.) 12 Capsule 0    cetirizine (ZYRTEC) 10 MG Tab Take 1 Tablet by mouth every day. 90 Tablet 3    SYNTHROID 75 MCG Tab Take 1 Tablet by mouth every morning on an empty stomach. 90 Tablet 3    lisinopril (PRINIVIL) 10 MG Tab Take 1 Tablet by mouth every day. 90 Tablet 3    DULoxetine HCl 40 MG Cap DR Particles Take 40 mg by mouth 2 times a day. 180 Capsule 3    clopidogrel (PLAVIX) 75 MG Tab Take 1 Tablet by mouth every day. 90 Tablet 3    ranolazine (RANEXA) 500 MG TABLET SR 12 HR Take 1 Tablet by mouth 2 times a day. 180 Tablet 3    busPIRone (BUSPAR) 10 MG Tab tablet Take 1 Tablet by mouth at bedtime. Pt takes once a day, not BID (Patient taking differently: Take 10 mg by mouth at bedtime.  ) 90 Tablet 3    isosorbide mononitrate SR (IMDUR) 60 MG TABLET SR 24 HR Take 1 Tablet by mouth every day. (Patient taking differently: Take 60 mg by mouth every evening.) 100 Tablet 3    diclofenac sodium (VOLTAREN) 1 % Gel Apply 2 g topically 4 times a day as needed (Apply's on both knees).      Ubrogepant 100 MG Tab Take 1 tablet for acute migraine, ok to repeat after 2 hours for persistent symptoms. Max 2 tablets in 24 hours. (Patient not taking: Reported on 12/10/2024) 16 Tablet 1    esomeprazole (NEXIUM) 40 MG delayed-release capsule Take 1 Capsule by mouth every morning before breakfast. 90 Capsule 1     No facility-administered encounter medications on file as of 12/10/2024.     ROS           Objective     /76 (BP Location: Left arm, Patient Position: Sitting, BP Cuff Size: Adult)   Pulse 75   Resp 12   Ht 1.702 m (5' 7\")   Wt 81.2 kg (179 lb)   SpO2 98%   BMI 28.04 kg/m²     Physical Exam  Constitutional:       " General: She is not in acute distress.     Appearance: She is not diaphoretic.   Eyes:      General: No scleral icterus.  Neck:      Vascular: No JVD.   Cardiovascular:      Rate and Rhythm: Normal rate.      Heart sounds: Normal heart sounds. No murmur heard.     No friction rub. No gallop.   Pulmonary:      Effort: No respiratory distress.      Breath sounds: No wheezing or rales.   Abdominal:      General: Bowel sounds are normal.      Palpations: Abdomen is soft.   Musculoskeletal:      Right lower leg: No edema.      Left lower leg: No edema.   Skin:     Findings: No rash.   Neurological:      Mental Status: She is alert. Mental status is at baseline.   Psychiatric:         Mood and Affect: Mood normal.            We reviewed in person the most recent labs  Recent Results (from the past 30 weeks)   proBrain Natriuretic Peptide, NT    Collection Time: 07/01/24 10:39 AM   Result Value Ref Range    NT-proBNP 450 (H) 0 - 125 pg/mL   Lipid Profile    Collection Time: 07/01/24 10:39 AM   Result Value Ref Range    Cholesterol,Tot 170 100 - 199 mg/dL    Triglycerides 137 0 - 149 mg/dL    HDL 69 >=40 mg/dL    LDL 74 <100 mg/dL   FASTING STATUS    Collection Time: 07/01/24 10:39 AM   Result Value Ref Range    Fasting Status Fasting    CBC WITH DIFFERENTIAL    Collection Time: 07/01/24 10:42 AM   Result Value Ref Range    WBC 6.1 4.8 - 10.8 K/uL    RBC 4.09 (L) 4.20 - 5.40 M/uL    Hemoglobin 12.6 12.0 - 16.0 g/dL    Hematocrit 38.6 37.0 - 47.0 %    MCV 94.4 81.4 - 97.8 fL    MCH 30.8 27.0 - 33.0 pg    MCHC 32.6 32.2 - 35.5 g/dL    RDW 50.8 (H) 35.9 - 50.0 fL    Platelet Count 312 164 - 446 K/uL    MPV 10.5 9.0 - 12.9 fL    Neutrophils-Polys 62.50 44.00 - 72.00 %    Lymphocytes 23.00 22.00 - 41.00 %    Monocytes 9.80 0.00 - 13.40 %    Eosinophils 3.00 0.00 - 6.90 %    Basophils 1.00 0.00 - 1.80 %    Immature Granulocytes 0.70 0.00 - 0.90 %    Nucleated RBC 0.00 0.00 - 0.20 /100 WBC    Neutrophils (Absolute) 3.82 1.82 -  7.42 K/uL    Lymphs (Absolute) 1.40 1.00 - 4.80 K/uL    Monos (Absolute) 0.60 0.00 - 0.85 K/uL    Eos (Absolute) 0.18 0.00 - 0.51 K/uL    Baso (Absolute) 0.06 0.00 - 0.12 K/uL    Immature Granulocytes (abs) 0.04 0.00 - 0.11 K/uL    NRBC (Absolute) 0.00 K/uL   Comp Metabolic Panel    Collection Time: 24 10:42 AM   Result Value Ref Range    Sodium 140 135 - 145 mmol/L    Potassium 4.0 3.6 - 5.5 mmol/L    Chloride 108 96 - 112 mmol/L    Co2 20 20 - 33 mmol/L    Anion Gap 12.0 7.0 - 16.0    Glucose 88 65 - 99 mg/dL    Bun 17 8 - 22 mg/dL    Creatinine 0.86 0.50 - 1.40 mg/dL    Calcium 9.2 8.5 - 10.5 mg/dL    Correct Calcium 9.3 8.5 - 10.5 mg/dL    AST(SGOT) 142 (H) 12 - 45 U/L    ALT(SGPT) 115 (H) 2 - 50 U/L    Alkaline Phosphatase 179 (H) 30 - 99 U/L    Total Bilirubin 0.8 0.1 - 1.5 mg/dL    Albumin 3.9 3.2 - 4.9 g/dL    Total Protein 6.1 6.0 - 8.2 g/dL    Globulin 2.2 1.9 - 3.5 g/dL    A-G Ratio 1.8 g/dL   TSH WITH REFLEX TO FT4    Collection Time: 24 10:42 AM   Result Value Ref Range    TSH 1.360 0.380 - 5.330 uIU/mL   FASTING STATUS    Collection Time: 24 10:42 AM   Result Value Ref Range    Fasting Status Fasting    ESTIMATED GFR    Collection Time: 24 10:42 AM   Result Value Ref Range    GFR (CKD-EPI) 70 >60 mL/min/1.73 m 2   EKG    Collection Time: 24  2:21 PM   Result Value Ref Range    Report       [facility] Emergency Dept.    Test Date:  2024  Pt Name:    IAN JOHNSON                   Department: [facility]  MRN:        [id number]                      Room:  Gender:     Female                       Technician: TIFFANY  :        [id number]                   Requested By:ER TRIAGE PROTOCOL  Order #:    [id number]                    Reading MD: CLAUDIA AMAYA, DO    Measurements  Intervals                                Axis  Rate:       86                           P:          69  DE:         188                          QRS:        -53  QRSD:       93                            T:          62  QT:         360  QTc:        431    Interpretive Statements  Sinus rhythm  Left anterior fascicular block  Abnormal R-wave progression, late transition  Compared to ECG 03/31/2024 10:28:59  No significant changes  Electronically Signed On 07- 22:23:51 PDT by CLAUDIA AMAYA DO     proBrain Natriuretic Peptide, NT    Collection Time: 07/19/24  3:11 PM   Result Value Ref Range    NT-proBNP 138 (H) 0 - 125 pg/mL   Lipase    Collection Time: 07/19/24  3:11 PM   Result Value Ref Range    Lipase 36 11 - 82 U/L   APTT    Collection Time: 07/19/24  3:11 PM   Result Value Ref Range    APTT 24.1 (L) 24.7 - 36.0 sec   PT/INR    Collection Time: 07/19/24  3:11 PM   Result Value Ref Range    PT 12.1 12.0 - 14.6 sec    INR 0.86 (L) 0.87 - 1.13   D-Dimer (only helpful in low pre-test probability wells critieria. Do not order if patient ruled out by PERC criteria. See Weblinks at top of Labs section)    Collection Time: 07/19/24  3:11 PM   Result Value Ref Range    D-Dimer 1.00 (H) 0.00 - 0.50 ug/mL (FEU)   CBC WITH DIFFERENTIAL    Collection Time: 07/19/24  3:11 PM   Result Value Ref Range    WBC 6.2 4.8 - 10.8 K/uL    RBC 4.51 4.20 - 5.40 M/uL    Hemoglobin 13.7 12.0 - 16.0 g/dL    Hematocrit 42.5 37.0 - 47.0 %    MCV 94.2 81.4 - 97.8 fL    MCH 30.4 27.0 - 33.0 pg    MCHC 32.2 32.2 - 35.5 g/dL    RDW 55.3 (H) 35.9 - 50.0 fL    Platelet Count 277 164 - 446 K/uL    MPV 9.9 9.0 - 12.9 fL    Neutrophils-Polys 56.60 44.00 - 72.00 %    Lymphocytes 29.50 22.00 - 41.00 %    Monocytes 9.70 0.00 - 13.40 %    Eosinophils 2.90 0.00 - 6.90 %    Basophils 0.80 0.00 - 1.80 %    Immature Granulocytes 0.50 0.00 - 0.90 %    Nucleated RBC 0.00 0.00 - 0.20 /100 WBC    Neutrophils (Absolute) 3.49 1.82 - 7.42 K/uL    Lymphs (Absolute) 1.82 1.00 - 4.80 K/uL    Monos (Absolute) 0.60 0.00 - 0.85 K/uL    Eos (Absolute) 0.18 0.00 - 0.51 K/uL    Baso (Absolute) 0.05 0.00 - 0.12 K/uL    Immature Granulocytes (abs)  0.03 0.00 - 0.11 K/uL    NRBC (Absolute) 0.00 K/uL   Comp Metabolic Panel    Collection Time: 07/19/24  3:11 PM   Result Value Ref Range    Sodium 141 135 - 145 mmol/L    Potassium 4.0 3.6 - 5.5 mmol/L    Chloride 106 96 - 112 mmol/L    Co2 24 20 - 33 mmol/L    Anion Gap 11.0 7.0 - 16.0    Glucose 70 65 - 99 mg/dL    Bun 24 (H) 8 - 22 mg/dL    Creatinine 0.98 0.50 - 1.40 mg/dL    Calcium 9.4 8.4 - 10.2 mg/dL    Correct Calcium 9.2 8.5 - 10.5 mg/dL    AST(SGOT) 37 12 - 45 U/L    ALT(SGPT) 41 2 - 50 U/L    Alkaline Phosphatase 114 (H) 30 - 99 U/L    Total Bilirubin 0.9 0.1 - 1.5 mg/dL    Albumin 4.3 3.2 - 4.9 g/dL    Total Protein 7.1 6.0 - 8.2 g/dL    Globulin 2.8 1.9 - 3.5 g/dL    A-G Ratio 1.5 g/dL   TROPONIN    Collection Time: 07/19/24  3:11 PM   Result Value Ref Range    Troponin T 18 6 - 19 ng/L   ESTIMATED GFR    Collection Time: 07/19/24  3:11 PM   Result Value Ref Range    GFR (CKD-EPI) 60 >60 mL/min/1.73 m 2   Troponins in two (2) hours    Collection Time: 07/19/24  5:10 PM   Result Value Ref Range    Troponin T 15 6 - 19 ng/L   POCT Urinalysis    Collection Time: 08/04/24 12:19 PM   Result Value Ref Range    POC Color Lolis Negative    POC Appearance Cloudy Negative    POC Glucose NEG Negative mg/dL    POC Bilirubin SMALL Negative mg/dL    POC Ketones TRACE Negative mg/dL    POC Specific Gravity 1.030 <1.005 - >1.030    POC Blood MODERATE Negative    POC Urine PH 5.5 5.0 - 8.0    POC Protein >300 Negative mg/dL    POC Urobiligen 1.0 Negative (0.2) mg/dL    POC Nitrites POSITIVE Negative    POC Leukocyte Esterase MODERATE Negative   HEPATITIS A AB IGM+TOTAL    Collection Time: 08/05/24  3:49 PM   Result Value Ref Range    Hep A Virus Antibody Total Positive (A) Negative    Hepatitis A Virus Ab, IgM Negative Negative   HEP C VIRUS ANTIBODY    Collection Time: 08/05/24  3:49 PM   Result Value Ref Range    Hepatitis C Antibody Non-Reactive Non-Reactive   HEP B SURFACE ANTIGEN    Collection Time: 08/05/24  3:49  PM   Result Value Ref Range    Hepatitis B Surface Antigen Non-Reactive Non-Reactive   HEP B SURFACE AB    Collection Time: 08/05/24  3:49 PM   Result Value Ref Range    Hep B Surface Antibody Quant <3.50 0.00 - 10.00 mIU/mL   HEP B CORE AB TOTAL    Collection Time: 08/05/24  3:49 PM   Result Value Ref Range    Hepatitis B Core Ab, Total NonReactive Non-Reactive   Comp Metabolic Panel    Collection Time: 11/06/24 10:58 AM   Result Value Ref Range    Sodium 143 135 - 145 mmol/L    Potassium 4.2 3.6 - 5.5 mmol/L    Chloride 107 96 - 112 mmol/L    Co2 23 20 - 33 mmol/L    Anion Gap 13.0 7.0 - 16.0    Glucose 75 65 - 99 mg/dL    Bun 23 (H) 8 - 22 mg/dL    Creatinine 0.73 0.50 - 1.40 mg/dL    Calcium 9.6 8.5 - 10.5 mg/dL    Correct Calcium 9.6 8.5 - 10.5 mg/dL    AST(SGOT) 26 12 - 45 U/L    ALT(SGPT) 26 2 - 50 U/L    Alkaline Phosphatase 82 30 - 99 U/L    Total Bilirubin 0.7 0.1 - 1.5 mg/dL    Albumin 4.0 3.2 - 4.9 g/dL    Total Protein 6.5 6.0 - 8.2 g/dL    Globulin 2.5 1.9 - 3.5 g/dL    A-G Ratio 1.6 g/dL   Lipid Profile    Collection Time: 11/06/24 10:58 AM   Result Value Ref Range    Cholesterol,Tot 202 (H) 100 - 199 mg/dL    Triglycerides 97 0 - 149 mg/dL    HDL 96 >=40 mg/dL    LDL 87 <100 mg/dL   FASTING STATUS    Collection Time: 11/06/24 10:58 AM   Result Value Ref Range    Fasting Status Fasting    Sed Rate    Collection Time: 11/06/24 10:58 AM   Result Value Ref Range    Sed Rate Westergren 7 0 - 25 mm/hour   CBC WITH DIFFERENTIAL    Collection Time: 11/06/24 10:58 AM   Result Value Ref Range    WBC 6.3 4.8 - 10.8 K/uL    RBC 4.18 (L) 4.20 - 5.40 M/uL    Hemoglobin 12.8 12.0 - 16.0 g/dL    Hematocrit 39.5 37.0 - 47.0 %    MCV 94.5 81.4 - 97.8 fL    MCH 30.6 27.0 - 33.0 pg    MCHC 32.4 32.2 - 35.5 g/dL    RDW 48.7 35.9 - 50.0 fL    Platelet Count 232 164 - 446 K/uL    MPV 10.3 9.0 - 12.9 fL    Neutrophils-Polys 60.30 44.00 - 72.00 %    Lymphocytes 23.30 22.00 - 41.00 %    Monocytes 8.50 0.00 - 13.40 %     Eosinophils 6.30 0.00 - 6.90 %    Basophils 0.80 0.00 - 1.80 %    Immature Granulocytes 0.80 0.00 - 0.90 %    Nucleated RBC 0.00 0.00 - 0.20 /100 WBC    Neutrophils (Absolute) 3.81 1.82 - 7.42 K/uL    Lymphs (Absolute) 1.47 1.00 - 4.80 K/uL    Monos (Absolute) 0.54 0.00 - 0.85 K/uL    Eos (Absolute) 0.40 0.00 - 0.51 K/uL    Baso (Absolute) 0.05 0.00 - 0.12 K/uL    Immature Granulocytes (abs) 0.05 0.00 - 0.11 K/uL    NRBC (Absolute) 0.00 K/uL   CRP QUANTITIVE (NON-CARDIAC)    Collection Time: 11/06/24 10:58 AM   Result Value Ref Range    Stat C-Reactive Protein <0.30 0.00 - 0.75 mg/dL   ESTIMATED GFR    Collection Time: 11/06/24 10:58 AM   Result Value Ref Range    GFR (CKD-EPI) 85 >60 mL/min/1.73 m 2         Assessment & Plan     1. Primary hypertension        2. Pure hypercholesterolemia        3. S/P CABG (coronary artery bypass graft)        4. S/P aortic valve replacement 8/2023        5. Paroxysmal atrial fibrillation (HCC)            Medical Decision Making: Today's Assessment/Status/Plan:          It was my pleasure to meet with Ms. Hopkins.    We addressed the management of hypertension at today's visit. Blood pressure is well controlled.  We specifically assessed the labs on hypertension treatment    We addressed the management of dyslipidemia and atherosclerosis at today's visit. She is on appropriate lipid lowering medication.    SHE IS STARTING REPATHA if cholesterol at goal  could add back lower dose statin    We addressed the management of atherosclerotic artery disease.  She is on proper antiplatelet, cholesterol management and beta-blockers as appropriate.  We addressed the potential side effects and laboratory follow-up for these medications.    I will see Ms. Hopkins back in 1 year time and encouraged her to follow up with us over the phone or electronically using my MyChart as issues arise.    It is my pleasure to participate in the care of Ms. Hopkins.  Please do not hesitate to contact me with questions  or concerns.    Pedro Birmingham MD PhD [facility]  Cardiologist [facility]    Please note that this dictation was created using voice recognition software. There may be errors I did not discover before finalizing the note.     () Today's E/M visit is associated with medical care services that serve as the continuing focal point for all needed health care services and/or with medical care services that  are part of ongoing care related to a patient's single, serious condition, or a complex condition: This includes  furnishing services to patients on an ongoing basis that result in care that is personalized  to the patient. The services result in a comprehensive, longitudinal, and continuous  relationship with the patient and involve delivery of team-based care that is accessible, coordinated with other practitioners and providers, and integrated with the broader health  care landscape.

## 2025-01-10 ENCOUNTER — TELEPHONE (OUTPATIENT)
Dept: CARDIOLOGY | Facility: MEDICAL CENTER | Age: 75
End: 2025-01-10
Payer: COMMERCIAL

## 2025-01-14 ENCOUNTER — TELEPHONE (OUTPATIENT)
Dept: CARDIOLOGY | Facility: MEDICAL CENTER | Age: 75
End: 2025-01-14
Payer: COMMERCIAL

## 2025-01-14 NOTE — TELEPHONE ENCOUNTER
Last OV: 12.10.2024  Proposed Surgery: Genicular Nerve Ablation  Surgery Date: TBD  Requesting Office Name: [facility]  Fax Number: [phone]  Preference of Location (default is surgery center unless specified by Cardiologist or [facility])  Prior Clearance Addressed: No    Is this a general clearance? YES   Anticoags/Antiplatelets: Clopidogrel   Anticoags/Antiplatelet managed by Cardiology? YES    Outstanding Cardiac Imaging : No  Ablation, Cardioversion, Stent, Cardiac Devices, Catheterization, Watchman: Yes  Date : 02.13.2024   TAVR/Valve, Mitral Clip, Watchman (including open heart),: N/A   Recent Cardiac Hospitalization: Yes  Date:  07.19.2024            When: Greater than 3 months since hospitalization   History (cardiac history):   Past Medical History:   Diagnosis Date    Adverse effect of anesthesia 15 yrs ago ?    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 states this is mild and does not have any signs or symptoms.    Aortic valve insufficiency 08/16/2023    aortic valve replacement with Bovine. Follows with [facility]ology    Arthritis 02/06/2024    general body    Atrial fibrillation (HCC) 02/06/2024    medicated    Breath shortness 02/06/2024    with exertion or anxiety. 9/22/23-denies at present.    Bronchitis 60+ yrs ago    Cataract 06/16/2023    IOL OU 2018    Dental disorder 02/06/2024    upper/lower front 2 teeth crowned    Exercise-induced asthma 11/12/2024    Gastric ulcer     Goiter     Heart burn 2022    Occasionally after acidic meals.    Heart murmur 1965    Hiatal hernia with GERD 8/10/2024    High cholesterol 02/06/2024    medicated    Hypertension 02/06/2024    medicated    Hypothyroidism 02/06/2024    medicated    Pain 06/16/2023    bilat knees, back    PONV (postoperative nausea  and vomiting) 02/06/2024    history of motion sickness    Psychiatric problem 02/06/2024    anxiety, medicated    Pulmonary embolism (HCC) 01/31/2020    Pt. states after joint replacement in 7-2018.    Reactive arthritis (HCC)     Snoring 02/06/2024    not an issues at present    Thyroid disease     hasimotos    Thyroid disease 01/31/2020    Thyroid Nodules    Urinary bladder disorder 15 yrs ago    Sling           Is this a dental clearance? NO  Ablation, Cardioversion, Watchman, Stents, Cath, Devices within the last 3 months? No   If yes- Send dental wait letter, do not forward to provider for review.     TAVR / Valve, Mitral clip within the last 6 months? No  If yes- Send dental wait letter, do not forward to provider for review.     If completing a general clearance, continue per protocol.           Surgical Clearance Letter Sent: YES   **Scan clearance request letter into [facility].**

## 2025-02-08 ENCOUNTER — HOSPITAL ENCOUNTER (OUTPATIENT)
Dept: LAB | Facility: MEDICAL CENTER | Age: 75
End: 2025-02-08
Attending: INTERNAL MEDICINE
Payer: COMMERCIAL

## 2025-02-08 DIAGNOSIS — E78.00 PURE HYPERCHOLESTEROLEMIA: ICD-10-CM

## 2025-02-08 LAB
ALBUMIN SERPL BCP-MCNC: 4 G/DL (ref 3.2–4.9)
ALBUMIN/GLOB SERPL: 1.6 G/DL
ALP SERPL-CCNC: 85 U/L (ref 30–99)
ALT SERPL-CCNC: 36 U/L (ref 2–50)
ANION GAP SERPL CALC-SCNC: 10 MMOL/L (ref 7–16)
AST SERPL-CCNC: 54 U/L (ref 12–45)
BILIRUB SERPL-MCNC: 0.5 MG/DL (ref 0.1–1.5)
BUN SERPL-MCNC: 18 MG/DL (ref 8–22)
CALCIUM ALBUM COR SERPL-MCNC: 9.3 MG/DL (ref 8.5–10.5)
CALCIUM SERPL-MCNC: 9.3 MG/DL (ref 8.5–10.5)
CHLORIDE SERPL-SCNC: 107 MMOL/L (ref 96–112)
CHOLEST SERPL-MCNC: 300 MG/DL (ref 100–199)
CO2 SERPL-SCNC: 24 MMOL/L (ref 20–33)
CREAT SERPL-MCNC: 0.91 MG/DL (ref 0.5–1.4)
FASTING STATUS PATIENT QL REPORTED: NORMAL
GFR SERPLBLD CREATININE-BSD FMLA CKD-EPI: 65 ML/MIN/1.73 M 2
GLOBULIN SER CALC-MCNC: 2.5 G/DL (ref 1.9–3.5)
GLUCOSE SERPL-MCNC: 91 MG/DL (ref 65–99)
HDLC SERPL-MCNC: 99 MG/DL
LDLC SERPL CALC-MCNC: 177 MG/DL
POTASSIUM SERPL-SCNC: 4.1 MMOL/L (ref 3.6–5.5)
PROT SERPL-MCNC: 6.5 G/DL (ref 6–8.2)
SODIUM SERPL-SCNC: 141 MMOL/L (ref 135–145)
TRIGL SERPL-MCNC: 118 MG/DL (ref 0–149)

## 2025-02-08 PROCEDURE — 80061 LIPID PANEL: CPT

## 2025-02-08 PROCEDURE — 36415 COLL VENOUS BLD VENIPUNCTURE: CPT

## 2025-02-08 PROCEDURE — 80053 COMPREHEN METABOLIC PANEL: CPT

## 2025-02-10 ENCOUNTER — TELEPHONE (OUTPATIENT)
Dept: CARDIOLOGY | Facility: MEDICAL CENTER | Age: 75
End: 2025-02-10
Payer: COMMERCIAL

## 2025-02-10 DIAGNOSIS — E78.00 PURE HYPERCHOLESTEROLEMIA: ICD-10-CM

## 2025-02-11 ENCOUNTER — OFFICE VISIT (OUTPATIENT)
Dept: MEDICAL GROUP | Facility: MEDICAL CENTER | Age: 75
End: 2025-02-11
Payer: COMMERCIAL

## 2025-02-11 VITALS
TEMPERATURE: 96.6 F | OXYGEN SATURATION: 94 % | HEART RATE: 86 BPM | SYSTOLIC BLOOD PRESSURE: 122 MMHG | DIASTOLIC BLOOD PRESSURE: 76 MMHG | HEIGHT: 67 IN | WEIGHT: 182.98 LBS | BODY MASS INDEX: 28.72 KG/M2

## 2025-02-11 DIAGNOSIS — Z95.1 S/P CABG X 1: ICD-10-CM

## 2025-02-11 DIAGNOSIS — F41.1 GENERALIZED ANXIETY DISORDER: ICD-10-CM

## 2025-02-11 DIAGNOSIS — I25.83 CORONARY ARTERY DISEASE DUE TO LIPID RICH PLAQUE: ICD-10-CM

## 2025-02-11 DIAGNOSIS — G89.29 LUMBOSACRAL PAIN, CHRONIC: ICD-10-CM

## 2025-02-11 DIAGNOSIS — Z78.9 STATIN INTOLERANCE: ICD-10-CM

## 2025-02-11 DIAGNOSIS — Z12.83 SKIN CANCER SCREENING: ICD-10-CM

## 2025-02-11 DIAGNOSIS — I10 BENIGN ESSENTIAL HTN: ICD-10-CM

## 2025-02-11 DIAGNOSIS — B07.9 VIRAL WARTS, UNSPECIFIED TYPE: ICD-10-CM

## 2025-02-11 DIAGNOSIS — E78.00 PURE HYPERCHOLESTEROLEMIA: ICD-10-CM

## 2025-02-11 DIAGNOSIS — M54.50 LUMBOSACRAL PAIN, CHRONIC: ICD-10-CM

## 2025-02-11 DIAGNOSIS — J34.89 FRONTAL SINUS PAIN: ICD-10-CM

## 2025-02-11 DIAGNOSIS — I25.10 CORONARY ARTERY DISEASE DUE TO LIPID RICH PLAQUE: ICD-10-CM

## 2025-02-11 PROCEDURE — 99214 OFFICE O/P EST MOD 30 MIN: CPT | Mod: 25 | Performed by: FAMILY MEDICINE

## 2025-02-11 PROCEDURE — 3074F SYST BP LT 130 MM HG: CPT | Performed by: FAMILY MEDICINE

## 2025-02-11 PROCEDURE — 3078F DIAST BP <80 MM HG: CPT | Performed by: FAMILY MEDICINE

## 2025-02-11 PROCEDURE — 17110 DESTRUCTION B9 LES UP TO 14: CPT | Performed by: FAMILY MEDICINE

## 2025-02-11 RX ORDER — DULOXETINE 40 MG/1
40 CAPSULE, DELAYED RELEASE ORAL 2 TIMES DAILY
Qty: 180 CAPSULE | Refills: 3 | Status: SHIPPED | OUTPATIENT
Start: 2025-02-11

## 2025-02-11 RX ORDER — AZITHROMYCIN 250 MG/1
TABLET, FILM COATED ORAL
Qty: 6 TABLET | Refills: 0 | Status: SHIPPED | OUTPATIENT
Start: 2025-02-11 | End: 2025-02-16

## 2025-02-11 RX ORDER — PREGABALIN 50 MG/1
50 CAPSULE ORAL 3 TIMES DAILY
Qty: 90 CAPSULE | Refills: 2 | Status: SHIPPED | OUTPATIENT
Start: 2025-02-11 | End: 2025-02-13 | Stop reason: SDUPTHER

## 2025-02-11 RX ORDER — CLOPIDOGREL BISULFATE 75 MG/1
75 TABLET ORAL DAILY
Qty: 90 TABLET | Refills: 3 | Status: SHIPPED | OUTPATIENT
Start: 2025-02-11

## 2025-02-11 RX ORDER — BUSPIRONE HYDROCHLORIDE 10 MG/1
10 TABLET ORAL
COMMUNITY
Start: 2025-02-11 | End: 2025-02-19 | Stop reason: SDUPTHER

## 2025-02-11 RX ORDER — RANOLAZINE 500 MG/1
500 TABLET, EXTENDED RELEASE ORAL 2 TIMES DAILY
Qty: 180 TABLET | Refills: 3 | Status: SHIPPED | OUTPATIENT
Start: 2025-02-11

## 2025-02-11 ASSESSMENT — PATIENT HEALTH QUESTIONNAIRE - PHQ9: CLINICAL INTERPRETATION OF PHQ2 SCORE: 0

## 2025-02-11 ASSESSMENT — FIBROSIS 4 INDEX: FIB4 SCORE: 2.95

## 2025-02-13 DIAGNOSIS — G89.29 LUMBOSACRAL PAIN, CHRONIC: ICD-10-CM

## 2025-02-13 DIAGNOSIS — M54.50 LUMBOSACRAL PAIN, CHRONIC: ICD-10-CM

## 2025-02-13 RX ORDER — PREGABALIN 50 MG/1
50 CAPSULE ORAL 3 TIMES DAILY
Qty: 270 CAPSULE | Refills: 0 | Status: SHIPPED | OUTPATIENT
Start: 2025-02-13 | End: 2025-02-19 | Stop reason: SDUPTHER

## 2025-02-13 RX ORDER — EVOLOCUMAB 140 MG/ML
140 INJECTION, SOLUTION SUBCUTANEOUS
Qty: 6 EACH | Refills: 1 | Status: SHIPPED | OUTPATIENT
Start: 2025-02-13

## 2025-02-13 NOTE — PROGRESS NOTES
Verbal consent was acquired by the patient to use [employer] ambient listening note generation during this visit: YES    CC: pain management     Assessment & Plan:     1. Pure hypercholesterolemia  2. S/P CABG x 1_8/2023  3. Statin intolerance  4. Coronary artery disease due to lipid rich plaque  Patient has chronic hyperlipidemia, CAD s/p CABG. She has side effects with several statin. She was recently started on Repatha. She has a few injections so far and tolerates it well. Unfortunately, recent lipid profile showed markedly elevated total cholesterol and LDL. Patient wishes to give Repatha a few more weeks. I reviewed injection instructions with patient to ensure proper techniques.   - continue Repatha  - repeat labs in 3 months.   - ranolazine (RANEXA) 500 MG TABLET SR 12 HR; Take 1 Tablet by mouth 2 times a day.  Dispense: 180 Tablet; Refill: 3  - clopidogrel (PLAVIX) 75 MG Tab; Take 1 Tablet by mouth every day.  Dispense: 90 Tablet; Refill: 3  - Lipid Profile; Future  - Comp Metabolic Panel; Future  - dietary modification, regular exercise  - weight loss   - avoid alcohol, illicit drugs, tobacco products     5. Primary hypertension  Chronic, controlled with Lisinopril 10 mg qd, no s/e reported, will continue.      6. Skin cancer screening  - Referral to Dermatology    7. Viral warts, unspecified type  - cryo x2    8. Frontal sinus pain  Patient complains of recent development of frontal headache with sinus pressure concerning for sinus infection.   - azithromycin (ZITHROMAX) 250 MG Tab; Take 2 tablets on the first day and 1 tablet daily thereafter for 4 days.  Dispense: 6 Tablet; Refill: 0  - nasal saline irrigation   - patient declined steroid due to sensitivity to this medication.   - follow up PRN     10. MOHAMUD (generalized anxiety disorder)  Chronic, controlled with Duloxetine 40 mg BID and Buspar 10 mg qhs, no s/e reported, will continue.    - DULoxetine HCl 40 MG Cap DR Particles; Take 40 mg by mouth 2  times a day.  Dispense: 180 Capsule; Refill: 3  - busPIRone (BUSPAR) 10 MG Tab tablet; Take 1 Tablet by mouth at bedtime.      11. Lumbosacral pain, chronic, with right sciatica_[place] [facility]  Chronic, controlled with Lyrica 50 mg TID PRN, no s/e reported, will continue.    - pregabalin (LYRICA) 50 MG capsule; Take 1 Capsule by mouth 3 times a day for 90 days.  Dispense: 90 Capsule; Refill: 2  - Risks, benefits, side effects, as well as potential health complications associated with Lyrica were previously discussed with patient. Appropriate counseling provided.   - follow up in 3 months for medication refills. Patient is aware that he/she needs to have regular follow up appointment for controlled substance refills.       CRYOTHERAPY:  Discussed risks and benefits of cryotherapy. Patient verbally agreed. 3 applications of cryotherapy were applied to 2 lesions on right and left calf. Patient tolerated procedure well. Aftercare instructions given.    Subjective:       HPI:     History of Present Illness  The patient presents for evaluation of cholesterol management, warts, hypertension, headaches, and medication management.    Cholesterol Management  - She has been self-administering Repatha injections biweekly since 12/10/2024, with one dose remaining.  - No issues with medication, no side effects reported.   - Not on rosuvastatin due to previous muscle pain. She has similar symptoms with multiple statin.     Warts  - She has small bumps on the posterior leg, frequently scratched, leading to bleeding.  - Suspects warts and wants removal.  - Lesions present for over a year, possibly from a hospital stay.  - Seeking a dermatologist referral for skin cancer screening.    Headaches  - she has history of chronic migraine. Previously on propranolol, which was effective but discontinued as she is now on Coreg for heart disease.   - she complains of recent development of frontal headache and sinus pressure  - she also  complains of squeezing sensation at the bitemporal area.   - No vision changes but updated eyeglass prescription.    Medication Management  - Currently on omeprazole for acid reflux, duloxetine twice daily, clopidogrel 75 mg, ranolazine, pregabalin, and BuSpar once daily at night.  - Requesting refills.      ALLERGIES  - Allergic to PENICILLIN            Current Outpatient Medications:     azithromycin (ZITHROMAX) 250 MG Tab, Take 2 tablets on the first day and 1 tablet daily thereafter for 4 days., Disp: 6 Tablet, Rfl: 0    DULoxetine HCl 40 MG Cap DR Particles, Take 40 mg by mouth 2 times a day., Disp: 180 Capsule, Rfl: 3    busPIRone (BUSPAR) 10 MG Tab tablet, Take 1 Tablet by mouth at bedtime.  , Disp: , Rfl:     ranolazine (RANEXA) 500 MG TABLET SR 12 HR, Take 1 Tablet by mouth 2 times a day., Disp: 180 Tablet, Rfl: 3    clopidogrel (PLAVIX) 75 MG Tab, Take 1 Tablet by mouth every day., Disp: 90 Tablet, Rfl: 3    pregabalin (LYRICA) 50 MG capsule, Take 1 Capsule by mouth 3 times a day for 90 days., Disp: 90 Capsule, Rfl: 2    zonisamide (ZONEGRAN) 100 MG Cap, Take 100 mg by mouth every day., Disp: , Rfl:     Evolocumab (REPATHA SURECLICK) 140 MG/ML Solution Auto-injector SubQ injection pen, Inject 1 mL under the skin every 14 days., Disp: 6 Each, Rfl: 1    albuterol 108 (90 Base) MCG/ACT Aero Soln inhalation aerosol, Inhale 2 Puffs every 6 hours as needed for Shortness of Breath., Disp: 8.5 g, Rfl: 5    carvedilol (COREG) 12.5 MG Tab, Take 1 Tablet by mouth 2 times a day with meals., Disp: 180 Tablet, Rfl: 1    vitamin D2, Ergocalciferol, (DRISDOL) 1.25 MG (51001 UT) Cap capsule, Take 1 Capsule by mouth every 7 days on saturdays (Patient taking differently: every day.), Disp: 12 Capsule, Rfl: 0    cetirizine (ZYRTEC) 10 MG Tab, Take 1 Tablet by mouth every day., Disp: 90 Tablet, Rfl: 3    SYNTHROID 75 MCG Tab, Take 1 Tablet by mouth every morning on an empty stomach., Disp: 90 Tablet, Rfl: 3    lisinopril  "(PRINIVIL) 10 MG Tab, Take 1 Tablet by mouth every day., Disp: 90 Tablet, Rfl: 3    diclofenac sodium (VOLTAREN) 1 % Gel, Apply 2 g topically 4 times a day as needed (Apply's on both knees)., Disp: , Rfl:      Objective:     Exam:  /76 (BP Location: Left arm, Patient Position: Sitting, BP Cuff Size: Adult long)   Pulse 86   Temp 35.9 °C (96.6 °F) (Temporal)   Ht 1.702 m (5' 7\")   Wt 83 kg (182 lb 15.7 oz)   SpO2 94%   BMI 28.66 kg/m²  Body mass index is 28.66 kg/m².    Constitutional: awake, alert, in no distress.  Skin: Warm, dry, good turgor, no rashes, bruises, ulcers in visible areas.  Eye: conjunctiva clear, lids neg for edema or lesions.  ENMT: TM and auditory canals wnl. Inflamed and congested nasal mucosa. Lips without lesions, good dentition, hyperemic posterior oropharynx.  Neck: Trachea midline, no masses, no thyromegaly. No cervical or supraclavicular lymphadenopathy  Respiratory: Unlabored respiratory effort, lungs clear to auscultation, no wheezes, no rales.  Cardiovascular: Normal S1, S2, no murmur, no pedal edema.  Psych: Oriented x3, affect and mood wnl, intact judgement and insight.         Return in about 3 months (around 5/11/2025) for controlled substance refill.          Please note that this dictation was created using voice recognition software. I have made every reasonable attempt to correct obvious errors, but I expect that there are errors of grammar and possibly content that I did not discover before finalizing the note.        "

## 2025-02-14 ENCOUNTER — HOSPITAL ENCOUNTER (OUTPATIENT)
Dept: LAB | Facility: MEDICAL CENTER | Age: 75
End: 2025-02-14
Attending: NURSE PRACTITIONER
Payer: COMMERCIAL

## 2025-02-14 LAB — ERYTHROCYTE [SEDIMENTATION RATE] IN BLOOD BY WESTERGREN METHOD: 4 MM/HOUR (ref 0–25)

## 2025-02-14 PROCEDURE — 86140 C-REACTIVE PROTEIN: CPT

## 2025-02-14 PROCEDURE — 36415 COLL VENOUS BLD VENIPUNCTURE: CPT

## 2025-02-14 PROCEDURE — 85652 RBC SED RATE AUTOMATED: CPT

## 2025-02-14 PROCEDURE — 80053 COMPREHEN METABOLIC PANEL: CPT

## 2025-02-14 NOTE — Clinical Note
REFERRAL APPROVAL NOTICE         Sent on February 13, 2025                   Margoth Hopkins  [address]  [facility] [address]                   Dear Ms. Hopkins,    After a careful review of the medical information and benefit coverage, [facility] has processed your referral. See below for additional details.    If applicable, you must be actively enrolled with your insurance for coverage of the authorized service. If you have any questions regarding your coverage, please contact your insurance directly.    REFERRAL INFORMATION   Referral #:  [id number]  Referred-To Provider    Referred-By Provider:  Marshall Walker M.D.   [facility]      [address] [place] [address] [address]  [facility] [address]  [phone] [address]  [facility] [address]  [phone]    Referral Start Date:  02/11/2025  Referral End Date:   02/11/2026             SCHEDULING  If you do not already have an appointment, please call [phone] to make an appointment.     MORE INFORMATION  If you do not already have a [employer] account, sign up at: [email or web address]  You can access your medical information, make appointments, see lab results, billing information, and more.  If you have questions regarding this referral, please contact  the [facility] Referrals department at:             [phone]. Monday - Friday 8:00AM - 5:00PM.     Sincerely,    [facility]

## 2025-02-15 LAB
ALBUMIN SERPL BCP-MCNC: 4 G/DL (ref 3.2–4.9)
ALBUMIN/GLOB SERPL: 1.6 G/DL
ALP SERPL-CCNC: 88 U/L (ref 30–99)
ALT SERPL-CCNC: 44 U/L (ref 2–50)
ANION GAP SERPL CALC-SCNC: 11 MMOL/L (ref 7–16)
AST SERPL-CCNC: 43 U/L (ref 12–45)
BILIRUB SERPL-MCNC: 0.5 MG/DL (ref 0.1–1.5)
BUN SERPL-MCNC: 20 MG/DL (ref 8–22)
CALCIUM ALBUM COR SERPL-MCNC: 9.5 MG/DL (ref 8.5–10.5)
CALCIUM SERPL-MCNC: 9.5 MG/DL (ref 8.5–10.5)
CHLORIDE SERPL-SCNC: 106 MMOL/L (ref 96–112)
CO2 SERPL-SCNC: 23 MMOL/L (ref 20–33)
CREAT SERPL-MCNC: 0.95 MG/DL (ref 0.5–1.4)
CRP SERPL HS-MCNC: <0.3 MG/DL (ref 0–0.75)
GFR SERPLBLD CREATININE-BSD FMLA CKD-EPI: 62 ML/MIN/1.73 M 2
GLOBULIN SER CALC-MCNC: 2.5 G/DL (ref 1.9–3.5)
GLUCOSE SERPL-MCNC: 93 MG/DL (ref 65–99)
POTASSIUM SERPL-SCNC: 4.5 MMOL/L (ref 3.6–5.5)
PROT SERPL-MCNC: 6.5 G/DL (ref 6–8.2)
SODIUM SERPL-SCNC: 140 MMOL/L (ref 135–145)

## 2025-02-17 ENCOUNTER — PATIENT MESSAGE (OUTPATIENT)
Dept: MEDICAL GROUP | Facility: MEDICAL CENTER | Age: 75
End: 2025-02-17
Payer: COMMERCIAL

## 2025-02-17 DIAGNOSIS — E78.00 PURE HYPERCHOLESTEROLEMIA: ICD-10-CM

## 2025-02-17 DIAGNOSIS — Z95.1 S/P CABG X 1: ICD-10-CM

## 2025-02-17 DIAGNOSIS — M54.50 LUMBOSACRAL PAIN, CHRONIC: ICD-10-CM

## 2025-02-17 DIAGNOSIS — I25.83 CORONARY ARTERY DISEASE DUE TO LIPID RICH PLAQUE: ICD-10-CM

## 2025-02-17 DIAGNOSIS — I25.10 CORONARY ARTERY DISEASE DUE TO LIPID RICH PLAQUE: ICD-10-CM

## 2025-02-17 DIAGNOSIS — G89.29 LUMBOSACRAL PAIN, CHRONIC: ICD-10-CM

## 2025-02-17 DIAGNOSIS — F41.1 GENERALIZED ANXIETY DISORDER: ICD-10-CM

## 2025-02-19 NOTE — PATIENT COMMUNICATION
Was the patient seen in the last year in this department? Yes   Does patient have an active prescription for medications requested? No   Received Request Via: Patient  Pt requested meds be sent to a different pharmacy

## 2025-02-20 DIAGNOSIS — I10 BENIGN ESSENTIAL HTN: ICD-10-CM

## 2025-02-20 DIAGNOSIS — Z95.1 S/P CABG X 1: ICD-10-CM

## 2025-02-20 RX ORDER — BUSPIRONE HYDROCHLORIDE 10 MG/1
10 TABLET ORAL
Qty: 90 TABLET | Refills: 2 | Status: SHIPPED | OUTPATIENT
Start: 2025-02-20

## 2025-02-20 RX ORDER — PREGABALIN 50 MG/1
50 CAPSULE ORAL 3 TIMES DAILY
Qty: 270 CAPSULE | Refills: 0 | Status: SHIPPED | OUTPATIENT
Start: 2025-02-20 | End: 2025-05-21

## 2025-02-21 RX ORDER — CARVEDILOL 12.5 MG/1
12.5 TABLET ORAL 2 TIMES DAILY WITH MEALS
Qty: 180 TABLET | Refills: 3 | Status: SHIPPED | OUTPATIENT
Start: 2025-02-21

## 2025-02-25 DIAGNOSIS — I25.10 CORONARY ARTERY DISEASE DUE TO LIPID RICH PLAQUE: ICD-10-CM

## 2025-02-25 DIAGNOSIS — I25.83 CORONARY ARTERY DISEASE DUE TO LIPID RICH PLAQUE: ICD-10-CM

## 2025-02-25 RX ORDER — ISOSORBIDE MONONITRATE 30 MG/1
30 TABLET, EXTENDED RELEASE ORAL
Qty: 90 TABLET | Refills: 3 | Status: SHIPPED | OUTPATIENT
Start: 2025-02-25

## 2025-02-25 NOTE — TELEPHONE ENCOUNTER
Is the patient due for a refill? Yes    Was the patient seen the last 15 months? Yes    Date of last office visit: 12/10/2024    Does the patient have an upcoming appointment?  No    Provider to refill:yes    Does the patient have [facility] and need 100-day supply? (This applies to ALL medications) Patient does not have SCP

## 2025-02-28 ENCOUNTER — PATIENT MESSAGE (OUTPATIENT)
Dept: MEDICAL GROUP | Facility: MEDICAL CENTER | Age: 75
End: 2025-02-28
Payer: COMMERCIAL

## 2025-02-28 DIAGNOSIS — F51.01 PRIMARY INSOMNIA: ICD-10-CM

## 2025-03-03 RX ORDER — ZOLPIDEM TARTRATE 5 MG/1
5 TABLET ORAL NIGHTLY PRN
Qty: 30 TABLET | Refills: 0 | Status: SHIPPED | OUTPATIENT
Start: 2025-03-03 | End: 2025-04-02

## 2025-03-03 NOTE — TELEPHONE ENCOUNTER
Phone Number Called: [phone] (home)     Message: Spoke to patient regarding note below.    Left Message for patient to call back: no         4 = No assist / stand by assistance

## 2025-03-04 ENCOUNTER — APPOINTMENT (OUTPATIENT)
Dept: RADIOLOGY | Facility: MEDICAL CENTER | Age: 75
End: 2025-03-04
Attending: NURSE PRACTITIONER
Payer: COMMERCIAL

## 2025-03-04 DIAGNOSIS — R27.0 ATAXIA: ICD-10-CM

## 2025-03-04 DIAGNOSIS — G44.89 OTHER HEADACHE SYNDROME: ICD-10-CM

## 2025-03-04 DIAGNOSIS — R42 DIZZINESS AND GIDDINESS: ICD-10-CM

## 2025-03-04 DIAGNOSIS — G31.84 MILD COGNITIVE IMPAIRMENT OF UNCERTAIN OR UNKNOWN ETIOLOGY: ICD-10-CM

## 2025-03-04 PROCEDURE — 70551 MRI BRAIN STEM W/O DYE: CPT

## 2025-03-28 NOTE — LETTER
Bactrim DS twice per day for 3 days - I will give enough for 7 days in case you need a longer course since you've been symptomatic for 2 days.   Take pyridium 200mg tid prn as a urinary analgesic until the Bactrim starts working  Will follow UC results     PROCEDURE/SURGERY CLEARANCE FORM      Encounter Date: 1/14/2025    Patient: Margoth Hopkins  YOB: 1950    CARDIOLOGIST:  Pedro Birmingham M.D.    REFERRING DOCTOR:  No ref. provider found    Procedure/surgery: Genicular Nerve Ablation                                           PROCEDURE/SURGERY CLEARANCE FORM    Date: 1/14/2025   Patient Name: Margoth Hopkins    Dear Surgeon or Proceduralist,      Thank you for your request for cardiac stratification of our mutual patient Margoth Hopkins 1950. We have reviewed their [facility] records; and to the best of our understanding this patient has not had stenting, ablation, watchman, cardiothoracic surgery or hospitalization for cardiovascular reasons in the past 6 months.  Margoth Hopkins has been seen within the past 15 months and is considered to have non-modifiable cardiac risk for this low-risk procedure/surgery. They may proceed from a cardiovascular standpoint and may hold their antiplatelet/anticoagulation as briefly as possible. Please have patient resume this medication when hemodynamically stable to do so.     Aspirin or Prasugrel   - hold 7 days prior to procedure/surgery, resume when hemodynamically stable      Clopidrogrel or Ticagrelor  - hold 7 days for all neurological procedures, hold 5 days prior to all other procedure/surgery,  resume when hemodynamically stable     Warfarin - hold 7 days for all neurological procedures, hold 5 days prior to all other procedure/surgery and coordinate with [facility] ([phone]) INR testing and dose management.      Pradaxa/Xarelto/Eliquis/Savesya - hold 1 day prior to procedure for low bleeding risk procedure, 2 days for high bleeding risk procedure, or consider holding 3 days or longer for patients with reduced kidney function (CrCl <30mL/min) or spinal/cranial surgeries/procedures.      If they have a mechanical heart valve, please coordinate with [facility] ([phone]) the proper management of their anticoagulant in the periprocedural or perioperative period.      Some patients have higher risk for cardiovascular complications or holding medication. If our patient has had prior complications of holding antiplatelet or anticoagulants in the past and we have seen them after these events, we have addressed these concerns with the patient. They are at an unknown degree of increased risk for recurrent complication.  You may hold anticoagulation/antiplatelets for the procedure or surgery if the benefits of the procedure or surgery outweigh this nonmodifiable risk.      If Margoth Hopkins 1950 has new symptoms of heart failure decompensation, unstable arrythmia, or angina please reach out and we will assess the patient.      If you have other patient-specific concerns, please feel free to reach out to the patient's cardiologist directly at [phone].     Thank you,       [facility]      Electronically signed        MD Signature   Pedro Birmingham M.D.

## 2025-04-07 ENCOUNTER — APPOINTMENT (OUTPATIENT)
Dept: URBAN - METROPOLITAN AREA CLINIC 15 | Facility: CLINIC | Age: 75
Setting detail: DERMATOLOGY
End: 2025-04-07

## 2025-04-07 DIAGNOSIS — D22 MELANOCYTIC NEVI: ICD-10-CM

## 2025-04-07 DIAGNOSIS — D69.2 OTHER NONTHROMBOCYTOPENIC PURPURA: ICD-10-CM

## 2025-04-07 DIAGNOSIS — T1490XA CONTUSION OF UNSPECIFIED SITE: ICD-10-CM | Status: INADEQUATELY CONTROLLED

## 2025-04-07 DIAGNOSIS — Z71.89 OTHER SPECIFIED COUNSELING: ICD-10-CM

## 2025-04-07 DIAGNOSIS — D18.0 HEMANGIOMA: ICD-10-CM

## 2025-04-07 DIAGNOSIS — L82.1 OTHER SEBORRHEIC KERATOSIS: ICD-10-CM

## 2025-04-07 DIAGNOSIS — L81.4 OTHER MELANIN HYPERPIGMENTATION: ICD-10-CM

## 2025-04-07 PROBLEM — S00.83XA CONTUSION OF OTHER PART OF HEAD, INITIAL ENCOUNTER: Status: ACTIVE | Noted: 2025-04-07

## 2025-04-07 PROBLEM — D22.71 MELANOCYTIC NEVI OF RIGHT LOWER LIMB, INCLUDING HIP: Status: ACTIVE | Noted: 2025-04-07

## 2025-04-07 PROBLEM — D18.01 HEMANGIOMA OF SKIN AND SUBCUTANEOUS TISSUE: Status: ACTIVE | Noted: 2025-04-07

## 2025-04-07 PROCEDURE — ? COUNSELING

## 2025-04-07 PROCEDURE — ? SUNSCREEN RECOMMENDATIONS

## 2025-04-07 PROCEDURE — ? ADDITIONAL NOTES

## 2025-04-07 PROCEDURE — 99203 OFFICE O/P NEW LOW 30 MIN: CPT

## 2025-04-07 PROCEDURE — ? DIAGNOSIS COMMENT

## 2025-04-07 ASSESSMENT — LOCATION SIMPLE DESCRIPTION DERM
LOCATION SIMPLE: RIGHT KNEE
LOCATION SIMPLE: LEFT FOREARM
LOCATION SIMPLE: RIGHT CHEEK
LOCATION SIMPLE: RIGHT CALF
LOCATION SIMPLE: LEFT CHEEK
LOCATION SIMPLE: LEFT FOOT
LOCATION SIMPLE: RIGHT FOREHEAD
LOCATION SIMPLE: CHEST
LOCATION SIMPLE: NECK
LOCATION SIMPLE: LEFT UPPER ARM

## 2025-04-07 ASSESSMENT — LOCATION ZONE DERM
LOCATION ZONE: ARM
LOCATION ZONE: LEG
LOCATION ZONE: NECK
LOCATION ZONE: FEET
LOCATION ZONE: FACE
LOCATION ZONE: TRUNK

## 2025-04-07 ASSESSMENT — LOCATION DETAILED DESCRIPTION DERM
LOCATION DETAILED: RIGHT INFERIOR FOREHEAD
LOCATION DETAILED: RIGHT KNEE
LOCATION DETAILED: RIGHT SUPERIOR MEDIAL MALAR CHEEK
LOCATION DETAILED: RIGHT MEDIAL INFERIOR CHEST
LOCATION DETAILED: RIGHT PROXIMAL CALF
LOCATION DETAILED: LEFT DORSAL FOOT
LOCATION DETAILED: LEFT PROXIMAL DORSAL FOREARM
LOCATION DETAILED: LEFT CENTRAL BUCCAL CHEEK
LOCATION DETAILED: LEFT ANTERIOR DISTAL UPPER ARM
LOCATION DETAILED: RIGHT LATERAL BUCCAL CHEEK
LOCATION DETAILED: LEFT MEDIAL MALAR CHEEK
LOCATION DETAILED: LEFT CENTRAL LATERAL NECK

## 2025-04-15 ENCOUNTER — HOSPITAL ENCOUNTER (OUTPATIENT)
Dept: LAB | Facility: MEDICAL CENTER | Age: 75
End: 2025-04-15
Attending: FAMILY MEDICINE
Payer: COMMERCIAL

## 2025-04-15 DIAGNOSIS — E78.00 PURE HYPERCHOLESTEROLEMIA: ICD-10-CM

## 2025-04-15 LAB
ALBUMIN SERPL BCP-MCNC: 4.1 G/DL (ref 3.2–4.9)
ALBUMIN/GLOB SERPL: 1.6 G/DL
ALP SERPL-CCNC: 95 U/L (ref 30–99)
ALT SERPL-CCNC: 20 U/L (ref 2–50)
ANION GAP SERPL CALC-SCNC: 12 MMOL/L (ref 7–16)
AST SERPL-CCNC: 23 U/L (ref 12–45)
BILIRUB SERPL-MCNC: 0.4 MG/DL (ref 0.1–1.5)
BUN SERPL-MCNC: 23 MG/DL (ref 8–22)
CALCIUM ALBUM COR SERPL-MCNC: 9.2 MG/DL (ref 8.5–10.5)
CALCIUM SERPL-MCNC: 9.3 MG/DL (ref 8.5–10.5)
CHLORIDE SERPL-SCNC: 110 MMOL/L (ref 96–112)
CHOLEST SERPL-MCNC: 268 MG/DL (ref 100–199)
CO2 SERPL-SCNC: 22 MMOL/L (ref 20–33)
CREAT SERPL-MCNC: 0.9 MG/DL (ref 0.5–1.4)
FASTING STATUS PATIENT QL REPORTED: NORMAL
GFR SERPLBLD CREATININE-BSD FMLA CKD-EPI: 66 ML/MIN/1.73 M 2
GLOBULIN SER CALC-MCNC: 2.6 G/DL (ref 1.9–3.5)
GLUCOSE SERPL-MCNC: 99 MG/DL (ref 65–99)
HDLC SERPL-MCNC: 84 MG/DL
LDLC SERPL CALC-MCNC: 160 MG/DL
POTASSIUM SERPL-SCNC: 4.4 MMOL/L (ref 3.6–5.5)
PROT SERPL-MCNC: 6.7 G/DL (ref 6–8.2)
SODIUM SERPL-SCNC: 144 MMOL/L (ref 135–145)
TRIGL SERPL-MCNC: 122 MG/DL (ref 0–149)

## 2025-04-15 PROCEDURE — 80061 LIPID PANEL: CPT

## 2025-04-15 PROCEDURE — 36415 COLL VENOUS BLD VENIPUNCTURE: CPT

## 2025-04-15 PROCEDURE — 80053 COMPREHEN METABOLIC PANEL: CPT

## 2025-04-17 ENCOUNTER — RESULTS FOLLOW-UP (OUTPATIENT)
Dept: MEDICAL GROUP | Facility: MEDICAL CENTER | Age: 75
End: 2025-04-17
Payer: COMMERCIAL

## 2025-04-18 ENCOUNTER — OFFICE VISIT (OUTPATIENT)
Dept: MEDICAL GROUP | Facility: MEDICAL CENTER | Age: 75
End: 2025-04-18
Payer: COMMERCIAL

## 2025-04-18 VITALS
DIASTOLIC BLOOD PRESSURE: 64 MMHG | RESPIRATION RATE: 12 BRPM | WEIGHT: 185.19 LBS | SYSTOLIC BLOOD PRESSURE: 116 MMHG | HEART RATE: 71 BPM | HEIGHT: 67 IN | OXYGEN SATURATION: 97 % | TEMPERATURE: 97.5 F | BODY MASS INDEX: 29.07 KG/M2

## 2025-04-18 DIAGNOSIS — Z95.1 S/P CABG X 1: ICD-10-CM

## 2025-04-18 DIAGNOSIS — S09.93XA FACIAL INJURY, INITIAL ENCOUNTER: ICD-10-CM
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
## 2025-04-18 DIAGNOSIS — S06.0X0A CONCUSSION WITHOUT LOSS OF CONSCIOUSNESS, INITIAL ENCOUNTER: ICD-10-CM

## 2025-04-18 DIAGNOSIS — F41.8 SITUATIONAL ANXIETY: ICD-10-CM

## 2025-04-18 DIAGNOSIS — R51.9 NEW ONSET HEADACHE: ICD-10-CM

## 2025-04-18 DIAGNOSIS — Z98.890 S/P SURGERY ON NASAL SEPTUM: ICD-10-CM

## 2025-04-18 DIAGNOSIS — S02.2XXA CLOSED FRACTURE OF NASAL BONE, INITIAL ENCOUNTER: ICD-10-CM

## 2025-04-18 DIAGNOSIS — I67.1 ANEURYSM OF LEFT INTERNAL CAROTID ARTERY: ICD-10-CM

## 2025-04-18 DIAGNOSIS — E78.00 PURE HYPERCHOLESTEROLEMIA: ICD-10-CM

## 2025-04-18 PROCEDURE — 3078F DIAST BP <80 MM HG: CPT | Performed by: FAMILY MEDICINE

## 2025-04-18 PROCEDURE — 99215 OFFICE O/P EST HI 40 MIN: CPT | Performed by: FAMILY MEDICINE

## 2025-04-18 PROCEDURE — 3074F SYST BP LT 130 MM HG: CPT | Performed by: FAMILY MEDICINE

## 2025-04-18 RX ORDER — ROSUVASTATIN CALCIUM 20 MG/1
20 TABLET, COATED ORAL EVERY EVENING
Qty: 90 TABLET | Refills: 3 | Status: SHIPPED | OUTPATIENT
Start: 2025-04-18

## 2025-04-18 RX ORDER — RANOLAZINE 500 MG/1
500 TABLET, EXTENDED RELEASE ORAL 2 TIMES DAILY
Qty: 180 TABLET | Refills: 3 | Status: SHIPPED | OUTPATIENT
Start: 2025-04-18

## 2025-04-18 RX ORDER — DIAZEPAM 5 MG/1
5 TABLET ORAL EVERY 6 HOURS PRN
Qty: 1 TABLET | Refills: 0 | Status: SHIPPED | OUTPATIENT
Start: 2025-04-18 | End: 2025-04-19 | Stop reason: SDUPTHER

## 2025-04-18 ASSESSMENT — FIBROSIS 4 INDEX: FIB4 SCORE: 1.68

## 2025-04-19 RX ORDER — DIAZEPAM 5 MG/1
TABLET ORAL
Qty: 2 TABLET | Refills: 0 | Status: SHIPPED | OUTPATIENT
Start: 2025-04-19 | End: 2025-05-19

## 2025-04-19 NOTE — PROGRESS NOTES
Verbal consent was acquired by the patient to use [employer] ambient listening note generation during this visit: YES    CC: GLF, facial injury, new onset headache     Assessment & Plan:     1. Aneurysm of left internal carotid artery  Incidental findings of aneurysm of the left internal carotid artery per recent MRI ordered by patient's neurologist for assessment of memory problems.  - CT-CTA HEAD WITH & W/O-POST PROCESS; Future    2. Closed fracture of nasal bone, initial encounter  3. Fall from ground level  4. S/P surgery on nasal septum  5. Facial injury, initial encounter  6. New onset headache  7.  Concussion without loss of consciousness, initial encounter  Patient suffered ground-level fall when she was visiting [facility] a few weeks ago leading to acute nasal bone and nasal septum fracture needing surgical intervention which was provided by a surgeon in [facility].  Patient had follow-up with the surgeon postop.  Sutures and packing were removed prior to her return to [facility].  Surgical wounds are healing well.   She continues to experiences symptoms of concussion.  She also has been experiencing daily headaches, nasal congestion since returning home in [place].  It is difficult for patient to determine if her symptom is related to chronic migraine and allergic rhinitis.  She wishes to follow-up with local ENT for consultation.  No other neurological symptoms reported.  Neuroexam performed during office visit was negative for acute concerns.  - Referral to ENT  - MR-BRAIN-W/O; Future  - Discussed management of concussion with patient and her .  Appropriate counseling provided   - ER precaution discussed with  - Tylenol as needed for pain  - Continue treatment for allergic rhinitis.  - Continue treatment for migraine.    7. Pure hypercholesterolemia  8. 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   She was previously taking statin which was discontinued due to  musculoskeletal pain.  Unfortunately, musculoskeletal pain persists after discontinuation of statin.  I had long discussion with patient and her .  She wishes to restart Crestor.  - rosuvastatin (CRESTOR) 20 MG Tab; Take 1 Tablet by mouth every evening.  Dispense: 90 Tablet; Refill: 3  - continue Repatha 140 mg every 2 weeks  - Comp Metabolic Panel; Future  - Lipid Profile; Future  - Continue Plavix 75 mg daily.  - Regular exercises as tolerated.  - ranolazine (RANEXA) 500 MG TABLET SR 12 HR; Take 1 Tablet by mouth 2 times a day.  Dispense: 180 Tablet; Refill: 3  - Follow-up with cardiology as directed  - Avoid alcohol and tobacco.    9. Situational anxiety  Will prescribe Valium for upcoming CT scan and MRI of the brain.  - diazePAM (VALIUM) 5 MG Tab; Take 1 Tablet by mouth every 6 hours as needed for Anxiety for up to 1 day.  Dispense: 1 Tablet; Refill: 0             Subjective:       HPI:     History of Present Illness  The patient presents for evaluation of nasal fracture, forehead hematoma, concussion, incidental aneurysm of the left internal carotid artery, and hyperlipidemia. She is accompanied by her .    On 03/26/2025, patient tripped over her suitcase and fell down in [place] with her nose impacted the ground leading to acute nasal fracture.  Patient underwent surgical repair with her surgeon in [facility].  She had postop visit with the surgeon.  Packing and suture were removed prior to her return to [place].  Surgical wound is healing well.  However, patient complains of persistent congestion which she is not sure if it is related to surgical wound or allergic rhinitis.  It is typical for patient to breathe through her nose.  She wishes to be referred to local ENT for consultation.      She also complains of persistent headaches since returning home.  Denies any fever, chills, nausea, vomiting, focal weakness, paresthesia, dysphagia, dysphonia, facial droopiness.  She has a history of  migraine.  It is difficult for her to determine if ongoing headache is related to migraine.    Patient has been experiencing memory problems.  She had brain scan done in 3/2025 which was notable for left internal carotid artery aneurysm.  Her neurologist order a new scan of the brain.  However, patient has not been able to schedule appointment for the scan.    She has claustrophobia and requested prescription for Valium to be taken prior to the scans.     Patient has chronic CAD, status post CABG x 1.  She is currently taking Repatha 140 mg every 2 weeks.  Unfortunately, recent lipid profile showed elevated total cholesterol and LDL.  She was previously taking statin for management of hyperlipidemia.  Statin was discontinued due to musculoskeletal pain.  Unfortunately, her musculoskeletal symptoms persist despite discontinuation of statin.  She wishes to restart statin.    Ranolazine is being taken for chest pain, which she finds beneficial.  She continue to follow-up with her cardiologist regularly as directed.      Current Outpatient Medications:   diazePAM (VALIUM) 5 MG Tab, Take 1 Tablet by mouth every 6 hours as needed for Anxiety for up to 1 day., Disp: 1 Tablet, Rfl: 0  rosuvastatin (CRESTOR) 20 MG Tab, Take 1 Tablet by mouth every evening., Disp: 90 Tablet, Rfl: 3  ranolazine (RANEXA) 500 MG TABLET SR 12 HR, Take 1 Tablet by mouth 2 times a day., Disp: 180 Tablet, Rfl: 3  isosorbide mononitrate SR (IMDUR) 30 MG TABLET SR 24 HR, TAKE 1 TABLET BY MOUTH EVERY DAY, Disp: 90 Tablet, Rfl: 3  carvedilol (COREG) 12.5 MG Tab, Take 1 Tablet by mouth 2 times a day with meals., Disp: 180 Tablet, Rfl: 3  pregabalin (LYRICA) 50 MG capsule, Take 1 Capsule by mouth 3 times a day for 90 days., Disp: 270 Capsule, Rfl: 0  busPIRone (BUSPAR) 10 MG Tab tablet, Take 1 Tablet by mouth at bedtime.  , Disp: 90 Tablet, Rfl: 2  Evolocumab (REPATHA SURECLICK) 140 MG/ML Solution Auto-injector SubQ injection pen, Inject 1 mL under the  "skin every 14 days., Disp: 6 Each, Rfl: 1  DULoxetine HCl 40 MG Cap DR Particles, Take 40 mg by mouth 2 times a day., Disp: 180 Capsule, Rfl: 3  clopidogrel (PLAVIX) 75 MG Tab, Take 1 Tablet by mouth every day., Disp: 90 Tablet, Rfl: 3  zonisamide (ZONEGRAN) 100 MG Cap, Take 100 mg by mouth every day., Disp: , Rfl:   albuterol 108 (90 Base) MCG/ACT Aero Soln inhalation aerosol, Inhale 2 Puffs every 6 hours as needed for Shortness of Breath., Disp: 8.5 g, Rfl: 5  cetirizine (ZYRTEC) 10 MG Tab, Take 1 Tablet by mouth every day., Disp: 90 Tablet, Rfl: 3  SYNTHROID 75 MCG Tab, Take 1 Tablet by mouth every morning on an empty stomach., Disp: 90 Tablet, Rfl: 3  lisinopril (PRINIVIL) 10 MG Tab, Take 1 Tablet by mouth every day., Disp: 90 Tablet, Rfl: 3  diclofenac sodium (VOLTAREN) 1 % Gel, Apply 2 g topically 4 times a day as needed (Apply's on both knees)., Disp: , Rfl:      Objective:     Exam:  /64 (BP Location: Right arm, Patient Position: Sitting, BP Cuff Size: Adult)   Pulse 71   Temp 36.4 °C (97.5 °F) (Temporal)   Resp 12   Ht 1.702 m (5' 7\")   Wt 84 kg (185 lb 3 oz)   SpO2 97%   BMI 29.00 kg/m²  Body mass index is 29 kg/m².    Constitutional: awake, alert, in no distress.  Skin: Warm, dry, good turgor, no rashes, bruises, ulcers in visible areas.  Eye: conjunctiva clear, lids neg for edema or lesions.  -Surgical wounds noted at the forehead and nose are healing well, negative bleeding or discharge.  ENMT: TM and auditory canals wnl. Oral and nasal mucosa wnl. Lips without lesions, good dentition, oropharynx clear.  Neck: Trachea midline, no masses, no thyromegaly. No cervical or supraclavicular lymphadenopathy  Respiratory: Unlabored respiratory effort, lungs clear to auscultation, no wheezes, no rales.  Cardiovascular: Normal S1, S2, no murmur, no pedal edema.  Psych: Oriented x3, affect and mood wnl, intact judgement and insight.   Physical Exam  Eyes:      General: No visual field " deficit.  Neurological:      Mental Status: She is alert and oriented to person, place, and time. Mental status is at baseline.      GCS: GCS eye subscore is 4. GCS verbal subscore is 5. GCS motor subscore is 6.      Cranial Nerves: Cranial nerves 2-12 are intact. No cranial nerve deficit, dysarthria or facial asymmetry.      Sensory: Sensation is intact.      Motor: Motor function is intact.      Coordination: Coordination is intact.      Deep Tendon Reflexes:      Reflex Scores:       Tricep reflexes are 1+ on the right side and 1+ on the left side.       Bicep reflexes are 1+ on the right side and 1+ on the left side.       Brachioradialis reflexes are 1+ on the right side and 1+ on the left side.       Patellar reflexes are 1+ on the right side and 1+ on the left side.       Achilles reflexes are 1+ on the right side and 1+ on the left side.      Records from [facility] reviewed and discussed with patient    Total time spent reviewing pt's chart, labs, notes, imaging, and counseling/prescribing medications to patient before, during, and after the visit: 40 minutes.        Return in about 3 months (around 7/18/2025) for Multiple issues.          Please note that this dictation was created using voice recognition software. I have made every reasonable attempt to correct obvious errors, but I expect that there are errors of grammar and possibly content that I did not discover before finalizing the note.

## 2025-04-21 ENCOUNTER — RESULTS FOLLOW-UP (OUTPATIENT)
Dept: MEDICAL GROUP | Facility: MEDICAL CENTER | Age: 75
End: 2025-04-21
Payer: COMMERCIAL

## 2025-04-21 ENCOUNTER — HOSPITAL ENCOUNTER (OUTPATIENT)
Dept: RADIOLOGY | Facility: MEDICAL CENTER | Age: 75
End: 2025-04-21
Attending: FAMILY MEDICINE
Payer: COMMERCIAL

## 2025-04-21 DIAGNOSIS — R51.9 NEW ONSET HEADACHE: ICD-10-CM

## 2025-04-21 DIAGNOSIS — S02.2XXA CLOSED FRACTURE OF NASAL BONE, INITIAL ENCOUNTER: ICD-10-CM

## 2025-04-21 DIAGNOSIS — S09.93XA FACIAL INJURY, INITIAL ENCOUNTER: ICD-10-CM
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
## 2025-04-21 PROCEDURE — 70551 MRI BRAIN STEM W/O DYE: CPT

## 2025-04-21 NOTE — Clinical Note
REFERRAL APPROVAL NOTICE         Sent on April 21, 2025                   Margoth Hopkins  [address] [facility] [place] [address]                   Dear Ms. Hopkins,    After a careful review of the medical information and benefit coverage, [facility] has processed your referral. See below for additional details.    If applicable, you must be actively enrolled with your insurance for coverage of the authorized service. If you have any questions regarding your coverage, please contact your insurance directly.    REFERRAL INFORMATION   Referral #:  [id number]  Referred-To Provider    Referred-By Provider:  Otolaryngology    TIMOTHY Lujan ANDREW      [address]  [address]  [facility] [address]  [phone] [address] [facility] [address]  [phone]    Referral Start Date:  04/18/2025  Referral End Date:   04/18/2026             SCHEDULING  If you do not already have an appointment, please call [phone] to make an appointment.     MORE INFORMATION  If you do not already have a [employer] account, sign up at: [email or web address]  You can access your medical information, make appointments, see lab results, billing information, and more.  If you have questions regarding this referral, please contact  the [facility] Referrals department at:             [phone]. Monday - Friday 8:00AM - 5:00PM.     Sincerely,    [facility]    
03-Mar-2023

## 2025-04-23 ENCOUNTER — HOSPITAL ENCOUNTER (OUTPATIENT)
Dept: RADIOLOGY | Facility: MEDICAL CENTER | Age: 75
End: 2025-04-23
Attending: FAMILY MEDICINE
Payer: COMMERCIAL

## 2025-04-23 DIAGNOSIS — I67.1 ANEURYSM OF LEFT INTERNAL CAROTID ARTERY: ICD-10-CM

## 2025-04-23 PROCEDURE — 700117 HCHG RX CONTRAST REV CODE 255: Performed by: FAMILY MEDICINE

## 2025-04-23 PROCEDURE — 70496 CT ANGIOGRAPHY HEAD: CPT

## 2025-04-23 RX ADMIN — IOHEXOL 80 ML: 350 INJECTION, SOLUTION INTRAVENOUS at 18:27

## 2025-04-26 ENCOUNTER — RESULTS FOLLOW-UP (OUTPATIENT)
Dept: MEDICAL GROUP | Facility: MEDICAL CENTER | Age: 75
End: 2025-04-26
Payer: COMMERCIAL

## 2025-04-26 DIAGNOSIS — I67.1 ANEURYSM OF LEFT INTERNAL CAROTID ARTERY: ICD-10-CM

## 2025-04-27 ENCOUNTER — OFFICE VISIT (OUTPATIENT)
Dept: URGENT CARE | Facility: CLINIC | Age: 75
End: 2025-04-27
Payer: COMMERCIAL

## 2025-04-27 VITALS
DIASTOLIC BLOOD PRESSURE: 72 MMHG | WEIGHT: 183 LBS | HEART RATE: 85 BPM | HEIGHT: 67 IN | SYSTOLIC BLOOD PRESSURE: 108 MMHG | BODY MASS INDEX: 28.72 KG/M2 | TEMPERATURE: 97.4 F | RESPIRATION RATE: 14 BRPM | OXYGEN SATURATION: 96 %

## 2025-04-27 DIAGNOSIS — N30.01 ACUTE CYSTITIS WITH HEMATURIA: ICD-10-CM

## 2025-04-27 DIAGNOSIS — R30.0 DYSURIA: ICD-10-CM

## 2025-04-27 LAB
APPEARANCE UR: CLEAR
BILIRUB UR STRIP-MCNC: NORMAL MG/DL
COLOR UR AUTO: NORMAL
GLUCOSE UR STRIP.AUTO-MCNC: NEGATIVE MG/DL
KETONES UR STRIP.AUTO-MCNC: NORMAL MG/DL
LEUKOCYTE ESTERASE UR QL STRIP.AUTO: NORMAL
NITRITE UR QL STRIP.AUTO: POSITIVE
PH UR STRIP.AUTO: 5.5 [PH] (ref 5–8)
PROT UR QL STRIP: 100 MG/DL
RBC UR QL AUTO: NORMAL
SP GR UR STRIP.AUTO: 1.03
UROBILINOGEN UR STRIP-MCNC: 1 MG/DL

## 2025-04-27 PROCEDURE — 99213 OFFICE O/P EST LOW 20 MIN: CPT | Performed by: FAMILY MEDICINE

## 2025-04-27 PROCEDURE — 81002 URINALYSIS NONAUTO W/O SCOPE: CPT | Performed by: FAMILY MEDICINE

## 2025-04-27 PROCEDURE — 3078F DIAST BP <80 MM HG: CPT | Performed by: FAMILY MEDICINE

## 2025-04-27 PROCEDURE — 3074F SYST BP LT 130 MM HG: CPT | Performed by: FAMILY MEDICINE

## 2025-04-27 RX ORDER — PHENAZOPYRIDINE HYDROCHLORIDE 200 MG/1
200 TABLET, FILM COATED ORAL 3 TIMES DAILY PRN
Qty: 6 TABLET | Refills: 0 | Status: SHIPPED | OUTPATIENT
Start: 2025-04-27

## 2025-04-27 RX ORDER — SULFAMETHOXAZOLE AND TRIMETHOPRIM 800; 160 MG/1; MG/1
1 TABLET ORAL 2 TIMES DAILY
Qty: 14 TABLET | Refills: 0 | Status: SHIPPED | OUTPATIENT
Start: 2025-04-27 | End: 2025-05-04

## 2025-04-27 ASSESSMENT — ENCOUNTER SYMPTOMS
FLANK PAIN: 0
CARDIOVASCULAR NEGATIVE: 1
RESPIRATORY NEGATIVE: 1
FEVER: 0
GASTROINTESTINAL NEGATIVE: 1

## 2025-04-27 ASSESSMENT — FIBROSIS 4 INDEX: FIB4 SCORE: 1.68

## 2025-04-27 NOTE — PROGRESS NOTES
"Subjective:   Margoth Hopkins is a 74 y.o. female who presents for Dysuria (Started 10 days ago)      Dysuria   Associated symptoms include frequency, hematuria and urgency. Pertinent negatives include no flank pain.       Review of Systems   Constitutional:  Negative for fever and malaise/fatigue.   Respiratory: Negative.     Cardiovascular: Negative.    Gastrointestinal: Negative.    Genitourinary:  Positive for dysuria, frequency, hematuria and urgency. Negative for flank pain.       Medications, Allergies, and current problem list reviewed today in Epic.     Objective:     /72 (BP Location: Left arm, Patient Position: Sitting, BP Cuff Size: Adult)   Pulse 85   Temp 36.3 °C (97.4 °F) (Temporal)   Resp 14   Ht 1.702 m (5' 7\")   Wt 83 kg (183 lb)   SpO2 96%     Physical Exam  Vitals and nursing note reviewed.   Constitutional:       Appearance: Normal appearance.   HENT:      Head: Normocephalic and atraumatic.      Right Ear: Tympanic membrane normal.      Left Ear: Tympanic membrane normal.   Cardiovascular:      Rate and Rhythm: Normal rate and regular rhythm.   Pulmonary:      Effort: Pulmonary effort is normal.      Breath sounds: Normal breath sounds.   Abdominal:      General: Abdomen is flat. Bowel sounds are normal.      Palpations: Abdomen is soft.   Neurological:      Mental Status: She is alert.         Assessment/Plan:     Diagnosis and associated orders:     1. Dysuria  POCT Urinalysis    phenazopyridine (PYRIDIUM) 200 MG Tab    sulfamethoxazole-trimethoprim (BACTRIM DS) 800-160 MG tablet      2. Acute cystitis with hematuria           Comments/MDM:              Differential diagnosis, natural history, supportive care, and indications for immediate follow-up discussed.    Advised the patient to follow-up with the primary care physician for recheck, reevaluation, and consideration of further management.    Please note that this dictation was created using voice recognition software. I have " made a reasonable attempt to correct obvious errors, but I expect that there are errors of grammar and possibly content that I did not discover before finalizing the note.    This note was electronically signed by Mikael Hernandez M.D.

## 2025-05-06 ENCOUNTER — OFFICE VISIT (OUTPATIENT)
Dept: MEDICAL GROUP | Facility: MEDICAL CENTER | Age: 75
End: 2025-05-06
Payer: COMMERCIAL

## 2025-05-06 VITALS
WEIGHT: 183.09 LBS | OXYGEN SATURATION: 93 % | HEART RATE: 71 BPM | TEMPERATURE: 97.1 F | DIASTOLIC BLOOD PRESSURE: 70 MMHG | BODY MASS INDEX: 28.74 KG/M2 | HEIGHT: 67 IN | SYSTOLIC BLOOD PRESSURE: 110 MMHG

## 2025-05-06 DIAGNOSIS — I10 BENIGN ESSENTIAL HTN: ICD-10-CM

## 2025-05-06 DIAGNOSIS — Z95.1 S/P CABG X 1: ICD-10-CM

## 2025-05-06 DIAGNOSIS — E89.0 POSTOPERATIVE HYPOTHYROIDISM: ICD-10-CM

## 2025-05-06 DIAGNOSIS — I67.1 ANEURYSM OF LEFT INTERNAL CAROTID ARTERY: ICD-10-CM

## 2025-05-06 DIAGNOSIS — M54.50 LUMBOSACRAL PAIN, CHRONIC: ICD-10-CM

## 2025-05-06 DIAGNOSIS — G89.29 LUMBOSACRAL PAIN, CHRONIC: ICD-10-CM

## 2025-05-06 DIAGNOSIS — G43.009 MIGRAINE WITHOUT AURA AND WITHOUT STATUS MIGRAINOSUS, NOT INTRACTABLE: ICD-10-CM

## 2025-05-06 DIAGNOSIS — G31.84 MILD COGNITIVE IMPAIRMENT: ICD-10-CM

## 2025-05-06 PROCEDURE — 99214 OFFICE O/P EST MOD 30 MIN: CPT | Performed by: FAMILY MEDICINE

## 2025-05-06 PROCEDURE — 3074F SYST BP LT 130 MM HG: CPT | Performed by: FAMILY MEDICINE

## 2025-05-06 PROCEDURE — 3078F DIAST BP <80 MM HG: CPT | Performed by: FAMILY MEDICINE

## 2025-05-06 RX ORDER — LISINOPRIL 10 MG/1
10 TABLET ORAL DAILY
Qty: 90 TABLET | Refills: 3 | Status: SHIPPED | OUTPATIENT
Start: 2025-05-06

## 2025-05-06 RX ORDER — UBROGEPANT 100 MG/1
TABLET ORAL
COMMUNITY
Start: 2025-04-29

## 2025-05-06 RX ORDER — PREGABALIN 50 MG/1
50 CAPSULE ORAL 3 TIMES DAILY
Qty: 270 CAPSULE | Refills: 0 | Status: SHIPPED | OUTPATIENT
Start: 2025-05-06 | End: 2025-08-04

## 2025-05-06 RX ORDER — LEVOTHYROXINE SODIUM 75 MCG
75 TABLET ORAL
Qty: 90 TABLET | Refills: 3 | Status: SHIPPED | OUTPATIENT
Start: 2025-05-06

## 2025-05-06 RX ORDER — CLOPIDOGREL BISULFATE 75 MG/1
75 TABLET ORAL DAILY
Qty: 90 TABLET | Refills: 3 | Status: SHIPPED | OUTPATIENT
Start: 2025-05-06

## 2025-05-06 ASSESSMENT — FIBROSIS 4 INDEX: FIB4 SCORE: 1.68

## 2025-05-07 NOTE — PROGRESS NOTES
Verbal consent was acquired by the patient to use [employer] ambient listening note generation during this visit: YES    CC: Chronic pain follow-up    Assessment & Plan:     1. Aneurysm of left internal carotid artery  Incidental finding of left internal carotid artery aneurysm measuring 5 mm at the ophthalmic segment of the left internal carotid artery by MRI done a few months ago confirmed by recent CTA of the head.  - Referral to Vascular Surgery    2. Lumbosacral pain, chronic, with right sciatica_[facility]  Chronic, stable, controlled with Lyrica 50 mg 3 times daily as needed.  Patient's tolerating Lyrica well, no side effect reported.  Negative history of illicit drugs, alcohol, tobacco abuse.  - pregabalin (LYRICA) 50 MG capsule; Take 1 Capsule by mouth 3 times a day for 90 days.  Dispense: 270 Capsule; Refill: 0  - Risks, benefits, side effects, as well as potential health complications associated with prescribed controlled substance(s) were previously discussed with patient. Appropriate counseling provided.   - follow up in 3 months for medication refills. Patient is aware that he/she needs to have regular follow up appointment for controlled substance refills.      3. 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  Patient has regular follow-up with cardiology.  - clopidogrel (PLAVIX) 75 MG Tab; Take 1 Tablet by mouth every day.  Dispense: 90 Tablet; Refill: 3  - Continue Coreg, Imdur, lisinopril, Crestor as directed.    4. Postoperative hypothyroidism  Chronic, controlled with Synthroid 75 mcg daily, no s/e reported, will continue.    - SYNTHROID 75 MCG Tab; Take 1 Tablet by mouth every morning on an empty stomach.  Dispense: 90 Tablet; Refill: 3    5. Primary hypertension  Chronic, controlled with lisinopril 10 mg daily Imdur 30 mg daily Coreg 12.5 mg twice daily, no s/e reported, will continue.    - lisinopril (PRINIVIL) 10 MG Tab; Take 1 Tablet by mouth every day.   Dispense: 90 Tablet; Refill: 3    6. Mild cognitive impairment  Patient was previously diagnosed with mild cognitive impairment.  She is currently working with neurology.  Her symptoms are stable and slightly improved.  Patient currently does not receive any treatment.  - Follow-up with neurology as directed    7. Migraine without aura and without status migrainosus, not intractable  Patient's of episodic migraine headaches that is controlled with Ubrelvy as needed.  - sleep 7-8 hours every night  - stress reduction, yoga, biofeedback, calming music, short walk  - exercise regularly, weight loss  - avoid known dietary triggers .   - avoid alcohol, tobacco, caffeine.   - eats regularly meals and avoid dehydration.   - avoid eye strain from excessive usage of computer, TV, or electronic devices   - Continue Ubrelvy as directed.      Subjective:       HPI:     History of Present Illness  The patient presents for evaluation of a left-sided aneurysm, back pain, essential tremor, atrial fibrillation, and post-surgical follow-up.    She has initiated her cholesterol medication regimen, which includes rosuvastatin. She is also on Plavix as recommended by her cardiologist.    She continues to take Lyrica but reports no noticeable difference in her condition. She has undergone a brain scan and is currently on a regimen of pregabalin 3 times daily, which she finds beneficial for her back pain. She experiences discomfort in the back of her leg when she does not take the medication.    She experienced a headache yesterday, which she attributes to a concussion. She took Ubrelvy, which provided relief.    She has been diagnosed with mild cognitive impairment by her neurologist, who did not find any indications of dementia or Alzheimer's disease. She reports no worsening of her memory.    She is currently on zonisamide for essential tremors, which she believes is effective.    She has paroxysmal atrial fibrillation, which was  managed with Plavix and Coreg.    She is on lisinopril for blood pressure management, which she reports is effective.    She is on Synthroid.    FAMILY HISTORY  Her brother  of an aneurysm in his late 70s.      Patient takes the rest of his medications as directed, no side effects reported.     Current Outpatient Medications:   UBRELVY 100 MG Tab, , Disp: , Rfl:   pregabalin (LYRICA) 50 MG capsule, Take 1 Capsule by mouth 3 times a day for 90 days., Disp: 270 Capsule, Rfl: 0  clopidogrel (PLAVIX) 75 MG Tab, Take 1 Tablet by mouth every day., Disp: 90 Tablet, Rfl: 3  SYNTHROID 75 MCG Tab, Take 1 Tablet by mouth every morning on an empty stomach., Disp: 90 Tablet, Rfl: 3  lisinopril (PRINIVIL) 10 MG Tab, Take 1 Tablet by mouth every day., Disp: 90 Tablet, Rfl: 3  phenazopyridine (PYRIDIUM) 200 MG Tab, Take 1 Tablet by mouth 3 times a day as needed for Moderate Pain., Disp: 6 Tablet, Rfl: 0  rosuvastatin (CRESTOR) 20 MG Tab, Take 1 Tablet by mouth every evening., Disp: 90 Tablet, Rfl: 3  ranolazine (RANEXA) 500 MG TABLET SR 12 HR, Take 1 Tablet by mouth 2 times a day., Disp: 180 Tablet, Rfl: 3  isosorbide mononitrate SR (IMDUR) 30 MG TABLET SR 24 HR, TAKE 1 TABLET BY MOUTH EVERY DAY, Disp: 90 Tablet, Rfl: 3  carvedilol (COREG) 12.5 MG Tab, Take 1 Tablet by mouth 2 times a day with meals., Disp: 180 Tablet, Rfl: 3  busPIRone (BUSPAR) 10 MG Tab tablet, Take 1 Tablet by mouth at bedtime.  , Disp: 90 Tablet, Rfl: 2  DULoxetine HCl 40 MG Cap DR Particles, Take 40 mg by mouth 2 times a day., Disp: 180 Capsule, Rfl: 3  zonisamide (ZONEGRAN) 100 MG Cap, Take 100 mg by mouth every day., Disp: , Rfl:   albuterol 108 (90 Base) MCG/ACT Aero Soln inhalation aerosol, Inhale 2 Puffs every 6 hours as needed for Shortness of Breath., Disp: 8.5 g, Rfl: 5  cetirizine (ZYRTEC) 10 MG Tab, Take 1 Tablet by mouth every day., Disp: 90 Tablet, Rfl: 3  diclofenac sodium (VOLTAREN) 1 % Gel, Apply 2 g topically 4 times a day as needed  "(Apply's on both knees)., Disp: , Rfl:      Objective:     Exam:  /70 (BP Location: Left arm, Patient Position: Sitting, BP Cuff Size: Adult long)   Pulse 71   Temp 36.2 °C (97.1 °F) (Temporal)   Ht 1.702 m (5' 7\")   Wt 83 kg (183 lb 1.5 oz)   SpO2 93%   BMI 28.68 kg/m²  Body mass index is 28.68 kg/m².    Constitutional: awake, alert, in no distress.  Skin: Warm, dry, good turgor, no rashes, bruises, ulcers in visible areas.  Eye: conjunctiva clear, lids neg for edema or lesions.  Neck: Trachea midline, no masses, no thyromegaly. No cervical or supraclavicular lymphadenopathy  Respiratory: Unlabored respiratory effort, lungs clear to auscultation, no wheezes, no rales.  Cardiovascular: Normal S1, S2, no murmur, no pedal edema.  Psych: Oriented x3, affect and mood wnl, intact judgement and insight.         Return in about 3 months (around 8/6/2025) for Multiple issues.          We use [employer] ([employer]-powered program) to document the visit. I also use Dragon (voice recognition software) to dictate part of the note. I have made every reasonable attempt to correct obvious errors, but I expect that there are errors of grammar and possibly content that I did not discover before finalizing the note.        "

## 2025-05-15 DIAGNOSIS — I67.1 ANEURYSM OF LEFT INTERNAL CAROTID ARTERY: Primary | ICD-10-CM

## 2025-05-21 NOTE — Clinical Note
REFERRAL APPROVAL NOTICE         Sent on May 21, 2025                   Margoth Hopkins  [address]  [place] [address]                   Dear Ms. Hopkins,    After a careful review of the medical information and benefit coverage, [facility] has processed your referral. See below for additional details.    If applicable, you must be actively enrolled with your insurance for coverage of the authorized service. If you have any questions regarding your coverage, please contact your insurance directly.    REFERRAL INFORMATION   Referral #:  [id number]  Referred-To Department    Referred-By Provider:  Neurosurgery    Charlee Walker M.D.   [facility]      [address]  [address]  [place] [address]  [phone] [address] [facility] [place] [address]  [phone]    Referral Start Date:  05/15/2025  Referral End Date:   05/15/2026             SCHEDULING  If you do not already have an appointment, please call [phone] to make an appointment.     MORE INFORMATION  If you do not already have a [employer] account, sign up at: [email or web address]  You can access your medical information, make appointments, see lab results, billing information, and more.  If you have questions regarding this referral, please contact  the [facility] Referrals department at:             [phone]. Monday - Friday 8:00AM - 5:00PM.     Sincerely,    [facility]

## 2025-06-08 DIAGNOSIS — I67.1 ANEURYSM OF LEFT INTERNAL CAROTID ARTERY: Primary | ICD-10-CM

## 2025-06-09 NOTE — Clinical Note
REFERRAL APPROVAL NOTICE         Sent on June 9, 2025                   Margoth Hopkins  [address] [facility] [address]                   Dear Ms. Hopkins,    After a careful review of the medical information and benefit coverage, [facility] has processed your referral. See below for additional details.    If applicable, you must be actively enrolled with your insurance for coverage of the authorized service. If you have any questions regarding your coverage, please contact your insurance directly.    REFERRAL INFORMATION   Referral #:  [id number]  Referred-To Provider    Referred-By Provider:  TIMOTHY Bueno SINA      [address]  [address]  [facility] [address]  [phone] [address]  [address]  [facility] [id number] [phone]    Referral Start Date:  06/08/2025  Referral End Date:   06/08/2026             SCHEDULING  If you do not already have an appointment, please call [phone] to make an appointment.     MORE INFORMATION  If you do not already have a [employer] account, sign up at: [email or web address]  You can access your medical information, make appointments, see lab results, billing information, and more.  If you have questions regarding this referral, please contact  the [facility] Referrals department at:             [phone]. Monday - Friday 8:00AM - 5:00PM.     Sincerely,    [facility]

## 2025-06-27 RX ORDER — UBROGEPANT 100 MG/1
TABLET ORAL
Qty: 16 TABLET | Refills: 5 | Status: SHIPPED | OUTPATIENT
Start: 2025-06-27

## 2025-07-23 ENCOUNTER — TELEPHONE (OUTPATIENT)
Dept: CARDIOLOGY | Facility: MEDICAL CENTER | Age: 75
End: 2025-07-23
Payer: COMMERCIAL

## 2025-07-23 DIAGNOSIS — Z95.1 S/P CABG X 1: ICD-10-CM

## 2025-07-23 DIAGNOSIS — E78.00 PURE HYPERCHOLESTEROLEMIA: ICD-10-CM

## 2025-07-23 NOTE — LETTER
PROCEDURE/SURGERY CLEARANCE FORM    Date: 7/28/2025   Patient Name: Margoth Hopkins    Dear Surgeon or Proceduralist,     The above patient is cleared to have the following procedure/surgery: Genicular Nerve Ablation     Additional comments: She can proceed with the proposed procedure or surgery from a cardiac standpoint, no modifiable cardiovascular risk, no further cardiac testing required, hold antiplatelet as necessary, resume as soon as possible typically when patient is able to take oral medications.     It is my pleasure to participate in the care of Ms. Hopkins.  Please do not hesitate to contact me with questions or concerns. [facility] is available 24/7 for consultative services at [phone] in the perioperative period.     Thank you for your request for cardiac stratification of our mutual patient Margoth Hopkins 1950. We have reviewed their [facility] records; and to the best of our understanding this patient has not had stenting, ablation, watchman, cardiothoracic surgery or hospitalization for cardiovascular reasons in the past 6 months.  Margoth Hopkins has been seen within the past 15 months and is considered to have non-modifiable cardiac risk for this low-risk procedure/surgery. They may proceed from a cardiovascular standpoint and may hold their antiplatelet/anticoagulation as briefly as possible. Please have patient resume this medication when hemodynamically stable to do so.     Aspirin or Prasugrel   - hold 7 days prior to procedure/surgery, resume when hemodynamically stable      Clopidrogrel or Ticagrelor  - hold 7 days for all neurological procedures, hold 5 days prior to all other procedure/surgery,  resume when hemodynamically stable     Warfarin - hold 7 days for all neurological procedures, hold 5 days prior to all other procedure/surgery and coordinate with [facility] ([phone]) INR testing and dose management.      Pradaxa/Xarelto/Eliquis/Savesya -  hold 1 day prior to procedure for low bleeding risk procedure, 2 days for high bleeding risk procedure, or consider holding 3 days or longer for patients with reduced kidney function (CrCl <30mL/min) or spinal/cranial surgeries/procedures.      If they have a mechanical heart valve, please coordinate with [facility]ticoagulation [facility] ([phone]) the proper management of their anticoagulant in the periprocedural or perioperative period.      Some patients have higher risk for cardiovascular complications or holding medication. If our patient has had prior complications of holding antiplatelet or anticoagulants in the past and we have seen them after these events, we have addressed these concerns with the patient. They are at an unknown degree of increased risk for recurrent complication.  You may hold anticoagulation/antiplatelets for the procedure or surgery if the benefits of the procedure or surgery outweigh this nonmodifiable risk.      If Margoth Hopkins 1950 has new symptoms of heart failure decompensation, unstable arrythmia, or angina please reach out and we will assess the patient.      If you have other patient-specific concerns, please feel free to reach out to the patient's cardiologist directly at [phone].     Thank you,   [facility]    __ _Electronically signed________  Provider: Pedro Birmingham MD PhD FACC  Cardiologist [facility]

## 2025-07-23 NOTE — TELEPHONE ENCOUNTER
Last OV: 12/10/2024  Proposed Surgery: Genicular Nerve Ablation  Surgery Date: TBD  Requesting Office Name: [facility]  Fax Number: [phone]  Preference of Location (default is surgery center unless specified by Cardiologist or [facility])  Prior Clearance Addressed: No    Is this a general clearance? YES   Anticoags/Antiplatelets: Clopidogrel   Anticoags/Antiplatelet managed by Cardiology? No Provider to advise.    Outstanding Cardiac Imaging : No  Ablation, Cardioversion, Stent, Cardiac Devices, Catheterization, Watchman: Yes  Date : 6/7/2023   TAVR/Valve, Mitral Clip, Watchman (including open heart),: N/A  Recent Cardiac Hospitalization: No            When: N/A  History (cardiac history): Past Medical History[1]        Is this a dental clearance? NO  Ablation, Cardioversion, Watchman, Stents, Cath, Devices within the last 3 months? No   If yes- Send dental wait letter, do not forward to provider for review.     TAVR / Valve, Mitral clip within the last 6 months? No  If yes- Send dental wait letter, do not forward to provider for review.     If completing a general clearance, continue per protocol.           Surgical Clearance Letter Sent: No Provider to advise.   **Scan clearance request letter into [facility].**           [1]   Past Medical History:  Diagnosis Date    Adverse effect of anesthesia 15 yrs ago ?    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 states this is mild and does not have any signs or symptoms.    Aortic valve insufficiency 08/16/2023    aortic valve replacement with Bovine. Follows with [facility] cardiology    Arthritis 02/06/2024    general body    Atrial fibrillation (HCC) 02/06/2024    medicated    Breath shortness 02/06/2024    with exertion or anxiety. 9/22/23-denies at present.     Bronchitis 60+ yrs ago    Cataract 06/16/2023    IOL OU 2018    Dental disorder 02/06/2024    upper/lower front 2 teeth crowned    Exercise-induced asthma 11/12/2024    Gastric ulcer     Goiter     Heart burn 2022    Occasionally after acidic meals.    Heart murmur 1965    Hiatal hernia with GERD 8/10/2024    High cholesterol 02/06/2024    medicated    Hypertension 02/06/2024    medicated    Hypothyroidism 02/06/2024    medicated    Pain 06/16/2023    bilat knees, back    PONV (postoperative nausea and vomiting) 02/06/2024    history of motion sickness    Psychiatric problem 02/06/2024    anxiety, medicated    Pulmonary embolism (HCC) 01/31/2020    Pt. states after joint replacement in 7-2018.    Reactive arthritis (HCC)     Snoring 02/06/2024    not an issues at present    Thyroid disease     hasimotos    Thyroid disease 01/31/2020    Thyroid Nodules    Urinary bladder disorder 15 yrs ago    Sling

## 2025-07-25 RX ORDER — RANOLAZINE 500 MG/1
500 TABLET, EXTENDED RELEASE ORAL 2 TIMES DAILY
Qty: 180 TABLET | Refills: 0 | Status: SHIPPED | OUTPATIENT
Start: 2025-07-25

## 2025-07-25 RX ORDER — ROSUVASTATIN CALCIUM 20 MG/1
20 TABLET, COATED ORAL EVERY EVENING
Qty: 90 TABLET | Refills: 3 | Status: SHIPPED | OUTPATIENT
Start: 2025-07-25

## 2025-07-27 NOTE — TELEPHONE ENCOUNTER
She can proceed with the proposed procedure or surgery from a cardiac standpoint, no modifiable cardiovascular risk, no further cardiac testing required, hold antiplatelet as necessary, resume as soon as possible typically when patient is able to take oral medications.    It is my pleasure to participate in the care of Ms. Hopkins.  Please do not hesitate to contact me with questions or concerns. [facility] Card[facility] is available 24/7 for consultative services at [phone] in the perioperative period.    Electronically Signed    Pedro Birmingham MD PhD FACC  Cardiologist [facility]

## (undated) DEVICE — PACK MINOR BASIN - (2EA/CA)

## (undated) DEVICE — SUCTION INSTRUMENT YANKAUER BULBOUS TIP W/O VENT (50EA/CA)

## (undated) DEVICE — TUBE CONNECTING SUCTION - CLEAR PLASTIC STERILE 72 IN (50EA/CA)

## (undated) DEVICE — SET LEADWIRE 5 LEAD BEDSIDE DISPOSABLE ECG (1SET OF 5/EA)

## (undated) DEVICE — TOWEL STOP TIMEOUT SAFETY FLAG (40EA/CA)

## (undated) DEVICE — SUTURE 3-0 VICRYL PLUS RB-1 - 8 X 18 INCH (12/BX)

## (undated) DEVICE — SODIUM CHL IRRIGATION 0.9% 1000ML (12EA/CA)

## (undated) DEVICE — SYRINGE 30 ML LL (56/BX)

## (undated) DEVICE — PROBE PRASS STAND STIMULATING (5EA/PK)

## (undated) DEVICE — ORGANIZER SUTURE GABBAY-FRAT - ER STERILE (3/SET 4ST/BX)

## (undated) DEVICE — KIT INTROPERCUTANEOUS SHEATH - 8.5 FR W/MAX BARRIER AND BIOPATCH  (5/CA)

## (undated) DEVICE — WIRE STEEL 5-0 B&S 20 OHS - 5/PK 12PK/BX ITEM. D5329 OR D6625 CAN BE USED AS A SUB

## (undated) DEVICE — SHEET THYROID - (10EA/CA)

## (undated) DEVICE — STOPCOCK MALE 4-WAY - (50/CA)

## (undated) DEVICE — GOWN SURGEONS LARGE - (32/CA)

## (undated) DEVICE — SOD. CHL. INJ. 0.9% 1000 ML - (14EA/CA 60CA/PF)

## (undated) DEVICE — BLADE STERNUM SAW SURGICAL 32.0 X 6.4 MM STERILE (1/EA)

## (undated) DEVICE — KIT RADIAL ARTERY 20GA W/MAX BARRIER AND BIOPATCH  (5EA/CA) #10740 IS FOR THE SET RADIAL ARTERIAL

## (undated) DEVICE — SYSTEM CLEARIFY VISUALIZATION (10EA/PK)

## (undated) DEVICE — BAG SPONGE COUNT 10.25 X 32 - BLUE (250/CA)

## (undated) DEVICE — SOLUTION NORMOSOL-4 INJ 1000ML

## (undated) DEVICE — TUBING CLEARLINK DUO-VENT - C-FLO (48EA/CA)

## (undated) DEVICE — SENSOR SPO2 NEO LNCS ADHESIVE (20/BX) SEE USER NOTES

## (undated) DEVICE — CATHETER THERMALDILUTION SWAN - (5EA/CA)

## (undated) DEVICE — ELECTRODE 5MM LHK LAPSCP STERILE DISP- MEGADYNE  (5/CA)

## (undated) DEVICE — SODIUM CHL. INJ. 0.9% 500ML (24EA/CA 50CA/PF)

## (undated) DEVICE — SHEAR HS FOCUS 9CM CVD - (6/BX)

## (undated) DEVICE — GLOVE BIOGEL SZ 8.5 SURGICAL PF LTX - (50PR/BX 4BX/CA)

## (undated) DEVICE — QUICKLOADS COR-KNOT TITANIUM - (6EA/PK 12PK/BX)

## (undated) DEVICE — SUTURE 4-0 MONOCRYL PLUS PS-2 - 27 INCH (36/BX)

## (undated) DEVICE — BLADE SURGICAL #15 - (50/BX 3BX/CA)

## (undated) DEVICE — DRAPE LAPAROTOMY T SHEET - (12EA/CA)

## (undated) DEVICE — PACK CV DRAPING/BASIN 2PART - (1/CA)

## (undated) DEVICE — Device

## (undated) DEVICE — TOWELS CLOTH SURGICAL - (4/PK 20PK/CA)

## (undated) DEVICE — GOWN SURGEONS X-LARGE - DISP. (30/CA)

## (undated) DEVICE — CHLORAPREP 26 ML APPLICATOR - ORANGE TINT(25/CA)

## (undated) DEVICE — LEAD PACING TEMP MYO - (12/BX)

## (undated) DEVICE — CANNULA W/SEAL 5X100 Z-THRE - ADED KII (12/BX)

## (undated) DEVICE — LACTATED RINGERS INJ 1000 ML - (14EA/CA 60CA/PF)

## (undated) DEVICE — GLOVE BIOGEL INDICATOR SZ 6.5 SURGICAL PF LTX - (50PR/BX 4BX/CA)

## (undated) DEVICE — SYRINGE NON SAFETY 3 CC 21 GA X 1 1/2 IN (100/BX 8BX/CA)

## (undated) DEVICE — BAG RESUSCITATION DISPOSABLE - WITH MASK (10 EA/CA)

## (undated) DEVICE — ELECTRODE DUAL RETURN W/ CORD - (50/PK)

## (undated) DEVICE — SLEEVE, VASO, THIGH, MED

## (undated) DEVICE — MICRODRIP PRIMARY VENTED 60 (48EA/CA) THIS WAS PART #2C8428 WHICH WAS DISCONTINUED

## (undated) DEVICE — TUBE CHEST 32FR. STRAIGHT - (10EA/CA)

## (undated) DEVICE — SUTURE 3-0 VICRYL PLUS SH - 27 INCH (36/BX)

## (undated) DEVICE — GLOVE BIOGEL SZ 8 SURGICAL PF LTX - (50PR/BX 4BX/CA)

## (undated) DEVICE — GLOVE BIOGEL SZ 7.5 SURGICAL PF LTX - (50PR/BX 4BX/CA)

## (undated) DEVICE — CANISTER SUCTION 3000ML MECHANICAL FILTER AUTO SHUTOFF MEDI-VAC NONSTERILE LF DISP  (40EA/CA)

## (undated) DEVICE — PAD LAP STERILE 18 X 18 - (5/PK 40PK/CA)

## (undated) DEVICE — CLIP MED INTNL HRZN TI ESCP - (25/BX)

## (undated) DEVICE — ELECTRODE 850 FOAM ADHESIVE - HYDROGEL RADIOTRNSPRNT (50/PK)

## (undated) DEVICE — RETRACTOR OFF PUMP OCTO ONLY - 10/BX

## (undated) DEVICE — MASK ANESTHESIA ADULT  - (100/CA)

## (undated) DEVICE — BOVIE NEEDLE TIP 3CM COLORADO

## (undated) DEVICE — SET FLUID WARMING STANDARD FLOW - (10/CA)

## (undated) DEVICE — GOWN WARMING STANDARD FLEX - (30/CA)

## (undated) DEVICE — SUTURE GENERAL

## (undated) DEVICE — CATHETER CHOLANGIOGRAM TAUT - (10/BX)

## (undated) DEVICE — QUICKLOADS COR-KNOT TITANIUM - (12/BX)

## (undated) DEVICE — DRAPE ABDOMINAL STERILE LAPAROSCOPIC 102IN X 121IN 77IN (12EA/CA)

## (undated) DEVICE — DEVICE KIT COR-KNOT COMBO2 DEVICES & 12 KNOTS PER KIT (6KT/CA)

## (undated) DEVICE — DRAPE LG. APERTURE 33 X 51" - (10EA/BX)"

## (undated) DEVICE — SUTURE 2-0 ETHIBOND V-5 - (6/BX)

## (undated) DEVICE — PTFE PLED STER - (250/CA)

## (undated) DEVICE — SUTURE 3-0 VICRYL PLUS SH - 8X 18 INCH (12/BX)

## (undated) DEVICE — SET BIFURCATED BLOOD - (48EA/CS)

## (undated) DEVICE — INSERT STEALTH #5 - (10/BX)

## (undated) DEVICE — SUTURE 4-0 30CM STRATAFIX SPIRAL PS-2 (12EA/BX)

## (undated) DEVICE — DERMABOND ADVANCED - (12EA/BX)

## (undated) DEVICE — CATHETER IV 20 GA X 1-1/4 ---SURG.& SDS ONLY--- (50EA/BX)

## (undated) DEVICE — KIT ANESTHESIA W/CIRCUIT & 3/LT BAG W/FILTER (20EA/CA)

## (undated) DEVICE — WATER IRRIGATION STERILE 1000ML (12EA/CA)

## (undated) DEVICE — ARMBOARD  SMALL IV 9 INLONG - (25EA/CA)

## (undated) DEVICE — KIT  I.V. START (100EA/CA)

## (undated) DEVICE — SET EXTENSION WITH 2 PORTS (48EA/CA) ***PART #2C8610 IS A SUBSTITUTE*****

## (undated) DEVICE — SUTURE 6-0 PROLENE RB-2 D/A 30 (36PK/BX)"

## (undated) DEVICE — DRAPE MAYO STAND - (30/CA)

## (undated) DEVICE — GLOVE BIOGEL PI INDICATOR SZ 8.0 SURGICAL PF LF -(50/BX 4BX/CA)

## (undated) DEVICE — FIBRILLAR SURGICEL 4X4 - 10/CA

## (undated) DEVICE — CLIP SM INTNL HRZN TI ESCP LGT - (24EA/PK 25PK/BX)

## (undated) DEVICE — GLOVE SZ 7.5 BIOGEL PI MICRO - PF LF (50PR/BX)

## (undated) DEVICE — FILTER BLOOD TRANSFUSION - (40/CA) (PALL)

## (undated) DEVICE — TROCAR 5X100 NON BLADED Z-TH - READ KII (6/BX)

## (undated) DEVICE — GLOVE BIOGEL INDICATOR SZ 8 SURGICAL PF LTX - (50/BX 4BX/CA)

## (undated) DEVICE — TROCAR LAPSCP 100MM 12MM NTHRD - (6/BX)

## (undated) DEVICE — SUTURE 5-0 PROLENE RB-1 D/A 36 (36PK/BX)"

## (undated) DEVICE — SCISSORS 5MM CVD (6EA/BX)

## (undated) DEVICE — SENSOR OXIMETER ADULT SPO2 RD SET (20EA/BX)

## (undated) DEVICE — SYS DLV COST CLS RM TEMP - INJECTATE (CO-SET II) (10EA/CA)

## (undated) DEVICE — NEEDLE SAFETY 18 GA X 1 1/2 IN (100EA/BX)

## (undated) DEVICE — DECANTER FLD BLS - (50/CA)

## (undated) DEVICE — SUTURE OHS

## (undated) DEVICE — SUTURE 7-0 PROLENE BV-1 D/A 30 (36PK/BX)"

## (undated) DEVICE — D-5-W INJ. 500 ML - (24EA/CA)

## (undated) DEVICE — SPRING BULLDOG 1/2 FORCE BLUE - (10/BX)

## (undated) DEVICE — SUTURE 0 VICRYL PLUS UR-6 - 27 INCH (36/BX)

## (undated) DEVICE — TRAY SURESTEP FOLEY TEMP SENSING 16FR (10EA/CA) ORDER  #18764 FOR TEMP FOLEY ONLY

## (undated) DEVICE — DRESSING TRANSPARENT FILM TEGADERM 4 X 4.75" (50EA/BX)"

## (undated) DEVICE — CANISTER SUCTION RIGID RED 1500CC (40EA/CA)

## (undated) DEVICE — SLEEVE VASO CALF MED - (10PR/CA)

## (undated) DEVICE — SHUNT CORONARY 1.5 - 5/PK

## (undated) DEVICE — HEAD HOLDER JUNIOR/ADULT

## (undated) DEVICE — SYSTEM CHEST DRAIN ADULT/PEDS W/AUTO TRANSFUSION CAPABILITY SAHARA (6EA/CA)

## (undated) DEVICE — SPONGE GAUZESTER 4 X 4 4PLY - (128PK/CA)

## (undated) DEVICE — DRAPE MAGNETIC (INSTRA-MAG) - (30/CA)

## (undated) DEVICE — SUTURE 5-0 PROLENE C-1 D/A 24 (36PK/BX)"

## (undated) DEVICE — GLOVE BIOGEL SZ 6.5 SURGICAL PF LTX (50PR/BX 4BX/CA)

## (undated) DEVICE — SENSOR CEREBRAL AND SOMATIC MONITORING (20/CA)

## (undated) DEVICE — DRESSING TRANSPARENT FILM TEGADERM 2.375 X 2.75"  (100EA/BX)"

## (undated) DEVICE — TUBE CHEST 32FR. RIGHT ANGLED (10EA/CA)

## (undated) DEVICE — PROTECTOR ULNA NERVE - (36PR/CA)

## (undated) DEVICE — PACK VEIN - (19/CA)

## (undated) DEVICE — TRANSDUCER BIFURCATED MONITORING KIT (10EA/CA)

## (undated) DEVICE — SET TUBING PNEUMOCLEAR HIGH FLOW SMOKE EVACUATION (10EA/BX)

## (undated) DEVICE — COVER LIGHT HANDLE ALC PLUS DISP (18EA/BX)

## (undated) DEVICE — SUTURE 2-0 ETHIBOND V-5 30 (12EA/BX)"

## (undated) DEVICE — SPONGE PEANUT - (5/PK 50PK/CA)

## (undated) DEVICE — TRAY MULTI-LUMEN 7FR PRESSURE W/MAX BARRIER AND BIOPATCH - (5/CA)